# Patient Record
Sex: FEMALE | Race: WHITE | HISPANIC OR LATINO | Employment: PART TIME | URBAN - METROPOLITAN AREA
[De-identification: names, ages, dates, MRNs, and addresses within clinical notes are randomized per-mention and may not be internally consistent; named-entity substitution may affect disease eponyms.]

---

## 2017-11-07 ENCOUNTER — OFFICE VISIT (OUTPATIENT)
Dept: URGENT CARE | Facility: CLINIC | Age: 62
End: 2017-11-07
Payer: COMMERCIAL

## 2017-11-07 VITALS
RESPIRATION RATE: 16 BRPM | HEIGHT: 67 IN | DIASTOLIC BLOOD PRESSURE: 88 MMHG | TEMPERATURE: 98 F | SYSTOLIC BLOOD PRESSURE: 125 MMHG | WEIGHT: 203 LBS | HEART RATE: 87 BPM | OXYGEN SATURATION: 98 % | BODY MASS INDEX: 31.86 KG/M2

## 2017-11-07 DIAGNOSIS — J32.9 SINUSITIS, UNSPECIFIED CHRONICITY, UNSPECIFIED LOCATION: Primary | ICD-10-CM

## 2017-11-07 DIAGNOSIS — J02.9 ACUTE PHARYNGITIS, UNSPECIFIED ETIOLOGY: ICD-10-CM

## 2017-11-07 DIAGNOSIS — H69.93 EUSTACHIAN TUBE DYSFUNCTION, BILATERAL: ICD-10-CM

## 2017-11-07 LAB
CTP QC/QA: YES
S PYO RRNA THROAT QL PROBE: NEGATIVE

## 2017-11-07 PROCEDURE — 87880 STREP A ASSAY W/OPTIC: CPT | Mod: QW,S$GLB,, | Performed by: PHYSICIAN ASSISTANT

## 2017-11-07 PROCEDURE — 96372 THER/PROPH/DIAG INJ SC/IM: CPT | Mod: S$GLB,,, | Performed by: EMERGENCY MEDICINE

## 2017-11-07 PROCEDURE — 99203 OFFICE O/P NEW LOW 30 MIN: CPT | Mod: 25,S$GLB,, | Performed by: PHYSICIAN ASSISTANT

## 2017-11-07 RX ORDER — METFORMIN HYDROCHLORIDE 1000 MG/1
1000 TABLET, FILM COATED, EXTENDED RELEASE ORAL
COMMUNITY
End: 2018-10-30

## 2017-11-07 RX ORDER — CEFDINIR 300 MG/1
300 CAPSULE ORAL 2 TIMES DAILY
Qty: 20 CAPSULE | Refills: 0 | Status: SHIPPED | OUTPATIENT
Start: 2017-11-07 | End: 2017-11-07 | Stop reason: SDUPTHER

## 2017-11-07 RX ORDER — BETAMETHASONE SODIUM PHOSPHATE AND BETAMETHASONE ACETATE 3; 3 MG/ML; MG/ML
6 INJECTION, SUSPENSION INTRA-ARTICULAR; INTRALESIONAL; INTRAMUSCULAR; SOFT TISSUE ONCE
Status: COMPLETED | OUTPATIENT
Start: 2017-11-07 | End: 2017-11-07

## 2017-11-07 RX ORDER — CEFDINIR 300 MG/1
300 CAPSULE ORAL 2 TIMES DAILY
Qty: 20 CAPSULE | Refills: 0 | Status: SHIPPED | OUTPATIENT
Start: 2017-11-07 | End: 2017-11-17

## 2017-11-07 RX ORDER — INSULIN GLARGINE 100 [IU]/ML
INJECTION, SOLUTION SUBCUTANEOUS NIGHTLY
COMMUNITY
End: 2018-10-30 | Stop reason: SDUPTHER

## 2017-11-07 RX ORDER — HYDROCHLOROTHIAZIDE 25 MG/1
25 TABLET ORAL DAILY
COMMUNITY
End: 2018-10-30

## 2017-11-07 RX ADMIN — BETAMETHASONE SODIUM PHOSPHATE AND BETAMETHASONE ACETATE 6 MG: 3; 3 INJECTION, SUSPENSION INTRA-ARTICULAR; INTRALESIONAL; INTRAMUSCULAR; SOFT TISSUE at 01:11

## 2017-11-07 NOTE — PATIENT INSTRUCTIONS
- Please return here or go to the Emergency Department for any concerns or worsening of condition.   - If you were prescribed antibiotics, please take them to completion.  - With steroid injections there is a possible risk of temporary elevation of blood glucose (sugar) and/or exacerbation of diabetic symptoms throughout the effective duration of the medication(s) provided. Please monitor your blood sugar levels and follow up with your PCP should there be significant change in your blood sugar levels for an extended period time, especially if you develop any symptoms of uncontrolled diabetes.    - Please follow up with your primary care provider (PCP) or discussed specialist(s) as needed.         Sinusitis (Antibiotic Treatment)    The sinuses are air-filled spaces within the bones of the face. They connect to the inside of the nose. Sinusitis is an inflammation of the tissue lining the sinus cavity. Sinus inflammation can occur during a cold. It can also be due to allergies to pollens and other particles in the air. Sinusitis can cause symptoms of sinus congestion and fullness. A sinus infection causes fever, headache and facial pain. There is often green or yellow drainage from the nose or into the back of the throat (post-nasal drip). You have been given antibiotics to treat this condition.  Home care:  · Take the full course of antibiotics as instructed. Do not stop taking them, even if you feel better.  · Drink plenty of water, hot tea, and other liquids. This may help thin mucus. It also may promote sinus drainage.  · Heat may help soothe painful areas of the face. Use a towel soaked in hot water. Or,  the shower and direct the hot spray onto your face. Using a vaporizer along with a menthol rub at night may also help.   · An expectorant containing guaifenesin may help thin the mucus and promote drainage from the sinuses.  · Over-the-counter decongestants may be used unless a similar medicine was  prescribed. Nasal sprays work the fastest. Use one that contains phenylephrine or oxymetazoline. First blow the nose gently. Then use the spray. Do not use these medicines more often than directed on the label or symptoms may get worse. You may also use tablets containing pseudoephedrine. Avoid products that combine ingredients, because side effects may be increased. Read labels. You can also ask the pharmacist for help. (NOTE: Persons with high blood pressure should not use decongestants. They can raise blood pressure.)  · Over-the-counter antihistamines may help if allergies contributed to your sinusitis.    · Do not use nasal rinses or irrigation during an acute sinus infection, unless told to by your health care provider. Rinsing may spread the infection to other sinuses.  · Use acetaminophen or ibuprofen to control pain, unless another pain medicine was prescribed. (If you have chronic liver or kidney disease or ever had a stomach ulcer, talk with your doctor before using these medicines. Aspirin should never be used in anyone under 18 years of age who is ill with a fever. It may cause severe liver damage.)  · Don't smoke. This can worsen symptoms.  Follow-up care  Follow up with your healthcare provider or our staff if you are not improving within the next week.  When to seek medical advice  Call your healthcare provider if any of these occur:  · Facial pain or headache becoming more severe  · Stiff neck  · Unusual drowsiness or confusion  · Swelling of the forehead or eyelids  · Vision problems, including blurred or double vision  · Fever of 100.4ºF (38ºC) or higher, or as directed by your healthcare provider  · Seizure  · Breathing problems  · Symptoms not resolving within 10 days  Date Last Reviewed: 4/13/2015 © 2000-2017 [employer]. [address] [place] [address], [place] [address]. All rights reserved. This information is not intended as a substitute for professional medical care. Always follow  your healthcare professional's instructions.      Earache, No Infection (Adult)  Earaches can happen without an infection. This occurs when air and fluid build up behind the eardrum causing a feeling of fullness and discomfort and reduced hearing. This is called otitis media with effusion (OME) or serous otitis media. It means there is fluid in the middle ear. It is not the same as acute otitis media, which is typically from infection.  OME can happen when you have a cold if congestion blocks the passage that drains the middle ear. This passage is called the eustachian tube. OME may also occur with nasal allergies or after a bacterial middle ear infection.    The pain or discomfort may come and go. You may hear clicking or popping sounds when you chew or swallow. You may feel that your balance is off. Or you may hear ringing in the ear.  It often takes from several weeks up to 3 months for the fluid to clear on its own. Oral pain relievers and ear drops help if there is pain. Decongestants and antihistamines sometimes help. Antibiotics don't help since there is no infection. Your doctor may prescribe a nasal spray to help reduce swelling in the nose and eustachian tube. This can allow the ear to drain.  If your OME doesn't improve after 3 months, surgery may be used to drain the fluid and insert a small tube in the eardrum to allow continued drainage.  Because the middle ear fluid can become infected, it is important to watch for signs of an ear infection which may develop later. These signs include increased ear pain, fever, or drainage from the ear.  Home care  The following guidelines will help you care for yourself at home:  · You may use over-the-counter medicine as directed to control pain, unless another medicine was prescribed. If you have chronic liver or kidney disease or ever had a stomach ulcer or GI bleeding, talk with your doctor before using these medicines. Aspirin should never be used in anyone  under 18 years of age who is ill with a fever. It may cause severe liver damage.  · You may use over-the-counter decongestants such as phenylephrine or pseudoephedrine. But they are not always helpful. Don't use nasal spray decongestants more than 3 days. Longer use can make congestion worse. Prescription nasal sprays from your doctor don't typically have those restrictions.  · Antihistamines may help if you are also having allergy symptoms.  · You may use medicines such as guaifenesin to thin mucus and promote drainage.  Follow-up care  Follow up with your healthcare provider or as advised if you are not feeling better after 3 days.  When to seek medical advice  Call your healthcare provider right away if any of the following occur:  · Your ear pain gets worse or does not start to improve   · Fever of 100.4°F (38°C) or higher, or as directed by your healthcare provider  · Fluid or blood draining from the ear  · Headache or sinus pain  · Stiff neck  · Unusual drowsiness or confusion  Date Last Reviewed: 10/1/2016  © 6285-6236 [employer]. [address] [place] [address], [place] [address]. All rights reserved. This information is not intended as a substitute for professional medical care. Always follow your healthcare professional's instructions.

## 2017-11-07 NOTE — PROGRESS NOTES
"Subjective:       Patient ID: Jill Perez is a 62 y.o. female.    Vitals:  height is 5' 7" (1.702 m) and weight is 92.1 kg (203 lb). Her temperature is 98.4 °F (36.9 °C). Her blood pressure is 125/88 and her pulse is 87. Her respiration is 16 and oxygen saturation is 98%.     Chief Complaint: Sore Throat    States that she has been having sinus pressure and pain for over 2-3 weeks and recently flew in a plane and her symptoms became significantly worse (especially the ear pressure)      Sore Throat    This is a new problem. Episode onset: 1 week. The problem has been gradually worsening. Neither side of throat is experiencing more pain than the other. There has been no fever. The pain is at a severity of 8/10. The pain is moderate. Associated symptoms include congestion, ear pain, headaches and a plugged ear sensation. Pertinent negatives include no abdominal pain, coughing, diarrhea, hoarse voice, neck pain, shortness of breath or vomiting. She has tried acetaminophen and gargles for the symptoms. The treatment provided mild relief.   Sinus Problem   Associated symptoms include chills, congestion, ear pain, headaches, sinus pressure and a sore throat. Pertinent negatives include no coughing, hoarse voice, neck pain or shortness of breath.     Review of Systems   Constitution: Positive for chills and malaise/fatigue. Negative for fever.   HENT: Positive for congestion, ear pain, sinus pressure and sore throat. Negative for hoarse voice.    Eyes: Negative for blurred vision, [facility], pain, redness and visual disturbance.   Cardiovascular: Negative for chest pain, dyspnea on exertion, leg swelling, near-syncope and syncope.   Respiratory: Negative for cough, shortness of breath, sputum production and wheezing.    Hematologic/Lymphatic: Negative for adenopathy.   Skin: Negative for itching and rash.   Musculoskeletal: Negative for back pain, neck pain and stiffness.   Gastrointestinal: Negative for abdominal pain, " diarrhea, nausea and vomiting.   Neurological: Positive for headaches. Negative for dizziness, light-headedness and numbness.   Psychiatric/Behavioral: Negative for altered mental status.   Allergic/Immunologic: Negative for hives.   All other systems reviewed and are negative.      Objective:      Physical Exam   Constitutional: She is oriented to person, place, and time. She appears well-developed and well-nourished.  Non-toxic appearance. She has a sickly appearance. She does not appear ill. No distress.   HENT:   Head: Normocephalic and atraumatic.   Right Ear: External ear and ear canal normal. Tympanic membrane is bulging. A middle ear effusion (clear) is present.   Left Ear: External ear and ear canal normal. Tympanic membrane is bulging. A middle ear effusion (clear) is present.   Nose: Mucosal edema present. No epistaxis. Right sinus exhibits maxillary sinus tenderness and frontal sinus tenderness. Left sinus exhibits maxillary sinus tenderness and frontal sinus tenderness.   Mouth/Throat: Uvula is midline and mucous membranes are normal. No uvula swelling. Posterior oropharyngeal erythema present.   Eyes: Pupils are equal, round, and reactive to light.   Neck: Normal range of motion. Neck supple.   Cardiovascular: Normal rate, regular rhythm and normal heart sounds.  Exam reveals no gallop and no friction rub.    No murmur heard.  Pulmonary/Chest: Effort normal and breath sounds normal. No respiratory distress. She has no decreased breath sounds. She has no wheezes. She has no rhonchi. She has no rales.   Musculoskeletal: Normal range of motion.   Lymphadenopathy:        Head (right side): No submental, no submandibular, no tonsillar, no preauricular, no posterior auricular and no occipital adenopathy present.        Head (left side): No submental, no submandibular, no tonsillar, no preauricular, no posterior auricular and no occipital adenopathy present.     She has no cervical adenopathy.        Right  "cervical: No posterior cervical adenopathy present.       Left cervical: No posterior cervical adenopathy present.        Right: No supraclavicular adenopathy present.        Left: No supraclavicular adenopathy present.   Neurological: She is alert and oriented to person, place, and time. She is not disoriented. Coordination and gait normal.   Skin: No abrasion, no ecchymosis, no laceration and no rash noted. No erythema.   Psychiatric: She has a normal mood and affect. Her behavior is normal.   Nursing note and vitals reviewed.      /88 (BP Location: Right arm, Patient Position: Sitting, BP Method: Large (Automatic))   Pulse 87   Temp 98.4 °F (36.9 °C)   Resp 16   Ht 5' 7" (1.702 m)   Wt 92.1 kg (203 lb)   SpO2 98%   BMI 31.79 kg/m²      Assessment:       1. Sinusitis, unspecified chronicity, unspecified location    2. Acute pharyngitis, unspecified etiology    3. Eustachian tube dysfunction, bilateral        Plan:         Sinusitis, unspecified chronicity, unspecified location  -     betamethasone acetate-betamethasone sodium phosphate injection 6 mg; Inject 1 mL (6 mg total) into the muscle once.  -     Discontinue: cefdinir (OMNICEF) 300 MG capsule; Take 1 capsule (300 mg total) by mouth 2 (two) times daily.  Dispense: 20 capsule; Refill: 0  -     cefdinir (OMNICEF) 300 MG capsule; Take 1 capsule (300 mg total) by mouth 2 (two) times daily.  Dispense: 20 capsule; Refill: 0    Acute pharyngitis, unspecified etiology  -     POCT rapid strep A  -     betamethasone acetate-betamethasone sodium phosphate injection 6 mg; Inject 1 mL (6 mg total) into the muscle once.  -     Discontinue: cefdinir (OMNICEF) 300 MG capsule; Take 1 capsule (300 mg total) by mouth 2 (two) times daily.  Dispense: 20 capsule; Refill: 0  -     cefdinir (OMNICEF) 300 MG capsule; Take 1 capsule (300 mg total) by mouth 2 (two) times daily.  Dispense: 20 capsule; Refill: 0    Eustachian tube dysfunction, bilateral  -     " betamethasone acetate-betamethasone sodium phosphate injection 6 mg; Inject 1 mL (6 mg total) into the muscle once.      - Please return here or go to the Emergency Department for any concerns or worsening of condition.   - If you were prescribed antibiotics, please take them to completion.  - With steroid injections there is a possible risk of temporary elevation of blood glucose (sugar) and/or exacerbation of diabetic symptoms throughout the effective duration of the medication(s) provided. Please monitor your blood sugar levels and follow up with your PCP should there be significant change in your blood sugar levels for an extended period time, especially if you develop any symptoms of uncontrolled diabetes.    - Please follow up with your primary care provider (PCP) or discussed specialist(s) as needed.

## 2018-10-23 ENCOUNTER — TELEPHONE (OUTPATIENT)
Dept: FAMILY MEDICINE | Facility: CLINIC | Age: 63
End: 2018-10-23

## 2018-10-23 ENCOUNTER — LAB VISIT (OUTPATIENT)
Dept: LAB | Facility: HOSPITAL | Age: 63
End: 2018-10-23
Attending: FAMILY MEDICINE
Payer: COMMERCIAL

## 2018-10-23 DIAGNOSIS — E78.5 HYPERLIPIDEMIA, UNSPECIFIED HYPERLIPIDEMIA TYPE: ICD-10-CM

## 2018-10-23 DIAGNOSIS — Z79.4 TYPE 2 DIABETES MELLITUS WITH HYPERGLYCEMIA, WITH LONG-TERM CURRENT USE OF INSULIN: ICD-10-CM

## 2018-10-23 DIAGNOSIS — Z01.419 WELL WOMAN EXAM: Primary | ICD-10-CM

## 2018-10-23 DIAGNOSIS — E11.65 TYPE 2 DIABETES MELLITUS WITH HYPERGLYCEMIA, WITH LONG-TERM CURRENT USE OF INSULIN: ICD-10-CM

## 2018-10-23 DIAGNOSIS — E11.65 POORLY CONTROLLED DIABETES MELLITUS: ICD-10-CM

## 2018-10-23 DIAGNOSIS — I10 ESSENTIAL HYPERTENSION: ICD-10-CM

## 2018-10-23 DIAGNOSIS — Z01.419 WELL WOMAN EXAM: ICD-10-CM

## 2018-10-23 LAB
25(OH)D3+25(OH)D2 SERPL-MCNC: 20 NG/ML
ALBUMIN SERPL BCP-MCNC: 3.8 G/DL
ALP SERPL-CCNC: 115 U/L
ALT SERPL W/O P-5'-P-CCNC: 11 U/L
ANION GAP SERPL CALC-SCNC: 11 MMOL/L
AST SERPL-CCNC: 19 U/L
BASOPHILS # BLD AUTO: 0.02 K/UL
BASOPHILS NFR BLD: 0.4 %
BILIRUB SERPL-MCNC: 0.4 MG/DL
BUN SERPL-MCNC: 17 MG/DL
CALCIUM SERPL-MCNC: 10.2 MG/DL
CHLORIDE SERPL-SCNC: 103 MMOL/L
CHOLEST SERPL-MCNC: 244 MG/DL
CHOLEST/HDLC SERPL: 3.7 {RATIO}
CO2 SERPL-SCNC: 26 MMOL/L
CREAT SERPL-MCNC: 0.8 MG/DL
DIFFERENTIAL METHOD: ABNORMAL
EOSINOPHIL # BLD AUTO: 0.1 K/UL
EOSINOPHIL NFR BLD: 1.3 %
ERYTHROCYTE [DISTWIDTH] IN BLOOD BY AUTOMATED COUNT: 16.2 %
EST. GFR  (AFRICAN AMERICAN): >60 ML/MIN/1.73 M^2
EST. GFR  (NON AFRICAN AMERICAN): >60 ML/MIN/1.73 M^2
ESTIMATED AVG GLUCOSE: 166 MG/DL
GLUCOSE SERPL-MCNC: 157 MG/DL
HBA1C MFR BLD HPLC: 7.4 %
HCT VFR BLD AUTO: 40.5 %
HDLC SERPL-MCNC: 66 MG/DL
HDLC SERPL: 27 %
HGB BLD-MCNC: 13 G/DL
IMM GRANULOCYTES # BLD AUTO: 0.02 K/UL
IMM GRANULOCYTES NFR BLD AUTO: 0.4 %
LDLC SERPL CALC-MCNC: 157.8 MG/DL
LYMPHOCYTES # BLD AUTO: 2.4 K/UL
LYMPHOCYTES NFR BLD: 43.3 %
MCH RBC QN AUTO: 27.3 PG
MCHC RBC AUTO-ENTMCNC: 32.1 G/DL
MCV RBC AUTO: 85 FL
MONOCYTES # BLD AUTO: 0.5 K/UL
MONOCYTES NFR BLD: 9.6 %
NEUTROPHILS # BLD AUTO: 2.5 K/UL
NEUTROPHILS NFR BLD: 45 %
NONHDLC SERPL-MCNC: 178 MG/DL
NRBC BLD-RTO: 0 /100 WBC
PLATELET # BLD AUTO: 410 K/UL
PMV BLD AUTO: 10.8 FL
POTASSIUM SERPL-SCNC: 4.1 MMOL/L
PROT SERPL-MCNC: 8.3 G/DL
RBC # BLD AUTO: 4.76 M/UL
SODIUM SERPL-SCNC: 140 MMOL/L
TRIGL SERPL-MCNC: 101 MG/DL
TSH SERPL DL<=0.005 MIU/L-ACNC: 1.67 UIU/ML
WBC # BLD AUTO: 5.43 K/UL

## 2018-10-23 PROCEDURE — 36415 COLL VENOUS BLD VENIPUNCTURE: CPT | Mod: PO

## 2018-10-23 PROCEDURE — 83036 HEMOGLOBIN GLYCOSYLATED A1C: CPT

## 2018-10-23 PROCEDURE — 85025 COMPLETE CBC W/AUTO DIFF WBC: CPT

## 2018-10-23 PROCEDURE — 84443 ASSAY THYROID STIM HORMONE: CPT

## 2018-10-23 PROCEDURE — 80053 COMPREHEN METABOLIC PANEL: CPT

## 2018-10-23 PROCEDURE — 80061 LIPID PANEL: CPT

## 2018-10-23 PROCEDURE — 82306 VITAMIN D 25 HYDROXY: CPT

## 2018-10-30 ENCOUNTER — OFFICE VISIT (OUTPATIENT)
Dept: FAMILY MEDICINE | Facility: CLINIC | Age: 63
End: 2018-10-30
Payer: COMMERCIAL

## 2018-10-30 VITALS
WEIGHT: 203.94 LBS | HEIGHT: 67 IN | HEART RATE: 97 BPM | BODY MASS INDEX: 32.01 KG/M2 | OXYGEN SATURATION: 97 % | DIASTOLIC BLOOD PRESSURE: 88 MMHG | SYSTOLIC BLOOD PRESSURE: 118 MMHG

## 2018-10-30 DIAGNOSIS — R79.89 LOW VITAMIN D LEVEL: ICD-10-CM

## 2018-10-30 DIAGNOSIS — Z12.4 CERVICAL CANCER SCREENING: ICD-10-CM

## 2018-10-30 DIAGNOSIS — E78.49 OTHER HYPERLIPIDEMIA: ICD-10-CM

## 2018-10-30 DIAGNOSIS — Z11.59 NEED FOR HEPATITIS C SCREENING TEST: ICD-10-CM

## 2018-10-30 DIAGNOSIS — Z23 NEED FOR INFLUENZA VACCINATION: ICD-10-CM

## 2018-10-30 DIAGNOSIS — I10 ESSENTIAL HYPERTENSION: ICD-10-CM

## 2018-10-30 DIAGNOSIS — Z23 NEED FOR VACCINATION AGAINST STREPTOCOCCUS PNEUMONIAE: ICD-10-CM

## 2018-10-30 DIAGNOSIS — E11.65 TYPE 2 DIABETES MELLITUS WITH HYPERGLYCEMIA, WITH LONG-TERM CURRENT USE OF INSULIN: ICD-10-CM

## 2018-10-30 DIAGNOSIS — Z79.4 TYPE 2 DIABETES MELLITUS WITH HYPERGLYCEMIA, WITH LONG-TERM CURRENT USE OF INSULIN: ICD-10-CM

## 2018-10-30 DIAGNOSIS — Z23 NEED FOR DIPHTHERIA-TETANUS-PERTUSSIS (TDAP) VACCINE: ICD-10-CM

## 2018-10-30 DIAGNOSIS — Z12.11 COLON CANCER SCREENING: ICD-10-CM

## 2018-10-30 DIAGNOSIS — Z98.84 S/P GASTRIC BYPASS: ICD-10-CM

## 2018-10-30 DIAGNOSIS — F51.01 PRIMARY INSOMNIA: ICD-10-CM

## 2018-10-30 DIAGNOSIS — Z01.419 WELL WOMAN EXAM: Primary | ICD-10-CM

## 2018-10-30 DIAGNOSIS — Z12.39 SCREENING FOR MALIGNANT NEOPLASM OF BREAST: ICD-10-CM

## 2018-10-30 DIAGNOSIS — Z90.81 S/P SPLENECTOMY: ICD-10-CM

## 2018-10-30 PROCEDURE — 99999 PR PBB SHADOW E&M-EST. PATIENT-LVL V: CPT | Mod: PBBFAC,,, | Performed by: FAMILY MEDICINE

## 2018-10-30 PROCEDURE — 99386 PREV VISIT NEW AGE 40-64: CPT | Mod: 25,S$GLB,, | Performed by: FAMILY MEDICINE

## 2018-10-30 PROCEDURE — 90686 IIV4 VACC NO PRSV 0.5 ML IM: CPT | Mod: S$GLB,,, | Performed by: FAMILY MEDICINE

## 2018-10-30 PROCEDURE — 90471 IMMUNIZATION ADMIN: CPT | Mod: S$GLB,,, | Performed by: FAMILY MEDICINE

## 2018-10-30 PROCEDURE — 3045F PR MOST RECENT HEMOGLOBIN A1C LEVEL 7.0-9.0%: CPT | Mod: CPTII,S$GLB,, | Performed by: FAMILY MEDICINE

## 2018-10-30 RX ORDER — LISINOPRIL AND HYDROCHLOROTHIAZIDE 12.5; 2 MG/1; MG/1
TABLET ORAL
Qty: 90 TABLET | Refills: 1 | Status: SHIPPED | OUTPATIENT
Start: 2018-10-30 | End: 2019-01-22 | Stop reason: SDUPTHER

## 2018-10-30 RX ORDER — ERGOCALCIFEROL 1.25 MG/1
50000 CAPSULE ORAL
Qty: 12 CAPSULE | Refills: 0 | Status: SHIPPED | OUTPATIENT
Start: 2018-10-30 | End: 2019-06-05

## 2018-10-30 RX ORDER — ZOLPIDEM TARTRATE 10 MG/1
TABLET ORAL
Refills: 0 | COMMUNITY
Start: 2018-10-15 | End: 2018-10-30 | Stop reason: SDUPTHER

## 2018-10-30 RX ORDER — ATORVASTATIN CALCIUM 40 MG/1
40 TABLET, FILM COATED ORAL DAILY
Qty: 90 TABLET | Refills: 3 | Status: SHIPPED | OUTPATIENT
Start: 2018-10-30 | End: 2019-01-22 | Stop reason: SDUPTHER

## 2018-10-30 RX ORDER — ZOLPIDEM TARTRATE 5 MG/1
5 TABLET ORAL NIGHTLY PRN
Qty: 90 TABLET | Refills: 0 | Status: SHIPPED | OUTPATIENT
Start: 2018-10-30 | End: 2018-12-11

## 2018-10-30 RX ORDER — INSULIN GLARGINE 100 [IU]/ML
20 INJECTION, SOLUTION SUBCUTANEOUS NIGHTLY
Qty: 60 ML | Refills: 0 | Status: SHIPPED | OUTPATIENT
Start: 2018-10-30 | End: 2019-01-22

## 2018-10-30 RX ORDER — LISINOPRIL AND HYDROCHLOROTHIAZIDE 12.5; 2 MG/1; MG/1
TABLET ORAL
Refills: 1 | COMMUNITY
Start: 2018-10-15 | End: 2018-10-30 | Stop reason: SDUPTHER

## 2018-10-30 RX ORDER — METFORMIN HYDROCHLORIDE 1000 MG/1
1000 TABLET ORAL 2 TIMES DAILY WITH MEALS
Qty: 180 TABLET | Refills: 0 | Status: SHIPPED | OUTPATIENT
Start: 2018-10-30 | End: 2019-01-22 | Stop reason: SDUPTHER

## 2018-10-30 NOTE — PATIENT INSTRUCTIONS
Please call your old physician Dr. Church for pneumonia vaccination records    Small sustainable changes  Increase intake of fiber  Decrease carb intake  Decrease drinks with added sugar such as soda or juice  1-2 servings of citrus fruits, berries, pineapple or melon a day (1/2 cup)  Avoid fried foods  No grains, rice, pasta, potatoes, bread, corn, peas, oatmeal, grits, tortillas, crackers, chips  Increase exercise  Handouts given  Consider [facility] alejandro  Follow up in 3 months      Lantus 10 units daily - instructed to titrate up by 2 units every 3-4 days if blood glucose consistently above 130 in the morning after 10 days  Metformin - continue 1000 mg bid  Start once weekly trulicity - monitor for any issues such as swallowing or neck swelling  Reviewed patient's current insulin regimen. Counseled to rotate injection sites.  Advised frequent self blood glucose monitoring.  Patient encouraged to document glucose results and bring them to every clinic visit. Will send me a log on patient portal in 2 weeks.  Hypoglycemia precautions discussed. Instructed on precautions before driving.    Close adherence to lifestyle changes recommended    Diabetes Management Status    Statin: Not taking  ACE/ARB: Taking    Screening or Prevention Patient's value Goal Complete/Controlled?   HgA1C Testing and Control   Lab Results   Component Value Date    HGBA1C 7.4 (H) 10/23/2018      Annually/Less than 8% Yes   Lipid profile : 10/23/2018 Annually Yes   LDL control Lab Results   Component Value Date    LDLCALC 157.8 10/23/2018    Annually/Less than 100 mg/dl  No   Nephropathy screening Lab Results   Component Value Date    LABMICR 18.0 10/23/2018     Lab Results   Component Value Date    PROTEINUA Negative 08/24/2015    Annually Yes   Blood pressure BP Readings from Last 1 Encounters:   10/30/18 118/88    Less than 140/90 Yes   Dilated retinal exam Most Recent Eye Exam Date: Not Found Annually No   Foot exam   : 10/30/2018  Annually No

## 2018-10-30 NOTE — PROGRESS NOTES
Subjective:       Patient ID: Jill Perez is a 63 y.o. female.    Chief Complaint: Establish Care    Jill Perez is a 63 y.o. female who presents today to establish care.     Diet: she doesn't watch what she eats. She drinks soda, every day. She doesn't drink juice. She drinks coffee with sweet and low, about 4 cups/day.   Exercise: she walks, about 3 times a week. Has lost >200 pounds since gastric bypass!    Flu: ordered  Tdap: 3 years ago, she stepped on a nail and got one  Shingles Vaccine: ordered at pharmacy  PNA Vaccine: records pending  Labs: reviewed today  C-scope: last colonoscopy was at age 52  Mammogram: ordered  Pap: never had abnormal pap smear. Desires gyn    PMHx: reviewed in EMR and updated  Meds: reviewed in EMR and updated  Shx: reviewed in EMR and updated  FMHx: reviewed in EMR and updated  Social: moved [place] from [place] Originally lived [place].  is also a patient of mine.     Labs as below reviewed in detail.       Review of Systems   Constitutional: Negative for chills and fever.   Respiratory: Negative for cough and shortness of breath.    Cardiovascular: Negative for chest pain.   Gastrointestinal: Negative for blood in stool, constipation, diarrhea, nausea and vomiting.   Genitourinary: Negative for difficulty urinating.         Health Maintenance Due   Topic Date Due    Hepatitis C Screening  1955    Eye Exam  04/28/1967    Pneumococcal PPSV23 (Medium Risk) (1) 04/28/1975    Pap Smear with HPV Cotest  04/28/1978    Mammogram  04/28/1997    Colonoscopy  04/28/2007    Zoster Vaccine  04/28/2017    Influenza Vaccine  08/01/2018     Immunization History   Administered Date(s) Administered    Influenza - Quadrivalent - PF 12/22/2016, 10/30/2018    Pneumococcal Conjugate - 13 Valent 12/22/2016       Lab Review   Lab Visit on 10/23/2018   Component Date Value    Hemoglobin A1C 10/23/2018 7.4*    Estimated Avg Glucose 10/23/2018 166*    WBC 10/23/2018  5.43     RBC 10/23/2018 4.76     Hemoglobin 10/23/2018 13.0     Hematocrit 10/23/2018 40.5     MCV 10/23/2018 85     MCH 10/23/2018 27.3     MCHC 10/23/2018 32.1     RDW 10/23/2018 16.2*    Platelets 10/23/2018 410*    MPV 10/23/2018 10.8     Sodium 10/23/2018 140     Potassium 10/23/2018 4.1     Chloride 10/23/2018 103     CO2 10/23/2018 26     Glucose 10/23/2018 157*    BUN, Bld 10/23/2018 17     Creatinine 10/23/2018 0.8     Calcium 10/23/2018 10.2     Total Protein 10/23/2018 8.3     Albumin 10/23/2018 3.8     Total Bilirubin 10/23/2018 0.4     Alkaline Phosphatase 10/23/2018 115     AST 10/23/2018 19     ALT 10/23/2018 11     Anion Gap 10/23/2018 11     eGFR if African American 10/23/2018 >60.0     eGFR if non  Amer* 10/23/2018 >60.0     Cholesterol 10/23/2018 244*    Triglycerides 10/23/2018 101     HDL 10/23/2018 66     LDL Cholesterol 10/23/2018 157.8     HDL/Chol Ratio 10/23/2018 27.0     Total Cholesterol/HDL Ra* 10/23/2018 3.7     Non-HDL Cholesterol 10/23/2018 178     TSH 10/23/2018 1.667     Vit D, 25-Hydroxy 10/23/2018 20*   Lab Visit on 10/23/2018   Component Date Value    Microalbum.,U,Random 10/23/2018 18.0     Creatinine, Random Ur 10/23/2018 238.0     Microalb Creat Ratio 10/23/2018 7.6         Objective:     Vitals:    10/30/18 0910   BP: 118/88   Pulse: 97        Physical Exam   Constitutional: She is oriented to person, place, and time. She appears well-developed and well-nourished.   HENT:   Head: Normocephalic and atraumatic.   Right Ear: Tympanic membrane, external ear and ear canal normal.   Left Ear: Tympanic membrane, external ear and ear canal normal.   Nose: Nose normal.   Mouth/Throat: Oropharynx is clear and moist and mucous membranes are normal.   Eyes: Conjunctivae are normal. Pupils are equal, round, and reactive to light.   Neck: Normal range of motion. Neck supple.   Cardiovascular: Normal rate, regular rhythm and normal heart sounds.    Pulmonary/Chest: Effort normal and breath sounds normal. No respiratory distress.   Abdominal: Soft. Bowel sounds are normal. She exhibits no distension.   Genitourinary:   Genitourinary Comments: deferred    Musculoskeletal: She exhibits no edema.   Feet:   Right Foot:   Protective Sensation: 10 sites tested. 10 sites sensed.   Skin Integrity: Negative for ulcer, blister, skin breakdown, erythema, warmth, callus or dry skin.   Left Foot:   Protective Sensation: 10 sites tested. 10 sites sensed.   Skin Integrity: Negative for ulcer, blister, skin breakdown, erythema, warmth, callus or dry skin.   Neurological: She is alert and oriented to person, place, and time. No cranial nerve deficit or sensory deficit. She exhibits normal muscle tone.   Slightly diminished sensation on the left foot compared to the right s/p knee surgery per patient.    Skin: Skin is warm and dry.   Psychiatric: She has a normal mood and affect. Her speech is normal and behavior is normal. Judgment and thought content normal.   Nursing note and vitals reviewed.      Assessment:       1. Well woman exam    2. Colon cancer screening    3. Need for influenza vaccination    4. Need for diphtheria-tetanus-pertussis (Tdap) vaccine    5. Screening for malignant neoplasm of breast    6. Need for hepatitis C screening test    7. Need for vaccination against Streptococcus pneumoniae    8. S/P splenectomy    9. S/P gastric bypass    10. Primary insomnia    11. Cervical cancer screening    12. Other hyperlipidemia    13. Type 2 diabetes mellitus with hyperglycemia, with long-term current use of insulin    14. Essential hypertension    15. Low vitamin D level    16. BMI 31.0-31.9,adult        Plan:         Temporarily decrease to Lantus 10 units daily - instructed to titrate up by 2 units every 3-4 days if blood glucose consistently above 130 in the morning after 10 days ( this is due to Trulicity being started )    Metformin - continue 1000 mg  bid    Start once weekly trulicity - monitor for any issues such as swallowing or neck swelling.No family history of thyroid cancer. No other history of endocrine tumor noted in the family that patient is aware of. The only family history of cancer that patient is aware of is breast cancer in her sister.     PNA records pending, will give vaccinations as indicated given history of splenectomy once I have outside records. TDAP is UTD per patient. Flu shot given today. Order given for shingrix.     Other diagnosis and orders as below. Follow up in 6 weeks.     Well woman exam  ?Avoid tobacco  ?Be physically active  ?Maintain a healthy weight  ?Eat a diet rich in fruits, vegetables, and whole grains, and low in saturated/trans fat  ?Limit alcohol consumption  ?Protect against sexually transmitted infections  ?Avoid excess sun    Colon cancer screening  -     Case request GI: COLONOSCOPY    Need for influenza vaccination  -     Influenza - Quadrivalent (3 years & older) (PF)    Need for diphtheria-tetanus-pertussis (Tdap) vaccine  UTD per patient    Screening for malignant neoplasm of breast  -     Mammo Digital Screening Bilat with Tomosynthesis with CAD; Future; Expected date: 10/30/2018    Need for hepatitis C screening test  -     Hepatitis C antibody; Future; Expected date: 10/30/2018    Need for vaccination against Streptococcus pneumoniaeS/P splenectomy    S/P gastric bypass  -     Vitamin B12; Future; Expected date: 10/30/2018  -     VITAMIN B1; Future; Expected date: 10/30/2018  -     FOLATE; Future; Expected date: 10/30/2018    Primary insomnia  -     zolpidem (AMBIEN) 5 MG Tab; Take 1 tablet (5 mg total) by mouth nightly as needed.  Dispense: 90 tablet; Refill: 0    Cervical cancer screening  -     Ambulatory referral to Obstetrics / Gynecology    Other hyperlipidemia  -     atorvastatin (LIPITOR) 40 MG tablet; Take 1 tablet (40 mg total) by mouth once daily.  Dispense: 90 tablet; Refill: 3    Type 2 diabetes  mellitus with hyperglycemia, with long-term current use of insulin  -     dulaglutide (TRULICITY) 0.75 mg/0.5 mL PnIj; Inject 0.5 mLs (0.75 mg total) into the skin every 7 days.  Dispense: 6 mL; Refill: 0  -     metFORMIN (GLUCOPHAGE) 1000 MG tablet; Take 1 tablet (1,000 mg total) by mouth 2 (two) times daily with meals.  Dispense: 180 tablet; Refill: 0  -     insulin glargine (LANTUS) 100 unit/mL injection; Inject 20 Units into the skin every evening.  Dispense: 60 mL; Refill: 0  -     Ambulatory Referral to Diabetes Education    Essential hypertension  -     lisinopril-hydrochlorothiazide (PRINZIDE,ZESTORETIC) 20-12.5 mg per tablet; Take 1 tablet by mouth once daily.  Dispense: 90 tablet; Refill: 1      Warning signs discussed, patient to call with any further issues or worsening of symptoms.

## 2018-10-31 ENCOUNTER — TELEPHONE (OUTPATIENT)
Dept: GASTROENTEROLOGY | Facility: CLINIC | Age: 63
End: 2018-10-31

## 2018-11-01 ENCOUNTER — TELEPHONE (OUTPATIENT)
Dept: PHARMACY | Facility: CLINIC | Age: 63
End: 2018-11-01

## 2018-11-01 NOTE — TELEPHONE ENCOUNTER
Good Afternoon.    The prior authorization for Ms. Perez's Trulicity prescription has been approved through 11/1/2019.    The patient has been notified and is aware of the medication approval.    If there are any additional questions about the medications approval please contact the [facility] at, [phone].    Thanks.    Barb Olguin CPhT.  Patient Care Advocate  [facility]  [email or web address]

## 2018-11-06 ENCOUNTER — HOSPITAL ENCOUNTER (OUTPATIENT)
Dept: RADIOLOGY | Facility: HOSPITAL | Age: 63
Discharge: HOME | End: 2018-11-06
Attending: FAMILY MEDICINE
Payer: COMMERCIAL

## 2018-11-06 DIAGNOSIS — Z12.39 SCREENING FOR MALIGNANT NEOPLASM OF BREAST: ICD-10-CM

## 2018-11-06 PROCEDURE — 77063 BREAST TOMOSYNTHESIS BI: CPT | Mod: TC

## 2018-11-06 PROCEDURE — 77063 BREAST TOMOSYNTHESIS BI: CPT | Mod: 26,,, | Performed by: RADIOLOGY

## 2018-11-06 PROCEDURE — 77067 SCR MAMMO BI INCL CAD: CPT | Mod: TC

## 2018-11-06 PROCEDURE — 77067 SCR MAMMO BI INCL CAD: CPT | Mod: 26,,, | Performed by: RADIOLOGY

## 2018-11-08 ENCOUNTER — TELEPHONE (OUTPATIENT)
Dept: RADIOLOGY | Facility: HOSPITAL | Age: 63
End: 2018-11-08

## 2018-11-19 ENCOUNTER — TELEPHONE (OUTPATIENT)
Dept: FAMILY MEDICINE | Facility: CLINIC | Age: 63
End: 2018-11-19

## 2018-11-19 DIAGNOSIS — Z12.4 CERVICAL CANCER SCREENING: Primary | ICD-10-CM

## 2018-11-19 NOTE — TELEPHONE ENCOUNTER
Please call the patient regarding her normal result. There is no mammographic evidence of malignancy.    Please ask her to follow up with gyn. She cancelled her appointment. She also needs to set up her colonoscopy.     When you have her on the phone, please ask how her sugars have been controlled.

## 2018-11-20 NOTE — TELEPHONE ENCOUNTER
Left message for patient to call office back if she need more information about Mammogram result.  Left a detail message about her normal mammogram.

## 2018-11-29 ENCOUNTER — TELEPHONE (OUTPATIENT)
Dept: GASTROENTEROLOGY | Facility: CLINIC | Age: 63
End: 2018-11-29

## 2018-12-11 ENCOUNTER — OFFICE VISIT (OUTPATIENT)
Dept: OBSTETRICS AND GYNECOLOGY | Facility: CLINIC | Age: 63
End: 2018-12-11
Payer: COMMERCIAL

## 2018-12-11 ENCOUNTER — OFFICE VISIT (OUTPATIENT)
Dept: FAMILY MEDICINE | Facility: CLINIC | Age: 63
End: 2018-12-11
Payer: COMMERCIAL

## 2018-12-11 VITALS
HEIGHT: 67 IN | DIASTOLIC BLOOD PRESSURE: 80 MMHG | OXYGEN SATURATION: 96 % | HEART RATE: 88 BPM | TEMPERATURE: 99 F | BODY MASS INDEX: 32.35 KG/M2 | SYSTOLIC BLOOD PRESSURE: 118 MMHG | WEIGHT: 206.13 LBS

## 2018-12-11 VITALS
BODY MASS INDEX: 31.97 KG/M2 | SYSTOLIC BLOOD PRESSURE: 114 MMHG | DIASTOLIC BLOOD PRESSURE: 76 MMHG | WEIGHT: 204.13 LBS

## 2018-12-11 DIAGNOSIS — Z01.419 WELL WOMAN EXAM WITH ROUTINE GYNECOLOGICAL EXAM: Primary | ICD-10-CM

## 2018-12-11 DIAGNOSIS — E11.65 TYPE 2 DIABETES MELLITUS WITH HYPERGLYCEMIA, WITH LONG-TERM CURRENT USE OF INSULIN: Primary | ICD-10-CM

## 2018-12-11 DIAGNOSIS — Z79.4 TYPE 2 DIABETES MELLITUS WITH HYPERGLYCEMIA, WITH LONG-TERM CURRENT USE OF INSULIN: Primary | ICD-10-CM

## 2018-12-11 DIAGNOSIS — E78.49 OTHER HYPERLIPIDEMIA: ICD-10-CM

## 2018-12-11 DIAGNOSIS — Z12.11 COLON CANCER SCREENING: ICD-10-CM

## 2018-12-11 DIAGNOSIS — I10 ESSENTIAL HYPERTENSION: ICD-10-CM

## 2018-12-11 DIAGNOSIS — Z12.4 SCREENING FOR CERVICAL CANCER: ICD-10-CM

## 2018-12-11 DIAGNOSIS — F51.01 PRIMARY INSOMNIA: ICD-10-CM

## 2018-12-11 PROCEDURE — 99386 PREV VISIT NEW AGE 40-64: CPT | Mod: S$GLB,,, | Performed by: OBSTETRICS & GYNECOLOGY

## 2018-12-11 PROCEDURE — 99214 OFFICE O/P EST MOD 30 MIN: CPT | Mod: S$GLB,,, | Performed by: FAMILY MEDICINE

## 2018-12-11 PROCEDURE — 3008F BODY MASS INDEX DOCD: CPT | Mod: CPTII,S$GLB,, | Performed by: FAMILY MEDICINE

## 2018-12-11 PROCEDURE — 99999 PR PBB SHADOW E&M-EST. PATIENT-LVL IV: CPT | Mod: PBBFAC,,, | Performed by: FAMILY MEDICINE

## 2018-12-11 PROCEDURE — 3045F PR MOST RECENT HEMOGLOBIN A1C LEVEL 7.0-9.0%: CPT | Mod: CPTII,S$GLB,, | Performed by: FAMILY MEDICINE

## 2018-12-11 PROCEDURE — 88175 CYTOPATH C/V AUTO FLUID REDO: CPT

## 2018-12-11 PROCEDURE — 99999 PR PBB SHADOW E&M-EST. PATIENT-LVL III: CPT | Mod: PBBFAC,,, | Performed by: OBSTETRICS & GYNECOLOGY

## 2018-12-11 PROCEDURE — 87624 HPV HI-RISK TYP POOLED RSLT: CPT

## 2018-12-11 RX ORDER — ZOLPIDEM TARTRATE 10 MG/1
10 TABLET ORAL NIGHTLY PRN
Qty: 30 TABLET | Refills: 1 | Status: SHIPPED | OUTPATIENT
Start: 2018-12-21 | End: 2019-01-22 | Stop reason: SDUPTHER

## 2018-12-11 NOTE — PROGRESS NOTES
GYNECOLOGY OFFICE NOTE    Reason for visit: annual    HPI: Pt is a 63 y.o.  female  who presents for annual. Cycle: menarche- 18. Reports being menopausal at age 37. Reports hot flashes occasionally. Denies issues with bleeding. She is sexually active. She does not desire STI screening. She denies vaginal [facility].  Last pap: unknown,  denies hx of abnormal. Last MMG 2018- negative.     Past Medical History:   Diagnosis Date    Chronic back pain     Diabetes mellitus     Diabetes mellitus, type 2     Hyperlipidemia     Hypertension     Insomnia        Past Surgical History:   Procedure Laterality Date    APPENDECTOMY      BACK SURGERY      laminectomy and fusion    Breast Reduction  2003     SECTION      x2    CHOLECYSTECTOMY      FRACTURE SURGERY      GASTRIC BYPASS  2010    gastric sleeve    SPLENECTOMY, TOTAL      TOTAL KNEE ARTHROPLASTY Left     TOTAL REDUCTION MAMMOPLASTY Bilateral     over 20yrs ago       Family History   Problem Relation Age of Onset    Heart disease Mother     Heart failure Mother     Rheumatologic disease Mother     Heart disease Father     Coronary artery disease Father     Diabetes Father     Hypertension Father     Hyperlipidemia Father     Breast cancer Sister     Cancer Sister     No Known Problems Brother     No Known Problems Brother     Diabetes Sister     No Known Problems Sister     No Known Problems Sister     No Known Problems Sister        Social History     Tobacco Use    Smoking status: Never Smoker    Smokeless tobacco: Never Used   Substance Use Topics    Alcohol use: Yes     Comment: occasional    Drug use: No       OB History    Para Term  AB Living   3 3 3     3   SAB TAB Ectopic Multiple Live Births           3      # Outcome Date GA Lbr Kadeem/2nd Weight Sex Delivery Anes PTL Lv   3 Term         FILOMENA   2 Term      CS-Unspec   FILOMENA   1 Term      CS-Unspec   FILOMENA          Current Outpatient  "Medications   Medication Sig    atorvastatin (LIPITOR) 40 MG tablet Take 1 tablet (40 mg total) by mouth once daily.    dulaglutide (TRULICITY) 0.75 mg/0.5 mL PnIj Inject 0.5 mLs (0.75 mg total) into the skin every 7 days.    ergocalciferol (ERGOCALCIFEROL) 50,000 unit Cap Take 1 capsule (50,000 Units total) by mouth every 7 days. Start OTC supplement once 3 months of this prescription is complete    insulin glargine (LANTUS SOLOSTAR) 100 unit/mL (3 mL) InPn pen Inject 20 Units into the skin every evening.    lisinopril-hydrochlorothiazide (PRINZIDE,ZESTORETIC) 20-12.5 mg per tablet Take 1 tablet by mouth once daily.    metFORMIN (GLUCOPHAGE) 1000 MG tablet Take 1 tablet (1,000 mg total) by mouth 2 (two) times daily with meals.    pen needle, diabetic 32 gauge x 1/4" Ndle USE AS DIRECTED ONCE DAILY WITH INSULIN    zolpidem (AMBIEN) 5 MG Tab Take 1 tablet (5 mg total) by mouth nightly as needed.     No current facility-administered medications for this visit.        Allergies: Aspirin; Codeine; and Pcn [penicillins]     /76   Wt 92.6 kg (204 lb 2.3 oz)   LMP  (LMP Unknown)   Breastfeeding? No   BMI 31.97 kg/m²     ROS:  GENERAL: Denies fever or chills.   SKIN: Denies rash or lesions.   HEAD: Denies head injury or headache.   CHEST: Denies chest pain or shortness of breath.   CARDIOVASCULAR: Denies palpitations or chest pain.   ABDOMEN: No abdominal pain, constipation, diarrhea, nausea, vomiting or rectal bleeding.   URINARY: No dysuria, hematuria, or burning on urination.  REPRODUCTIVE: See HPI.   BREASTS: see HPI  NEUROLOGIC: Denies syncope or weakness.     Physical Exam:  GENERAL: alert, appears stated age and cooperative  NEUROLOGIC: orientated to person, place and time, normal mood and affect   CHEST: Normal respiratory effort  NECK: normal appearance, no thyromegaly masses or tenderness  SKIN: no acne, striae, hirsutism  BREAST EXAM: breasts appear normal, no suspicious masses, no skin or " nipple changes or axillary nodes  ABDOMEN: abdomen is soft without significant tenderness, masses, organomegaly or guarding, no hernias noted  EXTERNAL GENITALIA:  normal general appearance  URETHRA: normal urethra, normal urethral meatus  VAGINA:  atrophic mucosa  CERVIX:  Normal  UTERUS:  mobile, non-tender  ADNEXA:  normal adnexa in size, nontender and no masses    Diagnosis:  1. Well woman exam with routine gynecological exam    2. Screening for cervical cancer        Plan:   1. Annual- up to date with mmg  2. Pap/hpv today    Orders Placed This Encounter    HPV High Risk Genotypes, PCR    Liquid-based pap smear, screening       Patient was counseled today on the new ACS guidelines for cervical cytology screening as well as the current recommendations for breast cancer screening. She was counseled to follow up with her PCP for other routine health maintenance.     Follow-up in about 1 year (around 12/11/2019) for annual.      Frida De Luna MD  OB/GYN  Pager: [phone]

## 2018-12-11 NOTE — PATIENT INSTRUCTIONS
Lantus 12 units daily - instructed to titrate up or down by 2 units every 3-4 days if blood glucose consistently above 130 in the morning.  Metformin - 2000 mg.  Continue once weekly Trulicity, but increase dose. New RX sent to pharmacy.  - monitor for any issues such as swallowing or neck swelling  Reviewed patient's current insulin regimen. Counseled to rotate injection sites.  Advised frequent self blood glucose monitoring.  Patient encouraged to document glucose results and bring them to every clinic visit. Will send me a log on patient portal in 2 weeks.  Hypoglycemia precautions discussed. Instructed on precautions before driving.    Close adherence to lifestyle changes recommended    Diabetes Management Status    Statin: Taking  ACE/ARB: Taking    Screening or Prevention Patient's value Goal Complete/Controlled?   HgA1C Testing and Control   Lab Results   Component Value Date    HGBA1C 7.4 (H) 10/23/2018      Annually/Less than 8% Yes   Lipid profile : 10/23/2018 Annually Yes   LDL control Lab Results   Component Value Date    LDLCALC 157.8 10/23/2018    Annually/Less than 100 mg/dl  No   Nephropathy screening Lab Results   Component Value Date    LABMICR 18.0 10/23/2018     Lab Results   Component Value Date    PROTEINUA Negative 08/24/2015    Annually Yes   Blood pressure BP Readings from Last 1 Encounters:   12/11/18 114/76    Less than 140/90 Yes   Dilated retinal exam Most Recent Eye Exam Date: Not Found Annually No   Foot exam   : 10/30/2018 Annually Yes

## 2018-12-11 NOTE — LETTER
December 11, 2018      Shahab Merlos, DO  [address]  [place] [address]           [place] [facility]  [address], [address]  [address] [facility]  [place] [address]  Phone: [phone]          Patient: Jill Perez   MR Number: [id number]   YOB: 1955   Date of Visit: 12/11/2018       Dear Dr. Shahab Merlos:    Thank you for referring Jill Perez to me for evaluation. Attached you will find relevant portions of my assessment and plan of care.    If you have questions, please do not hesitate to call me. I look forward to following Jill Perez along with you.    Sincerely,    Frida De Luna MD    Enclosure  CC:  No Recipients    If you would like to receive this communication electronically, please contact [email or web address] or [phone] to request more information on [employer] Link access.    For providers and/or their staff who would like to refer a patient to [facility], please contact us through our one-stop-shop provider referral line, [facility], at [phone].    If you feel you have received this communication in error or would no longer like to receive these types of communications, please e-mail [email or web address]

## 2018-12-11 NOTE — PROGRESS NOTES
Subjective:       Patient ID: Jill Perez is a 63 y.o. female.    Chief Complaint: Follow-up; Hypertension; and Hyperlipidemia    Jill is a 63 y.o. female who presents today for follow up on her chronic medical issues.     DM:  At the last visit, I temporarily decreased to Lantus 10 units daily with instruction to titrate up, continued metformin 1000 mg bid and started once weekly trulicity. No issues swallowing, no neck swelling. Her sugars have been around 140's to 160's. She is taking 12 U of lantus currently.   HTN: stable. No issues with medication. No cough.   DLD: she is tolerating this cholesterol medication. No muscle ache changes. She takes it in the morning.     Insomnia: She is feeling tired. Her last sleep study was 5-6 years ago. She doesn't snore that she is aware of. On the 10 mg of Ambien she felt well rested and was able to complete her days work, but now she feels tired. When she takes Ambien 5 mg, she is able to fall asleep but she tosses and turns and wake up tired.         Review of Systems   Constitutional: Negative for chills and fever.   Respiratory: Negative for cough and shortness of breath.    Gastrointestinal: Negative for blood in stool, constipation, diarrhea, nausea and vomiting.   Genitourinary: Negative for difficulty urinating and dysuria.   Musculoskeletal: Negative for neck pain and neck stiffness.   Skin: Negative for rash.   Neurological: Negative for dizziness, light-headedness and numbness.   Psychiatric/Behavioral: Positive for sleep disturbance.         Objective:     Vitals:    12/11/18 1349   BP: 118/80   Pulse: 88   Temp: 98.9 °F (37.2 °C)        Physical Exam   Constitutional: She is oriented to person, place, and time. She appears well-developed and well-nourished. No distress.   HENT:   Head: Normocephalic and atraumatic.   Eyes: Conjunctivae are normal.   Neck: Normal range of motion. Neck supple.   Cardiovascular: Normal rate and regular rhythm.    Pulmonary/Chest: Effort normal and breath sounds normal.   Abdominal: Soft. There is no tenderness.   Musculoskeletal: She exhibits no edema.   Neurological: She is alert and oriented to person, place, and time.   Skin: Skin is warm. She is not diaphoretic.   Psychiatric: She has a normal mood and affect. Her behavior is normal. Judgment and thought content normal.   Nursing note and vitals reviewed.      Assessment:       1. Type 2 diabetes mellitus with hyperglycemia, with long-term current use of insulin    2. Other hyperlipidemia    3. Essential hypertension    4. BMI 31.0-31.9,adult    5. Primary insomnia    6. Colon cancer screening        Plan:         Discussed risks and benefits of 10 mg of Ambien. She was on this from her previous PCP. Discussed that the FDA indication for women is 5 mg. She is unable to function due to lack of sleep and fatigue during the day with the 5 mg dose. Discussed short term use of 10 mg dose for now while she is still trying to function through the holidays, but that we may need a referral to sleep disorders moving forward. She is aware and accepting of the plan    Lantus 12 units daily - instructed to titrate up or down by 2 units every 3-4 days if blood glucose consistently above 130 in the morning.  Metformin - 2000 mg.  Increase trulicity to 1.5 mg New RX sent to pharmacy. Monitor for any issues such as swallowing or neck swelling  Continue ACE/HCTZ for BP  Continue atorvastatin 40 mg    Follow up in 6 weeks with labs prior.     Type 2 diabetes mellitus with hyperglycemia, with long-term current use of insulin  -     dulaglutide (TRULICITY) 1.5 mg/0.5 mL PnIj; Inject 1.5 mg into the skin every 7 days.  Dispense: 6 mL; Refill: 0  -     Ambulatory Referral to Ophthalmology    Other hyperlipidemia    Essential hypertension    BMI 31.0-31.9,adult    Primary insomnia  -     zolpidem (AMBIEN) 10 mg Tab; Take 1 tablet (10 mg total) by mouth nightly as needed.  Dispense: 30 tablet;  Refill: 1    Colon cancer screening  -     Case request GI: COLONOSCOPY        Warning signs discussed, patient to call with any further issues or worsening of symptoms.

## 2018-12-17 LAB
HPV HR 12 DNA CVX QL NAA+PROBE: NEGATIVE
HPV16 AG SPEC QL: NEGATIVE
HPV18 DNA SPEC QL NAA+PROBE: NEGATIVE

## 2018-12-20 ENCOUNTER — TELEPHONE (OUTPATIENT)
Dept: OBSTETRICS AND GYNECOLOGY | Facility: CLINIC | Age: 63
End: 2018-12-20

## 2018-12-20 NOTE — TELEPHONE ENCOUNTER
----- Message from Frida De Luna MD sent at 12/20/2018  2:45 PM CST -----  Please inform pt of negative pap and hPV results.

## 2019-01-10 ENCOUNTER — TELEPHONE (OUTPATIENT)
Dept: GASTROENTEROLOGY | Facility: CLINIC | Age: 64
End: 2019-01-10

## 2019-01-21 ENCOUNTER — LAB VISIT (OUTPATIENT)
Dept: LAB | Facility: HOSPITAL | Age: 64
End: 2019-01-21
Attending: FAMILY MEDICINE
Payer: COMMERCIAL

## 2019-01-21 DIAGNOSIS — Z98.84 S/P GASTRIC BYPASS: ICD-10-CM

## 2019-01-21 DIAGNOSIS — E78.49 OTHER HYPERLIPIDEMIA: ICD-10-CM

## 2019-01-21 DIAGNOSIS — Z79.4 TYPE 2 DIABETES MELLITUS WITH HYPERGLYCEMIA, WITH LONG-TERM CURRENT USE OF INSULIN: ICD-10-CM

## 2019-01-21 DIAGNOSIS — E11.65 TYPE 2 DIABETES MELLITUS WITH HYPERGLYCEMIA, WITH LONG-TERM CURRENT USE OF INSULIN: ICD-10-CM

## 2019-01-21 DIAGNOSIS — Z11.59 NEED FOR HEPATITIS C SCREENING TEST: ICD-10-CM

## 2019-01-21 LAB
CHOLEST SERPL-MCNC: 127 MG/DL
CHOLEST/HDLC SERPL: 2.2 {RATIO}
ESTIMATED AVG GLUCOSE: 131 MG/DL
FOLATE SERPL-MCNC: 11.2 NG/ML
HBA1C MFR BLD HPLC: 6.2 %
HDLC SERPL-MCNC: 57 MG/DL
HDLC SERPL: 44.9 %
LDLC SERPL CALC-MCNC: 59.2 MG/DL
NONHDLC SERPL-MCNC: 70 MG/DL
TRIGL SERPL-MCNC: 54 MG/DL
VIT B12 SERPL-MCNC: 259 PG/ML

## 2019-01-21 PROCEDURE — 86803 HEPATITIS C AB TEST: CPT

## 2019-01-21 PROCEDURE — 82607 VITAMIN B-12: CPT

## 2019-01-21 PROCEDURE — 83036 HEMOGLOBIN GLYCOSYLATED A1C: CPT

## 2019-01-21 PROCEDURE — 36415 COLL VENOUS BLD VENIPUNCTURE: CPT | Mod: PO

## 2019-01-21 PROCEDURE — 84425 ASSAY OF VITAMIN B-1: CPT

## 2019-01-21 PROCEDURE — 80061 LIPID PANEL: CPT

## 2019-01-21 PROCEDURE — 82746 ASSAY OF FOLIC ACID SERUM: CPT

## 2019-01-22 ENCOUNTER — OFFICE VISIT (OUTPATIENT)
Dept: FAMILY MEDICINE | Facility: CLINIC | Age: 64
End: 2019-01-22
Payer: COMMERCIAL

## 2019-01-22 VITALS
DIASTOLIC BLOOD PRESSURE: 74 MMHG | HEIGHT: 67 IN | OXYGEN SATURATION: 97 % | WEIGHT: 197.06 LBS | SYSTOLIC BLOOD PRESSURE: 112 MMHG | TEMPERATURE: 99 F | BODY MASS INDEX: 30.93 KG/M2 | HEART RATE: 100 BPM

## 2019-01-22 DIAGNOSIS — E53.8 LOW SERUM VITAMIN B12: ICD-10-CM

## 2019-01-22 DIAGNOSIS — Z79.4 TYPE 2 DIABETES MELLITUS WITH HYPERGLYCEMIA, WITH LONG-TERM CURRENT USE OF INSULIN: ICD-10-CM

## 2019-01-22 DIAGNOSIS — F51.01 PRIMARY INSOMNIA: ICD-10-CM

## 2019-01-22 DIAGNOSIS — I10 ESSENTIAL HYPERTENSION: Primary | ICD-10-CM

## 2019-01-22 DIAGNOSIS — Z12.11 COLON CANCER SCREENING: ICD-10-CM

## 2019-01-22 DIAGNOSIS — E78.49 OTHER HYPERLIPIDEMIA: ICD-10-CM

## 2019-01-22 DIAGNOSIS — E11.65 TYPE 2 DIABETES MELLITUS WITH HYPERGLYCEMIA, WITH LONG-TERM CURRENT USE OF INSULIN: ICD-10-CM

## 2019-01-22 DIAGNOSIS — I73.00 RAYNAUD'S DISEASE WITHOUT GANGRENE: ICD-10-CM

## 2019-01-22 DIAGNOSIS — R79.89 LOW VITAMIN D LEVEL: ICD-10-CM

## 2019-01-22 LAB — HCV AB SERPL QL IA: NEGATIVE

## 2019-01-22 PROCEDURE — 3074F PR MOST RECENT SYSTOLIC BLOOD PRESSURE < 130 MM HG: ICD-10-PCS | Mod: CPTII,S$GLB,, | Performed by: FAMILY MEDICINE

## 2019-01-22 PROCEDURE — 3074F SYST BP LT 130 MM HG: CPT | Mod: CPTII,S$GLB,, | Performed by: FAMILY MEDICINE

## 2019-01-22 PROCEDURE — 3078F DIAST BP <80 MM HG: CPT | Mod: CPTII,S$GLB,, | Performed by: FAMILY MEDICINE

## 2019-01-22 PROCEDURE — 3044F PR MOST RECENT HEMOGLOBIN A1C LEVEL <7.0%: ICD-10-PCS | Mod: CPTII,S$GLB,, | Performed by: FAMILY MEDICINE

## 2019-01-22 PROCEDURE — 3008F PR BODY MASS INDEX (BMI) DOCUMENTED: ICD-10-PCS | Mod: CPTII,S$GLB,, | Performed by: FAMILY MEDICINE

## 2019-01-22 PROCEDURE — 3008F BODY MASS INDEX DOCD: CPT | Mod: CPTII,S$GLB,, | Performed by: FAMILY MEDICINE

## 2019-01-22 PROCEDURE — 3044F HG A1C LEVEL LT 7.0%: CPT | Mod: CPTII,S$GLB,, | Performed by: FAMILY MEDICINE

## 2019-01-22 PROCEDURE — 99214 PR OFFICE/OUTPT VISIT, EST, LEVL IV, 30-39 MIN: ICD-10-PCS | Mod: S$GLB,,, | Performed by: FAMILY MEDICINE

## 2019-01-22 PROCEDURE — 99214 OFFICE O/P EST MOD 30 MIN: CPT | Mod: S$GLB,,, | Performed by: FAMILY MEDICINE

## 2019-01-22 PROCEDURE — 3078F PR MOST RECENT DIASTOLIC BLOOD PRESSURE < 80 MM HG: ICD-10-PCS | Mod: CPTII,S$GLB,, | Performed by: FAMILY MEDICINE

## 2019-01-22 PROCEDURE — 99999 PR PBB SHADOW E&M-EST. PATIENT-LVL IV: ICD-10-PCS | Mod: PBBFAC,,, | Performed by: FAMILY MEDICINE

## 2019-01-22 PROCEDURE — 99999 PR PBB SHADOW E&M-EST. PATIENT-LVL IV: CPT | Mod: PBBFAC,,, | Performed by: FAMILY MEDICINE

## 2019-01-22 RX ORDER — ATORVASTATIN CALCIUM 40 MG/1
40 TABLET, FILM COATED ORAL DAILY
Qty: 90 TABLET | Refills: 3 | Status: SHIPPED | OUTPATIENT
Start: 2019-01-22 | End: 2019-07-22 | Stop reason: SDUPTHER

## 2019-01-22 RX ORDER — LISINOPRIL AND HYDROCHLOROTHIAZIDE 12.5; 2 MG/1; MG/1
TABLET ORAL
Qty: 90 TABLET | Refills: 1 | Status: SHIPPED | OUTPATIENT
Start: 2019-01-22 | End: 2019-02-21 | Stop reason: ALTCHOICE

## 2019-01-22 RX ORDER — METFORMIN HYDROCHLORIDE 1000 MG/1
1000 TABLET ORAL 2 TIMES DAILY WITH MEALS
Qty: 180 TABLET | Refills: 0 | Status: SHIPPED | OUTPATIENT
Start: 2019-01-22 | End: 2019-04-08

## 2019-01-22 RX ORDER — ZOLPIDEM TARTRATE 10 MG/1
10 TABLET ORAL NIGHTLY PRN
Qty: 30 TABLET | Refills: 1 | Status: SHIPPED | OUTPATIENT
Start: 2019-01-22 | End: 2019-04-08

## 2019-01-22 NOTE — PROGRESS NOTES
"Subjective:       Patient ID: Jill Perez is a 63 y.o. female.    Chief Complaint: Follow-up    Jill is a 63 y.o. female who presents today for follow up on her chronic medical issues.     DM:  She is on trulicity 1.5 mg weekly and metformin. She is taking 4U of glargine. She has been titrating down from 20 to 12 to now 4. Her sugars in the morning have been around 115. In the afternoon they have been around 130. No trouble swallowing. No neck pain.   HTN: stable. No issues with medication. No cough.   DLD: she is tolerating this cholesterol medication. No myalgias. She takes it in the morning.   Weight: initially lost about 200 pounds s/p bypass surgery. She has lost another 10 pounds. She has been walking around the park in [place]. She has also decreased fried foods and portion size. She has also decreased tortilla's and bread.     Hands and Feet: she has occasional "white and blue" feet. This occurs every other day. This has increased as the weather has gotten colder. Her mother had rheumatoid arthritis. This resolves spontaneously. It is not painful. No dry eyes, dry mouth. No other joint pain or join issues that she is aware of.      Insomnia: She is feeling tired. Her last sleep study was 5-6 years ago. She doesn't snore that she is aware of. On the 10 mg of Ambien she felt well rested and was able to complete her days work, but now she feels tired. When she takes Ambien 5 mg, she is able to fall asleep but she tosses and turns and wakes up tired.     Labs as below were reviewed in detail.       Review of Systems   Constitutional: Negative for chills and fever.   Respiratory: Negative for cough and shortness of breath.    Gastrointestinal: Negative for blood in stool, constipation, diarrhea, nausea and vomiting.   Genitourinary: Negative for difficulty urinating and dysuria.   Musculoskeletal: Negative for neck pain and neck stiffness.   Skin: Negative for rash.   Neurological: Negative for dizziness, " light-headedness and numbness.   Psychiatric/Behavioral: Positive for sleep disturbance.       Results for orders placed or performed in visit on 01/21/19   Hepatitis C antibody   Result Value Ref Range    Hepatitis C Ab Negative    Vitamin B12   Result Value Ref Range    Vitamin B-12 259 210 - 950 pg/mL   FOLATE   Result Value Ref Range    Folate 11.2 4.0 - 24.0 ng/mL   Hemoglobin A1c   Result Value Ref Range    Hemoglobin A1C 6.2 (H) 4.0 - 5.6 %    Estimated Avg Glucose 131 68 - 131 mg/dL   Lipid panel   Result Value Ref Range    Cholesterol 127 120 - 199 mg/dL    Triglycerides 54 30 - 150 mg/dL    HDL 57 40 - 75 mg/dL    LDL Cholesterol 59.2 (L) 63.0 - 159.0 mg/dL    HDL/Chol Ratio 44.9 20.0 - 50.0 %    Total Cholesterol/HDL Ratio 2.2 2.0 - 5.0    Non-HDL Cholesterol 70 mg/dL       Objective:     Vitals:    01/22/19 1337   BP: 112/74   Pulse: 100   Temp: 99 °F (37.2 °C)        Physical Exam   Constitutional: She is oriented to person, place, and time. She appears well-developed and well-nourished. No distress.   HENT:   Head: Normocephalic and atraumatic.   Eyes: Conjunctivae are normal.   Neck: Normal range of motion. Neck supple.   Cardiovascular: Normal rate and regular rhythm.   Pulmonary/Chest: Effort normal and breath sounds normal.   Abdominal: Soft. There is no tenderness.   Musculoskeletal: She exhibits no edema.   Pulses appear normal BL LE  BL feet are warm to the touch  No visible deformity noted   Neurological: She is alert and oriented to person, place, and time.   Skin: Skin is warm. She is not diaphoretic.   Psychiatric: She has a normal mood and affect. Her behavior is normal. Judgment and thought content normal.   Nursing note and vitals reviewed.      Assessment:       1. Essential hypertension    2. Type 2 diabetes mellitus with hyperglycemia, with long-term current use of insulin    3. Other hyperlipidemia    4. Low vitamin D level    5. Primary insomnia    6. Raynaud's disease without  gangrene    7. Colon cancer screening    8. Low serum vitamin B12        Plan:       stop insulin glargine  Continue Metformin - 1000 mg twice daily  Continue trulicity to 1.5 mg. Monitor for any issues such as swallowing or neck swelling  Continue ACE/HCTZ for BP  Continue atorvastatin 40 mg  Start daily OTC B12 replacement   a1c in 3 months. Autoimmune workup for Reynaud's at that time. Earlier if other symptoms such as pain develop      Will refill Ambien for now. Will have to reconsider dosing over the long term.     Essential hypertension  -     lisinopril-hydrochlorothiazide (PRINZIDE,ZESTORETIC) 20-12.5 mg per tablet; Take 1 tablet by mouth once daily.  Dispense: 90 tablet; Refill: 1    Type 2 diabetes mellitus with hyperglycemia, with long-term current use of insulin  -     Hemoglobin A1c; Future; Expected date: 01/22/2019  -     metFORMIN (GLUCOPHAGE) 1000 MG tablet; Take 1 tablet (1,000 mg total) by mouth 2 (two) times daily with meals.  Dispense: 180 tablet; Refill: 0  -     dulaglutide (TRULICITY) 1.5 mg/0.5 mL PnIj; Inject 1.5 mg into the skin every 7 days.  Dispense: 6 mL; Refill: 0    Other hyperlipidemia  -     atorvastatin (LIPITOR) 40 MG tablet; Take 1 tablet (40 mg total) by mouth once daily.  Dispense: 90 tablet; Refill: 3    Low vitamin D level    Primary insomnia  -     zolpidem (AMBIEN) 10 mg Tab; Take 1 tablet (10 mg total) by mouth nightly as needed.  Dispense: 30 tablet; Refill: 1  -     Ambulatory consult to Psychiatry    Raynaud's disease without gangrene  -     Comprehensive metabolic panel; Future; Expected date: 01/22/2019  -     Sedimentation rate; Future; Expected date: 01/22/2019  -     C-reactive protein; Future; Expected date: 01/22/2019  -     YOLIS; Future; Expected date: 01/22/2019  -     Rheumatoid factor; Future; Expected date: 01/22/2019  -     Cyclic citrul peptide antibody, IgG; Future; Expected date: 01/22/2019  -     Sjogrens syndrome-A extractable nuclear antibody;  Future; Expected date: 01/22/2019    Colon cancer screening  -     Case request GI: COLONOSCOPY    Low serum vitamin B12      Warning signs discussed, patient to call with any further issues or worsening of symptoms.

## 2019-01-22 NOTE — PATIENT INSTRUCTIONS
stop insulin glargine  Continue Metformin - 1000 mg twice daily  Continue trulicity to 1.5 mg New RX sent to pharmacy. Monitor for any issues such as swallowing or neck swelling  Continue ACE/HCTZ for BP  Continue atorvastatin 40 mg  Start daily OTC B12 replacement   a1c in 3 months. Autoimmune workup for Reynaud's at that time. Earlier if other symptoms such as pain develop    Diabetes Management Status  Statin: Taking  ACE/ARB: Taking  Screening or Prevention Patient's value Goal Complete/Controlled?   HgA1C Testing and Control   Lab Results   Component Value Date    HGBA1C 6.2 (H) 01/21/2019      Annually/Less than 8% Yes   Lipid profile : 01/21/2019 Annually Yes   LDL control Lab Results   Component Value Date    LDLCALC 59.2 (L) 01/21/2019    Annually/Less than 100 mg/dl  Yes   Nephropathy screening Lab Results   Component Value Date    LABMICR 18.0 10/23/2018     Lab Results   Component Value Date    PROTEINUA Negative 08/24/2015    Annually Yes   Blood pressure BP Readings from Last 1 Encounters:   01/22/19 112/74    Less than 140/90 Yes   Dilated retinal exam Most Recent Eye Exam Date: Not Found Annually No   Foot exam   : 10/30/2018 Annually Yes

## 2019-01-23 ENCOUNTER — TELEPHONE (OUTPATIENT)
Dept: GASTROENTEROLOGY | Facility: CLINIC | Age: 64
End: 2019-01-23

## 2019-01-23 NOTE — TELEPHONE ENCOUNTER
Spoke with patient about scheduling a Colonoscopy. Patient stated that she would like to give office a call back when she gets back in town.

## 2019-01-24 LAB — VIT B1 BLD-MCNC: 49 UG/L (ref 38–122)

## 2019-02-15 ENCOUNTER — TELEPHONE (OUTPATIENT)
Dept: GASTROENTEROLOGY | Facility: CLINIC | Age: 64
End: 2019-02-15

## 2019-02-21 ENCOUNTER — LAB VISIT (OUTPATIENT)
Dept: LAB | Facility: HOSPITAL | Age: 64
End: 2019-02-21
Attending: FAMILY MEDICINE
Payer: COMMERCIAL

## 2019-02-21 ENCOUNTER — OFFICE VISIT (OUTPATIENT)
Dept: FAMILY MEDICINE | Facility: CLINIC | Age: 64
End: 2019-02-21
Payer: COMMERCIAL

## 2019-02-21 VITALS
OXYGEN SATURATION: 97 % | SYSTOLIC BLOOD PRESSURE: 136 MMHG | WEIGHT: 202.19 LBS | BODY MASS INDEX: 31.73 KG/M2 | DIASTOLIC BLOOD PRESSURE: 88 MMHG | TEMPERATURE: 98 F | HEART RATE: 90 BPM | HEIGHT: 67 IN

## 2019-02-21 DIAGNOSIS — I10 ESSENTIAL HYPERTENSION: ICD-10-CM

## 2019-02-21 DIAGNOSIS — I73.00 RAYNAUD'S DISEASE WITHOUT GANGRENE: ICD-10-CM

## 2019-02-21 DIAGNOSIS — I73.00 RAYNAUD'S DISEASE WITHOUT GANGRENE: Primary | ICD-10-CM

## 2019-02-21 LAB
ALBUMIN SERPL BCP-MCNC: 3.3 G/DL
ALP SERPL-CCNC: 134 U/L
ALT SERPL W/O P-5'-P-CCNC: 26 U/L
ANION GAP SERPL CALC-SCNC: 7 MMOL/L
AST SERPL-CCNC: 31 U/L
BILIRUB SERPL-MCNC: 0.3 MG/DL
BUN SERPL-MCNC: 12 MG/DL
CALCIUM SERPL-MCNC: 9.9 MG/DL
CCP AB SER IA-ACNC: <0.5 U/ML
CHLORIDE SERPL-SCNC: 106 MMOL/L
CO2 SERPL-SCNC: 28 MMOL/L
CREAT SERPL-MCNC: 0.6 MG/DL
CRP SERPL-MCNC: 0.6 MG/L
ERYTHROCYTE [SEDIMENTATION RATE] IN BLOOD BY WESTERGREN METHOD: 24 MM/HR
EST. GFR  (AFRICAN AMERICAN): >60 ML/MIN/1.73 M^2
EST. GFR  (NON AFRICAN AMERICAN): >60 ML/MIN/1.73 M^2
GLUCOSE SERPL-MCNC: 89 MG/DL
POTASSIUM SERPL-SCNC: 4.3 MMOL/L
PROT SERPL-MCNC: 7.1 G/DL
RHEUMATOID FACT SERPL-ACNC: <10 IU/ML
SODIUM SERPL-SCNC: 141 MMOL/L

## 2019-02-21 PROCEDURE — 3079F DIAST BP 80-89 MM HG: CPT | Mod: CPTII,S$GLB,, | Performed by: FAMILY MEDICINE

## 2019-02-21 PROCEDURE — 99999 PR PBB SHADOW E&M-EST. PATIENT-LVL IV: CPT | Mod: PBBFAC,,, | Performed by: FAMILY MEDICINE

## 2019-02-21 PROCEDURE — 3075F SYST BP GE 130 - 139MM HG: CPT | Mod: CPTII,S$GLB,, | Performed by: FAMILY MEDICINE

## 2019-02-21 PROCEDURE — 99214 OFFICE O/P EST MOD 30 MIN: CPT | Mod: S$GLB,,, | Performed by: FAMILY MEDICINE

## 2019-02-21 PROCEDURE — 86235 NUCLEAR ANTIGEN ANTIBODY: CPT | Mod: 59

## 2019-02-21 PROCEDURE — 3075F PR MOST RECENT SYSTOLIC BLOOD PRESS GE 130-139MM HG: ICD-10-PCS | Mod: CPTII,S$GLB,, | Performed by: FAMILY MEDICINE

## 2019-02-21 PROCEDURE — 3079F PR MOST RECENT DIASTOLIC BLOOD PRESSURE 80-89 MM HG: ICD-10-PCS | Mod: CPTII,S$GLB,, | Performed by: FAMILY MEDICINE

## 2019-02-21 PROCEDURE — 99999 PR PBB SHADOW E&M-EST. PATIENT-LVL IV: ICD-10-PCS | Mod: PBBFAC,,, | Performed by: FAMILY MEDICINE

## 2019-02-21 PROCEDURE — 86140 C-REACTIVE PROTEIN: CPT

## 2019-02-21 PROCEDURE — 86038 ANTINUCLEAR ANTIBODIES: CPT

## 2019-02-21 PROCEDURE — 85652 RBC SED RATE AUTOMATED: CPT

## 2019-02-21 PROCEDURE — 3008F PR BODY MASS INDEX (BMI) DOCUMENTED: ICD-10-PCS | Mod: CPTII,S$GLB,, | Performed by: FAMILY MEDICINE

## 2019-02-21 PROCEDURE — 86431 RHEUMATOID FACTOR QUANT: CPT

## 2019-02-21 PROCEDURE — 80053 COMPREHEN METABOLIC PANEL: CPT

## 2019-02-21 PROCEDURE — 86235 NUCLEAR ANTIGEN ANTIBODY: CPT

## 2019-02-21 PROCEDURE — 36415 COLL VENOUS BLD VENIPUNCTURE: CPT | Mod: PO

## 2019-02-21 PROCEDURE — 86200 CCP ANTIBODY: CPT

## 2019-02-21 PROCEDURE — 99214 PR OFFICE/OUTPT VISIT, EST, LEVL IV, 30-39 MIN: ICD-10-PCS | Mod: S$GLB,,, | Performed by: FAMILY MEDICINE

## 2019-02-21 PROCEDURE — 3008F BODY MASS INDEX DOCD: CPT | Mod: CPTII,S$GLB,, | Performed by: FAMILY MEDICINE

## 2019-02-21 PROCEDURE — 86039 ANTINUCLEAR ANTIBODIES (ANA): CPT

## 2019-02-21 RX ORDER — NIFEDIPINE 30 MG/1
30 TABLET, EXTENDED RELEASE ORAL DAILY
Qty: 90 TABLET | Refills: 0 | Status: SHIPPED | OUTPATIENT
Start: 2019-02-21 | End: 2019-03-13 | Stop reason: SDUPTHER

## 2019-02-21 RX ORDER — LOSARTAN POTASSIUM 50 MG/1
50 TABLET ORAL DAILY
Qty: 90 TABLET | Refills: 3 | Status: SHIPPED | OUTPATIENT
Start: 2019-02-21 | End: 2019-04-22 | Stop reason: SDUPTHER

## 2019-02-21 NOTE — PROGRESS NOTES
"Subjective:       Patient ID: Jill Perez is a 63 y.o. female.    Chief Complaint: Leg Pain    Jill is a 63 y.o. female who presents today with issues regarding possible raynaud's. At the last visit, she was complaining of occasional "white and blue" feet. Her toes also get "cold." the frequency has increased to every day. This has increased as the weather has gotten colder. Her mother had rheumatoid arthritis. This resolves spontaneously. It is painful. No dry eyes, dry mouth. No other joint pain or joint issues that she is aware of.  Cold is a significant factor that worsens her symptom. This also occurs in her hands.     She is able to walk without any claudication symptoms. No cramping in her legs at night.     She does endorse some "shooting pain" down her legs at time.       Review of Systems   Constitutional: Negative for chills and fever.   Respiratory: Negative for cough and shortness of breath.    Cardiovascular: Negative for chest pain, palpitations and leg swelling.   Gastrointestinal: Negative for blood in stool, constipation, diarrhea, nausea and vomiting.   Genitourinary: Negative for difficulty urinating and dysuria.   Musculoskeletal: Positive for gait problem and joint swelling. Negative for neck pain and neck stiffness.   Skin: Negative for rash.   Neurological: Negative for dizziness, light-headedness and numbness.   Psychiatric/Behavioral: Positive for sleep disturbance.           Objective:     Vitals:    02/21/19 0833   BP: 136/88   Pulse: 90   Temp: 98 °F (36.7 °C)        Physical Exam   Constitutional: She is oriented to person, place, and time. She appears well-developed and well-nourished. No distress.   HENT:   Head: Normocephalic and atraumatic.   Eyes: Conjunctivae are normal.   Neck: Normal range of motion. Neck supple.   Cardiovascular: Normal rate and regular rhythm.   Pulmonary/Chest: Effort normal and breath sounds normal.   Musculoskeletal: She exhibits no edema.   Pulses " appear normal BL LE  BL feet are warm to the touch  No visible deformity noted   Neurological: She is alert and oriented to person, place, and time.   Skin: Skin is warm. She is not diaphoretic.   Psychiatric: She has a normal mood and affect. Her behavior is normal. Judgment and thought content normal.   Nursing note and vitals reviewed.      Assessment:       1. Raynaud's disease without gangrene    2. Essential hypertension        Plan:         Possible multifactorial causes to her symptoms including raynaud's vs diabetic neuropathy vs PAD vs other.   Believe this is just primary Raynaud's at this time, autoimmune workup ordered to rule out other issues.   Stop lisinopril - hctz combination  Start losartan 50 mg  Start nifedipine 30 mg, can titrate this up as needed  Rheumatology referral placed    Follow instructions as on AVS  F/u in 2 months, A1c lab prior.     Raynaud's disease without gangrene  -     NIFEdipine (PROCARDIA-XL) 30 MG (OSM) 24 hr tablet; Take 1 tablet (30 mg total) by mouth once daily.  Dispense: 90 tablet; Refill: 0  -     losartan (COZAAR) 50 MG tablet; Take 1 tablet (50 mg total) by mouth once daily.  Dispense: 90 tablet; Refill: 3  -     Ambulatory referral to Rheumatology    Essential hypertension  -     NIFEdipine (PROCARDIA-XL) 30 MG (OSM) 24 hr tablet; Take 1 tablet (30 mg total) by mouth once daily.  Dispense: 90 tablet; Refill: 0  -     losartan (COZAAR) 50 MG tablet; Take 1 tablet (50 mg total) by mouth once daily.  Dispense: 90 tablet; Refill: 3        Warning signs discussed, patient to call with any further issues or worsening of symptoms.

## 2019-02-21 NOTE — PATIENT INSTRUCTIONS
Possible multifactorial causes to her symptoms including raynaud's vs diabetic neuropathy vs PAD vs other.     Stop lisinopril - hctz combination  Start losartan 50 mg  Start nifedipine 30 mg, can titrate this up as needed    Follow instructions as below  F/u in 2 months  A1c lab prior.       Raynaud Disease  Your healthcare provider has told you that you have Raynaud disease. It is also called Raynaud phenomenon or Raynaud syndrome. There is no cure for Raynaud disease, but you can manage it to help prevent attacks.    What are the symptoms of Raynaud disease?  A Raynaud disease attack is often triggered by cold or stress. During an attack, blood vessels suddenly narrow (called vasospasm).  This most often happens in fingers and toes. In rare cases, the nose, ears, or even tongue are affected. Narrowed blood vessels reduce the blood supply to the area. The area then turns white, then blue. The area may feel numb or painful. As the attack passes, the blood vessels open. The affected area may turn bright red as it warms up, then returns to normal color.  What is the cause of Raynaud disease?  With Raynaud disease, it is believed that blood vessels in the affected areas overrespond to certain triggers, such as cold. This makes them narrow (called vasospasm) much more than in people without the disease. What causes the blood vessels to react so strongly to certain triggers is unknown. In between attacks, the blood vessels are normal and healthy. Attacks dont permanently damage the blood vessels, but may thicken the artery walls.   In some cases, Raynaud disease happens along with another disease or condition. This is often a connective tissue disorder, such as lupus, scleroderma, or rheumatoid arthritis. This is called secondary Raynaud disease (as opposed to primary Raynaud disease discussed above) and may be more severe. If this is the case for you, you and your healthcare provider can discuss treatment for the  underlying condition.  What are the risk factors?  Risk factors for Raynaud disease include:  · Women are more likely to get Raynaud disease than men.  · Younger individuals are at higher risk, usually ages 15 to 30.  · Living in colder climates increases risk.  · Having a family member with Raynaud disease increases one's risk.  · Underlying rheumatoid conditions may increase one's risk.   What are possible triggers?  Triggers for Raynaud disease include:   · Cold  · Stress  · Caffeine  · Smoking  · Repetitive movements  · Certain medicines, such as beta-blockers, migraine medicine, birth control pills and others  · Injury  How is Raynaud disease diagnosed?  Your description of your symptoms, a health history, and a physical exam are often enough for a diagnosis. Blood tests and other tests may be done to see if any underlying conditions are present and rule out other problems.  How is Raynaud disease treated?  There is no cure for Raynaud disease. But you can control symptoms and reduce the number and severity of attacks. For most people, avoiding triggers is enough to limit attacks. Your healthcare provider may suggest the following:  · Take precautions to help prevent your hands and feet from losing circulation. This includes:  ¨ Dressing warmly in cold weather.  ¨ Wear gloves or mittens when your hands may become cold, such as when you use the refrigerator or freezer.  ¨ Avoid stress and caffeine.  ¨ Exercise regularly. This may reduce the number and severity of attacks.   ¨ If you smoke, quitting may improve the condition. This is because smoking causes your blood vessels to narrow and reduces blood flow.  · Soak your hands or feet in warm (not hot) water. Do this at the first sign of attack. Keep soaking until your skin color returns to normal.  In some people, symptoms are persistent or troubling. For these cases, other treatments are a choice. Your healthcare provider can tell you more about the  following:  · Prescription medicines that relax and widen blood vessels, such as calcium channel blockers. These may help relieve symptoms.  · Nerve surgery for severe cases that dont respond to other treatments. Surgery removes the nerves that surround the blood vessels in the hands and feet. Without nerve stimulation, the blood vessels stay more relaxed. They are less likely to become very narrow due to stimulus. Nerves may be blocked using injections in some cases.  Most cases of Raynaud disease are not cause for concern. The disease doesnt get worse and isnt likely to cause any permanent damage. If attacks are severe, very prolonged, or very often, skin damage may result. Controlling attacks can help prevent this.  When to seek medical care  The following problems happen rarely, but they can be serious. Call your healthcare provider right away if you notice any of the following:  · Infection or sores on the skin  · A finger or toe turns black  · The skin breaks open on its own  · A rash develops  · A finger or toe joint becomes painful or swollen   Date Last Reviewed: 1/27/2016  © 0835-3728 [employer]. [address] [place] [address], [place] [address]. All rights reserved. This information is not intended as a substitute for professional medical care. Always follow your healthcare professional's instructions.

## 2019-02-22 LAB
ANA SER QL IF: POSITIVE
ANA TITR SER IF: NORMAL {TITER}
ANTI-SSA ANTIBODY: 1.17 EU
ANTI-SSA INTERPRETATION: NEGATIVE

## 2019-02-25 ENCOUNTER — TELEPHONE (OUTPATIENT)
Dept: FAMILY MEDICINE | Facility: CLINIC | Age: 64
End: 2019-02-25

## 2019-02-25 LAB
ANTI SM ANTIBODY: 1.17 EU
ANTI SM/RNP ANTIBODY: 0 EU
ANTI-SM INTERPRETATION: NEGATIVE
ANTI-SM/RNP INTERPRETATION: NEGATIVE
ANTI-SSA ANTIBODY: 1.17 EU
ANTI-SSA INTERPRETATION: NEGATIVE
ANTI-SSB ANTIBODY: 0.18 EU
ANTI-SSB INTERPRETATION: NEGATIVE
DSDNA AB SER-ACNC: NORMAL [IU]/ML

## 2019-02-25 NOTE — TELEPHONE ENCOUNTER
----- Message from Paige Quezada sent at 2/25/2019  4:17 PM CST -----  Contact: Self/ [phone]  Patient called in requesting to speak with you. Patient prefers to speak with a nurse.     Please call and advise.

## 2019-02-25 NOTE — TELEPHONE ENCOUNTER
I spoke to patient and and inform of staci results, she said he sees rheumatology in May wanted [employer] she sees rheumatology to know if you can prescribed something for pain for legs and hands. Please advice.

## 2019-02-25 NOTE — TELEPHONE ENCOUNTER
----- Message from Shahab Merlos DO sent at 2/25/2019 11:44 AM CST -----  Please call the patient regarding her abnormal result. Her YOLIS test is positive. This is a non specific screening for autoimmune diseases. She needs to see rheumatology for this, and she already has an appointment to see them. The other labs I did, did not give me a more specific cause for this elevation.

## 2019-02-26 ENCOUNTER — TELEPHONE (OUTPATIENT)
Dept: FAMILY MEDICINE | Facility: CLINIC | Age: 64
End: 2019-02-26

## 2019-02-26 DIAGNOSIS — I73.00 RAYNAUD'S DISEASE WITHOUT GANGRENE: Primary | ICD-10-CM

## 2019-02-26 RX ORDER — TRAMADOL HYDROCHLORIDE 50 MG/1
50 TABLET ORAL EVERY 8 HOURS PRN
Qty: 21 TABLET | Refills: 0 | Status: SHIPPED | OUTPATIENT
Start: 2019-02-26 | End: 2019-04-22

## 2019-02-26 NOTE — TELEPHONE ENCOUNTER
Patient state that her feet feel like they're on fire and frozen at the same time. Patient state the medication did not help at all. I informed patient that you contact the rheumatologist and waiting on a response. I advised the patient she's on the waiting list for sooner appointment. Patient would like to know if something else can be called in for pain. Patient state she have taken tramadol before and asking for a Rx. I advised patient I would send message to  and give her a return call. Patient voiced understanding.

## 2019-02-26 NOTE — TELEPHONE ENCOUNTER
I am waiting for rheumatology to call back. She can increase this nifedipine to twice daily. I am not sure if tramadol will help this but I have sent her in a short course. She may need topical nitro or other local vasodilatory medications.

## 2019-02-26 NOTE — TELEPHONE ENCOUNTER
I spoke with patient and informed her to increase i nifedipine to twice daily. Tramadol was called into pharmacy. Patient voiced understanding.

## 2019-02-26 NOTE — TELEPHONE ENCOUNTER
----- Message from Julián Bailey sent at 2/26/2019 10:54 AM CST -----  Contact: self/[phone]  Patient called to speak with the doctor about the current pain in her legs.    Please call and advise.

## 2019-03-11 ENCOUNTER — TELEPHONE (OUTPATIENT)
Dept: RHEUMATOLOGY | Facility: CLINIC | Age: 64
End: 2019-03-11

## 2019-03-11 NOTE — TELEPHONE ENCOUNTER
----- Message from Elvira Daniels MD sent at 2/28/2019  4:10 PM CST -----   Hi  It the raynauds is controlled then she should not be having that much pain. I would try switching her to another CCB  if its not controlled.  I will cc our nurse supervisor and see if we can get her in sooner.    Elvira  ----- Message -----  From: Shahab Merlos DO  Sent: 2/26/2019  11:51 AM  To: Elvira Daniels MD    Hi,   I am seeing this patient and she appears to have raynaud's. She is also YOLIS positive. I started her on a CCB for the raynaud's but she is still having severe pain in her extremities. What would you recommend for pain control besides supportive care to keep extremities warm?     She is seeing you, but her appointment is not for a few months    Thanks in advance for your help.     Shahab

## 2019-03-13 ENCOUNTER — OFFICE VISIT (OUTPATIENT)
Dept: RHEUMATOLOGY | Facility: CLINIC | Age: 64
End: 2019-03-13
Payer: COMMERCIAL

## 2019-03-13 VITALS
HEART RATE: 96 BPM | BODY MASS INDEX: 32.73 KG/M2 | HEIGHT: 67 IN | SYSTOLIC BLOOD PRESSURE: 141 MMHG | WEIGHT: 208.56 LBS | DIASTOLIC BLOOD PRESSURE: 90 MMHG

## 2019-03-13 DIAGNOSIS — R05.9 COUGH: ICD-10-CM

## 2019-03-13 DIAGNOSIS — M25.512 LEFT SHOULDER PAIN, UNSPECIFIED CHRONICITY: ICD-10-CM

## 2019-03-13 DIAGNOSIS — M25.50 POLYARTHRALGIA: ICD-10-CM

## 2019-03-13 DIAGNOSIS — I73.00 RAYNAUD'S DISEASE WITHOUT GANGRENE: Primary | ICD-10-CM

## 2019-03-13 DIAGNOSIS — I10 ESSENTIAL HYPERTENSION: ICD-10-CM

## 2019-03-13 PROCEDURE — 99205 PR OFFICE/OUTPT VISIT, NEW, LEVL V, 60-74 MIN: ICD-10-PCS | Mod: S$GLB,,, | Performed by: INTERNAL MEDICINE

## 2019-03-13 PROCEDURE — 99205 OFFICE O/P NEW HI 60 MIN: CPT | Mod: S$GLB,,, | Performed by: INTERNAL MEDICINE

## 2019-03-13 PROCEDURE — 99999 PR PBB SHADOW E&M-EST. PATIENT-LVL III: CPT | Mod: PBBFAC,,, | Performed by: INTERNAL MEDICINE

## 2019-03-13 PROCEDURE — 3077F PR MOST RECENT SYSTOLIC BLOOD PRESSURE >= 140 MM HG: ICD-10-PCS | Mod: CPTII,S$GLB,, | Performed by: INTERNAL MEDICINE

## 2019-03-13 PROCEDURE — 3008F BODY MASS INDEX DOCD: CPT | Mod: CPTII,S$GLB,, | Performed by: INTERNAL MEDICINE

## 2019-03-13 PROCEDURE — 3008F PR BODY MASS INDEX (BMI) DOCUMENTED: ICD-10-PCS | Mod: CPTII,S$GLB,, | Performed by: INTERNAL MEDICINE

## 2019-03-13 PROCEDURE — 3077F SYST BP >= 140 MM HG: CPT | Mod: CPTII,S$GLB,, | Performed by: INTERNAL MEDICINE

## 2019-03-13 PROCEDURE — 99999 PR PBB SHADOW E&M-EST. PATIENT-LVL III: ICD-10-PCS | Mod: PBBFAC,,, | Performed by: INTERNAL MEDICINE

## 2019-03-13 PROCEDURE — 3080F DIAST BP >= 90 MM HG: CPT | Mod: CPTII,S$GLB,, | Performed by: INTERNAL MEDICINE

## 2019-03-13 PROCEDURE — 3080F PR MOST RECENT DIASTOLIC BLOOD PRESSURE >= 90 MM HG: ICD-10-PCS | Mod: CPTII,S$GLB,, | Performed by: INTERNAL MEDICINE

## 2019-03-13 RX ORDER — NIFEDIPINE 90 MG/1
90 TABLET, EXTENDED RELEASE ORAL DAILY
Qty: 30 TABLET | Refills: 4 | Status: SHIPPED | OUTPATIENT
Start: 2019-03-13 | End: 2019-07-22 | Stop reason: SDUPTHER

## 2019-03-13 NOTE — PROGRESS NOTES
Subjective:       Patient ID: Jill Perez is a 63 y.o. female.    Chief Complaint: raynauds disease    HPI     63 year old F with PMH of type II DM, s/p gastric bypass around age 53, HL, HTN, left knee replacement, lumbar surgeries x2, s/p splenectomy   here for evaluation.   Reports pain in left shoulder, lower back, hips. Reports she has been in pain for last year.  Pain level can be as high as 8/10,aching, non-radiating..She takes tylenol up to 6 pills a day due to the pain.   Reports mild swelling in both hands and feet since she was taken off fluid salt about a month ago. She tries to eat low salt.  Reports she has stiffness in morning in legs, arms, and hips. Heat improves the pain.  Denies any rashes, oral ulcers, alopecia, fevers, dysphagia. She has chronic reflux based on what she eats. Denies itchy skin, night sweats.    Denies sob or cough.  Reports that she has raynauds for a year in ands and feet.  She tries to keep her hands warm. She used to smoke in her teens.  She is taking procardia 60mg since February 26.       Review of Systems   Constitutional: Negative for activity change, appetite change, chills, diaphoresis and fatigue.   HENT: Negative for congestion, ear [facility], ear pain, facial swelling, mouth sores, sinus pressure, sneezing, sore throat, tinnitus and trouble swallowing.    Eyes: Negative for photophobia, pain, [facility], redness, itching and visual disturbance.   Respiratory: Negative for apnea, chest tightness, shortness of breath, wheezing and stridor.    Cardiovascular: Negative for leg swelling.   Gastrointestinal: Negative for abdominal distention, abdominal pain, anal bleeding, blood in stool, constipation, diarrhea and nausea.   Endocrine: Negative for cold intolerance and heat intolerance.   Genitourinary: Negative for difficulty urinating and dysuria.   Musculoskeletal: Positive for arthralgias and back pain. Negative for gait problem, joint swelling, myalgias, neck pain  and neck stiffness.   Skin: Positive for color change. Negative for pallor, rash and wound.   Neurological: Negative for dizziness, seizures, light-headedness and numbness.   Hematological: Negative for adenopathy. Does not bruise/bleed easily.   Psychiatric/Behavioral: Negative for sleep disturbance. The patient is not nervous/anxious.            Objective:   LMP  (LMP Unknown)      Physical Exam   Constitutional: She is oriented to person, place, and time.   HENT:   Head: Normocephalic and atraumatic.   Right Ear: External ear normal.   Left Ear: External ear normal.   Nose: Nose normal.   Mouth/Throat: Oropharynx is clear and moist. No oropharyngeal exudate.   Eyes: Conjunctivae and EOM are normal. Pupils are equal, round, and reactive to light. Right eye exhibits no [facility]. Left eye exhibits no [facility]. No scleral icterus.   Neck: Neck supple. No JVD present. No thyromegaly present.   Cardiovascular: Normal rate, regular rhythm, normal heart sounds and intact distal pulses.  Exam reveals no gallop and no friction rub.    No murmur heard.  Pulmonary/Chest: Effort normal and breath sounds normal. No respiratory distress. She has no wheezes. She has no rales. She exhibits no tenderness.   Abdominal: Soft. Bowel sounds are normal. She exhibits no distension and no mass. There is no tenderness. There is no rebound and no guarding.   Lymphadenopathy:     She has no cervical adenopathy.   Neurological: She is alert and oriented to person, place, and time. No cranial nerve deficit. Gait normal. Coordination normal.   Skin: Skin is dry. No rash noted. No erythema. No pallor.     Psychiatric: Affect and judgment normal.   Musculoskeletal: She exhibits no edema, tenderness or deformity.           Labs: reviewed       Assessment:     63 year old F with PMH of type II DM, s/p gastric bypass around age 53, HL, HTN, left knee replacement, lumbar surgeries x2, s/p splenectomy here for evaluation of raynauds.  Will work her  up for systemic connective tissue disease but have low suspicion.  No diagnosis found.        Plan:       Labs  Increase nifedipine from 60mg a day to 90mg a day ( told her she has to monitor her BP).  Patient reports she keeps house frigid. I told her it is very  Important she keeps her extremities warm   rtc in 10- 12 weeks  *

## 2019-03-13 NOTE — LETTER
March 18, 2019      Shahab Merlos, DO  [address]  [place] [address]           [facility]  [address] [place] [address] [address]  [place] [address]  Phone: [phone]          Patient: Jill Perez   MR Number: [id number]   YOB: 1955   Date of Visit: 3/13/2019       Dear Dr. Shahab Merlos:    Thank you for referring Jill Perez to me for evaluation. Attached you will find relevant portions of my assessment and plan of care.    If you have questions, please do not hesitate to call me. I look forward to following Jill Perez along with you.    Sincerely,    Elvira Daniels MD    Enclosure  CC:  No Recipients    If you would like to receive this communication electronically, please contact [email or web address] or [phone] to request more information on [facility] Link access.    For providers and/or their staff who would like to refer a patient to [facility], please contact us through our one-stop-shop provider referral line, [facility] , at [phone].    If you feel you have received this communication in error or would no longer like to receive these types of communications, please e-mail [email or web address]

## 2019-03-18 ENCOUNTER — HOSPITAL ENCOUNTER (OUTPATIENT)
Dept: RADIOLOGY | Facility: HOSPITAL | Age: 64
Discharge: HOME | End: 2019-03-18
Attending: INTERNAL MEDICINE
Payer: COMMERCIAL

## 2019-03-18 DIAGNOSIS — R05.9 COUGH: ICD-10-CM

## 2019-03-18 DIAGNOSIS — M25.512 LEFT SHOULDER PAIN, UNSPECIFIED CHRONICITY: ICD-10-CM

## 2019-03-18 PROCEDURE — 73030 XR SHOULDER COMPLETE 2 OR MORE VIEWS LEFT: ICD-10-PCS | Mod: 26,LT,, | Performed by: RADIOLOGY

## 2019-03-18 PROCEDURE — 71046 X-RAY EXAM CHEST 2 VIEWS: CPT | Mod: TC,FY

## 2019-03-18 PROCEDURE — 73030 X-RAY EXAM OF SHOULDER: CPT | Mod: TC,FY,LT

## 2019-03-18 PROCEDURE — 71046 X-RAY EXAM CHEST 2 VIEWS: CPT | Mod: 26,,, | Performed by: RADIOLOGY

## 2019-03-18 PROCEDURE — 71046 XR CHEST PA AND LATERAL: ICD-10-PCS | Mod: 26,,, | Performed by: RADIOLOGY

## 2019-03-18 PROCEDURE — 73030 X-RAY EXAM OF SHOULDER: CPT | Mod: 26,LT,, | Performed by: RADIOLOGY

## 2019-03-20 ENCOUNTER — TELEPHONE (OUTPATIENT)
Dept: RHEUMATOLOGY | Facility: CLINIC | Age: 64
End: 2019-03-20

## 2019-03-20 NOTE — TELEPHONE ENCOUNTER
----- Message from Rosaile Lee MA sent at 3/20/2019  9:49 AM CDT -----  Contact: Self/ [phone]      ----- Message -----  From: Anushka Cline  Sent: 3/20/2019   9:15 AM  To: Jeremiah Felix Staff    Patient would like a call back to get her lab results.

## 2019-03-21 ENCOUNTER — HOSPITAL ENCOUNTER (OUTPATIENT)
Dept: RADIOLOGY | Facility: HOSPITAL | Age: 64
Discharge: HOME | End: 2019-03-21
Attending: INTERNAL MEDICINE
Payer: COMMERCIAL

## 2019-03-21 DIAGNOSIS — M25.50 POLYARTHRALGIA: ICD-10-CM

## 2019-03-21 PROCEDURE — 77077 XR ARTHRITIS SURVEY: ICD-10-PCS | Mod: 26,,, | Performed by: RADIOLOGY

## 2019-03-21 PROCEDURE — 77077 JOINT SURVEY SINGLE VIEW: CPT | Mod: 26,,, | Performed by: RADIOLOGY

## 2019-03-21 PROCEDURE — 77077 JOINT SURVEY SINGLE VIEW: CPT | Mod: TC

## 2019-03-31 ENCOUNTER — TELEPHONE (OUTPATIENT)
Dept: RHEUMATOLOGY | Facility: CLINIC | Age: 64
End: 2019-03-31

## 2019-03-31 ENCOUNTER — PATIENT MESSAGE (OUTPATIENT)
Dept: RHEUMATOLOGY | Facility: CLINIC | Age: 64
End: 2019-03-31

## 2019-04-08 DIAGNOSIS — F51.01 PRIMARY INSOMNIA: ICD-10-CM

## 2019-04-08 DIAGNOSIS — E11.65 TYPE 2 DIABETES MELLITUS WITH HYPERGLYCEMIA, WITH LONG-TERM CURRENT USE OF INSULIN: ICD-10-CM

## 2019-04-08 DIAGNOSIS — Z79.4 TYPE 2 DIABETES MELLITUS WITH HYPERGLYCEMIA, WITH LONG-TERM CURRENT USE OF INSULIN: ICD-10-CM

## 2019-04-08 RX ORDER — ZOLPIDEM TARTRATE 10 MG/1
10 TABLET ORAL NIGHTLY PRN
Qty: 30 TABLET | Refills: 0 | Status: SHIPPED | OUTPATIENT
Start: 2019-04-08 | End: 2019-04-22 | Stop reason: SDUPTHER

## 2019-04-08 RX ORDER — METFORMIN HYDROCHLORIDE 1000 MG/1
1000 TABLET ORAL 2 TIMES DAILY WITH MEALS
Qty: 180 TABLET | Refills: 0 | Status: SHIPPED | OUTPATIENT
Start: 2019-04-08 | End: 2019-07-29 | Stop reason: SDUPTHER

## 2019-04-10 ENCOUNTER — PATIENT OUTREACH (OUTPATIENT)
Dept: ADMINISTRATIVE | Facility: HOSPITAL | Age: 64
End: 2019-04-10

## 2019-04-20 ENCOUNTER — LAB VISIT (OUTPATIENT)
Dept: LAB | Facility: HOSPITAL | Age: 64
End: 2019-04-20
Attending: FAMILY MEDICINE
Payer: COMMERCIAL

## 2019-04-20 DIAGNOSIS — E11.65 TYPE 2 DIABETES MELLITUS WITH HYPERGLYCEMIA, WITH LONG-TERM CURRENT USE OF INSULIN: ICD-10-CM

## 2019-04-20 DIAGNOSIS — Z79.4 TYPE 2 DIABETES MELLITUS WITH HYPERGLYCEMIA, WITH LONG-TERM CURRENT USE OF INSULIN: ICD-10-CM

## 2019-04-20 LAB
ESTIMATED AVG GLUCOSE: 123 MG/DL (ref 68–131)
HBA1C MFR BLD HPLC: 5.9 % (ref 4–5.6)

## 2019-04-20 PROCEDURE — 36415 COLL VENOUS BLD VENIPUNCTURE: CPT | Mod: PO

## 2019-04-20 PROCEDURE — 83036 HEMOGLOBIN GLYCOSYLATED A1C: CPT

## 2019-04-22 ENCOUNTER — LAB VISIT (OUTPATIENT)
Dept: LAB | Facility: HOSPITAL | Age: 64
End: 2019-04-22
Attending: FAMILY MEDICINE
Payer: COMMERCIAL

## 2019-04-22 ENCOUNTER — OFFICE VISIT (OUTPATIENT)
Dept: FAMILY MEDICINE | Facility: CLINIC | Age: 64
End: 2019-04-22
Payer: COMMERCIAL

## 2019-04-22 ENCOUNTER — TELEPHONE (OUTPATIENT)
Dept: GASTROENTEROLOGY | Facility: CLINIC | Age: 64
End: 2019-04-22

## 2019-04-22 VITALS
DIASTOLIC BLOOD PRESSURE: 72 MMHG | BODY MASS INDEX: 31.04 KG/M2 | HEIGHT: 67 IN | TEMPERATURE: 99 F | HEART RATE: 99 BPM | OXYGEN SATURATION: 98 % | SYSTOLIC BLOOD PRESSURE: 104 MMHG | WEIGHT: 197.75 LBS

## 2019-04-22 DIAGNOSIS — I10 ESSENTIAL HYPERTENSION: ICD-10-CM

## 2019-04-22 DIAGNOSIS — Z12.11 COLON CANCER SCREENING: ICD-10-CM

## 2019-04-22 DIAGNOSIS — Z79.4 TYPE 2 DIABETES MELLITUS WITH HYPERGLYCEMIA, WITH LONG-TERM CURRENT USE OF INSULIN: Primary | ICD-10-CM

## 2019-04-22 DIAGNOSIS — R10.13 EPIGASTRIC PAIN: ICD-10-CM

## 2019-04-22 DIAGNOSIS — R23.3 EASY BRUISING: ICD-10-CM

## 2019-04-22 DIAGNOSIS — E78.49 OTHER HYPERLIPIDEMIA: ICD-10-CM

## 2019-04-22 DIAGNOSIS — F51.01 PRIMARY INSOMNIA: ICD-10-CM

## 2019-04-22 DIAGNOSIS — E11.65 TYPE 2 DIABETES MELLITUS WITH HYPERGLYCEMIA, WITH LONG-TERM CURRENT USE OF INSULIN: Primary | ICD-10-CM

## 2019-04-22 DIAGNOSIS — I73.00 RAYNAUD'S DISEASE WITHOUT GANGRENE: ICD-10-CM

## 2019-04-22 LAB
ALBUMIN SERPL BCP-MCNC: 3.5 G/DL (ref 3.5–5.2)
ALP SERPL-CCNC: 131 U/L (ref 55–135)
ALT SERPL W/O P-5'-P-CCNC: 20 U/L (ref 10–44)
AMYLASE SERPL-CCNC: 81 U/L (ref 20–110)
ANION GAP SERPL CALC-SCNC: 11 MMOL/L (ref 8–16)
AST SERPL-CCNC: 26 U/L (ref 10–40)
BASOPHILS # BLD AUTO: 0.03 K/UL (ref 0–0.2)
BASOPHILS NFR BLD: 0.5 % (ref 0–1.9)
BILIRUB SERPL-MCNC: 0.4 MG/DL (ref 0.1–1)
BUN SERPL-MCNC: 14 MG/DL (ref 8–23)
CALCIUM SERPL-MCNC: 9.6 MG/DL (ref 8.7–10.5)
CHLORIDE SERPL-SCNC: 102 MMOL/L (ref 95–110)
CO2 SERPL-SCNC: 27 MMOL/L (ref 23–29)
CREAT SERPL-MCNC: 0.7 MG/DL (ref 0.5–1.4)
DIFFERENTIAL METHOD: ABNORMAL
EOSINOPHIL # BLD AUTO: 0.1 K/UL (ref 0–0.5)
EOSINOPHIL NFR BLD: 1.9 % (ref 0–8)
ERYTHROCYTE [DISTWIDTH] IN BLOOD BY AUTOMATED COUNT: 14.9 % (ref 11.5–14.5)
EST. GFR  (AFRICAN AMERICAN): >60 ML/MIN/1.73 M^2
EST. GFR  (NON AFRICAN AMERICAN): >60 ML/MIN/1.73 M^2
GLUCOSE SERPL-MCNC: 65 MG/DL (ref 70–110)
HCT VFR BLD AUTO: 38.4 % (ref 37–48.5)
HGB BLD-MCNC: 11.7 G/DL (ref 12–16)
IMM GRANULOCYTES # BLD AUTO: 0.02 K/UL (ref 0–0.04)
IMM GRANULOCYTES NFR BLD AUTO: 0.3 % (ref 0–0.5)
INR PPP: 0.9 (ref 0.8–1.2)
LIPASE SERPL-CCNC: 20 U/L (ref 4–60)
LYMPHOCYTES # BLD AUTO: 2.5 K/UL (ref 1–4.8)
LYMPHOCYTES NFR BLD: 39.6 % (ref 18–48)
MCH RBC QN AUTO: 26.9 PG (ref 27–31)
MCHC RBC AUTO-ENTMCNC: 30.5 G/DL (ref 32–36)
MCV RBC AUTO: 88 FL (ref 82–98)
MONOCYTES # BLD AUTO: 0.6 K/UL (ref 0.3–1)
MONOCYTES NFR BLD: 10.1 % (ref 4–15)
NEUTROPHILS # BLD AUTO: 3 K/UL (ref 1.8–7.7)
NEUTROPHILS NFR BLD: 47.6 % (ref 38–73)
NRBC BLD-RTO: 0 /100 WBC
PLATELET # BLD AUTO: 435 K/UL (ref 150–350)
PMV BLD AUTO: 10.5 FL (ref 9.2–12.9)
POTASSIUM SERPL-SCNC: 4 MMOL/L (ref 3.5–5.1)
PROT SERPL-MCNC: 7.6 G/DL (ref 6–8.4)
PROTHROMBIN TIME: 9.9 SEC (ref 9–12.5)
RBC # BLD AUTO: 4.35 M/UL (ref 4–5.4)
SODIUM SERPL-SCNC: 140 MMOL/L (ref 136–145)
WBC # BLD AUTO: 6.26 K/UL (ref 3.9–12.7)

## 2019-04-22 PROCEDURE — 99214 PR OFFICE/OUTPT VISIT, EST, LEVL IV, 30-39 MIN: ICD-10-PCS | Mod: S$GLB,,, | Performed by: FAMILY MEDICINE

## 2019-04-22 PROCEDURE — 3008F PR BODY MASS INDEX (BMI) DOCUMENTED: ICD-10-PCS | Mod: CPTII,S$GLB,, | Performed by: FAMILY MEDICINE

## 2019-04-22 PROCEDURE — 99214 OFFICE O/P EST MOD 30 MIN: CPT | Mod: S$GLB,,, | Performed by: FAMILY MEDICINE

## 2019-04-22 PROCEDURE — 3078F DIAST BP <80 MM HG: CPT | Mod: CPTII,S$GLB,, | Performed by: FAMILY MEDICINE

## 2019-04-22 PROCEDURE — 3074F SYST BP LT 130 MM HG: CPT | Mod: CPTII,S$GLB,, | Performed by: FAMILY MEDICINE

## 2019-04-22 PROCEDURE — 99999 PR PBB SHADOW E&M-EST. PATIENT-LVL III: ICD-10-PCS | Mod: PBBFAC,,, | Performed by: FAMILY MEDICINE

## 2019-04-22 PROCEDURE — 36415 COLL VENOUS BLD VENIPUNCTURE: CPT | Mod: PO

## 2019-04-22 PROCEDURE — 83690 ASSAY OF LIPASE: CPT

## 2019-04-22 PROCEDURE — 85610 PROTHROMBIN TIME: CPT

## 2019-04-22 PROCEDURE — 80053 COMPREHEN METABOLIC PANEL: CPT

## 2019-04-22 PROCEDURE — 85025 COMPLETE CBC W/AUTO DIFF WBC: CPT

## 2019-04-22 PROCEDURE — 3044F HG A1C LEVEL LT 7.0%: CPT | Mod: CPTII,S$GLB,, | Performed by: FAMILY MEDICINE

## 2019-04-22 PROCEDURE — 3078F PR MOST RECENT DIASTOLIC BLOOD PRESSURE < 80 MM HG: ICD-10-PCS | Mod: CPTII,S$GLB,, | Performed by: FAMILY MEDICINE

## 2019-04-22 PROCEDURE — 3008F BODY MASS INDEX DOCD: CPT | Mod: CPTII,S$GLB,, | Performed by: FAMILY MEDICINE

## 2019-04-22 PROCEDURE — 3074F PR MOST RECENT SYSTOLIC BLOOD PRESSURE < 130 MM HG: ICD-10-PCS | Mod: CPTII,S$GLB,, | Performed by: FAMILY MEDICINE

## 2019-04-22 PROCEDURE — 99999 PR PBB SHADOW E&M-EST. PATIENT-LVL III: CPT | Mod: PBBFAC,,, | Performed by: FAMILY MEDICINE

## 2019-04-22 PROCEDURE — 82150 ASSAY OF AMYLASE: CPT

## 2019-04-22 PROCEDURE — 3044F PR MOST RECENT HEMOGLOBIN A1C LEVEL <7.0%: ICD-10-PCS | Mod: CPTII,S$GLB,, | Performed by: FAMILY MEDICINE

## 2019-04-22 RX ORDER — LOSARTAN POTASSIUM 25 MG/1
25 TABLET ORAL DAILY
Qty: 90 TABLET | Refills: 3 | Status: SHIPPED | OUTPATIENT
Start: 2019-04-22 | End: 2019-07-22 | Stop reason: SDUPTHER

## 2019-04-22 RX ORDER — ZOLPIDEM TARTRATE 10 MG/1
10 TABLET ORAL NIGHTLY PRN
Qty: 30 TABLET | Refills: 0 | Status: SHIPPED | OUTPATIENT
Start: 2019-04-22 | End: 2019-06-05

## 2019-04-22 RX ORDER — PANTOPRAZOLE SODIUM 40 MG/1
40 TABLET, DELAYED RELEASE ORAL DAILY
Qty: 30 TABLET | Refills: 11 | Status: SHIPPED | OUTPATIENT
Start: 2019-04-22 | End: 2019-07-22 | Stop reason: SDUPTHER

## 2019-04-22 RX ORDER — GABAPENTIN 300 MG/1
CAPSULE ORAL
Refills: 0 | COMMUNITY
Start: 2019-03-03 | End: 2019-04-22

## 2019-04-22 NOTE — PROGRESS NOTES
Subjective:       Patient ID: Jill Perez is a 63 y.o. female.    Chief Complaint: Follow-up    Jill is a 63 y.o. female who presents today for follow up on her chronic medical issues.      DM:  No more lantus! continued metformin 1000 mg bid and started once weekly trulicity. No issues swallowing, no neck swelling. No symptoms of low blood sugar. a1c is well controlled at 5.9.   HTN: stable. No issues with medication. No cough.   DLD: she is tolerating this cholesterol medication. No muscle ache changes. She takes it in the morning.   Obesity: she has lost about 5 pounds. She has tried to eat small portions and has been trying to get away from fried food and breads.     Insomnia: She is feeling tired. Her last sleep study was 5-6 years ago. She doesn't snore that she is aware of. On the 10 mg of Ambien she felt well rested and was able to complete her days work, but now she feels tired. When she takes Ambien 5 mg, she is able to fall asleep but she tosses and turns and wake up tired.     She has been having  Raynaud's. Her nifedipine was increased by rheumatology. Her symptoms have been improving. She is having easy bruising. She has pain that starts under her left breast that seems to travel down her left arm. No chest pain. Weakness with activity but no SOB. Unknown triggers. Possibly associated with burping. Resolves on its own.       Review of Systems   Constitutional: Negative for chills and fever.   Respiratory: Negative for cough and shortness of breath.    Gastrointestinal: Negative for blood in stool, constipation, diarrhea, nausea and vomiting.   Genitourinary: Negative for difficulty urinating and dysuria.   Musculoskeletal: Negative for neck pain and neck stiffness.   Skin: Negative for rash.   Neurological: Negative for dizziness, light-headedness and numbness.   Hematological: Negative for adenopathy. Bruises/bleeds easily.   Psychiatric/Behavioral: Positive for sleep disturbance.         Results  for orders placed or performed in visit on 04/20/19   Hemoglobin A1c   Result Value Ref Range    Hemoglobin A1C 5.9 (H) 4.0 - 5.6 %    Estimated Avg Glucose 123 68 - 131 mg/dL       Objective:     Vitals:    04/22/19 0816   BP: 104/72   Pulse: 99   Temp: 98.8 °F (37.1 °C)        Physical Exam   Constitutional: She is oriented to person, place, and time. She appears well-developed and well-nourished. No distress.   HENT:   Head: Normocephalic and atraumatic.   Eyes: Conjunctivae are normal.   Neck: Normal range of motion. Neck supple.   Cardiovascular: Normal rate and regular rhythm.   Pulmonary/Chest: Effort normal and breath sounds normal.   Abdominal: Soft. There is tenderness in the epigastric area. There is no rigidity, no rebound, no guarding and no CVA tenderness.   Musculoskeletal: She exhibits no edema.   Neurological: She is alert and oriented to person, place, and time.   Skin: Skin is warm. Bruising noted. She is not diaphoretic.   Noted on BL LE, but left leg worse then the right.    Psychiatric: She has a normal mood and affect. Her behavior is normal. Judgment and thought content normal.   Nursing note and vitals reviewed.      Assessment:       1. Type 2 diabetes mellitus with hyperglycemia, with long-term current use of insulin    2. Essential hypertension    3. Other hyperlipidemia    4. BMI 30.0-30.9,adult    5. Easy bruising    6. Epigastric pain    7. Primary insomnia    8. Raynaud's disease without gangrene    9. Colon cancer screening        Plan:         Continue Metformin - 1000 mg twice daily  Continue trulicity to 1.5 mg. Monitor for any issues such as swallowing or neck swelling  Decrease losartan to 25 mg  Continue nifedipine and follow up with rheumatology  Continue atorvastatin 40 mg  Start daily OTC B12 replacement   Start acid reflux medication, first thing, every morning    Will refill Ambien for now. Will have to reconsider dosing over the long term.       Follow up in 3 months.  Labs at the time of the next visit.     Type 2 diabetes mellitus with hyperglycemia, with long-term current use of insulin  -     dulaglutide (TRULICITY) 1.5 mg/0.5 mL PnIj; Inject 1.5 mg into the skin every 7 days.  Dispense: 6 mL; Refill: 0    Essential hypertension  -     losartan (COZAAR) 25 MG tablet; Take 1 tablet (25 mg total) by mouth once daily.  Dispense: 90 tablet; Refill: 3    Other hyperlipidemia    BMI 30.0-30.9,adult    Easy bruising  -     CBC auto differential; Future; Expected date: 04/22/2019  -     Comprehensive metabolic panel; Future; Expected date: 04/22/2019  -     Protime-INR; Future; Expected date: 04/22/2019    Epigastric pain  -     Amylase; Future; Expected date: 04/22/2019  -     Lipase; Future; Expected date: 04/22/2019  -     pantoprazole (PROTONIX) 40 MG tablet; Take 1 tablet (40 mg total) by mouth once daily.  Dispense: 30 tablet; Refill: 11    Primary insomnia  -     zolpidem (AMBIEN) 10 mg Tab; Take 1 tablet (10 mg total) by mouth nightly as needed.  Dispense: 30 tablet; Refill: 0    Raynaud's disease without gangrene  -     losartan (COZAAR) 25 MG tablet; Take 1 tablet (25 mg total) by mouth once daily.  Dispense: 90 tablet; Refill: 3    Colon cancer screening  -     Case request GI: COLONOSCOPY        Warning signs discussed, patient to call with any further issues or worsening of symptoms.

## 2019-04-22 NOTE — TELEPHONE ENCOUNTER
SUPREP Instructions    You are scheduled for a colonoscopy with Dr. Armas on 5/17/2019 at [facility]  To ensure that your test is accurate and complete, you MUST follow these instructions listed below.  If you have any questions, please call our office at [phone].  Plan on being at the hospital for your procedure for 3-4 hours.    1.  Follow a CLEAR LIQUID DIET for the entire day before your scheduled colonoscopy.  This means no solid food the entire day starting when you wake.  You may have as much of the clear liquids as you want throughout the day.   CLEAR LIQUID DIET:   - Avoid Red, Orange, Purple, and/or Blue food coloring   - NO DAIRY   - You can have:  Coffee with sugar (no creamer), tea, water, soda, apple or white grape juice, chicken or beef broth/bouillon (no meat, noodles, or veggies), green/yellow popsicles, green/yellow Jell-O, lemonade.    2.  AT 5 pm the evening before your colonoscopy, POUR ONE (1) BOTTLE OF SUPREP INTO THE MIXING CONTAINER, PROVIDED INSIDE THE BOX.  ADD WATER TO THE LINE ON THE CONTAINER AND MIX IT WELL.  DRINK THE ENTIRE CONTAINER AND THEN DRINK TWO (2) MORE CONTAINERS OF WATER OVER THE NEXT 1 HOUR.  This is sometimes easier to drink if this solution is cold, so you can mix the solution a few hours ahead of time and place in the refrigerator prior to drinking.  You have to drink the solution within 24 hours of mixing it.  Do NOT put this solution over ice.  It IS ok to drink with a straw.    3.  The endoscopy department will call you 2 days before your colonoscopy to tell you the exact time to arrive, AND to tell you the exact time to drink the 2nd portion of your prep (which will be FIVE HOURS BEFORE YOUR ARRIVAL TIME).  At this time given to you, POUR ONE (1) BOTTLE OF SUPREP INTO THE MIXING CONTAINER, PROVIDED INSIDE THE BOX.  ADD WATER TO THE LINE ON THE CONTAINER AND MIX IT WELL.  DRINK THE ENTIRE CONTAINER AND THEN DRINK TWO (2) MORE CONTAINERS OF WATER OVER THE  NEXT 1 HOUR.  This is sometimes easier to drink if this solution is cold, so you can mix the solution a few hours ahead of time and place in the refrigerator prior to drinking.  You have to drink the solution within 24 hours of mixing it.  Do NOT put this solution over ice.  It IS ok to drink with a straw. Once this is complete, you may not have ANYTHING else by mouth!    4.  You must have someone with you to DRIVE YOU HOME since you will be receiving IV sedation for the colonoscopy.    5.  It is ok to take your heart, blood pressure, and seizure medications in the morning of your test with a SIP of water.  Hold other medications until after your procedure.  Do NOT have anything else to eat or drink the morning of your colonoscopy.  It is ok to brush your teeth.    6.  If you are on blood thinners THAT YOU HAVE BEEN INSTRUCTED TO HOLD BY YOUR DOCTOR FOR THIS PROCEDURE, then do NOT take this the morning of your colonoscopy.  Do NOT stop these medications on your own, they must be approved to be held by your doctor.  Your colonoscopy can NOT be done if you are on these medications.  Examples of blood thinners include: Coumadin, Aggrenox, Plavix, Pradaxa, Reapro, Pletal, Xarelto, Ticagrelor, Brilinta, Eliquis, and high dose aspirin (325 mg).  You do not have to stop baby aspirin 81 mg.    7.  IF YOU ARE DIABETIC:  NO INSULIN OR ORAL MEDICATIONS THE MORNING OF THE COLONOSCOPY.  TAKE ONLY HALF THE DOSE OF YOUR INSULIN THE DAY BEFORE THE COLONOSCOPY.  DO NOT TAKE ANY ORAL DIABETIC MEDICATIONS THE DAY BEFORE THE COLONOSCOPY.  IF YOU ARE AN INSULIN DEPENDENT DIABETIC WITH UNSTABLE BLOOD SUGARDS, NOTIFY YOUR PRIMARY CARE PHYSICIAN FOR INSTRUCTIONS.

## 2019-04-22 NOTE — TELEPHONE ENCOUNTER
Colonoscopy Referral   Referring Physician: Dr. Merlos  Date: 5/17/2019  Reason for Referral: Colon Screening  Family History of: None  Colon polyp:  None  Relationship/Age of Onset: None  Colon cancer: None  Relationship/Age of Onset: None      Hemoccults Done:NO    Iron deficient: NO  On Blood Thinner: NO  Valvular heart disease/valve replacement: NO   Anemia Present: NO  On NSAID: YES  Lung disease: NO  Kidney disease: NO  Hx of polyps:NO  Hx of colon cancer: NO  Previous colon evalations: YES      When:   Where:   Pertinent symptoms:     Patient was scheduled for colonoscopy on 5/17/2019 with Dr. Armas at [facility]. Suprep instructions were reviewed with patient.

## 2019-04-22 NOTE — PATIENT INSTRUCTIONS
Decrease losartan to 25 mg  Continue nifedipine and follow up with rheumatology  Continue trulicity  Continue atorvastatin  Start acid reflux medication, first thing, every morning    Diabetes Management Status    Statin: Taking  ACE/ARB: Taking    Screening or Prevention Patient's value Goal Complete/Controlled?   HgA1C Testing and Control   Lab Results   Component Value Date    HGBA1C 5.9 (H) 04/20/2019      Annually/Less than 8% Yes   Lipid profile : 01/21/2019 Annually Yes   LDL control Lab Results   Component Value Date    LDLCALC 59.2 (L) 01/21/2019    Annually/Less than 100 mg/dl  Yes   Nephropathy screening Lab Results   Component Value Date    LABMICR 18.0 10/23/2018     Lab Results   Component Value Date    PROTEINUA Negative 03/18/2019    Annually Yes   Blood pressure BP Readings from Last 1 Encounters:   04/22/19 104/72    Less than 140/90 Yes   Dilated retinal exam Most Recent Eye Exam Date: Not Found Annually No   Foot exam   : 10/30/2018 Annually Yes

## 2019-04-23 ENCOUNTER — TELEPHONE (OUTPATIENT)
Dept: FAMILY MEDICINE | Facility: CLINIC | Age: 64
End: 2019-04-23

## 2019-04-23 DIAGNOSIS — Z79.4 TYPE 2 DIABETES MELLITUS WITH HYPERGLYCEMIA, WITH LONG-TERM CURRENT USE OF INSULIN: ICD-10-CM

## 2019-04-23 DIAGNOSIS — R23.3 EASY BRUISING: ICD-10-CM

## 2019-04-23 DIAGNOSIS — I10 ESSENTIAL HYPERTENSION: Primary | ICD-10-CM

## 2019-04-23 DIAGNOSIS — E11.65 TYPE 2 DIABETES MELLITUS WITH HYPERGLYCEMIA, WITH LONG-TERM CURRENT USE OF INSULIN: ICD-10-CM

## 2019-04-24 ENCOUNTER — INITIAL CONSULT (OUTPATIENT)
Dept: OPTOMETRY | Facility: CLINIC | Age: 64
End: 2019-04-24
Payer: COMMERCIAL

## 2019-04-24 DIAGNOSIS — H25.13 NUCLEAR SCLEROSIS, BILATERAL: ICD-10-CM

## 2019-04-24 DIAGNOSIS — E11.9 TYPE 2 DIABETES MELLITUS WITHOUT RETINOPATHY: Primary | ICD-10-CM

## 2019-04-24 DIAGNOSIS — H52.4 PRESBYOPIA: ICD-10-CM

## 2019-04-24 PROCEDURE — 99999 PR PBB SHADOW E&M-EST. PATIENT-LVL II: CPT | Mod: PBBFAC,,, | Performed by: OPTOMETRIST

## 2019-04-24 PROCEDURE — 92004 PR EYE EXAM, NEW PATIENT,COMPREHESV: ICD-10-PCS | Mod: S$GLB,,, | Performed by: OPTOMETRIST

## 2019-04-24 PROCEDURE — 99999 PR PBB SHADOW E&M-EST. PATIENT-LVL II: ICD-10-PCS | Mod: PBBFAC,,, | Performed by: OPTOMETRIST

## 2019-04-24 PROCEDURE — 92015 PR REFRACTION: ICD-10-PCS | Mod: S$GLB,,, | Performed by: OPTOMETRIST

## 2019-04-24 PROCEDURE — 92004 COMPRE OPH EXAM NEW PT 1/>: CPT | Mod: S$GLB,,, | Performed by: OPTOMETRIST

## 2019-04-24 PROCEDURE — 92015 DETERMINE REFRACTIVE STATE: CPT | Mod: S$GLB,,, | Performed by: OPTOMETRIST

## 2019-04-24 NOTE — PROGRESS NOTES
YAYA ENGEL: 5 years ago   Pt states  She likes to read a lot and she has noticed when she puts her   OTC reader +1.25 on she has to have her book far away from her to be able   to read it. Also pt states she has been getting headaches when she has her   readers on.  States sometimes when she is reading she sees a blue   light/spot and when she closes her eyes and rubs them it goes away.   No problems with distance  No floaters  No gtts  LBS: does not have to check   Hemoglobin A1C       Date                     Value               Ref Range             Status                04/20/2019               5.9 (H)             4.0 - 5.6 %           Final              Comment:    ADA Screening Guidelines:  5.7-6.4%  Consistent with   prediabetes  >or=6.5%  Consistent with diabetes  High levels of fetal   hemoglobin interfere with the HbA1C  assay. Heterozygous hemoglobin   variants (HbS, HgC, etc)do  not significantly interfere with this assay.     However, presence of multiple variants may affect accuracy.         01/21/2019               6.2 (H)             4.0 - 5.6 %           Final              Comment:    ADA Screening Guidelines:  5.7-6.4%  Consistent with   prediabetes  >or=6.5%  Consistent with diabetes  High levels of fetal   hemoglobin interfere with the HbA1C  assay. Heterozygous hemoglobin   variants (HbS, HgC, etc)do  not significantly interfere with this assay.     However, presence of multiple variants may affect accuracy.         10/23/2018               7.4 (H)             4.0 - 5.6 %           Final              Comment:    ADA Screening Guidelines:  5.7-6.4%  Consistent with   prediabetes  >or=6.5%  Consistent with diabetes  High levels of fetal   hemoglobin interfere with the HbA1C  assay. Heterozygous hemoglobin   variants (HbS, HgC, etc)do  not significantly interfere with this assay.     However, presence of multiple variants may affect accuracy.    ----------        Last edited by Tonio Hutton, JUANITA  on 4/24/2019 12:54 PM. (History)        ROS     Positive for: Endocrine (DM)    Negative for: Constitutional, Gastrointestinal, Neurological, Skin,   Genitourinary, Musculoskeletal, HENT, Cardiovascular, Eyes, Respiratory,   Psychiatric, Allergic/Imm, Heme/Lymph    Last edited by Tonio Hutton, OD on 4/24/2019 12:57 PM. (History)        Assessment /Plan     For exam results, see Encounter Report.    Type 2 diabetes mellitus without retinopathy    Nuclear sclerosis, bilateral    Presbyopia      1. [employer] OU--pt happy w otc readers, but discussed she need to go stronger than +1.25  2. DM- WITHOUT RETINOPATHY.  Advised yearly DFE.  ALSO discussed visual flux assoc w sugar flux    PLAN:    rtc 1 yr

## 2019-04-24 NOTE — LETTER
April 24, 2019      Shahab Merlos, DO  [address] [facility] [place] [address]           [place] [facility]  [address] [facility]  [place] [address]  Phone: [phone]  Fax: [phone]          Patient: Jill Perez   MR Number: [id number]   YOB: 1955   Date of Visit: 4/24/2019       Dear Dr. Shahab Merlos:    Thank you for referring Jill Perez to me for evaluation. Attached you will find relevant portions of my assessment and plan of care.    If you have questions, please do not hesitate to call me. I look forward to following Jill Perez along with you.    Sincerely,    Tonio [place], OD    Enclosure  CC:  No Recipients    If you would like to receive this communication electronically, please contact [email or web address] or [phone] to request more information on [facility] Link access.    For providers and/or their staff who would like to refer a patient to [facility], please contact us through our one-stop-shop provider referral line, [facility], at [phone].    If you feel you have received this communication in error or would no longer like to receive these types of communications, please e-mail [email or web address]

## 2019-05-08 ENCOUNTER — TELEPHONE (OUTPATIENT)
Dept: FAMILY MEDICINE | Facility: CLINIC | Age: 64
End: 2019-05-08

## 2019-05-08 NOTE — TELEPHONE ENCOUNTER
----- Message from Anand Blevins sent at 5/8/2019 11:07 AM CDT -----  Contact: [phone]/self  Patient requesting to speak with the nurse only about a referral.  Please call back to assist at [phone]

## 2019-05-08 NOTE — TELEPHONE ENCOUNTER
I spoke with patient and offered her a same day appointment with Rheumatology. Patient declined same day appointment. I was unable to book a appointment. I advised patient that a message have been sent to department for a sooner appointment. Patient voiced understanding.

## 2019-05-13 ENCOUNTER — TELEPHONE (OUTPATIENT)
Dept: GASTROENTEROLOGY | Facility: CLINIC | Age: 64
End: 2019-05-13

## 2019-05-13 NOTE — TELEPHONE ENCOUNTER
Attempted to contact patient to inform her that her prep has been called to her pharmacy. Unable to leave patient a message.

## 2019-05-15 ENCOUNTER — TELEPHONE (OUTPATIENT)
Dept: ENDOSCOPY | Facility: HOSPITAL | Age: 64
End: 2019-05-15

## 2019-05-16 ENCOUNTER — TELEPHONE (OUTPATIENT)
Dept: ENDOSCOPY | Facility: HOSPITAL | Age: 64
End: 2019-05-16

## 2019-05-16 NOTE — TELEPHONE ENCOUNTER
Patient cancelled the procedure tomorrow because nobody will give her a ride back home. Advised to call the office to reschedule another appointment date.

## 2019-05-30 ENCOUNTER — TELEPHONE (OUTPATIENT)
Dept: GASTROENTEROLOGY | Facility: CLINIC | Age: 64
End: 2019-05-30

## 2019-05-30 NOTE — TELEPHONE ENCOUNTER
Attempted to contact patient about rescheduling her colonoscopy. Unable to leave patient a message.

## 2019-06-05 ENCOUNTER — OFFICE VISIT (OUTPATIENT)
Dept: FAMILY MEDICINE | Facility: CLINIC | Age: 64
End: 2019-06-05
Payer: COMMERCIAL

## 2019-06-05 ENCOUNTER — TELEPHONE (OUTPATIENT)
Dept: GASTROENTEROLOGY | Facility: CLINIC | Age: 64
End: 2019-06-05

## 2019-06-05 VITALS
OXYGEN SATURATION: 98 % | SYSTOLIC BLOOD PRESSURE: 128 MMHG | BODY MASS INDEX: 32.01 KG/M2 | DIASTOLIC BLOOD PRESSURE: 84 MMHG | HEIGHT: 67 IN | WEIGHT: 203.94 LBS | HEART RATE: 97 BPM

## 2019-06-05 DIAGNOSIS — I73.00 RAYNAUD'S DISEASE WITHOUT GANGRENE: Primary | ICD-10-CM

## 2019-06-05 PROCEDURE — 99999 PR PBB SHADOW E&M-EST. PATIENT-LVL III: ICD-10-PCS | Mod: PBBFAC,,, | Performed by: FAMILY MEDICINE

## 2019-06-05 PROCEDURE — 99215 PR OFFICE/OUTPT VISIT, EST, LEVL V, 40-54 MIN: ICD-10-PCS | Mod: S$GLB,,, | Performed by: FAMILY MEDICINE

## 2019-06-05 PROCEDURE — 3008F PR BODY MASS INDEX (BMI) DOCUMENTED: ICD-10-PCS | Mod: CPTII,S$GLB,, | Performed by: FAMILY MEDICINE

## 2019-06-05 PROCEDURE — 99215 OFFICE O/P EST HI 40 MIN: CPT | Mod: S$GLB,,, | Performed by: FAMILY MEDICINE

## 2019-06-05 PROCEDURE — 99999 PR PBB SHADOW E&M-EST. PATIENT-LVL III: CPT | Mod: PBBFAC,,, | Performed by: FAMILY MEDICINE

## 2019-06-05 PROCEDURE — 3008F BODY MASS INDEX DOCD: CPT | Mod: CPTII,S$GLB,, | Performed by: FAMILY MEDICINE

## 2019-06-05 PROCEDURE — 3074F SYST BP LT 130 MM HG: CPT | Mod: CPTII,S$GLB,, | Performed by: FAMILY MEDICINE

## 2019-06-05 PROCEDURE — 3079F PR MOST RECENT DIASTOLIC BLOOD PRESSURE 80-89 MM HG: ICD-10-PCS | Mod: CPTII,S$GLB,, | Performed by: FAMILY MEDICINE

## 2019-06-05 PROCEDURE — 3074F PR MOST RECENT SYSTOLIC BLOOD PRESSURE < 130 MM HG: ICD-10-PCS | Mod: CPTII,S$GLB,, | Performed by: FAMILY MEDICINE

## 2019-06-05 PROCEDURE — 3079F DIAST BP 80-89 MM HG: CPT | Mod: CPTII,S$GLB,, | Performed by: FAMILY MEDICINE

## 2019-06-05 RX ORDER — TRAMADOL HYDROCHLORIDE 50 MG/1
50 TABLET ORAL EVERY 8 HOURS PRN
Qty: 30 TABLET | Refills: 0 | Status: SHIPPED | OUTPATIENT
Start: 2019-06-05 | End: 2019-08-09

## 2019-06-05 RX ORDER — FLUOXETINE HYDROCHLORIDE 20 MG/1
20 CAPSULE ORAL DAILY
Qty: 90 CAPSULE | Refills: 0 | Status: SHIPPED | OUTPATIENT
Start: 2019-06-05 | End: 2019-07-22

## 2019-06-05 NOTE — PATIENT INSTRUCTIONS
Topical nitrate (eg, 0.5 inches [approximately 1.3 cm] of 2% ointment should be applied to a single more severely affected or ischemic digit for 6 hours. The nitroglycerin 2% ointment can be titrated as needed up to a full 2 inches (approximately 5.1 cm) every 4 to 6 hours with a 12-hour nitrate-free period.  Verbal order for this medication given to pharmacy. Can not find order in Epic.   Tramadol 50 mg Q8H PRN for pain  Start fluoxetine  Message sent to rheumatology.

## 2019-06-05 NOTE — TELEPHONE ENCOUNTER
Returned patient's call. Patient want to reschedule her Colonoscopy. Patient scheduled procedure with Dr. Armas on 6/28/2019. Patient stated she never picked up prep from pharmacy, informed patient another prep will be sent to her pharmacy.

## 2019-06-05 NOTE — Clinical Note
Hi, If you are able, please try to see patient earlier. I have started fluoxetine and nitro paste. She was already on a CCB. Tearful and in severe pain today. Dr. Merlos

## 2019-06-05 NOTE — PROGRESS NOTES
Subjective:       Patient ID: Jill Perez is a 64 y.o. female.    Chief Complaint: Leg Pain and Foot Pain    Jill is a 64 y.o. female who presents today with pain from her raynaud's. She saw Dr. Daniels and her procardia was increased to 90 mg. She is keeping the house warmer. She tried to use a fan once and it made her foot pain much worse. When she lived in [place], she never had this issue. The pain is a 10/10. She feels pain and swelling and needles down her foot. Symptoms have not changed since onset. No other systemic symptoms.     Review of Systems   Constitutional: Negative for chills and fever.   Respiratory: Negative for cough and shortness of breath.    Cardiovascular: Negative for chest pain, palpitations and leg swelling.   Gastrointestinal: Negative for blood in stool, constipation, diarrhea, nausea and vomiting.   Genitourinary: Negative for difficulty urinating and dysuria.   Musculoskeletal: Positive for gait problem and joint swelling. Negative for neck pain and neck stiffness.   Skin: Negative for rash.   Neurological: Negative for dizziness, light-headedness and numbness.   Psychiatric/Behavioral: Negative for sleep disturbance.         Objective:     Vitals:    06/05/19 1552   BP: 128/84   Pulse: 97        Physical Exam   Constitutional: She is oriented to person, place, and time. She appears well-developed and well-nourished. She appears distressed.   Tearful throughout the visit   HENT:   Head: Normocephalic and atraumatic.   Cardiovascular: Normal rate and regular rhythm.   Pulmonary/Chest: Effort normal and breath sounds normal.   Musculoskeletal: She exhibits no edema.   Pulses appear normal BL LE  BL feet are warm to the touch  No visible deformity noted   Neurological: She is alert and oriented to person, place, and time.   Skin: Skin is warm. She is not diaphoretic.   Psychiatric: She has a normal mood and affect. Her behavior is normal. Judgment and thought content normal.  "  Nursing note and vitals reviewed.      Assessment:       1. Raynaud's disease without gangrene        Plan:         Topical nitrate (eg, 0.5 inches [approximately 1.3 cm] of 2% ointment should be applied to a single more severely affected or ischemic digit for 6 hours. The nitroglycerin 2% ointment can be titrated as needed up to a full 2 inches (approximately 5.1 cm) every 4 to 6 hours with a 12-hour nitrate-free period.  Verbal order for this medication given to pharmacy. Can not find order in Epic.   Tramadol 50 mg Q8H PRN for pain  Start fluoxetine  Continue CCB  Reviewed treatment recommendation with the patient.   She is agreeable to trying all of the above interventions. "anything to get rid of the pain"  Message sent to rheumatology. Have asked for her to be seen earlier.   KEEP YOUR HOUSE HOT  Avoid cold/AC    Raynaud's disease without gangrene  -     FLUoxetine 20 MG capsule; Take 1 capsule (20 mg total) by mouth once daily.  Dispense: 90 capsule; Refill: 0  -     traMADol (ULTRAM) 50 mg tablet; Take 1 tablet (50 mg total) by mouth every 8 (eight) hours as needed for Pain.  Dispense: 30 tablet; Refill: 0        Warning signs discussed, patient to call with any further issues or worsening of symptoms.     40 minutes spent with patient, of which >50% was spent on counseling and coordination of care.     "

## 2019-06-06 DIAGNOSIS — F51.01 PRIMARY INSOMNIA: ICD-10-CM

## 2019-06-06 RX ORDER — ZOLPIDEM TARTRATE 10 MG/1
10 TABLET ORAL NIGHTLY PRN
Qty: 30 TABLET | Refills: 0 | Status: SHIPPED | OUTPATIENT
Start: 2019-06-06 | End: 2019-07-03

## 2019-06-11 ENCOUNTER — OFFICE VISIT (OUTPATIENT)
Dept: RHEUMATOLOGY | Facility: CLINIC | Age: 64
End: 2019-06-11
Payer: COMMERCIAL

## 2019-06-11 VITALS
HEART RATE: 92 BPM | WEIGHT: 207.69 LBS | SYSTOLIC BLOOD PRESSURE: 141 MMHG | BODY MASS INDEX: 32.6 KG/M2 | DIASTOLIC BLOOD PRESSURE: 86 MMHG | HEIGHT: 67 IN

## 2019-06-11 DIAGNOSIS — I73.00 RAYNAUD'S DISEASE WITHOUT GANGRENE: Primary | ICD-10-CM

## 2019-06-11 DIAGNOSIS — F32.A DEPRESSION, UNSPECIFIED DEPRESSION TYPE: ICD-10-CM

## 2019-06-11 DIAGNOSIS — M25.50 POLYARTHRALGIA: ICD-10-CM

## 2019-06-11 PROCEDURE — 99999 PR PBB SHADOW E&M-EST. PATIENT-LVL V: ICD-10-PCS | Mod: PBBFAC,,, | Performed by: STUDENT IN AN ORGANIZED HEALTH CARE EDUCATION/TRAINING PROGRAM

## 2019-06-11 PROCEDURE — 99214 OFFICE O/P EST MOD 30 MIN: CPT | Mod: S$GLB,,, | Performed by: STUDENT IN AN ORGANIZED HEALTH CARE EDUCATION/TRAINING PROGRAM

## 2019-06-11 PROCEDURE — 99214 PR OFFICE/OUTPT VISIT, EST, LEVL IV, 30-39 MIN: ICD-10-PCS | Mod: S$GLB,,, | Performed by: STUDENT IN AN ORGANIZED HEALTH CARE EDUCATION/TRAINING PROGRAM

## 2019-06-11 PROCEDURE — 3008F BODY MASS INDEX DOCD: CPT | Mod: CPTII,S$GLB,, | Performed by: STUDENT IN AN ORGANIZED HEALTH CARE EDUCATION/TRAINING PROGRAM

## 2019-06-11 PROCEDURE — 3077F SYST BP >= 140 MM HG: CPT | Mod: CPTII,S$GLB,, | Performed by: STUDENT IN AN ORGANIZED HEALTH CARE EDUCATION/TRAINING PROGRAM

## 2019-06-11 PROCEDURE — 3079F PR MOST RECENT DIASTOLIC BLOOD PRESSURE 80-89 MM HG: ICD-10-PCS | Mod: CPTII,S$GLB,, | Performed by: STUDENT IN AN ORGANIZED HEALTH CARE EDUCATION/TRAINING PROGRAM

## 2019-06-11 PROCEDURE — 3008F PR BODY MASS INDEX (BMI) DOCUMENTED: ICD-10-PCS | Mod: CPTII,S$GLB,, | Performed by: STUDENT IN AN ORGANIZED HEALTH CARE EDUCATION/TRAINING PROGRAM

## 2019-06-11 PROCEDURE — 3077F PR MOST RECENT SYSTOLIC BLOOD PRESSURE >= 140 MM HG: ICD-10-PCS | Mod: CPTII,S$GLB,, | Performed by: STUDENT IN AN ORGANIZED HEALTH CARE EDUCATION/TRAINING PROGRAM

## 2019-06-11 PROCEDURE — 99999 PR PBB SHADOW E&M-EST. PATIENT-LVL V: CPT | Mod: PBBFAC,,, | Performed by: STUDENT IN AN ORGANIZED HEALTH CARE EDUCATION/TRAINING PROGRAM

## 2019-06-11 PROCEDURE — 3079F DIAST BP 80-89 MM HG: CPT | Mod: CPTII,S$GLB,, | Performed by: STUDENT IN AN ORGANIZED HEALTH CARE EDUCATION/TRAINING PROGRAM

## 2019-06-11 ASSESSMENT — ROUTINE ASSESSMENT OF PATIENT INDEX DATA (RAPID3)
PAIN SCORE: 7.5
MDHAQ FUNCTION SCORE: 1.2
FATIGUE SCORE: 8
PSYCHOLOGICAL DISTRESS SCORE: 5.5
TOTAL RAPID3 SCORE: 5.5
AM STIFFNESS SCORE: 1, YES
WHEN YOU AWAKENED IN THE MORNING OVER THE LAST WEEK, PLEASE INDICATE THE AMOUNT OF TIME IT TAKES UNTIL YOU ARE AS LIMBER AS YOU WILL BE FOR THE DAY: 30MIN
PATIENT GLOBAL ASSESSMENT SCORE: 5

## 2019-06-11 NOTE — PROGRESS NOTES
RHEUMATOLOGY CLINIC FOLLOW UP VISIT  Chief complaints:-  To follow up for Raynaud's and neuropathy     HPI:-  Jill Bañuelos a 64 y.o. pleasant female with history of DMII, gastric bypass, HTN, L knee replacement, lumbar surgeries x 2, splenectomy was told by PCP to return back to rheumatology clinic for worsening Raynaud's.     Patient was last seen here in Rheumatology clinic by Dr. Daniels (March 13, 2019).  At that time patient was complaining of L shoulder, lower back and hip pain.  It was associated with AM stiffness.  No alleviation of tylenol (takes up to 6 pills/day).  Heat alleviated the symptoms.  Patient also reports Raynaud's of her hands and feet x 1 year.      Denies any rash, oral ulcers, alopecia, fever/chills, N/V, dysphagia, SOB, or cough.      Autoimmune work up was negative (YOLIS, APL, preDMARDs, RF/CCP, SSA, anti-scleroderma).  Imaging was significant for mild R knee chondrocalcinosis and bilateral 1st carpometocarpal osteophytes formation and DJD of the L shoulder.     FHx: RA in mother    Denies any EtOH, tobacco, or recreational medication/drugs.     Retired .     Interval History:  Patient followed up with PCP with concern about worsening neuropathy pain  (pins and needles) of bilateral LE especially after application of nitro paste.  Patient continues to have Raynaud's (purplish discoloration in the color and then redness after rewarming).  She is complaint with all home medication.  DM is better controlled - HgA1c coming down and one of the DM had been discontinued.  Diagnosed with DM ~2011.  PCP recently started fluoxetine 5 days ago.      Hx of L shoulder injury involving ligament repair (>12 years ago).  Currently L shoulder pain worsen and inability to move raise more than 90 degree.  Denies any recent injury.     + stressors.  +Insomnia and early awakening.  +      Review of Systems   Constitutional: Negative for  chills, diaphoresis, fever, malaise/fatigue and weight loss.   HENT: Negative for congestion, ear [facility], ear pain, hearing loss, nosebleeds, sinus pain and tinnitus.    Eyes: Negative for photophobia, pain, [facility] and redness.   Respiratory: Negative for cough, hemoptysis, sputum production, shortness of breath, wheezing and stridor.    Cardiovascular: Negative for chest pain, palpitations, orthopnea, claudication, leg swelling and PND.   Gastrointestinal: Positive for heartburn. Negative for abdominal pain, constipation, diarrhea, nausea and vomiting.   Genitourinary: Negative for dysuria, frequency, hematuria and urgency.   Musculoskeletal: Positive for joint pain. Negative for back pain, myalgias and neck pain.   Skin: Negative for rash.   Neurological: Positive for tingling. Negative for dizziness, tremors, weakness and headaches.   Endo/Heme/Allergies: Does not bruise/bleed easily.   Psychiatric/Behavioral: Negative for depression, hallucinations and suicidal ideas. The patient has insomnia. The patient is not nervous/anxious.        Past Medical History:   Diagnosis Date    Chronic back pain     Diabetes mellitus     Diabetes mellitus, type 2     Hyperlipidemia     Hypertension     Insomnia        Past Surgical History:   Procedure Laterality Date    APPENDECTOMY      BACK SURGERY      laminectomy and fusion    Breast Reduction  2003     SECTION      x2    CHOLECYSTECTOMY      FRACTURE SURGERY      GASTRIC BYPASS  2010    gastric sleeve    SPLENECTOMY, TOTAL      TOTAL KNEE ARTHROPLASTY Left     TOTAL REDUCTION MAMMOPLASTY Bilateral     over 20yrs ago        Social History     Tobacco Use    Smoking status: Never Smoker    Smokeless tobacco: Never Used   Substance Use Topics    Alcohol use: Yes     Comment: occasional    Drug use: No       Family History   Problem Relation Age of Onset    Heart disease Mother     Heart failure Mother     Rheumatologic disease Mother      "Cataracts Mother     Glaucoma Mother     Heart disease Father     Coronary artery disease Father     Diabetes Father     Hypertension Father     Hyperlipidemia Father     Cataracts Father     Breast cancer Sister     Cancer Sister     No Known Problems Brother     No Known Problems Brother     Diabetes Sister     No Known Problems Sister     No Known Problems Sister     No Known Problems Sister        Review of patient's allergies indicates:   Allergen Reactions    Aspirin Swelling     Tongue swelling    Codeine Itching    Pcn [penicillins] Hives and Itching       Vitals:    06/11/19 0816   BP: (!) 141/86   Pulse: 92   Weight: 94.2 kg (207 lb 10.8 oz)   Height: 5' 7" (1.702 m)   PainSc:   7       Physical Exam   Constitutional: She is oriented to person, place, and time and well-developed, well-nourished, and in no distress.   HENT:   Head: Normocephalic and atraumatic.   No oral ulcers  No signs of alopecia     Eyes: Pupils are equal, round, and reactive to light. EOM are normal.   Neck: Normal range of motion. Neck supple.   Cardiovascular: Normal rate, regular rhythm and normal heart sounds.   Pulmonary/Chest: Effort normal and breath sounds normal.   Abdominal: Soft. Bowel sounds are normal.   Musculoskeletal: Normal range of motion. She exhibits edema.   + R knee crepitus  Healed L knee wound s/p knee replacement   Decreased ROM of L shoulder - inability to raise above 90 degrees   Mild +1 edema bilateral LE    Neurological: She is alert and oriented to person, place, and time. Gait normal. GCS score is 15.   Skin: Skin is warm and dry. No rash noted. No erythema.   Cold extremities with no digital ulcers  No rashes   Psychiatric: Mood, memory, affect and judgment normal.       Medication List with Changes/Refills   Current Medications    ATORVASTATIN (LIPITOR) 40 MG TABLET    Take 1 tablet (40 mg total) by mouth once daily.    DULAGLUTIDE (TRULICITY) 1.5 MG/0.5 ML CHAR    Inject 1.5 mg into " "the skin every 7 days.    FLUOXETINE 20 MG CAPSULE    Take 1 capsule (20 mg total) by mouth once daily.    LOSARTAN (COZAAR) 25 MG TABLET    Take 1 tablet (25 mg total) by mouth once daily.    METFORMIN (GLUCOPHAGE) 1000 MG TABLET    Take 1 tablet (1,000 mg total) by mouth 2 (two) times daily with meals.    NIFEDIPINE (PROCARDIA-XL) 90 MG (OSM) 24 HR TABLET    Take 1 tablet (90 mg total) by mouth once daily.    NITROGLYCERIN 2% TD OINT (NITRO-BID) 2 % OINTMENT    Apply up to 2 inches of ointment to afftected joints every 4 to 6 hours.  Have 12 hour nitro free period daily    PANTOPRAZOLE (PROTONIX) 40 MG TABLET    Take 1 tablet (40 mg total) by mouth once daily.    PEN NEEDLE, DIABETIC 32 GAUGE X 1/4" NDLE    USE AS DIRECTED ONCE DAILY WITH INSULIN    TRAMADOL (ULTRAM) 50 MG TABLET    Take 1 tablet (50 mg total) by mouth every 8 (eight) hours as needed for Pain.    ZOLPIDEM (AMBIEN) 10 MG TAB    Take 1 tablet (10 mg total) by mouth nightly as needed.       Assessment/Plans:-  64 y.o. pleasant female with history of DMII, gastric bypass, HTN, L knee replacement, lumbar surgeries x 2, splenectomy was told by PCP to return back to rheumatology clinic for worsening Raynaud's.     Patient currently on procardia 90mg Qday, fluoxetine 20mg and nitropaste for Raynaud's.     Patient continues to experience neuropathy of bilateral LE - concern for DM neuropathy (DM x 7 years) vs Raynaud's.      Previously labs are remarkable for elevated ESR in setting of negative autoimmune workup.  Concern about MM which can also cause neuropathy     Plan  - Repeat ESR and CRP  - CPK - patient is on statin  - SPEP - evaluation of MM  - referral to psychology - +emotional stress is a trigger for Raynaud's  - Continue procardia and fluoxetine - education provided that fluoxetine was recently started and may take a few weeks to be effective  - discontinuation of nitropaste - worsening of neuropathy  - EMG of RUE and RLE - evaluation of " diabetic neuropathy  - referral to water aerobes     Follow up as previously scheduled with Dr. Daniels (or other provider)    .

## 2019-06-12 NOTE — PROGRESS NOTES
Patient seen and examined with fellow.  All elements of history, physical exam and medical decision making independently confirmed by me.  Patient of Dr. Daniels.  Seen in the emergency visit for worsening Raynaud's per her primary care doctor's concerns.  Patient reports significant stressors.  Patient's history not clear that this is actually Raynaud's related.  She has experienced significant amount of stress.  She is also a diabetic.  We will evaluate with labs.  Stressful situations can worsen Raynaud's as does cold exposure.  We recommend that she sees a psychologist to help deal with the stressors that are impacting her.  She was upset during her visit and noted significant stressors impacting her.  See note for details.

## 2019-06-26 ENCOUNTER — TELEPHONE (OUTPATIENT)
Dept: ENDOSCOPY | Facility: HOSPITAL | Age: 64
End: 2019-06-26

## 2019-06-26 NOTE — TELEPHONE ENCOUNTER
Left message instructing patient to call dept @ [phone] between 8am-4pm.    Arrival time to be given 1200 .  Message sent via [facility] portal.

## 2019-06-26 NOTE — TELEPHONE ENCOUNTER
Spoke with patient about arrival time @1200.     Prep instructions reviewed: the day before the procedure, follow a clear liquid diet all day, then start the first 1/2 of prep at 5pm and take 2nd 1/2 of prep @0600.  Pt must be completely NPO when prep completed @0800.              Medications: Do not take Insulin or oral diabetic medications the day of the procedure.  Take as prescribed: heart, seizure and blood pressure medication in the morning with a sip of water (less than an ounce).  Take any breathing medications and bring inhalers to hospital with you Leave all valuables and jewelry at home.     Wear comfortable clothes to procedure to change into hospital gown You cannot drive for 24 hours after your procedure because you will receive sedation for your procedure to make you comfortable.  A ride must be provided at [facility].

## 2019-06-28 ENCOUNTER — ANESTHESIA EVENT (OUTPATIENT)
Dept: ENDOSCOPY | Facility: HOSPITAL | Age: 64
End: 2019-06-28

## 2019-06-28 ENCOUNTER — ANESTHESIA (OUTPATIENT)
Dept: ENDOSCOPY | Facility: HOSPITAL | Age: 64
End: 2019-06-28

## 2019-06-30 ENCOUNTER — HOSPITAL ENCOUNTER (EMERGENCY)
Facility: HOSPITAL | Age: 64
Discharge: HOME | End: 2019-06-30
Attending: EMERGENCY MEDICINE
Payer: COMMERCIAL

## 2019-06-30 VITALS
WEIGHT: 198 LBS | HEIGHT: 67 IN | BODY MASS INDEX: 31.08 KG/M2 | TEMPERATURE: 98 F | OXYGEN SATURATION: 100 % | DIASTOLIC BLOOD PRESSURE: 60 MMHG | RESPIRATION RATE: 18 BRPM | SYSTOLIC BLOOD PRESSURE: 130 MMHG | HEART RATE: 86 BPM

## 2019-06-30 DIAGNOSIS — R10.9 LEFT FLANK PAIN: ICD-10-CM

## 2019-06-30 DIAGNOSIS — R10.9 LEFT SIDED ABDOMINAL PAIN: Primary | ICD-10-CM

## 2019-06-30 DIAGNOSIS — R10.9 ABDOMINAL PAIN: ICD-10-CM

## 2019-06-30 LAB
ALBUMIN SERPL BCP-MCNC: 3.3 G/DL (ref 3.5–5.2)
ALP SERPL-CCNC: 124 U/L (ref 55–135)
ALT SERPL W/O P-5'-P-CCNC: 18 U/L (ref 10–44)
ANION GAP SERPL CALC-SCNC: 10 MMOL/L (ref 8–16)
AST SERPL-CCNC: 21 U/L (ref 10–40)
BASOPHILS # BLD AUTO: 0.01 K/UL (ref 0–0.2)
BASOPHILS NFR BLD: 0.1 % (ref 0–1.9)
BILIRUB SERPL-MCNC: 0.2 MG/DL (ref 0.1–1)
BILIRUB UR QL STRIP: NEGATIVE
BUN SERPL-MCNC: 13 MG/DL (ref 8–23)
CALCIUM SERPL-MCNC: 10.1 MG/DL (ref 8.7–10.5)
CHLORIDE SERPL-SCNC: 104 MMOL/L (ref 95–110)
CLARITY UR: CLEAR
CO2 SERPL-SCNC: 27 MMOL/L (ref 23–29)
COLOR UR: YELLOW
CREAT SERPL-MCNC: 0.6 MG/DL (ref 0.5–1.4)
DIFFERENTIAL METHOD: ABNORMAL
EOSINOPHIL # BLD AUTO: 0.1 K/UL (ref 0–0.5)
EOSINOPHIL NFR BLD: 1.3 % (ref 0–8)
ERYTHROCYTE [DISTWIDTH] IN BLOOD BY AUTOMATED COUNT: 15.3 % (ref 11.5–14.5)
EST. GFR  (AFRICAN AMERICAN): >60 ML/MIN/1.73 M^2
EST. GFR  (NON AFRICAN AMERICAN): >60 ML/MIN/1.73 M^2
GLUCOSE SERPL-MCNC: 70 MG/DL (ref 70–110)
GLUCOSE UR QL STRIP: NEGATIVE
HCT VFR BLD AUTO: 35.8 % (ref 37–48.5)
HGB BLD-MCNC: 11.4 G/DL (ref 12–16)
HGB UR QL STRIP: NEGATIVE
KETONES UR QL STRIP: NEGATIVE
LEUKOCYTE ESTERASE UR QL STRIP: ABNORMAL
LIPASE SERPL-CCNC: 34 U/L (ref 4–60)
LYMPHOCYTES # BLD AUTO: 2.7 K/UL (ref 1–4.8)
LYMPHOCYTES NFR BLD: 35.6 % (ref 18–48)
MCH RBC QN AUTO: 27.1 PG (ref 27–31)
MCHC RBC AUTO-ENTMCNC: 31.8 G/DL (ref 32–36)
MCV RBC AUTO: 85 FL (ref 82–98)
MICROSCOPIC COMMENT: NORMAL
MONOCYTES # BLD AUTO: 0.9 K/UL (ref 0.3–1)
MONOCYTES NFR BLD: 11.7 % (ref 4–15)
NEUTROPHILS # BLD AUTO: 3.8 K/UL (ref 1.8–7.7)
NEUTROPHILS NFR BLD: 51.3 % (ref 38–73)
NITRITE UR QL STRIP: NEGATIVE
PH UR STRIP: 6 [PH] (ref 5–8)
PLATELET # BLD AUTO: 460 K/UL (ref 150–350)
PMV BLD AUTO: 9.2 FL (ref 9.2–12.9)
POTASSIUM SERPL-SCNC: 4.5 MMOL/L (ref 3.5–5.1)
PROT SERPL-MCNC: 7.4 G/DL (ref 6–8.4)
PROT UR QL STRIP: NEGATIVE
RBC # BLD AUTO: 4.2 M/UL (ref 4–5.4)
SODIUM SERPL-SCNC: 141 MMOL/L (ref 136–145)
SP GR UR STRIP: <=1.005 (ref 1–1.03)
SQUAMOUS #/AREA URNS HPF: 5 /HPF
TROPONIN I SERPL DL<=0.01 NG/ML-MCNC: <0.006 NG/ML (ref 0–0.03)
URN SPEC COLLECT METH UR: ABNORMAL
UROBILINOGEN UR STRIP-ACNC: NEGATIVE EU/DL
WBC # BLD AUTO: 7.5 K/UL (ref 3.9–12.7)
WBC #/AREA URNS HPF: 4 /HPF (ref 0–5)

## 2019-06-30 PROCEDURE — 93005 ELECTROCARDIOGRAM TRACING: CPT

## 2019-06-30 PROCEDURE — 83690 ASSAY OF LIPASE: CPT

## 2019-06-30 PROCEDURE — 85025 COMPLETE CBC W/AUTO DIFF WBC: CPT

## 2019-06-30 PROCEDURE — 96375 TX/PRO/DX INJ NEW DRUG ADDON: CPT

## 2019-06-30 PROCEDURE — 81000 URINALYSIS NONAUTO W/SCOPE: CPT

## 2019-06-30 PROCEDURE — 25000003 PHARM REV CODE 250: Performed by: EMERGENCY MEDICINE

## 2019-06-30 PROCEDURE — 99285 EMERGENCY DEPT VISIT HI MDM: CPT | Mod: 25

## 2019-06-30 PROCEDURE — 25500020 PHARM REV CODE 255: Performed by: EMERGENCY MEDICINE

## 2019-06-30 PROCEDURE — 96361 HYDRATE IV INFUSION ADD-ON: CPT

## 2019-06-30 PROCEDURE — 96374 THER/PROPH/DIAG INJ IV PUSH: CPT

## 2019-06-30 PROCEDURE — 84484 ASSAY OF TROPONIN QUANT: CPT

## 2019-06-30 PROCEDURE — 63600175 PHARM REV CODE 636 W HCPCS: Performed by: EMERGENCY MEDICINE

## 2019-06-30 PROCEDURE — 80053 COMPREHEN METABOLIC PANEL: CPT

## 2019-06-30 PROCEDURE — 82962 GLUCOSE BLOOD TEST: CPT

## 2019-06-30 RX ORDER — ONDANSETRON 2 MG/ML
4 INJECTION INTRAMUSCULAR; INTRAVENOUS
Status: COMPLETED | OUTPATIENT
Start: 2019-06-30 | End: 2019-06-30

## 2019-06-30 RX ORDER — VALACYCLOVIR HYDROCHLORIDE 1 G/1
1000 TABLET, FILM COATED ORAL 3 TIMES DAILY
Qty: 21 TABLET | Refills: 0 | Status: SHIPPED | OUTPATIENT
Start: 2019-06-30 | End: 2019-07-22

## 2019-06-30 RX ORDER — MORPHINE SULFATE 4 MG/ML
4 INJECTION, SOLUTION INTRAMUSCULAR; INTRAVENOUS
Status: COMPLETED | OUTPATIENT
Start: 2019-06-30 | End: 2019-06-30

## 2019-06-30 RX ORDER — HYDROCODONE BITARTRATE AND ACETAMINOPHEN 5; 325 MG/1; MG/1
1 TABLET ORAL EVERY 8 HOURS PRN
Qty: 10 TABLET | Refills: 0 | Status: SHIPPED | OUTPATIENT
Start: 2019-06-30 | End: 2019-07-22

## 2019-06-30 RX ADMIN — ONDANSETRON 4 MG: 2 INJECTION INTRAMUSCULAR; INTRAVENOUS at 07:06

## 2019-06-30 RX ADMIN — MORPHINE SULFATE 4 MG: 4 INJECTION INTRAVENOUS at 07:06

## 2019-06-30 RX ADMIN — SODIUM CHLORIDE 1000 ML: 0.9 INJECTION, SOLUTION INTRAVENOUS at 07:06

## 2019-06-30 RX ADMIN — IOHEXOL 100 ML: 350 INJECTION, SOLUTION INTRAVENOUS at 08:06

## 2019-06-30 NOTE — ED PROVIDER NOTES
"Encounter Date: 2019    SCRIBE #1 NOTE: I, Shilpa Rosas, am scribing for, and in the presence of,  Dr. Hunter. I have scribed the entire note.       History     Chief Complaint   Patient presents with    Abdominal Pain     LUQ pain x 2 days in same area as when she had spleen removed years ago.  Feels "hot" inside.  Decreased appetite.  Denies nausea or dysuria or fever     Jill Perez is a 64 y.o. female who  has a past medical history of Chronic back pain, Diabetes mellitus, Diabetes mellitus, type 2, Hyperlipidemia, Hypertension, and Insomnia.    The patient presents to the ED due to left sided abdominal pain. She reports onset of symptoms was 3 days ago. The patient describes the pain as burning that waxes and wanes. She notes the pain radiates down the abdomen. She denies any associated nausea, vomiting, diarrhea, urinary symptoms, fever or chills. The patient has not tried any medications for pain. There is no change in pain with eating or exertion. Current pain is similar to previous pain experienced when her spleen was removed about 20 years ago. The patient states her spleen was enlarged due use of weight loss drug and needed to be removed due to constant pain.     The history is provided by the patient.     Review of patient's allergies indicates:   Allergen Reactions    Aspirin Swelling     Tongue swelling    Codeine Itching    Pcn [penicillins] Hives and Itching     Past Medical History:   Diagnosis Date    Chronic back pain     Diabetes mellitus     Diabetes mellitus, type 2     Hyperlipidemia     Hypertension     Insomnia      Past Surgical History:   Procedure Laterality Date    APPENDECTOMY      BACK SURGERY      laminectomy and fusion    Breast Reduction  2003     SECTION      x2    CHOLECYSTECTOMY      FRACTURE SURGERY      GASTRIC BYPASS  2010    gastric sleeve    SPLENECTOMY, TOTAL      TOTAL KNEE ARTHROPLASTY Left     TOTAL REDUCTION MAMMOPLASTY Bilateral  "    over 20yrs ago     Family History   Problem Relation Age of Onset    Heart disease Mother     Heart failure Mother     Rheumatologic disease Mother     Cataracts Mother     Glaucoma Mother     Heart disease Father     Coronary artery disease Father     Diabetes Father     Hypertension Father     Hyperlipidemia Father     Cataracts Father     Breast cancer Sister     Cancer Sister     No Known Problems Brother     No Known Problems Brother     Diabetes Sister     No Known Problems Sister     No Known Problems Sister     No Known Problems Sister      Social History     Tobacco Use    Smoking status: Never Smoker    Smokeless tobacco: Never Used   Substance Use Topics    Alcohol use: Yes     Comment: occasional    Drug use: No     Review of Systems   Constitutional: Negative for chills and fever.   HENT: Negative for congestion, rhinorrhea and sore throat.    Eyes: Negative for redness and visual disturbance.   Respiratory: Negative for cough, shortness of breath and wheezing.    Cardiovascular: Negative for chest pain and palpitations.   Gastrointestinal: Positive for abdominal pain. Negative for diarrhea, nausea and vomiting.   Genitourinary: Negative for dysuria and hematuria.   Musculoskeletal: Negative for back pain, myalgias and neck pain.   Skin: Negative for rash.   Neurological: Negative for dizziness, weakness and light-headedness.   Psychiatric/Behavioral: Negative for confusion.   All other systems reviewed and are negative.      Physical Exam     Initial Vitals [06/30/19 1810]   BP Pulse Resp Temp SpO2   (!) 142/96 102 18 98.8 °F (37.1 °C) 99 %      MAP       --         Physical Exam    Nursing note and vitals reviewed.  Constitutional: She appears well-developed and well-nourished. She is not diaphoretic. No distress.   HENT:   Head: Normocephalic and atraumatic.   Mouth/Throat: Oropharynx is clear and moist.   Eyes: Conjunctivae and EOM are normal. Pupils are equal, round, and  reactive to light.   Neck: Normal range of motion. Neck supple.   Cardiovascular: Normal rate, regular rhythm and normal heart sounds. Exam reveals no gallop and no friction rub.    No murmur heard.  Pulmonary/Chest: Breath sounds normal. She has no wheezes. She has no rhonchi. She has no rales.   Abdominal: Soft. Bowel sounds are normal. There is tenderness. There is no rebound and no guarding.   Left sided abdominal tenderness, upper greater than lower. No significant guarding   Musculoskeletal: Normal range of motion. She exhibits no edema or tenderness.   Lymphadenopathy:     She has no cervical adenopathy.   Neurological: She is alert and oriented to person, place, and time. She has normal strength.   Skin: Skin is warm and dry. Capillary refill takes less than 2 seconds. No rash noted.         ED Course   Procedures  Labs Reviewed   CBC W/ AUTO DIFFERENTIAL - Abnormal; Notable for the following components:       Result Value    Hemoglobin 11.4 (*)     Hematocrit 35.8 (*)     Mean Corpuscular Hemoglobin Conc 31.8 (*)     RDW 15.3 (*)     Platelets 460 (*)     All other components within normal limits   COMPREHENSIVE METABOLIC PANEL - Abnormal; Notable for the following components:    Albumin 3.3 (*)     All other components within normal limits   URINALYSIS, REFLEX TO URINE CULTURE - Abnormal; Notable for the following components:    Specific Gravity, UA <=1.005 (*)     Leukocytes, UA Trace (*)     All other components within normal limits    Narrative:     Preferred Collection Type->Urine, Clean Catch   LIPASE   TROPONIN I   URINALYSIS MICROSCOPIC    Narrative:     Preferred Collection Type->Urine, Clean Catch   POCT GLUCOSE     EKG Readings: (Independently Interpreted)   Sinus rhythm with single PAC. Ventricular rate of 88. RBBB. No STEMI     ECG Results          EKG 12-lead (Final result)  Result time 07/01/19 16:39:03    Final result by Interface, Lab In [facility] (07/01/19 16:39:03)                  Narrative:    Test Reason : R10.9,    Vent. Rate : 088 BPM     Atrial Rate : 088 BPM     P-R Int : 136 ms          QRS Dur : 146 ms      QT Int : 410 ms       P-R-T Axes : 056 -40 034 degrees     QTc Int : 496 ms    Sinus rhythm with Premature atrial complexes  Left axis deviation  Right bundle branch block  Abnormal ECG  When compared with ECG of 21-DEC-2016 18:57,  Premature atrial complexes are now Present  Confirmed by Eric Michael MD ([id number]) on 7/1/2019 4:38:52 PM    Referred By: [id number]   SELF           Confirmed By:Eric Michael MD                            Imaging Results          CT Abdomen Pelvis With Contrast (Final result)  Result time 06/30/19 21:14:43    Final result by Tigist Motley MD (06/30/19 21:14:43)                 Impression:      Mild intrahepatic biliary ductal dilatation.  If warranted, consider ERCP or MRCP.    Fatty involutional change of the pancreas.    Postsurgical changes of the bowel in stomach.  Splenectomy.      Electronically signed by: Tigist Motley  Date:    06/30/2019  Time:    21:14             Narrative:    EXAMINATION:  CT OF ABDOMEN PELVIS WITH    CLINICAL HISTORY:  Abd pain, fever, abscess suspected;    TECHNIQUE:  5 mm enhanced axial images were obtained from the lung bases through the greater trochanters.  One hundred mL of Omnipaque 350 was injected.    COMPARISON:  None.    FINDINGS:  Mild intrahepatic biliary ductal dilatation is present.  The gallbladder is surgically absent.  The kidneys, and adrenal glands are unremarkable.  There are severe fatty involutional changes of the pancreas.  Splenectomy.    There is surgical changes involving the stomach, small bowel, and the ileocecal region.    There is no definite evidence for abdominal adenopathy or ascites.    There are no pelvic masses or adenopathy.  The urinary bladder is moderately distended.  There is no urinary bladder wall thickening.  A postmenopausal uterus is noted.    There is no  free fluid in the pelvis.    There is mild bibasilar atelectasis.  Pneumatocele or emphysematous bleb is seen in the right lower lobe.    There is surgical fixation lower lumbosacral spine with laminectomy changes.                                 Medical Decision Making:   Initial Assessment:   The patient presents to the ED due to left sided abdominal pain  Independently Interpreted Test(s):   I have ordered and independently interpreted EKG Reading(s) - see prior notes  Clinical Tests:   Lab Tests: Ordered and Reviewed  Radiological Study: Ordered and Reviewed  Medical Tests: Ordered and Reviewed  ED Management:  Care patient assumed from Dr. Shin as of shift change  CT abdomen pelvis demonstrates mild intrahepatic biliary ductal dilatation; fatty involutional change of the pancreas; postsurgical changes of the bowel in stomach; splenectomy  Pain controlled in the emergency department  Concern for pre herpetic breakthrough; prescribe patient Valtrex for possible zoster dissemination  Patient able to tolerate p.o. prior to [facility]  Vital signs stable on [facility]  Patient follow up with primary care physician next 2-3 days for recheck of days complaints  Patient given strict return precautions for any new or worsening symptoms  Patient verbalized understanding of the aforementioned                      Clinical Impression:     1. Left sided abdominal pain    2. Abdominal pain    3. Left flank pain                         Luan Mathew MD  07/03/19 0037

## 2019-06-30 NOTE — ED NOTES
Patient presents to Ed with c/o Abdominal [ain to LUQ. Patient states she has been having symptoms for two days. Patient states that she had her spleen removed and area feels hot inside. Denies nausea.       APPEARANCE: Alert, oriented and in no acute distress.  CARDIAC: Normal rate and rhythm, no murmur heard.   PERIPHERAL VASCULAR: peripheral pulses present. Normal cap refill. No edema. Warm to touch.    RESPIRATORY:Normal rate and effort, breath sounds clear bilaterally throughout chest. Respirations are equal and unlabored no obvious signs of distress.  GASTRO: soft, bowel sounds normal, tenderness to LUQ, no abdominal distention. LUQ abdominal pain and discomfort.  MUSC: Full ROM. No bony tenderness or soft tissue tenderness. No obvious deformity.  SKIN: Skin is warm and dry, normal skin turgor, mucous membranes moist.  NEURO: 5/5 strength major flexors/extensors bilaterally. Sensory intact to light touch bilaterally. Stuart coma scale: eyes open spontaneously-4, oriented & converses-5, obeys commands-6. No neurological abnormalities.   MENTAL STATUS: awake, alert and aware of environment.  EYE: PERRL, both eyes: pupils brisk and reactive to light. Normal size.  ENT: EARS: no obvious drainage. NOSE: no active bleeding.

## 2019-07-01 LAB — POCT GLUCOSE: 90 MG/DL (ref 70–110)

## 2019-07-03 DIAGNOSIS — F51.01 PRIMARY INSOMNIA: ICD-10-CM

## 2019-07-03 RX ORDER — ZOLPIDEM TARTRATE 10 MG/1
10 TABLET ORAL NIGHTLY PRN
Qty: 30 TABLET | Refills: 0 | Status: SHIPPED | OUTPATIENT
Start: 2019-07-03 | End: 2019-07-22 | Stop reason: SDUPTHER

## 2019-07-19 ENCOUNTER — LAB VISIT (OUTPATIENT)
Dept: LAB | Facility: HOSPITAL | Age: 64
End: 2019-07-19
Attending: FAMILY MEDICINE
Payer: COMMERCIAL

## 2019-07-19 DIAGNOSIS — R23.3 EASY BRUISING: ICD-10-CM

## 2019-07-19 DIAGNOSIS — E11.65 TYPE 2 DIABETES MELLITUS WITH HYPERGLYCEMIA, WITH LONG-TERM CURRENT USE OF INSULIN: ICD-10-CM

## 2019-07-19 DIAGNOSIS — I10 ESSENTIAL HYPERTENSION: ICD-10-CM

## 2019-07-19 DIAGNOSIS — Z79.4 TYPE 2 DIABETES MELLITUS WITH HYPERGLYCEMIA, WITH LONG-TERM CURRENT USE OF INSULIN: ICD-10-CM

## 2019-07-19 LAB
ALBUMIN SERPL BCP-MCNC: 3.3 G/DL (ref 3.5–5.2)
ALP SERPL-CCNC: 127 U/L (ref 55–135)
ALT SERPL W/O P-5'-P-CCNC: 23 U/L (ref 10–44)
ANION GAP SERPL CALC-SCNC: 15 MMOL/L (ref 8–16)
AST SERPL-CCNC: 29 U/L (ref 10–40)
BASOPHILS # BLD AUTO: 0.02 K/UL (ref 0–0.2)
BASOPHILS NFR BLD: 0.3 % (ref 0–1.9)
BILIRUB SERPL-MCNC: 0.3 MG/DL (ref 0.1–1)
BUN SERPL-MCNC: 10 MG/DL (ref 8–23)
CALCIUM SERPL-MCNC: 9.4 MG/DL (ref 8.7–10.5)
CHLORIDE SERPL-SCNC: 106 MMOL/L (ref 95–110)
CO2 SERPL-SCNC: 22 MMOL/L (ref 23–29)
CREAT SERPL-MCNC: 0.7 MG/DL (ref 0.5–1.4)
DIFFERENTIAL METHOD: ABNORMAL
EOSINOPHIL # BLD AUTO: 0.1 K/UL (ref 0–0.5)
EOSINOPHIL NFR BLD: 1.7 % (ref 0–8)
ERYTHROCYTE [DISTWIDTH] IN BLOOD BY AUTOMATED COUNT: 16.3 % (ref 11.5–14.5)
EST. GFR  (AFRICAN AMERICAN): >60 ML/MIN/1.73 M^2
EST. GFR  (NON AFRICAN AMERICAN): >60 ML/MIN/1.73 M^2
ESTIMATED AVG GLUCOSE: 123 MG/DL (ref 68–131)
GLUCOSE SERPL-MCNC: 110 MG/DL (ref 70–110)
HBA1C MFR BLD HPLC: 5.9 % (ref 4–5.6)
HCT VFR BLD AUTO: 36.1 % (ref 37–48.5)
HGB BLD-MCNC: 11.1 G/DL (ref 12–16)
IMM GRANULOCYTES # BLD AUTO: 0.01 K/UL (ref 0–0.04)
IMM GRANULOCYTES NFR BLD AUTO: 0.2 % (ref 0–0.5)
LYMPHOCYTES # BLD AUTO: 2.8 K/UL (ref 1–4.8)
LYMPHOCYTES NFR BLD: 43.7 % (ref 18–48)
MCH RBC QN AUTO: 27.3 PG (ref 27–31)
MCHC RBC AUTO-ENTMCNC: 30.7 G/DL (ref 32–36)
MCV RBC AUTO: 89 FL (ref 82–98)
MONOCYTES # BLD AUTO: 0.8 K/UL (ref 0.3–1)
MONOCYTES NFR BLD: 12.3 % (ref 4–15)
NEUTROPHILS # BLD AUTO: 2.7 K/UL (ref 1.8–7.7)
NEUTROPHILS NFR BLD: 41.8 % (ref 38–73)
NRBC BLD-RTO: 0 /100 WBC
PLATELET # BLD AUTO: 402 K/UL (ref 150–350)
PMV BLD AUTO: 10.8 FL (ref 9.2–12.9)
POTASSIUM SERPL-SCNC: 4.5 MMOL/L (ref 3.5–5.1)
PROT SERPL-MCNC: 7.2 G/DL (ref 6–8.4)
RBC # BLD AUTO: 4.07 M/UL (ref 4–5.4)
SODIUM SERPL-SCNC: 143 MMOL/L (ref 136–145)
WBC # BLD AUTO: 6.48 K/UL (ref 3.9–12.7)

## 2019-07-19 PROCEDURE — 36415 COLL VENOUS BLD VENIPUNCTURE: CPT | Mod: PO

## 2019-07-19 PROCEDURE — 83036 HEMOGLOBIN GLYCOSYLATED A1C: CPT

## 2019-07-19 PROCEDURE — 85025 COMPLETE CBC W/AUTO DIFF WBC: CPT

## 2019-07-19 PROCEDURE — 80053 COMPREHEN METABOLIC PANEL: CPT

## 2019-07-22 ENCOUNTER — OFFICE VISIT (OUTPATIENT)
Dept: FAMILY MEDICINE | Facility: CLINIC | Age: 64
End: 2019-07-22
Payer: COMMERCIAL

## 2019-07-22 ENCOUNTER — TELEPHONE (OUTPATIENT)
Dept: RHEUMATOLOGY | Facility: CLINIC | Age: 64
End: 2019-07-22

## 2019-07-22 ENCOUNTER — LAB VISIT (OUTPATIENT)
Dept: LAB | Facility: HOSPITAL | Age: 64
End: 2019-07-22
Attending: FAMILY MEDICINE
Payer: COMMERCIAL

## 2019-07-22 VITALS
HEART RATE: 99 BPM | HEIGHT: 67 IN | DIASTOLIC BLOOD PRESSURE: 80 MMHG | OXYGEN SATURATION: 98 % | BODY MASS INDEX: 30.93 KG/M2 | SYSTOLIC BLOOD PRESSURE: 118 MMHG | TEMPERATURE: 98 F | WEIGHT: 197.06 LBS

## 2019-07-22 DIAGNOSIS — D64.9 NORMOCYTIC ANEMIA: ICD-10-CM

## 2019-07-22 DIAGNOSIS — F51.01 PRIMARY INSOMNIA: ICD-10-CM

## 2019-07-22 DIAGNOSIS — R10.13 EPIGASTRIC PAIN: ICD-10-CM

## 2019-07-22 DIAGNOSIS — E78.49 OTHER HYPERLIPIDEMIA: ICD-10-CM

## 2019-07-22 DIAGNOSIS — E53.8 LOW SERUM VITAMIN B12: ICD-10-CM

## 2019-07-22 DIAGNOSIS — Z79.4 TYPE 2 DIABETES MELLITUS WITH HYPERGLYCEMIA, WITH LONG-TERM CURRENT USE OF INSULIN: Primary | ICD-10-CM

## 2019-07-22 DIAGNOSIS — E11.65 TYPE 2 DIABETES MELLITUS WITH HYPERGLYCEMIA, WITH LONG-TERM CURRENT USE OF INSULIN: Primary | ICD-10-CM

## 2019-07-22 DIAGNOSIS — R76.8 POSITIVE ANA (ANTINUCLEAR ANTIBODY): ICD-10-CM

## 2019-07-22 DIAGNOSIS — I10 ESSENTIAL HYPERTENSION: ICD-10-CM

## 2019-07-22 DIAGNOSIS — I73.00 RAYNAUD'S DISEASE WITHOUT GANGRENE: ICD-10-CM

## 2019-07-22 DIAGNOSIS — Z01.419 WELL WOMAN EXAM: ICD-10-CM

## 2019-07-22 DIAGNOSIS — Z12.11 COLON CANCER SCREENING: ICD-10-CM

## 2019-07-22 LAB
BASOPHILS # BLD AUTO: 0.02 K/UL (ref 0–0.2)
BASOPHILS NFR BLD: 0.3 % (ref 0–1.9)
DIFFERENTIAL METHOD: ABNORMAL
EOSINOPHIL # BLD AUTO: 0.1 K/UL (ref 0–0.5)
EOSINOPHIL NFR BLD: 1.4 % (ref 0–8)
ERYTHROCYTE [DISTWIDTH] IN BLOOD BY AUTOMATED COUNT: 16.5 % (ref 11.5–14.5)
FERRITIN SERPL-MCNC: 14 NG/ML (ref 20–300)
FOLATE SERPL-MCNC: 12.5 NG/ML (ref 4–24)
HAPTOGLOB SERPL-MCNC: 233 MG/DL (ref 30–250)
HCT VFR BLD AUTO: 37.6 % (ref 37–48.5)
HGB BLD-MCNC: 11.6 G/DL (ref 12–16)
IMM GRANULOCYTES # BLD AUTO: 0.02 K/UL (ref 0–0.04)
IMM GRANULOCYTES NFR BLD AUTO: 0.3 % (ref 0–0.5)
IRON SERPL-MCNC: 37 UG/DL (ref 30–160)
LDH SERPL L TO P-CCNC: 164 U/L (ref 110–260)
LYMPHOCYTES # BLD AUTO: 2.1 K/UL (ref 1–4.8)
LYMPHOCYTES NFR BLD: 35.8 % (ref 18–48)
MCH RBC QN AUTO: 27.4 PG (ref 27–31)
MCHC RBC AUTO-ENTMCNC: 30.9 G/DL (ref 32–36)
MCV RBC AUTO: 89 FL (ref 82–98)
MONOCYTES # BLD AUTO: 0.6 K/UL (ref 0.3–1)
MONOCYTES NFR BLD: 9.6 % (ref 4–15)
NEUTROPHILS # BLD AUTO: 3.1 K/UL (ref 1.8–7.7)
NEUTROPHILS NFR BLD: 52.6 % (ref 38–73)
NRBC BLD-RTO: 0 /100 WBC
PLATELET # BLD AUTO: 440 K/UL (ref 150–350)
PMV BLD AUTO: 10.6 FL (ref 9.2–12.9)
RBC # BLD AUTO: 4.23 M/UL (ref 4–5.4)
RETICS/RBC NFR AUTO: 1.3 % (ref 0.5–2.5)
SATURATED IRON: 9 % (ref 20–50)
TOTAL IRON BINDING CAPACITY: 435 UG/DL (ref 250–450)
TRANSFERRIN SERPL-MCNC: 294 MG/DL (ref 200–375)
VIT B12 SERPL-MCNC: 167 PG/ML (ref 210–950)
WBC # BLD AUTO: 5.81 K/UL (ref 3.9–12.7)

## 2019-07-22 PROCEDURE — 3044F HG A1C LEVEL LT 7.0%: CPT | Mod: CPTII,S$GLB,, | Performed by: FAMILY MEDICINE

## 2019-07-22 PROCEDURE — 99999 PR PBB SHADOW E&M-EST. PATIENT-LVL III: CPT | Mod: PBBFAC,,, | Performed by: FAMILY MEDICINE

## 2019-07-22 PROCEDURE — 82607 VITAMIN B-12: CPT

## 2019-07-22 PROCEDURE — 83615 LACTATE (LD) (LDH) ENZYME: CPT

## 2019-07-22 PROCEDURE — 85045 AUTOMATED RETICULOCYTE COUNT: CPT

## 2019-07-22 PROCEDURE — 36415 COLL VENOUS BLD VENIPUNCTURE: CPT | Mod: PO

## 2019-07-22 PROCEDURE — 3074F PR MOST RECENT SYSTOLIC BLOOD PRESSURE < 130 MM HG: ICD-10-PCS | Mod: CPTII,S$GLB,, | Performed by: FAMILY MEDICINE

## 2019-07-22 PROCEDURE — 3079F DIAST BP 80-89 MM HG: CPT | Mod: CPTII,S$GLB,, | Performed by: FAMILY MEDICINE

## 2019-07-22 PROCEDURE — 99999 PR PBB SHADOW E&M-EST. PATIENT-LVL III: ICD-10-PCS | Mod: PBBFAC,,, | Performed by: FAMILY MEDICINE

## 2019-07-22 PROCEDURE — 82728 ASSAY OF FERRITIN: CPT

## 2019-07-22 PROCEDURE — 85025 COMPLETE CBC W/AUTO DIFF WBC: CPT

## 2019-07-22 PROCEDURE — 83010 ASSAY OF HAPTOGLOBIN QUANT: CPT

## 2019-07-22 PROCEDURE — 82746 ASSAY OF FOLIC ACID SERUM: CPT

## 2019-07-22 PROCEDURE — 3044F PR MOST RECENT HEMOGLOBIN A1C LEVEL <7.0%: ICD-10-PCS | Mod: CPTII,S$GLB,, | Performed by: FAMILY MEDICINE

## 2019-07-22 PROCEDURE — 3079F PR MOST RECENT DIASTOLIC BLOOD PRESSURE 80-89 MM HG: ICD-10-PCS | Mod: CPTII,S$GLB,, | Performed by: FAMILY MEDICINE

## 2019-07-22 PROCEDURE — 99214 PR OFFICE/OUTPT VISIT, EST, LEVL IV, 30-39 MIN: ICD-10-PCS | Mod: S$GLB,,, | Performed by: FAMILY MEDICINE

## 2019-07-22 PROCEDURE — 3008F BODY MASS INDEX DOCD: CPT | Mod: CPTII,S$GLB,, | Performed by: FAMILY MEDICINE

## 2019-07-22 PROCEDURE — 3008F PR BODY MASS INDEX (BMI) DOCUMENTED: ICD-10-PCS | Mod: CPTII,S$GLB,, | Performed by: FAMILY MEDICINE

## 2019-07-22 PROCEDURE — 99214 OFFICE O/P EST MOD 30 MIN: CPT | Mod: S$GLB,,, | Performed by: FAMILY MEDICINE

## 2019-07-22 PROCEDURE — 3074F SYST BP LT 130 MM HG: CPT | Mod: CPTII,S$GLB,, | Performed by: FAMILY MEDICINE

## 2019-07-22 PROCEDURE — 83540 ASSAY OF IRON: CPT

## 2019-07-22 RX ORDER — FLUOXETINE 10 MG/1
10 TABLET ORAL DAILY
Qty: 30 TABLET | Refills: 0 | Status: SHIPPED | OUTPATIENT
Start: 2019-07-22 | End: 2019-08-17 | Stop reason: SDUPTHER

## 2019-07-22 RX ORDER — NIFEDIPINE 90 MG/1
90 TABLET, EXTENDED RELEASE ORAL DAILY
Qty: 30 TABLET | Refills: 4 | Status: SHIPPED | OUTPATIENT
Start: 2019-07-22 | End: 2019-10-22 | Stop reason: SDUPTHER

## 2019-07-22 RX ORDER — ZOLPIDEM TARTRATE 10 MG/1
10 TABLET ORAL NIGHTLY PRN
Qty: 30 TABLET | Refills: 2 | Status: SHIPPED | OUTPATIENT
Start: 2019-08-02 | End: 2019-10-22 | Stop reason: SDUPTHER

## 2019-07-22 RX ORDER — ATORVASTATIN CALCIUM 40 MG/1
40 TABLET, FILM COATED ORAL DAILY
Qty: 90 TABLET | Refills: 3 | Status: SHIPPED | OUTPATIENT
Start: 2019-07-22 | End: 2019-10-22 | Stop reason: SDUPTHER

## 2019-07-22 RX ORDER — LOSARTAN POTASSIUM 25 MG/1
25 TABLET ORAL DAILY
Qty: 90 TABLET | Refills: 3 | Status: SHIPPED | OUTPATIENT
Start: 2019-07-22 | End: 2019-10-22

## 2019-07-22 RX ORDER — PANTOPRAZOLE SODIUM 40 MG/1
40 TABLET, DELAYED RELEASE ORAL DAILY
Qty: 90 TABLET | Refills: 1 | Status: SHIPPED | OUTPATIENT
Start: 2019-07-22 | End: 2020-01-22 | Stop reason: SDUPTHER

## 2019-07-22 NOTE — TELEPHONE ENCOUNTER
Spoke with pt she wants pt, she wants to see Dr Logan, she knows she is out on maternity leave. She will call the doctor who referred her to see if she can wait until Dr Daniels gets back and give me a call back

## 2019-07-22 NOTE — PROGRESS NOTES
"Subjective:       Patient ID: Jill Perez is a 64 y.o. female.    Chief Complaint: Follow-up and Diabetes    Jill is a 64 y.o. female who presents today for follow up on her chronic medical issues.      DM:  No more lantus! continued metformin 1000 mg bid and started once weekly trulicity. No issues swallowing, no neck swelling. No symptoms of low blood sugar. a1c is well controlled at 5.9.   HTN: stable. No issues with medication. No cough.   DLD: she is tolerating this cholesterol medication. No muscle ache changes. She takes it in the morning.   Obesity: she has lost about 12 pounds since she first came to see me. She has tried to eat small portions and has been trying to get away from fried food and breads.   Normocytic anemia: down trending from normal 9 months ago to aprox 11 now. No melena, no hematochezia.     Foot pain: she has seen rheumatology. I doubt this is diabetic neuropathy. Worse when its cold. When she "sits outside in the sun" her feet are better. She is unsure if fluoxetine is helping. She desires to stop it and see.     Insomnia: She is feeling tired. Her last sleep study was 5-6 years ago. She doesn't snore that she is aware of. On the 10 mg of Ambien she felt well rested and was able to complete her days work, but now she feels tired. When she takes Ambien 5 mg, she is able to fall asleep but she tosses and turns and wake up tired.     Labs as below reviewed in detail.     Review of Systems   Constitutional: Negative for chills and fever.   Respiratory: Negative for cough and shortness of breath.    Cardiovascular: Negative for chest pain, palpitations and leg swelling.   Gastrointestinal: Negative for anal bleeding, blood in stool, constipation, diarrhea, nausea and vomiting.   Genitourinary: Negative for difficulty urinating and dysuria.   Musculoskeletal: Positive for gait problem and joint swelling. Negative for neck pain and neck stiffness.   Skin: Negative for rash.   Neurological: " Negative for dizziness, light-headedness and numbness.   Psychiatric/Behavioral: Negative for sleep disturbance.           Results for orders placed or performed in visit on 07/19/19   CBC auto differential   Result Value Ref Range    WBC 6.48 3.90 - 12.70 K/uL    RBC 4.07 4.00 - 5.40 M/uL    Hemoglobin 11.1 (L) 12.0 - 16.0 g/dL    Hematocrit 36.1 (L) 37.0 - 48.5 %    Mean Corpuscular Volume 89 82 - 98 fL    Mean Corpuscular Hemoglobin 27.3 27.0 - 31.0 pg    Mean Corpuscular Hemoglobin Conc 30.7 (L) 32.0 - 36.0 g/dL    RDW 16.3 (H) 11.5 - 14.5 %    Platelets 402 (H) 150 - 350 K/uL    MPV 10.8 9.2 - 12.9 fL    Immature Granulocytes 0.2 0.0 - 0.5 %    Gran # (ANC) 2.7 1.8 - 7.7 K/uL    Immature Grans (Abs) 0.01 0.00 - 0.04 K/uL    Lymph # 2.8 1.0 - 4.8 K/uL    Mono # 0.8 0.3 - 1.0 K/uL    Eos # 0.1 0.0 - 0.5 K/uL    Baso # 0.02 0.00 - 0.20 K/uL    nRBC 0 0 /100 WBC    Gran% 41.8 38.0 - 73.0 %    Lymph% 43.7 18.0 - 48.0 %    Mono% 12.3 4.0 - 15.0 %    Eosinophil% 1.7 0.0 - 8.0 %    Basophil% 0.3 0.0 - 1.9 %    Differential Method Automated    Comprehensive metabolic panel   Result Value Ref Range    Sodium 143 136 - 145 mmol/L    Potassium 4.5 3.5 - 5.1 mmol/L    Chloride 106 95 - 110 mmol/L    CO2 22 (L) 23 - 29 mmol/L    Glucose 110 70 - 110 mg/dL    BUN, Bld 10 8 - 23 mg/dL    Creatinine 0.7 0.5 - 1.4 mg/dL    Calcium 9.4 8.7 - 10.5 mg/dL    Total Protein 7.2 6.0 - 8.4 g/dL    Albumin 3.3 (L) 3.5 - 5.2 g/dL    Total Bilirubin 0.3 0.1 - 1.0 mg/dL    Alkaline Phosphatase 127 55 - 135 U/L    AST 29 10 - 40 U/L    ALT 23 10 - 44 U/L    Anion Gap 15 8 - 16 mmol/L    eGFR if African American >60.0 >60 mL/min/1.73 m^2    eGFR if non African American >60.0 >60 mL/min/1.73 m^2   Hemoglobin A1c   Result Value Ref Range    Hemoglobin A1C 5.9 (H) 4.0 - 5.6 %    Estimated Avg Glucose 123 68 - 131 mg/dL       Objective:     Vitals:    07/22/19 0813   BP: 118/80   Pulse: 99   Temp: 98.4 °F (36.9 °C)        Physical Exam    Constitutional: She is oriented to person, place, and time. She appears well-developed and well-nourished. No distress.   HENT:   Head: Normocephalic and atraumatic.   Eyes: Conjunctivae are normal.   Neck: Normal range of motion. Neck supple.   Cardiovascular: Normal rate and regular rhythm.   Pulmonary/Chest: Effort normal and breath sounds normal.   Musculoskeletal: She exhibits no edema.   BL feet are warm to the touch   Neurological: She is alert and oriented to person, place, and time.   Skin: Skin is warm. She is not diaphoretic.   Psychiatric: She has a normal mood and affect. Her behavior is normal. Judgment and thought content normal.   Nursing note and vitals reviewed.      Assessment:       1. Type 2 diabetes mellitus with hyperglycemia, with long-term current use of insulin    2. Essential hypertension    3. Other hyperlipidemia    4. Raynaud's disease without gangrene    5. BMI 30.0-30.9,adult    6. Positive YOLIS (antinuclear antibody)    7. Normocytic anemia    8. Low serum vitamin B12    9. Colon cancer screening    10. Epigastric pain    11. Primary insomnia    12. Well woman exam        Plan:       Decrease to fluoxetine 10 mg x 1 week  Then take 1/2 tablet (5 mg) x 1 week  Then taken 1/2 tablet every other day   Then stop  If raynaud's worsen, restart 20 mg and call me, I will refill 20 mg Rx.     Continue Metformin - 1000 mg twice daily  Continue trulicity to 1.5 mg. Monitor for any issues such as swallowing or neck swelling  Continue losartan 25 mg  Continue nifedipine and follow up with rheumatology  Continue atorvastatin 40 mg  You have low B12. Goal >400. Start daily OTC B12 supplementation at 500 mcg   Start acid reflux medication, first thing, every morning     Will refill Ambien for now. Will have to reconsider dosing over the long term.     Follow up in 3 months with labs prior for a physical.     Workup normocytic anemia as below.     Type 2 diabetes mellitus with hyperglycemia, with  long-term current use of insulin  -     losartan (COZAAR) 25 MG tablet; Take 1 tablet (25 mg total) by mouth once daily.  Dispense: 90 tablet; Refill: 3  -     dulaglutide (TRULICITY) 1.5 mg/0.5 mL PnIj; Inject 1.5 mg into the skin every 7 days.  Dispense: 6 mL; Refill: 0  -     Hemoglobin A1c; Future; Expected date: 07/22/2019    Essential hypertension  -     losartan (COZAAR) 25 MG tablet; Take 1 tablet (25 mg total) by mouth once daily.  Dispense: 90 tablet; Refill: 3  -     NIFEdipine (PROCARDIA-XL) 90 MG (OSM) 24 hr tablet; Take 1 tablet (90 mg total) by mouth once daily.  Dispense: 30 tablet; Refill: 4    Other hyperlipidemia  -     atorvastatin (LIPITOR) 40 MG tablet; Take 1 tablet (40 mg total) by mouth once daily.  Dispense: 90 tablet; Refill: 3    Raynaud's disease without gangrene  -     Ambulatory referral to Rheumatology  -     FLUoxetine 10 MG Tab; Take 1 tablet (10 mg total) by mouth once daily.  Dispense: 30 tablet; Refill: 0  -     losartan (COZAAR) 25 MG tablet; Take 1 tablet (25 mg total) by mouth once daily.  Dispense: 90 tablet; Refill: 3  -     NIFEdipine (PROCARDIA-XL) 90 MG (OSM) 24 hr tablet; Take 1 tablet (90 mg total) by mouth once daily.  Dispense: 30 tablet; Refill: 4    BMI 30.0-30.9,adult    Positive YOLIS (antinuclear antibody)    Normocytic anemia  -     Iron and TIBC; Future; Expected date: 07/22/2019  -     Ferritin; Future; Expected date: 07/22/2019  -     Haptoglobin; Future; Expected date: 07/22/2019  -     Lactate dehydrogenase; Future; Expected date: 07/22/2019  -     Reticulocytes; Future; Expected date: 07/22/2019  -     CBC auto differential; Future; Expected date: 07/22/2019  -     CBC auto differential; Future; Expected date: 07/22/2019    Low serum vitamin B12  -     Vitamin B12; Future; Expected date: 07/22/2019  -     FOLATE; Future; Expected date: 07/22/2019    Colon cancer screening  -     Case request GI: COLONOSCOPY    Epigastric pain  -     pantoprazole (PROTONIX)  40 MG tablet; Take 1 tablet (40 mg total) by mouth once daily.  Dispense: 90 tablet; Refill: 1    Primary insomnia  -     zolpidem (AMBIEN) 10 mg Tab; Take 1 tablet (10 mg total) by mouth nightly as needed.  Dispense: 30 tablet; Refill: 2    Well woman exam  -     CBC auto differential; Future; Expected date: 07/22/2019  -     Hemoglobin A1c; Future; Expected date: 07/22/2019  -     Comprehensive metabolic panel; Future; Expected date: 07/22/2019  -     Lipid panel; Future; Expected date: 07/22/2019  -     TSH; Future; Expected date: 07/22/2019  -     Vitamin D; Future; Expected date: 07/22/2019  -     MICROALBUMIN / CREATININE RATIO URINE        Warning signs discussed, patient to call with any further issues or worsening of symptoms.

## 2019-07-22 NOTE — TELEPHONE ENCOUNTER
----- Message from Amy Curran sent at 7/22/2019  9:01 AM CDT -----  Good Morning,   The patient has a referral from Dr. Shahab Merlos. The patient states the doctor was going out for maternity leave last time she saw her. Patient would like an appointment with Dr. Daniels, do we know when she will be coming back? Please let me know    Thank you

## 2019-07-22 NOTE — PATIENT INSTRUCTIONS
Decrease to fluoxetine 10 mg x 1 week  Then take 1/2 tablet (5 mg) x 1 week  Then taken 1/2 tablet every other day   Then stop  If raynaud's worsen, restart 20 mg and call me, I will refill 20 mg Rx.     Continue Metformin - 1000 mg twice daily  Continue trulicity to 1.5 mg. Monitor for any issues such as swallowing or neck swelling  Decrease losartan to 25 mg  Continue nifedipine and follow up with rheumatology  Continue atorvastatin 40 mg  You have low B12. Goal >400. Start daily OTC B12 supplementation at 500 mcg   Start acid reflux medication, first thing, every morning     Will refill Ambien for now. Will have to reconsider dosing over the long term.     Follow up in 3 months with labs prior for a physical.      Diabetes Management Status    Statin: Taking  ACE/ARB: Taking    Screening or Prevention Patient's value Goal Complete/Controlled?   HgA1C Testing and Control   Lab Results   Component Value Date    HGBA1C 5.9 (H) 07/19/2019      Annually/Less than 8% Yes   Lipid profile : 01/21/2019 Annually Yes   LDL control Lab Results   Component Value Date    LDLCALC 59.2 (L) 01/21/2019    Annually/Less than 100 mg/dl  Yes   Nephropathy screening Lab Results   Component Value Date    LABMICR 18.0 10/23/2018     Lab Results   Component Value Date    PROTEINUA Negative 06/30/2019    Annually Yes   Blood pressure BP Readings from Last 1 Encounters:   07/22/19 118/80    Less than 140/90 Yes   Dilated retinal exam : 04/24/2019 Annually Yes   Foot exam   : 10/30/2018 Annually Yes

## 2019-07-23 ENCOUNTER — TELEPHONE (OUTPATIENT)
Dept: FAMILY MEDICINE | Facility: CLINIC | Age: 64
End: 2019-07-23

## 2019-07-23 DIAGNOSIS — D64.9 NORMOCYTIC ANEMIA: Primary | ICD-10-CM

## 2019-07-23 NOTE — TELEPHONE ENCOUNTER
----- Message from Shahab Merlos DO sent at 7/23/2019 11:57 AM CDT -----  Please call the patient regarding her abnormal result. Please tell her to check her portal.

## 2019-07-29 DIAGNOSIS — Z79.4 TYPE 2 DIABETES MELLITUS WITH HYPERGLYCEMIA, WITH LONG-TERM CURRENT USE OF INSULIN: ICD-10-CM

## 2019-07-29 DIAGNOSIS — E11.65 TYPE 2 DIABETES MELLITUS WITH HYPERGLYCEMIA, WITH LONG-TERM CURRENT USE OF INSULIN: ICD-10-CM

## 2019-07-29 RX ORDER — METFORMIN HYDROCHLORIDE 1000 MG/1
1000 TABLET ORAL 2 TIMES DAILY WITH MEALS
Qty: 180 TABLET | Refills: 0 | Status: SHIPPED | OUTPATIENT
Start: 2019-07-29 | End: 2019-10-22 | Stop reason: SDUPTHER

## 2019-08-02 ENCOUNTER — TELEPHONE (OUTPATIENT)
Dept: GASTROENTEROLOGY | Facility: CLINIC | Age: 64
End: 2019-08-02

## 2019-08-06 ENCOUNTER — TELEPHONE (OUTPATIENT)
Dept: GASTROENTEROLOGY | Facility: CLINIC | Age: 64
End: 2019-08-06

## 2019-08-06 NOTE — TELEPHONE ENCOUNTER
Spoke with patient about scheduling a Colonoscopy. Informed patient due to her Anemia an office visit is needed first. Patient scheduled an appointment with Dr. Kearney on 8/9/2019.

## 2019-08-07 ENCOUNTER — PATIENT OUTREACH (OUTPATIENT)
Dept: ADMINISTRATIVE | Facility: OTHER | Age: 64
End: 2019-08-07

## 2019-08-09 ENCOUNTER — OFFICE VISIT (OUTPATIENT)
Dept: GASTROENTEROLOGY | Facility: CLINIC | Age: 64
End: 2019-08-09
Payer: COMMERCIAL

## 2019-08-09 VITALS
DIASTOLIC BLOOD PRESSURE: 71 MMHG | WEIGHT: 194.88 LBS | HEIGHT: 67 IN | SYSTOLIC BLOOD PRESSURE: 100 MMHG | BODY MASS INDEX: 30.59 KG/M2

## 2019-08-09 DIAGNOSIS — K21.9 GASTROESOPHAGEAL REFLUX DISEASE, ESOPHAGITIS PRESENCE NOT SPECIFIED: ICD-10-CM

## 2019-08-09 DIAGNOSIS — R13.19 ESOPHAGEAL DYSPHAGIA: ICD-10-CM

## 2019-08-09 DIAGNOSIS — D50.9 IRON DEFICIENCY ANEMIA, UNSPECIFIED IRON DEFICIENCY ANEMIA TYPE: Primary | ICD-10-CM

## 2019-08-09 DIAGNOSIS — Z12.11 SCREEN FOR COLON CANCER: ICD-10-CM

## 2019-08-09 PROCEDURE — 99205 PR OFFICE/OUTPT VISIT, NEW, LEVL V, 60-74 MIN: ICD-10-PCS | Mod: S$GLB,,, | Performed by: INTERNAL MEDICINE

## 2019-08-09 PROCEDURE — 99999 PR PBB SHADOW E&M-EST. PATIENT-LVL IV: ICD-10-PCS | Mod: PBBFAC,,, | Performed by: INTERNAL MEDICINE

## 2019-08-09 PROCEDURE — 99205 OFFICE O/P NEW HI 60 MIN: CPT | Mod: S$GLB,,, | Performed by: INTERNAL MEDICINE

## 2019-08-09 PROCEDURE — 99999 PR PBB SHADOW E&M-EST. PATIENT-LVL IV: CPT | Mod: PBBFAC,,, | Performed by: INTERNAL MEDICINE

## 2019-08-09 RX ORDER — FERROUS SULFATE 325(65) MG
325 TABLET ORAL 2 TIMES DAILY
COMMUNITY
End: 2020-04-22

## 2019-08-09 RX ORDER — LISINOPRIL 2.5 MG/1
2.5 TABLET ORAL DAILY
COMMUNITY
End: 2019-10-22

## 2019-08-09 RX ORDER — ASPIRIN 81 MG/1
81 TABLET ORAL NIGHTLY
Status: ON HOLD | COMMUNITY
End: 2023-10-27

## 2019-08-09 NOTE — PATIENT INSTRUCTIONS
SUPREP Instructions    You are scheduled for a colonoscopy with Dr. Kearney on 8/14/19 at [facility] located at [address] [place] [address].  Check in at the admit desk, first floor of the hospital (which is the building on the left).     You will receive a call 2-3 days before your colonoscopy to tell you the time to arrive.  If you have not received a call by the day before your procedure, call the Endoscopy Lab at [phone].    To ensure that your test is accurate and complete, you MUST follow these instructions listed below.  If you have any questions, please call our office at [phone].  Plan on being at the hospital for your procedure for 3-4 hours.    1.  Follow a CLEAR LIQUID DIET for the entire day before your scheduled colonoscopy.  This means no solid food the entire day starting when you wake.  You may have as much of the clear liquids as you want throughout the day.   CLEAR LIQUID DIET:   - Avoid Red, Orange, Purple, and/or Blue food coloring   - NO DAIRY   - You can have:  Coffee with sugar (no creamer), tea, water, soda, apple or white grape juice, chicken or beef broth/bouillon (no meat, noodles, or veggies), green/yellow popsicles, green/yellow Jell-O, lemonade.    2.  AT 5 pm the evening before your colonoscopy, POUR ONE (1) BOTTLE OF SUPREP INTO THE MIXING CONTAINER, PROVIDED INSIDE THE BOX.  ADD WATER TO THE LINE ON THE CONTAINER AND MIX IT WELL.  DRINK THE ENTIRE CONTAINER AND THEN DRINK TWO (2) MORE CONTAINERS OF WATER OVER THE NEXT 1 HOUR.  This is sometimes easier to drink if this solution is cold, so you can mix the solution a few hours ahead of time and place in the refrigerator prior to drinking.  You have to drink the solution within 24 hours of mixing it.  Do NOT put this solution over ice.  It IS ok to drink with a straw.    3.  The endoscopy department will call you 2 days before your colonoscopy to tell you the exact time to arrive, AND to tell you the exact time to  drink the 2nd portion of your prep (which will be FIVE HOURS BEFORE YOUR ARRIVAL TIME).  At this time given to you, POUR ONE (1) BOTTLE OF SUPREP INTO THE MIXING CONTAINER, PROVIDED INSIDE THE BOX.  ADD WATER TO THE LINE ON THE CONTAINER AND MIX IT WELL.  DRINK THE ENTIRE CONTAINER AND THEN DRINK TWO (2) MORE CONTAINERS OF WATER OVER THE NEXT 1 HOUR.  This is sometimes easier to drink if this solution is cold, so you can mix the solution a few hours ahead of time and place in the refrigerator prior to drinking.  You have to drink the solution within 24 hours of mixing it.  Do NOT put this solution over ice.  It IS ok to drink with a straw. Once this is complete, you may not have ANYTHING else by mouth!    4.  You must have someone with you to DRIVE YOU HOME since you will be receiving IV sedation for the colonoscopy.    5.  It is ok to take your heart, blood pressure, and seizure medications in the morning of your test with a SIP of water.  Hold other medications until after your procedure.  Do NOT have anything else to eat or drink the morning of your colonoscopy.  It is ok to brush your teeth.    6.  If you are on blood thinners THAT YOU HAVE BEEN INSTRUCTED TO HOLD BY YOUR DOCTOR FOR THIS PROCEDURE, then do NOT take this the morning of your colonoscopy.  Do NOT stop these medications on your own, they must be approved to be held by your doctor.  Your colonoscopy can NOT be done if you are on these medications.  Examples of blood thinners include: Coumadin, Aggrenox, Plavix, Pradaxa, Reapro, Pletal, Xarelto, Ticagrelor, Brilinta, Eliquis, and high dose aspirin (325 mg).  You do not have to stop baby aspirin 81 mg.    7.  IF YOU ARE DIABETIC:  NO INSULIN OR ORAL MEDICATIONS THE MORNING OF THE COLONOSCOPY.  TAKE ONLY HALF THE DOSE OF YOUR INSULIN THE DAY BEFORE THE COLONOSCOPY.  DO NOT TAKE ANY ORAL DIABETIC MEDICATIONS THE DAY BEFORE THE COLONOSCOPY.  IF YOU ARE AN INSULIN DEPENDENT DIABETIC WITH UNSTABLE BLOOD  SUGARS, NOTIFY YOUR PRIMARY CARE PHYSICIAN FOR INSTRUCTIONS.

## 2019-08-09 NOTE — PROGRESS NOTES
GASTROENTEROLOGY CLINIC NOTE    Reason for visit: The primary encounter diagnosis was Iron deficiency anemia, unspecified iron deficiency anemia type. Diagnoses of Screen for colon cancer, Esophageal dysphagia, and Gastroesophageal reflux disease, esophagitis presence not specified were also pertinent to this visit.  Referring provider/PCP: Shahab Merlos DO      HPI:  Jill Perez is a 64 y.o. female here today for new patient evaluation. Referred by PCP.  Hx pertinent for gastric sleeve. She has GERD and takes protonix as well.   Patient states over the past 2 weeks he has experienced a sticking sensation at the bottom of her chest.  This is mainly with solids and pills.  She does drink some water afterwards to help food go down.  She has not had a food bolus.  She has never had an EGD.  She has not had this problem before the past 2 weeks.  She has some indigestion and bubbling of her stomach of recent as well. It seems as though the Protonix has somewhat help with this.  The symptoms of dyspepsia are mainly after p.o. Intake. She has occasional reflux type symptoms.    She was also recently seen by her primary care doctor and her labs revealed an iron deficiency anemia.  She is due for screening colonoscopy.    Pt moved here approx 6 years ago. Formerly lived in [facility].   No family hx of CRC.    GI ROS:  Reflux: occasional  Dysphagia: yes   Constipation: No  Diarrhea: No  Rectal bleeding/Melena/hematemesis: No  NSAIDs: No  Anticoagulation: No  Anti-platelet therapy: asa    Prior Endoscopy:  EGD: none  Colon: 12 years ago in [place], was told to come back in 10 years.    (Portions of this note were dictated using voice recognition software and may contain dictation related errors in spelling/grammar/syntax not found on text review)    Review of Systems   Constitutional: Negative for fever and weight loss.   HENT: Negative for hearing loss and nosebleeds.    Eyes: Negative for double  vision and photophobia.   Respiratory: Negative for cough and shortness of breath.    Cardiovascular: Negative for chest pain and leg swelling.   Gastrointestinal: Positive for heartburn and nausea. Negative for blood in stool and melena.   Genitourinary: Negative for dysuria and hematuria.   Musculoskeletal: Negative for back pain and neck pain.   Skin: Negative for itching and rash.   Neurological: Negative for dizziness and headaches.   Psychiatric/Behavioral: Negative for hallucinations. The patient does not have insomnia.        Past Medical History: has a past medical history of Chronic back pain, Diabetes mellitus, Diabetes mellitus, type 2, Hyperlipidemia, Hypertension, and Insomnia.    Past Surgical History: has a past surgical history that includes Back surgery; Appendectomy; Fracture surgery; Splenectomy, total; Gastric bypass (); Total knee arthroplasty (Left); Cholecystectomy; Breast Reduction ();  section; and Total Reduction Mammoplasty (Bilateral).    Family History:family history includes Breast cancer in her sister; Cancer in her sister; Cataracts in her father and mother; Coronary artery disease in her father; Diabetes in her father and sister; Glaucoma in her mother; Heart disease in her father and mother; Heart failure in her mother; Hyperlipidemia in her father; Hypertension in her father; No Known Problems in her brother, brother, sister, sister, and sister; Rheumatologic disease in her mother.    Allergies:   Review of patient's allergies indicates:   Allergen Reactions    Codeine Itching    Pcn [penicillins] Hives and Itching       Social History: reports that she has never smoked. She has never used smokeless tobacco. She reports that she drinks alcohol. She reports that she does not use drugs.    Home medications:   Current Outpatient Medications on File Prior to Visit   Medication Sig Dispense Refill    aspirin (ECOTRIN) 81 MG EC tablet Take 81 mg by mouth once daily.    "   atorvastatin (LIPITOR) 40 MG tablet Take 1 tablet (40 mg total) by mouth once daily. 90 tablet 3    dulaglutide (TRULICITY) 1.5 mg/0.5 mL PnIj Inject 1.5 mg into the skin every 7 days. 6 mL 0    ferrous sulfate (FEOSOL) 325 mg (65 mg iron) Tab tablet Take 325 mg by mouth daily with breakfast.      FLUoxetine 10 MG Tab Take 1 tablet (10 mg total) by mouth once daily. 30 tablet 0    lisinopril (PRINIVIL,ZESTRIL) 2.5 MG tablet Take 2.5 mg by mouth once daily.      losartan (COZAAR) 25 MG tablet Take 1 tablet (25 mg total) by mouth once daily. 90 tablet 3    metFORMIN (GLUCOPHAGE) 1000 MG tablet Take 1 tablet (1,000 mg total) by mouth 2 (two) times daily with meals. 180 tablet 0    NIFEdipine (PROCARDIA-XL) 90 MG (OSM) 24 hr tablet Take 1 tablet (90 mg total) by mouth once daily. 30 tablet 4    nitroGLYCERIN 2% TD oint (NITRO-BID) 2 % ointment Apply up to 2 inches of ointment to afftected joints every 4 to 6 hours.  Have 12 hour nitro free period daily 30 g 0    pantoprazole (PROTONIX) 40 MG tablet Take 1 tablet (40 mg total) by mouth once daily. 90 tablet 1    pen needle, diabetic 32 gauge x 1/4" Ndle USE AS DIRECTED ONCE DAILY WITH INSULIN 100 each 4    sodium,potassium,mag sulfates (SUPREP BOWEL PREP KIT) 17.5-3.13-1.6 gram SolR Use as instructed on prep sheet 354 mL 0    zolpidem (AMBIEN) 10 mg Tab Take 1 tablet (10 mg total) by mouth nightly as needed. 30 tablet 2    [DISCONTINUED] traMADol (ULTRAM) 50 mg tablet Take 1 tablet (50 mg total) by mouth every 8 (eight) hours as needed for Pain. 30 tablet 0     No current facility-administered medications on file prior to visit.        Vital signs:  /71   Ht 5' 7" (1.702 m)   Wt 88.4 kg (194 lb 14.2 oz)   LMP  (LMP Unknown)   BMI 30.52 kg/m²     Physical Exam   Constitutional: She is oriented to person, place, and time. She appears well-developed. No distress.   HENT:   Head: Normocephalic and atraumatic.   Eyes: Conjunctivae are normal. No " scleral icterus.   Neck: Neck supple.   Cardiovascular: Normal rate.   Pulmonary/Chest: Effort normal. No stridor. No respiratory distress.   Abdominal: Soft. She exhibits no distension. There is no tenderness. There is no rebound and no guarding.   Musculoskeletal: She exhibits no tenderness or deformity.   Neurological: She is alert and oriented to person, place, and time.   Skin: Skin is warm and dry. No rash noted.   Psychiatric: She has a normal mood and affect.   Vitals reviewed.      Routine labs:  Lab Results   Component Value Date    WBC 5.81 07/22/2019    HGB 11.6 (L) 07/22/2019    HCT 37.6 07/22/2019    MCV 89 07/22/2019     (H) 07/22/2019     Lab Results   Component Value Date    INR 0.9 04/22/2019     Lab Results   Component Value Date    IRON 37 07/22/2019    FERRITIN 14 (L) 07/22/2019    TIBC 435 07/22/2019    FESATURATED 9 (L) 07/22/2019     Lab Results   Component Value Date     07/19/2019    K 4.5 07/19/2019     07/19/2019    CO2 22 (L) 07/19/2019    BUN 10 07/19/2019    CREATININE 0.7 07/19/2019     Lab Results   Component Value Date    ALBUMIN 3.3 (L) 07/19/2019    ALT 23 07/19/2019    AST 29 07/19/2019    ALKPHOS 127 07/19/2019    BILITOT 0.3 07/19/2019     No results found for: GLUCOSE    Imaging:  Reviewed      Assessment:    1. Iron deficiency anemia, unspecified iron deficiency anemia type    2. Screen for colon cancer    3. Esophageal dysphagia    4. Gastroesophageal reflux disease, esophagitis presence not specified        Plan:    Orders Placed This Encounter    Case request GI: EGD (ESOPHAGOGASTRODUODENOSCOPY), COLONOSCOPY     EGD for dysphagia, dyspepsia, iron def anemia in setting of chronic ASA use  Colon for repeat screening - last was 12 years ago.      RTC based on endoscopy      Carmine Kearney MD  [facility]

## 2019-08-09 NOTE — H&P (VIEW-ONLY)
GASTROENTEROLOGY CLINIC NOTE    Reason for visit: The primary encounter diagnosis was Iron deficiency anemia, unspecified iron deficiency anemia type. Diagnoses of Screen for colon cancer, Esophageal dysphagia, and Gastroesophageal reflux disease, esophagitis presence not specified were also pertinent to this visit.  Referring provider/PCP: Shahab Merlos DO      HPI:  Jill Perez is a 64 y.o. female here today for new patient evaluation. Referred by PCP.  Hx pertinent for gastric sleeve. She has GERD and takes protonix as well.   Patient states over the past 2 weeks he has experienced a sticking sensation at the bottom of her chest.  This is mainly with solids and pills.  She does drink some water afterwards to help food go down.  She has not had a food bolus.  She has never had an EGD.  She has not had this problem before the past 2 weeks.  She has some indigestion and bubbling of her stomach of recent as well. It seems as though the Protonix has somewhat help with this.  The symptoms of dyspepsia are mainly after p.o. Intake. She has occasional reflux type symptoms.    She was also recently seen by her primary care doctor and her labs revealed an iron deficiency anemia.  She is due for screening colonoscopy.    Pt moved here approx 6 years ago. Formerly lived in [facility].   No family hx of CRC.    GI ROS:  Reflux: occasional  Dysphagia: yes   Constipation: No  Diarrhea: No  Rectal bleeding/Melena/hematemesis: No  NSAIDs: No  Anticoagulation: No  Anti-platelet therapy: asa    Prior Endoscopy:  EGD: none  Colon: 12 years ago in [place], was told to come back in 10 years.    (Portions of this note were dictated using voice recognition software and may contain dictation related errors in spelling/grammar/syntax not found on text review)    Review of Systems   Constitutional: Negative for fever and weight loss.   HENT: Negative for hearing loss and nosebleeds.    Eyes: Negative for double  vision and photophobia.   Respiratory: Negative for cough and shortness of breath.    Cardiovascular: Negative for chest pain and leg swelling.   Gastrointestinal: Positive for heartburn and nausea. Negative for blood in stool and melena.   Genitourinary: Negative for dysuria and hematuria.   Musculoskeletal: Negative for back pain and neck pain.   Skin: Negative for itching and rash.   Neurological: Negative for dizziness and headaches.   Psychiatric/Behavioral: Negative for hallucinations. The patient does not have insomnia.        Past Medical History: has a past medical history of Chronic back pain, Diabetes mellitus, Diabetes mellitus, type 2, Hyperlipidemia, Hypertension, and Insomnia.    Past Surgical History: has a past surgical history that includes Back surgery; Appendectomy; Fracture surgery; Splenectomy, total; Gastric bypass (); Total knee arthroplasty (Left); Cholecystectomy; Breast Reduction ();  section; and Total Reduction Mammoplasty (Bilateral).    Family History:family history includes Breast cancer in her sister; Cancer in her sister; Cataracts in her father and mother; Coronary artery disease in her father; Diabetes in her father and sister; Glaucoma in her mother; Heart disease in her father and mother; Heart failure in her mother; Hyperlipidemia in her father; Hypertension in her father; No Known Problems in her brother, brother, sister, sister, and sister; Rheumatologic disease in her mother.    Allergies:   Review of patient's allergies indicates:   Allergen Reactions    Codeine Itching    Pcn [penicillins] Hives and Itching       Social History: reports that she has never smoked. She has never used smokeless tobacco. She reports that she drinks alcohol. She reports that she does not use drugs.    Home medications:   Current Outpatient Medications on File Prior to Visit   Medication Sig Dispense Refill    aspirin (ECOTRIN) 81 MG EC tablet Take 81 mg by mouth once daily.    "   atorvastatin (LIPITOR) 40 MG tablet Take 1 tablet (40 mg total) by mouth once daily. 90 tablet 3    dulaglutide (TRULICITY) 1.5 mg/0.5 mL PnIj Inject 1.5 mg into the skin every 7 days. 6 mL 0    ferrous sulfate (FEOSOL) 325 mg (65 mg iron) Tab tablet Take 325 mg by mouth daily with breakfast.      FLUoxetine 10 MG Tab Take 1 tablet (10 mg total) by mouth once daily. 30 tablet 0    lisinopril (PRINIVIL,ZESTRIL) 2.5 MG tablet Take 2.5 mg by mouth once daily.      losartan (COZAAR) 25 MG tablet Take 1 tablet (25 mg total) by mouth once daily. 90 tablet 3    metFORMIN (GLUCOPHAGE) 1000 MG tablet Take 1 tablet (1,000 mg total) by mouth 2 (two) times daily with meals. 180 tablet 0    NIFEdipine (PROCARDIA-XL) 90 MG (OSM) 24 hr tablet Take 1 tablet (90 mg total) by mouth once daily. 30 tablet 4    nitroGLYCERIN 2% TD oint (NITRO-BID) 2 % ointment Apply up to 2 inches of ointment to afftected joints every 4 to 6 hours.  Have 12 hour nitro free period daily 30 g 0    pantoprazole (PROTONIX) 40 MG tablet Take 1 tablet (40 mg total) by mouth once daily. 90 tablet 1    pen needle, diabetic 32 gauge x 1/4" Ndle USE AS DIRECTED ONCE DAILY WITH INSULIN 100 each 4    sodium,potassium,mag sulfates (SUPREP BOWEL PREP KIT) 17.5-3.13-1.6 gram SolR Use as instructed on prep sheet 354 mL 0    zolpidem (AMBIEN) 10 mg Tab Take 1 tablet (10 mg total) by mouth nightly as needed. 30 tablet 2    [DISCONTINUED] traMADol (ULTRAM) 50 mg tablet Take 1 tablet (50 mg total) by mouth every 8 (eight) hours as needed for Pain. 30 tablet 0     No current facility-administered medications on file prior to visit.        Vital signs:  /71   Ht 5' 7" (1.702 m)   Wt 88.4 kg (194 lb 14.2 oz)   LMP  (LMP Unknown)   BMI 30.52 kg/m²     Physical Exam   Constitutional: She is oriented to person, place, and time. She appears well-developed. No distress.   HENT:   Head: Normocephalic and atraumatic.   Eyes: Conjunctivae are normal. No " scleral icterus.   Neck: Neck supple.   Cardiovascular: Normal rate.   Pulmonary/Chest: Effort normal. No stridor. No respiratory distress.   Abdominal: Soft. She exhibits no distension. There is no tenderness. There is no rebound and no guarding.   Musculoskeletal: She exhibits no tenderness or deformity.   Neurological: She is alert and oriented to person, place, and time.   Skin: Skin is warm and dry. No rash noted.   Psychiatric: She has a normal mood and affect.   Vitals reviewed.      Routine labs:  Lab Results   Component Value Date    WBC 5.81 07/22/2019    HGB 11.6 (L) 07/22/2019    HCT 37.6 07/22/2019    MCV 89 07/22/2019     (H) 07/22/2019     Lab Results   Component Value Date    INR 0.9 04/22/2019     Lab Results   Component Value Date    IRON 37 07/22/2019    FERRITIN 14 (L) 07/22/2019    TIBC 435 07/22/2019    FESATURATED 9 (L) 07/22/2019     Lab Results   Component Value Date     07/19/2019    K 4.5 07/19/2019     07/19/2019    CO2 22 (L) 07/19/2019    BUN 10 07/19/2019    CREATININE 0.7 07/19/2019     Lab Results   Component Value Date    ALBUMIN 3.3 (L) 07/19/2019    ALT 23 07/19/2019    AST 29 07/19/2019    ALKPHOS 127 07/19/2019    BILITOT 0.3 07/19/2019     No results found for: GLUCOSE    Imaging:  Reviewed      Assessment:    1. Iron deficiency anemia, unspecified iron deficiency anemia type    2. Screen for colon cancer    3. Esophageal dysphagia    4. Gastroesophageal reflux disease, esophagitis presence not specified        Plan:    Orders Placed This Encounter    Case request GI: EGD (ESOPHAGOGASTRODUODENOSCOPY), COLONOSCOPY     EGD for dysphagia, dyspepsia, iron def anemia in setting of chronic ASA use  Colon for repeat screening - last was 12 years ago.      RTC based on endoscopy      Carmine Kearney MD  [facility]

## 2019-08-12 ENCOUNTER — TELEPHONE (OUTPATIENT)
Dept: ENDOSCOPY | Facility: HOSPITAL | Age: 64
End: 2019-08-12

## 2019-08-12 NOTE — TELEPHONE ENCOUNTER
Unable to contact patient due to either wrong number or unable to leave messages on her emergency contacts.    Arrival time 1030 and 2nd dose of prep @ 0530....then completely NPO

## 2019-08-13 ENCOUNTER — TELEPHONE (OUTPATIENT)
Dept: ENDOSCOPY | Facility: HOSPITAL | Age: 64
End: 2019-08-13

## 2019-08-13 NOTE — TELEPHONE ENCOUNTER
Spoke with patient about arrival time @ 1030.  EGD/Colon.    Prep instructions reviewed: the day before the procedure, follow a clear liquid diet all day, then start the first 1/2 of prep at 5pm and take 2nd 1/2 of prep @ 0530.  Pt must be completely NPO when prep completed @ 0630.              Medications: Do not take Insulin or oral diabetic medications the day of the procedure.  Take as prescribed: heart, seizure and blood pressure medication in the morning with a sip of water (less than an ounce).  Take any breathing medications and bring inhalers to hospital with you Leave all valuables and jewelry at home.     Wear comfortable clothes to procedure to change into hospital gown You cannot drive for 24 hours after your procedure because you will receive sedation for your procedure to make you comfortable.  A ride must be provided at [facility].

## 2019-08-14 ENCOUNTER — ANESTHESIA (OUTPATIENT)
Dept: ENDOSCOPY | Facility: HOSPITAL | Age: 64
End: 2019-08-14
Payer: COMMERCIAL

## 2019-08-14 ENCOUNTER — ANESTHESIA EVENT (OUTPATIENT)
Dept: ENDOSCOPY | Facility: HOSPITAL | Age: 64
End: 2019-08-14
Payer: COMMERCIAL

## 2019-08-14 ENCOUNTER — HOSPITAL ENCOUNTER (OUTPATIENT)
Facility: HOSPITAL | Age: 64
Discharge: HOME | End: 2019-08-14
Attending: INTERNAL MEDICINE | Admitting: INTERNAL MEDICINE
Payer: COMMERCIAL

## 2019-08-14 DIAGNOSIS — D64.9 NORMOCYTIC ANEMIA: Primary | ICD-10-CM

## 2019-08-14 DIAGNOSIS — Z12.11 COLON CANCER SCREENING: ICD-10-CM

## 2019-08-14 LAB — GLUCOSE SERPL-MCNC: 163 MG/DL (ref 70–110)

## 2019-08-14 PROCEDURE — 88305 TISSUE SPECIMEN TO PATHOLOGY - SURGERY: ICD-10-PCS | Mod: 26,,, | Performed by: PATHOLOGY

## 2019-08-14 PROCEDURE — 43239 EGD BIOPSY SINGLE/MULTIPLE: CPT | Mod: 51,,, | Performed by: INTERNAL MEDICINE

## 2019-08-14 PROCEDURE — G0121 COLON CA SCRN NOT HI RSK IND: HCPCS | Performed by: INTERNAL MEDICINE

## 2019-08-14 PROCEDURE — 88342 IMHCHEM/IMCYTCHM 1ST ANTB: CPT | Mod: 26,,, | Performed by: PATHOLOGY

## 2019-08-14 PROCEDURE — 88305 TISSUE EXAM BY PATHOLOGIST: CPT | Mod: 26,,, | Performed by: PATHOLOGY

## 2019-08-14 PROCEDURE — G0121 COLON CA SCRN NOT HI RSK IND: HCPCS | Mod: ,,, | Performed by: INTERNAL MEDICINE

## 2019-08-14 PROCEDURE — 43239 PR EGD, FLEX, W/BIOPSY, SGL/MULTI: ICD-10-PCS | Mod: 51,,, | Performed by: INTERNAL MEDICINE

## 2019-08-14 PROCEDURE — 88342 TISSUE SPECIMEN TO PATHOLOGY - SURGERY: ICD-10-PCS | Mod: 26,,, | Performed by: PATHOLOGY

## 2019-08-14 PROCEDURE — 43239 EGD BIOPSY SINGLE/MULTIPLE: CPT | Performed by: INTERNAL MEDICINE

## 2019-08-14 PROCEDURE — 37000009 HC ANESTHESIA EA ADD 15 MINS: Performed by: INTERNAL MEDICINE

## 2019-08-14 PROCEDURE — 82962 GLUCOSE BLOOD TEST: CPT | Performed by: INTERNAL MEDICINE

## 2019-08-14 PROCEDURE — 88342 IMHCHEM/IMCYTCHM 1ST ANTB: CPT | Performed by: PATHOLOGY

## 2019-08-14 PROCEDURE — 63600175 PHARM REV CODE 636 W HCPCS: Performed by: NURSE ANESTHETIST, CERTIFIED REGISTERED

## 2019-08-14 PROCEDURE — G0121 COLON CA SCRN NOT HI RSK IND: ICD-10-PCS | Mod: ,,, | Performed by: INTERNAL MEDICINE

## 2019-08-14 PROCEDURE — 63600175 PHARM REV CODE 636 W HCPCS: Performed by: INTERNAL MEDICINE

## 2019-08-14 PROCEDURE — 37000008 HC ANESTHESIA 1ST 15 MINUTES: Performed by: INTERNAL MEDICINE

## 2019-08-14 PROCEDURE — 88305 TISSUE EXAM BY PATHOLOGIST: CPT | Performed by: PATHOLOGY

## 2019-08-14 PROCEDURE — 27201012 HC FORCEPS, HOT/COLD, DISP: Performed by: INTERNAL MEDICINE

## 2019-08-14 RX ORDER — SODIUM CHLORIDE 9 MG/ML
INJECTION, SOLUTION INTRAVENOUS CONTINUOUS
Status: DISCONTINUED | OUTPATIENT
Start: 2019-08-14 | End: 2019-08-14 | Stop reason: HOSPADM

## 2019-08-14 RX ORDER — LIDOCAINE HCL/PF 100 MG/5ML
SYRINGE (ML) INTRAVENOUS
Status: DISCONTINUED | OUTPATIENT
Start: 2019-08-14 | End: 2019-08-14

## 2019-08-14 RX ORDER — SODIUM CHLORIDE 0.9 % (FLUSH) 0.9 %
10 SYRINGE (ML) INJECTION
Status: DISCONTINUED | OUTPATIENT
Start: 2019-08-14 | End: 2019-08-14 | Stop reason: HOSPADM

## 2019-08-14 RX ORDER — PROPOFOL 10 MG/ML
VIAL (ML) INTRAVENOUS
Status: DISCONTINUED | OUTPATIENT
Start: 2019-08-14 | End: 2019-08-14

## 2019-08-14 RX ORDER — PROPOFOL 10 MG/ML
VIAL (ML) INTRAVENOUS CONTINUOUS PRN
Status: DISCONTINUED | OUTPATIENT
Start: 2019-08-14 | End: 2019-08-14

## 2019-08-14 RX ADMIN — SODIUM CHLORIDE: 0.9 INJECTION, SOLUTION INTRAVENOUS at 10:08

## 2019-08-14 RX ADMIN — PROPOFOL 100 MG: 10 INJECTION, EMULSION INTRAVENOUS at 11:08

## 2019-08-14 RX ADMIN — PROPOFOL 30 MG: 10 INJECTION, EMULSION INTRAVENOUS at 11:08

## 2019-08-14 RX ADMIN — LIDOCAINE HYDROCHLORIDE 80 MG: 20 INJECTION, SOLUTION INTRAVENOUS at 11:08

## 2019-08-14 RX ADMIN — PROPOFOL 150 MCG/KG/MIN: 10 INJECTION, EMULSION INTRAVENOUS at 11:08

## 2019-08-14 RX ADMIN — PROPOFOL 40 MG: 10 INJECTION, EMULSION INTRAVENOUS at 11:08

## 2019-08-14 NOTE — TRANSFER OF CARE
"Anesthesia Transfer of Care Note    Patient: Jill Perez    Procedure(s) Performed: Procedure(s) (LRB):  EGD (ESOPHAGOGASTRODUODENOSCOPY) (N/A)  COLONOSCOPYSuprep (N/A)    Patient location: GI    Anesthesia Type: MAC    Transport from OR: Transported from OR on room air with adequate spontaneous ventilation    Post pain: adequate analgesia    Post assessment: no apparent anesthetic complications    Post vital signs: stable    Level of consciousness: awake    Nausea/Vomiting: no nausea/vomiting    Complications: none    Transfer of care protocol was followed      Last vitals:   Visit Vitals  /77 (BP Location: Left arm, Patient Position: Lying)   Pulse 99   Temp 36.8 °C (98.2 °F) (Temporal)   Resp 16   Ht 5' 7" (1.702 m)   Wt 85.3 kg (188 lb)   LMP  (LMP Unknown)   SpO2 99%   Breastfeeding? No   BMI 29.44 kg/m²     "

## 2019-08-14 NOTE — PLAN OF CARE
Pt. Procedure and recovery time completed with no complications.IV discontinued as ordered,Discharge orders given and explained.Pt. Verbalizes understanding.Pt. To be [facility]d to home with .

## 2019-08-14 NOTE — [employer] PATIENT INSTRUCTIONS
Discharge Summary/Instructions after an Endoscopic Procedure  Patient Name: Jill Perez  Patient MRN: [id number]  Patient YOB: 1955 Wednesday, August 14, 2019  Carmine Kearney MD  RESTRICTIONS:  During your procedure today, you received medications for sedation.  These   medications may affect your judgment, balance and coordination.  Therefore,   for 24 hours, you have the following restrictions:   - DO NOT drive a car, operate machinery, make legal/financial decisions,   sign important papers or drink alcohol.    ACTIVITY:  Today: no heavy lifting, straining or running due to procedural   sedation/anesthesia.  The following day: return to full activity including work.  DIET:  Eat and drink normally unless instructed otherwise.     TREATMENT FOR COMMON SIDE EFFECTS:  - Mild abdominal pain, nausea, belching, bloating or excessive gas:  rest,   eat lightly and use a heating pad.  - Sore Throat: treat with throat lozenges and/or gargle with warm salt   water.  - Because air was used during the procedure, expelling large amounts of air   from your rectum or belching is normal.  - If a bowel prep was taken, you may not have a bowel movement for 1-3 days.    This is normal.  SYMPTOMS TO WATCH FOR AND REPORT TO YOUR PHYSICIAN:  1. Abdominal pain or bloating, other than gas cramps.  2. Chest pain.  3. Back pain.  4. Signs of infection such as: chills or fever occurring within 24 hours   after the procedure.  5. Rectal bleeding, which would show as bright red, maroon, or black stools.   (A tablespoon of blood from the rectum is not serious, especially if   hemorrhoids are present.)  6. Vomiting.  7. Weakness or dizziness.  GO DIRECTLY TO THE NEAREST EMERGENCY ROOM IF YOU HAVE ANY OF THE FOLLOWING:      Difficulty breathing              Chills and/or fever over 101 F   Persistent vomiting and/or vomiting blood   Severe abdominal pain   Severe chest pain   Black, tarry stools   Bleeding- more than one  tablespoon   Any other symptom or condition that you feel may need urgent attention  Your doctor recommends these additional instructions:  If any biopsies were taken, your doctors clinic will contact you in 1 to 2   weeks with any results.  - Discharge patient to home.   - Patient has a contact number available for emergencies.  The signs and   symptoms of potential delayed complications were discussed with the   patient.  Return to normal activities tomorrow.  Written [facility]   instructions were provided to the patient.   - Perform a colonoscopy today.   - Continue present medications, including daily protonix.  - If symptoms of dysphagia persist despite daily PPI, might need to consider   manometry.  - Await pathology results.  For questions, problems or results please call your physician - Carmine Kearney MD at Work:  [phone].  EMERGENCY PHONE NUMBER: [phone],  LAB RESULTS: [phone]  IF A COMPLICATION OR EMERGENCY SITUATION ARISES AND YOU ARE UNABLE TO REACH   YOUR PHYSICIAN - GO DIRECTLY TO THE EMERGENCY ROOM.  MD Carmine Bray MD  8/14/2019 12:10:50 PM  This report has been verified and signed electronically.  [employer]

## 2019-08-14 NOTE — ANESTHESIA PREPROCEDURE EVALUATION
2019  Jill Perez is a 64 y.o., female for EGD and c-scope    Past Medical History:   Diagnosis Date    Chronic back pain     Diabetes mellitus     Diabetes mellitus, type 2     Hyperlipidemia     Hypertension     Insomnia      Past Surgical History:   Procedure Laterality Date    APPENDECTOMY      BACK SURGERY      laminectomy and fusion    Breast Reduction  2003     SECTION      x2    CHOLECYSTECTOMY      FRACTURE SURGERY      GASTRIC BYPASS      gastric sleeve    SPLENECTOMY, TOTAL      TOTAL KNEE ARTHROPLASTY Left     TOTAL REDUCTION MAMMOPLASTY Bilateral     over 20yrs ago     Lab Results   Component Value Date    WBC 5.81 2019    HGB 11.6 (L) 2019    HCT 37.6 2019     (H) 2019    CHOL 127 2019    TRIG 54 2019    HDL 57 2019    ALT 23 2019    AST 29 2019     2019    K 4.5 2019     2019    CREATININE 0.7 2019    BUN 10 2019    CO2 22 (L) 2019    TSH 1.667 10/23/2018    INR 0.9 2019    HGBA1C 5.9 (H) 2019         Anesthesia Evaluation    I have reviewed the Patient Summary Reports.    I have reviewed the Nursing Notes.   I have reviewed the Medications.     Review of Systems  Anesthesia Hx:  No problems with previous Anesthesia Denies Hx of Anesthetic complications  Denies Family Hx of Anesthesia complications.   Denies Personal Hx of Anesthesia complications.   Social:  Non-Smoker, No Alcohol Use    Hematology/Oncology:     Oncology Normal     EENT/Dental:EENT/Dental Normal   Cardiovascular:   Exercise tolerance: good Hypertension hyperlipidemia    Pulmonary:   Asthma    Renal/:  Renal/ Normal     Hepatic/GI:  Hepatic/GI Normal    Neurological:  Neurology Normal    Endocrine:   Diabetes    Psych:   depression          Physical  Exam  General:  Well nourished    Airway/Jaw/Neck:  Airway Findings: Mouth Opening: Normal Tongue: Normal  Mallampati: II  TM Distance: Normal, at least 6 cm  Jaw/Neck Findings:  Neck ROM: Normal ROM      Dental:  Dental Findings: In tact   Chest/Lungs:  Chest/Lungs Findings: Clear to auscultation     Heart/Vascular:  Heart Findings: Rate: Normal  Rhythm: Regular Rhythm  Sounds: Normal        Mental Status:  Mental Status Findings:  Alert and Oriented, Cooperative         Anesthesia Plan  Type of Anesthesia, risks & benefits discussed:  Anesthesia Type:  MAC  Patient's Preference:   Intra-op Monitoring Plan: standard ASA monitors  Intra-op Monitoring Plan Comments:   Post Op Pain Control Plan: per primary service following [facility] from PACU  Post Op Pain Control Plan Comments:   Induction:   IV  Beta Blocker:  Patient is not currently on a Beta-Blocker (No further documentation required).       Informed Consent: Patient understands risks and agrees with Anesthesia plan.  Questions answered. Anesthesia consent signed with patient.  ASA Score: 2     Day of Surgery Review of History & Physical:            Ready For Surgery From Anesthesia Perspective.

## 2019-08-14 NOTE — ANESTHESIA POSTPROCEDURE EVALUATION
Anesthesia Post Evaluation    Patient: Jill Perez    Procedure(s) Performed: Procedure(s) (LRB):  EGD (ESOPHAGOGASTRODUODENOSCOPY) (N/A)  COLONOSCOPYSuprep (N/A)    Final Anesthesia Type: MAC  Patient location during evaluation: GI PACU  Patient participation: Yes- Able to Participate  Level of consciousness: awake and alert  Post-procedure vital signs: reviewed and stable  Pain management: adequate  Airway patency: patent  PONV status at [facility]: No PONV  Anesthetic complications: no      Cardiovascular status: blood pressure returned to baseline and hemodynamically stable  Respiratory status: unassisted, spontaneous ventilation and room air  Hydration status: euvolemic  Follow-up not needed.          Vitals Value Taken Time   /77 8/14/2019 10:48 AM   Temp 36.8 °C (98.2 °F) 8/14/2019 10:48 AM   Pulse 99 8/14/2019 10:48 AM   Resp 16 8/14/2019 10:48 AM   SpO2 99 % 8/14/2019 10:48 AM         No case tracking events are documented in the log.      Pain/James Score: James Score: 9 (8/14/2019 12:03 PM)

## 2019-08-14 NOTE — PROVATION PATIENT INSTRUCTIONS
Discharge Summary/Instructions after an Endoscopic Procedure  Patient Name: Jill Perez  Patient MRN: [id number]  Patient YOB: 1955 Wednesday, August 14, 2019  Carmine Kearney MD  RESTRICTIONS:  During your procedure today, you received medications for sedation.  These   medications may affect your judgment, balance and coordination.  Therefore,   for 24 hours, you have the following restrictions:   - DO NOT drive a car, operate machinery, make legal/financial decisions,   sign important papers or drink alcohol.    ACTIVITY:  Today: no heavy lifting, straining or running due to procedural   sedation/anesthesia.  The following day: return to full activity including work.  DIET:  Eat and drink normally unless instructed otherwise.     TREATMENT FOR COMMON SIDE EFFECTS:  - Mild abdominal pain, nausea, belching, bloating or excessive gas:  rest,   eat lightly and use a heating pad.  - Sore Throat: treat with throat lozenges and/or gargle with warm salt   water.  - Because air was used during the procedure, expelling large amounts of air   from your rectum or belching is normal.  - If a bowel prep was taken, you may not have a bowel movement for 1-3 days.    This is normal.  SYMPTOMS TO WATCH FOR AND REPORT TO YOUR PHYSICIAN:  1. Abdominal pain or bloating, other than gas cramps.  2. Chest pain.  3. Back pain.  4. Signs of infection such as: chills or fever occurring within 24 hours   after the procedure.  5. Rectal bleeding, which would show as bright red, maroon, or black stools.   (A tablespoon of blood from the rectum is not serious, especially if   hemorrhoids are present.)  6. Vomiting.  7. Weakness or dizziness.  GO DIRECTLY TO THE NEAREST EMERGENCY ROOM IF YOU HAVE ANY OF THE FOLLOWING:      Difficulty breathing              Chills and/or fever over 101 F   Persistent vomiting and/or vomiting blood   Severe abdominal pain   Severe chest pain   Black, tarry stools   Bleeding- more than one  tablespoon   Any other symptom or condition that you feel may need urgent attention  Your doctor recommends these additional instructions:  If any biopsies were taken, your doctors clinic will contact you in 1 to 2   weeks with any results.  - Discharge patient to home.   - Patient has a contact number available for emergencies.  The signs and   symptoms of potential delayed complications were discussed with the   patient.  Return to normal activities tomorrow.  Written [facility]   instructions were provided to the patient.   - Resume previous diet.   - Continue present medications.   - Repeat colonoscopy in 10 years for screening purposes.  For questions, problems or results please call your physician - Carmine Kearney MD at Work:  [phone].  EMERGENCY PHONE NUMBER: [phone],  LAB RESULTS: [phone]  IF A COMPLICATION OR EMERGENCY SITUATION ARISES AND YOU ARE UNABLE TO REACH   YOUR PHYSICIAN - GO DIRECTLY TO THE EMERGENCY ROOM.  MD Carmine Bray MD  8/14/2019 12:02:07 PM  This report has been verified and signed electronically.  [employer]

## 2019-08-14 NOTE — PLAN OF CARE
Past Medical History:   Diagnosis Date    Chronic back pain     Diabetes mellitus     Diabetes mellitus, type 2     Hyperlipidemia     Hypertension     Insomnia      Admission process complete. Patient ready for procedure. Plan of care reviewed with patient. Preoperative fasting appropriate for procedure and sedation. Call light in reach and bed in lowest position.

## 2019-08-14 NOTE — INTERVAL H&P NOTE
The patient has been examined and the H&P has been reviewed:    I concur with the findings and no changes have occurred since H&P was written.    Anesthesia/Surgery risks, benefits and alternative options discussed and understood by patient/family.          Active Hospital Problems    Diagnosis  POA    Colon cancer screening [Z12.11]  Not Applicable      Resolved Hospital Problems   No resolved problems to display.

## 2019-08-15 VITALS
HEART RATE: 80 BPM | HEIGHT: 67 IN | WEIGHT: 188 LBS | BODY MASS INDEX: 29.51 KG/M2 | RESPIRATION RATE: 18 BRPM | OXYGEN SATURATION: 100 % | DIASTOLIC BLOOD PRESSURE: 62 MMHG | SYSTOLIC BLOOD PRESSURE: 111 MMHG | TEMPERATURE: 99 F

## 2019-08-15 LAB — POCT GLUCOSE: 163 MG/DL (ref 70–110)

## 2019-08-17 DIAGNOSIS — I73.00 RAYNAUD'S DISEASE WITHOUT GANGRENE: ICD-10-CM

## 2019-08-17 RX ORDER — FLUOXETINE 10 MG/1
10 TABLET ORAL DAILY
Qty: 30 TABLET | Refills: 0 | Status: SHIPPED | OUTPATIENT
Start: 2019-08-17 | End: 2019-09-16 | Stop reason: SDUPTHER

## 2019-08-21 ENCOUNTER — TELEPHONE (OUTPATIENT)
Dept: GASTROENTEROLOGY | Facility: CLINIC | Age: 64
End: 2019-08-21

## 2019-08-21 NOTE — TELEPHONE ENCOUNTER
----- Message from Carmine Kearney MD sent at 8/21/2019  3:08 PM CDT -----  Please inform:  Biopsies show no infection, mild inflammation which isnt currently active.    Thanks  ======  1. SMALL BOWEL, BIOPSY:  Benign small bowel mucosa with no significant histopathologic alterations.  No features of gluten sensitive enteropathy or intervillous parasitic microorganisms.  2. STOMACH, BIOPSY:  Oxyntic mucosa with moderate chronic inactive gastritis.  An immunohistochemical stain for Helicobacter pylori is negative, see note.  No intestinal metaplasia or dysplasia.  3. ESOPHAGUS, MID, BIOPSY:  Benign squamous mucosa with no significant histopathologic alterations.  No intraepithelial eosinophils are present.  No dysplasia.

## 2019-09-06 ENCOUNTER — HOSPITAL ENCOUNTER (EMERGENCY)
Facility: HOSPITAL | Age: 64
Discharge: HOME | End: 2019-09-07
Attending: EMERGENCY MEDICINE
Payer: COMMERCIAL

## 2019-09-06 DIAGNOSIS — B02.29 POST HERPETIC NEURALGIA: ICD-10-CM

## 2019-09-06 DIAGNOSIS — R07.9 CHEST PAIN: ICD-10-CM

## 2019-09-06 DIAGNOSIS — R07.89 ATYPICAL CHEST PAIN: Primary | ICD-10-CM

## 2019-09-06 LAB
ALBUMIN SERPL BCP-MCNC: 4.2 G/DL (ref 3.5–5.2)
ALP SERPL-CCNC: 135 U/L (ref 55–135)
ALT SERPL W/O P-5'-P-CCNC: 19 U/L (ref 10–44)
ANION GAP SERPL CALC-SCNC: 17 MMOL/L (ref 8–16)
AST SERPL-CCNC: 24 U/L (ref 10–40)
BASOPHILS # BLD AUTO: 0.01 K/UL (ref 0–0.2)
BASOPHILS NFR BLD: 0.1 % (ref 0–1.9)
BILIRUB SERPL-MCNC: 0.2 MG/DL (ref 0.1–1)
BNP SERPL-MCNC: 23 PG/ML (ref 0–99)
BUN SERPL-MCNC: 16 MG/DL (ref 8–23)
CALCIUM SERPL-MCNC: 10.3 MG/DL (ref 8.7–10.5)
CHLORIDE SERPL-SCNC: 104 MMOL/L (ref 95–110)
CO2 SERPL-SCNC: 20 MMOL/L (ref 23–29)
CREAT SERPL-MCNC: 0.7 MG/DL (ref 0.5–1.4)
D DIMER PPP IA.FEU-MCNC: 0.55 MG/L FEU
DIFFERENTIAL METHOD: ABNORMAL
EOSINOPHIL # BLD AUTO: 0 K/UL (ref 0–0.5)
EOSINOPHIL NFR BLD: 0.4 % (ref 0–8)
ERYTHROCYTE [DISTWIDTH] IN BLOOD BY AUTOMATED COUNT: 16.6 % (ref 11.5–14.5)
EST. GFR  (AFRICAN AMERICAN): >60 ML/MIN/1.73 M^2
EST. GFR  (NON AFRICAN AMERICAN): >60 ML/MIN/1.73 M^2
GLUCOSE SERPL-MCNC: 110 MG/DL (ref 70–110)
HCT VFR BLD AUTO: 40.3 % (ref 37–48.5)
HGB BLD-MCNC: 13.1 G/DL (ref 12–16)
LYMPHOCYTES # BLD AUTO: 3.3 K/UL (ref 1–4.8)
LYMPHOCYTES NFR BLD: 33.4 % (ref 18–48)
MAGNESIUM SERPL-MCNC: 1.4 MG/DL (ref 1.6–2.6)
MCH RBC QN AUTO: 27.9 PG (ref 27–31)
MCHC RBC AUTO-ENTMCNC: 32.5 G/DL (ref 32–36)
MCV RBC AUTO: 86 FL (ref 82–98)
MONOCYTES # BLD AUTO: 0.9 K/UL (ref 0.3–1)
MONOCYTES NFR BLD: 8.8 % (ref 4–15)
NEUTROPHILS # BLD AUTO: 5.6 K/UL (ref 1.8–7.7)
NEUTROPHILS NFR BLD: 57.3 % (ref 38–73)
PLATELET # BLD AUTO: 393 K/UL (ref 150–350)
PMV BLD AUTO: 9.5 FL (ref 9.2–12.9)
POTASSIUM SERPL-SCNC: 4.7 MMOL/L (ref 3.5–5.1)
PROT SERPL-MCNC: 7.9 G/DL (ref 6–8.4)
RBC # BLD AUTO: 4.7 M/UL (ref 4–5.4)
SODIUM SERPL-SCNC: 141 MMOL/L (ref 136–145)
TROPONIN I SERPL DL<=0.01 NG/ML-MCNC: 0.01 NG/ML (ref 0–0.03)
WBC # BLD AUTO: 9.82 K/UL (ref 3.9–12.7)

## 2019-09-06 PROCEDURE — 80053 COMPREHEN METABOLIC PANEL: CPT

## 2019-09-06 PROCEDURE — 85025 COMPLETE CBC W/AUTO DIFF WBC: CPT

## 2019-09-06 PROCEDURE — 85379 FIBRIN DEGRADATION QUANT: CPT

## 2019-09-06 PROCEDURE — 99285 EMERGENCY DEPT VISIT HI MDM: CPT | Mod: 25

## 2019-09-06 PROCEDURE — 63600175 PHARM REV CODE 636 W HCPCS: Performed by: EMERGENCY MEDICINE

## 2019-09-06 PROCEDURE — 96360 HYDRATION IV INFUSION INIT: CPT

## 2019-09-06 PROCEDURE — 25000003 PHARM REV CODE 250: Performed by: EMERGENCY MEDICINE

## 2019-09-06 PROCEDURE — 83880 ASSAY OF NATRIURETIC PEPTIDE: CPT

## 2019-09-06 PROCEDURE — 83735 ASSAY OF MAGNESIUM: CPT

## 2019-09-06 PROCEDURE — 84484 ASSAY OF TROPONIN QUANT: CPT | Mod: 91

## 2019-09-06 PROCEDURE — 93005 ELECTROCARDIOGRAM TRACING: CPT

## 2019-09-06 RX ORDER — NAPROXEN SODIUM 220 MG/1
162 TABLET, FILM COATED ORAL
Status: COMPLETED | OUTPATIENT
Start: 2019-09-06 | End: 2019-09-06

## 2019-09-06 RX ORDER — ASPIRIN 325 MG
325 TABLET ORAL
Status: DISCONTINUED | OUTPATIENT
Start: 2019-09-06 | End: 2019-09-06

## 2019-09-06 RX ORDER — MULTIVIT WITH MINERALS/HERBS
1 TABLET ORAL DAILY
COMMUNITY
End: 2020-04-22

## 2019-09-06 RX ORDER — LIDOCAINE 50 MG/G
1 PATCH TOPICAL
Status: DISCONTINUED | OUTPATIENT
Start: 2019-09-06 | End: 2019-09-07 | Stop reason: HOSPADM

## 2019-09-06 RX ADMIN — LIDOCAINE 1 PATCH: 50 PATCH TOPICAL at 09:09

## 2019-09-06 RX ADMIN — SODIUM CHLORIDE 1000 ML: 0.9 INJECTION, SOLUTION INTRAVENOUS at 09:09

## 2019-09-06 RX ADMIN — ASPIRIN 81 MG 162 MG: 81 TABLET ORAL at 09:09

## 2019-09-07 VITALS
WEIGHT: 198 LBS | SYSTOLIC BLOOD PRESSURE: 129 MMHG | OXYGEN SATURATION: 100 % | TEMPERATURE: 98 F | BODY MASS INDEX: 31.01 KG/M2 | RESPIRATION RATE: 10 BRPM | HEART RATE: 79 BPM | DIASTOLIC BLOOD PRESSURE: 64 MMHG

## 2019-09-07 LAB — TROPONIN I SERPL DL<=0.01 NG/ML-MCNC: <0.006 NG/ML (ref 0–0.03)

## 2019-09-07 PROCEDURE — 25000003 PHARM REV CODE 250: Performed by: EMERGENCY MEDICINE

## 2019-09-07 RX ORDER — GABAPENTIN 300 MG/1
300 CAPSULE ORAL 3 TIMES DAILY
Status: DISCONTINUED | OUTPATIENT
Start: 2019-09-07 | End: 2019-09-07 | Stop reason: HOSPADM

## 2019-09-07 RX ORDER — GABAPENTIN 300 MG/1
300 CAPSULE ORAL 3 TIMES DAILY
Qty: 90 CAPSULE | Refills: 11 | Status: SHIPPED | OUTPATIENT
Start: 2019-09-07 | End: 2020-01-22

## 2019-09-07 RX ORDER — DIPHENHYDRAMINE HCL 25 MG
25 CAPSULE ORAL
Status: COMPLETED | OUTPATIENT
Start: 2019-09-07 | End: 2019-09-07

## 2019-09-07 RX ORDER — HYDROCODONE BITARTRATE AND ACETAMINOPHEN 5; 325 MG/1; MG/1
1 TABLET ORAL
Status: COMPLETED | OUTPATIENT
Start: 2019-09-07 | End: 2019-09-07

## 2019-09-07 RX ORDER — HYDROCODONE BITARTRATE AND ACETAMINOPHEN 5; 325 MG/1; MG/1
1 TABLET ORAL EVERY 4 HOURS PRN
Qty: 8 TABLET | Refills: 0 | Status: SHIPPED | OUTPATIENT
Start: 2019-09-07 | End: 2019-09-10

## 2019-09-07 RX ADMIN — HYDROCODONE BITARTRATE AND ACETAMINOPHEN 1 TABLET: 5; 325 TABLET ORAL at 01:09

## 2019-09-07 RX ADMIN — DIPHENHYDRAMINE HYDROCHLORIDE 25 MG: 25 CAPSULE ORAL at 01:09

## 2019-09-07 NOTE — DISCHARGE INSTRUCTIONS
Please follow up with primary care.  Please take pain medicine as prescribed.  Please have caution with taking Norco, if you experience itching and please take Benadryl with it.  Please come to the ER for any concerning reason.

## 2019-09-07 NOTE — ED PROVIDER NOTES
Encounter Date: 2019    SCRIBE #1 NOTE: I, Gian Cabrera, am scribing for, and in the presence of,  . I have scribed the entire note.       History     Chief Complaint   Patient presents with    Chest Pain     mid sternal chest pain x 2 days with worsening last night patient states vikas Perez is a 64 y.o. female who  has a past medical history of Chronic back pain, Diabetes mellitus, Diabetes mellitus, type 2, Hyperlipidemia, Hypertension, and Insomnia.    The patient presents to the ED due to chest pain with associated nausea and headache for 2 days. The pain is located mid chest and worsened last night. Patient had similar symptoms a few months ago and was told it was related to shingles. Patient denies fever, chills, sore throat, cough, vomiting, dysuria, urinary frequency, urgency, back pain, or any other complaints.        Review of patient's allergies indicates:   Allergen Reactions    Codeine Itching    Pcn [penicillins] Hives and Itching     Past Medical History:   Diagnosis Date    Chronic back pain     Diabetes mellitus     Diabetes mellitus, type 2     Hyperlipidemia     Hypertension     Insomnia      Past Surgical History:   Procedure Laterality Date    ANTERIOR CRUCIATE LIGAMENT REPAIR      left shoulder    APPENDECTOMY      BACK SURGERY      laminectomy and fusion    Breast Reduction  2003     SECTION      x2    CHOLECYSTECTOMY      COLONOSCOPYSuprep N/A 2019    Performed by [facility] at [facility]    EGD (ESOPHAGOGASTRODUODENOSCOPY) N/A 2019    Performed by [facility] at [facility] END[facility]    FRACTURE SURGERY      GASTRIC BYPASS  2010    gastric sleeve    SPLENECTOMY, TOTAL      TOTAL KNEE ARTHROPLASTY Left     TOTAL REDUCTION MAMMOPLASTY Bilateral     over 20yrs ago     Family History   Problem Relation Age of Onset    Heart disease Mother     Heart failure Mother     Rheumatologic disease Mother     Cataracts Mother      Glaucoma Mother     Heart disease Father     Coronary artery disease Father     Diabetes Father     Hypertension Father     Hyperlipidemia Father     Cataracts Father     Breast cancer Sister     Cancer Sister     No Known Problems Brother     No Known Problems Brother     Diabetes Sister     No Known Problems Sister     No Known Problems Sister     No Known Problems Sister      Social History     Tobacco Use    Smoking status: Never Smoker    Smokeless tobacco: Never Used   Substance Use Topics    Alcohol use: Yes     Comment: occasional    Drug use: No     Review of Systems   Constitutional: Negative for chills and fever.   Respiratory: Negative for cough and shortness of breath.    Cardiovascular: Positive for chest pain.   Gastrointestinal: Positive for nausea. Negative for abdominal pain and vomiting.   Musculoskeletal: Negative for back pain.   Neurological: Positive for headaches.   All other systems reviewed and are negative.      Physical Exam     Initial Vitals [09/06/19 1830]   BP Pulse Resp Temp SpO2   121/74 (!) 120 20 98.2 °F (36.8 °C) 96 %      MAP       --         Physical Exam    ED Course   Procedures  Labs Reviewed   CBC W/ AUTO DIFFERENTIAL - Abnormal; Notable for the following components:       Result Value    RDW 16.6 (*)     Platelets 393 (*)     All other components within normal limits   COMPREHENSIVE METABOLIC PANEL - Abnormal; Notable for the following components:    CO2 20 (*)     Anion Gap 17 (*)     All other components within normal limits   MAGNESIUM - Abnormal; Notable for the following components:    Magnesium 1.4 (*)     All other components within normal limits   D DIMER, QUANTITATIVE - Abnormal; Notable for the following components:    D-Dimer 0.55 (*)     All other components within normal limits   TROPONIN I   B-TYPE NATRIURETIC PEPTIDE   MAGNESIUM   D DIMER, QUANTITATIVE   TROPONIN I     EKG Readings: (Independently Interpreted)   Sinus tach 104 beats per  "minute, right bundle branch block, left axis, no significant change from previous EKG       Imaging Results          X-Ray Chest PA And Lateral (Final result)  Result time 09/06/19 21:18:23    Final result by Justen Valero MD (09/06/19 21:18:23)                 Impression:      No acute process.      Electronically signed by: Justen Valero MD  Date:    09/06/2019  Time:    21:18             Narrative:    EXAMINATION:  XR CHEST PA AND LATERAL    CLINICAL HISTORY:  Chest Pain;    TECHNIQUE:  PA and lateral views of the chest were performed.    COMPARISON:  03/18/2019.    FINDINGS:  Monitoring EKG leads are present.  The trachea is unremarkable.  There are calcifications of the aortic knob.  The cardiomediastinal silhouette is within normal limits.  The hemidiaphragms are unremarkable.  There is no evidence of free air beneath the hemidiaphragms.  There are no pleural effusions.  There is no evidence of a pneumothorax.  There is no evidence of pneumomediastinum.  No airspace opacity is present.  There are degenerative changes in the osseous structures.  The subcutaneous tissues are unremarkable.                                 Medical Decision Making:   Initial Assessment:   This is a 64-year-old female who has a history of chronic back pain, diabetes, hypertension, presents the ER for evaluation 2 days of midsternal chest pain.  Reports intermittent, pleuritic, progressively worsening, denies has history like this previously.  She reports she had something similar a couple months ago ago, and was told that it was "shingles".Patient is anxious appearing, but no acute distress, differential includes cardiac, pulmonary, infectious or electrolyte abnormality or post herpetic neuralgia.  Will obtain cardiac workup pain control reassess.              Attending Attestation:           Physician Attestation for Scribe:  Physician Attestation Statement for Scribe #1: Joann WYATT M.D., reviewed documentation, as scribed by " Gian Cabrera in my presence, and it is both accurate and complete.                 ED Course as of Sep 07 0159   Sat Sep 07, 2019   0045 RestingIn bed, patient still complaining of minor chest discomfort.  Burning sensation  Two set troponin negative, EKG no acute changes.  D-dimer slightly elevated but negative for age adjusted D-dimer.  Patient could be possibly suffering from post herpetic neuralgia, she does report that she was diagnosed with shingles in this area few months ago.  No obvious skin changes over the site.  Heart rate has improved.  Will give narcotics and gabapentin will reassess.  Likely plan [facility] soon.    [SE]      ED Course User Index  [SE] Joann Glez MD     Clinical Impression:       ICD-10-CM ICD-9-CM   1. Atypical chest pain R07.89 786.59   2. Chest pain R07.9 786.50   3. Post herpetic neuralgia B02.29 053.19                                  Joann Glez MD  09/07/19 0159

## 2019-09-07 NOTE — ED NOTES
Pt reports pain is unchanged. Pt sitting in bed, reports has a ride home that is currently at facility.

## 2019-09-07 NOTE — ED NOTES
"Pt presents to ED c/o chest pain to left chest, reports has been constant X 2 days. Pt reports had similar episode of pain X3 months ago, was dx with shingles. Pt states  had shingles and the symptoms were not the same, so pt did not take prescribed medication. Pt reports pain has been intermittent X 1 week. Pt does not associate getting worse or better with anything specific. Pt reports taking tylenol for pain with no relief. Pt reports last dose of tylenol at 1800 today. Pt reports pain intermittent is bad enough to cause SOB described as "taking breath away".   "

## 2019-09-16 DIAGNOSIS — I73.00 RAYNAUD'S DISEASE WITHOUT GANGRENE: ICD-10-CM

## 2019-09-16 RX ORDER — FLUOXETINE 10 MG/1
10 TABLET ORAL DAILY
Qty: 30 TABLET | Refills: 0 | Status: SHIPPED | OUTPATIENT
Start: 2019-09-16 | End: 2019-10-16 | Stop reason: SDUPTHER

## 2019-09-27 ENCOUNTER — PATIENT OUTREACH (OUTPATIENT)
Dept: ADMINISTRATIVE | Facility: OTHER | Age: 64
End: 2019-09-27

## 2019-10-08 ENCOUNTER — PATIENT OUTREACH (OUTPATIENT)
Dept: ADMINISTRATIVE | Facility: HOSPITAL | Age: 64
End: 2019-10-08

## 2019-10-10 ENCOUNTER — PROCEDURE VISIT (OUTPATIENT)
Dept: PHYSICAL MEDICINE AND REHAB | Facility: CLINIC | Age: 64
End: 2019-10-10
Payer: COMMERCIAL

## 2019-10-10 ENCOUNTER — PATIENT MESSAGE (OUTPATIENT)
Dept: RHEUMATOLOGY | Facility: CLINIC | Age: 64
End: 2019-10-10

## 2019-10-10 DIAGNOSIS — I73.00 RAYNAUD'S DISEASE WITHOUT GANGRENE: ICD-10-CM

## 2019-10-10 DIAGNOSIS — M25.50 POLYARTHRALGIA: ICD-10-CM

## 2019-10-10 PROCEDURE — 95913 PR NERVE CONDUCTION STUDY; 13 OR MORE STUDIES: ICD-10-PCS | Mod: S$GLB,,, | Performed by: PHYSICAL MEDICINE & REHABILITATION

## 2019-10-10 PROCEDURE — 95886 MUSC TEST DONE W/N TEST COMP: CPT | Mod: S$GLB,,, | Performed by: PHYSICAL MEDICINE & REHABILITATION

## 2019-10-10 PROCEDURE — 95913 NRV CNDJ TEST 13/> STUDIES: CPT | Mod: S$GLB,,, | Performed by: PHYSICAL MEDICINE & REHABILITATION

## 2019-10-10 PROCEDURE — 95886 PR EMG COMPLETE, W/ NERVE CONDUCTION STUDIES, 5+ MUSCLES: ICD-10-PCS | Mod: S$GLB,,, | Performed by: PHYSICAL MEDICINE & REHABILITATION

## 2019-10-10 NOTE — PROCEDURES
HPI: Jill Perez is a 64 y.o.female who presents for NCS/EMG of all 4 limbs for left>right leg pain and left>right hand numbness/tingling.  She relates a history of 2 lumbar decompressions.      NCV & EMG Findings:   Evaluation of the left Fibular motor nerve showed reduced amplitude.   The left tibial motor nerve showed prolonged distal onset latency and reduced amplitude.   The left superficial peroneal nerve showed no response   The left median sensory nerve showed prolonged latency at the wrist    All examined muscles (as indicated in the following table) showed no evidence of electrical instability.    Impression:  1. There is evidence of a mild sensory median mononeuropathy across the left wrist (i.e. carpal tunnel syndrome).  There is no motor axonal loss and no active denervation.  2. 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  However, an extensive needle examination of the left lower extremity was completely normal, without signs of active or chronic neuropathy.  Therefore, I cannot localize this well electrodiagnostically.  The fact that it involves all three of these nerves would lend towards either a sciatic neuropathy, lumbosacral plexopathy (lower lumbosacral plexus), radiculopathy, or asymmetric peripheral polyneuropathy.  Given her history of lumbar decompressions, I think that lumbar radiculopathy is the most likely cause.      ___________________________  Damien Lucio D.O.        Nerve Conduction Studies- 2nd runs  Motor Summary Table     Stim Site NR Onset (ms) Norm Onset (ms) O-P Amp (mV) Norm O-P Amp Site1 Site2 Delta-0 (ms) Dist (cm) Sohan (m/s) Norm Sohan (m/s)   Left Fibular Motor (Ext Dig Brev)   Ankle    3.4 <6.1 *0.5 >2.5 B Fib Ankle 7.5 0.0  >38   B Fib    10.9  0.5          Site 4    13.8  0.0          Left Tibial Motor (Abd  Potter Brev)   Ankle    *11.6 <6.1 *0.3 >3.0           NCS+  Motor Nerve Results      Latency Amplitude F-Lat Segment Distance CV Comment   Site (ms) Norm (mV) Norm (ms)  (cm) (m/s) Norm    Left Fibular (EDB) Motor   Ankle 3.2  < 6.5 *0.55  > 1.10         Bel Fib Head 11.0 - 0.46 -  Bel Fib Head-Ankle 32 41 -    Right Fibular (EDB) Motor   Ankle 3.5  < 6.5 1.62  > 1.10         Bel Fib Head 10.3 - 1.61 -  Bel Fib Head-Ankle 34 50 -    Left Median (APB) Motor   Wrist 3.6  < 4.4 4.8  > 3.8         Elbow 7.5 - 4.2 -  Elbow-Wrist 24 62  > 51    Right Median (APB) Motor   Wrist 3.5  < 4.4 5.8  > 3.8         Elbow 7.5 - 5.6 -  Elbow-Wrist 22 55  > 51    Left Tibial (AHB) Motor   Ankle 5.4  < 6.1 *0.30  > 1.10         Right Tibial (AHB) Motor   Ankle 3.9  < 6.1 1.33  > 1.10         Left Ulnar (ADM) Motor   Wrist 2.8  < 3.7 8.1  > 5.0         Bel Elbow 6.3 - 6.7 -  Bel Elbow-Wrist 22 63  > 52    Right Ulnar (ADM) Motor   Wrist 2.7  < 3.7 5.1  > 5.0         Bel Elbow 6.2 - 5.8 -  Bel Elbow-Wrist 24 69  > 52      Sensory Nerve Results      Latency (Peak) Amplitude (P-P) Segment Distance CV Comment   Site (ms) Norm (µV) Norm  (cm) (m/s) Norm    Left Median Sensory   Wrist-Dig II *4.2  < 4.0 13  > 8 Wrist-Dig II 14 *33  > 39    Right Median Sensory   Wrist-Dig II 4.0  < 4.0 22  > 8 Wrist-Dig II 14 *35  > 39    Left Radial Sensory   Forearm-Wrist 2.0  < 2.8 20  > 11 Forearm-Wrist 10 50 -    Right Radial Sensory   Forearm-Wrist 2.3  < 2.8 22  > 11 Forearm-Wrist 10 43 -    Left Superficial Fibular Sensory   14 cm-Ankle *NR  < 4.2 *NR  > 5 14 cm-Ankle 14 *NR  > 32    Right Superficial Fibular Sensory   14 cm-Ankle 2.1  < 4.2 8  > 5 14 cm-Ankle 14 67  > 32    Left Sural Sensory   Calf-Lat Mall 3.3  < 4.5 9  > 4 Calf-Lat Mall 14 42  > 35    Right Ulnar Sensory   Wrist-Dig V 3.6  < 4.0 16  > 4 Wrist-Dig V 14 39  > 38      EMG+     Side Muscle Nerve Root Ins Act Fibs Psw Amp Dur Poly Recrt Int Pat Comment   Left Vastus Med Femoral L2-L4  Nml Nml Nml Nml Nml 0 Nml Nml    Left Add Longus Obturator L2-L4 Nml Nml Nml Nml Nml 0 Nml Nml    Left Tib Anterior Fibular,  Deep Fibula... L4-L5 Nml Nml Nml Nml Nml 0 Nml Nml    Left Fib Longus Fibular,  Superficial... L5-S1 Nml Nml Nml Nml Nml 0 Nml Nml    Left Gastroc Tibial S1-S2 Nml Nml Nml Nml Nml 0 Nml Nml    Left Gluteus Max Inf Gluteal L5-S2 Nml Nml Nml Nml Nml 0 Nml Nml    Left Gluteus Med Sup Gluteal L5-S1 Nml Nml Nml Nml Nml 0 Nml Nml    Left Lumbo Parasp (Lower) Rami L5-S1 Nml Nml Nml         Left Lumbo Parasp (Mid) Rami L3-L4 Nml Nml Nml         Left Biceps Fem SH Sciatic L5-S1 Nml Nml Nml Nml Nml 0 Nml Nml    Left Biceps Musculocut C5-C6 Nml Nml Nml Nml Nml 0 Nml Nml    Left Triceps Radial C6-C8 Nml Nml Nml Nml Nml 0 Nml Nml    Left Pronator Teres Median C6-C7 Nml Nml Nml Nml Nml 0 Nml Nml    Left Brachiorad Radial C5-C6 Nml Nml Nml Nml Nml 0 Nml Nml    Left FDI Ulnar C8-T1 Nml Nml Nml Nml Nml 0 Nml Nml    Left APB Median C8-T1 Nml Nml Nml Nml Nml 0 Nml Nml        Nerve Conduction Studies  Motor Left/Right Comparison     Stim Site L Lat (ms) R Lat (ms) L-R Lat (ms) L Amp (mV) R Amp (mV) L-R Amp (%) Site1 Site2 L Sohan (m/s) R Sohan (m/s) L-R Sohan (m/s)   Fibular Motor (Ext Dig Brev)   Ankle 3.4   *0.5   B Fib Ankle      B Fib 10.9   0.5          Site 4 13.8   0.0          Tibial Motor (Abd Potter Brev)   Ankle *11.6   *0.3                Waveforms:       Motor                           Sensory

## 2019-10-15 DIAGNOSIS — E11.65 TYPE 2 DIABETES MELLITUS WITH HYPERGLYCEMIA, WITH LONG-TERM CURRENT USE OF INSULIN: ICD-10-CM

## 2019-10-15 DIAGNOSIS — Z79.4 TYPE 2 DIABETES MELLITUS WITH HYPERGLYCEMIA, WITH LONG-TERM CURRENT USE OF INSULIN: ICD-10-CM

## 2019-10-16 ENCOUNTER — IMMUNIZATION (OUTPATIENT)
Dept: PHARMACY | Facility: CLINIC | Age: 64
End: 2019-10-16
Payer: COMMERCIAL

## 2019-10-16 DIAGNOSIS — I73.00 RAYNAUD'S DISEASE WITHOUT GANGRENE: ICD-10-CM

## 2019-10-16 RX ORDER — FLUOXETINE 10 MG/1
10 TABLET ORAL DAILY
Qty: 30 TABLET | Refills: 0 | Status: SHIPPED | OUTPATIENT
Start: 2019-10-16 | End: 2019-10-22

## 2019-10-19 ENCOUNTER — LAB VISIT (OUTPATIENT)
Dept: LAB | Facility: HOSPITAL | Age: 64
End: 2019-10-19
Attending: FAMILY MEDICINE
Payer: COMMERCIAL

## 2019-10-19 DIAGNOSIS — D64.9 NORMOCYTIC ANEMIA: ICD-10-CM

## 2019-10-19 DIAGNOSIS — E11.65 TYPE 2 DIABETES MELLITUS WITH HYPERGLYCEMIA, WITH LONG-TERM CURRENT USE OF INSULIN: ICD-10-CM

## 2019-10-19 DIAGNOSIS — Z79.4 TYPE 2 DIABETES MELLITUS WITH HYPERGLYCEMIA, WITH LONG-TERM CURRENT USE OF INSULIN: ICD-10-CM

## 2019-10-19 DIAGNOSIS — Z01.419 WELL WOMAN EXAM: ICD-10-CM

## 2019-10-19 LAB
ALBUMIN SERPL BCP-MCNC: 3.2 G/DL (ref 3.5–5.2)
ALP SERPL-CCNC: 106 U/L (ref 55–135)
ALT SERPL W/O P-5'-P-CCNC: 13 U/L (ref 10–44)
ANION GAP SERPL CALC-SCNC: 11 MMOL/L (ref 8–16)
AST SERPL-CCNC: 20 U/L (ref 10–40)
BASOPHILS # BLD AUTO: 0.02 K/UL (ref 0–0.2)
BASOPHILS NFR BLD: 0.3 % (ref 0–1.9)
BILIRUB SERPL-MCNC: 0.3 MG/DL (ref 0.1–1)
BUN SERPL-MCNC: 9 MG/DL (ref 8–23)
CALCIUM SERPL-MCNC: 9.2 MG/DL (ref 8.7–10.5)
CHLORIDE SERPL-SCNC: 104 MMOL/L (ref 95–110)
CHOLEST SERPL-MCNC: 133 MG/DL (ref 120–199)
CHOLEST/HDLC SERPL: 2.1 {RATIO} (ref 2–5)
CO2 SERPL-SCNC: 25 MMOL/L (ref 23–29)
CREAT SERPL-MCNC: 0.6 MG/DL (ref 0.5–1.4)
DIFFERENTIAL METHOD: ABNORMAL
EOSINOPHIL # BLD AUTO: 0.1 K/UL (ref 0–0.5)
EOSINOPHIL NFR BLD: 1.2 % (ref 0–8)
ERYTHROCYTE [DISTWIDTH] IN BLOOD BY AUTOMATED COUNT: 17.5 % (ref 11.5–14.5)
EST. GFR  (AFRICAN AMERICAN): >60 ML/MIN/1.73 M^2
EST. GFR  (NON AFRICAN AMERICAN): >60 ML/MIN/1.73 M^2
ESTIMATED AVG GLUCOSE: 140 MG/DL (ref 68–131)
FERRITIN SERPL-MCNC: 19 NG/ML (ref 20–300)
GLUCOSE SERPL-MCNC: 108 MG/DL (ref 70–110)
HBA1C MFR BLD HPLC: 6.5 % (ref 4–5.6)
HCT VFR BLD AUTO: 37.2 % (ref 37–48.5)
HDLC SERPL-MCNC: 62 MG/DL (ref 40–75)
HDLC SERPL: 46.6 % (ref 20–50)
HGB BLD-MCNC: 11.7 G/DL (ref 12–16)
IMM GRANULOCYTES # BLD AUTO: 0.04 K/UL (ref 0–0.04)
IMM GRANULOCYTES NFR BLD AUTO: 0.6 % (ref 0–0.5)
LDLC SERPL CALC-MCNC: 60.2 MG/DL (ref 63–159)
LYMPHOCYTES # BLD AUTO: 3.6 K/UL (ref 1–4.8)
LYMPHOCYTES NFR BLD: 54.9 % (ref 18–48)
MCH RBC QN AUTO: 28.5 PG (ref 27–31)
MCHC RBC AUTO-ENTMCNC: 31.5 G/DL (ref 32–36)
MCV RBC AUTO: 91 FL (ref 82–98)
MONOCYTES # BLD AUTO: 0.5 K/UL (ref 0.3–1)
MONOCYTES NFR BLD: 7.9 % (ref 4–15)
NEUTROPHILS # BLD AUTO: 2.3 K/UL (ref 1.8–7.7)
NEUTROPHILS NFR BLD: 35.1 % (ref 38–73)
NONHDLC SERPL-MCNC: 71 MG/DL
NRBC BLD-RTO: 0 /100 WBC
PLATELET # BLD AUTO: 418 K/UL (ref 150–350)
PMV BLD AUTO: 10.6 FL (ref 9.2–12.9)
POTASSIUM SERPL-SCNC: 3.9 MMOL/L (ref 3.5–5.1)
PROT SERPL-MCNC: 6.9 G/DL (ref 6–8.4)
RBC # BLD AUTO: 4.1 M/UL (ref 4–5.4)
SODIUM SERPL-SCNC: 140 MMOL/L (ref 136–145)
TRIGL SERPL-MCNC: 54 MG/DL (ref 30–150)
TSH SERPL DL<=0.005 MIU/L-ACNC: 2.84 UIU/ML (ref 0.4–4)
VIT B12 SERPL-MCNC: 646 PG/ML (ref 210–950)
WBC # BLD AUTO: 6.59 K/UL (ref 3.9–12.7)

## 2019-10-19 PROCEDURE — 80061 LIPID PANEL: CPT

## 2019-10-19 PROCEDURE — 83036 HEMOGLOBIN GLYCOSYLATED A1C: CPT

## 2019-10-19 PROCEDURE — 36415 COLL VENOUS BLD VENIPUNCTURE: CPT | Mod: PO

## 2019-10-19 PROCEDURE — 82607 VITAMIN B-12: CPT

## 2019-10-19 PROCEDURE — 84443 ASSAY THYROID STIM HORMONE: CPT

## 2019-10-19 PROCEDURE — 85025 COMPLETE CBC W/AUTO DIFF WBC: CPT

## 2019-10-19 PROCEDURE — 82728 ASSAY OF FERRITIN: CPT

## 2019-10-19 PROCEDURE — 80053 COMPREHEN METABOLIC PANEL: CPT

## 2019-10-22 ENCOUNTER — LAB VISIT (OUTPATIENT)
Dept: LAB | Facility: HOSPITAL | Age: 64
End: 2019-10-22
Attending: FAMILY MEDICINE
Payer: COMMERCIAL

## 2019-10-22 ENCOUNTER — OFFICE VISIT (OUTPATIENT)
Dept: FAMILY MEDICINE | Facility: CLINIC | Age: 64
End: 2019-10-22
Payer: COMMERCIAL

## 2019-10-22 ENCOUNTER — INITIAL CONSULT (OUTPATIENT)
Dept: RHEUMATOLOGY | Facility: CLINIC | Age: 64
End: 2019-10-22
Payer: COMMERCIAL

## 2019-10-22 VITALS
HEART RATE: 101 BPM | WEIGHT: 193.13 LBS | BODY MASS INDEX: 30.31 KG/M2 | HEIGHT: 67 IN | TEMPERATURE: 99 F | DIASTOLIC BLOOD PRESSURE: 68 MMHG | SYSTOLIC BLOOD PRESSURE: 94 MMHG | OXYGEN SATURATION: 96 %

## 2019-10-22 VITALS
SYSTOLIC BLOOD PRESSURE: 111 MMHG | DIASTOLIC BLOOD PRESSURE: 78 MMHG | BODY MASS INDEX: 30.42 KG/M2 | HEIGHT: 67 IN | HEART RATE: 88 BPM | WEIGHT: 193.81 LBS

## 2019-10-22 DIAGNOSIS — R79.89 LOW VITAMIN D LEVEL: ICD-10-CM

## 2019-10-22 DIAGNOSIS — Z79.4 TYPE 2 DIABETES MELLITUS WITH HYPERGLYCEMIA, WITH LONG-TERM CURRENT USE OF INSULIN: ICD-10-CM

## 2019-10-22 DIAGNOSIS — Z79.899 HIGH RISK MEDICATION USE: ICD-10-CM

## 2019-10-22 DIAGNOSIS — F51.01 PRIMARY INSOMNIA: ICD-10-CM

## 2019-10-22 DIAGNOSIS — Z01.419 WELL WOMAN EXAM: Primary | ICD-10-CM

## 2019-10-22 DIAGNOSIS — I10 ESSENTIAL HYPERTENSION: ICD-10-CM

## 2019-10-22 DIAGNOSIS — D64.9 NORMOCYTIC ANEMIA: ICD-10-CM

## 2019-10-22 DIAGNOSIS — E53.8 B12 DEFICIENCY: ICD-10-CM

## 2019-10-22 DIAGNOSIS — Z12.31 ENCOUNTER FOR SCREENING MAMMOGRAM FOR MALIGNANT NEOPLASM OF BREAST: ICD-10-CM

## 2019-10-22 DIAGNOSIS — I73.00 RAYNAUD'S DISEASE WITHOUT GANGRENE: ICD-10-CM

## 2019-10-22 DIAGNOSIS — E11.65 TYPE 2 DIABETES MELLITUS WITH HYPERGLYCEMIA, WITH LONG-TERM CURRENT USE OF INSULIN: ICD-10-CM

## 2019-10-22 DIAGNOSIS — M54.50 CHRONIC MIDLINE LOW BACK PAIN, UNSPECIFIED WHETHER SCIATICA PRESENT: ICD-10-CM

## 2019-10-22 DIAGNOSIS — M25.50 POLYARTHRALGIA: ICD-10-CM

## 2019-10-22 DIAGNOSIS — I73.00 RAYNAUD'S DISEASE WITHOUT GANGRENE: Primary | ICD-10-CM

## 2019-10-22 DIAGNOSIS — R76.8 POSITIVE ANA (ANTINUCLEAR ANTIBODY): ICD-10-CM

## 2019-10-22 DIAGNOSIS — E78.49 OTHER HYPERLIPIDEMIA: ICD-10-CM

## 2019-10-22 DIAGNOSIS — R70.0 ELEVATED SED RATE: ICD-10-CM

## 2019-10-22 DIAGNOSIS — G89.29 CHRONIC MIDLINE LOW BACK PAIN, UNSPECIFIED WHETHER SCIATICA PRESENT: ICD-10-CM

## 2019-10-22 LAB
ALBUMIN/CREAT UR: 8.2 UG/MG (ref 0–30)
CREAT UR-MCNC: 134 MG/DL (ref 15–325)
MICROALBUMIN UR DL<=1MG/L-MCNC: 11 UG/ML

## 2019-10-22 PROCEDURE — 99396 PR PREVENTIVE VISIT,EST,40-64: ICD-10-PCS | Mod: S$GLB,,, | Performed by: FAMILY MEDICINE

## 2019-10-22 PROCEDURE — 99999 PR PBB SHADOW E&M-EST. PATIENT-LVL III: ICD-10-PCS | Mod: PBBFAC,,, | Performed by: INTERNAL MEDICINE

## 2019-10-22 PROCEDURE — 3008F PR BODY MASS INDEX (BMI) DOCUMENTED: ICD-10-PCS | Mod: CPTII,S$GLB,, | Performed by: INTERNAL MEDICINE

## 2019-10-22 PROCEDURE — 3078F PR MOST RECENT DIASTOLIC BLOOD PRESSURE < 80 MM HG: ICD-10-PCS | Mod: CPTII,S$GLB,, | Performed by: INTERNAL MEDICINE

## 2019-10-22 PROCEDURE — 3044F PR MOST RECENT HEMOGLOBIN A1C LEVEL <7.0%: ICD-10-PCS | Mod: CPTII,S$GLB,, | Performed by: FAMILY MEDICINE

## 2019-10-22 PROCEDURE — 3078F DIAST BP <80 MM HG: CPT | Mod: CPTII,S$GLB,, | Performed by: FAMILY MEDICINE

## 2019-10-22 PROCEDURE — 82043 UR ALBUMIN QUANTITATIVE: CPT

## 2019-10-22 PROCEDURE — 3078F PR MOST RECENT DIASTOLIC BLOOD PRESSURE < 80 MM HG: ICD-10-PCS | Mod: CPTII,S$GLB,, | Performed by: FAMILY MEDICINE

## 2019-10-22 PROCEDURE — 3008F BODY MASS INDEX DOCD: CPT | Mod: CPTII,S$GLB,, | Performed by: INTERNAL MEDICINE

## 2019-10-22 PROCEDURE — 99999 PR PBB SHADOW E&M-EST. PATIENT-LVL III: CPT | Mod: PBBFAC,,, | Performed by: INTERNAL MEDICINE

## 2019-10-22 PROCEDURE — 99999 PR PBB SHADOW E&M-EST. PATIENT-LVL IV: ICD-10-PCS | Mod: PBBFAC,,, | Performed by: FAMILY MEDICINE

## 2019-10-22 PROCEDURE — 3078F DIAST BP <80 MM HG: CPT | Mod: CPTII,S$GLB,, | Performed by: INTERNAL MEDICINE

## 2019-10-22 PROCEDURE — 3074F SYST BP LT 130 MM HG: CPT | Mod: CPTII,S$GLB,, | Performed by: INTERNAL MEDICINE

## 2019-10-22 PROCEDURE — 3074F SYST BP LT 130 MM HG: CPT | Mod: CPTII,S$GLB,, | Performed by: FAMILY MEDICINE

## 2019-10-22 PROCEDURE — 3044F HG A1C LEVEL LT 7.0%: CPT | Mod: CPTII,S$GLB,, | Performed by: FAMILY MEDICINE

## 2019-10-22 PROCEDURE — 3074F PR MOST RECENT SYSTOLIC BLOOD PRESSURE < 130 MM HG: ICD-10-PCS | Mod: CPTII,S$GLB,, | Performed by: FAMILY MEDICINE

## 2019-10-22 PROCEDURE — 99999 PR PBB SHADOW E&M-EST. PATIENT-LVL IV: CPT | Mod: PBBFAC,,, | Performed by: FAMILY MEDICINE

## 2019-10-22 PROCEDURE — 99215 OFFICE O/P EST HI 40 MIN: CPT | Mod: S$GLB,,, | Performed by: INTERNAL MEDICINE

## 2019-10-22 PROCEDURE — 99215 PR OFFICE/OUTPT VISIT, EST, LEVL V, 40-54 MIN: ICD-10-PCS | Mod: S$GLB,,, | Performed by: INTERNAL MEDICINE

## 2019-10-22 PROCEDURE — 3074F PR MOST RECENT SYSTOLIC BLOOD PRESSURE < 130 MM HG: ICD-10-PCS | Mod: CPTII,S$GLB,, | Performed by: INTERNAL MEDICINE

## 2019-10-22 PROCEDURE — 99396 PREV VISIT EST AGE 40-64: CPT | Mod: S$GLB,,, | Performed by: FAMILY MEDICINE

## 2019-10-22 RX ORDER — NIFEDIPINE 90 MG/1
90 TABLET, EXTENDED RELEASE ORAL DAILY
Qty: 30 TABLET | Refills: 4 | Status: SHIPPED | OUTPATIENT
Start: 2019-10-22 | End: 2019-10-22 | Stop reason: SDUPTHER

## 2019-10-22 RX ORDER — ZOLPIDEM TARTRATE 10 MG/1
10 TABLET ORAL NIGHTLY PRN
Qty: 30 TABLET | Refills: 2 | Status: SHIPPED | OUTPATIENT
Start: 2019-10-22 | End: 2020-01-17

## 2019-10-22 RX ORDER — ATORVASTATIN CALCIUM 40 MG/1
40 TABLET, FILM COATED ORAL DAILY
Qty: 90 TABLET | Refills: 3 | Status: SHIPPED | OUTPATIENT
Start: 2019-10-22 | End: 2020-07-21 | Stop reason: SDUPTHER

## 2019-10-22 RX ORDER — FLUOXETINE 20 MG/1
20 TABLET ORAL DAILY
Qty: 90 TABLET | Refills: 1 | Status: SHIPPED | OUTPATIENT
Start: 2019-10-22 | End: 2020-03-30

## 2019-10-22 RX ORDER — METFORMIN HYDROCHLORIDE 1000 MG/1
1000 TABLET ORAL 2 TIMES DAILY WITH MEALS
Qty: 180 TABLET | Refills: 0 | Status: SHIPPED | OUTPATIENT
Start: 2019-10-22 | End: 2020-01-19 | Stop reason: SDUPTHER

## 2019-10-22 RX ORDER — NIFEDIPINE 90 MG/1
90 TABLET, EXTENDED RELEASE ORAL DAILY
Qty: 30 TABLET | Refills: 4 | Status: SHIPPED | OUTPATIENT
Start: 2019-10-22 | End: 2020-05-01 | Stop reason: SDUPTHER

## 2019-10-22 ASSESSMENT — ROUTINE ASSESSMENT OF PATIENT INDEX DATA (RAPID3)
FATIGUE SCORE: 2.5
PAIN SCORE: 4
PATIENT GLOBAL ASSESSMENT SCORE: 3
WHEN YOU AWAKENED IN THE MORNING OVER THE LAST WEEK, PLEASE INDICATE THE AMOUNT OF TIME IT TAKES UNTIL YOU ARE AS LIMBER AS YOU WILL BE FOR THE DAY: 1 HR
PSYCHOLOGICAL DISTRESS SCORE: 3.3
AM STIFFNESS SCORE: 1, YES
TOTAL RAPID3 SCORE: 3.44
MDHAQ FUNCTION SCORE: 1

## 2019-10-22 NOTE — PROGRESS NOTES
Subjective:       Patient ID: Jill Perez is a 64 y.o. female.    Chief Complaint: Annual Exam    Jill Perez is a 64 y.o. female who presents today for a physical    Diet: a1c has gone up slightly; she has been eating more carbs recently. She hasn't been checking her sugars. She has been eating more bread and rice and pasta.   Exercise: she walks around in the park.     Labs: ordered previously, reviewed    C-scope: UTD    Mammogram: UTD. Desires annually. Repeat ordered.   Pap: UTD    DM:  No more lantus! continued metformin 1000 mg bid and started once weekly trulicity. No issues swallowing, no neck swelling. No symptoms of low blood sugar. a1c is well controlled at 6.5  HTN: BP is a little on the lower end. She has been feeling a little dizzy recently.   DLD: she is tolerating this cholesterol medication. No muscle ache changes. She takes it in the morning.   Obesity: weight is generally trending down   Normocytic anemia: EGD/Colonoscopy negative. Mild low iron.    GERD: no reflux symptoms currently  Mood: fluoxetine given for raynaud's; its helping her mood.     Foot pain: taking fluoxetine. Seeing rheumatology.  she was given nitro cream; that didn't help. Her feet are always discolored and cold.      Insomnia: She is feeling tired. Her last sleep study was 5-6 years ago. She doesn't snore that she is aware of. On the 10 mg of Ambien she felt well rested and was able to complete her days work, but now she feels tired. When she takes Ambien 5 mg, she is able to fall asleep but she tosses and turns and wake up tired.     PMHx: reviewed in EMR and updated  Meds: reviewed in EMR and updated  Shx: reviewed in EMR and updated  FMHx: no family history of colon cancer, breast cancer, ovarian cancer  Social: moved here from [place] permanently about 1 year ago. Originally lived in [place].  is also a patient of mine. No kids at home. No pets at home. No smokers at home.       Review of Systems    Constitutional: Negative for chills and fever.   Respiratory: Negative for cough and shortness of breath.    Cardiovascular: Positive for leg swelling. Negative for chest pain and palpitations.   Gastrointestinal: Negative for anal bleeding, blood in stool, constipation, diarrhea, nausea and vomiting.        No trouble swallowing   Genitourinary: Negative for difficulty urinating and dysuria.   Musculoskeletal: Positive for gait problem and joint swelling. Negative for neck pain and neck stiffness.   Skin: Negative for rash.   Neurological: Positive for dizziness. Negative for light-headedness and numbness.   Psychiatric/Behavioral: Positive for sleep disturbance. Negative for dysphoric mood and suicidal ideas. The patient is not nervous/anxious.          Health Maintenance Due   Topic Date Due    Pneumococcal Vaccine (Medium Risk) (1 of 1 - PPSV23) 04/28/1976     Immunization History   Administered Date(s) Administered    Influenza - Quadrivalent - PF (6 months and older) 12/22/2016, 10/30/2018    Meningococcal Conjugate (MCV4P) 09/14/2017    Pneumococcal Conjugate - 13 Valent 12/22/2016    Zoster Recombinant 08/02/2019, 10/15/2019         Objective:     Vitals:    10/22/19 0918   BP: 94/68   Pulse: 101   Temp: 98.6 °F (37 °C)        Physical Exam   Constitutional: She is oriented to person, place, and time. She appears well-developed and well-nourished. No distress.   HENT:   Head: Normocephalic and atraumatic.   Right Ear: External ear normal.   Left Ear: External ear normal.   Nose: Nose normal.   Mouth/Throat: Oropharynx is clear and moist.   Eyes: Conjunctivae are normal.   Neck: Normal range of motion. Neck supple.   Cardiovascular: Normal rate and regular rhythm.   Pulmonary/Chest: Effort normal and breath sounds normal.   Abdominal: Soft. There is no tenderness.   Musculoskeletal: She exhibits no edema.   BL feet are warm to the touch   Feet:   Right Foot:   Protective Sensation: 10 sites tested. 10  sites sensed.   Skin Integrity: Negative for ulcer, blister, skin breakdown, erythema, warmth, callus or dry skin.   Left Foot:   Protective Sensation: 10 sites tested. 10 sites sensed.   Skin Integrity: Negative for ulcer, blister, skin breakdown, erythema, warmth, callus or dry skin.   Neurological: She is alert and oriented to person, place, and time. No sensory deficit.   Skin: Skin is warm. She is not diaphoretic.   Psychiatric: She has a normal mood and affect. Her behavior is normal. Judgment and thought content normal.   Nursing note and vitals reviewed.      Assessment:       1. Well woman exam    2. Essential hypertension    3. Other hyperlipidemia    4. Normocytic anemia    5. Type 2 diabetes mellitus with hyperglycemia, with long-term current use of insulin    6. B12 deficiency    7. Encounter for screening mammogram for malignant neoplasm of breast    8. Raynaud's disease without gangrene    9. Primary insomnia    10. BMI 30.0-30.9,adult        Plan:       Stop losartan  Check BP at home  Decrease carbs    Continue Metformin - 1000 mg twice daily  Continue trulicity to 1.5 mg. Monitor for any issues such as swallowing or neck swelling  Continue nifedipine and follow up with rheumatology  Continue atorvastatin 40 mg  Start acid reflux medication, first thing, every morning  Continue fluoxetine 20 mg    Normocytic anemia; 2/2 to chronic disease? EGD/Cscope normal. Ferritin low. Start iron BID. Recheck CBC in 3 months.      Will refill Ambien for now. Will have to reconsider dosing over the long term.       Well woman exam  ?Avoid tobacco  ?Be physically active  ?Maintain a healthy weight  ?Eat a diet rich in fruits, vegetables, and whole grains, and low in saturated/trans fat  ?Limit alcohol consumption  ?Protect against sexually transmitted infections  ?Avoid excess sun    Essential hypertension  -     CBC auto differential; Future; Expected date: 10/22/2019  -     Comprehensive metabolic panel; Future;  Expected date: 10/22/2019  -     Discontinue: NIFEdipine (PROCARDIA-XL) 90 MG (OSM) 24 hr tablet; Take 1 tablet (90 mg total) by mouth once daily.  Dispense: 30 tablet; Refill: 4    Other hyperlipidemia  -     atorvastatin (LIPITOR) 40 MG tablet; Take 1 tablet (40 mg total) by mouth once daily.  Dispense: 90 tablet; Refill: 3    Normocytic anemia  -     CBC auto differential; Future; Expected date: 10/22/2019  -     Comprehensive metabolic panel; Future; Expected date: 10/22/2019    Type 2 diabetes mellitus with hyperglycemia, with long-term current use of insulin  -     CBC auto differential; Future; Expected date: 10/22/2019  -     Comprehensive metabolic panel; Future; Expected date: 10/22/2019  -     Hemoglobin A1c; Future; Expected date: 10/22/2019  -     metFORMIN (GLUCOPHAGE) 1000 MG tablet; Take 1 tablet (1,000 mg total) by mouth 2 (two) times daily with meals.  Dispense: 180 tablet; Refill: 0  -     dulaglutide (TRULICITY) 1.5 mg/0.5 mL PnIj; Inject 1.5 mg into the skin every 7 days.  Dispense: 6 mL; Refill: 0    B12 deficiency    Encounter for screening mammogram for malignant neoplasm of breast  -     Mammo Digital Screening Bilat; Future; Expected date: 10/22/2019    Raynaud's disease without gangrene  -     Discontinue: NIFEdipine (PROCARDIA-XL) 90 MG (OSM) 24 hr tablet; Take 1 tablet (90 mg total) by mouth once daily.  Dispense: 30 tablet; Refill: 4  -     FLUoxetine 20 MG tablet; Take 1 tablet (20 mg total) by mouth once daily.  Dispense: 90 tablet; Refill: 1    Primary insomnia  -     zolpidem (AMBIEN) 10 mg Tab; Take 1 tablet (10 mg total) by mouth nightly as needed.  Dispense: 30 tablet; Refill: 2      Warning signs discussed, patient to call with any further issues or worsening of symptoms.     40 minutes spent with patient, of which >50% was spent on counseling and coordination of care.

## 2019-10-22 NOTE — PROGRESS NOTES
Subjective:       Patient ID: Jill Perez is a 64 y.o. female.    Chief Complaint: raynauds  HPI  Pt is a 64 year old female with PMH of HTN, HLD, DM2, dCHF, chronic lower back pain s/p lumbar surgery x 2 (last surgery was in the early 2000s), s/p gastric bypass, left knee replacement who presented to clinic for follow up of raynaud's.    Prior work up has revealed +YOLIS 1;1280, negative profile, elevated sed rate of 46, normal CRP, negative Scl70, normal complement, negative RF/CCP, absent cryoglobulins, normal SPEP, negative hepatitis B and C, urine showed no elevation in protein or hematuria, normal CPK.    Pt has achiness in the morning for about 10-15 minutes. Pt admits to pain in her b/l shoulders and pain at the base of her right thumb.  No swelling of joints. No rashes.  Pt has history of chronic headaches.  Pt has not seen psychiatry from prior recommendations at her June visit with Dr Gonzalez.  Pt stated that she wears two pairs of socks and uses warm water to help keep her hands/feet warm. Pt admits to have some tingling in her left leg, she had an EMG done 10/10/2019.  Pt admits to have a left foot drop after she had her last lower back surgery and had to wear a brace.    Initial HPI from 10/22/2019: Pt initially saw Dr Daniels on 3/13/2019 for complain ot left shoulder, lower back and hip pain with stiffness in her arms/legs and hips in the morning with improvement in pain.  She had been having raynauds in her hands/feet for years but was officially diagnosed when she saw Dr Daniels in March of 2019.   Pt had her nifedipine increased from 60mg to 90mg daily.  Pt then followed up in June 2019 with Dr Gonzalez/Bety.    Former smoker in her 20s, smoked for 5-6 years, smoked a few cigarettes a week.  No drinking, no drug use. 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  Pt has three children, all premature, 1 born at 6.5 months and others at 7 months, with pre-eclampsia.  "No h/o miscarriages.  Mother had rheumatoid.  Son has Crohn's disease.  Patient denied family history of lupus, psoriasis, scleroderma, sjogrens, sarcoidosis, UC.    Review of Systems   Constitutional: Positive for fatigue. Negative for chills, diaphoresis, fever and unexpected weight change.   HENT: Negative for congestion, hearing loss, mouth sores, nosebleeds, sore throat, trouble swallowing and voice change.    Eyes: Negative for photophobia, pain, redness and visual disturbance.   Respiratory: Negative for cough, chest tightness and shortness of breath.    Cardiovascular: Negative for chest pain and palpitations.   Gastrointestinal: Negative for abdominal pain, diarrhea, nausea and vomiting.        Alternating diarrhea and constipation with taking iron and laxatives.   Genitourinary: Negative for difficulty urinating, dysuria, genital sores and hematuria.   Musculoskeletal: Positive for arthralgias and back pain. Negative for joint swelling, myalgias, neck pain and neck stiffness.   Skin: Positive for color change. Negative for rash.   Neurological: Positive for headaches. Negative for seizures, weakness and numbness.   Psychiatric/Behavioral: Positive for agitation. Negative for confusion and sleep disturbance. The patient is nervous/anxious.          Objective:   /78   Pulse 88   Ht 5' 7" (1.702 m)   Wt 87.9 kg (193 lb 12.6 oz)   LMP  (LMP Unknown)   BMI 30.35 kg/m²      Physical Exam   Nursing note and vitals reviewed.  Constitutional: She is oriented to person, place, and time and well-developed, well-nourished, and in no distress. No distress.   HENT:   Head: Normocephalic and atraumatic.   Right Ear: External ear normal.   Left Ear: External ear normal.   Mouth/Throat: Oropharynx is clear and moist. No oropharyngeal exudate.   Poor dentition   Eyes: Conjunctivae and EOM are normal. Right eye exhibits no [facility]. Left eye exhibits no [facility]. No scleral icterus.   Neck: Neck supple. No " tracheal deviation present.   Cardiovascular: Normal rate, regular rhythm, normal heart sounds and intact distal pulses.    No murmur heard.  Pulmonary/Chest: Effort normal and breath sounds normal. No stridor. No respiratory distress. She has no wheezes. She has no rales.   Abdominal: Soft. Bowel sounds are normal. She exhibits no distension. There is no tenderness. There is no rebound.   Neurological: She is alert and oriented to person, place, and time.   Skin: Skin is warm and dry. No rash noted. She is not diaphoretic.     No digital or toe ulcerations noted    Cool toes b/l but good perfusion, no blue discoloration noted.   Psychiatric: Mood and affect normal.   Musculoskeletal:   No active synovitis, enthesitis, dactylitis or effusion noted on exam      squarring of CMC joints b/l hands, OA changes of PIPs/DIPs b/l        Results for JAX TINSLEY (MRN [id number]) as of 10/22/2019 10:54   Ref. Range 3/18/2019 11:48 3/18/2019 11:48 6/11/2019 10:10   YOLIS HEP-2 Titer Unknown Positive 1:1280 Speckled  Atypical     Anti-SSA Antibody Latest Ref Range: 0.00 - 19.99 EU 1.71     Anti-SSA Interpretation Latest Ref Range: Negative  Negative     Anti-SSB Antibody Latest Ref Range: 0.00 - 19.99 EU 0.31     Anti-SSB Interpretation Latest Ref Range: Negative  Negative     ds DNA Ab Latest Ref Range: Negative 1:10  Negative 1:10     Anti Sm Antibody Latest Ref Range: 0.00 - 19.99 EU 1.51     Anti-Sm Interpretation Latest Ref Range: Negative  Negative     Anti Sm/RNP Antibody Latest Ref Range: 0.00 - 19.99 EU 1.08     Anti-Sm/RNP Interpretation Latest Ref Range: Negative  Negative     YOLIS Screen Latest Ref Range: Negative <1:160  Positive (A)     Scleroderma SCL- Latest Ref Range: <20 UNITS 3     Complement (C-3) Latest Ref Range: 50 - 180 mg/dL 167     Complement (C-4) Latest Ref Range: 11 - 44 mg/dL 32     Protein, Serum Latest Ref Range: 6.0 - 8.4 g/dL   7.5   Albumin grams/dl Latest Ref Range: 3.35 - 5.55 g/dL   3.71    Alpha-1 grams/dl Latest Ref Range: 0.17 - 0.41 g/dL   0.33   Alpha-2 grams/dl Latest Ref Range: 0.43 - 0.99 g/dL   0.97   Beta grams/dl Latest Ref Range: 0.50 - 1.10 g/dL   1.19 (H)   Gamma grams/dl Latest Ref Range: 0.67 - 1.58 g/dL   1.30   Pathologist Interpretation SPE Unknown   REVIEWED   Cryoglobulin, Qual Latest Ref Range: Absent  Absent Absent      Arthritis survey done 3/21/2019 independently reviewed showing DJD of cspine and hands b/l, left knee replacement, chondrocalcinosis of right knee, no erosions identified.    EMG done 10/10/2019: 1. There is evidence of a mild sensory median mononeuropathy   across the left wrist (i.e. carpal tunnel syndrome).  There is no   motor axonal loss and no active denervation.  2. 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  However, an extensive needle examination of the   left lower extremity was completely normal, without signs of   active or chronic neuropathy.  Therefore, I cannot localize this   well electrodiagnostically.  The fact that it involves all three   of these nerves would lend towards either a sciatic neuropathy,   lumbosacral plexopathy (lower lumbosacral plexus), radiculopathy,   or asymmetric peripheral polyneuropathy.  Given her history of   lumbar decompressions, I think that lumbar radiculopathy is the   most likely cause.      Assessment:       1. Raynaud's disease without gangrene    2. Polyarthralgia    3. Elevated sed rate    4. Low vitamin D level    5. Chronic midline low back pain, unspecified whether sciatica present    6. Essential hypertension    7. Positive YOLIS (antinuclear antibody)    8. High risk medication use        Pt is a 64 year old female with PMH of HTN, HLD, DM2, dCHF, chronic lower back pain s/p lumbar surgery x 2 (last surgery was in the early 2000s), s/p gastric bypass,  left knee replacement who presented to clinic for follow up of raynaud's.    Prior work up has revealed +YOLIS 1;1280, negative profile, elevated sed rate of 46, normal CRP, negative Scl70, normal complement, negative RF/CCP, absent cryoglobulins, normal SPEP, negative hepatitis B and C, urine showed no elevation in protein or hematuria, normal CPK.    Pt currently does not meet criteria for a CTD, although with positive YOLIS, need to be mindful of potential underlying CTD. Will evaluate further.    Plan:       -Labs and urine as below  -refilled nifedipine 90mg XL for raynauds, BP okay today, pt tolerating medication well  -in setting of positive YOLIS and raynauds, repeat lupus labs  -check vitamin D  -referral to ortho spine clinic as requested by patient to establish care given her h/o spinal surgery and now EMG showing lumbar radiculopathy potentially.  -educated pt on keeping her hands/feet warm in the winter months, keeping house temperature warm and body temperature warm.  -RTC in 3 months  Problem List Items Addressed This Visit        Cardiac/Vascular    Essential hypertension    Relevant Medications    NIFEdipine (PROCARDIA-XL) 90 MG (OSM) 24 hr tablet    Raynaud's disease without gangrene - Primary    Relevant Medications    NIFEdipine (PROCARDIA-XL) 90 MG (OSM) 24 hr tablet    Other Relevant Orders    RNA polymerase III Ab, IgG    CBC auto differential    CK    Comprehensive metabolic panel    C-reactive protein    Sedimentation rate    Urinalysis    Protein / creatinine ratio, urine    C3 complement    C4 complement    Beta-2 glycoprotein Abs (IgA, IgG, IgM)    DRVVT    Direct antiglobulin test    Cardiolipin antibody    Immunofixation electrophoresis    Protein electrophoresis, serum    Anti-DNA antibody, double-stranded       Immunology/Multi System    Positive YOLIS (antinuclear antibody)    Relevant Orders    RNA polymerase III Ab, IgG    CBC auto differential    CK    Comprehensive metabolic panel     C-reactive protein    Sedimentation rate    Urinalysis    Protein / creatinine ratio, urine    C3 complement    C4 complement    Beta-2 glycoprotein Abs (IgA, IgG, IgM)    DRVVT    Direct antiglobulin test    Cardiolipin antibody    Immunofixation electrophoresis    Protein electrophoresis, serum    Anti-DNA antibody, double-stranded       Endocrine    Low vitamin D level    Relevant Orders    Vitamin D       Orthopedic    Polyarthralgia    Relevant Orders    RNA polymerase III Ab, IgG    CBC auto differential    CK    Comprehensive metabolic panel    C-reactive protein    Sedimentation rate    Urinalysis    Protein / creatinine ratio, urine    C3 complement    C4 complement    Beta-2 glycoprotein Abs (IgA, IgG, IgM)    DRVVT    Direct antiglobulin test    Cardiolipin antibody    Immunofixation electrophoresis    Protein electrophoresis, serum    Anti-DNA antibody, double-stranded      Other Visit Diagnoses     Elevated sed rate        Relevant Orders    RNA polymerase III Ab, IgG    CBC auto differential    CK    Comprehensive metabolic panel    C-reactive protein    Sedimentation rate    Urinalysis    Protein / creatinine ratio, urine    C3 complement    C4 complement    Beta-2 glycoprotein Abs (IgA, IgG, IgM)    DRVVT    Direct antiglobulin test    Cardiolipin antibody    Immunofixation electrophoresis    Protein electrophoresis, serum    Anti-DNA antibody, double-stranded    Chronic midline low back pain, unspecified whether sciatica present        Relevant Orders    Ambulatory Referral to Back & Spine Clinic    High risk medication use

## 2019-10-22 NOTE — LETTER
October 22, 2019      Shahab Merlos DO  [address]  [place] [address]           [place] - Rheumatology  [address]  [place] [address]  Phone: [phone]  Fax: [phone]          Patient: Jill Perez   MR Number: [id number]   YOB: 1955   Date of Visit: 10/22/2019       Dear Dr. Shahab Merlos:    Thank you for referring Jill Perez to me for evaluation. Attached you will find relevant portions of my assessment and plan of care.    If you have questions, please do not hesitate to call me. I look forward to following Jill Perez along with you.    Sincerely,    Ignacia Magdaleno,     Enclosure  CC:  No Recipients    If you would like to receive this communication electronically, please contact [email or web address] or [phone] to request more information on [facility] access.    For providers and/or their staff who would like to refer a patient to [facility], please contact us through our one-stop-shop provider referral line, [facility], at [phone].    If you feel you have received this communication in error or would no longer like to receive these types of communications, please e-mail [email or web address]

## 2019-10-22 NOTE — PATIENT INSTRUCTIONS
Stop losartan  Check BP at home  Decrease carbs    Continue Metformin - 1000 mg twice daily  Continue trulicity to 1.5 mg. Monitor for any issues such as swallowing or neck swelling  Continue nifedipine and follow up with rheumatology  Continue atorvastatin 40 mg  Start acid reflux medication, first thing, every morning  Continue fluoxetine 20 mg    Normocytic anemia; 2/2 to chronic disease? EGD/Cscope normal. Ferritin low. Start iron BID. Recheck CBC in 3 months.      Will refill Ambien for now. Will have to reconsider dosing over the long term.     Diabetes Management Status    Statin: Taking  ACE/ARB: Taking    Screening or Prevention Patient's value Goal Complete/Controlled?   HgA1C Testing and Control   Lab Results   Component Value Date    HGBA1C 6.5 (H) 10/19/2019      Annually/Less than 8% Yes   Lipid profile : 10/19/2019 Annually Yes   LDL control Lab Results   Component Value Date    LDLCALC 60.2 (L) 10/19/2019    Annually/Less than 100 mg/dl  Yes   Nephropathy screening Lab Results   Component Value Date    LABMICR 18.0 10/23/2018     Lab Results   Component Value Date    PROTEINUA Negative 06/30/2019    Annually Yes   Blood pressure BP Readings from Last 1 Encounters:   10/22/19 94/68    Less than 140/90 Yes   Dilated retinal exam : 04/24/2019 Annually Yes   Foot exam   : 10/22/2019 Annually Yes

## 2019-10-23 ENCOUNTER — PATIENT OUTREACH (OUTPATIENT)
Dept: ADMINISTRATIVE | Facility: OTHER | Age: 64
End: 2019-10-23

## 2019-10-23 ENCOUNTER — TELEPHONE (OUTPATIENT)
Dept: RHEUMATOLOGY | Facility: CLINIC | Age: 64
End: 2019-10-23

## 2019-10-23 DIAGNOSIS — R79.89 LOW VITAMIN D LEVEL: Primary | ICD-10-CM

## 2019-10-23 RX ORDER — ASPIRIN 325 MG
50000 TABLET, DELAYED RELEASE (ENTERIC COATED) ORAL
Qty: 40 CAPSULE | Refills: 0 | Status: SHIPPED | OUTPATIENT
Start: 2019-10-24 | End: 2020-04-22

## 2019-10-25 ENCOUNTER — OFFICE VISIT (OUTPATIENT)
Dept: SPINE | Facility: CLINIC | Age: 64
End: 2019-10-25
Payer: COMMERCIAL

## 2019-10-25 ENCOUNTER — PATIENT MESSAGE (OUTPATIENT)
Dept: SPINE | Facility: CLINIC | Age: 64
End: 2019-10-25

## 2019-10-25 ENCOUNTER — HOSPITAL ENCOUNTER (OUTPATIENT)
Dept: RADIOLOGY | Facility: OTHER | Age: 64
Discharge: HOME | End: 2019-10-25
Attending: PHYSICAL MEDICINE & REHABILITATION
Payer: COMMERCIAL

## 2019-10-25 VITALS
DIASTOLIC BLOOD PRESSURE: 83 MMHG | BODY MASS INDEX: 30.29 KG/M2 | HEART RATE: 97 BPM | WEIGHT: 193 LBS | HEIGHT: 67 IN | SYSTOLIC BLOOD PRESSURE: 124 MMHG

## 2019-10-25 DIAGNOSIS — G89.29 CHRONIC LEFT-SIDED LOW BACK PAIN WITH LEFT-SIDED SCIATICA: ICD-10-CM

## 2019-10-25 DIAGNOSIS — M54.42 CHRONIC LEFT-SIDED LOW BACK PAIN WITH LEFT-SIDED SCIATICA: ICD-10-CM

## 2019-10-25 DIAGNOSIS — M54.16 CHRONIC LEFT LUMBAR RADICULOPATHY: ICD-10-CM

## 2019-10-25 DIAGNOSIS — R29.898 LEFT LEG WEAKNESS: ICD-10-CM

## 2019-10-25 DIAGNOSIS — M54.16 CHRONIC LEFT LUMBAR RADICULOPATHY: Primary | ICD-10-CM

## 2019-10-25 PROCEDURE — 3008F BODY MASS INDEX DOCD: CPT | Mod: CPTII,S$GLB,, | Performed by: PHYSICAL MEDICINE & REHABILITATION

## 2019-10-25 PROCEDURE — 3074F PR MOST RECENT SYSTOLIC BLOOD PRESSURE < 130 MM HG: ICD-10-PCS | Mod: CPTII,S$GLB,, | Performed by: PHYSICAL MEDICINE & REHABILITATION

## 2019-10-25 PROCEDURE — 3074F SYST BP LT 130 MM HG: CPT | Mod: CPTII,S$GLB,, | Performed by: PHYSICAL MEDICINE & REHABILITATION

## 2019-10-25 PROCEDURE — 3079F DIAST BP 80-89 MM HG: CPT | Mod: CPTII,S$GLB,, | Performed by: PHYSICAL MEDICINE & REHABILITATION

## 2019-10-25 PROCEDURE — 99999 PR PBB SHADOW E&M-EST. PATIENT-LVL III: CPT | Mod: PBBFAC,,, | Performed by: PHYSICAL MEDICINE & REHABILITATION

## 2019-10-25 PROCEDURE — 3079F PR MOST RECENT DIASTOLIC BLOOD PRESSURE 80-89 MM HG: ICD-10-PCS | Mod: CPTII,S$GLB,, | Performed by: PHYSICAL MEDICINE & REHABILITATION

## 2019-10-25 PROCEDURE — 99999 PR PBB SHADOW E&M-EST. PATIENT-LVL III: ICD-10-PCS | Mod: PBBFAC,,, | Performed by: PHYSICAL MEDICINE & REHABILITATION

## 2019-10-25 PROCEDURE — 99214 PR OFFICE/OUTPT VISIT, EST, LEVL IV, 30-39 MIN: ICD-10-PCS | Mod: S$GLB,,, | Performed by: PHYSICAL MEDICINE & REHABILITATION

## 2019-10-25 PROCEDURE — 99214 OFFICE O/P EST MOD 30 MIN: CPT | Mod: S$GLB,,, | Performed by: PHYSICAL MEDICINE & REHABILITATION

## 2019-10-25 PROCEDURE — 72100 X-RAY EXAM L-S SPINE 2/3 VWS: CPT | Mod: 26,,, | Performed by: RADIOLOGY

## 2019-10-25 PROCEDURE — 72100 XR LUMBAR SPINE 2 OR 3 VIEWS: ICD-10-PCS | Mod: 26,,, | Performed by: RADIOLOGY

## 2019-10-25 PROCEDURE — 3008F PR BODY MASS INDEX (BMI) DOCUMENTED: ICD-10-PCS | Mod: CPTII,S$GLB,, | Performed by: PHYSICAL MEDICINE & REHABILITATION

## 2019-10-25 PROCEDURE — 72100 X-RAY EXAM L-S SPINE 2/3 VWS: CPT | Mod: TC,FY

## 2019-10-25 NOTE — PROGRESS NOTES
[facility] Initial Visit Note    Chief Complaint: Low back pain    HPI: Jill Perez is a 64 y.o. female who presents for evaluation of low back pain, referred by Dr. Ignacia Magdaleno.    Pain is located at posterior lower lumbar spine, with radiation down to the posterior thigh and knee.    It has been present for 20+ years, and began without inciting injury.  The pain is described as achey    It is aggravated by forward flexion, sitting, laying supine  It is alleviated by standing up straight, laying in fetal position   Pain 3/10 currently    (+) numbness/tingling left leg anterorlateral to foot, mostly in L5 distruibution  (+) weakness- left leg- has to  her leg to get in car  (--) bowel/bladder incontinence  (--) recent fevers    Therapeutic interventions thus far:  Medications: aspercream (does help), gabapentin (300mg TID- doesn't help much), alleve (helps)  Bracing/Modalities: heat (helps)  Therapy: years ago (after surgeries)  Injections: has had several (ESIs)  Surgery: Laminectomy (20 years ago), Fusion (10-12 years ago)      PMH:   Past Medical History:   Diagnosis Date    B12 deficiency     Chronic back pain     Diabetes mellitus     Diabetes mellitus, type 2     Hyperlipidemia     Hypertension     Insomnia                  Past Surgical History:   Procedure Laterality Date    ANTERIOR CRUCIATE LIGAMENT REPAIR      left shoulder    APPENDECTOMY      BACK SURGERY      laminectomy and fusion    Breast Reduction       SECTION      x2    CHOLECYSTECTOMY      COLONOSCOPY N/A 2019    Procedure: COLONOSCOPYSuprep;  Surgeon: Carmine Kearney MD;  Location: [facility];  Service: General;  Laterality: N/A;    ESOPHAGOGASTRODUODENOSCOPY N/A 2019    Procedure: EGD (ESOPHAGOGASTRODUODENOSCOPY);  Surgeon: Carmine Kearney MD;  Location: [facility];  Service: General;  Laterality: N/A;    FRACTURE SURGERY      GASTRIC BYPASS      gastric sleeve    SPLENECTOMY,  TOTAL      TOTAL KNEE ARTHROPLASTY Left     TOTAL REDUCTION MAMMOPLASTY Bilateral     over 20yrs ago       Family Hx:   Family History   Problem Relation Age of Onset    Heart disease Mother     Heart failure Mother     Rheumatologic disease Mother     Cataracts Mother     Glaucoma Mother     Heart disease Father     Coronary artery disease Father     Diabetes Father     Hypertension Father     Hyperlipidemia Father     Cataracts Father     Breast cancer Sister     Cancer Sister     No Known Problems Brother     No Known Problems Brother     Diabetes Sister     No Known Problems Sister     No Known Problems Sister     No Known Problems Sister         Social History:   Social History     Socioeconomic History    Marital status:      Spouse name: Link    Number of children: 3    Years of education: Not on file    Highest education level: Not on file   Occupational History    Not on file   Social Needs    Financial resource strain: Not on file    Food insecurity:     Worry: Not on file     Inability: Not on file    Transportation needs:     Medical: Not on file     Non-medical: Not on file   Tobacco Use    Smoking status: Current Every Day Smoker     Years: 5.00    Smokeless tobacco: Former User   Substance and Sexual Activity    Alcohol use: Yes     Comment: occasional    Drug use: No    Sexual activity: Yes     Partners: Male     Birth control/protection: Post-menopausal   Lifestyle    Physical activity:     Days per week: Not on file     Minutes per session: Not on file    Stress: Not on file   Relationships    Social connections:     Talks on phone: Not on file     Gets together: Not on file     Attends [employer] service: Not on file     Active member of club or organization: Not on file     Attends meetings of clubs or organizations: Not on file     Relationship status: Not on file   Other Topics Concern    Not on file   Social History Narrative    10/22/19: moved  "here from [place] permanently about 1 year ago. Originally lived in [place].  is also a patient of mine. No kids at home. No pets at home. No smokers at home.        Allergies:   Review of patient's allergies indicates:   Allergen Reactions    Codeine Itching    Pcn [penicillins] Hives and Itching       Current Meds:   Current Outpatient Medications   Medication Sig Dispense Refill    aspirin (ECOTRIN) 81 MG EC tablet Take 81 mg by mouth once daily.      atorvastatin (LIPITOR) 40 MG tablet Take 1 tablet (40 mg total) by mouth once daily. 90 tablet 3    b complex vitamins tablet Take 1 tablet by mouth once daily.      cholecalciferol, vitamin D3, (DECARA) 50,000 unit capsule Take 1 capsule (50,000 Units total) by mouth twice a week. 40 capsule 0    dulaglutide (TRULICITY) 1.5 mg/0.5 mL PnIj Inject 1.5 mg into the skin every 7 days. 6 mL 0    ferrous sulfate (FEOSOL) 325 mg (65 mg iron) Tab tablet Take 325 mg by mouth 2 (two) times daily.      FLUoxetine 20 MG tablet Take 1 tablet (20 mg total) by mouth once daily. 90 tablet 1    gabapentin (NEURONTIN) 300 MG capsule Take 1 capsule (300 mg total) by mouth 3 (three) times daily. 90 capsule 11    metFORMIN (GLUCOPHAGE) 1000 MG tablet Take 1 tablet (1,000 mg total) by mouth 2 (two) times daily with meals. 180 tablet 0    NIFEdipine (PROCARDIA-XL) 90 MG (OSM) 24 hr tablet Take 1 tablet (90 mg total) by mouth once daily. 30 tablet 4    pantoprazole (PROTONIX) 40 MG tablet Take 1 tablet (40 mg total) by mouth once daily. 90 tablet 1    pen needle, diabetic 32 gauge x 1/4" Ndle USE AS DIRECTED ONCE DAILY WITH INSULIN 100 each 4    zolpidem (AMBIEN) 10 mg Tab Take 1 tablet (10 mg total) by mouth nightly as needed. 30 tablet 2     No current facility-administered medications for this visit.        Review of Systems:  11 Point ROS (constitutional,HEENT,Resp,CV,GI,,Skin,Endo/Heme/Allergy,Psych,Neuro,MSK) negative aside from what is mentioned in HPI " "above.      Physical Exam:  /83   Pulse 97   Ht 5' 7" (1.702 m)   Wt 87.5 kg (193 lb)   LMP  (LMP Unknown)   BMI 30.23 kg/m²   General/Constitutional: Awake, in no acute distress  Psych: Normal affect  CV: Warm, well perfused extremities  Resp: Normal rate, unlabored breathing  Skin: No erythema or rash on exposed skin  Lymph: No lympedema  Lumbar Spine Exam:  Inspection: Pelvis is asymmetric,  Lumbar lordotic curvature is decreased.  Palpation: There was tenderness to palpation of the bilateral lower lumbar paraspinals  ROM: ROM was Moderately decreased.  There was pain with ROM (flexion>extension)    Provocative tests:   (+) SLR - in L5 distribution mostly, partially S1  (+) FABERs- left lateral hip pain  (--) FADIR  (--) Piriformis stretch test- causes low back pain of left paraspinals  Unable to keep balance with Trendelenburg   Neurovascular test:  Feet are warm and well perfused.    Reflexes are mildly hypoactive and symmetric in the lower extremities  Sensation to light touch is abnormal to the left anterior leg and dorsal foot, hyperesthetic to the right anterior distal leg as well.  Otherwise, sensation to light touch is normal in all dermatomes and peripheral nerve distributions to the bilateral lower extremities  Motor exam showed weakness of Left ADF and EHL>APF, as well as knee flexion.  Hip flexion limited by low back pain..  Otherwise, strength was full in the bilateral lower extremities.                      Labs:  Sodium   Date Value Ref Range Status   10/22/2019 139 136 - 145 mmol/L Final     Potassium   Date Value Ref Range Status   10/22/2019 3.8 3.5 - 5.1 mmol/L Final     Chloride   Date Value Ref Range Status   10/22/2019 105 95 - 110 mmol/L Final     CO2   Date Value Ref Range Status   10/22/2019 24 23 - 29 mmol/L Final     Glucose   Date Value Ref Range Status   10/22/2019 91 70 - 110 mg/dL Final     BUN, Bld   Date Value Ref Range Status   10/22/2019 11 8 - 23 mg/dL Final "     Creatinine   Date Value Ref Range Status   10/22/2019 0.6 0.5 - 1.4 mg/dL Final     Calcium   Date Value Ref Range Status   10/22/2019 9.4 8.7 - 10.5 mg/dL Final     Total Protein   Date Value Ref Range Status   10/22/2019 7.2 6.0 - 8.4 g/dL Final     Albumin   Date Value Ref Range Status   10/22/2019 3.5 3.5 - 5.2 g/dL Final     Total Bilirubin   Date Value Ref Range Status   10/22/2019 0.3 0.1 - 1.0 mg/dL Final     Comment:     For infants and newborns, interpretation of results should be based  on gestational age, weight and in agreement with clinical  observations.  Premature Infant recommended reference ranges:  Up to 24 hours.............<8.0 mg/dL  Up to 48 hours............<12.0 mg/dL  3-5 days..................<15.0 mg/dL  6-29 days.................<15.0 mg/dL       Alkaline Phosphatase   Date Value Ref Range Status   10/22/2019 102 55 - 135 U/L Final     AST   Date Value Ref Range Status   10/22/2019 17 10 - 40 U/L Final     ALT   Date Value Ref Range Status   10/22/2019 12 10 - 44 U/L Final     Anion Gap   Date Value Ref Range Status   10/22/2019 10 8 - 16 mmol/L Final     eGFR if    Date Value Ref Range Status   10/22/2019 >60.0 >60 mL/min/1.73 m^2 Final     eGFR if non    Date Value Ref Range Status   10/22/2019 >60.0 >60 mL/min/1.73 m^2 Final     Comment:     Calculation used to obtain the estimated glomerular filtration  rate (eGFR) is the CKD-EPI equation.        Lab Results   Component Value Date    WBC 7.71 10/22/2019    HGB 11.5 (L) 10/22/2019    HCT 36.7 (L) 10/22/2019    MCV 90 10/22/2019     10/22/2019     Lab Results   Component Value Date    CRP 0.8 10/22/2019     Lab Results   Component Value Date    SEDRATE 52 (H) 10/22/2019     Lab Results   Component Value Date    HGBA1C 6.5 (H) 10/19/2019         Imaging: None of lumbar spine    EMG/NCS (10/10/19):  1. There is evidence of a mild sensory median mononeuropathy across the left wrist (i.e. carpal  tunnel syndrome).  There is no motor axonal loss and no active denervation.  2. 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  However, an extensive needle examination of the left lower extremity was completely normal, without signs of active or chronic neuropathy.  Therefore, I cannot localize this well electrodiagnostically.  The fact that it involves all three of these nerves would lend towards either a sciatic neuropathy, lumbosacral plexopathy (lower lumbosacral plexus), radiculopathy, or asymmetric peripheral polyneuropathy.  Given her history of lumbar decompressions, I think that lumbar radiculopathy is the most likely cause.        Impression: Jill Perez is a 64 y.o. female who presents with: Chronic low back pain with history of lumbar laminectomy and fusion X2 (20 yrs ago, and 10-12 yrs ago), with chronic radicular symptoms, mostly in the L5 dermatome.  She does have weakness of her left leg clinically as well in L5 innervated muscles.   Her EMG was not entirely conclusive of her lower extremities (showed mild CTS otherwise), but I reviewed the data, and the smaller tibial and peroneal nerve responses could certainly be due to a chronic L5/S1 radiculopathy.  The superficial peroneal nerve was absent, which can also be affected with L5 radiculopathies.  Strangely, the needle examination did not show any signs of active or chronic denervation.  However, putting this all together, I believe that her EMG findings and clinical signs are most consistent with a chronic left L5 radiculopathy.        Plan:   - Physical Therapy to work on core stabilization, leg strengthening, and gait safety  - Since the gabapentin not helpful, discussed that we could 1) continue current dose, 2) titrate up, or 3) wean off.  She prefers to wean off- discussed wean schedule over the  course of 1 week  - Currently taking Alleve OTC which does help- ok to continue, but try to use sparingly given cardiac and GI history  - Ok to continue with heat PRN, aspercreme PRN- also helpful  - Given her clinical weakness, if no significant improvement is noted with this conservative approach, will order MRI to evaluate the left L5 and S1 nerve roots.  Discussed that if she is not making progress in PT, to call/message me and I will place the order.    - Follow up with me in 3 months      Damien Lucio, DO  Physical Medicine and Rehabilitation

## 2019-11-04 ENCOUNTER — TELEPHONE (OUTPATIENT)
Dept: FAMILY MEDICINE | Facility: CLINIC | Age: 64
End: 2019-11-04

## 2019-11-04 DIAGNOSIS — I73.00 RAYNAUD'S DISEASE WITHOUT GANGRENE: Primary | ICD-10-CM

## 2019-11-04 RX ORDER — TRAMADOL HYDROCHLORIDE 50 MG/1
50 TABLET ORAL EVERY 12 HOURS PRN
Qty: 14 TABLET | Refills: 0 | Status: SHIPPED | OUTPATIENT
Start: 2019-11-04 | End: 2020-01-22

## 2019-11-06 ENCOUNTER — TELEPHONE (OUTPATIENT)
Dept: PHARMACY | Facility: CLINIC | Age: 64
End: 2019-11-06

## 2019-11-12 ENCOUNTER — TELEPHONE (OUTPATIENT)
Dept: FAMILY MEDICINE | Facility: CLINIC | Age: 64
End: 2019-11-12

## 2019-11-12 ENCOUNTER — PATIENT MESSAGE (OUTPATIENT)
Dept: FAMILY MEDICINE | Facility: CLINIC | Age: 64
End: 2019-11-12

## 2019-11-12 DIAGNOSIS — T30.0 BURN: Primary | ICD-10-CM

## 2019-11-12 RX ORDER — SILVER SULFADIAZINE 10 G/1000G
CREAM TOPICAL
Qty: 50 G | Refills: 0 | Status: SHIPPED | OUTPATIENT
Start: 2019-11-12 | End: 2020-04-22
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
## 2019-11-12 NOTE — TELEPHONE ENCOUNTER
Wound care referral placed. Silvadene cream sent. Please call patient and let her know about upcoming apts and medication sent to pharmacy.

## 2019-11-13 ENCOUNTER — HOSPITAL ENCOUNTER (OUTPATIENT)
Dept: WOUND CARE | Facility: HOSPITAL | Age: 64
Discharge: HOME | End: 2019-11-13
Attending: SURGERY
Payer: COMMERCIAL

## 2019-11-13 VITALS
TEMPERATURE: 99 F | HEIGHT: 67 IN | DIASTOLIC BLOOD PRESSURE: 68 MMHG | HEART RATE: 88 BPM | BODY MASS INDEX: 30.29 KG/M2 | WEIGHT: 193 LBS | SYSTOLIC BLOOD PRESSURE: 124 MMHG

## 2019-11-13 DIAGNOSIS — Z79.4 TYPE 2 DIABETES MELLITUS WITH HYPERGLYCEMIA, WITH LONG-TERM CURRENT USE OF INSULIN: ICD-10-CM

## 2019-11-13 DIAGNOSIS — E11.65 TYPE 2 DIABETES MELLITUS WITH HYPERGLYCEMIA, WITH LONG-TERM CURRENT USE OF INSULIN: ICD-10-CM

## 2019-11-13 DIAGNOSIS — I10 ESSENTIAL HYPERTENSION: ICD-10-CM

## 2019-11-13 DIAGNOSIS — T30.0 THERMAL BURNS OF MULTIPLE SITES: Primary | ICD-10-CM

## 2019-11-13 PROCEDURE — 99204 OFFICE O/P NEW MOD 45 MIN: CPT | Mod: 25

## 2019-11-13 PROCEDURE — 97597 DBRDMT OPN WND 1ST 20 CM/<: CPT
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
## 2019-11-20 ENCOUNTER — HOSPITAL ENCOUNTER (OUTPATIENT)
Dept: WOUND CARE | Facility: HOSPITAL | Age: 64
Discharge: HOME | End: 2019-11-20
Attending: SURGERY
Payer: COMMERCIAL

## 2019-11-20 VITALS
DIASTOLIC BLOOD PRESSURE: 75 MMHG | HEART RATE: 89 BPM | SYSTOLIC BLOOD PRESSURE: 117 MMHG | TEMPERATURE: 98 F | HEIGHT: 67 IN | BODY MASS INDEX: 30.29 KG/M2 | WEIGHT: 193 LBS

## 2019-11-20 DIAGNOSIS — T30.0 THERMAL BURNS OF MULTIPLE SITES: Primary | ICD-10-CM

## 2019-11-20 DIAGNOSIS — E78.49 OTHER HYPERLIPIDEMIA: ICD-10-CM

## 2019-11-20 DIAGNOSIS — E11.65 TYPE 2 DIABETES MELLITUS WITH HYPERGLYCEMIA, WITH LONG-TERM CURRENT USE OF INSULIN: ICD-10-CM

## 2019-11-20 DIAGNOSIS — Z79.4 TYPE 2 DIABETES MELLITUS WITH HYPERGLYCEMIA, WITH LONG-TERM CURRENT USE OF INSULIN: ICD-10-CM

## 2019-11-20 DIAGNOSIS — F32.A DEPRESSION, UNSPECIFIED DEPRESSION TYPE: ICD-10-CM

## 2019-11-20 PROCEDURE — 99213 OFFICE O/P EST LOW 20 MIN: CPT | Performed by: NURSE PRACTITIONER
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
## 2019-11-25 ENCOUNTER — TELEPHONE (OUTPATIENT)
Dept: ADMINISTRATIVE | Facility: OTHER | Age: 64
End: 2019-11-25

## 2019-12-04 ENCOUNTER — HOSPITAL ENCOUNTER (OUTPATIENT)
Dept: WOUND CARE | Facility: HOSPITAL | Age: 64
Discharge: HOME | End: 2019-12-04
Attending: SURGERY
Payer: COMMERCIAL

## 2019-12-04 VITALS
TEMPERATURE: 98 F | SYSTOLIC BLOOD PRESSURE: 105 MMHG | BODY MASS INDEX: 30.29 KG/M2 | WEIGHT: 193 LBS | HEART RATE: 88 BPM | DIASTOLIC BLOOD PRESSURE: 67 MMHG | HEIGHT: 67 IN

## 2019-12-04 DIAGNOSIS — T30.0 THERMAL BURNS OF MULTIPLE SITES: Primary | ICD-10-CM

## 2019-12-04 PROCEDURE — 99212 OFFICE O/P EST SF 10 MIN: CPT
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
## 2019-12-04 NOTE — PATIENT INSTRUCTIONS
Continue to clean closed wounds daily and redress with silvadene or Maderma creame, cover if needed until sensitivity resolved..

## 2020-01-17 DIAGNOSIS — F51.01 PRIMARY INSOMNIA: ICD-10-CM

## 2020-01-17 RX ORDER — ZOLPIDEM TARTRATE 10 MG/1
10 TABLET ORAL NIGHTLY PRN
Qty: 30 TABLET | Refills: 2 | Status: SHIPPED | OUTPATIENT
Start: 2020-01-17 | End: 2020-04-14

## 2020-01-19 DIAGNOSIS — Z79.4 TYPE 2 DIABETES MELLITUS WITH HYPERGLYCEMIA, WITH LONG-TERM CURRENT USE OF INSULIN: ICD-10-CM

## 2020-01-19 DIAGNOSIS — E11.65 TYPE 2 DIABETES MELLITUS WITH HYPERGLYCEMIA, WITH LONG-TERM CURRENT USE OF INSULIN: ICD-10-CM

## 2020-01-19 RX ORDER — METFORMIN HYDROCHLORIDE 1000 MG/1
1000 TABLET ORAL 2 TIMES DAILY WITH MEALS
Qty: 180 TABLET | Refills: 0 | Status: SHIPPED | OUTPATIENT
Start: 2020-01-19 | End: 2020-04-19 | Stop reason: SDUPTHER

## 2020-01-21 ENCOUNTER — LAB VISIT (OUTPATIENT)
Dept: LAB | Facility: HOSPITAL | Age: 65
End: 2020-01-21
Attending: INTERNAL MEDICINE
Payer: COMMERCIAL

## 2020-01-21 DIAGNOSIS — M25.50 POLYARTHRALGIA: ICD-10-CM

## 2020-01-21 DIAGNOSIS — I73.00 RAYNAUD'S DISEASE WITHOUT GANGRENE: ICD-10-CM

## 2020-01-21 DIAGNOSIS — R76.8 POSITIVE ANA (ANTINUCLEAR ANTIBODY): ICD-10-CM

## 2020-01-21 DIAGNOSIS — R70.0 ELEVATED SED RATE: ICD-10-CM

## 2020-01-21 LAB
ALBUMIN SERPL BCP-MCNC: 3.7 G/DL (ref 3.5–5.2)
ALP SERPL-CCNC: 113 U/L (ref 55–135)
ALT SERPL W/O P-5'-P-CCNC: 12 U/L (ref 10–44)
ANION GAP SERPL CALC-SCNC: 12 MMOL/L (ref 8–16)
AST SERPL-CCNC: 17 U/L (ref 10–40)
BASOPHILS # BLD AUTO: 0.02 K/UL (ref 0–0.2)
BASOPHILS NFR BLD: 0.3 % (ref 0–1.9)
BILIRUB SERPL-MCNC: 0.3 MG/DL (ref 0.1–1)
BUN SERPL-MCNC: 9 MG/DL (ref 8–23)
CALCIUM SERPL-MCNC: 9.6 MG/DL (ref 8.7–10.5)
CHLORIDE SERPL-SCNC: 104 MMOL/L (ref 95–110)
CK SERPL-CCNC: 42 U/L (ref 20–180)
CO2 SERPL-SCNC: 26 MMOL/L (ref 23–29)
CREAT SERPL-MCNC: 0.6 MG/DL (ref 0.5–1.4)
CRP SERPL-MCNC: 1.2 MG/L (ref 0–8.2)
DIFFERENTIAL METHOD: ABNORMAL
EOSINOPHIL # BLD AUTO: 0.1 K/UL (ref 0–0.5)
EOSINOPHIL NFR BLD: 1.2 % (ref 0–8)
ERYTHROCYTE [DISTWIDTH] IN BLOOD BY AUTOMATED COUNT: 15 % (ref 11.5–14.5)
ERYTHROCYTE [SEDIMENTATION RATE] IN BLOOD BY WESTERGREN METHOD: 16 MM/HR (ref 0–36)
EST. GFR  (AFRICAN AMERICAN): >60 ML/MIN/1.73 M^2
EST. GFR  (NON AFRICAN AMERICAN): >60 ML/MIN/1.73 M^2
GLUCOSE SERPL-MCNC: 127 MG/DL (ref 70–110)
HCT VFR BLD AUTO: 41.3 % (ref 37–48.5)
HGB BLD-MCNC: 12.3 G/DL (ref 12–16)
IMM GRANULOCYTES # BLD AUTO: 0.02 K/UL (ref 0–0.04)
IMM GRANULOCYTES NFR BLD AUTO: 0.3 % (ref 0–0.5)
LYMPHOCYTES # BLD AUTO: 3.2 K/UL (ref 1–4.8)
LYMPHOCYTES NFR BLD: 47.1 % (ref 18–48)
MCH RBC QN AUTO: 27.6 PG (ref 27–31)
MCHC RBC AUTO-ENTMCNC: 29.8 G/DL (ref 32–36)
MCV RBC AUTO: 93 FL (ref 82–98)
MONOCYTES # BLD AUTO: 0.6 K/UL (ref 0.3–1)
MONOCYTES NFR BLD: 8.2 % (ref 4–15)
NEUTROPHILS # BLD AUTO: 2.9 K/UL (ref 1.8–7.7)
NEUTROPHILS NFR BLD: 42.9 % (ref 38–73)
NRBC BLD-RTO: 0 /100 WBC
PLATELET # BLD AUTO: 377 K/UL (ref 150–350)
PMV BLD AUTO: 10.9 FL (ref 9.2–12.9)
POTASSIUM SERPL-SCNC: 3.4 MMOL/L (ref 3.5–5.1)
PROT SERPL-MCNC: 7.5 G/DL (ref 6–8.4)
RBC # BLD AUTO: 4.46 M/UL (ref 4–5.4)
SODIUM SERPL-SCNC: 142 MMOL/L (ref 136–145)
WBC # BLD AUTO: 6.71 K/UL (ref 3.9–12.7)

## 2020-01-21 PROCEDURE — 82550 ASSAY OF CK (CPK): CPT

## 2020-01-21 PROCEDURE — 86140 C-REACTIVE PROTEIN: CPT

## 2020-01-21 PROCEDURE — 83036 HEMOGLOBIN GLYCOSYLATED A1C: CPT

## 2020-01-21 PROCEDURE — 85652 RBC SED RATE AUTOMATED: CPT

## 2020-01-21 PROCEDURE — 36415 COLL VENOUS BLD VENIPUNCTURE: CPT | Mod: PO

## 2020-01-21 PROCEDURE — 80053 COMPREHEN METABOLIC PANEL: CPT

## 2020-01-21 PROCEDURE — 85025 COMPLETE CBC W/AUTO DIFF WBC: CPT

## 2020-01-22 ENCOUNTER — OFFICE VISIT (OUTPATIENT)
Dept: FAMILY MEDICINE | Facility: CLINIC | Age: 65
End: 2020-01-22
Payer: COMMERCIAL

## 2020-01-22 ENCOUNTER — LAB VISIT (OUTPATIENT)
Dept: LAB | Facility: HOSPITAL | Age: 65
End: 2020-01-22
Attending: FAMILY MEDICINE
Payer: COMMERCIAL

## 2020-01-22 VITALS
HEIGHT: 67 IN | WEIGHT: 184.5 LBS | DIASTOLIC BLOOD PRESSURE: 70 MMHG | SYSTOLIC BLOOD PRESSURE: 118 MMHG | OXYGEN SATURATION: 97 % | HEART RATE: 95 BPM | BODY MASS INDEX: 28.96 KG/M2 | TEMPERATURE: 98 F

## 2020-01-22 DIAGNOSIS — K83.8 COMMON BILE DUCT DILATION: ICD-10-CM

## 2020-01-22 DIAGNOSIS — R10.13 EPIGASTRIC PAIN: Primary | ICD-10-CM

## 2020-01-22 DIAGNOSIS — Z98.84 S/P GASTRIC BYPASS: ICD-10-CM

## 2020-01-22 DIAGNOSIS — T30.0 THERMAL BURNS OF MULTIPLE SITES: ICD-10-CM

## 2020-01-22 DIAGNOSIS — E78.49 OTHER HYPERLIPIDEMIA: ICD-10-CM

## 2020-01-22 DIAGNOSIS — I73.00 RAYNAUD'S DISEASE WITHOUT GANGRENE: ICD-10-CM

## 2020-01-22 DIAGNOSIS — I10 ESSENTIAL HYPERTENSION: ICD-10-CM

## 2020-01-22 DIAGNOSIS — D64.9 NORMOCYTIC ANEMIA: ICD-10-CM

## 2020-01-22 DIAGNOSIS — E11.65 TYPE 2 DIABETES MELLITUS WITH HYPERGLYCEMIA, WITH LONG-TERM CURRENT USE OF INSULIN: ICD-10-CM

## 2020-01-22 DIAGNOSIS — Z90.81 S/P SPLENECTOMY: ICD-10-CM

## 2020-01-22 DIAGNOSIS — Z23 NEED FOR VACCINATION AGAINST STREPTOCOCCUS PNEUMONIAE: ICD-10-CM

## 2020-01-22 DIAGNOSIS — Z79.4 TYPE 2 DIABETES MELLITUS WITH HYPERGLYCEMIA, WITH LONG-TERM CURRENT USE OF INSULIN: ICD-10-CM

## 2020-01-22 DIAGNOSIS — R10.13 EPIGASTRIC PAIN: ICD-10-CM

## 2020-01-22 PROBLEM — Z12.11 COLON CANCER SCREENING: Status: RESOLVED | Noted: 2019-08-14 | Resolved: 2020-01-22

## 2020-01-22 LAB
AMYLASE SERPL-CCNC: 91 U/L (ref 20–110)
ESTIMATED AVG GLUCOSE: 134 MG/DL (ref 68–131)
HBA1C MFR BLD HPLC: 6.3 % (ref 4–5.6)
LIPASE SERPL-CCNC: 16 U/L (ref 4–60)

## 2020-01-22 PROCEDURE — 99999 PR PBB SHADOW E&M-EST. PATIENT-LVL IV: CPT | Mod: PBBFAC,,, | Performed by: FAMILY MEDICINE

## 2020-01-22 PROCEDURE — 90471 IMMUNIZATION ADMIN: CPT | Mod: S$GLB,,, | Performed by: FAMILY MEDICINE

## 2020-01-22 PROCEDURE — 90732 PNEUMOCOCCAL POLYSACCHARIDE VACCINE 23-VALENT =>2YO SQ IM: ICD-10-PCS | Mod: S$GLB,,, | Performed by: FAMILY MEDICINE

## 2020-01-22 PROCEDURE — 3074F SYST BP LT 130 MM HG: CPT | Mod: CPTII,S$GLB,, | Performed by: FAMILY MEDICINE

## 2020-01-22 PROCEDURE — 99215 OFFICE O/P EST HI 40 MIN: CPT | Mod: 25,S$GLB,, | Performed by: FAMILY MEDICINE

## 2020-01-22 PROCEDURE — 3044F PR MOST RECENT HEMOGLOBIN A1C LEVEL <7.0%: ICD-10-PCS | Mod: CPTII,S$GLB,, | Performed by: FAMILY MEDICINE

## 2020-01-22 PROCEDURE — 36415 COLL VENOUS BLD VENIPUNCTURE: CPT | Mod: PO

## 2020-01-22 PROCEDURE — 82150 ASSAY OF AMYLASE: CPT

## 2020-01-22 PROCEDURE — 3074F PR MOST RECENT SYSTOLIC BLOOD PRESSURE < 130 MM HG: ICD-10-PCS | Mod: CPTII,S$GLB,, | Performed by: FAMILY MEDICINE

## 2020-01-22 PROCEDURE — 86677 HELICOBACTER PYLORI ANTIBODY: CPT

## 2020-01-22 PROCEDURE — 3008F BODY MASS INDEX DOCD: CPT | Mod: CPTII,S$GLB,, | Performed by: FAMILY MEDICINE

## 2020-01-22 PROCEDURE — 3078F DIAST BP <80 MM HG: CPT | Mod: CPTII,S$GLB,, | Performed by: FAMILY MEDICINE

## 2020-01-22 PROCEDURE — 90471 PNEUMOCOCCAL POLYSACCHARIDE VACCINE 23-VALENT =>2YO SQ IM: ICD-10-PCS | Mod: S$GLB,,, | Performed by: FAMILY MEDICINE

## 2020-01-22 PROCEDURE — 3008F PR BODY MASS INDEX (BMI) DOCUMENTED: ICD-10-PCS | Mod: CPTII,S$GLB,, | Performed by: FAMILY MEDICINE

## 2020-01-22 PROCEDURE — 3044F HG A1C LEVEL LT 7.0%: CPT | Mod: CPTII,S$GLB,, | Performed by: FAMILY MEDICINE

## 2020-01-22 PROCEDURE — 99215 PR OFFICE/OUTPT VISIT, EST, LEVL V, 40-54 MIN: ICD-10-PCS | Mod: 25,S$GLB,, | Performed by: FAMILY MEDICINE

## 2020-01-22 PROCEDURE — 83690 ASSAY OF LIPASE: CPT

## 2020-01-22 PROCEDURE — 3078F PR MOST RECENT DIASTOLIC BLOOD PRESSURE < 80 MM HG: ICD-10-PCS | Mod: CPTII,S$GLB,, | Performed by: FAMILY MEDICINE

## 2020-01-22 PROCEDURE — 90732 PPSV23 VACC 2 YRS+ SUBQ/IM: CPT | Mod: S$GLB,,, | Performed by: FAMILY MEDICINE

## 2020-01-22 PROCEDURE — 99999 PR PBB SHADOW E&M-EST. PATIENT-LVL IV: ICD-10-PCS | Mod: PBBFAC,,, | Performed by: FAMILY MEDICINE

## 2020-01-22 RX ORDER — PANTOPRAZOLE SODIUM 40 MG/1
40 TABLET, DELAYED RELEASE ORAL 2 TIMES DAILY
Qty: 180 TABLET | Refills: 0 | Status: SHIPPED | OUTPATIENT
Start: 2020-01-22 | End: 2020-04-24 | Stop reason: SDUPTHER

## 2020-01-22 NOTE — PATIENT INSTRUCTIONS
Continue Metformin - 1000 mg twice daily  Continue trulicity to 1.5 mg. Monitor for any issues such as swallowing or neck swelling  Continue nifedipine and follow up with rheumatology  Continue atorvastatin 40 mg  Start acid reflux medication twice daily  Continue fluoxetine 20 mg    Epigastric pain; refractory to PPI. Multiple possible causes. History of hiatial hernia and gastric bypass. trulicity can increase risk of pancreatitis. Had recent EGD, but symptoms started after that. CT in early 2019 also showed: Mild intrahepatic biliary ductal dilatation. Refer to GI. May need advanced endoscopy. Check amylase, lipase, hpylori today    Regarding fall, likely due to hypoglycemia/decrease oral intake. You need to eat regularly! If you do not, I will have to decrease/stop trulicity. Current a1c is at goal. Consider decreasing to 0.75 in the future. Neuro exam normal. If symptoms happen again, will do MRI and refer to neurology for possible EMG.     Anemia better      Diabetes Management Status    Statin: Taking  ACE/ARB: Not taking    Screening or Prevention Patient's value Goal Complete/Controlled?   HgA1C Testing and Control   Lab Results   Component Value Date    HGBA1C 6.3 (H) 01/21/2020      Annually/Less than 8% Yes   Lipid profile : 10/19/2019 Annually Yes   LDL control Lab Results   Component Value Date    LDLCALC 60.2 (L) 10/19/2019    Annually/Less than 100 mg/dl  Yes   Nephropathy screening Lab Results   Component Value Date    LABMICR 12.0 01/21/2020     Lab Results   Component Value Date    PROTEINUA Negative 01/21/2020    Annually Yes   Blood pressure BP Readings from Last 1 Encounters:   01/22/20 118/70    Less than 140/90 Yes   Dilated retinal exam : 04/24/2019 Annually Yes   Foot exam   : 10/22/2019 Annually Yes
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
## 2020-01-23 LAB — H PYLORI IGG SERPL QL IA: NEGATIVE

## 2020-01-28 ENCOUNTER — HOSPITAL ENCOUNTER (OUTPATIENT)
Dept: RADIOLOGY | Facility: HOSPITAL | Age: 65
Discharge: HOME | End: 2020-01-28
Attending: FAMILY MEDICINE
Payer: COMMERCIAL

## 2020-01-28 DIAGNOSIS — R10.13 EPIGASTRIC PAIN: ICD-10-CM

## 2020-01-28 DIAGNOSIS — K83.8 COMMON BILE DUCT DILATION: ICD-10-CM

## 2020-01-28 PROCEDURE — 76705 ECHO EXAM OF ABDOMEN: CPT | Mod: TC

## 2020-01-28 PROCEDURE — 76705 ECHO EXAM OF ABDOMEN: CPT | Mod: 26,,, | Performed by: RADIOLOGY

## 2020-01-28 PROCEDURE — 76705 US ABDOMEN LIMITED: ICD-10-PCS | Mod: 26,,, | Performed by: RADIOLOGY

## 2020-01-31 ENCOUNTER — OFFICE VISIT (OUTPATIENT)
Dept: RHEUMATOLOGY | Facility: CLINIC | Age: 65
End: 2020-01-31
Payer: COMMERCIAL

## 2020-01-31 ENCOUNTER — LAB VISIT (OUTPATIENT)
Dept: LAB | Facility: HOSPITAL | Age: 65
End: 2020-01-31
Attending: INTERNAL MEDICINE
Payer: COMMERCIAL

## 2020-01-31 VITALS
DIASTOLIC BLOOD PRESSURE: 77 MMHG | SYSTOLIC BLOOD PRESSURE: 125 MMHG | HEIGHT: 67 IN | BODY MASS INDEX: 28.04 KG/M2 | HEART RATE: 112 BPM | OXYGEN SATURATION: 98 % | WEIGHT: 178.69 LBS

## 2020-01-31 DIAGNOSIS — I73.00 RAYNAUD'S DISEASE WITHOUT GANGRENE: ICD-10-CM

## 2020-01-31 DIAGNOSIS — Z79.899 HIGH RISK MEDICATION USE: ICD-10-CM

## 2020-01-31 DIAGNOSIS — E55.9 VITAMIN D INSUFFICIENCY: ICD-10-CM

## 2020-01-31 DIAGNOSIS — R76.8 POSITIVE ANA (ANTINUCLEAR ANTIBODY): Primary | ICD-10-CM

## 2020-01-31 DIAGNOSIS — R76.8 POSITIVE ANA (ANTINUCLEAR ANTIBODY): ICD-10-CM

## 2020-01-31 DIAGNOSIS — L60.1 ONYCHOLYSIS: ICD-10-CM

## 2020-01-31 LAB
C3 SERPL-MCNC: 146 MG/DL (ref 50–180)
C4 SERPL-MCNC: 33 MG/DL (ref 11–44)
CCP AB SER IA-ACNC: <0.5 U/ML
RHEUMATOID FACT SERPL-ACNC: 10 IU/ML (ref 0–15)

## 2020-01-31 PROCEDURE — 86235 NUCLEAR ANTIGEN ANTIBODY: CPT

## 2020-01-31 PROCEDURE — 99999 PR PBB SHADOW E&M-EST. PATIENT-LVL III: ICD-10-PCS | Mod: PBBFAC,,, | Performed by: INTERNAL MEDICINE

## 2020-01-31 PROCEDURE — 86200 CCP ANTIBODY: CPT

## 2020-01-31 PROCEDURE — 99214 OFFICE O/P EST MOD 30 MIN: CPT | Mod: S$GLB,,, | Performed by: INTERNAL MEDICINE

## 2020-01-31 PROCEDURE — 3074F PR MOST RECENT SYSTOLIC BLOOD PRESSURE < 130 MM HG: ICD-10-PCS | Mod: CPTII,S$GLB,, | Performed by: INTERNAL MEDICINE

## 2020-01-31 PROCEDURE — 99999 PR PBB SHADOW E&M-EST. PATIENT-LVL III: CPT | Mod: PBBFAC,,, | Performed by: INTERNAL MEDICINE

## 2020-01-31 PROCEDURE — 86160 COMPLEMENT ANTIGEN: CPT

## 2020-01-31 PROCEDURE — 86235 NUCLEAR ANTIGEN ANTIBODY: CPT | Mod: 59

## 2020-01-31 PROCEDURE — 3078F PR MOST RECENT DIASTOLIC BLOOD PRESSURE < 80 MM HG: ICD-10-PCS | Mod: CPTII,S$GLB,, | Performed by: INTERNAL MEDICINE

## 2020-01-31 PROCEDURE — 3008F BODY MASS INDEX DOCD: CPT | Mod: CPTII,S$GLB,, | Performed by: INTERNAL MEDICINE

## 2020-01-31 PROCEDURE — 3078F DIAST BP <80 MM HG: CPT | Mod: CPTII,S$GLB,, | Performed by: INTERNAL MEDICINE

## 2020-01-31 PROCEDURE — 3074F SYST BP LT 130 MM HG: CPT | Mod: CPTII,S$GLB,, | Performed by: INTERNAL MEDICINE

## 2020-01-31 PROCEDURE — 86038 ANTINUCLEAR ANTIBODIES: CPT

## 2020-01-31 PROCEDURE — 36415 COLL VENOUS BLD VENIPUNCTURE: CPT | Mod: PO

## 2020-01-31 PROCEDURE — 86160 COMPLEMENT ANTIGEN: CPT | Mod: 59

## 2020-01-31 PROCEDURE — 86431 RHEUMATOID FACTOR QUANT: CPT

## 2020-01-31 PROCEDURE — 99214 PR OFFICE/OUTPT VISIT, EST, LEVL IV, 30-39 MIN: ICD-10-PCS | Mod: S$GLB,,, | Performed by: INTERNAL MEDICINE

## 2020-01-31 PROCEDURE — 82306 VITAMIN D 25 HYDROXY: CPT

## 2020-01-31 PROCEDURE — 3008F PR BODY MASS INDEX (BMI) DOCUMENTED: ICD-10-PCS | Mod: CPTII,S$GLB,, | Performed by: INTERNAL MEDICINE

## 2020-01-31 NOTE — PROGRESS NOTES
Subjective:       Patient ID: Jill Perez is a 64 y.o. female.    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    Prior work up has revealed +YOLIS 1;1280, negative profile, elevated sed rate of 46, normal CRP, negative Scl70, normal complement, negative RF/CCP, absent cryoglobulins, normal SPEP, negative hepatitis B and C, urine showed no elevation in protein or hematuria, normal CPK.    Interval history: Pt has achiness in the morning for about 10-15 minutes. Pt admits to pain in her left shoulders and right hand 2nd/3rd MCP pain, no swelling of the joints. No rashes.  Pt has history of chronic headaches.  Pt stated that she wears two pairs of socks and uses warm water to help keep her hands/feet warm.  Pt has not seen psychiatry from prior recommendations at her June visit with Dr Gonzalez. Pt admits to have some tingling in her left leg, she had an EMG done 10/10/2019.  Pt admits to have a left foot drop after she had her last lower back surgery and had to wear a brace.    Pt has lost about 10-12 pounds since mid December unintentionally in setting of dysphagia.    Pt admits that she feels like her pills would get stuck when she is trying to swallow them, pt had EGD with Dr Kearney, no explanation of esophageal dysphagia seen to explain dysphagia. She was recently placed on a PPI BID.    Initial HPI from 10/22/2019: Pt initially saw Dr Daniels on 3/13/2019 for complain ot left shoulder, lower back and hip pain with stiffness in her arms/legs and hips in the morning with improvement in pain.  She had been having raynauds in her hands/feet for years but was officially diagnosed when she saw Dr Daniels in March of 2019.   Pt had her nifedipine increased  "from 60mg to 90mg daily.  Pt then followed up in June 2019 with Dr Gonzalez/Bety.    Former smoker in her 20s, smoked for 5-6 years, smoked a few cigarettes a week.  No drinking, no drug use. 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  Pt has three children, all premature, 1 born at 6.5 months and others at 7 months, with pre-eclampsia. No h/o miscarriages.  Mother had rheumatoid.  Son has Crohn's disease.  Patient denied family history of lupus, psoriasis, scleroderma, sjogrens, sarcoidosis, UC.    Review of Systems   Constitutional: Positive for fatigue. Negative for chills, diaphoresis, fever and unexpected weight change.   HENT: Negative for congestion, hearing loss, mouth sores, nosebleeds, sore throat, trouble swallowing and voice change.    Eyes: Negative for photophobia, pain, redness and visual disturbance.   Respiratory: Negative for cough, chest tightness and shortness of breath.    Cardiovascular: Negative for chest pain and palpitations.   Gastrointestinal: Negative for abdominal pain, diarrhea, nausea and vomiting.        Alternating diarrhea and constipation with taking iron and laxatives.   Genitourinary: Negative for difficulty urinating, dysuria, genital sores and hematuria.   Musculoskeletal: Positive for arthralgias and back pain. Negative for joint swelling, myalgias, neck pain and neck stiffness.   Skin: Positive for color change. Negative for rash.   Neurological: Positive for headaches. Negative for seizures, weakness and numbness.   Psychiatric/Behavioral: Positive for agitation. Negative for confusion and sleep disturbance. The patient is nervous/anxious.          Objective:   /77   Pulse (!) 112   Ht 5' 7" (1.702 m)   Wt 81.1 kg (178 lb 11.4 oz)   LMP  (LMP Unknown)   SpO2 98%   BMI 27.99 kg/m²       Repeat HR of 100.  Physical Exam   Nursing note and vitals reviewed.  Constitutional: She is oriented to person, place, and time and " well-developed, well-nourished, and in no distress. No distress.   HENT:   Head: Normocephalic and atraumatic.   Right Ear: External ear normal.   Left Ear: External ear normal.   Mouth/Throat: Oropharynx is clear and moist. No oropharyngeal exudate.   Poor dentition   Eyes: Conjunctivae and EOM are normal. Right eye exhibits no [facility]. Left eye exhibits no [facility]. No scleral icterus.   Neck: Neck supple. No tracheal deviation present.   Cardiovascular: Normal rate, regular rhythm, normal heart sounds and intact distal pulses.    No murmur heard.  Pulmonary/Chest: Effort normal and breath sounds normal. No stridor. No respiratory distress. She has no wheezes. She has no rales.   Abdominal: Soft. Bowel sounds are normal. She exhibits no distension. There is no tenderness. There is no rebound.   Neurological: She is alert and oriented to person, place, and time.   Skin: Skin is warm and dry. No rash noted. She is not diaphoretic.     No digital or toe ulcerations noted    Cool toes b/l but good perfusion, no blue discoloration noted.   Psychiatric: Mood and affect normal.   Musculoskeletal:   No active synovitis, enthesitis, dactylitis or effusion noted on exam      squarring of CMC joints b/l hands, OA changes of PIPs/DIPs b/l           Ref. Range 3/18/2019 11:48 3/18/2019 11:48 6/11/2019 10:10   YOLIS HEP-2 Titer Unknown Positive 1:1280 Speckled  Atypical     Anti-SSA Antibody Latest Ref Range: 0.00 - 19.99 EU 1.71     Anti-SSA Interpretation Latest Ref Range: Negative  Negative     Anti-SSB Antibody Latest Ref Range: 0.00 - 19.99 EU 0.31     Anti-SSB Interpretation Latest Ref Range: Negative  Negative     ds DNA Ab Latest Ref Range: Negative 1:10  Negative 1:10     Anti Sm Antibody Latest Ref Range: 0.00 - 19.99 EU 1.51     Anti-Sm Interpretation Latest Ref Range: Negative  Negative     Anti Sm/RNP Antibody Latest Ref Range: 0.00 - 19.99 EU 1.08     Anti-Sm/RNP Interpretation Latest Ref Range: Negative   Negative     YOLIS Screen Latest Ref Range: Negative <1:160  Positive (A)     Scleroderma SCL- Latest Ref Range: <20 UNITS 3     Complement (C-3) Latest Ref Range: 50 - 180 mg/dL 167     Complement (C-4) Latest Ref Range: 11 - 44 mg/dL 32     Protein, Serum Latest Ref Range: 6.0 - 8.4 g/dL   7.5   Albumin grams/dl Latest Ref Range: 3.35 - 5.55 g/dL   3.71   Alpha-1 grams/dl Latest Ref Range: 0.17 - 0.41 g/dL   0.33   Alpha-2 grams/dl Latest Ref Range: 0.43 - 0.99 g/dL   0.97   Beta grams/dl Latest Ref Range: 0.50 - 1.10 g/dL   1.19 (H)   Gamma grams/dl Latest Ref Range: 0.67 - 1.58 g/dL   1.30   Pathologist Interpretation SPE Unknown   REVIEWED   Cryoglobulin, Qual Latest Ref Range: Absent  Absent Absent      Arthritis survey done 3/21/2019 independently reviewed showing DJD of cspine and hands b/l, left knee replacement, chondrocalcinosis of right knee, no erosions identified.    EMG done 10/10/2019: 1. There is evidence of a mild sensory median mononeuropathy   across the left wrist (i.e. carpal tunnel syndrome).  There is no   motor axonal loss and no active denervation.  2. 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  However, an extensive needle examination of the   left lower extremity was completely normal, without signs of   active or chronic neuropathy.  Therefore, I cannot localize this   well electrodiagnostically.  The fact that it involves all three   of these nerves would lend towards either a sciatic neuropathy,   lumbosacral plexopathy (lower lumbosacral plexus), radiculopathy,   or asymmetric peripheral polyneuropathy.  Given her history of   lumbar decompressions, I think that lumbar radiculopathy is the   most likely cause.      Assessment:       1. Positive YOLIS (antinuclear antibody)    2. Raynaud's disease without gangrene    3. High risk  medication use    4. Onycholysis    5. Vitamin D insufficiency        Pt is a 64 year old female with PMH of HTN, HLD, DM2, dCHF, chronic lower back pain s/p lumbar surgery x 2 (last surgery was in the early 2000s), s/p gastric sleeve, left knee replacement who presented to clinic for follow up of raynaud's.    Prior work up has revealed +YOLIS 1;1280, negative profile, elevated sed rate of 46, normal CRP, negative Scl70, normal complement, negative RF/CCP, absent cryoglobulins, normal SPEP, negative hepatitis B and C, urine showed no elevation in protein or hematuria, normal CPK.    Pt currently does not meet criteria for a CTD, although with positive YOLIS, need to be mindful of potential underlying CTD. Will continue to monitor    Plan:       -Labs and urine as below  -continue nifedipine 90mg XL for raynauds, BP okay today, pt tolerating medication well  -in setting of positive YOLIS and raynauds, repeat lupus labs  -pt to start vitamin D3 2000 units daily.  -pt to follow up with Dr Lucio, he is considering getting MRI lumbar spine  -pt to follow up with GI regarding dysphagia, could this be related to her prior gastric sleeve?  -educated pt on keeping her hands/feet warm in the winter months, keeping house temperature warm and body temperature warm.  -referred to podiatry for onycholysis per pt request.  -RTC in 3 months  Problem List Items Addressed This Visit        Cardiac/Vascular    Raynaud's disease without gangrene    Relevant Orders    YOLIS Screen w/Reflex    Rheumatoid factor    Cyclic citrul peptide antibody, IgG    Sjogrens syndrome-A extractable nuclear antibody    Anti-scleroderma antibody    C4 complement    C3 complement    Urinalysis    Protein / creatinine ratio, urine    Ambulatory referral to Podiatry       Immunology/Multi System    Positive YOLIS (antinuclear antibody) - Primary    Relevant Orders    YOLIS Screen w/Reflex    Rheumatoid factor    Cyclic citrul peptide antibody, IgG    Sjogrens  syndrome-A extractable nuclear antibody    Anti-scleroderma antibody    C4 complement    C3 complement    Urinalysis    Protein / creatinine ratio, urine    Ambulatory referral to Podiatry      Other Visit Diagnoses     High risk medication use        Relevant Orders    YOLIS Screen w/Reflex    Rheumatoid factor    Cyclic citrul peptide antibody, IgG    Sjogrens syndrome-A extractable nuclear antibody    Anti-scleroderma antibody    C4 complement    C3 complement    Urinalysis    Protein / creatinine ratio, urine    Ambulatory referral to Podiatry    Onycholysis        Relevant Orders    Ambulatory referral to Podiatry    Vitamin D insufficiency        Relevant Orders    Vitamin D

## 2020-02-01 LAB — 25(OH)D3+25(OH)D2 SERPL-MCNC: 122 NG/ML (ref 30–96)

## 2020-02-03 LAB — ENA SCL70 AB SER-ACNC: 4 UNITS

## 2020-02-04 LAB
ANA SER QL IF: NORMAL
ANTI-SSA ANTIBODY: 0.07 RATIO (ref 0–0.99)
ANTI-SSA INTERPRETATION: NEGATIVE

## 2020-02-21 ENCOUNTER — OFFICE VISIT (OUTPATIENT)
Dept: GASTROENTEROLOGY | Facility: CLINIC | Age: 65
End: 2020-02-21
Payer: COMMERCIAL

## 2020-02-21 VITALS
WEIGHT: 178.56 LBS | HEART RATE: 90 BPM | SYSTOLIC BLOOD PRESSURE: 123 MMHG | DIASTOLIC BLOOD PRESSURE: 83 MMHG | BODY MASS INDEX: 27.97 KG/M2

## 2020-02-21 DIAGNOSIS — R10.12 LUQ PAIN: ICD-10-CM

## 2020-02-21 DIAGNOSIS — R11.2 NAUSEA VOMITING AND DIARRHEA: Primary | ICD-10-CM

## 2020-02-21 DIAGNOSIS — R19.7 NAUSEA VOMITING AND DIARRHEA: Primary | ICD-10-CM

## 2020-02-21 PROCEDURE — 99214 PR OFFICE/OUTPT VISIT, EST, LEVL IV, 30-39 MIN: ICD-10-PCS | Mod: S$GLB,,, | Performed by: INTERNAL MEDICINE

## 2020-02-21 PROCEDURE — 99999 PR PBB SHADOW E&M-EST. PATIENT-LVL IV: ICD-10-PCS | Mod: PBBFAC,,, | Performed by: INTERNAL MEDICINE

## 2020-02-21 PROCEDURE — 99999 PR PBB SHADOW E&M-EST. PATIENT-LVL IV: CPT | Mod: PBBFAC,,, | Performed by: INTERNAL MEDICINE

## 2020-02-21 PROCEDURE — 99214 OFFICE O/P EST MOD 30 MIN: CPT | Mod: S$GLB,,, | Performed by: INTERNAL MEDICINE

## 2020-02-21 RX ORDER — ONDANSETRON 4 MG/1
4 TABLET, ORALLY DISINTEGRATING ORAL EVERY 8 HOURS PRN
Qty: 60 TABLET | Refills: 0 | Status: SHIPPED | OUTPATIENT
Start: 2020-02-21 | End: 2020-10-21

## 2020-02-21 RX ORDER — SUCRALFATE 1 G/1
1 TABLET ORAL
Qty: 90 TABLET | Refills: 1 | Status: SHIPPED | OUTPATIENT
Start: 2020-02-21 | End: 2020-04-22 | Stop reason: SDUPTHER

## 2020-02-21 NOTE — PROGRESS NOTES
GASTROENTEROLOGY CLINIC NOTE    Reason for visit: The primary encounter diagnosis was Nausea vomiting and diarrhea. A diagnosis of LUQ pain was also pertinent to this visit.  Referring provider/PCP: Shahab Merlos DO      HPI:  Jill Perez is a 64 y.o. female here today for flollow up.  Hx pertinent for gastric bypass. She has GERD and takes protonix as well.     Interval:  Recently worsening luq pain. She feels a knot at times there and a bulge that goes away, these symptoms are not constant and become intermittent. During this, she can have n/v. She also has constipation and goes 2 -3 days wihtout a BM then will have a couple liquids stools in  A day. No blood in the stool. No new medications tried.  The vomiting can be with liquids or solids, she sometimes feels that things stick at bottom of chest.   She has tried increasing her protonix to BID and that didn't help.  She has had labs including negative HP serology, negative amylase / lipase  She had U/s abdomen with a normal biliary tree.      ========================  Initial HPI:  Patient states over the past 2 weeks he has experienced a sticking sensation at the bottom of her chest.  This is mainly with solids and pills.  She does drink some water afterwards to help food go down.  She has not had a food bolus.  She has never had an EGD.  She has not had this problem before the past 2 weeks.  She has some indigestion and bubbling of her stomach of recent as well. It seems as though the Protonix has somewhat help with this.  The symptoms of dyspepsia are mainly after p.o. Intake. She has occasional reflux type symptoms.    She was also recently seen by her primary care doctor and her labs revealed an iron deficiency anemia.  She is due for screening colonoscopy.    Pt moved here approx 6 years ago. Formerly lived in [facility].   No family hx of CRC.        Prior Endoscopy:  EGD:   2019 with me  Impression:           - No endoscopic  esophageal abnormality to explain                         patient's dysphagia. Biopsied.                        - 2 cm hiatal hernia.                        - Priya-en-Y gastrojejunostomy with gastrojejunal                         anastomosis characterized by healthy appearing                         mucosa. Biopsied.                        - Normal examined jejunum. Biopsied.  PATH normal.    Colon:   2019 with me  Findings:       The perianal and digital rectal examinations were normal.       A few small-mouthed diverticula were found in the sigmoid colon.       The colon (entire examined portion) appeared normal.       The terminal ileum appeared normal.  Repeat 10 years    Around  in [place], was told to come back in 10 years.    (Portions of this note were dictated using voice recognition software and may contain dictation related errors in spelling/grammar/syntax not found on text review)    Review of Systems   Constitutional: Negative for fever and weight loss.   Respiratory: Negative for cough and shortness of breath.    Cardiovascular: Negative for chest pain and leg swelling.   Gastrointestinal: Positive for abdominal pain, constipation, diarrhea, heartburn, nausea and vomiting. Negative for blood in stool and melena.   Genitourinary: Negative for dysuria and hematuria.   Musculoskeletal: Positive for back pain. Negative for neck pain.   Neurological: Negative for dizziness and headaches.   Psychiatric/Behavioral: Negative for hallucinations. The patient does not have insomnia.        Past Medical History: has a past medical history of B12 deficiency, Chronic back pain, Diabetes mellitus, Diabetes mellitus, type 2, Hyperlipidemia, Hypertension, and Insomnia.    Past Surgical History: has a past surgical history that includes Back surgery; Appendectomy; Fracture surgery; Splenectomy, total; Gastric bypass (); Total knee arthroplasty (Left); Cholecystectomy; Breast Reduction ();   section; Total Reduction Mammoplasty (Bilateral); Anterior cruciate ligament repair; Esophagogastroduodenoscopy (N/A, 8/14/2019); and Colonoscopy (N/A, 8/14/2019).    Family History:family history includes Breast cancer in her sister; Cancer in her sister; Cataracts in her father and mother; Coronary artery disease in her father; Diabetes in her father and sister; Glaucoma in her mother; Heart disease in her father and mother; Heart failure in her mother; Hyperlipidemia in her father; Hypertension in her father; No Known Problems in her brother, brother, sister, sister, and sister; Rheumatologic disease in her mother.    Allergies:   Review of patient's allergies indicates:   Allergen Reactions    Pcn [penicillins] Hives and Itching       Social History: reports that she has been smoking. She has smoked for the past 5.00 years. She has quit using smokeless tobacco. She reports that she drinks alcohol. She reports that she does not use drugs.    Home medications:   Current Outpatient Medications on File Prior to Visit   Medication Sig Dispense Refill    aspirin (ECOTRIN) 81 MG EC tablet Take 81 mg by mouth once daily.      atorvastatin (LIPITOR) 40 MG tablet Take 1 tablet (40 mg total) by mouth once daily. 90 tablet 3    dulaglutide (TRULICITY) 1.5 mg/0.5 mL PnIj Inject 1.5 mg into the skin every 7 days. 6 mL 0    ferrous sulfate (FEOSOL) 325 mg (65 mg iron) Tab tablet Take 325 mg by mouth 2 (two) times daily.      FLUoxetine 20 MG tablet Take 1 tablet (20 mg total) by mouth once daily. 90 tablet 1    metFORMIN (GLUCOPHAGE) 1000 MG tablet Take 1 tablet (1,000 mg total) by mouth 2 (two) times daily with meals. 180 tablet 0    NIFEdipine (PROCARDIA-XL) 90 MG (OSM) 24 hr tablet Take 1 tablet (90 mg total) by mouth once daily. 30 tablet 4    pantoprazole (PROTONIX) 40 MG tablet Take 1 tablet (40 mg total) by mouth 2 (two) times daily. 180 tablet 0    zolpidem (AMBIEN) 10 mg Tab Take 1 tablet (10 mg total) by  "mouth nightly as needed. 30 tablet 2    b complex vitamins tablet Take 1 tablet by mouth once daily.      cholecalciferol, vitamin D3, (DECARA) 50,000 unit capsule Take 1 capsule (50,000 Units total) by mouth twice a week. (Patient not taking: Reported on 2/21/2020) 40 capsule 0    pen needle, diabetic 32 gauge x 1/4" Ndle USE AS DIRECTED ONCE DAILY WITH INSULIN (Patient not taking: Reported on 2/21/2020) 100 each 4    silver sulfADIAZINE 1% (SILVADENE) 1 % cream apply  cream in 1/16-inch layer twice daily (Patient not taking: Reported on 2/21/2020) 50 g 0     No current facility-administered medications on file prior to visit.        Vital signs:  /83   Pulse 90   Wt 81 kg (178 lb 9.2 oz)   LMP  (LMP Unknown)   BMI 27.97 kg/m²     Physical Exam   Constitutional: She is oriented to person, place, and time. She appears well-nourished. No distress.   HENT:   Head: Normocephalic and atraumatic.   Eyes: Conjunctivae are normal. No scleral icterus.   Abdominal: Soft. Bowel sounds are normal. She exhibits no distension and no mass.   Over the LUQ, there is prior splenectomy scar; she points exactly the the length of this scar as the issue of concern ; there is TTP over the scar tissue to deeper palpation ; I dont appreciate any hernia in that area on sitting up or supine. No mass or knot in that area appreciated ; no rash in that area   Neurological: She is alert and oriented to person, place, and time.   Vitals reviewed.      Routine labs:  Lab Results   Component Value Date    WBC 6.71 01/21/2020    HGB 12.3 01/21/2020    HCT 41.3 01/21/2020    MCV 93 01/21/2020     (H) 01/21/2020     Lab Results   Component Value Date    INR 0.9 04/22/2019     Lab Results   Component Value Date    IRON 37 07/22/2019    FERRITIN 19 (L) 10/19/2019    TIBC 435 07/22/2019    FESATURATED 9 (L) 07/22/2019     Lab Results   Component Value Date     01/21/2020    K 3.4 (L) 01/21/2020     01/21/2020    CO2 26 " 01/21/2020    BUN 9 01/21/2020    CREATININE 0.6 01/21/2020     Lab Results   Component Value Date    ALBUMIN 3.7 01/21/2020    ALT 12 01/21/2020    AST 17 01/21/2020    ALKPHOS 113 01/21/2020    BILITOT 0.3 01/21/2020     No results found for: GLUCOSE    Imaging:  Reviewed      Assessment:  1. Nausea vomiting and diarrhea    2. LUQ pain      Her site of maximal pain is directly over LUQ prior scar tissue.   I do wonder if she has intermittent internal hernia or if she has incisional hernia, vs. Scar tissue type pain.  Certainly there seems to be a degree of constipation causing symptoms as well.    Plan:  Orders Placed This Encounter    US Abdomen Limited    CT Abdomen Pelvis With Contrast    X-Ray Abdomen AP 1 View    Creatinine, serum    sucralfate (CARAFATE) 1 gram tablet    ondansetron (ZOFRAN-ODT) 4 MG TbDL     Check CT with PO / IV contrast  US abd wall, rule out hernia in that area  Xray rule out constipation    Trial of carafate and zofran for symptomatic relief.    RTC 1 month   - consider surgery referral pending results. Could also consider injection into scar tissue with pain management.   - consider manometry if dysphagia persists.    Carmine Kearney MD  [facility]    A total of 25 minutes were spent face-to-face with the patient during this encounter and over half of that time was spent on counseling and coordination of care.

## 2020-02-21 NOTE — LETTER
February 21, 2020      Shahab Merlos, DO  [address] [facility]  [place] [address]           [facility]  [address], [address]  [place] [address]  Phone: [phone]          Patient: Jill Perez   MR Number: [id number]   YOB: 1955   Date of Visit: 2/21/2020       Dear Dr. Shahab Merlos:    Thank you for referring Jill Perez to me for evaluation. Attached you will find relevant portions of my assessment and plan of care.    If you have questions, please do not hesitate to call me. I look forward to following Jill Perez along with you.    Sincerely,    Carmine Kearney MD    Enclosure  CC:  No Recipients    If you would like to receive this communication electronically, please contact [email or web address] or [phone] to request more information on PowerMessage Link access.    For providers and/or their staff who would like to refer a patient to [facility], please contact us through our one-stop-shop provider referral line, [facility] , at [phone].    If you feel you have received this communication in error or would no longer like to receive these types of communications, please e-mail [email or web address]

## 2020-03-03 ENCOUNTER — TELEPHONE (OUTPATIENT)
Dept: GASTROENTEROLOGY | Facility: CLINIC | Age: 65
End: 2020-03-03

## 2020-03-03 ENCOUNTER — HOSPITAL ENCOUNTER (OUTPATIENT)
Dept: RADIOLOGY | Facility: HOSPITAL | Age: 65
Discharge: HOME | End: 2020-03-03
Attending: INTERNAL MEDICINE
Payer: COMMERCIAL

## 2020-03-03 DIAGNOSIS — R11.2 NAUSEA VOMITING AND DIARRHEA: ICD-10-CM

## 2020-03-03 DIAGNOSIS — R10.12 LUQ PAIN: Primary | ICD-10-CM

## 2020-03-03 DIAGNOSIS — R13.19 ESOPHAGEAL DYSPHAGIA: ICD-10-CM

## 2020-03-03 DIAGNOSIS — R10.12 LUQ PAIN: ICD-10-CM

## 2020-03-03 DIAGNOSIS — R19.7 NAUSEA VOMITING AND DIARRHEA: ICD-10-CM

## 2020-03-03 DIAGNOSIS — L76.82 INCISIONAL PAIN: ICD-10-CM

## 2020-03-03 PROCEDURE — 74177 CT ABDOMEN PELVIS WITH CONTRAST: ICD-10-PCS | Mod: 26,,, | Performed by: RADIOLOGY

## 2020-03-03 PROCEDURE — 76705 ECHO EXAM OF ABDOMEN: CPT | Mod: TC

## 2020-03-03 PROCEDURE — 25500020 PHARM REV CODE 255: Performed by: INTERNAL MEDICINE

## 2020-03-03 PROCEDURE — 76705 US ABDOMEN LIMITED: ICD-10-PCS | Mod: 26,,, | Performed by: RADIOLOGY

## 2020-03-03 PROCEDURE — 76705 ECHO EXAM OF ABDOMEN: CPT | Mod: 26,,, | Performed by: RADIOLOGY

## 2020-03-03 PROCEDURE — 74177 CT ABD & PELVIS W/CONTRAST: CPT | Mod: 26,,, | Performed by: RADIOLOGY

## 2020-03-03 PROCEDURE — 74177 CT ABD & PELVIS W/CONTRAST: CPT | Mod: TC

## 2020-03-03 RX ORDER — DICYCLOMINE HYDROCHLORIDE 10 MG/1
10 CAPSULE ORAL
Qty: 60 CAPSULE | Refills: 1 | Status: SHIPPED | OUTPATIENT
Start: 2020-03-03 | End: 2020-04-22 | Stop reason: ALTCHOICE

## 2020-03-03 RX ADMIN — IOHEXOL 1000 ML: 9 SOLUTION ORAL at 10:03

## 2020-03-03 RX ADMIN — IOHEXOL 75 ML: 350 INJECTION, SOLUTION INTRAVENOUS at 11:03

## 2020-03-03 NOTE — TELEPHONE ENCOUNTER
Spoke with pt and she stated that someone called her to set up appointment with the [facility]. Patient has been scheduled for an Esophagram on 3/6/20. Number to main campus given to patient to schedule Manometry test.

## 2020-03-03 NOTE — TELEPHONE ENCOUNTER
----- Message from Carmine Kearney MD sent at 3/3/2020 12:59 PM CST -----  Please arrange following orders placed.  Esophagram.  Pain referral  Manometry at [facility].

## 2020-03-05 ENCOUNTER — OFFICE VISIT (OUTPATIENT)
Dept: PAIN MEDICINE | Facility: CLINIC | Age: 65
End: 2020-03-05
Payer: COMMERCIAL

## 2020-03-05 VITALS
HEART RATE: 86 BPM | WEIGHT: 178.63 LBS | DIASTOLIC BLOOD PRESSURE: 82 MMHG | BODY MASS INDEX: 27.97 KG/M2 | SYSTOLIC BLOOD PRESSURE: 115 MMHG

## 2020-03-05 DIAGNOSIS — R10.12 LUQ PAIN: Primary | ICD-10-CM

## 2020-03-05 DIAGNOSIS — G58.9 NERVE ENTRAPMENT: ICD-10-CM

## 2020-03-05 DIAGNOSIS — R53.81 PHYSICAL DECONDITIONING: ICD-10-CM

## 2020-03-05 DIAGNOSIS — L76.82 INCISIONAL PAIN: ICD-10-CM

## 2020-03-05 DIAGNOSIS — R19.8 ABDOMINAL WEAKNESS: ICD-10-CM

## 2020-03-05 PROCEDURE — 99999 PR PBB SHADOW E&M-EST. PATIENT-LVL III: CPT | Mod: PBBFAC,,, | Performed by: PHYSICAL MEDICINE & REHABILITATION

## 2020-03-05 PROCEDURE — 99999 PR PBB SHADOW E&M-EST. PATIENT-LVL III: ICD-10-PCS | Mod: PBBFAC,,, | Performed by: PHYSICAL MEDICINE & REHABILITATION

## 2020-03-05 PROCEDURE — 99244 PR OFFICE CONSULTATION,LEVEL IV: ICD-10-PCS | Mod: S$GLB,,, | Performed by: PHYSICAL MEDICINE & REHABILITATION

## 2020-03-05 PROCEDURE — 99244 OFF/OP CNSLTJ NEW/EST MOD 40: CPT | Mod: S$GLB,,, | Performed by: PHYSICAL MEDICINE & REHABILITATION

## 2020-03-05 NOTE — PROGRESS NOTES
[facility]  New Patient H&P    Referring Provider: Carmine Kearney Md  [address] [facility]  [address]  [place] [address]    Chief Complaint:   Chief Complaint   Patient presents with    Abdominal Pain       History of Present Illness: Jill Perez is a 64 y.o. female with history of gastric bypass, splenectomy, dm 2, previous lumbar fusion, depression referred by Dr. Carmine Kearney for left upper quadrant pain, incisional pain.      Onset: 4 months ago, gradually progressed in frequency and severity   Location: LUQ abdomen over prior scar tissue from previous splenectomy about 20 years ago.   Radiation: none  Timing: intermittent, not necessarily associated with any activity, eating, bowel movements  Quality: Aching, Throbbing, Superficial and Sharp  Exacerbating Factors: nothing in particular. However, she states she cannot bend or twist much due to back pain.   Alleviating Factors: nothing, sometimes helps to put pressure over the area  Associated Symptoms: (+) numbness in the area of the previous splenectomy scar and pain. (+) Nausea, vomiting, dysphagia, However, this N/V and dysphagia is not associated with her abdominal pain. Occasional diarrhea and constipation, but again does not seem assoc with abdominal pain. (+) Weakness in the left leg since 2nd back surgery. (+) Bowel incontinence, last was 4-5 months ago. Denies night fever/night sweats, urinary incontinence, bowel incontinence, significant weight loss    Severity: Currently:4 /10   Typical Range: 2-4/10     Exacerbation: 4/10   Pain Disability Index  Family/Home Responsibilities:: 4  Recreation:: 4  Social Activity:: 4  Occupation:: 4  Sexual Behavior:: 0  Self Care:: 4  Life-Support Activities:: 4  Pain Disability Index (PDI): 24    Previous Interventions:  - Possibly had scar tissue injection 6 months after surgery (20 years ago)  - Multiple back injections in [place] prior to back surgery    Previous Therapies:  PT/OT: yes for the  "back and left knee, but not for abdominal wall/core strengthening  Surgery: yes    - Splenectomy 20 years ago   - 2 lumbar surgeries, Laminectomy and fusion at L4-5, more than 20 years ago   - Left knee TKA 10 years ago, sometimes with pain still and has limited ROM  Previous Medications:   - NSAIDS: Tylenol sometimes helps  - Muscle Relaxants: none   - TCAs:   - SNRIs:   - Topicals:   - Anticonvulsants: Gabapentin just made her sick and nauseous  - Opioids:     Current Pain Medications:  1. Bentyl  2. Zofran  3. Carafate    Blood Thinners: ASA 81 mg     Full Medication List:    Current Outpatient Medications:     aspirin (ECOTRIN) 81 MG EC tablet, Take 81 mg by mouth once daily., Disp: , Rfl:     atorvastatin (LIPITOR) 40 MG tablet, Take 1 tablet (40 mg total) by mouth once daily., Disp: 90 tablet, Rfl: 3    dicyclomine (BENTYL) 10 MG capsule, Take 1 capsule (10 mg total) by mouth before meals as needed., Disp: 60 capsule, Rfl: 1    dulaglutide (TRULICITY) 1.5 mg/0.5 mL PnIj, Inject 1.5 mg into the skin every 7 days., Disp: 6 mL, Rfl: 0    ferrous sulfate (FEOSOL) 325 mg (65 mg iron) Tab tablet, Take 325 mg by mouth 2 (two) times daily., Disp: , Rfl:     FLUoxetine 20 MG tablet, Take 1 tablet (20 mg total) by mouth once daily., Disp: 90 tablet, Rfl: 1    metFORMIN (GLUCOPHAGE) 1000 MG tablet, Take 1 tablet (1,000 mg total) by mouth 2 (two) times daily with meals., Disp: 180 tablet, Rfl: 0    NIFEdipine (PROCARDIA-XL) 90 MG (OSM) 24 hr tablet, Take 1 tablet (90 mg total) by mouth once daily., Disp: 30 tablet, Rfl: 4    ondansetron (ZOFRAN-ODT) 4 MG TbDL, Dissolve 1 tablet (4 mg total) by mouth every 8 (eight) hours as needed (nausea)., Disp: 60 tablet, Rfl: 0    pantoprazole (PROTONIX) 40 MG tablet, Take 1 tablet (40 mg total) by mouth 2 (two) times daily., Disp: 180 tablet, Rfl: 0    pen needle, diabetic 32 gauge x 1/4" Ndle, USE AS DIRECTED ONCE DAILY WITH INSULIN, Disp: 100 each, Rfl: 4    sucralfate " (CARAFATE) 1 gram tablet, Take 1 tablet (1 g total) by mouth every meal as needed (upper gi upset)., Disp: 90 tablet, Rfl: 1    zolpidem (AMBIEN) 10 mg Tab, Take 1 tablet (10 mg total) by mouth nightly as needed., Disp: 30 tablet, Rfl: 2    b complex vitamins tablet, Take 1 tablet by mouth once daily., Disp: , Rfl:     cholecalciferol, vitamin D3, (DECARA) 50,000 unit capsule, Take 1 capsule (50,000 Units total) by mouth twice a week. (Patient not taking: Reported on 2/21/2020), Disp: 40 capsule, Rfl: 0    silver sulfADIAZINE 1% (SILVADENE) 1 % cream, apply  cream in 1/16-inch layer twice daily (Patient not taking: Reported on 2/21/2020), Disp: 50 g, Rfl: 0     Review of Systems:  Review of Systems   Constitutional: Negative for fever and weight loss.   HENT: Negative for ear pain and tinnitus.    Eyes: Negative for pain and redness.   Respiratory: Negative for cough and shortness of breath.    Cardiovascular: Negative for chest pain and palpitations.   Gastrointestinal: Positive for abdominal pain, constipation, diarrhea, heartburn, nausea and vomiting.   Genitourinary: Negative.         Denies urinary incontinence. Denies urine retention.    Musculoskeletal: Positive for joint pain (left knee). Negative for back pain and neck pain.   Skin: Negative for itching and rash.   Neurological: Positive for tingling, weakness and headaches. Negative for focal weakness and seizures.   Endo/Heme/Allergies: Negative for environmental allergies. Does not bruise/bleed easily.   Psychiatric/Behavioral: Negative for depression. The patient has insomnia. The patient is not nervous/anxious.        Allergies:  Pcn [penicillins]     Medical History:   has a past medical history of B12 deficiency, Chronic back pain, Diabetes mellitus, Diabetes mellitus, type 2, Hyperlipidemia, Hypertension, and Insomnia.    Surgical History:   has a past surgical history that includes Back surgery; Appendectomy; Fracture surgery; Splenectomy,  total; Gastric bypass (2010); Total knee arthroplasty (Left); Cholecystectomy; Breast Reduction ();  section; Total Reduction Mammoplasty (Bilateral); Anterior cruciate ligament repair; Esophagogastroduodenoscopy (N/A, 2019); and Colonoscopy (N/A, 2019).    Family History:  family history includes Breast cancer in her sister; Cancer in her sister; Cataracts in her father and mother; Coronary artery disease in her father; Diabetes in her father and sister; Glaucoma in her mother; Heart disease in her father and mother; Heart failure in her mother; Hyperlipidemia in her father; Hypertension in her father; No Known Problems in her brother, brother, sister, sister, and sister; Rheumatologic disease in her mother.    Social History:   reports that she has quit smoking. She quit after 5.00 years of use. She has quit using smokeless tobacco. She reports that she drinks alcohol. She reports that she does not use drugs.    Physical Exam:  /82   Pulse 86   Wt 81 kg (178 lb 9.6 oz)   LMP  (LMP Unknown)   BMI 27.97 kg/m²   GEN: No acute distress. Calm, comfortable  HENT: Normocephalic, atraumatic, moist mucous membranes  EYE: Anicteric sclera, non-injected.   CV: Non-diaphoretic. Regular Rate. Radial Pulses 2+.  RESP: Breathing comfortably. Chest expansion symmetric.  EXT: No clubbing, cyanosis.   SKIN: Warm, & dry to palpation. No visible rashes or lesions of exposed skin.   PSYCH: Pleasant mood and appropriate affect. Recent and remote memory intact.   ABD: (+) TTP in LUQ over previous splenectomy scar. Weakly positive Carnett's sign, but patient unable to lift both legs simultaneously due to weak core musculature.   GAIT: Independent, antalgic ambulation  Neurologic Exam:     Alert. Speech is fluent and appropriate.     Cranial Nerves: Extra-ocular movements intact. Pupils equal. No strabismus. Face Symmetric. Jaw opens midline. Uvula midline. Tongue midline. Shoulder shrug symmetric.        Strength: 4/5 in bilateral hip flexion and left knee extension, otherwise 5/5 throughout bilateral lower extremities     Sensation: Abnormal to LT over previous splenectomy scar, otherwise grossly intact to light touch in bilateral lower extremities     Reflexes: 2+ in right patella, absent left patella and b/l achilles     Tone: No abnormality appreciated      No Clonus     Downgoing toes on plantar stimulation     (-) Ibarra bilaterally      Imaging:  - CT Abd/Pel with contrast 03/03/2020:  Cholecystectomy, mild prominence of the biliary ducts can be seen after cholecystectomy, it is unchanged.  Fatty atrophy of the pancreas.  Prior stomach surgery and splenectomy.  No acute intra-abdominal process seen    - Limited ultrasound abdomen 03/03/2020:  1. No evidence for LUQ hernia.    Labs:  BMP  Lab Results   Component Value Date     01/21/2020    K 3.4 (L) 01/21/2020     01/21/2020    CO2 26 01/21/2020    BUN 9 01/21/2020    CREATININE 0.6 01/21/2020    CALCIUM 9.6 01/21/2020    ANIONGAP 12 01/21/2020    ESTGFRAFRICA >60.0 01/21/2020    EGFRNONAA >60.0 01/21/2020     Lab Results   Component Value Date    ALT 12 01/21/2020    AST 17 01/21/2020    ALKPHOS 113 01/21/2020    BILITOT 0.3 01/21/2020     Lab Results   Component Value Date     (H) 01/21/2020       Assessment:  Jill Perez is a 64 y.o. female with the following diagnoses based on history, exam, and imaging:    Problem List Items Addressed This Visit     None      Visit Diagnoses     LUQ pain    -  Primary    Relevant Orders    Ambulatory referral/consult to Physical Therapy    Incisional pain        Physical deconditioning        Relevant Orders    Ambulatory referral/consult to Physical Therapy    Abdominal weakness        Relevant Orders    Ambulatory referral/consult to Physical Therapy    Nerve entrapment        Relevant Orders    Ambulatory referral/consult to Physical Therapy          This is a pleasant 64 y.o. lady with  diabetes, depression, previous splenectomy, gastric sleeve, lumbar fusion, left total knee arthroplasty presenting with left upper quadrant pain.    Left upper quadrant pain does appear neuropathic, with abnormal sensation in the area.  It is peculiar that this developed 20 years after the surgery.  She does have some other GI issues, but this pain does not appear visceral in nature.  It could be a nerve entrapment in that scar tissue from previous splenectomy.    Treatment Plan:   - PT/OT/HEP: Refer to physical therapy for core strengthening  - Procedures:  Plan for ultrasound-guided abdominal wall trigger point injection at next available appointment  - Medications: Prescription for compound topical medication with diclofenac 2.5%, gabapentin 2.5%, lidocaine 2.25%, prilocaine 2.25%.  Apply 3.2 (2 pumps) to painful area up to 5 times daily  - Imaging: Reviewed.   - Labs: Reviewed.  Medications are appropriately dosed for current hepatorenal function.    Follow Up: RTC next available for US guided abdominal wall trigger point injection.     Patricia Barnett M.D.  Interventional Pain Medicine     Disclaimer: This note was partly generated using dictation software which may occasionally result in transcription errors.

## 2020-03-05 NOTE — LETTER
March 5, 2020      Carmine Kearney MD  [address] [place] [address]  [address]  [facility] [address]           [facility]  [address] [facility] [address]  [facility] [address]  Phone: [phone]          Patient: Jill Perez   MR Number: [id number]   YOB: 1955   Date of Visit: 3/5/2020       Dear Dr. Carmine Kearney:    Thank you for referring Jill Perez to me for evaluation. Attached you will find relevant portions of my assessment and plan of care.    If you have questions, please do not hesitate to call me. I look forward to following Jill Perez along with you.    Sincerely,    Patricia Barnett MD    Enclosure  CC:  No Recipients    If you would like to receive this communication electronically, please contact [email or web address] or [phone] to request more information on [employer] Link access.    For providers and/or their staff who would like to refer a patient to [facility], please contact us through our one-stop-shop provider referral line, [facility], at [phone].    If you feel you have received this communication in error or would no longer like to receive these types of communications, please e-mail [email or web address]         
declines

## 2020-03-06 ENCOUNTER — HOSPITAL ENCOUNTER (OUTPATIENT)
Dept: RADIOLOGY | Facility: HOSPITAL | Age: 65
Discharge: HOME | End: 2020-03-06
Attending: INTERNAL MEDICINE
Payer: COMMERCIAL

## 2020-03-06 DIAGNOSIS — R13.19 ESOPHAGEAL DYSPHAGIA: ICD-10-CM

## 2020-03-06 PROCEDURE — 25500020 PHARM REV CODE 255: Performed by: INTERNAL MEDICINE

## 2020-03-06 PROCEDURE — 74220 FL ESOPHAGRAM WITH BARIUM TABLET: ICD-10-PCS | Mod: 26,,, | Performed by: RADIOLOGY

## 2020-03-06 PROCEDURE — 74220 X-RAY XM ESOPHAGUS 1CNTRST: CPT | Mod: 26,,, | Performed by: RADIOLOGY

## 2020-03-06 PROCEDURE — A9698 NON-RAD CONTRAST MATERIALNOC: HCPCS | Performed by: INTERNAL MEDICINE

## 2020-03-06 PROCEDURE — 74220 X-RAY XM ESOPHAGUS 1CNTRST: CPT | Mod: TC

## 2020-03-06 RX ADMIN — BARIUM SULFATE 355 ML: 0.6 SUSPENSION ORAL at 08:03

## 2020-03-06 RX ADMIN — BARIUM SULFATE 340 ML: 980 POWDER, FOR SUSPENSION ORAL at 08:03

## 2020-03-09 ENCOUNTER — PATIENT MESSAGE (OUTPATIENT)
Dept: GASTROENTEROLOGY | Facility: CLINIC | Age: 65
End: 2020-03-09

## 2020-03-17 ENCOUNTER — PROCEDURE VISIT (OUTPATIENT)
Dept: PAIN MEDICINE | Facility: CLINIC | Age: 65
End: 2020-03-17
Payer: COMMERCIAL

## 2020-03-17 VITALS
BODY MASS INDEX: 27.88 KG/M2 | WEIGHT: 178 LBS | HEART RATE: 111 BPM | SYSTOLIC BLOOD PRESSURE: 109 MMHG | DIASTOLIC BLOOD PRESSURE: 76 MMHG

## 2020-03-17 DIAGNOSIS — L76.82 INCISIONAL PAIN: ICD-10-CM

## 2020-03-17 DIAGNOSIS — R19.8 ABDOMINAL WEAKNESS: ICD-10-CM

## 2020-03-17 DIAGNOSIS — R10.12 LUQ PAIN: ICD-10-CM

## 2020-03-17 DIAGNOSIS — M79.18 MYOFASCIAL PAIN: Primary | ICD-10-CM

## 2020-03-17 DIAGNOSIS — R53.81 PHYSICAL DECONDITIONING: ICD-10-CM

## 2020-03-17 PROCEDURE — 20553 PR INJECT TRIGGER POINTS, > 3: ICD-10-PCS | Mod: S$GLB,,, | Performed by: PHYSICAL MEDICINE & REHABILITATION

## 2020-03-17 PROCEDURE — 76942 PR U/S GUIDANCE FOR NEEDLE GUIDANCE: ICD-10-PCS | Mod: S$GLB,,, | Performed by: PHYSICAL MEDICINE & REHABILITATION

## 2020-03-17 PROCEDURE — 76942 ECHO GUIDE FOR BIOPSY: CPT | Mod: S$GLB,,, | Performed by: PHYSICAL MEDICINE & REHABILITATION

## 2020-03-17 PROCEDURE — 20553 NJX 1/MLT TRIGGER POINTS 3/>: CPT | Mod: S$GLB,,, | Performed by: PHYSICAL MEDICINE & REHABILITATION

## 2020-03-17 RX ORDER — BUPIVACAINE HYDROCHLORIDE 2.5 MG/ML
10 INJECTION, SOLUTION EPIDURAL; INFILTRATION; INTRACAUDAL
Status: COMPLETED | OUTPATIENT
Start: 2020-03-17 | End: 2020-03-17

## 2020-03-17 RX ORDER — TRIAMCINOLONE ACETONIDE 40 MG/ML
40 INJECTION, SUSPENSION INTRA-ARTICULAR; INTRAMUSCULAR ONCE
Status: COMPLETED | OUTPATIENT
Start: 2020-03-17 | End: 2020-03-17

## 2020-03-17 RX ADMIN — TRIAMCINOLONE ACETONIDE 40 MG: 40 INJECTION, SUSPENSION INTRA-ARTICULAR; INTRAMUSCULAR at 10:03

## 2020-03-17 RX ADMIN — BUPIVACAINE HYDROCHLORIDE 25 MG: 2.5 INJECTION, SOLUTION EPIDURAL; INFILTRATION; INTRACAUDAL at 10:03

## 2020-03-17 NOTE — PROCEDURES
Procedures     The patient returns today and notes that the LUQ pain is unchanged.  Denies any new pains since previous visit. The pain is unchanged in character or location.  Pain intensity does fluctuate.  Pain currently 5/10. Denies any changes in bowel or bladder function. Denies any new weakness or new numbness since the most recent visit. Denies any fevers or recent infections/antibiotics. Denies any unexplained weight loss.     Trigger Point Injection:   The procedure was discussed with the patient including complications of damage, bleeding, infection, and failure of pain relief.     All medications, allergies, and relevant histories were reviewed. No recent antibiotics or infections.  A time-out was taken to verify the correct patient, procedure, laterality, and appropriate medications/allergies.    Trigger points were identified by palpation and marked.  ChloraPrep utilized to prep  sites. Ultrasound guidance was utilized to ensure safe needle depth. A 25-gauge needle was advanced to the point of maximal tenderness, and 1 mL of a mixture of 9 mL of 0.25% bupivacaine with kenalog 40 mg was injected after negative aspiration in a fanlike distribution. All sites done in the same manner. Patient tolerated the procedure well and without complications. 10 Sites injected included: Abdominal wall musculature.     The patient tolerated the procedure well and was [facility]d in excellent condition.

## 2020-03-20 ENCOUNTER — PATIENT MESSAGE (OUTPATIENT)
Dept: ADMINISTRATIVE | Facility: OTHER | Age: 65
End: 2020-03-20

## 2020-03-23 DIAGNOSIS — E11.9 TYPE 2 DIABETES MELLITUS: ICD-10-CM

## 2020-03-23 RX ORDER — GABAPENTIN 300 MG/1
300 CAPSULE ORAL 3 TIMES DAILY
Qty: 90 CAPSULE | Refills: 11 | Status: CANCELLED | OUTPATIENT
Start: 2020-03-23 | End: 2021-03-23

## 2020-03-26 RX ORDER — GABAPENTIN 300 MG/1
300 CAPSULE ORAL 3 TIMES DAILY
Qty: 90 CAPSULE | Refills: 11 | OUTPATIENT
Start: 2020-03-26 | End: 2021-03-26

## 2020-03-30 DIAGNOSIS — I73.00 RAYNAUD'S DISEASE WITHOUT GANGRENE: ICD-10-CM

## 2020-03-30 RX ORDER — FLUOXETINE 20 MG/1
20 TABLET ORAL DAILY
Qty: 90 TABLET | Refills: 0 | Status: SHIPPED | OUTPATIENT
Start: 2020-03-30 | End: 2020-06-10 | Stop reason: SDUPTHER

## 2020-03-30 RX ORDER — GABAPENTIN 300 MG/1
300 CAPSULE ORAL 3 TIMES DAILY
Qty: 270 CAPSULE | Refills: 0 | Status: SHIPPED | OUTPATIENT
Start: 2020-03-30 | End: 2020-07-21 | Stop reason: SDUPTHER

## 2020-03-30 NOTE — TELEPHONE ENCOUNTER
----- Message from Serena Kam sent at 3/30/2020  9:02 AM CDT -----  Contact: self, [phone]  Patient is requesting a medication refill.        RX Name: Gabapentin  Strength: 300 mg  Quantity:  90  Directions: Take 1 tablet 3x a day  Pharmacy: [facility]

## 2020-04-04 ENCOUNTER — PATIENT MESSAGE (OUTPATIENT)
Dept: ADMINISTRATIVE | Facility: OTHER | Age: 65
End: 2020-04-04

## 2020-04-07 DIAGNOSIS — E11.65 TYPE 2 DIABETES MELLITUS WITH HYPERGLYCEMIA, WITH LONG-TERM CURRENT USE OF INSULIN: ICD-10-CM

## 2020-04-07 DIAGNOSIS — Z79.4 TYPE 2 DIABETES MELLITUS WITH HYPERGLYCEMIA, WITH LONG-TERM CURRENT USE OF INSULIN: ICD-10-CM

## 2020-04-08 ENCOUNTER — PATIENT MESSAGE (OUTPATIENT)
Dept: FAMILY MEDICINE | Facility: CLINIC | Age: 65
End: 2020-04-08

## 2020-04-14 DIAGNOSIS — F51.01 PRIMARY INSOMNIA: ICD-10-CM

## 2020-04-14 RX ORDER — ZOLPIDEM TARTRATE 10 MG/1
10 TABLET ORAL NIGHTLY PRN
Qty: 30 TABLET | Refills: 2 | Status: SHIPPED | OUTPATIENT
Start: 2020-04-14 | End: 2020-07-13 | Stop reason: SDUPTHER

## 2020-04-16 ENCOUNTER — TELEPHONE (OUTPATIENT)
Dept: GASTROENTEROLOGY | Facility: CLINIC | Age: 65
End: 2020-04-16

## 2020-04-18 DIAGNOSIS — Z79.4 TYPE 2 DIABETES MELLITUS WITH HYPERGLYCEMIA, WITH LONG-TERM CURRENT USE OF INSULIN: ICD-10-CM

## 2020-04-18 DIAGNOSIS — E11.65 TYPE 2 DIABETES MELLITUS WITH HYPERGLYCEMIA, WITH LONG-TERM CURRENT USE OF INSULIN: ICD-10-CM

## 2020-04-19 RX ORDER — METFORMIN HYDROCHLORIDE 1000 MG/1
1000 TABLET ORAL 2 TIMES DAILY WITH MEALS
Qty: 180 TABLET | Refills: 0 | Status: SHIPPED | OUTPATIENT
Start: 2020-04-19 | End: 2020-07-10 | Stop reason: SDUPTHER

## 2020-04-20 ENCOUNTER — LAB VISIT (OUTPATIENT)
Dept: LAB | Facility: HOSPITAL | Age: 65
End: 2020-04-20
Attending: FAMILY MEDICINE
Payer: COMMERCIAL

## 2020-04-20 DIAGNOSIS — E11.65 TYPE 2 DIABETES MELLITUS WITH HYPERGLYCEMIA, WITH LONG-TERM CURRENT USE OF INSULIN: ICD-10-CM

## 2020-04-20 DIAGNOSIS — Z79.4 TYPE 2 DIABETES MELLITUS WITH HYPERGLYCEMIA, WITH LONG-TERM CURRENT USE OF INSULIN: ICD-10-CM

## 2020-04-20 LAB
ESTIMATED AVG GLUCOSE: 160 MG/DL (ref 68–131)
HBA1C MFR BLD HPLC: 7.2 % (ref 4–5.6)

## 2020-04-20 PROCEDURE — 83036 HEMOGLOBIN GLYCOSYLATED A1C: CPT

## 2020-04-20 PROCEDURE — 36415 COLL VENOUS BLD VENIPUNCTURE: CPT | Mod: PO

## 2020-04-22 ENCOUNTER — PATIENT MESSAGE (OUTPATIENT)
Dept: FAMILY MEDICINE | Facility: CLINIC | Age: 65
End: 2020-04-22

## 2020-04-22 ENCOUNTER — PATIENT MESSAGE (OUTPATIENT)
Dept: ADMINISTRATIVE | Facility: OTHER | Age: 65
End: 2020-04-22

## 2020-04-22 ENCOUNTER — OFFICE VISIT (OUTPATIENT)
Dept: FAMILY MEDICINE | Facility: CLINIC | Age: 65
End: 2020-04-22
Payer: COMMERCIAL

## 2020-04-22 DIAGNOSIS — E11.65 TYPE 2 DIABETES MELLITUS WITH HYPERGLYCEMIA, WITH LONG-TERM CURRENT USE OF INSULIN: Primary | ICD-10-CM

## 2020-04-22 DIAGNOSIS — Z79.899 ENCOUNTER FOR MONITORING LONG-TERM PROTON PUMP INHIBITOR THERAPY: ICD-10-CM

## 2020-04-22 DIAGNOSIS — E78.49 OTHER HYPERLIPIDEMIA: ICD-10-CM

## 2020-04-22 DIAGNOSIS — Z11.4 ENCOUNTER FOR SCREENING FOR HIV: ICD-10-CM

## 2020-04-22 DIAGNOSIS — K22.4 ESOPHAGEAL DYSMOTILITY: ICD-10-CM

## 2020-04-22 DIAGNOSIS — Z79.4 TYPE 2 DIABETES MELLITUS WITH HYPERGLYCEMIA, WITH LONG-TERM CURRENT USE OF INSULIN: Primary | ICD-10-CM

## 2020-04-22 DIAGNOSIS — Z51.81 ENCOUNTER FOR MONITORING LONG-TERM PROTON PUMP INHIBITOR THERAPY: ICD-10-CM

## 2020-04-22 DIAGNOSIS — I10 ESSENTIAL HYPERTENSION: ICD-10-CM

## 2020-04-22 DIAGNOSIS — K21.9 GASTROESOPHAGEAL REFLUX DISEASE, ESOPHAGITIS PRESENCE NOT SPECIFIED: ICD-10-CM

## 2020-04-22 DIAGNOSIS — Z98.84 S/P GASTRIC BYPASS: ICD-10-CM

## 2020-04-22 DIAGNOSIS — I73.00 RAYNAUD'S DISEASE WITHOUT GANGRENE: ICD-10-CM

## 2020-04-22 DIAGNOSIS — R76.8 POSITIVE ANA (ANTINUCLEAR ANTIBODY): ICD-10-CM

## 2020-04-22 DIAGNOSIS — D64.9 NORMOCYTIC ANEMIA: ICD-10-CM

## 2020-04-22 PROCEDURE — 99214 OFFICE O/P EST MOD 30 MIN: CPT | Mod: 95,,, | Performed by: FAMILY MEDICINE

## 2020-04-22 PROCEDURE — 3051F PR MOST RECENT HEMOGLOBIN A1C LEVEL 7.0 - < 8.0%: ICD-10-PCS | Mod: CPTII,,, | Performed by: FAMILY MEDICINE

## 2020-04-22 PROCEDURE — 3051F HG A1C>EQUAL 7.0%<8.0%: CPT | Mod: CPTII,,, | Performed by: FAMILY MEDICINE

## 2020-04-22 PROCEDURE — 99214 PR OFFICE/OUTPT VISIT, EST, LEVL IV, 30-39 MIN: ICD-10-PCS | Mod: 95,,, | Performed by: FAMILY MEDICINE

## 2020-04-22 RX ORDER — SUCRALFATE 1 G/1
1 TABLET ORAL
Qty: 90 TABLET | Refills: 1 | Status: SHIPPED | OUTPATIENT
Start: 2020-04-22 | End: 2020-10-21

## 2020-04-22 NOTE — Clinical Note
F/u in 3 months with labs and urine prior. Telemedicine visit please. Please call her and schedule this/let her know date/time of apts. Thanks.DIANE

## 2020-04-22 NOTE — PROGRESS NOTES
"Subjective:       Patient ID: Jill Perez is a 64 y.o. female.    The patient location is: her home  The chief complaint leading to consultation is: DM, other issues  Visit type: audiovisual  Total time spent with patient: aprox 30 minutes  Each patient to whom he or she provides medical services by telemedicine is:  (1) informed of the relationship between the physician and patient and the respective role of any other health care provider with respect to management of the patient; and (2) notified that he or she may decline to receive medical services by telemedicine and may withdraw from such care at any time.      Jill is a 64 y.o. female who presents today for f/u on her multiple issues.     DM: metformin 1000 BID, taking trulicity weekly. a1c slightly higher. Goal is 7. No trouble swallowing.     HTN: on nifedipine. She has not been checking her BP. Supposed to be using digital HTN? Ordered again.     DLD: lipitor 40 mg. Tolerating.     Raynauds: nifedipine, wants better control. Seeing rheumatology. Still having pain. Hurts all the time. Wearing multiple socks today (3), still can't "get warm." Only trigger seems to be the cold. Doesn't report signs of PAD such as pain with activity.     Insomnia: ambien 10mg, sleeping well. Her last sleep study was 5-6 years ago. She doesn't snore that she is aware of. On the 10 mg of Ambien she felt well rested and was able to complete her days work, but without it she feels tired. When she takes Ambien 5 mg, she is able to fall asleep but she tosses and turns and wakes up tired.     Anemia: egd/colonoscopy normal. Anemia improved    Mood: fluoxetine is helping. Continuing to take ambien. Tried to trial off or lower dose, unable to sleep without it. Diminished quality of life.      GI/Issues: has seen GI and Pain management. Had scar injection. Didn't help. CT and US done by Gi were normal. Esophageal dysmotility noted on manometry. Also having spontaneous reflux. She " is NOT taking sulfacrate But she is taking bentyl and PPI. Minimally improved. Alternating constipation and loose stool. No black stools. No hematochezia. Loose stools are just water.     She has not passed out since episode in [place].     Obesity: she has lost maybe 5 pounds? She is stressed. covid isolation has been very difficult for her. Off insulin as well. No night sweats. She is cooking more then she used to.     Diabetes   She has type 2 diabetes mellitus. No MedicAlert identification noted. Hypoglycemia symptoms include dizziness, headaches, mood changes, nervousness/anxiousness and pallor. Pertinent negatives for hypoglycemia include no confusion, hunger, seizures, sleepiness, sweats or tremors. Associated symptoms include blurred vision, fatigue, foot paresthesias, polydipsia, polyuria, visual change, weakness and weight loss. Pertinent negatives for diabetes include no chest pain, no foot ulcerations and no polyphagia. Hypoglycemia complications include required assistance. Pertinent negatives for hypoglycemia complications include no blackouts, no hospitalization, no nocturnal hypoglycemia and no required glucagon injection. Symptoms are worsening. Diabetic complications include peripheral neuropathy and PVD. Pertinent negatives for diabetic complications include no CVA, heart disease, impotence, nephropathy or retinopathy. Risk factors for coronary artery disease include dyslipidemia, family history, hypertension and stress. Current diabetic treatment includes insulin injections and oral agent (monotherapy). She is compliant with treatment most of the time. She is currently taking insulin pre-breakfast. Insulin injections are given by patient. Rotation sites for injection include the abdominal wall. Her weight is decreasing steadily. She is following a high fat/cholesterol and high salt diet. When asked about meal planning, she reported none. She has not had a previous visit with a dietitian. She  participates in exercise daily. She monitors blood glucose at home 1-2 x per week. Blood glucose monitoring compliance is inadequate. Her home blood glucose trend is fluctuating minimally. She does not see a podiatrist.Eye exam is not current.     Review of Systems   Constitutional: Positive for fatigue and weight loss.   Eyes: Positive for blurred vision.   Cardiovascular: Negative for chest pain.   Endocrine: Positive for polydipsia and polyuria. Negative for polyphagia.   Genitourinary: Negative for impotence.   Skin: Positive for pallor.   Neurological: Positive for dizziness, weakness and headaches. Negative for tremors and seizures.   Psychiatric/Behavioral: Negative for confusion. The patient is nervous/anxious.          .    Results for orders placed or performed in visit on 04/20/20   Hemoglobin A1c   Result Value Ref Range    Hemoglobin A1C 7.2 (H) 4.0 - 5.6 %    Estimated Avg Glucose 160 (H) 68 - 131 mg/dL       Objective:   There were no vitals filed for this visit.     Physical Exam   Constitutional: She is oriented to person, place, and time. She appears well-developed and well-nourished. No distress.   Exam performed as able, but limited due to the inherent nature of telemedicine visit.    Pulmonary/Chest: No respiratory distress.   No respiratory distress or accessory muscle use noted   Neurological: She is alert and oriented to person, place, and time.   Skin: She is not diaphoretic.   Psychiatric: She has a normal mood and affect. Her behavior is normal. Judgment and thought content normal.       Assessment:       1. Type 2 diabetes mellitus with hyperglycemia, with long-term current use of insulin    2. Normocytic anemia    3. Positive YOLIS (antinuclear antibody)    4. Raynaud's disease without gangrene    5. Other hyperlipidemia    6. Essential hypertension    7. S/P gastric bypass    8. Esophageal dysmotility    9. Gastroesophageal reflux disease, esophagitis presence not specified    10.  Encounter for monitoring long-term proton pump inhibitor therapy    11. Encounter for screening for HIV        Plan:       Trial of gabapentin once in the AM, twice in the PM  Continue fluoxetine BUT AT 40 mg. Let me know how this works/if this works in 3 weeks.   Continue Metformin - 1000 mg twice daily  Continue trulicity to 1.5 mg. Monitor for any issues such as swallowing or neck swelling  Goal a1c around 7. No change in regimen at this time.     Continue nifedipine and follow up with rheumatology  Continue atorvastatin 40 mg  Continue acid reflux medication twice daily  Restart sulfacrate TID WM  Esophagram shows small hiatal hernia and spontaneous reflux. CT and US negative. Pain management injections didn't help.   Eat small frequently meals. Avoid saucy foods, spicy foods. Amylase/lipase/hypylori recently negative.     Will hold off on a diabetic eye exam due to covid.     Treat constipation with directions as per [employer] message    F/u with rheumatology for persistent raynaud's. Consider   US? F/u with GI if pain persists despite addition of carafate and PPI BID.     F/u in 3 months. Labs and urine prior.       Type 2 diabetes mellitus with hyperglycemia, with long-term current use of insulin  -     Diabetes Digital Medicine (DDMP) Enrollment Order  -     Hypertension [place] Medicine (HDMP) Enrollment Order  -     Hypertension Digital Medicine (HDMP): Assign Onboarding Questionnaires  -     Hemoglobin A1c; Future; Expected date: 04/22/2020  -     Microalbumin/creatinine urine ratio; Future; Expected date: 04/22/2020    Normocytic anemia  -     CBC auto differential; Future; Expected date: 04/22/2020  -     Comprehensive metabolic panel; Future; Expected date: 04/22/2020  -     Ferritin; Future; Expected date: 04/22/2020  -     Iron and TIBC; Future; Expected date: 04/22/2020    Positive YOLIS (antinuclear antibody)    Raynaud's disease without gangrene    Other hyperlipidemia  -     Lipid panel; Future;  Expected date: 04/22/2020    Essential hypertension  -     Diabetes Digital Medicine (DDMP) Enrollment Order  -     Hypertension Digital Medicine (HDMP) Enrollment Order  -     Hypertension Digital Medicine (HDMP): Assign Onboarding Questionnaires  -     TSH; Future; Expected date: 04/22/2020    S/P gastric bypass    Esophageal dysmotility  -     sucralfate (CARAFATE) 1 gram tablet; Take 1 tablet (1 g total) by mouth every meal as needed (upper gi upset).  Dispense: 90 tablet; Refill: 1    Gastroesophageal reflux disease, esophagitis presence not specified  -     sucralfate (CARAFATE) 1 gram tablet; Take 1 tablet (1 g total) by mouth every meal as needed (upper gi upset).  Dispense: 90 tablet; Refill: 1    Encounter for monitoring long-term proton pump inhibitor therapy  -     Vitamin B12; Future; Expected date: 04/22/2020  -     Magnesium; Future; Expected date: 04/22/2020  -     Vitamin D; Future; Expected date: 04/22/2020    Encounter for screening for HIV  -     HIV 1/2 Ag/Ab (4th Gen); Future; Expected date: 04/22/2020        Warning signs discussed, patient to call with any further issues or worsening of symptoms.

## 2020-04-23 ENCOUNTER — TELEPHONE (OUTPATIENT)
Dept: GASTROENTEROLOGY | Facility: CLINIC | Age: 65
End: 2020-04-23

## 2020-04-23 NOTE — TELEPHONE ENCOUNTER
----- Message from Lenora Hays sent at 4/23/2020  9:16 AM CDT -----  Contact: [phone]  Type:  Patient Returning Call    Who Called: self  Who Left Message for Patient: Isabel  Does the patient know what this is regarding?:   Would the patient rather a call back or a response via [facility]?  Call  Best Call Back Number: [phone]  Additional Information:

## 2020-04-23 NOTE — TELEPHONE ENCOUNTER
----- Message from Carmine Kearney MD sent at 4/23/2020  8:56 AM CDT -----  Thanks for routing that to me...  I havent figured out the source of her pain yet... I thought perhaps it was incisional given the nature directly over her prior scar.   Everything else including CT and endoscopies are all ok...    Ill get her back in about a month or so and see hows she doing with the med adjustments you advised.  Thanks again.    Isabel, can we schedule her non-urgent routine follow up, perhaps in about 1 months time.    ----- Message -----  From: Shahab Merlos DO  Sent: 4/22/2020   4:26 PM CDT  To: Carmine Kearney MD

## 2020-04-24 DIAGNOSIS — R10.13 EPIGASTRIC PAIN: ICD-10-CM

## 2020-04-24 RX ORDER — PANTOPRAZOLE SODIUM 40 MG/1
40 TABLET, DELAYED RELEASE ORAL 2 TIMES DAILY
Qty: 180 TABLET | Refills: 0 | Status: SHIPPED | OUTPATIENT
Start: 2020-04-24 | End: 2020-07-23 | Stop reason: SDUPTHER

## 2020-04-26 ENCOUNTER — PATIENT MESSAGE (OUTPATIENT)
Dept: ADMINISTRATIVE | Facility: OTHER | Age: 65
End: 2020-04-26

## 2020-04-30 ENCOUNTER — TELEPHONE (OUTPATIENT)
Dept: RHEUMATOLOGY | Facility: CLINIC | Age: 65
End: 2020-04-30

## 2020-04-30 NOTE — TELEPHONE ENCOUNTER
Spoke with patient in regards to transitioning 5/1 appointment with Dr. Magdaleno to a virtual visit. Patient states that she has the [employer] alejandro downloaded and understands how to navigate the alejandro. She states that she has previously done a virtual visit with a different provider.

## 2020-05-01 ENCOUNTER — OFFICE VISIT (OUTPATIENT)
Dept: RHEUMATOLOGY | Facility: CLINIC | Age: 65
End: 2020-05-01
Payer: COMMERCIAL

## 2020-05-01 DIAGNOSIS — M25.50 POLYARTHRALGIA: ICD-10-CM

## 2020-05-01 DIAGNOSIS — I10 ESSENTIAL HYPERTENSION: ICD-10-CM

## 2020-05-01 DIAGNOSIS — I73.00 RAYNAUD'S DISEASE WITHOUT GANGRENE: Primary | ICD-10-CM

## 2020-05-01 DIAGNOSIS — Z79.899 HIGH RISK MEDICATION USE: ICD-10-CM

## 2020-05-01 PROCEDURE — 99214 OFFICE O/P EST MOD 30 MIN: CPT | Mod: 95,,, | Performed by: INTERNAL MEDICINE

## 2020-05-01 PROCEDURE — 99214 PR OFFICE/OUTPT VISIT, EST, LEVL IV, 30-39 MIN: ICD-10-PCS | Mod: 95,,, | Performed by: INTERNAL MEDICINE

## 2020-05-01 RX ORDER — NIFEDIPINE 90 MG/1
90 TABLET, EXTENDED RELEASE ORAL DAILY
Qty: 30 TABLET | Refills: 4 | Status: SHIPPED | OUTPATIENT
Start: 2020-05-01 | End: 2020-06-24 | Stop reason: DRUGHIGH

## 2020-05-01 NOTE — PROGRESS NOTES
Subjective:       Patient ID: Jill Perez is a 65 y.o. female.    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    Prior work up has revealed +YOLIS 1;1280, negative profile, elevated sed rate of 46, normal CRP, negative Scl70, normal complement, negative RF/CCP, absent cryoglobulins, normal SPEP, negative hepatitis B and C, urine showed no elevation in protein or hematuria, normal CPK.  Repeat YOLIS negative, normal complement, negative dsDNA.    Interval history: pt feels she has more fatigued and like she is having more anxiety. Patient recently had prozac dose increased last week.  Pt has been having more discoloration of her feet when they turn white, but not red or purple.  She then will have episodes of her feet being hot.  She uses layers of socks. No ulcerations on her feet. She feels that when her anxiety is worse he raynauds gets worse.    She still has some achiness of her joints in her shoulders and hands but stated she is doing well overall. No swelling of her joints. Pt has history of chronic headaches.    Pt has not seen psychiatry from prior recommendations at her [facility] visit with Dr Gonzalez. Pt admits to have some tingling in her left leg, she had an EMG done 10/10/2019.  Pt admits to have a left foot drop after she had her last lower back surgery and had to wear a brace.    Pt has lost about 10-12 pounds since mid December unintentionally in setting of dysphagia.     Pt admits that she feels like her pills would get stuck when she is trying to swallow them, pt had EGD with Dr Kearney, no explanation of esophageal dysphagia seen to explain dysphagia. She was recently placed on a PPI BID.    Initial HPI from 10/22/2019: Pt initially saw   Jeremiah on 3/13/2019 for complain ot left shoulder, lower back and hip pain with stiffness in her arms/legs and hips in the morning with improvement in pain.  She had been having raynauds in her hands/feet for years but was officially diagnosed when she saw Dr Daniels in March of 2019.   Pt had her nifedipine increased from 60mg to 90mg daily.  Pt then followed up in June 2019 with Dr Gonzalez/Bety.    Former smoker in her 20s, smoked for 5-6 years, smoked a few cigarettes a week.  No drinking, no drug use. 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  Pt has three children, all premature, 1 born at 6.5 months and others at 7 months, with pre-eclampsia. No h/o miscarriages.  Mother had rheumatoid.  Son has Crohn's disease.  Patient denied family history of lupus, psoriasis, scleroderma, sjogrens, sarcoidosis, UC.    Review of Systems   Constitutional: Positive for fatigue. Negative for chills, diaphoresis, fever and unexpected weight change.   HENT: Negative for congestion, hearing loss, mouth sores, nosebleeds, sore throat, trouble swallowing and voice change.    Eyes: Negative for photophobia, pain, redness and visual disturbance.   Respiratory: Negative for cough, chest tightness and shortness of breath.    Cardiovascular: Negative for chest pain and palpitations.   Gastrointestinal: Negative for abdominal pain, diarrhea, nausea and vomiting.        Alternating diarrhea and constipation with taking iron and laxatives.   Genitourinary: Negative for difficulty urinating, dysuria, genital sores and hematuria.   Musculoskeletal: Positive for arthralgias and back pain. Negative for joint swelling, myalgias, neck pain and neck stiffness.   Skin: Positive for color change. Negative for rash.   Neurological: Positive for headaches. Negative for seizures, weakness and numbness.   Psychiatric/Behavioral: Positive for agitation. Negative for confusion and sleep disturbance. The patient  is nervous/anxious.          Objective:   LMP  (LMP Unknown)         Physical Exam   Constitutional: She is oriented to person, place, and time and well-developed, well-nourished, and in no distress. No distress.   HENT:   Head: Normocephalic and atraumatic.   Pulmonary/Chest: Effort normal.   Neurological: She is alert and oriented to person, place, and time.   Skin: She is not diaphoretic.     Feet without ischemic looking changes visually, no palor or purple discoloration, mild erythema of left 4th/5th digit and lateral foot.   Psychiatric: Mood and affect normal.         Ref. Range 3/18/2019 11:48 3/18/2019 11:48 6/11/2019 10:10   YOLIS HEP-2 Titer Unknown Positive 1:1280 Speckled  Atypical     Anti-SSA Antibody Latest Ref Range: 0.00 - 19.99 EU 1.71     Anti-SSA Interpretation Latest Ref Range: Negative  Negative     Anti-SSB Antibody Latest Ref Range: 0.00 - 19.99 EU 0.31     Anti-SSB Interpretation Latest Ref Range: Negative  Negative     ds DNA Ab Latest Ref Range: Negative 1:10  Negative 1:10     Anti Sm Antibody Latest Ref Range: 0.00 - 19.99 EU 1.51     Anti-Sm Interpretation Latest Ref Range: Negative  Negative     Anti Sm/RNP Antibody Latest Ref Range: 0.00 - 19.99 EU 1.08     Anti-Sm/RNP Interpretation Latest Ref Range: Negative  Negative     YOLIS Screen Latest Ref Range: Negative <1:160  Positive (A)     Scleroderma SCL- Latest Ref Range: <20 UNITS 3     Complement (C-3) Latest Ref Range: 50 - 180 mg/dL 167     Complement (C-4) Latest Ref Range: 11 - 44 mg/dL 32     Protein, Serum Latest Ref Range: 6.0 - 8.4 g/dL   7.5   Albumin grams/dl Latest Ref Range: 3.35 - 5.55 g/dL   3.71   Alpha-1 grams/dl Latest Ref Range: 0.17 - 0.41 g/dL   0.33   Alpha-2 grams/dl Latest Ref Range: 0.43 - 0.99 g/dL   0.97   Beta grams/dl Latest Ref Range: 0.50 - 1.10 g/dL   1.19 (H)   Gamma grams/dl Latest Ref Range: 0.67 - 1.58 g/dL   1.30   Pathologist Interpretation SPE Unknown   REVIEWED   Cryoglobulin, Qual Latest Ref  Range: Absent  Absent Absent      Arthritis survey done 3/21/2019 independently reviewed showing DJD of cspine and hands b/l, left knee replacement, chondrocalcinosis of right knee, no erosions identified.    EMG done 10/10/2019: 1. There is evidence of a mild sensory median mononeuropathy   across the left wrist (i.e. carpal tunnel syndrome).  There is no   motor axonal loss and no active denervation.  2. 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  However, an extensive needle examination of the   left lower extremity was completely normal, without signs of   active or chronic neuropathy.  Therefore, I cannot localize this   well electrodiagnostically.  The fact that it involves all three   of these nerves would lend towards either a sciatic neuropathy,   lumbosacral plexopathy (lower lumbosacral plexus), radiculopathy,   or asymmetric peripheral polyneuropathy.  Given her history of   lumbar decompressions, I think that lumbar radiculopathy is the   most likely cause.      Assessment:       1. Raynaud's disease without gangrene    2. Essential hypertension    3. High risk medication use    4. 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    Prior work up has revealed +YOLIS 1;1280, negative profile, elevated sed rate of 46, normal CRP, negative Scl70, normal complement, negative RF/CCP, absent cryoglobulins, normal SPEP, negative hepatitis B and C, urine showed no elevation in protein or hematuria, normal CPK.  Repeat YOLIS negative, normal complement, negative dsDNA.    Pt currently does not meet criteria for  a CTD at this time    Plan:       - no need for labs today  -continue nifedipine 90mg XL for raynauds, pt tolerating medication well, refill given  -pt to follow up with GI regarding dysphagia  -educated pt on keeping her hands/feet warm in the winter months, keeping house temperature warm and body temperature warm.  -repeat vitamin D at next visit  -pt to follow up with her PCP regarding her anxiety, as concerned that patients worsening anxiety is also contributing to worsening of her raynauds.  -RTC in 8 weeks  Problem List Items Addressed This Visit        Cardiac/Vascular    Essential hypertension    Relevant Medications    NIFEdipine (PROCARDIA-XL) 90 MG (OSM) 24 hr tablet    Raynaud's disease without gangrene - Primary    Relevant Medications    NIFEdipine (PROCARDIA-XL) 90 MG (OSM) 24 hr tablet       Orthopedic    Polyarthralgia      Other Visit Diagnoses     High risk medication use            The patient location is: home  The chief complaint leading to consultation is: follow up raynauds  Visit type: audiovisual  Total time spent with patient: 10 minutes  Each patient to whom he or she provides medical services by telemedicine is:  (1) informed of the relationship between the physician and patient and the respective role of any other health care provider with respect to management of the patient; and (2) notified that he or she may decline to receive medical services by telemedicine and may withdraw from such care at any time.

## 2020-05-04 ENCOUNTER — TELEPHONE (OUTPATIENT)
Dept: RHEUMATOLOGY | Facility: CLINIC | Age: 65
End: 2020-05-04

## 2020-05-04 NOTE — TELEPHONE ENCOUNTER
Ignacia Magdaleno, DO [facility] Staff             Please schedule patient for a follow up in person in 8 weeks from now.        Yue: The patient confirmed apt for June 19 at 8:00 am. The pt voices understanding.

## 2020-05-28 ENCOUNTER — PATIENT MESSAGE (OUTPATIENT)
Dept: GASTROENTEROLOGY | Facility: CLINIC | Age: 65
End: 2020-05-28

## 2020-05-28 ENCOUNTER — TELEPHONE (OUTPATIENT)
Dept: GASTROENTEROLOGY | Facility: CLINIC | Age: 65
End: 2020-05-28

## 2020-05-28 NOTE — TELEPHONE ENCOUNTER
Patient stated that she could not get her wifi to work. Patient would like an appointment to come in. OV scheduled on 6/9/20 at 9:30am.

## 2020-05-28 NOTE — TELEPHONE ENCOUNTER
----- Message from Jared Szymanski sent at 5/28/2020  9:49 AM CDT -----  Contact: Pt  Pt called and said that she was supposed to have a virtual visit at 9:00    She is having trouble logging in and would like to have someone call her back    Pt can be reached at [phone]

## 2020-06-05 ENCOUNTER — PATIENT OUTREACH (OUTPATIENT)
Dept: OTHER | Facility: OTHER | Age: 65
End: 2020-06-05

## 2020-06-05 NOTE — LETTER
June 5, 2020     Jill Perez  [address]  [address]  [place] [address]       Dear Jill,    Welcome to [facility]! Our goal is to make care effective, proactive and convenient by using data you send us from home to better treat your chronic conditions.              My name is Yoel Mays, and I am your dedicated Digital Medicine clinician. As an expert in medication management, I will help ensure that the medications you are taking continue to provide the intended benefits and help you reach your goals. You can reach me directly at [phone] or by sending me a message directly through your [facility] account.      I am Eugenia Simms and I will be your health . My job is to help you identify lifestyle changes to improve your disease control. We will talk about nutrition, exercise, and other ways you may be able to adjust your current habits to better your health. Additionally, we will help ensure you are completing the tests and screenings that are necessary to help manage your conditions. You can reach me directly at [phone] or by sending me a message directly through your [facility] account.    Most importantly, YOU are at the center of this team. Together, we will work to improve your overall health and encourage you to meet your goals for a healthier lifestyle.     What we expect from YOU:  · Please take frequent home blood pressure measurements. We ask that you take at least 1 blood pressure reading per week, but more information will better help us get you know you. Be sure you rest for a few minutes before taking the reading in a quiet, comfortable place.    · Please take frequent home blood sugar measurements according to the frequency your physician and Digital Medicine care team specify. It is important that your team see both fasting and after meal readings.      Be available to receive phone calls or [facility] messages, when appropriate, from your care team. Please let  us know if there are any specific days or times that work best for us to reach you via phone.     Complete routine tests and screenings. Dont worry, we will help keep you on track!           What you should expect from your Digital Medicine Care Team:   We will work with you to create a personalized plan of care and provide you with encouragement and education, including regarding lifestyle changes, that could help you manage your disease states.     We will adjust your current medications, if needed, and continue to monitor your long-term progress.     We will provide you and your physician with monthly progress reports after you have been in the program for more than 30 days.     We will send you reminders through [employer] and text messages to help ensure you do not miss any testing deadlines to help manage your disease states.    You will be able to reach us by phone or through your [facility] account by clicking our names under Care Team on the right side of the home screen.    I look forward to working with you to achieve your blood pressure goals!    We look forward to working with you to help manage your health,    Sincerely,    Your Digital Medicine Team    Please visit our websites to learn more:   · Hypertension: [email or web address]  · Diabetes: [email or web address]      Remember, we are not available for emergencies. If you have an emergency, please contact your doctors office directly or call [facility] on-call ([phone] or [phone]) or [phone].    Diabetes: We want help you get important tests and screenings done regularly to assure that your health needs are met. We have put a new system in place, called CareTouch that will help us improve how we monitor and reach out to you about the following lab tests that you will need to help manage your diabetes.  · Hemoglobin A1c testing (Frequency: Every 3 to 6 months, dependent on A1c goal)  · Nephropathy  Assessment, generally urine micro albumin testing (Frequency: Yearly)  · Eye exam through a quick 30-minute Eye Photo Exam (Frequency: 1-2 Years, depending on result)    When necessary you can come in to one of the labs on the attached page any weekday between 10:30 am and 4:00 pm to have your tests done prior to their due date. Tell the  you received a CareTouch letter, or just look for the CareTouch sign.

## 2020-06-05 NOTE — PROGRESS NOTES
Digital Medicine: Health  Introduction    Introduced Jill Perez to Digital Medicine. Discussed health  role and recommended lifestyle modifications.    The history is provided by the patient. No  was used.     HYPERTENSION  Our goal is to get BP to consistently below 130/80mmHg and make the process convenient so patient can avoid extra trips to the office. Getting your blood pressure below 130/80mmHg (definition of control) will reduce your risk for heart attack, kidney failure, stroke and death (as well as kidney failure, eye disease, & dementia)      Reviewed that the Digital Medicine care team - consisting of a clinician and a health  - will follow the most current evidence-based national guidelines for treating your condition.  The health  will focus on lifestyle modifications and motivation while the clinician will focus on medication therapy.  The care team will review all data on a regular basis and reach out as needed.      Explained that one of the key parts of the program is communication with the care team.  Asked patient to respond to outreach attempts and complete questionnaires.  Stressed importance of medication adherence.    Reviewed non-pharmacologic therapies and impact on BP.      Explained that we expect patient to obtain several blood pressures per week at random times of day.  Instructed patient not to allow anyone else to use phone and monitoring device.  Confirmed appropriate BP monitoring technique.      Explained to patient that the digital medicine team is not available for emergencies.  Patient will call [facility] on-call ([phone] or [phone]) or [phone] if needed.      Patient's BP goal is 130/80.Patient's BP average is 119/80 mmHg, which is above goal, per 2017 ACC/AHA Hypertension Guidelines.        DIABETES    Our goal is to decrease A1c within patient-specific target levels and make the process convenient so patient can avoid extra trips  to the office. Reducing A1C by merely 1% results in a decreased risk of complications of at least 10%. For example, an A1C reduced from 8.5% to 7.5% results in almost 40% lower risk of kidney, eye, and nerve disease      Reviewed that the Digital Medicine care team - consisting of a clinician and a health  - will follow the most current evidence-based national guidelines for treating your condition.  The health  will focus on lifestyle modifications and motivation while the clinician will focus on medication therapy.  The care team will review all data on a regular basis and reach out as needed.      Explained that one of the key parts of the program is communication with the care team.  Asked patient to respond to outreach attempts and complete questionnaires.  Stressed importance of medication adherence.      Instructed patient not to allow anyone else to use phone and monitoring device.  Explained that we expect patient to test their blood sugar as prescribed by their physician or Digital Medicine Clinician.      Reviewed general Self-Monitoring of Blood Glucose (SMBG) goals:  · FPG: <  mg/dL  · 1h PPG: < 180 mg/dL  · 2h PPG: < 160 mg/dL  · Bedtime: < 150 mg/dL    Explained to patient that the digital medicine team is not available for emergencies.  Patient will call [facility] on-call ([phone] or [phone]) or [phone] if needed.      Expected SMBG schedule: Daily  Reviewed signs and symptoms of hypoglycemia (weakness, dizziness, hunger, shakiness, nausea, headache, heart palpitations, sweating, fatigue, anxiety, etc.).  Reviewed treatment of hypoglycemia (15/15 rule).          Last 5 Patient Entered Readings                                      Current 30 Day Average: 119/80     Recent Readings 6/5/2020 6/4/2020 6/3/2020 5/12/2020    SBP (mmHg) 115 125 115 120    DBP (mmHg) 85 78 78 80    Pulse 73 72 99 80        Last 6 Patient Entered Readings                                          Most  Recent A1c:      Recent Readings 6/5/2020 6/4/2020 6/3/2020    Blood Glucose (mg/dL) 136 116 299          INTERVENTION(S)  reviewed appropriate dose schedule, recommended diet modifications, recommend physical activity, reviewed monitoring technique and encouragement/support    PLAN  patient verbalizes understanding and continue monitoring    Will f/u in 3 weeks to discuss screenings in detail.       There are no preventive care reminders to display for this patient.    Reviewed the importance of self-monitoring, medication adherence, and that the health  can be used as a resource for lifestyle modifications to help reduce or maintain a healthy lifestyle.    Sent link to [facility] webpages and my contact information via [employer] for future questions. Follow up scheduled.             Diet Screening       Encouraged to decrease sodium intake to <2,000 mg per day and decrease carb intake to <200g per day.    Physical Activity Screening       Encouraged to increase exercise to at least 150 minutes per week.        SDOH

## 2020-06-09 ENCOUNTER — OFFICE VISIT (OUTPATIENT)
Dept: GASTROENTEROLOGY | Facility: CLINIC | Age: 65
End: 2020-06-09
Payer: COMMERCIAL

## 2020-06-09 ENCOUNTER — HOSPITAL ENCOUNTER (OUTPATIENT)
Dept: RADIOLOGY | Facility: HOSPITAL | Age: 65
Discharge: HOME | End: 2020-06-09
Attending: INTERNAL MEDICINE
Payer: COMMERCIAL

## 2020-06-09 VITALS
HEIGHT: 67 IN | WEIGHT: 180.75 LBS | SYSTOLIC BLOOD PRESSURE: 122 MMHG | DIASTOLIC BLOOD PRESSURE: 81 MMHG | BODY MASS INDEX: 28.37 KG/M2 | HEART RATE: 94 BPM

## 2020-06-09 DIAGNOSIS — R19.4 CHANGE IN BOWEL HABITS: ICD-10-CM

## 2020-06-09 DIAGNOSIS — R13.19 ESOPHAGEAL DYSPHAGIA: Primary | ICD-10-CM

## 2020-06-09 PROCEDURE — 99999 PR PBB SHADOW E&M-EST. PATIENT-LVL IV: CPT | Mod: PBBFAC,,, | Performed by: INTERNAL MEDICINE

## 2020-06-09 PROCEDURE — 74018 RADEX ABDOMEN 1 VIEW: CPT | Mod: 26,,, | Performed by: RADIOLOGY

## 2020-06-09 PROCEDURE — 99214 OFFICE O/P EST MOD 30 MIN: CPT | Mod: S$GLB,,, | Performed by: INTERNAL MEDICINE

## 2020-06-09 PROCEDURE — 99214 PR OFFICE/OUTPT VISIT, EST, LEVL IV, 30-39 MIN: ICD-10-PCS | Mod: S$GLB,,, | Performed by: INTERNAL MEDICINE

## 2020-06-09 PROCEDURE — 74018 XR ABDOMEN AP 1 VIEW: ICD-10-PCS | Mod: 26,,, | Performed by: RADIOLOGY

## 2020-06-09 PROCEDURE — 99999 PR PBB SHADOW E&M-EST. PATIENT-LVL IV: ICD-10-PCS | Mod: PBBFAC,,, | Performed by: INTERNAL MEDICINE

## 2020-06-09 PROCEDURE — 74018 RADEX ABDOMEN 1 VIEW: CPT | Mod: TC,FY

## 2020-06-09 NOTE — PATIENT INSTRUCTIONS
EGD Prep Instructions    [facility] [address] [place] [address]    You are scheduled for an EGD with Dr. Kearney on 6/17/20 at [facility] located at [address] [place] [address].  Check in at the admit desk, first floor of the hospital (which is the building on the left).   You will receive a call 2-3 days before your EGD to tell you the time to arrive.  If you have not received a call by the day before your procedure, call the Endoscopy Lab at [phone].    Nothing to eat or drink after midnight before the procedure.  You MAY brush your teeth.    You MAY take your blood pressure, heart, and seizure medication on the morning of the procedure, with a SIP of water.  Hold ALL other medications until after the procedure.    If you are on blood thinners THAT YOU HAVE BEEN INSTRUCTED TO HOLD BY YOUR DOCTOR FOR THIS PROCEDURE, then do NOT take this the morning of your EGD.  Do NOT stop these medications on your own, they must be approved to be held by your doctor.  Your EGD can NOT be done if you are on these medications.  Examples of blood thinners include: Coumadin, Aggrenox, Plavix, Pradaxa, Reapro, Pletal, Xarelto, Ticagrelor, Brilinta, Eliquis, and high dose aspirin (325 mg).  You do not have to stop baby aspirin 81 mg.    You will receive a call 2-3 days before your EGD to tell you the time to arrive.  If you have not received a call by the day before your procedure, call the Endoscopy department at [phone].

## 2020-06-09 NOTE — PROGRESS NOTES
GASTROENTEROLOGY CLINIC NOTE    Reason for visit: The primary encounter diagnosis was Esophageal dysphagia. A diagnosis of Change in bowel habits was also pertinent to this visit.  Referring provider/PCP: Shahab Merlos DO      HPI:  Jill Perez is a 65 y.o. female here today for flollow up.  Hx pertinent for gastric bypass. She has GERD and takes protonix as well.       Interval:   Has had visits with rheumatology and pcp in interval. Also had pain mngmt visit and trigger injection didn't help the pain.  Ct without evidence of etiology of pain.  Esophagram showing dysmotility and barium tablet momentarily sticking at GEJ then spontaneously passing.    She has 3 issues today:  Dysphagia - still persistent to solids, not liquids tho. She has to crush her pills.   Taking protonix BID which helps, taking carafate and doesn't help at all.     Diarrhea / constipation: she has had 1 week of loose stools , no blood, but doesn't feel like completely evacuating. Some small squirts and at times doesn't feel like she will make it to restroom.   Taking:   Metamucil, prune juice, apple sauce cocktail.  Dulcolax  Scared of exlax. Gets bad cramps.    LUQ pain:  Intermittent pain, feels like gas trapping and gurgling and she has to massage it out. Not constant and only very intermittent and most of the time she massages it down and it goes away. No vomiting.        ========================  Initial HPI:  Patient states over the past 2 weeks he has experienced a sticking sensation at the bottom of her chest.  This is mainly with solids and pills.  She does drink some water afterwards to help food go down.  She has not had a food bolus.  She has never had an EGD.  She has not had this problem before the past 2 weeks.  She has some indigestion and bubbling of her stomach of recent as well. It seems as though the Protonix has somewhat help with this.  The symptoms of dyspepsia are mainly after p.o. Intake. She has  occasional reflux type symptoms.    She was also recently seen by her primary care doctor and her labs revealed an iron deficiency anemia.  She is due for screening colonoscopy.    Pt moved here approx 6 years ago. Formerly lived in [facility].   No family hx of CRC.        Prior Endoscopy:  EGD:   2019 with me  Impression:           - No endoscopic esophageal abnormality to explain                         patient's dysphagia. Biopsied.                        - 2 cm hiatal hernia.                        - Priya-en-Y gastrojejunostomy with gastrojejunal                         anastomosis characterized by healthy appearing                         mucosa. Biopsied.                        - Normal examined jejunum. Biopsied.  PATH normal.    Colon:   2019 with me  Findings:       The perianal and digital rectal examinations were normal.       A few small-mouthed diverticula were found in the sigmoid colon.       The colon (entire examined portion) appeared normal.       The terminal ileum appeared normal.  Repeat 10 years    Around 2008 in [place], was told to come back in 10 years.    (Portions of this note were dictated using voice recognition software and may contain dictation related errors in spelling/grammar/syntax not found on text review)    Review of Systems   Constitutional: Negative for chills and fever.   Respiratory: Negative for cough and shortness of breath.    Cardiovascular: Negative for chest pain and leg swelling.   Gastrointestinal: Positive for abdominal pain, constipation, diarrhea, heartburn and nausea. Negative for blood in stool, melena and vomiting.   Genitourinary: Negative for dysuria and hematuria.   Musculoskeletal: Positive for back pain. Negative for neck pain.   Neurological: Negative for dizziness and headaches.   Psychiatric/Behavioral: Negative for hallucinations. The patient does not have insomnia.        Past Medical History: has a past medical history of B12 deficiency,  Chronic back pain, Diabetes mellitus, Diabetes mellitus, type 2, Hyperlipidemia, Hypertension, and Insomnia.    Past Surgical History: has a past surgical history that includes Back surgery; Appendectomy; Fracture surgery; Splenectomy, total; Gastric bypass (2010); Total knee arthroplasty (Left); Cholecystectomy; Breast Reduction ();  section; Total Reduction Mammoplasty (Bilateral); Anterior cruciate ligament repair; Esophagogastroduodenoscopy (N/A, 2019); and Colonoscopy (N/A, 2019).    Family History:family history includes Breast cancer in her sister; Cancer in her sister; Cataracts in her father and mother; Coronary artery disease in her father; Diabetes in her father and sister; Glaucoma in her mother; Heart disease in her father and mother; Heart failure in her mother; Hyperlipidemia in her father; Hypertension in her father; No Known Problems in her brother, brother, sister, sister, and sister; Rheumatologic disease in her mother.    Allergies:   Review of patient's allergies indicates:   Allergen Reactions    Pcn [penicillins] Hives and Itching       Social History: reports that she has quit smoking. She quit after 5.00 years of use. She has quit using smokeless tobacco. She reports that she drinks alcohol. She reports that she does not use drugs.    Home medications:   Current Outpatient Medications on File Prior to Visit   Medication Sig Dispense Refill    aspirin (ECOTRIN) 81 MG EC tablet Take 81 mg by mouth once daily.      atorvastatin (LIPITOR) 40 MG tablet Take 1 tablet (40 mg total) by mouth once daily. 90 tablet 3    dulaglutide (TRULICITY) 1.5 mg/0.5 mL PnIj Inject 1.5 mg into the skin every 7 days. 6 mL 0    FLUoxetine 20 MG tablet Take 1 tablet (20 mg total) by mouth once daily. 90 tablet 0    gabapentin (NEURONTIN) 300 MG capsule Take 1 capsule (300 mg total) by mouth 3 (three) times daily. 270 capsule 0    metFORMIN (GLUCOPHAGE) 1000 MG tablet Take 1 tablet (1,000  "mg total) by mouth 2 (two) times daily with meals. 180 tablet 0    NIFEdipine (PROCARDIA-XL) 90 MG (OSM) 24 hr tablet Take 1 tablet (90 mg total) by mouth once daily. 30 tablet 4    ondansetron (ZOFRAN-ODT) 4 MG TbDL Dissolve 1 tablet (4 mg total) by mouth every 8 (eight) hours as needed (nausea). 60 tablet 0    pantoprazole (PROTONIX) 40 MG tablet Take 1 tablet (40 mg total) by mouth 2 (two) times daily. 180 tablet 0    sucralfate (CARAFATE) 1 gram tablet Take 1 tablet (1 g total) by mouth every meal as needed (upper gi upset). 90 tablet 1    zolpidem (AMBIEN) 10 mg Tab Take 1 tablet (10 mg total) by mouth nightly as needed. 30 tablet 2     No current facility-administered medications on file prior to visit.        Vital signs:  /81   Pulse 94   Ht 5' 7" (1.702 m)   Wt 82 kg (180 lb 12.4 oz)   LMP  (LMP Unknown)   BMI 28.31 kg/m²     Physical Exam   Constitutional: She is oriented to person, place, and time. She appears well-nourished. No distress.   HENT:   Head: Normocephalic and atraumatic.   Eyes: Conjunctivae are normal. No scleral icterus.   Abdominal: Soft. Bowel sounds are normal. She exhibits no distension and no mass.   Again pain over LUQ without rigidity ; no palpable mass or hernia; pain only with deep palpation    Neurological: She is alert and oriented to person, place, and time.   Vitals reviewed.      Routine labs:  Lab Results   Component Value Date    WBC 6.71 01/21/2020    HGB 12.3 01/21/2020    HCT 41.3 01/21/2020    MCV 93 01/21/2020     (H) 01/21/2020     Lab Results   Component Value Date    INR 0.9 04/22/2019     Lab Results   Component Value Date    IRON 37 07/22/2019    FERRITIN 19 (L) 10/19/2019    TIBC 435 07/22/2019    FESATURATED 9 (L) 07/22/2019     Lab Results   Component Value Date     01/21/2020    K 3.4 (L) 01/21/2020     01/21/2020    CO2 26 01/21/2020    BUN 9 01/21/2020    CREATININE 0.6 01/21/2020     Lab Results   Component Value Date    " ALBUMIN 3.7 01/21/2020    ALT 12 01/21/2020    AST 17 01/21/2020    ALKPHOS 113 01/21/2020    BILITOT 0.3 01/21/2020     No results found for: GLUCOSE    Imaging:  Reviewed    CT 3/2020  Cholecystectomy, mild prominence of the biliary ducts can be seen after cholecystectomy, it is unchanged.  Fatty atrophy of the pancreas.  Prior stomach surgery and splenectomy.  No acute intra-abdominal process seen.    esoophagram 3/20  Esophageal dysmotility.  Prior gastric bypass with small hiatal hernia and spontaneous reflux.  Barium tablet passed readily to the gastroesophageal junction with eventual passage after approximately 2 minutes, underlying intermittent spasm not excluded.    Assessment:  1. Esophageal dysphagia    2. Change in bowel habits        Dysphagia: suspect related to underlying dysmotility vs. Mild stricture at GEJ. Her HH and gastric sleeve may be contributing as well.  LUQ pain: I believe this is scar tissue and intermittent air trapping related to scar tissue in that area, as it resloves intermittently with pressure and massage to that area. No benefit from trigger injection to that site.  Alternating bowel habits/ fecal urgency: unsure if predominant constipation or diarrhea.      Plan:  Orders Placed This Encounter    X-Ray Abdomen AP 1 View    COVID-19 Routine Screening    Case request GI: EGD (ESOPHAGOGASTRODUODENOSCOPY)     egd with empiric dilation  Continue protonix    We discussed scar tissue as suspected etiology of luq intermittent pains and offered referral to surgery but she declines.    Will start with KUB to see if overflow constipation, if not, can consider questran vs. zenpep vs. Elavil (prolonged QTc 519 in 2019 )    rtc after above complete.    Carmine Kearney MD  [facility]    A total of 32 minutes were spent face-to-face with the patient during this encounter and over half of that time was spent on counseling and coordination of care.

## 2020-06-09 NOTE — H&P (VIEW-ONLY)
GASTROENTEROLOGY CLINIC NOTE    Reason for visit: The primary encounter diagnosis was Esophageal dysphagia. A diagnosis of Change in bowel habits was also pertinent to this visit.  Referring provider/PCP: hSahab Merlos DO      HPI:  Jill Perez is a 65 y.o. female here today for flollow up.  Hx pertinent for gastric bypass. She has GERD and takes protonix as well.       Interval:   Has had visits with rheumatology and pcp in interval. Also had pain mngmt visit and trigger injection didn't help the pain.  Ct without evidence of etiology of pain.  Esophagram showing dysmotility and barium tablet momentarily sticking at GEJ then spontaneously passing.    She has 3 issues today:  Dysphagia - still persistent to solids, not liquids tho. She has to crush her pills.   Taking protonix BID which helps, taking carafate and doesn't help at all.     Diarrhea / constipation: she has had 1 week of loose stools , no blood, but doesn't feel like completely evacuating. Some small squirts and at times doesn't feel like she will make it to restroom.   Taking:   Metamucil, prune juice, apple sauce cocktail.  Dulcolax  Scared of exlax. Gets bad cramps.    LUQ pain:  Intermittent pain, feels like gas trapping and gurgling and she has to massage it out. Not constant and only very intermittent and most of the time she massages it down and it goes away. No vomiting.        ========================  Initial HPI:  Patient states over the past 2 weeks he has experienced a sticking sensation at the bottom of her chest.  This is mainly with solids and pills.  She does drink some water afterwards to help food go down.  She has not had a food bolus.  She has never had an EGD.  She has not had this problem before the past 2 weeks.  She has some indigestion and bubbling of her stomach of recent as well. It seems as though the Protonix has somewhat help with this.  The symptoms of dyspepsia are mainly after p.o. Intake. She has  occasional reflux type symptoms.    She was also recently seen by her primary care doctor and her labs revealed an iron deficiency anemia.  She is due for screening colonoscopy.    Pt moved here approx 6 years ago. Formerly lived in [facility].   No family hx of CRC.        Prior Endoscopy:  EGD:   2019 with me  Impression:           - No endoscopic esophageal abnormality to explain                         patient's dysphagia. Biopsied.                        - 2 cm hiatal hernia.                        - Priya-en-Y gastrojejunostomy with gastrojejunal                         anastomosis characterized by healthy appearing                         mucosa. Biopsied.                        - Normal examined jejunum. Biopsied.  PATH normal.    Colon:   2019 with me  Findings:       The perianal and digital rectal examinations were normal.       A few small-mouthed diverticula were found in the sigmoid colon.       The colon (entire examined portion) appeared normal.       The terminal ileum appeared normal.  Repeat 10 years    Around 2008 in [place], was told to come back in 10 years.    (Portions of this note were dictated using voice recognition software and may contain dictation related errors in spelling/grammar/syntax not found on text review)    Review of Systems   Constitutional: Negative for chills and fever.   Respiratory: Negative for cough and shortness of breath.    Cardiovascular: Negative for chest pain and leg swelling.   Gastrointestinal: Positive for abdominal pain, constipation, diarrhea, heartburn and nausea. Negative for blood in stool, melena and vomiting.   Genitourinary: Negative for dysuria and hematuria.   Musculoskeletal: Positive for back pain. Negative for neck pain.   Neurological: Negative for dizziness and headaches.   Psychiatric/Behavioral: Negative for hallucinations. The patient does not have insomnia.        Past Medical History: has a past medical history of B12 deficiency,  Chronic back pain, Diabetes mellitus, Diabetes mellitus, type 2, Hyperlipidemia, Hypertension, and Insomnia.    Past Surgical History: has a past surgical history that includes Back surgery; Appendectomy; Fracture surgery; Splenectomy, total; Gastric bypass (2010); Total knee arthroplasty (Left); Cholecystectomy; Breast Reduction ();  section; Total Reduction Mammoplasty (Bilateral); Anterior cruciate ligament repair; Esophagogastroduodenoscopy (N/A, 2019); and Colonoscopy (N/A, 2019).    Family History:family history includes Breast cancer in her sister; Cancer in her sister; Cataracts in her father and mother; Coronary artery disease in her father; Diabetes in her father and sister; Glaucoma in her mother; Heart disease in her father and mother; Heart failure in her mother; Hyperlipidemia in her father; Hypertension in her father; No Known Problems in her brother, brother, sister, sister, and sister; Rheumatologic disease in her mother.    Allergies:   Review of patient's allergies indicates:   Allergen Reactions    Pcn [penicillins] Hives and Itching       Social History: reports that she has quit smoking. She quit after 5.00 years of use. She has quit using smokeless tobacco. She reports that she drinks alcohol. She reports that she does not use drugs.    Home medications:   Current Outpatient Medications on File Prior to Visit   Medication Sig Dispense Refill    aspirin (ECOTRIN) 81 MG EC tablet Take 81 mg by mouth once daily.      atorvastatin (LIPITOR) 40 MG tablet Take 1 tablet (40 mg total) by mouth once daily. 90 tablet 3    dulaglutide (TRULICITY) 1.5 mg/0.5 mL PnIj Inject 1.5 mg into the skin every 7 days. 6 mL 0    FLUoxetine 20 MG tablet Take 1 tablet (20 mg total) by mouth once daily. 90 tablet 0    gabapentin (NEURONTIN) 300 MG capsule Take 1 capsule (300 mg total) by mouth 3 (three) times daily. 270 capsule 0    metFORMIN (GLUCOPHAGE) 1000 MG tablet Take 1 tablet (1,000  "mg total) by mouth 2 (two) times daily with meals. 180 tablet 0    NIFEdipine (PROCARDIA-XL) 90 MG (OSM) 24 hr tablet Take 1 tablet (90 mg total) by mouth once daily. 30 tablet 4    ondansetron (ZOFRAN-ODT) 4 MG TbDL Dissolve 1 tablet (4 mg total) by mouth every 8 (eight) hours as needed (nausea). 60 tablet 0    pantoprazole (PROTONIX) 40 MG tablet Take 1 tablet (40 mg total) by mouth 2 (two) times daily. 180 tablet 0    sucralfate (CARAFATE) 1 gram tablet Take 1 tablet (1 g total) by mouth every meal as needed (upper gi upset). 90 tablet 1    zolpidem (AMBIEN) 10 mg Tab Take 1 tablet (10 mg total) by mouth nightly as needed. 30 tablet 2     No current facility-administered medications on file prior to visit.        Vital signs:  /81   Pulse 94   Ht 5' 7" (1.702 m)   Wt 82 kg (180 lb 12.4 oz)   LMP  (LMP Unknown)   BMI 28.31 kg/m²     Physical Exam   Constitutional: She is oriented to person, place, and time. She appears well-nourished. No distress.   HENT:   Head: Normocephalic and atraumatic.   Eyes: Conjunctivae are normal. No scleral icterus.   Abdominal: Soft. Bowel sounds are normal. She exhibits no distension and no mass.   Again pain over LUQ without rigidity ; no palpable mass or hernia; pain only with deep palpation    Neurological: She is alert and oriented to person, place, and time.   Vitals reviewed.      Routine labs:  Lab Results   Component Value Date    WBC 6.71 01/21/2020    HGB 12.3 01/21/2020    HCT 41.3 01/21/2020    MCV 93 01/21/2020     (H) 01/21/2020     Lab Results   Component Value Date    INR 0.9 04/22/2019     Lab Results   Component Value Date    IRON 37 07/22/2019    FERRITIN 19 (L) 10/19/2019    TIBC 435 07/22/2019    FESATURATED 9 (L) 07/22/2019     Lab Results   Component Value Date     01/21/2020    K 3.4 (L) 01/21/2020     01/21/2020    CO2 26 01/21/2020    BUN 9 01/21/2020    CREATININE 0.6 01/21/2020     Lab Results   Component Value Date    " ALBUMIN 3.7 01/21/2020    ALT 12 01/21/2020    AST 17 01/21/2020    ALKPHOS 113 01/21/2020    BILITOT 0.3 01/21/2020     No results found for: GLUCOSE    Imaging:  Reviewed    CT 3/2020  Cholecystectomy, mild prominence of the biliary ducts can be seen after cholecystectomy, it is unchanged.  Fatty atrophy of the pancreas.  Prior stomach surgery and splenectomy.  No acute intra-abdominal process seen.    esoophagram 3/20  Esophageal dysmotility.  Prior gastric bypass with small hiatal hernia and spontaneous reflux.  Barium tablet passed readily to the gastroesophageal junction with eventual passage after approximately 2 minutes, underlying intermittent spasm not excluded.    Assessment:  1. Esophageal dysphagia    2. Change in bowel habits        Dysphagia: suspect related to underlying dysmotility vs. Mild stricture at GEJ. Her HH and gastric sleeve may be contributing as well.  LUQ pain: I believe this is scar tissue and intermittent air trapping related to scar tissue in that area, as it resloves intermittently with pressure and massage to that area. No benefit from trigger injection to that site.  Alternating bowel habits/ fecal urgency: unsure if predominant constipation or diarrhea.      Plan:  Orders Placed This Encounter    X-Ray Abdomen AP 1 View    COVID-19 Routine Screening    Case request GI: EGD (ESOPHAGOGASTRODUODENOSCOPY)     egd with empiric dilation  Continue protonix    We discussed scar tissue as suspected etiology of luq intermittent pains and offered referral to surgery but she declines.    Will start with KUB to see if overflow constipation, if not, can consider questran vs. zenpep vs. Elavil (prolonged QTc 519 in 2019 )    rtc after above complete.    Carmine Kearney MD  [facility]    A total of 32 minutes were spent face-to-face with the patient during this encounter and over half of that time was spent on counseling and coordination of care.

## 2020-06-10 ENCOUNTER — TELEPHONE (OUTPATIENT)
Dept: BARIATRICS | Facility: CLINIC | Age: 65
End: 2020-06-10

## 2020-06-10 ENCOUNTER — TELEPHONE (OUTPATIENT)
Dept: SURGERY | Facility: CLINIC | Age: 65
End: 2020-06-10

## 2020-06-10 DIAGNOSIS — I73.00 RAYNAUD'S DISEASE WITHOUT GANGRENE: ICD-10-CM

## 2020-06-10 RX ORDER — FLUOXETINE 20 MG/1
20 TABLET ORAL DAILY
Qty: 90 TABLET | Refills: 0 | Status: SHIPPED | OUTPATIENT
Start: 2020-06-10 | End: 2020-07-21 | Stop reason: SDUPTHER

## 2020-06-10 NOTE — TELEPHONE ENCOUNTER
----- Message from Cesar Bryan sent at 6/10/2020 10:33 AM CDT -----  Patient does not have bariatric coverage.       ----- Message -----  From: Nallely Dobson RN  Sent: 6/10/2020  10:15 AM CDT  To: Cesar Bryan    Good morning. Will you please verify her insurance to see if she has bariatric coverage? Thank you!

## 2020-06-11 ENCOUNTER — TELEPHONE (OUTPATIENT)
Dept: SURGERY | Facility: CLINIC | Age: 65
End: 2020-06-11

## 2020-06-11 NOTE — TELEPHONE ENCOUNTER
Spoke with pt and scheduled appt for June 16 at 1430. Pt confirmed location, date and time.      ----- Message from Shabana Laureano LPN sent at 6/11/2020  8:43 AM CDT -----  Contact: Pt      ----- Message -----  From: Radha El  Sent: 6/11/2020   8:36 AM CDT  To: Jo[facility] Sch[facility] Staff    Reason: Pt returning Shabana call regarding to scheduling appt . Please call back    Communication: [phone]

## 2020-06-15 ENCOUNTER — LAB VISIT (OUTPATIENT)
Dept: FAMILY MEDICINE | Facility: CLINIC | Age: 65
End: 2020-06-15
Payer: COMMERCIAL

## 2020-06-15 ENCOUNTER — TELEPHONE (OUTPATIENT)
Dept: ENDOSCOPY | Facility: HOSPITAL | Age: 65
End: 2020-06-15

## 2020-06-15 DIAGNOSIS — R13.19 ESOPHAGEAL DYSPHAGIA: ICD-10-CM

## 2020-06-15 PROCEDURE — U0003 INFECTIOUS AGENT DETECTION BY NUCLEIC ACID (DNA OR RNA); SEVERE ACUTE RESPIRATORY SYNDROME CORONAVIRUS 2 (SARS-COV-2) (CORONAVIRUS DISEASE [COVID-19]), AMPLIFIED PROBE TECHNIQUE, MAKING USE OF HIGH THROUGHPUT TECHNOLOGIES AS DESCRIBED BY CMS-2020-01-R: HCPCS

## 2020-06-15 NOTE — TELEPHONE ENCOUNTER
Spoke with patient about arrival time @ 1000.     NPO status reviewed: Patient must have nothing to eat after midnight.  Medications: Do not take Insulin or oral diabetic medications the day of the procedure.  Take as prescribed: heart, seizure and blood pressure medication in the morning with a sip of water (less than an ounce).  Take any breathing medications and bring inhalers to hospital with you Leave all valuables and jewelry at home.     Wear comfortable clothes to procedure to change into hospital gown You cannot drive for 24 hours after your procedure because you will receive sedation for your procedure to make you comfortable.  A ride must be provided at [facility].

## 2020-06-16 ENCOUNTER — PATIENT OUTREACH (OUTPATIENT)
Dept: ADMINISTRATIVE | Facility: OTHER | Age: 65
End: 2020-06-16

## 2020-06-16 ENCOUNTER — OFFICE VISIT (OUTPATIENT)
Dept: SURGERY | Facility: CLINIC | Age: 65
End: 2020-06-16
Payer: COMMERCIAL

## 2020-06-16 VITALS
HEART RATE: 81 BPM | TEMPERATURE: 98 F | DIASTOLIC BLOOD PRESSURE: 65 MMHG | BODY MASS INDEX: 29.07 KG/M2 | WEIGHT: 180.88 LBS | SYSTOLIC BLOOD PRESSURE: 111 MMHG | HEIGHT: 66 IN

## 2020-06-16 DIAGNOSIS — K45.8 INTERNAL HERNIA: Primary | ICD-10-CM

## 2020-06-16 DIAGNOSIS — R13.19 ESOPHAGEAL DYSPHAGIA: ICD-10-CM

## 2020-06-16 DIAGNOSIS — K44.9 HIATAL HERNIA: ICD-10-CM

## 2020-06-16 DIAGNOSIS — Z01.818 PRE-OP TESTING: ICD-10-CM

## 2020-06-16 PROCEDURE — 3008F BODY MASS INDEX DOCD: CPT | Mod: CPTII,S$GLB,, | Performed by: SURGERY

## 2020-06-16 PROCEDURE — 99205 OFFICE O/P NEW HI 60 MIN: CPT | Mod: S$GLB,,, | Performed by: SURGERY

## 2020-06-16 PROCEDURE — 99999 PR PBB SHADOW E&M-EST. PATIENT-LVL V: ICD-10-PCS | Mod: PBBFAC,,, | Performed by: SURGERY

## 2020-06-16 PROCEDURE — 3074F PR MOST RECENT SYSTOLIC BLOOD PRESSURE < 130 MM HG: ICD-10-PCS | Mod: CPTII,S$GLB,, | Performed by: SURGERY

## 2020-06-16 PROCEDURE — 99999 PR PBB SHADOW E&M-EST. PATIENT-LVL V: CPT | Mod: PBBFAC,,, | Performed by: SURGERY

## 2020-06-16 PROCEDURE — 99205 PR OFFICE/OUTPT VISIT, NEW, LEVL V, 60-74 MIN: ICD-10-PCS | Mod: S$GLB,,, | Performed by: SURGERY

## 2020-06-16 PROCEDURE — 3078F PR MOST RECENT DIASTOLIC BLOOD PRESSURE < 80 MM HG: ICD-10-PCS | Mod: CPTII,S$GLB,, | Performed by: SURGERY

## 2020-06-16 PROCEDURE — 3078F DIAST BP <80 MM HG: CPT | Mod: CPTII,S$GLB,, | Performed by: SURGERY

## 2020-06-16 PROCEDURE — 3074F SYST BP LT 130 MM HG: CPT | Mod: CPTII,S$GLB,, | Performed by: SURGERY

## 2020-06-16 PROCEDURE — 3008F PR BODY MASS INDEX (BMI) DOCUMENTED: ICD-10-PCS | Mod: CPTII,S$GLB,, | Performed by: SURGERY

## 2020-06-16 NOTE — LETTER
June 18, 2020      Carmine Kearney MD  [address]  [address]  [facility] [address]           [facility]  [address]  [place] [address]  Phone: [phone]          Patient: Jill Perez   MR Number: [id number]   YOB: 1955   Date of Visit: 6/16/2020       Dear Dr. Carmine Kearney:    Thank you for referring Jill Perez to me for evaluation. Attached you will find relevant portions of my assessment and plan of care.    If you have questions, please do not hesitate to call me. I look forward to following Jill Perez along with you.    Sincerely,    Solange Kent MD    Enclosure  CC:  DO Ignacia Bess, DO Sirena Reynolds, PREETI Barnett MD    If you would like to receive this communication electronically, please contact [email or web address] or [phone] to request more information on [employer] Link access.    For providers and/or their staff who would like to refer a patient to [facility], please contact us through our one-stop-shop provider referral line, [facility], at [phone].    If you feel you have received this communication in error or would no longer like to receive these types of communications, please e-mail [email or web address]

## 2020-06-17 ENCOUNTER — TELEPHONE (OUTPATIENT)
Dept: SURGERY | Facility: CLINIC | Age: 65
End: 2020-06-17

## 2020-06-17 ENCOUNTER — HOSPITAL ENCOUNTER (OUTPATIENT)
Facility: HOSPITAL | Age: 65
Discharge: HOME | End: 2020-06-17
Attending: INTERNAL MEDICINE | Admitting: INTERNAL MEDICINE
Payer: COMMERCIAL

## 2020-06-17 ENCOUNTER — ANESTHESIA (OUTPATIENT)
Dept: ENDOSCOPY | Facility: HOSPITAL | Age: 65
End: 2020-06-17
Payer: COMMERCIAL

## 2020-06-17 ENCOUNTER — ANESTHESIA EVENT (OUTPATIENT)
Dept: ENDOSCOPY | Facility: HOSPITAL | Age: 65
End: 2020-06-17
Payer: COMMERCIAL

## 2020-06-17 VITALS
WEIGHT: 176 LBS | DIASTOLIC BLOOD PRESSURE: 68 MMHG | HEART RATE: 79 BPM | HEIGHT: 66 IN | BODY MASS INDEX: 28.28 KG/M2 | SYSTOLIC BLOOD PRESSURE: 104 MMHG | TEMPERATURE: 98 F | OXYGEN SATURATION: 100 % | RESPIRATION RATE: 20 BRPM

## 2020-06-17 DIAGNOSIS — R13.19 ESOPHAGEAL DYSPHAGIA: ICD-10-CM

## 2020-06-17 DIAGNOSIS — K22.4 ESOPHAGEAL DYSMOTILITY: Primary | ICD-10-CM

## 2020-06-17 LAB
POCT GLUCOSE: 116 MG/DL (ref 70–110)
SARS-COV-2 RNA RESP QL NAA+PROBE: NOT DETECTED

## 2020-06-17 PROCEDURE — 43248 PR EGD, FLEX, W/DILATION OVER GUIDEWIRE: ICD-10-PCS | Mod: ,,, | Performed by: INTERNAL MEDICINE

## 2020-06-17 PROCEDURE — 37000009 HC ANESTHESIA EA ADD 15 MINS: Performed by: INTERNAL MEDICINE

## 2020-06-17 PROCEDURE — 63600175 PHARM REV CODE 636 W HCPCS: Performed by: NURSE ANESTHETIST, CERTIFIED REGISTERED

## 2020-06-17 PROCEDURE — 25000003 PHARM REV CODE 250: Performed by: INTERNAL MEDICINE

## 2020-06-17 PROCEDURE — 43248 EGD GUIDE WIRE INSERTION: CPT | Mod: ,,, | Performed by: INTERNAL MEDICINE

## 2020-06-17 PROCEDURE — 43248 EGD GUIDE WIRE INSERTION: CPT | Performed by: INTERNAL MEDICINE

## 2020-06-17 PROCEDURE — 25000003 PHARM REV CODE 250: Performed by: NURSE ANESTHETIST, CERTIFIED REGISTERED

## 2020-06-17 PROCEDURE — C1769 GUIDE WIRE: HCPCS | Performed by: INTERNAL MEDICINE

## 2020-06-17 PROCEDURE — 37000008 HC ANESTHESIA 1ST 15 MINUTES: Performed by: INTERNAL MEDICINE

## 2020-06-17 PROCEDURE — 82962 GLUCOSE BLOOD TEST: CPT | Performed by: INTERNAL MEDICINE

## 2020-06-17 RX ORDER — SODIUM CHLORIDE 9 MG/ML
INJECTION, SOLUTION INTRAVENOUS CONTINUOUS
Status: DISCONTINUED | OUTPATIENT
Start: 2020-06-17 | End: 2020-06-17 | Stop reason: HOSPADM

## 2020-06-17 RX ORDER — PROPOFOL 10 MG/ML
VIAL (ML) INTRAVENOUS CONTINUOUS PRN
Status: DISCONTINUED | OUTPATIENT
Start: 2020-06-17 | End: 2020-06-17

## 2020-06-17 RX ORDER — SODIUM CHLORIDE 0.9 % (FLUSH) 0.9 %
10 SYRINGE (ML) INJECTION
Status: DISCONTINUED | OUTPATIENT
Start: 2020-06-17 | End: 2020-06-17 | Stop reason: HOSPADM

## 2020-06-17 RX ORDER — PROPOFOL 10 MG/ML
VIAL (ML) INTRAVENOUS
Status: DISCONTINUED | OUTPATIENT
Start: 2020-06-17 | End: 2020-06-17

## 2020-06-17 RX ORDER — LIDOCAINE HCL/PF 100 MG/5ML
SYRINGE (ML) INTRAVENOUS
Status: DISCONTINUED | OUTPATIENT
Start: 2020-06-17 | End: 2020-06-17

## 2020-06-17 RX ADMIN — PROPOFOL 150 MCG/KG/MIN: 10 INJECTION, EMULSION INTRAVENOUS at 11:06

## 2020-06-17 RX ADMIN — LIDOCAINE HYDROCHLORIDE 75 MG: 20 INJECTION, SOLUTION INTRAVENOUS at 11:06

## 2020-06-17 RX ADMIN — SODIUM CHLORIDE: 0.9 INJECTION, SOLUTION INTRAVENOUS at 10:06

## 2020-06-17 RX ADMIN — PROPOFOL 150 MG: 10 INJECTION, EMULSION INTRAVENOUS at 11:06

## 2020-06-17 NOTE — [employer] PATIENT INSTRUCTIONS
Discharge Summary/Instructions after an Endoscopic Procedure  Patient Name: Jill Perez  Patient MRN: [id number]  Patient YOB: 1955 Wednesday, June 17, 2020  Carmine Kearney MD  Your health is very important to us during the Covid Crisis. Following your   procedure today, you will receive a daily text for 2 weeks asking about   signs or symptoms of Covid 19.  Please respond to this text when you   receive it so we can follow up and keep you as safe as possible.   RESTRICTIONS:  During your procedure today, you received medications for sedation.  These   medications may affect your judgment, balance and coordination.  Therefore,   for 24 hours, you have the following restrictions:   - DO NOT drive a car, operate machinery, make legal/financial decisions,   sign important papers or drink alcohol.    ACTIVITY:  Today: no heavy lifting, straining or running due to procedural   sedation/anesthesia.  The following day: return to full activity including work.  DIET:  Eat and drink normally unless instructed otherwise.     TREATMENT FOR COMMON SIDE EFFECTS:  - Mild abdominal pain, nausea, belching, bloating or excessive gas:  rest,   eat lightly and use a heating pad.  - Sore Throat: treat with throat lozenges and/or gargle with warm salt   water.  - Because air was used during the procedure, expelling large amounts of air   from your rectum or belching is normal.  - If a bowel prep was taken, you may not have a bowel movement for 1-3 days.    This is normal.  SYMPTOMS TO WATCH FOR AND REPORT TO YOUR PHYSICIAN:  1. Abdominal pain or bloating, other than gas cramps.  2. Chest pain.  3. Back pain.  4. Signs of infection such as: chills or fever occurring within 24 hours   after the procedure.  5. Rectal bleeding, which would show as bright red, maroon, or black stools.   (A tablespoon of blood from the rectum is not serious, especially if   hemorrhoids are present.)  6. Vomiting.  7. Weakness or dizziness.  GO  DIRECTLY TO THE NEAREST EMERGENCY ROOM IF YOU HAVE ANY OF THE FOLLOWING:      Difficulty breathing              Chills and/or fever over 101 F   Persistent vomiting and/or vomiting blood   Severe abdominal pain   Severe chest pain   Black, tarry stools   Bleeding- more than one tablespoon   Any other symptom or condition that you feel may need urgent attention  Your doctor recommends these additional instructions:  If any biopsies were taken, your doctors clinic will contact you in 1 to 2   weeks with any results.  - Discharge patient to home.   - Patient has a contact number available for emergencies.  The signs and   symptoms of potential delayed complications were discussed with the   patient.  Return to normal activities tomorrow.  Written [facility]   instructions were provided to the patient.   - Resume previous diet.   - Continue present medications.   - Will inform Dr. Kent of my findings today and discuss, given that the   small diverticular-type opening created from prior gastric surgery that may   preferentially collect pills / foods etc and create the sensation of   dysphagia. Empirically dilated today with max 51Fr Savary but the esophagus   is wide open and I dont suspect there is actual esophageal stricturing.   Other possibility is that the small hiatal hernia is creating the sensation   of dysphagia.  For questions, problems or results please call your physician - Carmine Kearney MD at Work:  [phone].  EMERGENCY PHONE NUMBER: [phone],  LAB RESULTS: [phone]  IF A COMPLICATION OR EMERGENCY SITUATION ARISES AND YOU ARE UNABLE TO REACH   YOUR PHYSICIAN - GO DIRECTLY TO THE EMERGENCY ROOM.  Carmine Kearney MD  6/17/2020 11:26:11 AM  This report has been verified and signed electronically.  [employer]

## 2020-06-17 NOTE — ANESTHESIA PREPROCEDURE EVALUATION
2020  Jill Perez is a 65 y.o., female for EGD under MAC    Past Medical History:   Diagnosis Date    B12 deficiency     Chronic back pain     Diabetes mellitus     Diabetes mellitus, type 2     Hyperlipidemia     Hypertension     Insomnia      Past Surgical History:   Procedure Laterality Date    ANTERIOR CRUCIATE LIGAMENT REPAIR      left shoulder    APPENDECTOMY      BACK SURGERY      laminectomy and fusion    Breast Reduction  2003     SECTION      x2    CHOLECYSTECTOMY      COLONOSCOPY N/A 2019    Procedure: COLONOSCOPYSuprep;  Surgeon: Carmine Kearney MD;  Location: [facility];  Service: General;  Laterality: N/A;    ESOPHAGOGASTRODUODENOSCOPY N/A 2019    Procedure: EGD (ESOPHAGOGASTRODUODENOSCOPY);  Surgeon: Carmine Kearney MD;  Location: [facility];  Service: General;  Laterality: N/A;    FRACTURE SURGERY      GASTRIC BYPASS  2010    gastric sleeve    SPLENECTOMY, TOTAL      TOTAL KNEE ARTHROPLASTY Left     TOTAL REDUCTION MAMMOPLASTY Bilateral     over 20yrs ago           Pre-op Assessment    I have reviewed the Patient Summary Reports.     I have reviewed the Nursing Notes. I have reviewed the NPO Status.   I have reviewed the Medications.     Review of Systems  Anesthesia Hx:  No problems with previous Anesthesia Denies Hx of Anesthetic complications   Denies Personal Hx of Anesthesia complications.   Social:  No Alcohol Use, Former Smoker    Cardiovascular:   Exercise tolerance: good Hypertension hyperlipidemia    Pulmonary:   Asthma    Renal/:  Renal/ Normal     Hepatic/GI:   GERD    Neurological:  Neurology Normal    Endocrine:   Diabetes    Psych:   depression          Physical Exam  General:  Well nourished    Airway/Jaw/Neck:  Airway Findings: Mouth Opening: Normal Tongue: Normal  Mallampati: II  TM Distance: Normal, at least 6 cm  Jaw/Neck  Findings:  Neck ROM: Normal ROM       Chest/Lungs:  Chest/Lungs Findings: Clear to auscultation, Normal Respiratory Rate     Heart/Vascular:  Heart Findings: Rate: Normal  Rhythm: Regular Rhythm  Sounds: Normal        Mental Status:  Mental Status Findings:  Alert and Oriented, Cooperative         Anesthesia Plan  Type of Anesthesia, risks & benefits discussed:  Anesthesia Type:  MAC  Patient's Preference:   Intra-op Monitoring Plan: standard ASA monitors  Intra-op Monitoring Plan Comments:   Post Op Pain Control Plan: per primary service following [facility] from PACU  Post Op Pain Control Plan Comments:   Induction:   IV  Beta Blocker:  Patient is not currently on a Beta-Blocker (No further documentation required).       Informed Consent: Patient understands risks and agrees with Anesthesia plan.  Questions answered. Anesthesia consent signed with patient.  ASA Score: 2     Day of Surgery Review of History & Physical:            Ready For Surgery From Anesthesia Perspective.

## 2020-06-17 NOTE — PLAN OF CARE
Admission process complete. Patient ready for procedure. Plan of care reviewed with patient. Preoperative fasting appropriate for procedure and sedation. Call light in reach and bed in lowest position. tion.

## 2020-06-17 NOTE — TRANSFER OF CARE
"Anesthesia Transfer of Care Note    Patient: Jill Perez    Procedure(s) Performed: Procedure(s) (LRB):  EGD (ESOPHAGOGASTRODUODENOSCOPY) (N/A)    Patient location: GI    Anesthesia Type: MAC    Transport from OR: Transported from OR on room air with adequate spontaneous ventilation    Post pain: adequate analgesia    Post assessment: no apparent anesthetic complications    Post vital signs: stable    Level of consciousness: awake    Nausea/Vomiting: no nausea/vomiting    Complications: none    Transfer of care protocol was followed      Last vitals:   Visit Vitals  /73 (Patient Position: Lying)   Pulse 88   Temp 36.3 °C (97.3 °F) (Temporal)   Resp 17   Ht 5' 6" (1.676 m)   Wt 79.8 kg (176 lb)   LMP  (LMP Unknown)   SpO2 99%   Breastfeeding No   BMI 28.41 kg/m²     "

## 2020-06-17 NOTE — ANESTHESIA POSTPROCEDURE EVALUATION
Anesthesia Post Evaluation    Patient: Jill Perez    Procedure(s) Performed: Procedure(s) (LRB):  EGD (ESOPHAGOGASTRODUODENOSCOPY) (N/A)    Final Anesthesia Type: general    Patient location during evaluation: PACU  Patient participation: Yes- Able to Participate  Level of consciousness: awake and alert, oriented and awake  Post-procedure vital signs: reviewed and stable  Pain management: adequate  Airway patency: patent    PONV status at [facility]: No PONV  Anesthetic complications: no      Cardiovascular status: blood pressure returned to baseline  Respiratory status: unassisted and room air  Hydration status: euvolemic  Follow-up not needed.          Vitals Value Taken Time   /71 06/17/20 1122   Temp 36.4 °C (97.5 °F) 06/17/20 1122   Pulse 89 06/17/20 1122   Resp 18 06/17/20 1122   SpO2 98 % 06/17/20 1122         No case tracking events are documented in the log.      Pain/James Score: James Score: 10 (6/17/2020 11:22 AM)

## 2020-06-17 NOTE — TELEPHONE ENCOUNTER
Spoke with pt and informed her that her sx will be changed from 6/25 to 7/6/20. New post-op appt made and mailed out. Pt verbalized understanding.

## 2020-06-17 NOTE — ANESTHESIA POSTPROCEDURE EVALUATION
Anesthesia Post Evaluation    Patient: Jill Perez    Procedure(s) Performed: Procedure(s) (LRB):  EGD (ESOPHAGOGASTRODUODENOSCOPY) (N/A)    [facility] Anesthesia Post Op Evaluation      Vitals Value Taken Time   /63 06/17/20 1125   Temp 36.8 06/17/20 1125   Pulse 71 06/17/20 1125   Resp 14 06/17/20 1125   SpO2 99 06/17/20 1125         No case tracking events are documented in the log.      Pain/James Score: No data recorded

## 2020-06-18 ENCOUNTER — TELEPHONE (OUTPATIENT)
Dept: FAMILY MEDICINE | Facility: CLINIC | Age: 65
End: 2020-06-18

## 2020-06-18 PROBLEM — K44.9 HIATAL HERNIA: Status: ACTIVE | Noted: 2020-04-22

## 2020-06-18 PROBLEM — D64.9 NORMOCYTIC ANEMIA: Status: RESOLVED | Noted: 2019-07-22 | Resolved: 2020-06-18

## 2020-06-18 PROBLEM — Z98.84 S/P GASTRIC BYPASS: Status: RESOLVED | Noted: 2018-10-30 | Resolved: 2020-06-18

## 2020-06-18 PROBLEM — E78.00 PURE HYPERCHOLESTEROLEMIA: Status: ACTIVE | Noted: 2018-10-23

## 2020-06-18 PROBLEM — R79.89 LOW VITAMIN D LEVEL: Status: RESOLVED | Noted: 2018-10-30 | Resolved: 2020-06-18

## 2020-06-18 PROBLEM — E53.8 B12 DEFICIENCY: Status: RESOLVED | Noted: 2019-10-22 | Resolved: 2020-06-18

## 2020-06-18 RX ORDER — SODIUM CHLORIDE 9 MG/ML
INJECTION, SOLUTION INTRAVENOUS CONTINUOUS
Status: CANCELLED | OUTPATIENT
Start: 2020-06-18
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
## 2020-06-18 NOTE — TELEPHONE ENCOUNTER
Spoke with patient and informed returning call. Patient reports she will call office back in the morning.
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
## 2020-06-18 NOTE — TELEPHONE ENCOUNTER
----- Message from María Perez sent at 6/18/2020  4:58 PM CDT -----  Regarding: Call back  Contact: [phone]  Patient Returning Your Phone Call

## 2020-06-19 ENCOUNTER — LAB VISIT (OUTPATIENT)
Dept: LAB | Facility: HOSPITAL | Age: 65
End: 2020-06-19
Attending: INTERNAL MEDICINE
Payer: COMMERCIAL

## 2020-06-19 ENCOUNTER — TELEPHONE (OUTPATIENT)
Dept: RHEUMATOLOGY | Facility: CLINIC | Age: 65
End: 2020-06-19

## 2020-06-19 ENCOUNTER — OFFICE VISIT (OUTPATIENT)
Dept: RHEUMATOLOGY | Facility: CLINIC | Age: 65
End: 2020-06-19
Payer: COMMERCIAL

## 2020-06-19 VITALS
TEMPERATURE: 98 F | SYSTOLIC BLOOD PRESSURE: 128 MMHG | HEIGHT: 66 IN | HEART RATE: 95 BPM | WEIGHT: 180.5 LBS | BODY MASS INDEX: 29.01 KG/M2 | DIASTOLIC BLOOD PRESSURE: 80 MMHG

## 2020-06-19 DIAGNOSIS — R76.8 POSITIVE ANA (ANTINUCLEAR ANTIBODY): ICD-10-CM

## 2020-06-19 DIAGNOSIS — M79.606 PAIN AND SWELLING OF LOWER EXTREMITY, UNSPECIFIED LATERALITY: ICD-10-CM

## 2020-06-19 DIAGNOSIS — Z79.899 HIGH RISK MEDICATION USE: ICD-10-CM

## 2020-06-19 DIAGNOSIS — M79.89 PAIN AND SWELLING OF LOWER EXTREMITY, UNSPECIFIED LATERALITY: Primary | ICD-10-CM

## 2020-06-19 DIAGNOSIS — I73.00 RAYNAUD'S DISEASE WITHOUT GANGRENE: ICD-10-CM

## 2020-06-19 DIAGNOSIS — M79.606 PAIN AND SWELLING OF LOWER EXTREMITY, UNSPECIFIED LATERALITY: Primary | ICD-10-CM

## 2020-06-19 DIAGNOSIS — M79.89 PAIN AND SWELLING OF LOWER EXTREMITY, UNSPECIFIED LATERALITY: ICD-10-CM

## 2020-06-19 LAB
C3 SERPL-MCNC: 120 MG/DL (ref 50–180)
C4 SERPL-MCNC: 29 MG/DL (ref 11–44)
CRP SERPL-MCNC: 55.3 MG/L (ref 0–8.2)
ERYTHROCYTE [SEDIMENTATION RATE] IN BLOOD BY WESTERGREN METHOD: 39 MM/HR (ref 0–36)

## 2020-06-19 PROCEDURE — 99214 PR OFFICE/OUTPT VISIT, EST, LEVL IV, 30-39 MIN: ICD-10-PCS | Mod: S$GLB,,, | Performed by: INTERNAL MEDICINE

## 2020-06-19 PROCEDURE — 3008F BODY MASS INDEX DOCD: CPT | Mod: CPTII,S$GLB,, | Performed by: INTERNAL MEDICINE

## 2020-06-19 PROCEDURE — 86225 DNA ANTIBODY NATIVE: CPT

## 2020-06-19 PROCEDURE — 36415 COLL VENOUS BLD VENIPUNCTURE: CPT | Mod: PO

## 2020-06-19 PROCEDURE — 99999 PR PBB SHADOW E&M-EST. PATIENT-LVL IV: ICD-10-PCS | Mod: PBBFAC,,, | Performed by: INTERNAL MEDICINE

## 2020-06-19 PROCEDURE — 3008F PR BODY MASS INDEX (BMI) DOCUMENTED: ICD-10-PCS | Mod: CPTII,S$GLB,, | Performed by: INTERNAL MEDICINE

## 2020-06-19 PROCEDURE — 3074F PR MOST RECENT SYSTOLIC BLOOD PRESSURE < 130 MM HG: ICD-10-PCS | Mod: CPTII,S$GLB,, | Performed by: INTERNAL MEDICINE

## 2020-06-19 PROCEDURE — 85652 RBC SED RATE AUTOMATED: CPT

## 2020-06-19 PROCEDURE — 99999 PR PBB SHADOW E&M-EST. PATIENT-LVL IV: CPT | Mod: PBBFAC,,, | Performed by: INTERNAL MEDICINE

## 2020-06-19 PROCEDURE — 99214 OFFICE O/P EST MOD 30 MIN: CPT | Mod: S$GLB,,, | Performed by: INTERNAL MEDICINE

## 2020-06-19 PROCEDURE — 3074F SYST BP LT 130 MM HG: CPT | Mod: CPTII,S$GLB,, | Performed by: INTERNAL MEDICINE

## 2020-06-19 PROCEDURE — 3079F PR MOST RECENT DIASTOLIC BLOOD PRESSURE 80-89 MM HG: ICD-10-PCS | Mod: CPTII,S$GLB,, | Performed by: INTERNAL MEDICINE

## 2020-06-19 PROCEDURE — 86140 C-REACTIVE PROTEIN: CPT

## 2020-06-19 PROCEDURE — 86162 COMPLEMENT TOTAL (CH50): CPT

## 2020-06-19 PROCEDURE — 86160 COMPLEMENT ANTIGEN: CPT | Mod: 59

## 2020-06-19 PROCEDURE — 3079F DIAST BP 80-89 MM HG: CPT | Mod: CPTII,S$GLB,, | Performed by: INTERNAL MEDICINE

## 2020-06-19 PROCEDURE — 86160 COMPLEMENT ANTIGEN: CPT

## 2020-06-19 NOTE — TELEPHONE ENCOUNTER
----- Message from Jared Szymanski sent at 6/19/2020  8:26 AM CDT -----  Contact: Patient  Pt called and would like to have someone call her back    She thought that he appt was for 8:30 this morning. She would like to know is you received some pictures that she sent.    Pt can be reached at [phone]

## 2020-06-19 NOTE — TELEPHONE ENCOUNTER
Spoke with patient, she missed appointment this morning but have cancellation and will reschedule pt for 2:30pm to discuss foot pain and swelling.   Color consistent with ethnicity/race, warm, dry intact, resilient.

## 2020-06-19 NOTE — PROGRESS NOTES
Subjective:       Patient ID: Jill Perez is a 65 y.o. female.    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    Prior work up has revealed +YOLIS 1;1280, negative profile, elevated sed rate of 46, normal CRP, negative Scl70, normal complement, negative RF/CCP, absent cryoglobulins, normal SPEP, negative hepatitis B and C, urine showed no elevation in protein or hematuria, normal CPK.  Repeat YOLIS negative, normal complement, negative dsDNA.    Interval history:  Today patient admitting to a burning sensation on the bottom of her feet associated with foot pain and lower extremity swelling as well.  She rates the pain a 6/10.  At times she will have shooting pains down her legs from her back but denied back pain today and stated her foot pain is the worst currently and they feel tight with fluid.    On 6/17 pt had EGD with Dr Kearney for the dysphagia and had balloon of her esophagus per patient and found to have hiatal hernia, planning to have surgery July 6th for this.    Pt still having discoloration of her feet when they turn white, but not red or purple.  She then will have episodes of her feet being hot.  She uses layers of socks. No ulcerations on her feet. She feels that when her anxiety is worse he raynauds gets worse.    She still has some achiness of her joints in her shoulders and hands but stated she is doing well overall. No swelling of her joints. Pt has history of chronic headaches.     Pt has not seen psychiatry from prior recommendations at her June visit with Dr Gonzalez. Pt admits to have some tingling in her left leg, she had an EMG done 10/10/2019.  Pt admits to have a left foot drop after she had her last lower back surgery and had  "to wear a brace.    Initial HPI from 10/22/2019: Pt initially saw Dr Daniels on 3/13/2019 for complain ot left shoulder, lower back and hip pain with stiffness in her arms/legs and hips in the morning with improvement in pain.  She had been having raynauds in her hands/feet for years but was officially diagnosed when she saw Dr Daniels in March of 2019.   Pt had her nifedipine increased from 60mg to 90mg daily.  Pt then followed up in June 2019 with Dr Gonzalez/Bety.    Former smoker in her 20s, smoked for 5-6 years, smoked a few cigarettes a week.  No drinking, no drug use. 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  Pt has three children, all premature, 1 born at 6.5 months and others at 7 months, with pre-eclampsia. No h/o miscarriages.  Mother had rheumatoid.  Son has Crohn's disease.  Patient denied family history of lupus, psoriasis, scleroderma, sjogrens, sarcoidosis, UC.    Review of Systems   Constitutional: Negative for unexpected weight change.   HENT: Negative for hearing loss, mouth sores, nosebleeds, trouble swallowing and voice change.    Eyes: Negative for photophobia, pain, redness and visual disturbance.   Respiratory: Negative for chest tightness and shortness of breath.    Cardiovascular: Negative for palpitations.   Gastrointestinal: Negative for diarrhea.        Alternating diarrhea and constipation with taking iron and laxatives.   Genitourinary: Negative for difficulty urinating, dysuria, genital sores and hematuria.   Musculoskeletal: Positive for back pain. Negative for neck stiffness.   Skin: Positive for color change.   Neurological: Negative for seizures.   Psychiatric/Behavioral: Positive for agitation. Negative for confusion and sleep disturbance. The patient is nervous/anxious.          Objective:   /80   Pulse 95   Temp 97.8 °F (36.6 °C)   Ht 5' 6" (1.676 m)   Wt 81.9 kg (180 lb 8 oz)   LMP  (LMP Unknown)   BMI 29.13 kg/m²     "     Physical Exam   Nursing note and vitals reviewed.  Constitutional: She is oriented to person, place, and time and well-developed, well-nourished, and in no distress. No distress.   HENT:   Head: Normocephalic and atraumatic.   Right Ear: External ear normal.   Left Ear: External ear normal.   Mouth/Throat: Oropharynx is clear and moist. No oropharyngeal exudate.   Eyes: Conjunctivae and EOM are normal. Right eye exhibits no [facility]. Left eye exhibits no [facility]. No scleral icterus.   Neck: Neck supple. No tracheal deviation present.   Cardiovascular: Normal rate, regular rhythm and normal heart sounds.    No murmur heard.  Pulmonary/Chest: Effort normal and breath sounds normal. No stridor. No respiratory distress. She has no wheezes. She has no rales.   Abdominal: Soft. Bowel sounds are normal. She exhibits no distension. There is no abdominal tenderness. There is no rebound.   Neurological: She is alert and oriented to person, place, and time.   Skin: Skin is warm and dry. No rash noted. She is not diaphoretic.     Feet without ischemic looking changes visually, no palor or purple discoloration, mild erythema of left 4th/5th digit and lateral foot.   Psychiatric: Mood and affect normal.   Musculoskeletal: Tenderness, deformity and edema present.      Comments: No active synovitis, enthesitis, dactylitis or effusion noted on exam    ayo enlargement PIPs b/l with squarring of CMC b/l    right knee crepitus    Pitting edema b/l lower extremities with tenderness on plantar surface of feet and distal lower extremities b/l, non specific to joints. Tenderness along anterior and posterior distal lower extremity b/l.    Good capillary refill b/l toes and fingers with intact distal lower extremities pulses b/l    No signs of ischemia or raynauds on exam, no digital or toe ulcerations noted.    Varicose veins noted b/l           Ref. Range 3/18/2019 11:48 3/18/2019 11:48 6/11/2019 10:10   YOLIS HEP-2 Titer Unknown  Positive 1:1280 Speckled  Atypical     Anti-SSA Antibody Latest Ref Range: 0.00 - 19.99 EU 1.71     Anti-SSA Interpretation Latest Ref Range: Negative  Negative     Anti-SSB Antibody Latest Ref Range: 0.00 - 19.99 EU 0.31     Anti-SSB Interpretation Latest Ref Range: Negative  Negative     ds DNA Ab Latest Ref Range: Negative 1:10  Negative 1:10     Anti Sm Antibody Latest Ref Range: 0.00 - 19.99 EU 1.51     Anti-Sm Interpretation Latest Ref Range: Negative  Negative     Anti Sm/RNP Antibody Latest Ref Range: 0.00 - 19.99 EU 1.08     Anti-Sm/RNP Interpretation Latest Ref Range: Negative  Negative     YOLIS Screen Latest Ref Range: Negative <1:160  Positive (A)     Scleroderma SCL- Latest Ref Range: <20 UNITS 3     Complement (C-3) Latest Ref Range: 50 - 180 mg/dL 167     Complement (C-4) Latest Ref Range: 11 - 44 mg/dL 32     Protein, Serum Latest Ref Range: 6.0 - 8.4 g/dL   7.5   Albumin grams/dl Latest Ref Range: 3.35 - 5.55 g/dL   3.71   Alpha-1 grams/dl Latest Ref Range: 0.17 - 0.41 g/dL   0.33   Alpha-2 grams/dl Latest Ref Range: 0.43 - 0.99 g/dL   0.97   Beta grams/dl Latest Ref Range: 0.50 - 1.10 g/dL   1.19 (H)   Gamma grams/dl Latest Ref Range: 0.67 - 1.58 g/dL   1.30   Pathologist Interpretation SPE Unknown   REVIEWED   Cryoglobulin, Qual Latest Ref Range: Absent  Absent Absent      Arthritis survey done 3/21/2019 independently reviewed showing DJD of cspine and hands b/l, left knee replacement, chondrocalcinosis of right knee, no erosions identified.    EMG done 10/10/2019: 1. There is evidence of a mild sensory median mononeuropathy   across the left wrist (i.e. carpal tunnel syndrome).  There is no   motor axonal loss and no active denervation.  2. 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  However, an extensive needle examination of the   left lower  extremity was completely normal, without signs of   active or chronic neuropathy.  Therefore, I cannot localize this   well electrodiagnostically.  The fact that it involves all three   of these nerves would lend towards either a sciatic neuropathy,   lumbosacral plexopathy (lower lumbosacral plexus), radiculopathy,   or asymmetric peripheral polyneuropathy.  Given her history of   lumbar decompressions, I think that lumbar radiculopathy is the   most likely cause.      Assessment:       1. Pain and swelling of lower extremity, unspecified laterality    2. Positive YOLIS (antinuclear antibody)    3. Raynaud's disease without gangrene    4. High risk medication use        Pt is a 65 year old female with PMH of HTN, HLD, DM2, dCHF, chronic lower back pain s/p lumbar surgery x 2 (last surgery was in the early 2000s), s/p gastric sleeve (2008), s/p splenectomy (due to phin phin), left knee replacement who presented to clinic for follow up of raynaud's.    Prior work up has revealed +YOLIS 1;1280, negative profile, elevated sed rate of 46, normal CRP, negative Scl70, normal complement, negative RF/CCP, absent cryoglobulins, normal SPEP, negative hepatitis B and C, urine showed no elevation in protein or hematuria, normal CPK.  Repeat YOLIS negative, normal complement, negative dsDNA.    Pt currently does not meet criteria for a CTD at this time    Plan:       -obtain labs as below  -continue nifedipine 90mg XL for raynauds, pt tolerating medication well  -pt to follow up with GI  -educated pt on keeping her hands/feet warm, keeping house temperature warm and body temperature warm.  -referral to vascular for lower extremity swelling and varicose veins  -obtain US lower extremities b/l  -RTC in 3 months or sooner if needed.  Problem List Items Addressed This Visit        Cardiac/Vascular    Raynaud's disease without gangrene    Relevant Orders    C-Reactive Protein    Sedimentation rate    Urinalysis    Protein / creatinine  ratio, urine    C4 complement    C3 complement    Complement, total    Anti-DNA antibody, double-stranded       Immunology/Multi System    Positive YOLIS (antinuclear antibody)    Relevant Orders    C-Reactive Protein    Sedimentation rate    Urinalysis    Protein / creatinine ratio, urine    US Lower Extremity Veins Bilateral    C4 complement    C3 complement    Complement, total    Anti-DNA antibody, double-stranded      Other Visit Diagnoses     Pain and swelling of lower extremity, unspecified laterality    -  Primary    Relevant Orders    C-Reactive Protein    Sedimentation rate    Urinalysis    Protein / creatinine ratio, urine    US Lower Extremity Veins Bilateral    C4 complement    C3 complement    Complement, total    Anti-DNA antibody, double-stranded    Ambulatory referral/consult to Vascular Medicine    High risk medication use        Relevant Orders    C-Reactive Protein    Sedimentation rate    Urinalysis    Protein / creatinine ratio, urine    C4 complement    C3 complement    Complement, total    Anti-DNA antibody, double-stranded

## 2020-06-19 NOTE — TELEPHONE ENCOUNTER
Jared Beth Staff   Caller: Patient (Today,  8:26 AM)             Pt called and would like to have someone call her back     She thought that he appt was for 8:30 this morning. She would like to know is you received some pictures that she sent.     Pt can be reached at [phone]      I called the patient around 8:03 am this morning and informed her that her apt was indeed for 8:00am. I also called the patient on 05/04/2020 and confirmed the apt for today at 8:00am.     I informed the patient that we have not received the pictures via my chart. I gave the patient the technical support number for my chart [phone]. The patient voices understanding.

## 2020-06-22 ENCOUNTER — TELEPHONE (OUTPATIENT)
Dept: BARIATRICS | Facility: CLINIC | Age: 65
End: 2020-06-22

## 2020-06-22 ENCOUNTER — PATIENT OUTREACH (OUTPATIENT)
Dept: OTHER | Facility: OTHER | Age: 65
End: 2020-06-22

## 2020-06-22 ENCOUNTER — TELEPHONE (OUTPATIENT)
Dept: CARDIOLOGY | Facility: CLINIC | Age: 65
End: 2020-06-22

## 2020-06-22 ENCOUNTER — HOSPITAL ENCOUNTER (OUTPATIENT)
Dept: RADIOLOGY | Facility: HOSPITAL | Age: 65
Discharge: HOME | End: 2020-06-22
Attending: INTERNAL MEDICINE
Payer: COMMERCIAL

## 2020-06-22 DIAGNOSIS — E11.65 TYPE 2 DIABETES MELLITUS WITH HYPERGLYCEMIA, WITH LONG-TERM CURRENT USE OF INSULIN: ICD-10-CM

## 2020-06-22 DIAGNOSIS — M79.606 PAIN AND SWELLING OF LOWER EXTREMITY, UNSPECIFIED LATERALITY: ICD-10-CM

## 2020-06-22 DIAGNOSIS — I10 ESSENTIAL HYPERTENSION: Primary | ICD-10-CM

## 2020-06-22 DIAGNOSIS — M79.89 PAIN AND SWELLING OF LOWER EXTREMITY, UNSPECIFIED LATERALITY: ICD-10-CM

## 2020-06-22 DIAGNOSIS — Z79.4 TYPE 2 DIABETES MELLITUS WITH HYPERGLYCEMIA, WITH LONG-TERM CURRENT USE OF INSULIN: ICD-10-CM

## 2020-06-22 DIAGNOSIS — R76.8 POSITIVE ANA (ANTINUCLEAR ANTIBODY): ICD-10-CM

## 2020-06-22 LAB — DSDNA AB SER-ACNC: NORMAL [IU]/ML

## 2020-06-22 PROCEDURE — 93970 EXTREMITY STUDY: CPT | Mod: TC

## 2020-06-22 PROCEDURE — 93970 EXTREMITY STUDY: CPT | Mod: 26,,, | Performed by: RADIOLOGY

## 2020-06-22 PROCEDURE — 93970 US LOWER EXTREMITY VEINS BILATERAL: ICD-10-PCS | Mod: 26,,, | Performed by: RADIOLOGY

## 2020-06-22 NOTE — TELEPHONE ENCOUNTER
Schedule pt on matias's schedule for inpatient MSD- discussed copay of 55.  Pt v/u  ---- Message from Sirena Reynolds RD sent at 6/17/2020  7:55 AM CDT -----  Regarding: Call to schedule MSD

## 2020-06-22 NOTE — TELEPHONE ENCOUNTER
Tried reaching out to pt in regards to message     Unable to leave message, phone kept ringing     Will add pt alejandro to the waitlist and will work on getting the pt scheduled sooner for an alejandro

## 2020-06-22 NOTE — TELEPHONE ENCOUNTER
----- Message from Amy Curran sent at 6/22/2020 11:20 AM CDT -----  Good Morning,  The patient has a referral from Dr. Ignacia Magdaleno, the diagnosis is Pain and swelling of lower extremity, unspecified laterality. I scheduled her for 07/28/20 but patient would like to know if she can get in sooner.     Thank you

## 2020-06-24 RX ORDER — NIFEDIPINE 60 MG/1
60 TABLET, EXTENDED RELEASE ORAL DAILY
Qty: 30 TABLET | Refills: 0 | Status: SHIPPED | OUTPATIENT
Start: 2020-06-24 | End: 2020-07-15 | Stop reason: SDUPTHER

## 2020-06-24 NOTE — PROGRESS NOTES
Digital Medicine: Clinician Introduction    Jill Perez is a 65 y.o. female who is newly enrolled in the Digital Medicine Clinic.    The following information was reviewed and updated:  Preferred pharmacy   [facility]  [address] [place] [address]  Phone: [phone] Fax: [phone]      Patient prefers a 90 days supply.     Review of patient's allergies indicates:   Allergen Reactions    Pcn [penicillins] Hives and Itching       Patient doing okay but endorses recent low blood pressure readings that are associated with dizziness and fatigue.  Notes upcoming surgery on July 6. Also, does not know why her blood sugar readings are so high.    Diabetes  Hyper-/hypoglycemic symptoms: confirms    Medication issues/non-adherence: denies    Glucometer issues: believes readings are higher than they actually are    2. HTN  Hyper-/hypotensive symptoms: confirms, as above    Medication issues/non-adherence: denies    Blood pressure cuff issues: denies      Diet: Not following any particular diet, mindful of sugar intake, no red meat    Physical activity[place] but limited due to leg pain            The history is provided by the patient. No  was used.     DIABETES  Instructed patient not to allow anyone else to use phone and monitoring device.  Explained that we expect patient to test their blood sugar as prescribed by their physician or Digital Medicine Clinician.      Reviewed general Self-Monitoring of Blood Glucose (SMBG) goals:  · FPG: <  mg/dL  · 1h PPG: < 180 mg/dL  · 2h PPG: < 160 mg/dL  · Bedtime: < 150 mg/dL    Explained to patient that the digital medicine team is not available for emergencies.  Patient will call [facility] on-call ([phone] or [phone]) or [phone] if needed.      Expected SMBG schedule: Weekly  Patient does not have history of hypoglycemia.    Reviewed signs and symptoms of hypoglycemia (weakness,  dizziness, hunger, shakiness, nausea, headache, heart palpitations, sweating, fatigue, anxiety, etc.).  Reviewed treatment of hypoglycemia (15/15 rule).      Reviewed signs or symptoms of hyperglycemia (headache, increased thirst, increased urination, fatigue, blurred vision, etc.).      Patient is not on ace inhibitor or angiotensin II receptor blocker because Using nifedipine for Reynaud's and blood pressure low.    Patient is on statin.     Patient's A1C goals is less than or equal to 7. Patient's most recent A1C result is above goal. Lab Results     Component                Value               Date                     HGBA1C                   7.2 (H)             04/20/2020             Clinician received low BP alert.  Patient is experiencing symptoms     Medication Change: dose decrease    Med Review complete.    Allergies reviewed.      HYPERTENSION  Our goal is to get BP to consistently below 130/80mmHg and make the process convenient so patient can avoid extra trips to the office. Getting your blood pressure below 130/80mmHg (definition of control) will reduce your risk for heart attack, kidney failure, stroke and death (as well as kidney failure, eye disease, & dementia)      Reviewed non-pharmacologic therapies and impact on BP      Explained that we expect patient to obtain several blood pressures per week at random times of day.  Instructed patient not to allow anyone else to use phone and monitoring device.  Confirmed appropriate BP monitoring technique.      Explained to patient that the digital medicine team is not available for emergencies.  Patient will call [facility] on-call ([phone] or [phone]) or [phone] if needed.    Patient's BP goal is 130/80. Patients BP average is 109/72 mmHg, which is at or below goal, per 2017 ACC/AHA Hypertension Guidelines.    Clinician received low BP alert.    Patient is experiencing symptoms of hypotension.      Medication Change: dose decrease    Med Review  complete.    Allergies reviewed.      Last 5 Patient Entered Readings                                      Current 30 Day Average: 109/72     Recent Readings 6/24/2020 6/24/2020 6/23/2020 6/20/2020 6/19/2020    SBP (mmHg) 89 88 110 120 104    DBP (mmHg) 59 61 75 80 67    Pulse 103 103 92 81 99        Last 6 Patient Entered Readings                                          Most Recent A1c: 7.2% on 4/20/2020  (Goal: 7%)     Recent Readings 6/24/2020 6/23/2020 6/20/2020 6/19/2020 6/19/2020    Blood Glucose (mg/dL) 261 286 211 265 257          INTERVENTION(S)  reviewed appropriate dose schedule, reviewed monitoring technique, recommended med change, encouragement/support and denied questions    PLAN  patient verbalizes understanding, demonstrates understanding via teach back, patient amenable to changes, additional monitoring needed and continue monitoring    Diabetes  -Glycemia is Uncontrolled per recent A1c but home readings incongruent.  -Hypoglycemia absent  -Likely factors contributing to lack of control include: unclear - may be issues with meter itself  -Current regimen is appropriate and may be adequate but need to investigate meter first.  Will send info about proper technique and using control solution    -Continue current medications as prescribed for now  -Use control solution to see if test strips are working properly    HTN  -Blood pressure is Controlled per recent readings but pt having symptomatic lows  -Upcoming surgery so additional reason for correctly lows promptly  -Current regimen is appropriate due to concomitant Reynaud's but dose is too high.  Will decrease to 60 mg    -Medication changes as follows: decrease nifedipine to 60 mg once daily        There are no preventive care reminders to display for this patient.    Current Medication Regimen:  Hypertension Medications             NIFEdipine (PROCARDIA-XL) 90 MG (OSM) 24 hr tablet Take 1 tablet (90 mg total) by mouth once daily.        Diabetes  Medications             dulaglutide (TRULICITY) 1.5 mg/0.5 mL PnIj Inject 1.5 mg into the skin every 7 days.    metFORMIN (GLUCOPHAGE) 1000 MG tablet Take 1 tablet (1,000 mg total) by mouth 2 (two) times daily with meals.          Reviewed the importance of self-monitoring, medication adherence, and that the health  can be used as a resource for lifestyle modifications to help reduce or maintain a healthy lifestyle.    Sent link to [facility] webpages and my contact information via [employer] for future questions. Follow up scheduled.             Sleep Apnea Screening  Patient not previously diagnosed with IRAM and         Medication Adherence Screening   She missed a dose once this week.  Patient knows purpose of medications.      Patient identified the following reasons for non-compliance: None    Adherence tools used: None    Intervention(s): Provided patient education

## 2020-06-25 ENCOUNTER — CLINICAL SUPPORT (OUTPATIENT)
Dept: BARIATRICS | Facility: CLINIC | Age: 65
End: 2020-06-25
Payer: COMMERCIAL

## 2020-06-25 VITALS — BODY MASS INDEX: 28.57 KG/M2 | WEIGHT: 177 LBS

## 2020-06-25 DIAGNOSIS — Z98.0 S/P BYPASS GASTROJEJUNOSTOMY: Primary | ICD-10-CM

## 2020-06-25 PROCEDURE — 99999 PR PBB SHADOW E&M-EST. PATIENT-LVL I: ICD-10-PCS | Mod: PBBFAC,,, | Performed by: DIETITIAN, REGISTERED

## 2020-06-25 PROCEDURE — 99499 UNLISTED E&M SERVICE: CPT | Mod: S$GLB,,, | Performed by: DIETITIAN, REGISTERED

## 2020-06-25 PROCEDURE — 99999 PR PBB SHADOW E&M-EST. PATIENT-LVL I: CPT | Mod: PBBFAC,,, | Performed by: DIETITIAN, REGISTERED

## 2020-06-25 PROCEDURE — 99499 NO LOS: ICD-10-PCS | Mod: S$GLB,,, | Performed by: DIETITIAN, REGISTERED

## 2020-06-25 NOTE — PROGRESS NOTES
Pt will use Premier protein shakes.  If using protein powder, reminded pt of mixing with water for days 1 and 2, by day 3 may try using skim, 1% milk or unsweetened almond/soy milk.  By Day 4, may use RTD protein shakes as tolerated  Pt has the vitamins and minerals to start taking once [facility]d from hospital  Reviewed dosage and timing of vitamin/mineral guidelines.  Reviewed nutritional guidelines for protein and fluid requirements for week 1 and week 2 post-surgery.  Handout provided to log protein and fluid daily.  1 ounce medicine cups provided for patient to measure fluid intake after surgery.  Reviewed common nutritional concerns and prevention tips after bariatric surgery  Pt verbalized understanding of information provided with appropriate questions and comments.

## 2020-06-26 ENCOUNTER — PATIENT MESSAGE (OUTPATIENT)
Dept: FAMILY MEDICINE | Facility: CLINIC | Age: 65
End: 2020-06-26

## 2020-06-26 ENCOUNTER — PATIENT OUTREACH (OUTPATIENT)
Dept: OTHER | Facility: OTHER | Age: 65
End: 2020-06-26

## 2020-06-26 LAB — CH50 SERPL-ACNC: 81 U/ML (ref 42–95)

## 2020-06-26 NOTE — TELEPHONE ENCOUNTER
Grzegorz Brown, it looks like Dr. Kent has ordered CBC, BMP, a1c, folate, liver function, cholesterol, iron, thyroid, b12, b1, vitamin D, electrolytes      Can you add my ferritin and HIV to her upcoming labs for Dr. Kent.

## 2020-06-26 NOTE — PROGRESS NOTES
Digital Medicine: Health  Follow-Up    Average blood pressure today is 112/73. Average blood glucose today is 212. She knows that her readings have been really high lately. She has been having a lot of bread but is planning on cutting it out now. Her reading this morning was 134 which is a huge improvement now.     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 She states that she had gastric bypass surgery about 15 years ago.     The history is provided by the patient. No  was used.   Follow Up  Follow-up reason(s): routine education      Routine Education Topics: eating patterns and physical activity        INTERVENTION(S)  recommended diet modifications, recommend physical activity and encouragement/support    PLAN  patient verbalizes understanding and continue monitoring    Will f/u in 5 weeks, sooner if concerns.       There are no preventive care reminders to display for this patient.    Last 5 Patient Entered Readings                                      Current 30 Day Average: 112/73     Recent Readings 6/26/2020 6/25/2020 6/24/2020 6/24/2020 6/23/2020    SBP (mmHg) 139 127 89 88 110    DBP (mmHg) 83 83 59 61 75    Pulse 77 79 103 103 92        Last 6 Patient Entered Readings                                          Most Recent A1c: 7.2% on 4/20/2020  (Goal: 7%)     Recent Readings 6/26/2020 6/25/2020 6/24/2020 6/23/2020 6/20/2020    Blood Glucose (mg/dL) 134 183 261 286 211                    Diet Screening   She has the following dietary restrictions: low sodium diet and low carb    Patient reports that she has been doing a lot of bread and hasn't been doing well with her diet lately. She knows that she needs to improve it and has started to cut out bread again. She will start to monitor her carbs better now.     Intervention(s): carb reduction    Assigning the following patient goals: reduce carbs    Physical  Activity Screening   When asked if exercising, patient responded: no    She identified the following barriers to physical activity: pain/injury/recent surgery and motivation    She reports that she has varicose veins and they hurt really bad in the morning. She hasn't done any exercise for 2 weeks. She used to walk for a mile every day. She is planning on starting this back up. She does have some stockings she can wear when walking.     Medication Adherence Screening   She did not miss a dose this month.  Patient knows purpose of medications.      Patient identified the following reasons for non-compliance: None    Tobacco and Alcohol Screening       No tobacco use     Patient is not eligible for referral to smoking cessation.      She has a drink maybe 1-2 times a year.      SDOH

## 2020-06-29 ENCOUNTER — PATIENT MESSAGE (OUTPATIENT)
Dept: FAMILY MEDICINE | Facility: CLINIC | Age: 65
End: 2020-06-29

## 2020-06-29 ENCOUNTER — LAB VISIT (OUTPATIENT)
Dept: LAB | Facility: HOSPITAL | Age: 65
End: 2020-06-29
Attending: FAMILY MEDICINE
Payer: COMMERCIAL

## 2020-06-29 ENCOUNTER — TELEPHONE (OUTPATIENT)
Dept: BARIATRICS | Facility: CLINIC | Age: 65
End: 2020-06-29

## 2020-06-29 ENCOUNTER — PATIENT MESSAGE (OUTPATIENT)
Dept: RHEUMATOLOGY | Facility: CLINIC | Age: 65
End: 2020-06-29

## 2020-06-29 DIAGNOSIS — Z11.4 ENCOUNTER FOR SCREENING FOR HIV: ICD-10-CM

## 2020-06-29 DIAGNOSIS — Z79.4 TYPE 2 DIABETES MELLITUS WITH HYPERGLYCEMIA, WITH LONG-TERM CURRENT USE OF INSULIN: ICD-10-CM

## 2020-06-29 DIAGNOSIS — E78.49 OTHER HYPERLIPIDEMIA: ICD-10-CM

## 2020-06-29 DIAGNOSIS — E11.65 TYPE 2 DIABETES MELLITUS WITH HYPERGLYCEMIA, WITH LONG-TERM CURRENT USE OF INSULIN: ICD-10-CM

## 2020-06-29 DIAGNOSIS — Z79.899 ENCOUNTER FOR MONITORING LONG-TERM PROTON PUMP INHIBITOR THERAPY: ICD-10-CM

## 2020-06-29 DIAGNOSIS — I10 ESSENTIAL HYPERTENSION: ICD-10-CM

## 2020-06-29 DIAGNOSIS — D64.9 NORMOCYTIC ANEMIA: ICD-10-CM

## 2020-06-29 DIAGNOSIS — Z51.81 ENCOUNTER FOR MONITORING LONG-TERM PROTON PUMP INHIBITOR THERAPY: ICD-10-CM

## 2020-06-29 LAB
25(OH)D3+25(OH)D2 SERPL-MCNC: 39 NG/ML (ref 30–96)
ALBUMIN SERPL BCP-MCNC: 3.3 G/DL (ref 3.5–5.2)
ALP SERPL-CCNC: 73 U/L (ref 55–135)
ALT SERPL W/O P-5'-P-CCNC: 18 U/L (ref 10–44)
ANION GAP SERPL CALC-SCNC: 9 MMOL/L (ref 8–16)
AST SERPL-CCNC: 27 U/L (ref 10–40)
BASOPHILS # BLD AUTO: 0.02 K/UL (ref 0–0.2)
BASOPHILS NFR BLD: 0.3 % (ref 0–1.9)
BILIRUB SERPL-MCNC: 0.3 MG/DL (ref 0.1–1)
BUN SERPL-MCNC: 14 MG/DL (ref 8–23)
CALCIUM SERPL-MCNC: 9.5 MG/DL (ref 8.7–10.5)
CHLORIDE SERPL-SCNC: 108 MMOL/L (ref 95–110)
CHOLEST SERPL-MCNC: 147 MG/DL (ref 120–199)
CHOLEST/HDLC SERPL: 2.4 {RATIO} (ref 2–5)
CO2 SERPL-SCNC: 26 MMOL/L (ref 23–29)
CREAT SERPL-MCNC: 0.6 MG/DL (ref 0.5–1.4)
DIFFERENTIAL METHOD: ABNORMAL
EOSINOPHIL # BLD AUTO: 0.1 K/UL (ref 0–0.5)
EOSINOPHIL NFR BLD: 1.1 % (ref 0–8)
ERYTHROCYTE [DISTWIDTH] IN BLOOD BY AUTOMATED COUNT: 15.5 % (ref 11.5–14.5)
EST. GFR  (AFRICAN AMERICAN): >60 ML/MIN/1.73 M^2
EST. GFR  (NON AFRICAN AMERICAN): >60 ML/MIN/1.73 M^2
ESTIMATED AVG GLUCOSE: 137 MG/DL (ref 68–131)
FERRITIN SERPL-MCNC: 22 NG/ML (ref 20–300)
GLUCOSE SERPL-MCNC: 120 MG/DL (ref 70–110)
HBA1C MFR BLD HPLC: 6.4 % (ref 4–5.6)
HCT VFR BLD AUTO: 34.4 % (ref 37–48.5)
HDLC SERPL-MCNC: 61 MG/DL (ref 40–75)
HDLC SERPL: 41.5 % (ref 20–50)
HGB BLD-MCNC: 11 G/DL (ref 12–16)
HIV 1+2 AB+HIV1 P24 AG SERPL QL IA: NEGATIVE
IMM GRANULOCYTES # BLD AUTO: 0.01 K/UL (ref 0–0.04)
IMM GRANULOCYTES NFR BLD AUTO: 0.2 % (ref 0–0.5)
IRON SERPL-MCNC: 71 UG/DL (ref 30–160)
LDLC SERPL CALC-MCNC: 70.8 MG/DL (ref 63–159)
LYMPHOCYTES # BLD AUTO: 2.4 K/UL (ref 1–4.8)
LYMPHOCYTES NFR BLD: 36.1 % (ref 18–48)
MAGNESIUM SERPL-MCNC: 1.6 MG/DL (ref 1.6–2.6)
MCH RBC QN AUTO: 28.5 PG (ref 27–31)
MCHC RBC AUTO-ENTMCNC: 32 G/DL (ref 32–36)
MCV RBC AUTO: 89 FL (ref 82–98)
MONOCYTES # BLD AUTO: 0.6 K/UL (ref 0.3–1)
MONOCYTES NFR BLD: 8.6 % (ref 4–15)
NEUTROPHILS # BLD AUTO: 3.6 K/UL (ref 1.8–7.7)
NEUTROPHILS NFR BLD: 53.7 % (ref 38–73)
NONHDLC SERPL-MCNC: 86 MG/DL
NRBC BLD-RTO: 0 /100 WBC
PLATELET # BLD AUTO: 397 K/UL (ref 150–350)
PMV BLD AUTO: 10.8 FL (ref 9.2–12.9)
POTASSIUM SERPL-SCNC: 4 MMOL/L (ref 3.5–5.1)
PROT SERPL-MCNC: 6.8 G/DL (ref 6–8.4)
RBC # BLD AUTO: 3.86 M/UL (ref 4–5.4)
SATURATED IRON: 19 % (ref 20–50)
SODIUM SERPL-SCNC: 143 MMOL/L (ref 136–145)
TOTAL IRON BINDING CAPACITY: 369 UG/DL (ref 250–450)
TRANSFERRIN SERPL-MCNC: 249 MG/DL (ref 200–375)
TRIGL SERPL-MCNC: 76 MG/DL (ref 30–150)
TSH SERPL DL<=0.005 MIU/L-ACNC: 0.97 UIU/ML (ref 0.4–4)
VIT B12 SERPL-MCNC: 354 PG/ML (ref 210–950)
WBC # BLD AUTO: 6.62 K/UL (ref 3.9–12.7)

## 2020-06-29 PROCEDURE — 80053 COMPREHEN METABOLIC PANEL: CPT

## 2020-06-29 PROCEDURE — 83036 HEMOGLOBIN GLYCOSYLATED A1C: CPT

## 2020-06-29 PROCEDURE — 82728 ASSAY OF FERRITIN: CPT

## 2020-06-29 PROCEDURE — 82306 VITAMIN D 25 HYDROXY: CPT

## 2020-06-29 PROCEDURE — 86703 HIV-1/HIV-2 1 RESULT ANTBDY: CPT

## 2020-06-29 PROCEDURE — 36415 COLL VENOUS BLD VENIPUNCTURE: CPT | Mod: PO

## 2020-06-29 PROCEDURE — 83735 ASSAY OF MAGNESIUM: CPT

## 2020-06-29 PROCEDURE — 83540 ASSAY OF IRON: CPT

## 2020-06-29 PROCEDURE — 85025 COMPLETE CBC W/AUTO DIFF WBC: CPT

## 2020-06-29 PROCEDURE — 80061 LIPID PANEL: CPT

## 2020-06-29 PROCEDURE — 82607 VITAMIN B-12: CPT

## 2020-06-29 PROCEDURE — 84443 ASSAY THYROID STIM HORMONE: CPT

## 2020-06-29 NOTE — TELEPHONE ENCOUNTER
Called patient to discuss liquid diet and vitamins.    Pt using [employer] protein and protein 2.0    Discussion:  -  gms of protein per day  - 600-800 calories per day   - Less than 4gm sugar per shake  - SF, Decaf, non-carbonated Fluids  - No Fruits, juices, yogurt or pudding on liquid diet  - No vitamins, fish oils or herbal supplements for 1 week prior to surgery    Remind pt per nursing and medical team to inform our department if taking antibiotics within the 30 days post bariatric surgery or it any other surgeries/procedures are scheduled within 30 days after bariatric surgery.    Reminded pt of pre-op and surgery dates.    Pt to call back with any questions.  ----- Message from Sirena Reynolds RD sent at 2020  3:03 PM CDT -----  Regardin week liquid diet  Call pt to discuss liquid diet.  [facility] surgery pt  Sx date: 2020

## 2020-06-29 NOTE — TELEPHONE ENCOUNTER
I spoke to the patient and explained to her that Dr. Merlos is out of office and to reach out to her rheumatologist.

## 2020-06-30 ENCOUNTER — PATIENT OUTREACH (OUTPATIENT)
Dept: OTHER | Facility: OTHER | Age: 65
End: 2020-06-30

## 2020-06-30 NOTE — PROGRESS NOTES
Digital Medicine: Clinician Follow-Up    Patient reports doing well without major concern.  Surgery on Monday.  Has been feeling better since nifedipine dose decrease    Diabetes  Hyper-/hypoglycemic symptoms: confirms, feeling sluggish and thirsty with high readings    Medication issues/non-adherence: denies    Glucometer issues: did control solution check, within expected range    2. HTN  Hyper-/hypotensive symptoms: denies    Medication issues/non-adherence: denies    Blood pressure cuff issues: denies      Diet: On liquid diet up until surgery    Physical activity: No changes          The history is provided by the patient. No  was used.   Follow Up  Follow-up reason(s): reading review and medication change follow-up      Patient started new medication.    Is patient tolerating med change?:  Yes  Routine Education Topics: eating patterns and macronutrient distribution        INTERVENTION(S)  reviewed appropriate dose schedule, reviewed monitoring technique, encouragement/support and denied questions    PLAN  patient verbalizes understanding, patient amenable to changes, additional monitoring needed and continue monitoring    Diabetes  -Glycemia is Controlled per recent A1c but home reading avg 205  -Will have pt switch up how she is checking after recovering from surgery.  Switch to pre/2hrs post 1 meal per day  -Hypoglycemia absent  -Likely factors contributing to lack of control include: Eating pattern and Physical activity  -Current regimen is appropriate.  Defer any changes until after surgery    -Continue current medications as prescribed    HTN  -Blood pressure is Controlled per recent readings  -Current regimen is appropriate and adequate for control.  Dose decrease resolved symptoms.    -Continue current medications as prescribed        There are no preventive care reminders to display for this patient.    Last 5 Patient Entered Readings                                      Current 30  Day Average: 113/73     Recent Readings 6/30/2020 6/29/2020 6/28/2020 6/27/2020 6/26/2020    SBP (mmHg) 126 114 123 112 139    DBP (mmHg) 74 69 79 67 83    Pulse 86 74 75 68 77        Last 6 Patient Entered Readings                                          Most Recent A1c: 6.4% on 6/29/2020  (Goal: 7%)     Recent Readings 6/30/2020 6/29/2020 6/28/2020 6/27/2020 6/26/2020    Blood Glucose (mg/dL) 162 156 172 159 134           Hypertension Medications             NIFEdipine (PROCARDIA-XL) 60 MG (OSM) 24 hr tablet Take 1 tablet (60 mg total) by mouth once daily.        Diabetes Medications             dulaglutide (TRULICITY) 1.5 mg/0.5 mL PnIj Inject 1.5 mg into the skin every 7 days.    metFORMIN (GLUCOPHAGE) 1000 MG tablet Take 1 tablet (1,000 mg total) by mouth 2 (two) times daily with meals.               Screenings

## 2020-07-02 ENCOUNTER — TELEPHONE (OUTPATIENT)
Dept: SURGERY | Facility: CLINIC | Age: 65
End: 2020-07-02

## 2020-07-02 ENCOUNTER — PATIENT MESSAGE (OUTPATIENT)
Dept: RHEUMATOLOGY | Facility: CLINIC | Age: 65
End: 2020-07-02
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
## 2020-07-02 NOTE — PRE-PROCEDURE INSTRUCTIONS
PREOP INSTRUCTIONS:    No solid food ,milk or milk products for 8 hours prior to procedure.Clear liquids are allowed up to 2 hours before procedure.Clear liquids are:water,apple juice,gatorade & powerade.Shower instructions as well as directions to the Day of  Surgery Center were given.Patient encouraged to wear loose fitting,comfortable clothing.Medication instructions for pm prior to and am of procedure reviewed.Instructed patient to avoid taking vitamins,supplements,aspirin and ibuprofen the morning of surgery. Patient stated an understanding.Patient instructed to take temperature the night before surgery as well as the morning of surgery and to notify [facility] at [phone] if it is 100.4 or above.Patient also informed of the current visitor policy and advised patient that one visitor may accompany them into the hospital and wait (socially distanced) .When they enter the hospital both patient and visitor will have their temperature checked,provided a mask and provided assistance to their destination.     Inpatients are allowed 1 visitor/day from 8 am until 6 pm.     Covid screening needs to be completed   Patient denies any side effects or issues with anesthesia or sedation.     - [facility] WILL BE PROVIDING TRANSPORTATION HOME UPON DISCHARGE.

## 2020-07-02 NOTE — TELEPHONE ENCOUNTER
Call patient discuss her concerns.  Her voicemail not set up.  Will [facility] a message through [facility].  Review of records shows the patient has an EMG that was concerning of lumbar radiculopathy.  Will discuss the patient's symptoms with her when she returns the message.  She is seen by pain management.  Will recommend that she discuss with them her concerns.

## 2020-07-05 ENCOUNTER — ANESTHESIA EVENT (OUTPATIENT)
Dept: SURGERY | Facility: HOSPITAL | Age: 65
End: 2020-07-05
Payer: COMMERCIAL

## 2020-07-05 NOTE — ANESTHESIA PREPROCEDURE EVALUATION
[facility]  Anesthesia Pre-Operative Evaluation         Patient Name: Jill Perez  YOB: 1955  MRN: [id number]    SUBJECTIVE:     Pre-operative evaluation for Procedure(s) (LRB):  LAPAROSCOPY, DIAGNOSTIC, LYSIS OF ADHESIONS, POSSIBLE REPAIR OF INTERNAL HERNIA, (N/A)  REPAIR, HERNIA, HIATAL, LAPAROSCOPIC WITHOUT FUNDO (N/A)     07/05/2020    Jill Perez is a 65 y.o. female w/ a significant PMHx of HTN, HLD, NIDDM2, GERD, Asthma, Raynaud's disease +YOLIS, depression, insomnia, and h/o prior splenectomy, cholecystectomy, and maribel-en-y gastric bypass (12 years ago) who presents for evaluation of LUQ abdominal pain and dysphagia.    Patient now presents for the above procedure(s).      LDA: None documented.     Prev airway: None documented.    Drips: None documented.      Patient Active Problem List   Diagnosis    Type 2 diabetes mellitus with hyperglycemia, with long-term current use of insulin    Essential hypertension    Pure hypercholesterolemia    Primary insomnia    S/P splenectomy    Polyarthralgia    Raynaud's disease without gangrene    Depression    Positive YOLIS (antinuclear antibody)    BMI 28.0-28.9,adult    Hiatal hernia    Esophageal dysphagia       Review of patient's allergies indicates:   Allergen Reactions    Pcn [penicillins] Hives and Itching       Current Inpatient Medications:      No current facility-administered medications on file prior to encounter.      Current Outpatient Medications on File Prior to Encounter   Medication Sig Dispense Refill    aspirin (ECOTRIN) 81 MG EC tablet Take 81 mg by mouth once daily.      atorvastatin (LIPITOR) 40 MG tablet Take 1 tablet (40 mg total) by mouth once daily. 90 tablet 3    FLUoxetine 20 MG tablet Take 1 tablet (20 mg total) by mouth once daily. 90 tablet 0    gabapentin (NEURONTIN) 300 MG capsule Take 1 capsule (300 mg total) by mouth 3 (three) times daily. 270 capsule 0    metFORMIN (GLUCOPHAGE) 1000  MG tablet Take 1 tablet (1,000 mg total) by mouth 2 (two) times daily with meals. 180 tablet 0    pantoprazole (PROTONIX) 40 MG tablet Take 1 tablet (40 mg total) by mouth 2 (two) times daily. 180 tablet 0    sucralfate (CARAFATE) 1 gram tablet Take 1 tablet (1 g total) by mouth every meal as needed (upper gi upset). 90 tablet 1    zolpidem (AMBIEN) 10 mg Tab Take 1 tablet (10 mg total) by mouth nightly as needed. 30 tablet 2    dulaglutide (TRULICITY) 1.5 mg/0.5 mL PnIj Inject 1.5 mg into the skin every 7 days. 6 mL 0    ondansetron (ZOFRAN-ODT) 4 MG TbDL Dissolve 1 tablet (4 mg total) by mouth every 8 (eight) hours as needed (nausea). 60 tablet 0       Past Surgical History:   Procedure Laterality Date    APPENDECTOMY      BACK SURGERY      laminectomy and fusion     SECTION      x2    CHOLECYSTECTOMY      COLONOSCOPY N/A 2019    Procedure: COLONOSCOPYSuprep;  Surgeon: Carmine Kearney MD;  Location: [facility];  Service: General;  Laterality: N/A;    ESOPHAGOGASTRODUODENOSCOPY N/A 2019    Procedure: EGD (ESOPHAGOGASTRODUODENOSCOPY);  Surgeon: Carmine Kearney MD;  Location: [facility];  Service: General;  Laterality: N/A;    ESOPHAGOGASTRODUODENOSCOPY N/A 2020    Procedure: EGD (ESOPHAGOGASTRODUODENOSCOPY);  Surgeon: Carmine Kearney MD;  Location: [facility];  Service: Endoscopy;  Laterality: N/A;    GASTRIC BYPASS      SHOULDER SURGERY Left     SPLENECTOMY, TOTAL      TOTAL KNEE ARTHROPLASTY Left     TOTAL REDUCTION MAMMOPLASTY Bilateral        Social History     Socioeconomic History    Marital status:      Spouse name: Link    Number of children: 3    Years of education: Not on file    Highest education level: Not on file   Occupational History    Not on file   Social Needs    Financial resource strain: Not hard at all    Food insecurity     Worry: Never true     Inability: Never true    Transportation needs     Medical: No     Non-medical: No    Tobacco Use    Smoking status: Former Smoker     Years: 5.00    Smokeless tobacco: Former User   Substance and Sexual Activity    Alcohol use: Yes     Frequency: Never     Drinks per session: Patient refused     Binge frequency: Never     Comment: occasional    Drug use: No    Sexual activity: Yes     Partners: Male     Birth control/protection: Post-menopausal   Lifestyle    Physical activity     Days per week: 1 day     Minutes per session: Not on file    Stress: To some extent   Relationships    Social connections     Talks on phone: More than three times a week     Gets together: Twice a week     Attends Mu-ism service: More than 4 times per year     Active member of club or organization: Yes     Attends meetings of clubs or organizations: Patient refused     Relationship status:    Other Topics Concern    Not on file   Social History Narrative    10/22/19: moved here from [place] permanently about 1 year ago. Originally lived in [place].  is also a patient of mine. No kids at home. No pets at home. No smokers at home.        OBJECTIVE:     Vital Signs Range (Last 24H):         Significant Labs:  Lab Results   Component Value Date    WBC 6.62 06/29/2020    HGB 11.0 (L) 06/29/2020    HCT 34.4 (L) 06/29/2020     (H) 06/29/2020    CHOL 147 06/29/2020    TRIG 76 06/29/2020    HDL 61 06/29/2020    ALT 18 06/29/2020    AST 27 06/29/2020     06/29/2020    K 4.0 06/29/2020     06/29/2020    CREATININE 0.6 06/29/2020    BUN 14 06/29/2020    CO2 26 06/29/2020    TSH 0.969 06/29/2020    INR 0.9 04/22/2019    HGBA1C 6.4 (H) 06/29/2020       Diagnostic Studies: No relevant studies.    EKG:   Results for orders placed or performed during the hospital encounter of 09/06/19   EKG 12-lead    Collection Time: 09/06/19  6:26 PM    Narrative    Test Reason : R07.9,    Vent. Rate : 104 BPM     Atrial Rate : 104 BPM     P-R Int : 148 ms          QRS Dur : 140 ms      QT Int : 394  ms       P-R-T Axes : 058 -49 036 degrees     QTc Int : 518 ms    Sinus tachycardia  Possible Left atrial enlargement  Right bundle branch block  Left anterior fascicular block  -- Bifascicular block --  Abnormal ECG  When compared with ECG of 30-JUN-2019 19:55,  Premature atrial complexes are no longer Present  Confirmed by Eric Michael MD ([id number]) on 9/10/2019 9:21:05 AM    Referred By: [email or web address]   SELF           Confirmed By:Eric Michael MD       2D ECHO:  TTE:  No results found for this or any previous visit.    LANA:  No results found for this or any previous visit.    ASSESSMENT/PLAN:         Anesthesia Evaluation    I have reviewed the Patient Summary Reports.    I have reviewed the Nursing Notes. I have reviewed the NPO Status.   I have reviewed the Medications.     Review of Systems  Anesthesia Hx:  No problems with previous Anesthesia Denies Hx of Anesthetic complications  History of prior surgery of interest to airway management or planning: Previous anesthesia: MAC Denies Family Hx of Anesthesia complications.   Denies Personal Hx of Anesthesia complications.   Social:  No Alcohol Use, Former Smoker    Hematology/Oncology:         -- Anemia: Hematology Comments: Normocytic   Cardiovascular:   Exercise tolerance: good Hypertension, well controlled hyperlipidemia    Pulmonary:   Asthma Denies Shortness of breath.  Denies Recent URI.    Renal/:  Renal/ Normal     Hepatic/GI:   GERD    Musculoskeletal:   Arthritis     Neurological:  Neurology Normal  Denies CVA.    Endocrine:   Diabetes, well controlled    Psych:   Psychiatric History depression          Physical Exam  General:  Well nourished    Airway/Jaw/Neck:  Airway Findings: Mouth Opening: Normal Tongue: Normal  Mallampati: II  TM Distance: Normal, at least 6 cm  Jaw/Neck Findings:  Neck ROM: Normal ROM     Eyes/Ears/Nose:  EYES/EARS/NOSE FINDINGS: Normal   Dental:  Dental Findings:   Chest/Lungs:  Chest/Lungs Findings: Clear to  auscultation, Normal Respiratory Rate     Heart/Vascular:  Heart Findings: Rate: Normal  Rhythm: Regular Rhythm  Sounds: Normal     Abdomen:  Abdomen Findings:     Musculoskeletal:  Musculoskeletal Findings: Normal   Skin:  Skin Findings: Normal    Mental Status:  Mental Status Findings:  Alert and Oriented, Cooperative         Anesthesia Plan  Type of Anesthesia, risks & benefits discussed:  Anesthesia Type:  general  Patient's Preference:   Intra-op Monitoring Plan: standard ASA monitors  Intra-op Monitoring Plan Comments:   Post Op Pain Control Plan: multimodal analgesia and per primary service following [facility] from PACU  Post Op Pain Control Plan Comments:   Induction:   IV  Beta Blocker:  Patient is not currently on a Beta-Blocker (No further documentation required).       Informed Consent:    ASA Score: 2     Day of Surgery Review of History & Physical:    H&P update referred to the surgeon.         Ready For Surgery From Anesthesia Perspective.

## 2020-07-06 ENCOUNTER — ANESTHESIA (OUTPATIENT)
Dept: SURGERY | Facility: HOSPITAL | Age: 65
End: 2020-07-06
Payer: COMMERCIAL

## 2020-07-06 ENCOUNTER — HOSPITAL ENCOUNTER (OUTPATIENT)
Facility: HOSPITAL | Age: 65
Discharge: HOME | End: 2020-07-07
Attending: SURGERY | Admitting: SURGERY
Payer: COMMERCIAL

## 2020-07-06 DIAGNOSIS — Z90.49 S/P SMALL BOWEL RESECTION: ICD-10-CM

## 2020-07-06 DIAGNOSIS — K45.8 INTERNAL HERNIA: ICD-10-CM

## 2020-07-06 DIAGNOSIS — E11.65 TYPE 2 DIABETES MELLITUS WITH HYPERGLYCEMIA, WITH LONG-TERM CURRENT USE OF INSULIN: ICD-10-CM

## 2020-07-06 DIAGNOSIS — K44.9 HIATAL HERNIA: Primary | ICD-10-CM

## 2020-07-06 DIAGNOSIS — Z79.4 TYPE 2 DIABETES MELLITUS WITH HYPERGLYCEMIA, WITH LONG-TERM CURRENT USE OF INSULIN: ICD-10-CM

## 2020-07-06 LAB
POCT GLUCOSE: 118 MG/DL (ref 70–110)
POCT GLUCOSE: 150 MG/DL (ref 70–110)
SARS-COV-2 RDRP RESP QL NAA+PROBE: NEGATIVE

## 2020-07-06 PROCEDURE — 37000008 HC ANESTHESIA 1ST 15 MINUTES: Performed by: SURGERY

## 2020-07-06 PROCEDURE — 25000003 PHARM REV CODE 250: Performed by: STUDENT IN AN ORGANIZED HEALTH CARE EDUCATION/TRAINING PROGRAM

## 2020-07-06 PROCEDURE — 43280 PR LAP,ESOPHAGOGAST FUNDOPLASTY: ICD-10-PCS | Mod: 51,,, | Performed by: SURGERY

## 2020-07-06 PROCEDURE — 88307 TISSUE EXAM BY PATHOLOGIST: CPT | Mod: 26,,, | Performed by: PATHOLOGY

## 2020-07-06 PROCEDURE — 63600175 PHARM REV CODE 636 W HCPCS: Performed by: STUDENT IN AN ORGANIZED HEALTH CARE EDUCATION/TRAINING PROGRAM

## 2020-07-06 PROCEDURE — 43280 LAPAROSCOPY FUNDOPLASTY: CPT | Mod: 51,,, | Performed by: SURGERY

## 2020-07-06 PROCEDURE — 36000711: Performed by: SURGERY

## 2020-07-06 PROCEDURE — 82962 GLUCOSE BLOOD TEST: CPT | Performed by: SURGERY

## 2020-07-06 PROCEDURE — D9220A PRA ANESTHESIA: Mod: ANES,,, | Performed by: ANESTHESIOLOGY

## 2020-07-06 PROCEDURE — 94761 N-INVAS EAR/PLS OXIMETRY MLT: CPT

## 2020-07-06 PROCEDURE — D9220A PRA ANESTHESIA: Mod: CRNA,,, | Performed by: NURSE ANESTHETIST, CERTIFIED REGISTERED

## 2020-07-06 PROCEDURE — U0002 COVID-19 LAB TEST NON-CDC: HCPCS

## 2020-07-06 PROCEDURE — 63600175 PHARM REV CODE 636 W HCPCS: Performed by: NURSE ANESTHETIST, CERTIFIED REGISTERED

## 2020-07-06 PROCEDURE — 63600175 PHARM REV CODE 636 W HCPCS: Performed by: SURGERY

## 2020-07-06 PROCEDURE — 25000003 PHARM REV CODE 250: Performed by: SURGERY

## 2020-07-06 PROCEDURE — 71000015 HC POSTOP RECOV 1ST HR: Performed by: SURGERY

## 2020-07-06 PROCEDURE — 37000009 HC ANESTHESIA EA ADD 15 MINS: Performed by: SURGERY

## 2020-07-06 PROCEDURE — 25000003 PHARM REV CODE 250: Performed by: NURSE ANESTHETIST, CERTIFIED REGISTERED

## 2020-07-06 PROCEDURE — D9220A PRA ANESTHESIA: ICD-10-PCS | Mod: ANES,,, | Performed by: ANESTHESIOLOGY

## 2020-07-06 PROCEDURE — 71000016 HC POSTOP RECOV ADDL HR: Performed by: SURGERY

## 2020-07-06 PROCEDURE — 36000710: Performed by: SURGERY

## 2020-07-06 PROCEDURE — 27201423 OPTIME MED/SURG SUP & DEVICES STERILE SUPPLY: Performed by: SURGERY

## 2020-07-06 PROCEDURE — 44202 LAP ENTERECTOMY: CPT | Mod: ,,, | Performed by: SURGERY

## 2020-07-06 PROCEDURE — 88307 PR  SURG PATH,LEVEL V: ICD-10-PCS | Mod: 26,,, | Performed by: PATHOLOGY

## 2020-07-06 PROCEDURE — 27000221 HC OXYGEN, UP TO 24 HOURS

## 2020-07-06 PROCEDURE — C1768 GRAFT, VASCULAR: HCPCS | Performed by: SURGERY

## 2020-07-06 PROCEDURE — 44202 PR LAP,SURG,ENTERECTOMY,RESECT & ANAST: ICD-10-PCS | Mod: ,,, | Performed by: SURGERY

## 2020-07-06 PROCEDURE — D9220A PRA ANESTHESIA: ICD-10-PCS | Mod: CRNA,,, | Performed by: NURSE ANESTHETIST, CERTIFIED REGISTERED

## 2020-07-06 PROCEDURE — 71000033 HC RECOVERY, INTIAL HOUR: Performed by: SURGERY

## 2020-07-06 PROCEDURE — 88307 TISSUE EXAM BY PATHOLOGIST: CPT | Performed by: PATHOLOGY

## 2020-07-06 DEVICE — FELT THIN 1INX1IN: Type: IMPLANTABLE DEVICE | Site: ABDOMEN | Status: FUNCTIONAL

## 2020-07-06 RX ORDER — HYDROMORPHONE HYDROCHLORIDE 1 MG/ML
0.2 INJECTION, SOLUTION INTRAMUSCULAR; INTRAVENOUS; SUBCUTANEOUS EVERY 5 MIN PRN
Status: DISCONTINUED | OUTPATIENT
Start: 2020-07-06 | End: 2020-07-06 | Stop reason: HOSPADM

## 2020-07-06 RX ORDER — MEPERIDINE HYDROCHLORIDE 50 MG/ML
12.5 INJECTION INTRAMUSCULAR; INTRAVENOUS; SUBCUTANEOUS ONCE AS NEEDED
Status: DISCONTINUED | OUTPATIENT
Start: 2020-07-06 | End: 2020-07-06 | Stop reason: HOSPADM

## 2020-07-06 RX ORDER — FENTANYL CITRATE 50 UG/ML
INJECTION, SOLUTION INTRAMUSCULAR; INTRAVENOUS
Status: DISCONTINUED | OUTPATIENT
Start: 2020-07-06 | End: 2020-07-06

## 2020-07-06 RX ORDER — SODIUM CHLORIDE 9 MG/ML
INJECTION, SOLUTION INTRAVENOUS CONTINUOUS
Status: DISCONTINUED | OUTPATIENT
Start: 2020-07-06 | End: 2020-07-06

## 2020-07-06 RX ORDER — PANTOPRAZOLE SODIUM 40 MG/10ML
40 INJECTION, POWDER, LYOPHILIZED, FOR SOLUTION INTRAVENOUS DAILY
Status: DISCONTINUED | OUTPATIENT
Start: 2020-07-07 | End: 2020-07-07 | Stop reason: HOSPADM

## 2020-07-06 RX ORDER — INSULIN ASPART 100 [IU]/ML
0-5 INJECTION, SOLUTION INTRAVENOUS; SUBCUTANEOUS EVERY 6 HOURS PRN
Status: DISCONTINUED | OUTPATIENT
Start: 2020-07-06 | End: 2020-07-07 | Stop reason: HOSPADM

## 2020-07-06 RX ORDER — ROCURONIUM BROMIDE 10 MG/ML
INJECTION, SOLUTION INTRAVENOUS
Status: DISCONTINUED | OUTPATIENT
Start: 2020-07-06 | End: 2020-07-06

## 2020-07-06 RX ORDER — SODIUM CHLORIDE 9 MG/ML
INJECTION, SOLUTION INTRAVENOUS CONTINUOUS
Status: DISCONTINUED | OUTPATIENT
Start: 2020-07-06 | End: 2020-07-07

## 2020-07-06 RX ORDER — GLUCAGON 1 MG
1 KIT INJECTION
Status: DISCONTINUED | OUTPATIENT
Start: 2020-07-06 | End: 2020-07-07 | Stop reason: HOSPADM

## 2020-07-06 RX ORDER — NALOXONE HCL 0.4 MG/ML
0.02 VIAL (ML) INJECTION
Status: DISCONTINUED | OUTPATIENT
Start: 2020-07-06 | End: 2020-07-07 | Stop reason: HOSPADM

## 2020-07-06 RX ORDER — GLYCOPYRROLATE 0.2 MG/ML
INJECTION INTRAMUSCULAR; INTRAVENOUS
Status: DISCONTINUED | OUTPATIENT
Start: 2020-07-06 | End: 2020-07-06

## 2020-07-06 RX ORDER — HYDROMORPHONE HCL IN 0.9% NACL 6 MG/30 ML
PATIENT CONTROLLED ANALGESIA SYRINGE INTRAVENOUS CONTINUOUS
Status: DISCONTINUED | OUTPATIENT
Start: 2020-07-06 | End: 2020-07-07

## 2020-07-06 RX ORDER — KETAMINE HCL IN 0.9 % NACL 50 MG/5 ML
SYRINGE (ML) INTRAVENOUS
Status: DISCONTINUED | OUTPATIENT
Start: 2020-07-06 | End: 2020-07-06

## 2020-07-06 RX ORDER — HYDROCODONE BITARTRATE AND ACETAMINOPHEN 7.5; 325 MG/15ML; MG/15ML
15 SOLUTION ORAL EVERY 4 HOURS PRN
Status: DISCONTINUED | OUTPATIENT
Start: 2020-07-07 | End: 2020-07-07

## 2020-07-06 RX ORDER — ONDANSETRON 2 MG/ML
INJECTION INTRAMUSCULAR; INTRAVENOUS
Status: DISCONTINUED | OUTPATIENT
Start: 2020-07-06 | End: 2020-07-06

## 2020-07-06 RX ORDER — LIDOCAINE HYDROCHLORIDE 20 MG/ML
INJECTION INTRAVENOUS
Status: DISCONTINUED | OUTPATIENT
Start: 2020-07-06 | End: 2020-07-06

## 2020-07-06 RX ORDER — ACETAMINOPHEN 500 MG
1000 TABLET ORAL
Status: COMPLETED | OUTPATIENT
Start: 2020-07-06 | End: 2020-07-06

## 2020-07-06 RX ORDER — LORAZEPAM 2 MG/ML
0.25 INJECTION INTRAMUSCULAR ONCE AS NEEDED
Status: DISCONTINUED | OUTPATIENT
Start: 2020-07-06 | End: 2020-07-06 | Stop reason: HOSPADM

## 2020-07-06 RX ORDER — DEXAMETHASONE SODIUM PHOSPHATE 4 MG/ML
INJECTION, SOLUTION INTRA-ARTICULAR; INTRALESIONAL; INTRAMUSCULAR; INTRAVENOUS; SOFT TISSUE
Status: DISCONTINUED | OUTPATIENT
Start: 2020-07-06 | End: 2020-07-06

## 2020-07-06 RX ORDER — NEOSTIGMINE METHYLSULFATE 0.5 MG/ML
INJECTION, SOLUTION INTRAVENOUS
Status: DISCONTINUED | OUTPATIENT
Start: 2020-07-06 | End: 2020-07-06

## 2020-07-06 RX ORDER — SUCCINYLCHOLINE CHLORIDE 20 MG/ML
INJECTION INTRAMUSCULAR; INTRAVENOUS
Status: DISCONTINUED | OUTPATIENT
Start: 2020-07-06 | End: 2020-07-06

## 2020-07-06 RX ORDER — ACETAMINOPHEN 10 MG/ML
1000 INJECTION, SOLUTION INTRAVENOUS ONCE
Status: COMPLETED | OUTPATIENT
Start: 2020-07-06 | End: 2020-07-06

## 2020-07-06 RX ORDER — CEFAZOLIN SODIUM 1 G/3ML
2 INJECTION, POWDER, FOR SOLUTION INTRAMUSCULAR; INTRAVENOUS
Status: COMPLETED | OUTPATIENT
Start: 2020-07-06 | End: 2020-07-06

## 2020-07-06 RX ORDER — MIDAZOLAM HYDROCHLORIDE 1 MG/ML
INJECTION, SOLUTION INTRAMUSCULAR; INTRAVENOUS
Status: DISCONTINUED | OUTPATIENT
Start: 2020-07-06 | End: 2020-07-06

## 2020-07-06 RX ORDER — ONDANSETRON 2 MG/ML
4 INJECTION INTRAMUSCULAR; INTRAVENOUS EVERY 6 HOURS
Status: DISCONTINUED | OUTPATIENT
Start: 2020-07-06 | End: 2020-07-07

## 2020-07-06 RX ORDER — BUPIVACAINE HYDROCHLORIDE 2.5 MG/ML
INJECTION, SOLUTION EPIDURAL; INFILTRATION; INTRACAUDAL
Status: DISCONTINUED | OUTPATIENT
Start: 2020-07-06 | End: 2020-07-06

## 2020-07-06 RX ORDER — PROPOFOL 10 MG/ML
VIAL (ML) INTRAVENOUS
Status: DISCONTINUED | OUTPATIENT
Start: 2020-07-06 | End: 2020-07-06

## 2020-07-06 RX ADMIN — PROPOFOL 150 MG: 10 INJECTION, EMULSION INTRAVENOUS at 01:07

## 2020-07-06 RX ADMIN — SODIUM CHLORIDE: 0.9 INJECTION, SOLUTION INTRAVENOUS at 09:07

## 2020-07-06 RX ADMIN — SUCCINYLCHOLINE CHLORIDE 140 MG: 20 INJECTION, SOLUTION INTRAMUSCULAR; INTRAVENOUS at 01:07

## 2020-07-06 RX ADMIN — ACETAMINOPHEN 1000 MG: 500 TABLET ORAL at 09:07

## 2020-07-06 RX ADMIN — LIDOCAINE HYDROCHLORIDE 100 MG: 20 INJECTION, SOLUTION INTRAVENOUS at 01:07

## 2020-07-06 RX ADMIN — CEFAZOLIN 2 G: 330 INJECTION, POWDER, FOR SOLUTION INTRAMUSCULAR; INTRAVENOUS at 01:07

## 2020-07-06 RX ADMIN — SODIUM CHLORIDE: 0.9 INJECTION, SOLUTION INTRAVENOUS at 05:07

## 2020-07-06 RX ADMIN — Medication: at 04:07

## 2020-07-06 RX ADMIN — NEOSTIGMINE METHYLSULFATE 3 MG: 0.5 INJECTION INTRAVENOUS at 04:07

## 2020-07-06 RX ADMIN — MIDAZOLAM HYDROCHLORIDE 1 MG: 1 INJECTION, SOLUTION INTRAMUSCULAR; INTRAVENOUS at 01:07

## 2020-07-06 RX ADMIN — PROPOFOL 20 MG: 10 INJECTION, EMULSION INTRAVENOUS at 03:07

## 2020-07-06 RX ADMIN — GLYCOPYRROLATE 0.4 MG: 0.2 INJECTION, SOLUTION INTRAMUSCULAR; INTRAVENOUS at 04:07

## 2020-07-06 RX ADMIN — ONDANSETRON 4 MG: 2 INJECTION, SOLUTION INTRAMUSCULAR; INTRAVENOUS at 01:07

## 2020-07-06 RX ADMIN — FENTANYL CITRATE 50 MCG: 50 INJECTION, SOLUTION INTRAMUSCULAR; INTRAVENOUS at 04:07

## 2020-07-06 RX ADMIN — ROCURONIUM BROMIDE 20 MG: 10 INJECTION, SOLUTION INTRAVENOUS at 02:07

## 2020-07-06 RX ADMIN — ACETAMINOPHEN 1000 MG: 10 INJECTION, SOLUTION INTRAVENOUS at 06:07

## 2020-07-06 RX ADMIN — ROCURONIUM BROMIDE 30 MG: 10 INJECTION, SOLUTION INTRAVENOUS at 01:07

## 2020-07-06 RX ADMIN — FENTANYL CITRATE 100 MCG: 50 INJECTION, SOLUTION INTRAMUSCULAR; INTRAVENOUS at 01:07

## 2020-07-06 RX ADMIN — SODIUM CHLORIDE, SODIUM GLUCONATE, SODIUM ACETATE, POTASSIUM CHLORIDE, MAGNESIUM CHLORIDE, SODIUM PHOSPHATE, DIBASIC, AND POTASSIUM PHOSPHATE: .53; .5; .37; .037; .03; .012; .00082 INJECTION, SOLUTION INTRAVENOUS at 01:07

## 2020-07-06 RX ADMIN — Medication 30 MG: at 01:07

## 2020-07-06 RX ADMIN — SODIUM CHLORIDE: 0.9 INJECTION, SOLUTION INTRAVENOUS at 12:07

## 2020-07-06 RX ADMIN — DEXAMETHASONE SODIUM PHOSPHATE 4 MG: 4 INJECTION, SOLUTION INTRAMUSCULAR; INTRAVENOUS at 01:07

## 2020-07-06 RX ADMIN — ROCURONIUM BROMIDE 20 MG: 10 INJECTION, SOLUTION INTRAVENOUS at 03:07

## 2020-07-06 RX ADMIN — ONDANSETRON 4 MG: 2 INJECTION INTRAMUSCULAR; INTRAVENOUS at 06:07

## 2020-07-06 NOTE — TRANSFER OF CARE
"Anesthesia Transfer of Care Note    Patient: Jill Perez    Procedure(s) Performed: Procedure(s) (LRB):  LAPAROSCOPY, DIAGNOSTIC (N/A)  EXCISION, SMALL INTESTINE, LAPAROSCOPIC (N/A)  LYSIS, ADHESIONS, LAPAROSCOPIC (N/A)  REPAIR, HERNIA, HIATAL, LAPAROSCOPIC (N/A)  EGD (ESOPHAGOGASTRODUODENOSCOPY) (N/A)    Patient location: PACU    Anesthesia Type: general    Transport from OR: Transported from OR on room air with adequate spontaneous ventilation    Post pain: adequate analgesia    Post assessment: no apparent anesthetic complications    Post vital signs: stable    Level of consciousness: awake, alert and oriented    Nausea/Vomiting: no nausea/vomiting    Complications: none    Transfer of care protocol was followed      Last vitals:   Visit Vitals  /68 (BP Location: Right arm, Patient Position: Lying)   Pulse 70   Temp 36.7 °C (98.1 °F) (Temporal)   Resp 14   Ht 5' 6" (1.676 m)   Wt 78.5 kg (173 lb)   LMP  (LMP Unknown)   SpO2 98%   Breastfeeding No   BMI 27.92 kg/m²     "

## 2020-07-06 NOTE — PROGRESS NOTES
Rounding on pt completed, pt denies any needs at this time. Pt resting quietly with her  present at the bedside. Will continue to monitor.

## 2020-07-06 NOTE — BRIEF OP NOTE
[facility]  Surgery Department  Brief Op Note    SUMMARY     Date of Procedure: 7/6/2020     Procedure: Procedure(s) (LRB):  LAPAROSCOPY, DIAGNOSTIC (N/A)  EXCISION, SMALL INTESTINE, LAPAROSCOPIC (N/A)  LYSIS, ADHESIONS, LAPAROSCOPIC (N/A)  REPAIR, HERNIA, HIATAL, LAPAROSCOPIC (N/A)  EGD (ESOPHAGOGASTRODUODENOSCOPY) (N/A)     Surgeon(s) and Role:     * Solange Kent MD - Primary     * Ramana Temple MD - Resident - Assisting        Pre-Operative Diagnosis: Esophageal dysphagia [R13.10]    Post-Operative Diagnosis: Post-Op Diagnosis Codes:     * Esophageal dysphagia [R13.10]    Anesthesia: General    Technical Procedures Used: Small bowel resection. HH repair.  EGD    Description of the Findings of the Procedure: Very long candy cane coming off of G-J, 27cm resected.  No internal hernia.  Hiatal hernia closed primarily      Significant Surgical Tasks Conducted by the Assistant(s), if Applicable: N/a    Complications: No    Estimated Blood Loss (EBL): * No values recorded between 7/6/2020  1:39 PM and 7/6/2020  4:30 PM *           Implants:   Implant Name Type Inv. Item Serial No.  Lot No. LRB No. Used Action   [employer] [id number] - [id number]  [employer] [id number]  [employer] [place] [id number]  1 Implanted       Specimens:   Specimen (12h ago, onward)    None                  Condition: Good    Disposition: PACU - hemodynamically stable.    Attestation: I was present and scrubbed for the entire procedure.

## 2020-07-06 NOTE — PROGRESS NOTES
Attempted to call report,awaiting return call from Tonio RN, pt resting quietly,VSS,resp unlabored, abd soft,laps dry and intact, made ready fro transport to [facility] via [facility] bed per transporters,  updated,stable at present.

## 2020-07-06 NOTE — ANESTHESIA PROCEDURE NOTES
Intubation  Performed by: Elton Duong MD  Authorized by: Sarthak Randle Jr., MD     Intubation:     Induction:  Intravenous    Intubated:  Postinduction    Mask Ventilation:  Easy mask    Attempts:  1    Attempted By:  Resident anesthesiologist    Blade:  George 3    Laryngeal View Grade: Grade I - full view of chords      Difficult Airway Encountered?: No      Complications:  None    Airway Device:  Oral endotracheal tube    Airway Device Size:  7.5    Style/Cuff Inflation:  Cuffed (inflated to minimal occlusive pressure)    Inflation Amount (mL):  6    Tube secured:  23    Secured at:  The lips    Placement Verified By:  Capnometry    Complicating Factors:  None    Findings Post-Intubation:  BS equal bilateral

## 2020-07-06 NOTE — OP NOTE
DATE OF PROCEDURE: 7/6/2020    PRE OP DIAGNOSIS: Dysphagia  LUQ abdominal pain  Partial small bowel obstruction  Status-post gastric bypass  Hiatal hernia  Status-post splenectomy    POST OP DIAGNOSIS: Dysphagia  Candy-cane syndrome  Status-post gastric bypass  Hiatal hernia  Status-post splenectomy  Status-post ventral hernia repair with mesh    PROCEDURE: Procedure(s) (LRB):  LAPAROSCOPY, DIAGNOSTIC (N/A)  EXCISION, SMALL INTESTINE, LAPAROSCOPIC (N/A)  LYSIS, ADHESIONS, LAPAROSCOPIC (N/A)  REPAIR, HERNIA, HIATAL, LAPAROSCOPIC (N/A)  EGD (ESOPHAGOGASTRODUODENOSCOPY) (N/A)    Surgeon(s) and Role:     * Solange Kent MD - Primary     * Ramana Temple MD - Resident - Assisting    ANESTHESIA: General    INDICATION: Patient is a 65 year-old woman with dysphagia s/p open splenectomy and laparoscopic maribel-en-y gastric bypass who presented with colicky LUQ abdominal pain and bloating. Work-up revealed a hiatal hernia and small bowel dilation in the left upper quadrant of her abdomen. After discussing the risks, benefits and alternatives to diagnostic laparoscopic with adhesiolysis, possible internal hernia repair, hiatal hernia repair, and other indicated procedures, she elected to proceed. Informed consent was obtained.     FINDINGS:  1. Extensive omental and transverse colon adhesions to abdominal wall and intraperitoneal mesh, taken down.  2. Antecolic-antegastric maribel-en-y configuration with extremely long blind-appendage of maribel limb (resected).  3. No evidence of internal hernia at either Abraham's or the jejunojejunostomy mesenteric sites.  4. Moderately-sized hiatal hernia, reduced and closed primarily.  5. 4 cm of intra-abdominal esophagus without tension following reduction.  6. Identification and preservation of both the anterior and posterior vagal nerves during hiatal hernia repair.     PROCEDURE IN DETAIL:The patient was met in the pre-op area and her identity and consent confirmed. She was  then brought to the Operating Room and placed in the supine position. General anesthesia was induced and she was intubated without incident. SCDs and a warming blanket were placed and pre-op antibiotic prophylaxis was infused within 30 minutes prior to the incision. The abdomen was prepped and draped in sterile fashion and a pre-procedural pause performed by all members of the surgical and anesthesia teams. Access to peritoneum was gained in the left upper quadrant with a 5-mm Optiview trocar under direct vision. Pneumoperitoneum to 15 mmHg with CO2 gas was obtained. A significant amount of omental adhesions were immediately encountered and blunt dissection with the laparoscope used to make a window to visualize the right-upper lateral abdomen. A 5-mm trocar was then placed under vision and omental adhesions continued to be carefully taken down using a combination of blunt and sharp dissection. Once cleared, a second left upper quadrant 5-mm port was placed under vision and adhesiolysis continued. Centrally an intraperitoneal mesh was encountered and dense adhesions from both omentum and transverse colon were carefully taken down using a combination of blunt, sharp, and Harmonic dissection. Once complete, an 11-mm port 15-cm from the xiphoid just to the left of midline, and a 5-mm RUQ port and 12-mm port to the right of midline were placed. Any remaining adhesions to the abdominal wall were taken down. The terminal ileum was identified and the small bowel run from [id number] to the jejunojejunostomy which was normal and healthy in appearance. The maribel limb was followed proximally and the biliopancreatic limb followed to the ligament of Treitz. There were no twists or kinks throughout. Both the jejunojejunostomy and Abraham's potential mesenteric defects were closely inspected and noted to be well-adherent and closed without evidence of internal hernia. A liver retractor was placed through the right lateral  trocar and adhesions from the maribel limb and gastrojejunostomy were carefully and sharply taken down until the entire pouch and hiatus were exposed. The blind end of the maribel limb was noted to be excessively long creating an exceptional candy-cane, likely the cause of her colicky LUQ symptoms. Intraoperative endoscopy was performed confirming the location of the GEJ and gastrojejunostomy which appeared healthy and patent. Just distal to this, a mesenteric window was made on the blind-limb side and the jejunum divided with a white staple load. The mesentery was divided along the length of this blind afferent limb close to the bowel until it was fully resected. The segment of jejunum was placed in an EndoCatch bag and removed via the 12-mm incision site. This measured 27-cm in length in its devascularized ex-vivo state. At this point we turned our attention to the hiatal hernia. The pars flaccida was divided. Using judicial use of the Harmonic scalpel and blunt dissection the hernia sac was divided from the crura and brought into the abdomen. The EGJ was freed circumferentially. The mediastinal attachments to the esophagus were bluntly divided. The anterior and posterior vagus nerves were identified and preserved with the esophagus throughout. After dissection about 4 cm of esophagus laid within the abdominal cavity without tension. The crural opening was moderately-large in size and was reapproximated with three 0-Ticron pledgeted sutures posteriorly. A grasper could just be placed between the fundoplication and esophagus. The abdomen was inspected and hemostasis ensured. The liver retractor was removed. The 12-mm and 11-mm port sites were closed with 0-vicryl suture using the Arnaud-Anny device. The remaining ports were removed under direct visualization, allowing the abdomen to desufflate prior to removal of the last one. The skin incisions were closed with 4-0 Monocryl and reinforced with Dermabond.The patient  was awoken from anesthesia and extubated without incident. She was brought to the PACU in good condition having tolerated the operation well. Sponge and needle counts were correct at the end of the case.      EBL: 10 mL  Specimens: Jejunum  Drains: None  Complications: None apparent  Dispo: Surgical floor     I was present and scrubbed for the entirety of this operation.     Solange Kent  7/6/2020

## 2020-07-07 VITALS
HEIGHT: 66 IN | SYSTOLIC BLOOD PRESSURE: 134 MMHG | WEIGHT: 173 LBS | DIASTOLIC BLOOD PRESSURE: 67 MMHG | BODY MASS INDEX: 27.8 KG/M2 | OXYGEN SATURATION: 96 % | TEMPERATURE: 99 F | HEART RATE: 74 BPM | RESPIRATION RATE: 18 BRPM

## 2020-07-07 LAB
ACANTHOCYTES BLD QL SMEAR: ABNORMAL
ANION GAP SERPL CALC-SCNC: 7 MMOL/L (ref 8–16)
ANISOCYTOSIS BLD QL SMEAR: ABNORMAL
AUER BODIES BLD QL SMEAR: ABNORMAL
BASO STIPL BLD QL SMEAR: ABNORMAL
BASOPHILS # BLD AUTO: 0.03 K/UL (ref 0–0.2)
BASOPHILS # BLD AUTO: ABNORMAL K/UL (ref 0–0.2)
BASOPHILS NFR BLD: 0.3 % (ref 0–1.9)
BASOPHILS NFR BLD: ABNORMAL % (ref 0–1.9)
BLASTS NFR BLD MANUAL: ABNORMAL %
BUN SERPL-MCNC: 6 MG/DL (ref 8–23)
BURR CELLS BLD QL SMEAR: ABNORMAL
CABOT RINGS BLD QL SMEAR: ABNORMAL
CALCIUM SERPL-MCNC: 8.1 MG/DL (ref 8.7–10.5)
CHLORIDE SERPL-SCNC: 109 MMOL/L (ref 95–110)
CO2 SERPL-SCNC: 25 MMOL/L (ref 23–29)
CREAT SERPL-MCNC: 0.6 MG/DL (ref 0.5–1.4)
DACRYOCYTES BLD QL SMEAR: ABNORMAL
DIFFERENTIAL METHOD: ABNORMAL
DIFFERENTIAL METHOD: ABNORMAL
DOHLE BOD BLD QL SMEAR: ABNORMAL
EOSINOPHIL # BLD AUTO: 0 K/UL (ref 0–0.5)
EOSINOPHIL # BLD AUTO: ABNORMAL K/UL (ref 0–0.5)
EOSINOPHIL NFR BLD: 0.3 % (ref 0–8)
EOSINOPHIL NFR BLD: ABNORMAL % (ref 0–8)
ERYTHROCYTE [DISTWIDTH] IN BLOOD BY AUTOMATED COUNT: 15.4 % (ref 11.5–14.5)
ERYTHROCYTE [DISTWIDTH] IN BLOOD BY AUTOMATED COUNT: ABNORMAL % (ref 11.5–14.5)
EST. GFR  (AFRICAN AMERICAN): >60 ML/MIN/1.73 M^2
EST. GFR  (NON AFRICAN AMERICAN): >60 ML/MIN/1.73 M^2
GIANT PLATELETS BLD QL SMEAR: ABNORMAL
GLUCOSE SERPL-MCNC: 99 MG/DL (ref 70–110)
HCT VFR BLD AUTO: 32.2 % (ref 37–48.5)
HCT VFR BLD AUTO: ABNORMAL % (ref 37–48.5)
HEINZ BOD BLD QL SMEAR: ABNORMAL
HGB BLD-MCNC: 10.3 G/DL (ref 12–16)
HGB BLD-MCNC: ABNORMAL G/DL (ref 12–16)
HGB C CRY RBC QL MICRO: ABNORMAL
HOWELL-JOLLY BOD BLD QL SMEAR: ABNORMAL
HYPOCHROMIA BLD QL SMEAR: ABNORMAL
IMM GRANULOCYTES # BLD AUTO: 0.02 K/UL (ref 0–0.04)
IMM GRANULOCYTES # BLD AUTO: ABNORMAL K/UL (ref 0–0.04)
IMM GRANULOCYTES NFR BLD AUTO: 0.2 % (ref 0–0.5)
IMM GRANULOCYTES NFR BLD AUTO: ABNORMAL % (ref 0–0.5)
LYMPHOCYTES # BLD AUTO: 2.8 K/UL (ref 1–4.8)
LYMPHOCYTES # BLD AUTO: ABNORMAL K/UL (ref 1–4.8)
LYMPHOCYTES NFR BLD: 32.4 % (ref 18–48)
LYMPHOCYTES NFR BLD: ABNORMAL % (ref 18–48)
MAGNESIUM SERPL-MCNC: 1.5 MG/DL (ref 1.6–2.6)
MCH RBC QN AUTO: 28.7 PG (ref 27–31)
MCH RBC QN AUTO: ABNORMAL PG (ref 27–31)
MCHC RBC AUTO-ENTMCNC: 32 G/DL (ref 32–36)
MCHC RBC AUTO-ENTMCNC: ABNORMAL G/DL (ref 32–36)
MCV RBC AUTO: 90 FL (ref 82–98)
MCV RBC AUTO: ABNORMAL FL (ref 82–98)
MEGAKARYOCYTIC FRAGMENTS: ABNORMAL
METAMYELOCYTES NFR BLD MANUAL: ABNORMAL %
MONOCYTES # BLD AUTO: 1 K/UL (ref 0.3–1)
MONOCYTES # BLD AUTO: ABNORMAL K/UL (ref 0.3–1)
MONOCYTES NFR BLD: 11.1 % (ref 4–15)
MONOCYTES NFR BLD: ABNORMAL % (ref 4–15)
MYELOCYTES NFR BLD MANUAL: ABNORMAL %
NEUTROPHILS # BLD AUTO: 4.8 K/UL (ref 1.8–7.7)
NEUTROPHILS # BLD AUTO: ABNORMAL K/UL (ref 1.8–7.7)
NEUTROPHILS NFR BLD: 55.7 % (ref 38–73)
NEUTROPHILS NFR BLD: ABNORMAL % (ref 38–73)
NEUTS BAND NFR BLD MANUAL: ABNORMAL %
NRBC BLD-RTO: 0 /100 WBC
NRBC BLD-RTO: ABNORMAL /100 WBC
OVALOCYTES BLD QL SMEAR: ABNORMAL
PAPPENHEIMER BOD BLD QL SMEAR: ABNORMAL
PHOSPHATE SERPL-MCNC: 2.3 MG/DL (ref 2.7–4.5)
PLASMODIUM BLD QL SMEAR: ABNORMAL
PLATELET # BLD AUTO: 359 K/UL (ref 150–350)
PLATELET # BLD AUTO: ABNORMAL K/UL (ref 150–350)
PLATELET BLD QL SMEAR: ABNORMAL
PMV BLD AUTO: 10.4 FL (ref 9.2–12.9)
PMV BLD AUTO: ABNORMAL FL (ref 9.2–12.9)
POCT GLUCOSE: 112 MG/DL (ref 70–110)
POCT GLUCOSE: 130 MG/DL (ref 70–110)
POCT GLUCOSE: 98 MG/DL (ref 70–110)
POIKILOCYTOSIS BLD QL SMEAR: ABNORMAL
POLYCHROMASIA BLD QL SMEAR: ABNORMAL
POTASSIUM SERPL-SCNC: 3.9 MMOL/L (ref 3.5–5.1)
PROMYELOCYTES NFR BLD MANUAL: ABNORMAL %
RBC # BLD AUTO: 3.59 M/UL (ref 4–5.4)
RBC # BLD AUTO: ABNORMAL M/UL (ref 4–5.4)
RBC AGGLUT BLD QL: ABNORMAL
ROULEAUX BLD QL SMEAR: ABNORMAL
SCHISTOCYTES BLD QL SMEAR: ABNORMAL
SCHISTOCYTES BLD QL SMEAR: ABNORMAL
SICKLE CELLS BLD QL SMEAR: ABNORMAL
SMUDGE CELLS BLD QL SMEAR: ABNORMAL
SODIUM SERPL-SCNC: 141 MMOL/L (ref 136–145)
SPHEROCYTES BLD QL SMEAR: ABNORMAL
STOMATOCYTES BLD QL SMEAR: ABNORMAL
TARGETS BLD QL SMEAR: ABNORMAL
TOXIC GRANULES BLD QL SMEAR: ABNORMAL
WBC # BLD AUTO: 8.7 K/UL (ref 3.9–12.7)
WBC # BLD AUTO: ABNORMAL K/UL (ref 3.9–12.7)
WBC NRBC COR # BLD: ABNORMAL K/UL (ref 3.9–12.7)
WBC OTHER NFR BLD MANUAL: ABNORMAL %
WBC TOXIC VACUOLES BLD QL SMEAR: ABNORMAL

## 2020-07-07 PROCEDURE — 63600175 PHARM REV CODE 636 W HCPCS: Performed by: STUDENT IN AN ORGANIZED HEALTH CARE EDUCATION/TRAINING PROGRAM

## 2020-07-07 PROCEDURE — 25000003 PHARM REV CODE 250: Performed by: STUDENT IN AN ORGANIZED HEALTH CARE EDUCATION/TRAINING PROGRAM

## 2020-07-07 PROCEDURE — 25000003 PHARM REV CODE 250: Performed by: SURGERY

## 2020-07-07 PROCEDURE — 85025 COMPLETE CBC W/AUTO DIFF WBC: CPT

## 2020-07-07 PROCEDURE — C9113 INJ PANTOPRAZOLE SODIUM, VIA: HCPCS | Performed by: STUDENT IN AN ORGANIZED HEALTH CARE EDUCATION/TRAINING PROGRAM

## 2020-07-07 PROCEDURE — 83735 ASSAY OF MAGNESIUM: CPT

## 2020-07-07 PROCEDURE — 84100 ASSAY OF PHOSPHORUS: CPT

## 2020-07-07 PROCEDURE — 80048 BASIC METABOLIC PNL TOTAL CA: CPT

## 2020-07-07 PROCEDURE — 36415 COLL VENOUS BLD VENIPUNCTURE: CPT

## 2020-07-07 RX ORDER — SODIUM,POTASSIUM PHOSPHATES 280-250MG
2 POWDER IN PACKET (EA) ORAL ONCE
Status: COMPLETED | OUTPATIENT
Start: 2020-07-07 | End: 2020-07-07

## 2020-07-07 RX ORDER — SUCRALFATE 1 G/1
1 TABLET ORAL
Status: DISCONTINUED | OUTPATIENT
Start: 2020-07-07 | End: 2020-07-07 | Stop reason: HOSPADM

## 2020-07-07 RX ORDER — GABAPENTIN 300 MG/1
300 CAPSULE ORAL 3 TIMES DAILY
Status: DISCONTINUED | OUTPATIENT
Start: 2020-07-07 | End: 2020-07-07 | Stop reason: HOSPADM

## 2020-07-07 RX ORDER — FLUOXETINE 20 MG/5ML
20 SOLUTION ORAL DAILY
Status: DISCONTINUED | OUTPATIENT
Start: 2020-07-07 | End: 2020-07-07

## 2020-07-07 RX ORDER — NIFEDIPINE 30 MG/1
60 TABLET, EXTENDED RELEASE ORAL DAILY
Status: DISCONTINUED | OUTPATIENT
Start: 2020-07-07 | End: 2020-07-07 | Stop reason: HOSPADM

## 2020-07-07 RX ORDER — HYDROCODONE BITARTRATE AND ACETAMINOPHEN 7.5; 325 MG/15ML; MG/15ML
10 SOLUTION ORAL EVERY 6 HOURS PRN
Qty: 160 ML | Refills: 0 | Status: SHIPPED | OUTPATIENT
Start: 2020-07-07 | End: 2020-07-21

## 2020-07-07 RX ORDER — ATORVASTATIN CALCIUM 10 MG/1
40 TABLET, FILM COATED ORAL NIGHTLY
Status: DISCONTINUED | OUTPATIENT
Start: 2020-07-07 | End: 2020-07-07 | Stop reason: HOSPADM

## 2020-07-07 RX ORDER — HYDROCODONE BITARTRATE AND ACETAMINOPHEN 7.5; 325 MG/15ML; MG/15ML
15 SOLUTION ORAL EVERY 4 HOURS PRN
Status: DISCONTINUED | OUTPATIENT
Start: 2020-07-07 | End: 2020-07-07 | Stop reason: HOSPADM

## 2020-07-07 RX ORDER — ZOLPIDEM TARTRATE 10 MG/1
10 TABLET ORAL NIGHTLY
Status: DISCONTINUED | OUTPATIENT
Start: 2020-07-07 | End: 2020-07-07 | Stop reason: HOSPADM

## 2020-07-07 RX ORDER — LANOLIN ALCOHOL/MO/W.PET/CERES
800 CREAM (GRAM) TOPICAL ONCE
Status: COMPLETED | OUTPATIENT
Start: 2020-07-07 | End: 2020-07-07

## 2020-07-07 RX ORDER — PROMETHAZINE HYDROCHLORIDE 12.5 MG/1
12.5 TABLET ORAL EVERY 6 HOURS PRN
Status: DISCONTINUED | OUTPATIENT
Start: 2020-07-07 | End: 2020-07-07 | Stop reason: HOSPADM

## 2020-07-07 RX ORDER — ONDANSETRON 2 MG/ML
4 INJECTION INTRAMUSCULAR; INTRAVENOUS EVERY 6 HOURS PRN
Status: DISCONTINUED | OUTPATIENT
Start: 2020-07-07 | End: 2020-07-07

## 2020-07-07 RX ORDER — HYDROCODONE BITARTRATE AND ACETAMINOPHEN 7.5; 325 MG/15ML; MG/15ML
10 SOLUTION ORAL EVERY 6 HOURS PRN
Qty: 160 ML | Refills: 0 | Status: SHIPPED | OUTPATIENT
Start: 2020-07-07 | End: 2020-07-07 | Stop reason: SDUPTHER

## 2020-07-07 RX ORDER — HYDROCODONE BITARTRATE AND ACETAMINOPHEN 7.5; 325 MG/15ML; MG/15ML
10 SOLUTION ORAL EVERY 4 HOURS PRN
Status: DISCONTINUED | OUTPATIENT
Start: 2020-07-07 | End: 2020-07-07 | Stop reason: HOSPADM

## 2020-07-07 RX ORDER — ATORVASTATIN CALCIUM 10 MG/1
40 TABLET, FILM COATED ORAL DAILY
Status: DISCONTINUED | OUTPATIENT
Start: 2020-07-07 | End: 2020-07-07

## 2020-07-07 RX ORDER — FLUOXETINE 20 MG/5ML
20 SOLUTION ORAL DAILY
Status: DISCONTINUED | OUTPATIENT
Start: 2020-07-07 | End: 2020-07-07 | Stop reason: HOSPADM

## 2020-07-07 RX ORDER — NIFEDIPINE 30 MG/1
60 TABLET, EXTENDED RELEASE ORAL DAILY
Status: DISCONTINUED | OUTPATIENT
Start: 2020-07-07 | End: 2020-07-07

## 2020-07-07 RX ORDER — ONDANSETRON 4 MG/1
4 TABLET, FILM COATED ORAL EVERY 6 HOURS PRN
Status: DISCONTINUED | OUTPATIENT
Start: 2020-07-07 | End: 2020-07-07 | Stop reason: HOSPADM

## 2020-07-07 RX ADMIN — HYDROCODONE BITARTRATE AND ACETAMINOPHEN 15 ML: 7.5; 325 SOLUTION ORAL at 09:07

## 2020-07-07 RX ADMIN — Medication 800 MG: at 01:07

## 2020-07-07 RX ADMIN — HYDROCODONE BITARTRATE AND ACETAMINOPHEN 10 ML: 7.5; 325 SOLUTION ORAL at 01:07

## 2020-07-07 RX ADMIN — PANTOPRAZOLE SODIUM 40 MG: 40 INJECTION, POWDER, FOR SOLUTION INTRAVENOUS at 09:07

## 2020-07-07 RX ADMIN — ONDANSETRON 4 MG: 2 INJECTION INTRAMUSCULAR; INTRAVENOUS at 05:07

## 2020-07-07 RX ADMIN — NIFEDIPINE 60 MG: 30 TABLET, FILM COATED, EXTENDED RELEASE ORAL at 02:07

## 2020-07-07 RX ADMIN — ONDANSETRON 4 MG: 2 INJECTION INTRAMUSCULAR; INTRAVENOUS at 12:07

## 2020-07-07 RX ADMIN — POTASSIUM & SODIUM PHOSPHATES POWDER PACK 280-160-250 MG 2 PACKET: 280-160-250 PACK at 01:07

## 2020-07-07 NOTE — SUBJECTIVE & OBJECTIVE
Interval History: NAEON. Pt resting comfortably in bed. Pain under control w/ PCA. No BM or gas. No complaints.     Medications:  Continuous Infusions:   sodium chloride 0.9%      sodium chloride 0.9% 125 mL/hr at 07/06/20 1704    hydromorphone in 0.9 % NaCl 6 mg/30 ml       Scheduled Meds:   ondansetron  4 mg Intravenous Q6H    pantoprazole  40 mg Intravenous Daily     PRN Meds:dextrose 50%, glucagon (human recombinant), hydrocodone-apap 7.5-325 MG/15 ML, insulin aspart U-100, naloxone, promethazine (PHENERGAN) IVPB     Review of patient's allergies indicates:   Allergen Reactions    Pcn [penicillins] Hives and Itching     Objective:     Vital Signs (Most Recent):  Temp: 98.7 °F (37.1 °C) (07/07/20 0559)  Pulse: 75 (07/07/20 0559)  Resp: 16 (07/07/20 0559)  BP: 121/68 (07/07/20 0559)  SpO2: 95 % (07/07/20 0559) Vital Signs (24h Range):  Temp:  [96.7 °F (35.9 °C)-98.7 °F (37.1 °C)] 98.7 °F (37.1 °C)  Pulse:  [] 75  Resp:  [10-20] 16  SpO2:  [94 %-98 %] 95 %  BP: (104-141)/(51-70) 121/68     Weight: 78.5 kg (173 lb)  Body mass index is 27.92 kg/m².    Intake/Output - Last 3 Shifts       07/05 0700 - 07/06 0659 07/06 0700 - 07/07 0659    I.V. (mL/kg)  1400 (17.8)    Total Intake(mL/kg)  1400 (17.8)    Net  +1400          Urine Occurrence  2 x    Stool Occurrence  0 x          Physical Exam  Constitutional:       General: She is not in acute distress.     Appearance: Normal appearance.   Eyes:      Extraocular Movements: Extraocular movements intact.   Cardiovascular:      Rate and Rhythm: Normal rate and regular rhythm.   Abdominal:      General: There is no distension.      Palpations: Abdomen is soft.   Skin:     General: Skin is warm and dry.   Neurological:      General: No focal deficit present.      Mental Status: She is alert and oriented to person, place, and time.   Psychiatric:         Mood and Affect: Mood normal.         Behavior: Behavior normal.         Significant Labs:  CBC: No results for  input(s): WBC, RBC, HGB, HCT, PLT, MCV, MCH, MCHC in the last 168 hours.  CMP: No results for input(s): GLU, CALCIUM, ALBUMIN, PROT, NA, K, CO2, CL, BUN, CREATININE, ALKPHOS, ALT, AST, BILITOT in the last 168 hours.    Significant Diagnostics:  I have reviewed all pertinent imaging results/findings within the past 24 hours.

## 2020-07-07 NOTE — HPI
65 y F with BMI 28.41 kg/m2, HTN, DLD, NIDDM2, Raynaud's disease +YOLIS, depression, insomnia, and h/o prior splenectomy, cholecystectomy, and maribel-en-y gastric bypass who presents for evaluation of LUQ abdominal pain and dysphagia. She underwent laparoscopic RYGB about 12 years ago at [facility] in [place]. POD 1 s/p lap hiatal hernia repair and resection of maribel limb candy cane.

## 2020-07-07 NOTE — PROGRESS NOTES
[facility]  General Surgery  Progress Note    Subjective:     History of Present Illness:  65 y F with BMI 28.41 kg/m2, HTN, DLD, NIDDM2, Raynaud's disease +YOLIS, depression, insomnia, and h/o prior splenectomy, cholecystectomy, and maribel-en-y gastric bypass who presents for evaluation of LUQ abdominal pain and dysphagia. She underwent laparoscopic RYGB about 12 years ago at [facility] in [place]. POD 1 s/p lap hiatal hernia repair and resection of maribel limb candy cane.     Post-Op Info:  Procedure(s) (LRB):  LAPAROSCOPY, DIAGNOSTIC (N/A)  EXCISION, SMALL INTESTINE, LAPAROSCOPIC (N/A)  LYSIS, ADHESIONS, LAPAROSCOPIC (N/A)  REPAIR, HERNIA, HIATAL, LAPAROSCOPIC (N/A)  EGD (ESOPHAGOGASTRODUODENOSCOPY) (N/A)   1 Day Post-Op     Interval History: NAEON. Pt resting comfortably in bed. Pain under control w/ PCA. No BM or gas. No complaints.     Medications:   Continuous Infusions:     sodium chloride 0.9%     sodium chloride 0.9% 125 mL/hr at 07/06/20 1704    hydromorphone in 0.9 % NaCl 6 mg/30 ml      Scheduled Meds:      ondansetron 4 mg Intravenous Q6H     pantoprazole 40 mg Intravenous Daily      PRN Meds:dextrose 50%, glucagon (human recombinant), hydrocodone-apap 7.5-325 MG/15 ML, insulin aspart U-100, naloxone, promethazine (PHENERGAN) IVPB           Review of patient's allergies indicates:   Allergen Reactions    Pcn [penicillins] Hives and Itching     Objective:     Vital Signs (Most Recent):   Temp: 98.7 °F (37.1 °C) (07/07/20 0559)   Pulse: 75 (07/07/20 0559)   Resp: 16 (07/07/20 0559)   BP: 121/68 (07/07/20 0559)   SpO2: 95 % (07/07/20 0559) Vital Signs (24h Range):   Temp: [96.7 °F (35.9 °C)-98.7 °F (37.1 °C)] 98.7 °F (37.1 °C)   Pulse: [] 75   Resp: [10-20] 16   SpO2: [94 %-98 %] 95 %   BP: (104-141)/(51-70) 121/68   Weight: 78.5 kg (173 lb)   Body mass index is 27.92 kg/m².          Intake/Output - Last 3 Shifts        07/05 0700 - 07/06 0659 07/06 0700 - 07/07 0659    I.V. (mL/kg)   1400 (17.8)    Total Intake(mL/kg)  1400 (17.8)    Net  +1400          Urine Occurrence  2 x    Stool Occurrence  0 x          Physical Exam   Constitutional:   General: She is not in acute distress.  Appearance: Normal appearance.   Eyes:   Extraocular Movements: Extraocular movements intact.   Cardiovascular:   Rate and Rhythm: Normal rate and regular rhythm.   Abdominal:   General: There is no distension.   Palpations: Abdomen is soft.   Skin:   General: Skin is warm and dry.   Neurological:   General: No focal deficit present.   Mental Status: She is alert and oriented to person, place, and time.   Psychiatric:   Mood and Affect: Mood normal.   Behavior: Behavior normal.     Significant Labs:   CBC: No results for input(s): WBC, RBC, HGB, HCT, PLT, MCV, MCH, MCHC in the last 168 hours.   CMP: No results for input(s): GLU, CALCIUM, ALBUMIN, PROT, NA, K, CO2, CL, BUN, CREATININE, ALKPHOS, ALT, AST, BILITOT in the last 168 hours.   Significant Diagnostics:   I have reviewed all pertinent imaging results/findings within the past 24 hours.    Assessment/Plan:     Hiatal hernia  65 y F s/p laparoscopic hiatal hernia repair and resection of maribel limb candy cane on 7/7/20 POD 1.     - bariatric clears diet  - If tolerates clears, can start home meds liquid or crushed  - will d/c PCA once tolerates clears, begin PO pain control   - OOB to chair and ambulate in halls  - DVT ppx  - encourage IS       Amanda Maguire MD  General Surgery  [facility]

## 2020-07-07 NOTE — PROGRESS NOTES
Report called to Camila BARBOSA, pt made ready for transport to room via bed with transporters,stable at present.

## 2020-07-07 NOTE — ASSESSMENT & PLAN NOTE
65 y F s/p laparoscopic hiatal hernia repair and resection of maribel limb candy cane on 7/7/20 POD 1.     - bariatric clears diet  - If tolerates clears, can start home meds liquid or crushed  - will d/c PCA once tolerates clears, begin PO pain control   - OOB to chair and ambulate in halls  - DVT ppx  - encourage IS

## 2020-07-07 NOTE — DISCHARGE SUMMARY
"[facility]  General Surgery  Discharge Summary      Patient Name: Jill Perez  MRN: [id number]  Admission Date: 7/6/2020  Hospital Length of Stay: 0 days  Discharge Date and Time:  07/07/2020   Attending Physician: Solange Kent*   Discharging Provider: Ezio Duffy MD  Primary Care Provider: Shahab Merlos DO     HPI:     Mrs. Perez is a 65 year-old woman with BMI 28.41 kg/m2, HTN, DLD, NIDDM2, Raynaud's disease +YOLIS, depression, insomnia, and h/o prior splenectomy, cholecystectomy, and maribel-en-y gastric bypass who presents for evaluation of LUQ abdominal pain and dysphagia. She underwent laparoscopic RYGB about 12 years ago at [facility] in [place] since which time she has experienced the sensation of solids and pills getting "stuck" at the bottom of her chest and regurgitation about twice a week. She typically drinks water to help these go down with mixed success. Bread, pork and salads tend to be the most common offenders. More recently, she has also developed intermittent left upper quadrant abdominal pain, that is completely unrelated to the dysphagia, and has progressed to occurring 1-2 times daily. She feels a "burning" or "hot" sensation in this area with a feeling of pressure that lasts up to 15-20 minutes with what she describes a as a palpable fullness in her LUQ. She was evaluated by a Pain specialist who attempted trigger point injections and topical cream to her open splenectomy scar without relief. She has also been evaluated by Dr. Kearney in GI with the following work-up which has been personally reviewed:     CT AP 6/30/19: Mild intrahepatic bile duct dilation  EGD 8/14/19: 2-cm hiatal hernia. 6-cm gastric pouch. No esophagitis. Biopsies: Normal esophagus and duodenum, stomach with mild chronic inactive gastritis, HP negative.  Abd US 1/28/20: Normal  Abd US 3/3/20: Normal, specifically no incisional hernia  CT AP 3/3/20: No intra-abdominal process or incisional " hernia  UGI 3/6/20: Small HH, esophageal dysmotility, spontaneous reflux, 13-mm tablet hung up at EGD for 2 minutes prior to passing  AXR 6/9/20: Several mildly dilated lops of air-filled small bowel within left upper abdomen, no free air    She was admitted for diagnostic laparoscopy and underwent the procedures below.    Procedure(s) (LRB):  LAPAROSCOPY, DIAGNOSTIC (N/A)  EXCISION, SMALL INTESTINE, LAPAROSCOPIC (N/A)  LYSIS, ADHESIONS, LAPAROSCOPIC (N/A)  REPAIR, HERNIA, HIATAL, LAPAROSCOPIC (N/A)  EGD (ESOPHAGOGASTRODUODENOSCOPY) (N/A)     Hospital Course: She was admitted post op. No issues overnight. The next morning she tolerated a clear liquid diet and was progressed to full liquids. After tolerating full liquids and meeting all other milestones she was [facility]d for follow up in 2 weeks.    Consults:   Consults (From admission, onward)        Status Ordering Provider     Inpatient consult to Registered Dietitian/Nutritionist  Once     Provider:  (Not yet assigned)    Ordered TE WATTERS          Significant Diagnostic Studies: none    Pending Diagnostic Studies:     Procedure Component Value Units Date/Time    Specimen to Pathology, Surgery General Surgery [[id number]] Collected: 07/06/20 1628    Order Status: Sent Lab Status: In process Updated: 07/06/20 1629        Final Active Diagnoses:    Diagnosis Date Noted POA    PRINCIPAL PROBLEM:  Internal hernia [K45.8] 07/06/2020 Yes    Hiatal hernia [K44.9] 04/22/2020 Yes      Problems Resolved During this Admission:      Discharged Condition: good    Disposition: Home/Self care    Follow Up:  Follow-up Information     Te Kent MD. Schedule an appointment as soon as possible for a visit in 2 weeks.    Specialties: General Surgery, Bariatrics  Why: post op  Contact information:  [address]  [facility] [id number]  [phone]                 Patient Instructions:     Crush all your home medications. If taking in capsule  form, open the capsule and take with water.    OK to take your procardia without crushing it.       Diet full liquid   Order Comments: Keep a full liquid diet for the first week after [facility].  The second week after [facility] it is OK to progress to a soft diet (no foods that require chewing).     Lifting restrictions   Order Comments: May not lift more than 10 lbs for 3 weeks from day of surgery.  No pushing/pulling such as vacuuming or raking.  No straining, avoid constipation, and take stool softeners as described and laxatives as needed.  No driving while on narcotics and until you can react quickly without pain.     Notify your health care provider if you experience any of the following:  temperature >100.4     Notify your health care provider if you experience any of the following:  persistent nausea and vomiting or diarrhea     Notify your health care provider if you experience any of the following:  severe uncontrolled pain     Notify your health care provider if you experience any of the following:  redness, tenderness, or signs of infection (pain, swelling, redness, odor or green/yellow [facility] around incision site)     Notify your health care provider if you experience any of the following:  difficulty breathing or increased cough     Notify your health care provider if you experience any of the following:  severe persistent headache     Notify your health care provider if you experience any of the following:  worsening rash     Notify your health care provider if you experience any of the following:  persistent dizziness, light-headedness, or visual disturbances     Notify your health care provider if you experience any of the following:  increased confusion or weakness     No dressing needed   Order Comments: Skin glue will come off on its own over 2 weeks.     Activity as tolerated   Order Comments: Do not submerge your incisions for 2 weeks.  OK to shower starting 48 hours after surgery.  You may  have skin glue or steri strips on your incisions. These will come off in 2 weeks.     Medications:  Reconciled Home Medications:      Medication List      START taking these medications    hydrocodone-apap 7.5-325 MG/15 ML oral solution  Commonly known as: HYCET  Take 10 mLs by mouth every 6 (six) hours as needed for Pain (Do not drive while taking this medication.).        CONTINUE taking these medications    aspirin 81 MG EC tablet  Commonly known as: ECOTRIN  Take 81 mg by mouth once daily.     atorvastatin 40 MG tablet  Commonly known as: LIPITOR  Take 1 tablet (40 mg total) by mouth once daily.     FLUoxetine 20 MG tablet  Take 1 tablet (20 mg total) by mouth once daily.     gabapentin 300 MG capsule  Commonly known as: NEURONTIN  Take 1 capsule (300 mg total) by mouth 3 (three) times daily.     metFORMIN 1000 MG tablet  Commonly known as: GLUCOPHAGE  Take 1 tablet (1,000 mg total) by mouth 2 (two) times daily with meals.     NIFEdipine 60 MG (OSM) 24 hr tablet  Commonly known as: PROCARDIA-XL  Take 1 tablet (60 mg total) by mouth once daily.     ondansetron 4 MG Tbdl  Commonly known as: ZOFRAN-ODT  Dissolve 1 tablet (4 mg total) by mouth every 8 (eight) hours as needed (nausea).     pantoprazole 40 MG tablet  Commonly known as: PROTONIX  Take 1 tablet (40 mg total) by mouth 2 (two) times daily.     sucralfate 1 gram tablet  Commonly known as: CARAFATE  Take 1 tablet (1 g total) by mouth every meal as needed (upper gi upset).     TRULICITY 1.5 mg/0.5 mL pen injector  Generic drug: dulaglutide  Inject 1.5 mg into the skin every 7 days.     zolpidem 10 mg Tab  Commonly known as: AMBIEN  Take 1 tablet (10 mg total) by mouth nightly as needed.            Ezio Duffy MD  General Surgery  [facility]

## 2020-07-07 NOTE — CONSULTS
Food & Nutrition  Education    Diet Education: Nissen   Learners: Pt and spouse       Nutrition Education provided with handouts: Diet After Gastric Surgery      Comments: Pt resting in bed with spouse at bedside. Educated pt on full liquid diet x 1 week and soft diet x 1 week. Discussed preventing stomach stretching and excess gas. Pt verbalized understanding.    All questions and concerns answered.      Pt reports decreased appetite PTA 2/2 abdominal pain. No significant wt loss per chart review. Pt does not meet malnutrition criteria at this time, but is at risk.       Follow-Up: Yes     Please re-consult as needed.

## 2020-07-07 NOTE — NURSING
Nurse instructed patient on D/c orders and received copy of [facility]d papers instructions. Pt denies pain at this time. Pt educated on signs and symptoms of when to call the doctor. All belongings with the patient . Pt verbalized understanding. Patient waiting for medication from bedside pharmacy and transport

## 2020-07-08 NOTE — PLAN OF CARE
07/07/20 1616   Final Note   Assessment Type Final Discharge Note   Anticipated Discharge Disposition Home   What phone number can be called within the next 1-3 days to see how you are doing after [facility]? [phone]   Hospital Follow Up  Appt(s) scheduled? Yes   Discharge plans and expectations educations in teach back method with documentation complete? Yes     Future Appointments   Date Time Provider Department Center   7/21/2020 10:00 AM [facility]   7/21/2020  1:15 PM Solange Kent MD [facility]   7/28/2020  4:00 PM Damien Armendariz MD [facility]   9/21/2020 11:30 AM [facility]

## 2020-07-08 NOTE — ANESTHESIA POSTPROCEDURE EVALUATION
Anesthesia Post Evaluation    Patient: Jill C Perez    Procedure(s) Performed: Procedure(s) (LRB):  LAPAROSCOPY, DIAGNOSTIC (N/A)  EXCISION, SMALL INTESTINE, LAPAROSCOPIC (N/A)  LYSIS, ADHESIONS, LAPAROSCOPIC (N/A)  REPAIR, HERNIA, HIATAL, LAPAROSCOPIC (N/A)  EGD (ESOPHAGOGASTRODUODENOSCOPY) (N/A)    Final Anesthesia Type: general    Patient location during evaluation: PACU  Patient participation: Yes- Able to Participate  Level of consciousness: awake and alert  Post-procedure vital signs: reviewed and stable  Pain management: adequate  Airway patency: patent    PONV status at [facility]: No PONV  Anesthetic complications: no      Cardiovascular status: blood pressure returned to baseline  Respiratory status: unassisted, spontaneous ventilation and room air  Hydration status: euvolemic  Follow-up not needed.          Vitals Value Taken Time   /67 07/07/20 0800   Temp 37 °C (98.6 °F) 07/07/20 0800   Pulse 74 07/07/20 0800   Resp 18 07/07/20 1340   SpO2 96 % 07/07/20 0800         Event Time   Out of Recovery 17:15:00         Pain/James Score: Pain Rating Prior to Med Admin: 4 (7/7/2020  1:40 PM)

## 2020-07-08 NOTE — NURSING
Pt [facility]d to home via wheelchair.Pt denies pain at this time. Pt educated on signs and symptoms of when to call the doctor. All belongings with the patient . Pt verbalized understanding.

## 2020-07-10 ENCOUNTER — PATIENT MESSAGE (OUTPATIENT)
Dept: FAMILY MEDICINE | Facility: CLINIC | Age: 65
End: 2020-07-10

## 2020-07-10 DIAGNOSIS — E11.65 TYPE 2 DIABETES MELLITUS WITH HYPERGLYCEMIA, WITH LONG-TERM CURRENT USE OF INSULIN: ICD-10-CM

## 2020-07-10 DIAGNOSIS — Z79.4 TYPE 2 DIABETES MELLITUS WITH HYPERGLYCEMIA, WITH LONG-TERM CURRENT USE OF INSULIN: ICD-10-CM

## 2020-07-10 LAB
FINAL PATHOLOGIC DIAGNOSIS: NORMAL
GROSS: NORMAL

## 2020-07-10 RX ORDER — METFORMIN HYDROCHLORIDE 1000 MG/1
1000 TABLET ORAL 2 TIMES DAILY WITH MEALS
Qty: 180 TABLET | Refills: 0 | Status: SHIPPED | OUTPATIENT
Start: 2020-07-10 | End: 2020-10-15 | Stop reason: SDUPTHER

## 2020-07-10 RX ORDER — METFORMIN HYDROCHLORIDE 500 MG/5ML
1000 SOLUTION ORAL 2 TIMES DAILY
Qty: 300 ML | Refills: 0 | Status: SHIPPED | OUTPATIENT
Start: 2020-07-10 | End: 2020-07-21

## 2020-07-10 RX ORDER — DULAGLUTIDE 1.5 MG/.5ML
1.5 INJECTION, SOLUTION SUBCUTANEOUS
Qty: 6 ML | Refills: 0 | Status: SHIPPED | OUTPATIENT
Start: 2020-07-10 | End: 2020-09-08 | Stop reason: SDUPTHER

## 2020-07-13 ENCOUNTER — TELEPHONE (OUTPATIENT)
Dept: BARIATRICS | Facility: CLINIC | Age: 65
End: 2020-07-13

## 2020-07-13 DIAGNOSIS — F51.01 PRIMARY INSOMNIA: ICD-10-CM

## 2020-07-13 RX ORDER — ZOLPIDEM TARTRATE 10 MG/1
10 TABLET ORAL NIGHTLY PRN
Qty: 90 TABLET | Refills: 0 | Status: SHIPPED | OUTPATIENT
Start: 2020-07-13 | End: 2020-10-13 | Stop reason: SDUPTHER

## 2020-07-13 NOTE — TELEPHONE ENCOUNTER
Called to check in 1 week post op from bariatric surgery.    Water protocol began at 7 am and completed while in hospital/ medicine cups given to you by nursing to take home (y/n):  y    Dehydration assessment:  Urine output/color: clear  Chest pain: n   Persistent increased heart rate: n   Fatigue: n  N/V: n   Dizzy/weak: n  BM: y  Protein and fluid intake assessment: (food diary)  Fluid intake: ~64 oz  Protein supplements:  Premier ( 2) protein 2.0 ( 2)  Protein intake yesterday: 100  Vitamins  -What vitamins are you taking? all  -Are you tolerating well? yes  Medications  Omeprazole: y  Hycet: n  How are you tolerating pain at this time? 3 (rate on a scale from 1 to 10; >7 notify PA/MD)  Are you having any problems? n (f/u with PCP, cardiologist, endocrinologist)  How is your support system at home?  good  Exercise reminder (light exercise at this time, no lifting above 10 lbs)      Questions for nurse/MA/PA:  N     Assessment:  Doing well.     Discussion:  Continue to work on fluid and protein intake.     Confirmed date and time for 2 week po labs and clinic visit    ----- Message from Sirena Reynolds RD sent at 2020  3:05 PM CDT -----  Regardin week post op phone call  [facility] surgery pt

## 2020-07-14 ENCOUNTER — OFFICE VISIT (OUTPATIENT)
Dept: CARDIOLOGY | Facility: CLINIC | Age: 65
End: 2020-07-14
Payer: COMMERCIAL

## 2020-07-14 VITALS
SYSTOLIC BLOOD PRESSURE: 97 MMHG | HEIGHT: 66 IN | WEIGHT: 174.63 LBS | DIASTOLIC BLOOD PRESSURE: 67 MMHG | HEART RATE: 107 BPM | OXYGEN SATURATION: 96 % | BODY MASS INDEX: 28.06 KG/M2

## 2020-07-14 DIAGNOSIS — I10 ESSENTIAL HYPERTENSION: ICD-10-CM

## 2020-07-14 DIAGNOSIS — R07.9 CHEST PAIN: ICD-10-CM

## 2020-07-14 DIAGNOSIS — M79.89 PAIN AND SWELLING OF LOWER EXTREMITY, UNSPECIFIED LATERALITY: ICD-10-CM

## 2020-07-14 DIAGNOSIS — M25.50 POLYARTHRALGIA: ICD-10-CM

## 2020-07-14 DIAGNOSIS — I73.00 RAYNAUD'S DISEASE WITHOUT GANGRENE: ICD-10-CM

## 2020-07-14 DIAGNOSIS — Z79.4 TYPE 2 DIABETES MELLITUS WITH HYPERGLYCEMIA, WITH LONG-TERM CURRENT USE OF INSULIN: ICD-10-CM

## 2020-07-14 DIAGNOSIS — I87.2 VENOUS INSUFFICIENCY OF BOTH LOWER EXTREMITIES: ICD-10-CM

## 2020-07-14 DIAGNOSIS — R76.8 POSITIVE ANA (ANTINUCLEAR ANTIBODY): ICD-10-CM

## 2020-07-14 DIAGNOSIS — R07.9 CHEST PAIN, UNSPECIFIED TYPE: Primary | ICD-10-CM

## 2020-07-14 DIAGNOSIS — Z90.81 S/P SPLENECTOMY: ICD-10-CM

## 2020-07-14 DIAGNOSIS — M79.606 PAIN AND SWELLING OF LOWER EXTREMITY, UNSPECIFIED LATERALITY: ICD-10-CM

## 2020-07-14 DIAGNOSIS — E78.00 PURE HYPERCHOLESTEROLEMIA: ICD-10-CM

## 2020-07-14 DIAGNOSIS — E11.65 TYPE 2 DIABETES MELLITUS WITH HYPERGLYCEMIA, WITH LONG-TERM CURRENT USE OF INSULIN: ICD-10-CM

## 2020-07-14 PROCEDURE — 3074F PR MOST RECENT SYSTOLIC BLOOD PRESSURE < 130 MM HG: ICD-10-PCS | Mod: CPTII,S$GLB,, | Performed by: INTERNAL MEDICINE

## 2020-07-14 PROCEDURE — 3008F BODY MASS INDEX DOCD: CPT | Mod: CPTII,S$GLB,, | Performed by: INTERNAL MEDICINE

## 2020-07-14 PROCEDURE — 3074F SYST BP LT 130 MM HG: CPT | Mod: CPTII,S$GLB,, | Performed by: INTERNAL MEDICINE

## 2020-07-14 PROCEDURE — 3008F PR BODY MASS INDEX (BMI) DOCUMENTED: ICD-10-PCS | Mod: CPTII,S$GLB,, | Performed by: INTERNAL MEDICINE

## 2020-07-14 PROCEDURE — 93010 ELECTROCARDIOGRAM REPORT: CPT | Mod: S$GLB,,, | Performed by: INTERNAL MEDICINE

## 2020-07-14 PROCEDURE — 99205 PR OFFICE/OUTPT VISIT, NEW, LEVL V, 60-74 MIN: ICD-10-PCS | Mod: S$GLB,,, | Performed by: INTERNAL MEDICINE

## 2020-07-14 PROCEDURE — 99205 OFFICE O/P NEW HI 60 MIN: CPT | Mod: S$GLB,,, | Performed by: INTERNAL MEDICINE

## 2020-07-14 PROCEDURE — 3078F DIAST BP <80 MM HG: CPT | Mod: CPTII,S$GLB,, | Performed by: INTERNAL MEDICINE

## 2020-07-14 PROCEDURE — 99999 PR PBB SHADOW E&M-EST. PATIENT-LVL V: ICD-10-PCS | Mod: PBBFAC,,, | Performed by: INTERNAL MEDICINE

## 2020-07-14 PROCEDURE — 93010 EKG 12-LEAD: ICD-10-PCS | Mod: S$GLB,,, | Performed by: INTERNAL MEDICINE

## 2020-07-14 PROCEDURE — 93005 EKG 12-LEAD: ICD-10-PCS | Mod: S$GLB,,, | Performed by: INTERNAL MEDICINE

## 2020-07-14 PROCEDURE — 3078F PR MOST RECENT DIASTOLIC BLOOD PRESSURE < 80 MM HG: ICD-10-PCS | Mod: CPTII,S$GLB,, | Performed by: INTERNAL MEDICINE

## 2020-07-14 PROCEDURE — 3044F HG A1C LEVEL LT 7.0%: CPT | Mod: CPTII,S$GLB,, | Performed by: INTERNAL MEDICINE

## 2020-07-14 PROCEDURE — 99999 PR PBB SHADOW E&M-EST. PATIENT-LVL V: CPT | Mod: PBBFAC,,, | Performed by: INTERNAL MEDICINE

## 2020-07-14 PROCEDURE — 3044F PR MOST RECENT HEMOGLOBIN A1C LEVEL <7.0%: ICD-10-PCS | Mod: CPTII,S$GLB,, | Performed by: INTERNAL MEDICINE

## 2020-07-14 PROCEDURE — 93005 ELECTROCARDIOGRAM TRACING: CPT | Mod: S$GLB,,, | Performed by: INTERNAL MEDICINE

## 2020-07-14 NOTE — LETTER
July 27, 2020      Ignacia Magdaleno, DO  [address]  [place] [address]           [place] [facility]  [address]  [place] [address]  Phone: [phone]          Patient: Jill Perez   MR Number: [id number]   YOB: 1955   Date of Visit: 7/14/2020       Dear Dr. Ignacia Magdaleno:    Thank you for referring Jill Perez to me for evaluation. Attached you will find relevant portions of my assessment and plan of care.    If you have questions, please do not hesitate to call me. I look forward to following Jill Perez along with you.    Sincerely,    Damien Armendariz MD    Enclosure  CC:  No Recipients    If you would like to receive this communication electronically, please contact [email or web address] or [phone] to request more information on [employer] Link access.    For providers and/or their staff who would like to refer a patient to [facility], please contact us through our one-stop-shop provider referral line, Peña Shane, at [phone].    If you feel you have received this communication in error or would no longer like to receive these types of communications, please e-mail [email or web address]

## 2020-07-14 NOTE — PROGRESS NOTES
Subjective:    Patient ID:  Jill Perez is a 65 y.o. female who presents for evaluation of Leg Swelling and Chest Pain      HPI    64 y/o female with hx of HTN, HLD, Raynaud's, DM, polyarthralgia, YOLIS + who presents for evaluation of bilateral LE edema. Has superficial and deep varicose veins that are painful. Edema improves while lying down and in the AM. No recent trauma or prolonged travel. Has also been experiencing substernal, non radiating, non exertional CP. Has some mild TELLES. Denies orthopnea, PND, syncope, palps. Former smoker.     Review of Systems   Constitution: Negative for malaise/fatigue.   HENT: Negative for congestion.    Eyes: Negative for blurred vision.   Cardiovascular: Positive for chest pain, dyspnea on exertion and leg swelling. Negative for claudication, cyanosis, irregular heartbeat, near-syncope, orthopnea, palpitations, paroxysmal nocturnal dyspnea and syncope.   Respiratory: Negative for shortness of breath.    Endocrine: Negative for polyuria.   Hematologic/Lymphatic: Negative for bleeding problem.   Skin: Negative for itching and rash.   Musculoskeletal: Positive for joint pain, joint swelling and muscle weakness. Negative for muscle cramps.   Gastrointestinal: Positive for abdominal pain. Negative for hematemesis, hematochezia, melena, nausea and vomiting.   Genitourinary: Negative for dysuria and hematuria.   Neurological: Negative for dizziness, focal weakness, headaches, light-headedness, loss of balance and weakness.   Psychiatric/Behavioral: Negative for depression. The patient is not nervous/anxious.         Objective:    Physical Exam   Constitutional: She is oriented to person, place, and time. She appears well-developed and well-nourished.   HENT:   Head: Normocephalic and atraumatic.   Neck: Neck supple. No JVD present.   Cardiovascular: Normal rate, regular rhythm and normal heart sounds.   Pulses:       Carotid pulses are 2+ on the right side and 2+ on the left side.        Radial pulses are 2+ on the right side and 2+ on the left side.        Femoral pulses are 2+ on the right side and 2+ on the left side.       Dorsalis pedis pulses are 2+ on the right side and 2+ on the left side.        Posterior tibial pulses are 2+ on the right side and 2+ on the left side.   Pulmonary/Chest: Effort normal and breath sounds normal.   Abdominal: Soft. Bowel sounds are normal.   Musculoskeletal:         General: Edema present.   Neurological: She is alert and oriented to person, place, and time.   Skin: Skin is warm.   Psychiatric: She has a normal mood and affect. Her behavior is normal. Thought content normal.         Assessment:       1. Chest pain, unspecified type    2. Pain and swelling of lower extremity, unspecified laterality    3. Chest pain    4. Essential hypertension    5. Pure hypercholesterolemia    6. Raynaud's disease without gangrene    7. Positive YOLIS (antinuclear antibody)    8. Type 2 diabetes mellitus with hyperglycemia, with long-term current use of insulin    9. S/P splenectomy    10. Polyarthralgia      65 year old patient with hx and presentation as above. Has risk factors for having CAD and will evaluate symptoms with noninvasive cardiac stress imaging and 2DE. Regarding LE edema, appears to have venous insuff and will recommend compression stockings. Venous insuff doppler. Discussed the etiology, evaluation, and management of Chest pain, CAD, venous insuff, leg edema, HTN, HLD, DM. Discussed the importance of med compliance, heart healthy diet, and regular exercise.      Plan:       -Nuclear SPECT  -2DE   -Venous insufficiency doppler   -Compression stockings 20-30 mmHg  -f/u in 1 month

## 2020-07-15 DIAGNOSIS — I10 ESSENTIAL HYPERTENSION: ICD-10-CM

## 2020-07-15 RX ORDER — NIFEDIPINE 60 MG/1
60 TABLET, EXTENDED RELEASE ORAL DAILY
Qty: 90 TABLET | Refills: 0 | Status: SHIPPED | OUTPATIENT
Start: 2020-07-15 | End: 2020-07-21 | Stop reason: SDUPTHER

## 2020-07-21 ENCOUNTER — OFFICE VISIT (OUTPATIENT)
Dept: SURGERY | Facility: CLINIC | Age: 65
End: 2020-07-21
Payer: COMMERCIAL

## 2020-07-21 ENCOUNTER — PATIENT MESSAGE (OUTPATIENT)
Dept: GASTROENTEROLOGY | Facility: CLINIC | Age: 65
End: 2020-07-21

## 2020-07-21 ENCOUNTER — OFFICE VISIT (OUTPATIENT)
Dept: FAMILY MEDICINE | Facility: CLINIC | Age: 65
End: 2020-07-21
Payer: COMMERCIAL

## 2020-07-21 VITALS
DIASTOLIC BLOOD PRESSURE: 69 MMHG | HEART RATE: 97 BPM | BODY MASS INDEX: 28.28 KG/M2 | WEIGHT: 176 LBS | SYSTOLIC BLOOD PRESSURE: 112 MMHG | HEIGHT: 66 IN

## 2020-07-21 DIAGNOSIS — E11.65 TYPE 2 DIABETES MELLITUS WITH HYPERGLYCEMIA, WITH LONG-TERM CURRENT USE OF INSULIN: ICD-10-CM

## 2020-07-21 DIAGNOSIS — E78.00 PURE HYPERCHOLESTEROLEMIA: ICD-10-CM

## 2020-07-21 DIAGNOSIS — Z79.4 TYPE 2 DIABETES MELLITUS WITH HYPERGLYCEMIA, WITH LONG-TERM CURRENT USE OF INSULIN: ICD-10-CM

## 2020-07-21 DIAGNOSIS — E78.49 OTHER HYPERLIPIDEMIA: ICD-10-CM

## 2020-07-21 DIAGNOSIS — I10 ESSENTIAL HYPERTENSION: ICD-10-CM

## 2020-07-21 DIAGNOSIS — Z90.81 S/P SPLENECTOMY: ICD-10-CM

## 2020-07-21 DIAGNOSIS — Z90.49 S/P SMALL BOWEL RESECTION: Primary | ICD-10-CM

## 2020-07-21 DIAGNOSIS — E53.8 B12 DEFICIENCY: Primary | ICD-10-CM

## 2020-07-21 DIAGNOSIS — I73.00 RAYNAUD'S DISEASE WITHOUT GANGRENE: ICD-10-CM

## 2020-07-21 DIAGNOSIS — K44.9 HIATAL HERNIA: ICD-10-CM

## 2020-07-21 DIAGNOSIS — Z01.419 WELL WOMAN EXAM: ICD-10-CM

## 2020-07-21 DIAGNOSIS — Z12.31 ENCOUNTER FOR SCREENING MAMMOGRAM FOR MALIGNANT NEOPLASM OF BREAST: ICD-10-CM

## 2020-07-21 PROCEDURE — 99999 PR PBB SHADOW E&M-EST. PATIENT-LVL III: CPT | Mod: PBBFAC,,, | Performed by: SURGERY

## 2020-07-21 PROCEDURE — 3044F HG A1C LEVEL LT 7.0%: CPT | Mod: CPTII,,, | Performed by: FAMILY MEDICINE

## 2020-07-21 PROCEDURE — 99999 PR PBB SHADOW E&M-EST. PATIENT-LVL III: ICD-10-PCS | Mod: PBBFAC,,, | Performed by: SURGERY

## 2020-07-21 PROCEDURE — 99024 PR POST-OP FOLLOW-UP VISIT: ICD-10-PCS | Mod: S$GLB,,, | Performed by: SURGERY

## 2020-07-21 PROCEDURE — 99024 POSTOP FOLLOW-UP VISIT: CPT | Mod: S$GLB,,, | Performed by: SURGERY

## 2020-07-21 PROCEDURE — 99213 PR OFFICE/OUTPT VISIT, EST, LEVL III, 20-29 MIN: ICD-10-PCS | Mod: 95,,, | Performed by: FAMILY MEDICINE

## 2020-07-21 PROCEDURE — 3044F PR MOST RECENT HEMOGLOBIN A1C LEVEL <7.0%: ICD-10-PCS | Mod: CPTII,,, | Performed by: FAMILY MEDICINE

## 2020-07-21 PROCEDURE — 99213 OFFICE O/P EST LOW 20 MIN: CPT | Mod: 95,,, | Performed by: FAMILY MEDICINE

## 2020-07-21 RX ORDER — FLUOXETINE 20 MG/1
40 TABLET ORAL DAILY
Qty: 180 TABLET | Refills: 0 | Status: SHIPPED | OUTPATIENT
Start: 2020-07-21 | End: 2020-10-21 | Stop reason: SDUPTHER

## 2020-07-21 RX ORDER — ATORVASTATIN CALCIUM 40 MG/1
40 TABLET, FILM COATED ORAL DAILY
Qty: 90 TABLET | Refills: 3 | Status: SHIPPED | OUTPATIENT
Start: 2020-07-21 | End: 2020-10-21 | Stop reason: SDUPTHER

## 2020-07-21 RX ORDER — NIFEDIPINE 30 MG/1
30 TABLET, EXTENDED RELEASE ORAL DAILY
Qty: 45 TABLET | Refills: 0 | Status: SHIPPED | OUTPATIENT
Start: 2020-07-21 | End: 2020-08-13 | Stop reason: SDUPTHER

## 2020-07-21 RX ORDER — GABAPENTIN 300 MG/1
300 CAPSULE ORAL 3 TIMES DAILY
Qty: 270 CAPSULE | Refills: 0 | Status: SHIPPED | OUTPATIENT
Start: 2020-07-21 | End: 2020-10-21

## 2020-07-21 NOTE — PROGRESS NOTES
"Subjective:       Patient ID: Jill Perez is a 65 y.o. female.    Audio Only Telehealth Visit     The patient location is: [place]  The chief complaint leading to consultation is: DM  Visit type: Virtual visit with audio only (telephone)  Total time spent with patient: 11 minutes     The reason for the audio only service rather than synchronous audio and video virtual visit was related to technical difficulties or patient preference/necessity.     Each patient to whom I provide medical services by telemedicine is:  (1) informed of the relationship between the physician and patient and the respective role of any other health care provider with respect to management of the patient; and (2) notified that they may decline to receive medical services by telemedicine and may withdraw from such care at any time. Patient verbally consented to receive this service via voice-only telephone call.    Jill is a 64 y.o. female who presents today for f/u on her multiple issues.      DM: metformin 1000 BID, taking trulicity weekly. Crushing metformin.      HTN: on nifedipine 60 mg. Still gets light headed at time. She has lost some weight since surgery. Was on 90 mg in the past. Decrease to 30 mg now.      DLD: lipitor 40 mg. Tolerating.      Raynauds: nifedipine, wants better control. Seeing rheumatology. Still having pain. Hurts all the time. Wearing multiple socks today (3), still can't "get warm." Only trigger seems to be the cold.     Insomnia: ambien 10mg, sleeping well. Her last sleep study was 5-6 years ago. She doesn't snore that she is aware of. On the 10 mg of Ambien she felt well rested and was able to complete her days work, but without it she feels tired. When she takes Ambien 5 mg, she is able to fall asleep but she tosses and turns and wakes up tired.      Anemia: egd/colonoscopy normal.      Mood: fluoxetine is helping. Continuing to take ambien. Tried to trial off or lower dose, unable to sleep without it. " Diminished quality of life.      GI/Issues: had surgery, symptoms as resolved!        This service was not originating from a related E/M service provided within the previous 7 days nor will  to an E/M service or procedure within the next 24 hours or my soonest available appointment.  Prevailing standard of care was able to be met in this audio-only visit.        Review of Systems   Constitutional: Negative for chills and fever.   Gastrointestinal: Positive for abdominal pain (soreness after surgery). Negative for diarrhea, nausea and vomiting.   Skin: Negative for rash.   Neurological: Positive for light-headedness.             Objective:   There were no vitals filed for this visit.     Physical Exam      Unable to perform due to inherent nature of audio only visit  Audio only due to technical issues  Assessment:       1. B12 deficiency    2. Essential hypertension    3. Other hyperlipidemia    4. Raynaud's disease without gangrene    5. Type 2 diabetes mellitus with hyperglycemia, with long-term current use of insulin    6. Encounter for screening mammogram for malignant neoplasm of breast    7. Well woman exam        Plan:       Trial of once daily metformin  Trial of nifedipine 30 mg XR  Continue fluoxetine 40 mg  Continue atorvastatin 40 mg  Continue gabapentin TID  Continue trulicity  Continue ambien    You have low B12. Goal >400. Start daily OTC B12 supplementation at 1000 mcg. Can consider beverages with B12 rather then crushing pills.     Mammogram and eye doctor ordered    Schedule labs in 3 months with f/u after    B12 deficiency  -     Vitamin B12; Future; Expected date: 07/21/2020    Essential hypertension  -     NIFEdipine (PROCARDIA-XL) 30 MG (OSM) 24 hr tablet; Take 1 tablet (30 mg total) by mouth once daily.  Dispense: 45 tablet; Refill: 0  -     CBC auto differential; Future; Expected date: 07/21/2020  -     Comprehensive metabolic panel; Future; Expected date: 07/21/2020    Other  hyperlipidemia  -     atorvastatin (LIPITOR) 40 MG tablet; Take 1 tablet (40 mg total) by mouth once daily.  Dispense: 90 tablet; Refill: 3  -     CBC auto differential; Future; Expected date: 07/21/2020  -     Comprehensive metabolic panel; Future; Expected date: 07/21/2020    Raynaud's disease without gangrene  -     FLUoxetine 20 MG tablet; Take 2 tablets (40 mg total) by mouth once daily.  Dispense: 180 tablet; Refill: 0    Type 2 diabetes mellitus with hyperglycemia, with long-term current use of insulin  -     Ambulatory referral/consult to Optometry; Future; Expected date: 07/28/2020  -     Hemoglobin A1C; Future; Expected date: 07/21/2020    Encounter for screening mammogram for malignant neoplasm of breast  -     Mammo Digital Screening Bilat; Future; Expected date: 07/21/2020    Well woman exam  -     CBC auto differential; Future; Expected date: 07/21/2020  -     Comprehensive metabolic panel; Future; Expected date: 07/21/2020  -     Hemoglobin A1C; Future; Expected date: 07/21/2020  -     Vitamin B12; Future; Expected date: 07/21/2020    Other orders  -     gabapentin (NEURONTIN) 300 MG capsule; Take 1 capsule (300 mg total) by mouth 3 (three) times daily.  Dispense: 270 capsule; Refill: 0          Warning signs discussed, patient to call with any further issues or worsening of symptoms.

## 2020-07-21 NOTE — Clinical Note
[place], please schedule f/u in 3 months. Labs prior. Schedule mammogram and optometry please.     Visit will be a physical.
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
## 2020-07-22 ENCOUNTER — TELEPHONE (OUTPATIENT)
Dept: FAMILY MEDICINE | Facility: CLINIC | Age: 65
End: 2020-07-22

## 2020-07-22 NOTE — TELEPHONE ENCOUNTER
----- Message from Shahab Merlos DO sent at 7/21/2020 10:30 AM CDT -----  [place], please schedule f/u in 3 months. Labs prior. Schedule mammogram and optometry please. Visit will be a physical.

## 2020-07-23 ENCOUNTER — HOSPITAL ENCOUNTER (OUTPATIENT)
Dept: CARDIOLOGY | Facility: HOSPITAL | Age: 65
Discharge: HOME | End: 2020-07-23
Attending: INTERNAL MEDICINE
Payer: COMMERCIAL

## 2020-07-23 ENCOUNTER — HOSPITAL ENCOUNTER (OUTPATIENT)
Dept: RADIOLOGY | Facility: HOSPITAL | Age: 65
Discharge: HOME | End: 2020-07-23
Attending: INTERNAL MEDICINE
Payer: COMMERCIAL

## 2020-07-23 VITALS — WEIGHT: 176 LBS | BODY MASS INDEX: 28.28 KG/M2 | HEIGHT: 66 IN

## 2020-07-23 VITALS — WEIGHT: 176 LBS | HEIGHT: 66 IN | BODY MASS INDEX: 28.28 KG/M2

## 2020-07-23 DIAGNOSIS — I87.2 VENOUS INSUFFICIENCY OF BOTH LOWER EXTREMITIES: ICD-10-CM

## 2020-07-23 DIAGNOSIS — R07.9 CHEST PAIN, UNSPECIFIED TYPE: ICD-10-CM

## 2020-07-23 DIAGNOSIS — R10.13 EPIGASTRIC PAIN: ICD-10-CM

## 2020-07-23 LAB
AORTIC ROOT ANNULUS: 3.61 CM
AV INDEX (PROSTH): 0.59
AV MEAN GRADIENT: 5 MMHG
AV PEAK GRADIENT: 9 MMHG
AV VALVE AREA: 2.46 CM2
AV VELOCITY RATIO: 0.59
BSA FOR ECHO PROCEDURE: 1.93 M2
CV ECHO LV RWT: 0.37 CM
CV STRESS BASE HR: 58 BPM
DIASTOLIC BLOOD PRESSURE: 74 MMHG
DOP CALC AO PEAK VEL: 1.53 M/S
DOP CALC AO VTI: 37.31 CM
DOP CALC LVOT AREA: 4.2 CM2
DOP CALC LVOT DIAMETER: 2.31 CM
DOP CALC LVOT PEAK VEL: 0.91 M/S
DOP CALC LVOT STROKE VOLUME: 91.65 CM3
DOP CALCLVOT PEAK VEL VTI: 21.88 CM
E WAVE DECELERATION TIME: 186.72 MSEC
E/A RATIO: 1.08
E/E' RATIO: 9.56 M/S
ECHO LV POSTERIOR WALL: 0.93 CM (ref 0.6–1.1)
FRACTIONAL SHORTENING: 33 % (ref 28–44)
INTERVENTRICULAR SEPTUM: 1.13 CM (ref 0.6–1.1)
LA MAJOR: 5.25 CM
LA MINOR: 5.42 CM
LA WIDTH: 4.11 CM
LEFT ATRIUM SIZE: 3.81 CM
LEFT ATRIUM VOLUME INDEX: 37.5 ML/M2
LEFT ATRIUM VOLUME: 70.99 CM3
LEFT INTERNAL DIMENSION IN SYSTOLE: 3.39 CM (ref 2.1–4)
LEFT VENTRICLE DIASTOLIC VOLUME INDEX: 63.54 ML/M2
LEFT VENTRICLE DIASTOLIC VOLUME: 120.36 ML
LEFT VENTRICLE MASS INDEX: 101 G/M2
LEFT VENTRICLE SYSTOLIC VOLUME INDEX: 24.9 ML/M2
LEFT VENTRICLE SYSTOLIC VOLUME: 47.11 ML
LEFT VENTRICULAR INTERNAL DIMENSION IN DIASTOLE: 5.04 CM (ref 3.5–6)
LEFT VENTRICULAR MASS: 191.87 G
LV LATERAL E/E' RATIO: 7.17 M/S
LV SEPTAL E/E' RATIO: 14.33 M/S
MV PEAK A VEL: 0.8 M/S
MV PEAK E VEL: 0.86 M/S
MV STENOSIS PRESSURE HALF TIME: 54.15 MS
MV VALVE AREA P 1/2 METHOD: 4.06 CM2
OHS CV CPX 1 MINUTE RECOVERY HEART RATE: 71 BPM
OHS CV CPX 85 PERCENT MAX PREDICTED HEART RATE MALE: 128
OHS CV CPX MAX PREDICTED HEART RATE: 151
OHS CV CPX PATIENT IS FEMALE: 1
OHS CV CPX PATIENT IS MALE: 0
OHS CV CPX PEAK DIASTOLIC BLOOD PRESSURE: 74 MMHG
OHS CV CPX PEAK HEAR RATE: 58 BPM
OHS CV CPX PEAK RATE PRESSURE PRODUCT: 8178
OHS CV CPX PEAK SYSTOLIC BLOOD PRESSURE: 141 MMHG
OHS CV CPX PERCENT MAX PREDICTED HEART RATE ACHIEVED: 39
OHS CV CPX RATE PRESSURE PRODUCT PRESENTING: 8178
PISA TR MAX VEL: 2.14 M/S
PULM VEIN S/D RATIO: 1
PV PEAK D VEL: 0.46 M/S
PV PEAK S VEL: 0.46 M/S
PV PEAK VELOCITY: 1.02 CM/S
RA MAJOR: 4.2 CM
RA PRESSURE: 3 MMHG
RA WIDTH: 3.6 CM
SYSTOLIC BLOOD PRESSURE: 141 MMHG
TDI LATERAL: 0.12 M/S
TDI SEPTAL: 0.06 M/S
TDI: 0.09 M/S
TR MAX PG: 18 MMHG
TRICUSPID ANNULAR PLANE SYSTOLIC EXCURSION: 1.96 CM
TV REST PULMONARY ARTERY PRESSURE: 21 MMHG

## 2020-07-23 PROCEDURE — 93306 TTE W/DOPPLER COMPLETE: CPT

## 2020-07-23 PROCEDURE — 93016 CV STRESS TEST SUPVJ ONLY: CPT | Mod: ,,, | Performed by: INTERNAL MEDICINE

## 2020-07-23 PROCEDURE — 63600175 PHARM REV CODE 636 W HCPCS: Performed by: INTERNAL MEDICINE

## 2020-07-23 PROCEDURE — 93970 EXTREMITY STUDY: CPT | Mod: TC

## 2020-07-23 PROCEDURE — 78452 HT MUSCLE IMAGE SPECT MULT: CPT | Mod: 26,,, | Performed by: RADIOLOGY

## 2020-07-23 PROCEDURE — 93306 ECHO (CUPID ONLY): ICD-10-PCS | Mod: 26,,, | Performed by: INTERNAL MEDICINE

## 2020-07-23 PROCEDURE — 93306 TTE W/DOPPLER COMPLETE: CPT | Mod: 26,,, | Performed by: INTERNAL MEDICINE

## 2020-07-23 PROCEDURE — A9502 TC99M TETROFOSMIN: HCPCS

## 2020-07-23 PROCEDURE — 93018 NUCLEAR STRESS TEST (CUPID ONLY): ICD-10-PCS | Mod: ,,, | Performed by: INTERNAL MEDICINE

## 2020-07-23 PROCEDURE — 93016 NUCLEAR STRESS TEST (CUPID ONLY): ICD-10-PCS | Mod: ,,, | Performed by: INTERNAL MEDICINE

## 2020-07-23 PROCEDURE — 93970 EXTREMITY STUDY: CPT | Mod: 26,,, | Performed by: RADIOLOGY

## 2020-07-23 PROCEDURE — 93017 CV STRESS TEST TRACING ONLY: CPT

## 2020-07-23 PROCEDURE — 93018 CV STRESS TEST I&R ONLY: CPT | Mod: ,,, | Performed by: INTERNAL MEDICINE

## 2020-07-23 PROCEDURE — 78452 NM MYOCARDIAL PERFUSION SPECT MULTI PHARM: ICD-10-PCS | Mod: 26,,, | Performed by: RADIOLOGY

## 2020-07-23 PROCEDURE — 93970 US LOWER EXTREMITY VEINS BILATERAL INSUFFICIENCY: ICD-10-PCS | Mod: 26,,, | Performed by: RADIOLOGY

## 2020-07-23 RX ORDER — PANTOPRAZOLE SODIUM 40 MG/1
40 TABLET, DELAYED RELEASE ORAL 2 TIMES DAILY
Qty: 180 TABLET | Refills: 0 | Status: SHIPPED | OUTPATIENT
Start: 2020-07-23 | End: 2020-10-21 | Stop reason: SDUPTHER

## 2020-07-23 RX ORDER — REGADENOSON 0.08 MG/ML
0.4 INJECTION, SOLUTION INTRAVENOUS ONCE
Status: COMPLETED | OUTPATIENT
Start: 2020-07-23 | End: 2020-07-23

## 2020-07-23 RX ADMIN — REGADENOSON 0.4 MG: 0.08 INJECTION, SOLUTION INTRAVENOUS at 01:07

## 2020-07-24 ENCOUNTER — TELEPHONE (OUTPATIENT)
Dept: CARDIOLOGY | Facility: CLINIC | Age: 65
End: 2020-07-24

## 2020-07-24 NOTE — TELEPHONE ENCOUNTER
----- Message from Damien Armendariz MD sent at 7/24/2020 11:25 AM CDT -----  Your echocardiogram shows normal heart and valve function.  Your stress test was normal  Your leg ultrasound was abnormal and shows significant congestion in your circulation. I would like to schedule you a clinic visit with my partner to discuss treatment and a procedure that can potentially alleviate the congestion and help with your symptoms.

## 2020-07-24 NOTE — TELEPHONE ENCOUNTER
----- Message from Damien Armendariz MD sent at 7/24/2020 11:26 AM CDT -----  Please schedule for a visit with Swapnil for venous insuff

## 2020-07-24 NOTE — TELEPHONE ENCOUNTER
Reached out to pt in regards to test results     Advised pt about scheduling clinica visit with Dr. Lopez to further discuss US results     Informed pt about scheduled clinical date and time with Dr. Lopez. Tima slip mailed out as well

## 2020-07-29 ENCOUNTER — PATIENT OUTREACH (OUTPATIENT)
Dept: OTHER | Facility: OTHER | Age: 65
End: 2020-07-29

## 2020-07-29 NOTE — PROGRESS NOTES
Digital Medicine: Clinician Follow-Up    Pt reports doing well without concern.  Recovering well from surgery.    The history is provided by the patient.   Follow-up reason(s): medication change follow-up.     Patient readings are stable     Patient did make medication change.    Is patient tolerating med change?: yes      Additional Follow-up details: PCP decreased nifedipine from 60 mg to 30 mg daily and metformin from 2000 mg/day to 1000 mg/day.      Last 5 Patient Entered Readings                                      Current 30 Day Average: 116/77     Recent Readings 7/29/2020 7/28/2020 7/27/2020 7/25/2020 7/24/2020    SBP (mmHg) 115 128 117 130 115    DBP (mmHg) 77 76 78 79 74    Pulse 68 65 76 65 73        Last 6 Patient Entered Readings                                          Most Recent A1c: 6.4% on 6/29/2020  (Goal: 7%)     Recent Readings 7/29/2020 7/27/2020 7/25/2020 7/24/2020 7/22/2020    Blood Glucose (mg/dL) 169 240 165 167 139           Screenings    ASSESSMENT(S)  Patients BP average is 116/77 mmHg, which is at goal. Patient's BP goal is less than or equal to 130/80 per 2017 ACC/AHA Hypertension Guidelines.  Patient's most recent A1C result is Lab Results    Component                Value               Date                     HGBA1C                   6.4 (H)             06/29/2020           and is at goal. Patient's A1C goal is less than or equal to 7.        PLAN  Continue current therapy: Readings stable after medication changes.   Continue current diet/physical activity routine:    Patient verbalizes understanding. Patient did not express questions or concerns and patient has contact information if needed.          There are no preventive care reminders to display for this patient.      Hypertension Medications             NIFEdipine (PROCARDIA-XL) 30 MG (OSM) 24 hr tablet Take 1 tablet (30 mg total) by mouth once daily.        Diabetes Medications             dulaglutide (TRULICITY) 1.5 mg/0.5  mL pen injector Inject 1.5 mg into the skin every 7 days.    metFORMIN (GLUCOPHAGE) 1000 MG tablet Take 1 tablet (1,000 mg total) by mouth 2 (two) times daily with meals.

## 2020-07-31 ENCOUNTER — PATIENT OUTREACH (OUTPATIENT)
Dept: OTHER | Facility: OTHER | Age: 65
End: 2020-07-31

## 2020-08-13 DIAGNOSIS — I10 ESSENTIAL HYPERTENSION: ICD-10-CM

## 2020-08-13 RX ORDER — NIFEDIPINE 30 MG/1
30 TABLET, EXTENDED RELEASE ORAL DAILY
Qty: 90 TABLET | Refills: 0 | Status: SHIPPED | OUTPATIENT
Start: 2020-08-13 | End: 2020-10-21 | Stop reason: SDUPTHER

## 2020-08-28 ENCOUNTER — TELEPHONE (OUTPATIENT)
Dept: FAMILY MEDICINE | Facility: CLINIC | Age: 65
End: 2020-08-28

## 2020-09-01 ENCOUNTER — PATIENT MESSAGE (OUTPATIENT)
Dept: OTHER | Facility: OTHER | Age: 65
End: 2020-09-01

## 2020-09-01 ENCOUNTER — TELEPHONE (OUTPATIENT)
Dept: OPTOMETRY | Facility: CLINIC | Age: 65
End: 2020-09-01

## 2020-09-01 ENCOUNTER — TELEPHONE (OUTPATIENT)
Dept: CARDIOLOGY | Facility: CLINIC | Age: 65
End: 2020-09-01

## 2020-09-01 NOTE — TELEPHONE ENCOUNTER
Returned patient call.    She No Showed for . 8-.      Had referred her.    Patient wanted  to rescheduled but she stated she is fixing to go out of town again.    And will call a later time to reschedule.          NW                                            ----- Message from Rafi Alarcon sent at 9/1/2020  9:52 AM CDT -----  Regarding: reschedule appt  Pt calling in regards to reschedule  her appt she canceled on August 17th, pt was out of town         Please advise pt can be contact at [phone]

## 2020-09-01 NOTE — TELEPHONE ENCOUNTER
Spoke with patient regarding appointment, patient is scheduled to see Dr Hutton Thursday 10/01/2020 at 9.00am.----- Message from Rafi Alarcon sent at 9/1/2020  9:54 AM CDT -----  Regarding: reschedule  Pt calling in regards to reschedule  her appt she canceled on August 17th, pt was out of town         Please advise pt can be contact at [phone]

## 2020-09-08 DIAGNOSIS — Z79.4 TYPE 2 DIABETES MELLITUS WITH HYPERGLYCEMIA, WITH LONG-TERM CURRENT USE OF INSULIN: ICD-10-CM

## 2020-09-08 DIAGNOSIS — E11.65 TYPE 2 DIABETES MELLITUS WITH HYPERGLYCEMIA, WITH LONG-TERM CURRENT USE OF INSULIN: ICD-10-CM

## 2020-09-08 RX ORDER — DULAGLUTIDE 1.5 MG/.5ML
1.5 INJECTION, SOLUTION SUBCUTANEOUS
Qty: 6 ML | Refills: 0 | Status: CANCELLED | OUTPATIENT
Start: 2020-09-08

## 2020-09-08 RX ORDER — DULAGLUTIDE 1.5 MG/.5ML
1.5 INJECTION, SOLUTION SUBCUTANEOUS
Qty: 6 ML | Refills: 0 | Status: SHIPPED | OUTPATIENT
Start: 2020-09-08 | End: 2020-09-09 | Stop reason: SDUPTHER

## 2020-09-09 DIAGNOSIS — Z79.4 TYPE 2 DIABETES MELLITUS WITH HYPERGLYCEMIA, WITH LONG-TERM CURRENT USE OF INSULIN: ICD-10-CM

## 2020-09-09 DIAGNOSIS — E11.65 TYPE 2 DIABETES MELLITUS WITH HYPERGLYCEMIA, WITH LONG-TERM CURRENT USE OF INSULIN: ICD-10-CM

## 2020-09-09 RX ORDER — DULAGLUTIDE 1.5 MG/.5ML
1.5 INJECTION, SOLUTION SUBCUTANEOUS
Qty: 6 ML | Refills: 0 | Status: SHIPPED | OUTPATIENT
Start: 2020-09-09 | End: 2020-10-21 | Stop reason: SDUPTHER

## 2020-09-30 ENCOUNTER — PATIENT OUTREACH (OUTPATIENT)
Dept: ADMINISTRATIVE | Facility: OTHER | Age: 65
End: 2020-09-30

## 2020-09-30 NOTE — PROGRESS NOTES
Care Everywhere:   Immunization: updated    Health Maintenance: updated  Media Review: review for outside mammogram report   Legacy Review:   Order placed:   Upcoming appts:  Mammogram scheduling ticket sent to patient's portal

## 2020-10-08 ENCOUNTER — PATIENT OUTREACH (OUTPATIENT)
Dept: OTHER | Facility: OTHER | Age: 65
End: 2020-10-08

## 2020-10-13 DIAGNOSIS — M79.605 PAIN IN POSTERIOR LEFT LOWER EXTREMITY: Primary | ICD-10-CM

## 2020-10-13 DIAGNOSIS — F51.01 PRIMARY INSOMNIA: ICD-10-CM

## 2020-10-13 DIAGNOSIS — M47.26 OTHER SPONDYLOSIS WITH RADICULOPATHY, LUMBAR REGION: ICD-10-CM

## 2020-10-13 RX ORDER — ZOLPIDEM TARTRATE 10 MG/1
10 TABLET ORAL NIGHTLY PRN
Qty: 90 TABLET | Refills: 0 | Status: SHIPPED | OUTPATIENT
Start: 2020-10-13 | End: 2021-01-06 | Stop reason: SDUPTHER

## 2020-10-14 ENCOUNTER — HOSPITAL ENCOUNTER (OUTPATIENT)
Dept: RADIOLOGY | Facility: HOSPITAL | Age: 65
Discharge: HOME | End: 2020-10-14
Attending: ANESTHESIOLOGY
Payer: COMMERCIAL

## 2020-10-14 DIAGNOSIS — M79.605 PAIN IN POSTERIOR LEFT LOWER EXTREMITY: ICD-10-CM

## 2020-10-14 DIAGNOSIS — M47.26 OTHER SPONDYLOSIS WITH RADICULOPATHY, LUMBAR REGION: ICD-10-CM

## 2020-10-14 PROCEDURE — 72148 MRI LUMBAR SPINE WITHOUT CONTRAST: ICD-10-PCS | Mod: 26,,, | Performed by: RADIOLOGY

## 2020-10-14 PROCEDURE — 72148 MRI LUMBAR SPINE W/O DYE: CPT | Mod: 26,,, | Performed by: RADIOLOGY

## 2020-10-14 PROCEDURE — 72148 MRI LUMBAR SPINE W/O DYE: CPT | Mod: TC

## 2020-10-15 DIAGNOSIS — E11.65 TYPE 2 DIABETES MELLITUS WITH HYPERGLYCEMIA, WITH LONG-TERM CURRENT USE OF INSULIN: ICD-10-CM

## 2020-10-15 DIAGNOSIS — Z79.4 TYPE 2 DIABETES MELLITUS WITH HYPERGLYCEMIA, WITH LONG-TERM CURRENT USE OF INSULIN: ICD-10-CM

## 2020-10-15 RX ORDER — METFORMIN HYDROCHLORIDE 1000 MG/1
1000 TABLET ORAL 2 TIMES DAILY WITH MEALS
Qty: 180 TABLET | Refills: 0 | Status: SHIPPED | OUTPATIENT
Start: 2020-10-15 | End: 2020-10-21 | Stop reason: SDUPTHER

## 2020-10-19 ENCOUNTER — LAB VISIT (OUTPATIENT)
Dept: LAB | Facility: HOSPITAL | Age: 65
End: 2020-10-19
Attending: FAMILY MEDICINE
Payer: COMMERCIAL

## 2020-10-19 DIAGNOSIS — Z79.4 TYPE 2 DIABETES MELLITUS WITH HYPERGLYCEMIA, WITH LONG-TERM CURRENT USE OF INSULIN: ICD-10-CM

## 2020-10-19 DIAGNOSIS — E11.65 TYPE 2 DIABETES MELLITUS WITH HYPERGLYCEMIA, WITH LONG-TERM CURRENT USE OF INSULIN: ICD-10-CM

## 2020-10-19 DIAGNOSIS — E53.8 B12 DEFICIENCY: ICD-10-CM

## 2020-10-19 DIAGNOSIS — Z01.419 WELL WOMAN EXAM: ICD-10-CM

## 2020-10-19 DIAGNOSIS — I10 ESSENTIAL HYPERTENSION: ICD-10-CM

## 2020-10-19 DIAGNOSIS — E78.49 OTHER HYPERLIPIDEMIA: ICD-10-CM

## 2020-10-19 LAB
ALBUMIN SERPL BCP-MCNC: 3.6 G/DL (ref 3.5–5.2)
ALP SERPL-CCNC: 117 U/L (ref 55–135)
ALT SERPL W/O P-5'-P-CCNC: 24 U/L (ref 10–44)
ANION GAP SERPL CALC-SCNC: 7 MMOL/L (ref 8–16)
AST SERPL-CCNC: 24 U/L (ref 10–40)
BASOPHILS # BLD AUTO: 0.03 K/UL (ref 0–0.2)
BASOPHILS NFR BLD: 0.5 % (ref 0–1.9)
BILIRUB SERPL-MCNC: 0.4 MG/DL (ref 0.1–1)
BUN SERPL-MCNC: 15 MG/DL (ref 8–23)
CALCIUM SERPL-MCNC: 9.5 MG/DL (ref 8.7–10.5)
CHLORIDE SERPL-SCNC: 106 MMOL/L (ref 95–110)
CO2 SERPL-SCNC: 28 MMOL/L (ref 23–29)
CREAT SERPL-MCNC: 0.6 MG/DL (ref 0.5–1.4)
DIFFERENTIAL METHOD: ABNORMAL
EOSINOPHIL # BLD AUTO: 0.1 K/UL (ref 0–0.5)
EOSINOPHIL NFR BLD: 1.8 % (ref 0–8)
ERYTHROCYTE [DISTWIDTH] IN BLOOD BY AUTOMATED COUNT: 15.4 % (ref 11.5–14.5)
EST. GFR  (AFRICAN AMERICAN): >60 ML/MIN/1.73 M^2
EST. GFR  (NON AFRICAN AMERICAN): >60 ML/MIN/1.73 M^2
ESTIMATED AVG GLUCOSE: 146 MG/DL (ref 68–131)
GLUCOSE SERPL-MCNC: 114 MG/DL (ref 70–110)
HBA1C MFR BLD HPLC: 6.7 % (ref 4–5.6)
HCT VFR BLD AUTO: 35.1 % (ref 37–48.5)
HGB BLD-MCNC: 11 G/DL (ref 12–16)
IMM GRANULOCYTES # BLD AUTO: 0.01 K/UL (ref 0–0.04)
IMM GRANULOCYTES NFR BLD AUTO: 0.2 % (ref 0–0.5)
LYMPHOCYTES # BLD AUTO: 2.6 K/UL (ref 1–4.8)
LYMPHOCYTES NFR BLD: 39.8 % (ref 18–48)
MCH RBC QN AUTO: 27.7 PG (ref 27–31)
MCHC RBC AUTO-ENTMCNC: 31.3 G/DL (ref 32–36)
MCV RBC AUTO: 88 FL (ref 82–98)
MONOCYTES # BLD AUTO: 0.7 K/UL (ref 0.3–1)
MONOCYTES NFR BLD: 10.9 % (ref 4–15)
NEUTROPHILS # BLD AUTO: 3.1 K/UL (ref 1.8–7.7)
NEUTROPHILS NFR BLD: 46.8 % (ref 38–73)
NRBC BLD-RTO: 0 /100 WBC
PLATELET # BLD AUTO: 335 K/UL (ref 150–350)
PMV BLD AUTO: 11.4 FL (ref 9.2–12.9)
POTASSIUM SERPL-SCNC: 4.9 MMOL/L (ref 3.5–5.1)
PROT SERPL-MCNC: 7.1 G/DL (ref 6–8.4)
RBC # BLD AUTO: 3.97 M/UL (ref 4–5.4)
SODIUM SERPL-SCNC: 141 MMOL/L (ref 136–145)
VIT B12 SERPL-MCNC: >2000 PG/ML (ref 210–950)
WBC # BLD AUTO: 6.59 K/UL (ref 3.9–12.7)

## 2020-10-19 PROCEDURE — 83036 HEMOGLOBIN GLYCOSYLATED A1C: CPT

## 2020-10-19 PROCEDURE — 80053 COMPREHEN METABOLIC PANEL: CPT

## 2020-10-19 PROCEDURE — 36415 COLL VENOUS BLD VENIPUNCTURE: CPT | Mod: PO

## 2020-10-19 PROCEDURE — 85025 COMPLETE CBC W/AUTO DIFF WBC: CPT

## 2020-10-19 PROCEDURE — 82607 VITAMIN B-12: CPT

## 2020-10-21 ENCOUNTER — HOSPITAL ENCOUNTER (OUTPATIENT)
Dept: RADIOLOGY | Facility: HOSPITAL | Age: 65
Discharge: HOME | End: 2020-10-21
Attending: FAMILY MEDICINE
Payer: COMMERCIAL

## 2020-10-21 ENCOUNTER — OFFICE VISIT (OUTPATIENT)
Dept: FAMILY MEDICINE | Facility: CLINIC | Age: 65
End: 2020-10-21
Payer: COMMERCIAL

## 2020-10-21 ENCOUNTER — PATIENT OUTREACH (OUTPATIENT)
Dept: OTHER | Facility: OTHER | Age: 65
End: 2020-10-21

## 2020-10-21 VITALS
DIASTOLIC BLOOD PRESSURE: 72 MMHG | TEMPERATURE: 97 F | HEART RATE: 79 BPM | OXYGEN SATURATION: 97 % | BODY MASS INDEX: 27.65 KG/M2 | WEIGHT: 171.31 LBS | SYSTOLIC BLOOD PRESSURE: 128 MMHG

## 2020-10-21 DIAGNOSIS — I83.893 VARICOSE VEINS OF BOTH LEGS WITH EDEMA: ICD-10-CM

## 2020-10-21 DIAGNOSIS — Z01.419 WELL WOMAN EXAM: ICD-10-CM

## 2020-10-21 DIAGNOSIS — F51.01 PRIMARY INSOMNIA: ICD-10-CM

## 2020-10-21 DIAGNOSIS — E78.00 PURE HYPERCHOLESTEROLEMIA: ICD-10-CM

## 2020-10-21 DIAGNOSIS — R10.13 EPIGASTRIC PAIN: ICD-10-CM

## 2020-10-21 DIAGNOSIS — E78.49 OTHER HYPERLIPIDEMIA: ICD-10-CM

## 2020-10-21 DIAGNOSIS — R63.4 WEIGHT LOSS: ICD-10-CM

## 2020-10-21 DIAGNOSIS — Z90.81 S/P SPLENECTOMY: ICD-10-CM

## 2020-10-21 DIAGNOSIS — E11.65 TYPE 2 DIABETES MELLITUS WITH HYPERGLYCEMIA, WITH LONG-TERM CURRENT USE OF INSULIN: ICD-10-CM

## 2020-10-21 DIAGNOSIS — Z23 NEED FOR INFLUENZA VACCINATION: ICD-10-CM

## 2020-10-21 DIAGNOSIS — R76.8 POSITIVE ANA (ANTINUCLEAR ANTIBODY): ICD-10-CM

## 2020-10-21 DIAGNOSIS — E53.8 B12 DEFICIENCY: ICD-10-CM

## 2020-10-21 DIAGNOSIS — Z01.419 WELL WOMAN EXAM: Primary | ICD-10-CM

## 2020-10-21 DIAGNOSIS — F32.A DEPRESSION, UNSPECIFIED DEPRESSION TYPE: ICD-10-CM

## 2020-10-21 DIAGNOSIS — Z79.4 TYPE 2 DIABETES MELLITUS WITH HYPERGLYCEMIA, WITH LONG-TERM CURRENT USE OF INSULIN: ICD-10-CM

## 2020-10-21 DIAGNOSIS — I73.00 RAYNAUD'S DISEASE WITHOUT GANGRENE: ICD-10-CM

## 2020-10-21 DIAGNOSIS — I10 ESSENTIAL HYPERTENSION: ICD-10-CM

## 2020-10-21 PROCEDURE — 99396 PREV VISIT EST AGE 40-64: CPT | Mod: 25,S$GLB,, | Performed by: FAMILY MEDICINE

## 2020-10-21 PROCEDURE — 3008F PR BODY MASS INDEX (BMI) DOCUMENTED: ICD-10-PCS | Mod: CPTII,S$GLB,, | Performed by: FAMILY MEDICINE

## 2020-10-21 PROCEDURE — 3044F PR MOST RECENT HEMOGLOBIN A1C LEVEL <7.0%: ICD-10-PCS | Mod: CPTII,S$GLB,, | Performed by: FAMILY MEDICINE

## 2020-10-21 PROCEDURE — 90471 IMMUNIZATION ADMIN: CPT | Mod: S$GLB,,, | Performed by: FAMILY MEDICINE

## 2020-10-21 PROCEDURE — 3078F PR MOST RECENT DIASTOLIC BLOOD PRESSURE < 80 MM HG: ICD-10-PCS | Mod: CPTII,S$GLB,, | Performed by: FAMILY MEDICINE

## 2020-10-21 PROCEDURE — 90471 FLU VACCINE (QUAD) GREATER THAN OR EQUAL TO 3YO PRESERVATIVE FREE IM: ICD-10-PCS | Mod: S$GLB,,, | Performed by: FAMILY MEDICINE

## 2020-10-21 PROCEDURE — 71046 XR CHEST PA AND LATERAL: ICD-10-PCS | Mod: 26,,, | Performed by: INTERNAL MEDICINE

## 2020-10-21 PROCEDURE — 3074F PR MOST RECENT SYSTOLIC BLOOD PRESSURE < 130 MM HG: ICD-10-PCS | Mod: CPTII,S$GLB,, | Performed by: FAMILY MEDICINE

## 2020-10-21 PROCEDURE — 99999 PR PBB SHADOW E&M-EST. PATIENT-LVL IV: ICD-10-PCS | Mod: PBBFAC,,, | Performed by: FAMILY MEDICINE

## 2020-10-21 PROCEDURE — 3078F DIAST BP <80 MM HG: CPT | Mod: CPTII,S$GLB,, | Performed by: FAMILY MEDICINE

## 2020-10-21 PROCEDURE — 99999 PR PBB SHADOW E&M-EST. PATIENT-LVL IV: CPT | Mod: PBBFAC,,, | Performed by: FAMILY MEDICINE

## 2020-10-21 PROCEDURE — 90686 FLU VACCINE (QUAD) GREATER THAN OR EQUAL TO 3YO PRESERVATIVE FREE IM: ICD-10-PCS | Mod: S$GLB,,, | Performed by: FAMILY MEDICINE

## 2020-10-21 PROCEDURE — 3008F BODY MASS INDEX DOCD: CPT | Mod: CPTII,S$GLB,, | Performed by: FAMILY MEDICINE

## 2020-10-21 PROCEDURE — 71046 X-RAY EXAM CHEST 2 VIEWS: CPT | Mod: TC,FY,PO

## 2020-10-21 PROCEDURE — 3044F HG A1C LEVEL LT 7.0%: CPT | Mod: CPTII,S$GLB,, | Performed by: FAMILY MEDICINE

## 2020-10-21 PROCEDURE — 99396 PR PREVENTIVE VISIT,EST,40-64: ICD-10-PCS | Mod: 25,S$GLB,, | Performed by: FAMILY MEDICINE

## 2020-10-21 PROCEDURE — 90686 IIV4 VACC NO PRSV 0.5 ML IM: CPT | Mod: S$GLB,,, | Performed by: FAMILY MEDICINE

## 2020-10-21 PROCEDURE — 71046 X-RAY EXAM CHEST 2 VIEWS: CPT | Mod: 26,,, | Performed by: INTERNAL MEDICINE

## 2020-10-21 PROCEDURE — 3074F SYST BP LT 130 MM HG: CPT | Mod: CPTII,S$GLB,, | Performed by: FAMILY MEDICINE

## 2020-10-21 RX ORDER — NIFEDIPINE 30 MG/1
30 TABLET, EXTENDED RELEASE ORAL DAILY
Qty: 90 TABLET | Refills: 0 | Status: SHIPPED | OUTPATIENT
Start: 2020-10-21 | End: 2021-02-09 | Stop reason: SDUPTHER

## 2020-10-21 RX ORDER — METFORMIN HYDROCHLORIDE 1000 MG/1
1000 TABLET ORAL 2 TIMES DAILY WITH MEALS
Qty: 180 TABLET | Refills: 0 | Status: SHIPPED | OUTPATIENT
Start: 2020-10-21 | End: 2021-01-13 | Stop reason: SDUPTHER

## 2020-10-21 RX ORDER — FLUOXETINE 20 MG/1
40 TABLET ORAL DAILY
Qty: 180 TABLET | Refills: 0 | Status: SHIPPED | OUTPATIENT
Start: 2020-10-21 | End: 2021-01-20 | Stop reason: SDUPTHER

## 2020-10-21 RX ORDER — ATORVASTATIN CALCIUM 40 MG/1
40 TABLET, FILM COATED ORAL DAILY
Qty: 90 TABLET | Refills: 3 | Status: SHIPPED | OUTPATIENT
Start: 2020-10-21 | End: 2021-10-21

## 2020-10-21 RX ORDER — PANTOPRAZOLE SODIUM 40 MG/1
40 TABLET, DELAYED RELEASE ORAL DAILY
Qty: 90 TABLET | Refills: 0 | Status: SHIPPED | OUTPATIENT
Start: 2020-10-21 | End: 2022-05-13 | Stop reason: SDUPTHER

## 2020-10-21 RX ORDER — DULAGLUTIDE 1.5 MG/.5ML
1.5 INJECTION, SOLUTION SUBCUTANEOUS
Qty: 6 ML | Refills: 0 | Status: SHIPPED | OUTPATIENT
Start: 2020-10-21 | End: 2022-05-13 | Stop reason: SDUPTHER

## 2020-10-21 RX ORDER — GABAPENTIN 300 MG/1
600 CAPSULE ORAL NIGHTLY
Qty: 270 CAPSULE | Refills: 0
Start: 2020-10-21 | End: 2021-10-21

## 2020-10-21 NOTE — PROGRESS NOTES
THIS NOTE IS FOR PRE-CHARTING PURPOSES ONLY  Current meds for diseases state:   HTN:  -Nifedipine (Procardia XL) 30 mg daily  DM:  -Trulicity 1.5 mg weekly  -Metformin 1000 mg twice daily    Past med(s) & discontinuation reason(s) for disease state:  HTN:  -HCTZ 25 mg daily  -Losartan  -Lisinopril-HCTZ  DM  -Lantus   -Aspart  -Glimepiride (2016, historical med)    Pertinent allergies: n/a    Pertinent problems/diagnoses:  -HLD, HTN, B12 deficiency, T2DM, Raynaud's disease    Current BP Average or A1C (and goal):   -BP: 115/78 avg (goal less than 130/80)  -A1C: 6.7% goal less than 7%    BP/BG trending up/down:   -BP: trending up (still at goal)  -BG: trending down    Past s/s related to disease state    Pertinent labs - values, trends, up to date  -CMP (10/20): wnl, K 4.9, Scr: 0.6, eGFR>60, AST, ALT: wnl, B12: > 2000 (210-950) overly elevated    Technique or device issues to address: n/a    Clinician outreach: Yoel    [employer]  outreach: Eugenia     Other pertinent visits since last outreach    Upcoming appointments:  -Rheumatology 10/29  -Cardiology 11/04  -[facility] 01/2021    Plan  -HTN:     -Assess technique, charge device,      -At goal no changes  -DM:     -Some high readings after breakfast-confirm what she got for breakfast     -Readings trending down-defer changes  Follow up  -F/u: 4 weeks    I have discussed the plan above with Yoel Mays.

## 2020-10-21 NOTE — PROGRESS NOTES
Subjective:       Patient ID: Jill Perez is a 65 y.o. female.    Chief Complaint: Follow-up (3 months) and Leg Pain (both legs)      Jill Perez is a 66 y.o. female who presents today for a physical     Labs: ordered previously, reviewed     C-scope: UTD     Mammogram: ordered, scheduled.   Pap: UTD     DM:  No more lantus! continued metformin 1000 mg bid and started once weekly trulicity. No issues swallowing, no neck swelling. No symptoms of low blood sugar. a1c is well controlled.   HTN: stable   DLD: she is tolerating this cholesterol medication. No muscle ache changes. She takes it in the morning.   Obesity: weight is generally trending down. She has cut back on red meat. Has had bariatric surgery. Had recent surgery.   Normocytic anemia: EGD/Colonoscopy negative. Has seen GI, had recent surgery  GERD: no reflux symptoms currently  Mood: fluoxetine given for raynaud's; its helping her mood.     h/o prior splenectomy, cholecystectomy, and maribel-en-y gastric bypass who presents for 2 week f/u s/p lysis of adhesions and hiatal hernia repair     Foot pain: taking fluoxetine. Seeing rheumatology.  she was given nitro cream; that didn't help. Her feet are always discolored and cold.      Insomnia: She is feeling tired. Her last sleep study was 5-6 years ago. She doesn't snore that she is aware of. On the 10 mg of Ambien she felt well rested and was able to complete her days work, but now she feels tired. When she takes Ambien 5 mg, she is able to fall asleep but she tosses and turns and wake up tired.      She is having worsening varicose veins. She is having pain. She is trying compression, they help a little. Leg swell     PMHx: reviewed in EMR and updated  Meds: reviewed in EMR and updated  Shx: reviewed in EMR and updated  FMHx: no family history of colon cancer, breast cancer, ovarian cancer  Social: moved here from [place] permanently a few years ago. Originally lived in [place].  is also a  patient of mine. No kids at home. No pets at home. No smokers at home.     Review of Systems   Constitutional: Positive for appetite change. Negative for chills and fever.   Respiratory: Negative for shortness of breath.    Cardiovascular: Positive for leg swelling. Negative for chest pain and palpitations.   Gastrointestinal: Negative for abdominal pain, anal bleeding, blood in stool, constipation, diarrhea, nausea and vomiting.   Musculoskeletal: Positive for gait problem and joint swelling. Negative for neck pain and neck stiffness.        Leg pain   Skin: Negative for rash.   Neurological: Negative for dizziness, light-headedness and numbness.   Psychiatric/Behavioral: Negative for sleep disturbance.         Health Maintenance Due   Topic Date Due    Sign Pain Contract  04/28/1975    Complete Opioid Risk Tool  04/28/1975    Eye Exam  04/24/2020    Mammogram  11/14/2020     Immunization History   Administered Date(s) Administered    Influenza 10/22/2019    Influenza - Quadrivalent - PF *Preferred* (6 months and older) 12/22/2016, 10/30/2018, 10/21/2020    Meningococcal Conjugate (MCV4P) 09/14/2017    Pneumococcal Conjugate - 13 Valent 12/22/2016    Pneumococcal Polysaccharide - 23 Valent 01/22/2020    Zoster Recombinant 08/02/2019, 10/15/2019           Objective:     Vitals:    10/21/20 0930   BP: 128/72   Pulse: 79   Temp: 97.2 °F (36.2 °C)        Physical Exam  Vitals signs and nursing note reviewed.   Constitutional:       General: She is not in acute distress.     Appearance: She is well-developed. She is not diaphoretic.   HENT:      Head: Normocephalic and atraumatic.   Neck:      Musculoskeletal: Normal range of motion and neck supple.   Cardiovascular:      Rate and Rhythm: Normal rate and regular rhythm.   Pulmonary:      Effort: Pulmonary effort is normal.      Breath sounds: Normal breath sounds.   Abdominal:      Palpations: Abdomen is soft.      Tenderness: There is no abdominal tenderness.    Musculoskeletal:      Right foot: Normal range of motion. No deformity.      Left foot: Normal range of motion.      Comments: BL feet are warm to the touch    Varicose veins noted   Feet:      Right foot:      Protective Sensation: 10 sites tested. 5 sites sensed.      Skin integrity: No ulcer, blister, skin breakdown, erythema, warmth, callus, dry skin or fissure.      Left foot:      Protective Sensation: 10 sites tested. 5 sites sensed.      Skin integrity: No ulcer, blister, skin breakdown, erythema, warmth, callus, dry skin or fissure.   Lymphadenopathy:      Cervical: No cervical adenopathy.      Right cervical: No superficial cervical adenopathy.     Left cervical: No superficial cervical adenopathy.      Upper Body:      Right upper body: No supraclavicular or axillary adenopathy.      Left upper body: No supraclavicular or axillary adenopathy.   Neurological:      Mental Status: She is alert and oriented to person, place, and time.      Sensory: No sensory deficit.   Psychiatric:         Behavior: Behavior normal.         Thought Content: Thought content normal.         Judgment: Judgment normal.         Assessment:       1. Well woman exam    2. Type 2 diabetes mellitus with hyperglycemia, with long-term current use of insulin    3. B12 deficiency    4. BMI 27.0-27.9,adult    5. Essential hypertension    6. Pure hypercholesterolemia    7. Raynaud's disease without gangrene    8. Positive YOLIS (antinuclear antibody)    9. Depression, unspecified depression type    10. S/P splenectomy    11. Primary insomnia    12. Varicose veins of both legs with edema    13. Need for influenza vaccination    14. Other hyperlipidemia    15. Epigastric pain    16. Weight loss        Plan:       Continue atorvastatin 40 mg  Continue protonix but only once daily  Continue nifedipine 30 mg for BP and for Raynaud's  Continue metformin 1000 mg BID for DM  Continue trulicity  Continue gabapentin but at 600 mg at night time, stop  TID    You have low B12. Goal >400. Start daily OTC B12 supplementation at 1000 mcg. Can consider beverages with B12 rather then crushing pills.     Continue monitoring weight. Will order labs and CXR for next visit. If still losing weight, consider further workup. More likely due to trulicity, diet changes. Consider holding trulicity?        Well woman exam  -     Sedimentation rate; Future; Expected date: 10/21/2020  -     C-Reactive Protein; Future; Expected date: 10/21/2020  -     HIV 1/2 Ag/Ab (4th Gen); Future; Expected date: 10/21/2020  -     CBC auto differential; Future; Expected date: 10/21/2020  -     Comprehensive Metabolic Panel; Future; Expected date: 10/21/2020  -     Hemoglobin A1C; Future; Expected date: 10/21/2020  -     Lipid Panel; Future; Expected date: 10/21/2020  -     TSH; Future; Expected date: 10/21/2020  -     Vitamin D; Future; Expected date: 10/21/2020    Type 2 diabetes mellitus with hyperglycemia, with long-term current use of insulin  -     dulaglutide (TRULICITY) 1.5 mg/0.5 mL pen injector; Inject 1.5 mg into the skin every 7 days.  Dispense: 6 mL; Refill: 0  -     metFORMIN (GLUCOPHAGE) 1000 MG tablet; Take 1 tablet (1,000 mg total) by mouth 2 (two) times daily with meals.  Dispense: 180 tablet; Refill: 0    B12 deficiency    BMI 27.0-27.9,adult    Essential hypertension  -     NIFEdipine (PROCARDIA-XL) 30 MG (OSM) 24 hr tablet; Take 1 tablet (30 mg total) by mouth once daily.  Dispense: 90 tablet; Refill: 0    Pure hypercholesterolemia    Raynaud's disease without gangrene  -     FLUoxetine 20 MG tablet; Take 2 tablets (40 mg total) by mouth once daily.  Dispense: 180 tablet; Refill: 0    Positive YOLIS (antinuclear antibody)    Depression, unspecified depression type    S/P splenectomy    Primary insomnia    Varicose veins of both legs with edema  -     COMPRESSION STOCKINGS  -     Ambulatory referral/consult to Cardiology; Future; Expected date: 10/28/2020    Need for influenza  vaccination  -     Influenza - Quadrivalent (PF)    Other hyperlipidemia  -     atorvastatin (LIPITOR) 40 MG tablet; Take 1 tablet (40 mg total) by mouth once daily.  Dispense: 90 tablet; Refill: 3    Epigastric pain  -     pantoprazole (PROTONIX) 40 MG tablet; Take 1 tablet (40 mg total) by mouth once daily.  Dispense: 90 tablet; Refill: 0    Weight loss  -     Sedimentation rate; Future; Expected date: 10/21/2020  -     C-Reactive Protein; Future; Expected date: 10/21/2020  -     HIV 1/2 Ag/Ab (4th Gen); Future; Expected date: 10/21/2020  -     X-Ray Chest PA And Lateral; Future; Expected date: 10/21/2020    Other orders  -     gabapentin (NEURONTIN) 300 MG capsule; Take 2 capsules (600 mg total) by mouth every evening.  Dispense: 270 capsule; Refill: 0

## 2020-10-21 NOTE — PATIENT INSTRUCTIONS
Continue atorvastatin 40 mg  Continue protonix but only once daily  Continue nifedipine 30 mg for BP and for Raynaud's  Continue metformin 1000 mg BID for DM  Continue trulicity  Continue gabapentin but at 600 mg at night time, stop TID    You have low B12. Goal >400. Start daily OTC B12 supplementation at 1000 mcg. Can consider beverages with B12 rather then crushing pills.     Continue monitoring weight. Will order labs and CXR for next visit. If still losing weight, consider further workup. More likely due to trulicity, diet changes. Consider holding trulicity?    Diabetes Management Status    Statin: Taking  ACE/ARB: Not taking    Screening or Prevention Patient's value Goal Complete/Controlled?   HgA1C Testing and Control   Lab Results   Component Value Date    HGBA1C 6.7 (H) 10/19/2020      Annually/Less than 8% Yes   Lipid profile : 06/29/2020 Annually Yes   LDL control Lab Results   Component Value Date    LDLCALC 70.8 06/29/2020    Annually/Less than 100 mg/dl  Yes   Nephropathy screening Lab Results   Component Value Date    LABMICR <2.5 06/29/2020     Lab Results   Component Value Date    PROTEINUA Negative 06/19/2020    Annually Yes   Blood pressure BP Readings from Last 1 Encounters:   10/21/20 128/72    Less than 140/90 Yes   Dilated retinal exam : 04/24/2019 Annually Yes   Foot exam   : 10/22/2019 Annually Yes

## 2020-10-22 ENCOUNTER — HOSPITAL ENCOUNTER (OUTPATIENT)
Dept: RADIOLOGY | Facility: HOSPITAL | Age: 65
Discharge: HOME | End: 2020-10-22
Attending: FAMILY MEDICINE
Payer: COMMERCIAL

## 2020-10-22 DIAGNOSIS — Z12.31 ENCOUNTER FOR SCREENING MAMMOGRAM FOR MALIGNANT NEOPLASM OF BREAST: ICD-10-CM

## 2020-10-22 PROCEDURE — 77067 SCR MAMMO BI INCL CAD: CPT | Mod: 26,,, | Performed by: RADIOLOGY

## 2020-10-22 PROCEDURE — 77063 BREAST TOMOSYNTHESIS BI: CPT | Mod: 26,,, | Performed by: RADIOLOGY

## 2020-10-22 PROCEDURE — 77067 MAMMO DIGITAL SCREENING BILAT WITH TOMOSYNTHESIS_CAD: ICD-10-PCS | Mod: 26,,, | Performed by: RADIOLOGY

## 2020-10-22 PROCEDURE — 77067 SCR MAMMO BI INCL CAD: CPT | Mod: TC

## 2020-10-22 PROCEDURE — 77063 MAMMO DIGITAL SCREENING BILAT WITH TOMOSYNTHESIS_CAD: ICD-10-PCS | Mod: 26,,, | Performed by: RADIOLOGY

## 2020-10-26 ENCOUNTER — PATIENT MESSAGE (OUTPATIENT)
Dept: FAMILY MEDICINE | Facility: CLINIC | Age: 65
End: 2020-10-26

## 2020-10-26 ENCOUNTER — LAB VISIT (OUTPATIENT)
Dept: LAB | Facility: HOSPITAL | Age: 65
End: 2020-10-26
Attending: FAMILY MEDICINE
Payer: COMMERCIAL

## 2020-10-26 DIAGNOSIS — R39.9 UTI SYMPTOMS: Primary | ICD-10-CM

## 2020-10-26 DIAGNOSIS — R39.9 UTI SYMPTOMS: ICD-10-CM

## 2020-10-26 LAB
BACTERIA #/AREA URNS AUTO: ABNORMAL /HPF
BILIRUB UR QL STRIP: NEGATIVE
CLARITY UR REFRACT.AUTO: ABNORMAL
COLOR UR AUTO: YELLOW
GLUCOSE UR QL STRIP: NEGATIVE
HGB UR QL STRIP: NEGATIVE
HYALINE CASTS UR QL AUTO: 4 /LPF
KETONES UR QL STRIP: NEGATIVE
LEUKOCYTE ESTERASE UR QL STRIP: ABNORMAL
MICROSCOPIC COMMENT: ABNORMAL
NITRITE UR QL STRIP: NEGATIVE
PH UR STRIP: 5 [PH] (ref 5–8)
PROT UR QL STRIP: NEGATIVE
RBC #/AREA URNS AUTO: 1 /HPF (ref 0–4)
SP GR UR STRIP: 1.02 (ref 1–1.03)
SQUAMOUS #/AREA URNS AUTO: 0 /HPF
URN SPEC COLLECT METH UR: ABNORMAL
WBC #/AREA URNS AUTO: 76 /HPF (ref 0–5)

## 2020-10-26 PROCEDURE — 87086 URINE CULTURE/COLONY COUNT: CPT

## 2020-10-26 PROCEDURE — 87088 URINE BACTERIA CULTURE: CPT

## 2020-10-26 PROCEDURE — 87186 SC STD MICRODIL/AGAR DIL: CPT

## 2020-10-26 PROCEDURE — 81001 URINALYSIS AUTO W/SCOPE: CPT

## 2020-10-26 PROCEDURE — 87077 CULTURE AEROBIC IDENTIFY: CPT

## 2020-10-27 RX ORDER — CIPROFLOXACIN 500 MG/1
500 TABLET ORAL 2 TIMES DAILY
Qty: 14 TABLET | Refills: 0 | Status: SHIPPED | OUTPATIENT
Start: 2020-10-27 | End: 2020-11-03

## 2020-10-29 LAB — BACTERIA UR CULT: ABNORMAL

## 2020-10-29 NOTE — PROGRESS NOTES
Unable to complete outreach while on rotation.    Next outreach will be completed by Yoel Mays as scheduled

## 2020-11-02 ENCOUNTER — PATIENT OUTREACH (OUTPATIENT)
Dept: ADMINISTRATIVE | Facility: OTHER | Age: 65
End: 2020-11-02

## 2020-11-02 ENCOUNTER — TELEPHONE (OUTPATIENT)
Dept: RHEUMATOLOGY | Facility: CLINIC | Age: 65
End: 2020-11-02
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
## 2020-11-03 ENCOUNTER — PATIENT MESSAGE (OUTPATIENT)
Dept: OTHER | Facility: OTHER | Age: 65
End: 2020-11-03

## 2020-11-04 ENCOUNTER — OFFICE VISIT (OUTPATIENT)
Dept: CARDIOLOGY | Facility: CLINIC | Age: 65
End: 2020-11-04
Payer: COMMERCIAL

## 2020-11-04 VITALS
HEART RATE: 86 BPM | SYSTOLIC BLOOD PRESSURE: 110 MMHG | DIASTOLIC BLOOD PRESSURE: 70 MMHG | BODY MASS INDEX: 28.03 KG/M2 | HEIGHT: 66 IN | WEIGHT: 174.38 LBS

## 2020-11-04 DIAGNOSIS — I10 ESSENTIAL HYPERTENSION: ICD-10-CM

## 2020-11-04 DIAGNOSIS — I83.893 VARICOSE VEINS OF BOTH LEGS WITH EDEMA: ICD-10-CM

## 2020-11-04 DIAGNOSIS — I83.811 VARICOSE VEINS OF RIGHT LOWER EXTREMITY WITH PAIN: ICD-10-CM

## 2020-11-04 DIAGNOSIS — I87.2 VENOUS INSUFFICIENCY OF BOTH LOWER EXTREMITIES: Primary | ICD-10-CM

## 2020-11-04 DIAGNOSIS — E78.00 PURE HYPERCHOLESTEROLEMIA: ICD-10-CM

## 2020-11-04 DIAGNOSIS — I73.00 RAYNAUD'S DISEASE WITHOUT GANGRENE: ICD-10-CM

## 2020-11-04 DIAGNOSIS — Z79.4 TYPE 2 DIABETES MELLITUS WITH HYPERGLYCEMIA, WITH LONG-TERM CURRENT USE OF INSULIN: ICD-10-CM

## 2020-11-04 DIAGNOSIS — E11.65 TYPE 2 DIABETES MELLITUS WITH HYPERGLYCEMIA, WITH LONG-TERM CURRENT USE OF INSULIN: ICD-10-CM

## 2020-11-04 PROCEDURE — 99999 PR PBB SHADOW E&M-EST. PATIENT-LVL IV: ICD-10-PCS | Mod: PBBFAC,,, | Performed by: INTERNAL MEDICINE

## 2020-11-04 PROCEDURE — 99999 PR PBB SHADOW E&M-EST. PATIENT-LVL IV: CPT | Mod: PBBFAC,,, | Performed by: INTERNAL MEDICINE

## 2020-11-04 PROCEDURE — 99214 OFFICE O/P EST MOD 30 MIN: CPT | Mod: S$GLB,,, | Performed by: INTERNAL MEDICINE

## 2020-11-04 PROCEDURE — 3074F SYST BP LT 130 MM HG: CPT | Mod: CPTII,S$GLB,, | Performed by: INTERNAL MEDICINE

## 2020-11-04 PROCEDURE — 3044F PR MOST RECENT HEMOGLOBIN A1C LEVEL <7.0%: ICD-10-PCS | Mod: CPTII,S$GLB,, | Performed by: INTERNAL MEDICINE

## 2020-11-04 PROCEDURE — 3008F PR BODY MASS INDEX (BMI) DOCUMENTED: ICD-10-PCS | Mod: CPTII,S$GLB,, | Performed by: INTERNAL MEDICINE

## 2020-11-04 PROCEDURE — 3078F DIAST BP <80 MM HG: CPT | Mod: CPTII,S$GLB,, | Performed by: INTERNAL MEDICINE

## 2020-11-04 PROCEDURE — 99214 PR OFFICE/OUTPT VISIT, EST, LEVL IV, 30-39 MIN: ICD-10-PCS | Mod: S$GLB,,, | Performed by: INTERNAL MEDICINE

## 2020-11-04 PROCEDURE — 3078F PR MOST RECENT DIASTOLIC BLOOD PRESSURE < 80 MM HG: ICD-10-PCS | Mod: CPTII,S$GLB,, | Performed by: INTERNAL MEDICINE

## 2020-11-04 PROCEDURE — 3044F HG A1C LEVEL LT 7.0%: CPT | Mod: CPTII,S$GLB,, | Performed by: INTERNAL MEDICINE

## 2020-11-04 PROCEDURE — 3008F BODY MASS INDEX DOCD: CPT | Mod: CPTII,S$GLB,, | Performed by: INTERNAL MEDICINE

## 2020-11-04 PROCEDURE — 3074F PR MOST RECENT SYSTOLIC BLOOD PRESSURE < 130 MM HG: ICD-10-PCS | Mod: CPTII,S$GLB,, | Performed by: INTERNAL MEDICINE

## 2020-11-04 NOTE — PATIENT INSTRUCTIONS
Understanding Chronic Venous Insufficiency  Problems with the veins in the legs may lead to chronic venous insufficiency (CVI). CVI means that there is a long-term problem with the veins not being able to pump blood back to your heart. When this happens, blood stays in the legs and causes swelling and aching.   Two problems that may lead to chronic venous insufficiency are:  · Damaged valves. Valves keep blood flowing from the legs through the blood vessels and back to the heart. When the valves are damaged, blood does not flow as well.   · Deep vein thrombosis (DVT). Blood clots may form in the deep veins of the legs. This may cause pain, redness, and swelling in the legs. It may also block the flow of blood back to the heart. Seek immediate medical care if you have these symptoms.  · A blood clot in the leg can also break off and travel to the lungs. This is called pulmonary embolism (PE). In the lungs, the clot can cut off the flow of blood. This may cause chest pain, trouble breathing, sweating, a fast heartbeat, coughing (may cough up blood), and fainting. It is a medical emergency and may cause death. Call 911 if you have these symptoms.  · Healthcare providers call the two conditions, DVT and PE, venous thromboembolism (VTE).  CVI cant be cured, but you can control leg swelling to reduce the likelihood of ulcers (sores).  Recognizing the symptoms  Be aware of the following:  · If you stand or sit with your feet down for long periods, your legs may ache or feel heavy.  · Swollen ankles are possibly the most common symptom of CVI.  · As swelling increases, the skin over your ankles may show red spots or a brownish tinge. The skin may feel leathery or scaly, and may start to itch.  · If swelling is not controlled, an ulcer (open wound) may form.  What you can do  Reduce your risk of developing ulcers by doing the following:  · Increase blood flow back to your heart by elevating your legs, exercising daily,  and wearing elastic stockings.  · Boost blood flow in your legs by losing excess weight.  · If you must stand or sit in one place for a period of time, keep your blood moving by wiggling your toes, shifting your body position, and rising up on the balls of your feet.    Date Last Reviewed: 5/1/2016  © 6395-5824 [employer]. [address] [place] [address], [place] [address]. All rights reserved. This information is not intended as a substitute for professional medical care. Always follow your healthcare professional's instructions.

## 2020-11-04 NOTE — LETTER
November 4, 2020      Shahab Merlos, DO  [address]  [place] [address]           [place] [facility]  [address]  [place] [address]  Phone: [phone]          Patient: Jill Perez   MR Number: [id number]   YOB: 1955   Date of Visit: 11/4/2020       Dear Dr. Shahab Merlos:    Thank you for referring Jill Perez to me for evaluation. Attached you will find relevant portions of my assessment and plan of care.    If you have questions, please do not hesitate to call me. I look forward to following Jill Perez along with you.    Sincerely,    Swapnil Lopez MD    Enclosure  CC:  No Recipients    If you would like to receive this communication electronically, please contact [email or web address] or [phone] to request more information on [employer] access.    For providers and/or their staff who would like to refer a patient to [facility], please contact us through our one-stop-shop provider referral line, [facility], at [phone].    If you feel you have received this communication in error or would no longer like to receive these types of communications, please e-mail [email or web address]

## 2020-11-04 NOTE — PROGRESS NOTES
Subjective:   Patient ID:  Jill Perez is a 65 y.o. female who presents for evaluation  of Chronic Venous Insufficiency, Varicose Veins, Edema, and venous claudication      HPI:     She is here by recommendation of the care team for management of venous insufficiency complicated by pain, edema, venous claudication, irritation, and engorged varicose veins despite wearing compression stockings 20-30 mmHg. No ulcerations.  No history of DVT. Mother had a history venous insufficiency status post vein stripping.     Patient has a past medical history hypertension, hyperlipidemia, diabetes, obesity status post gastric bypass surgery with over 250 lb weight loss, Raynaud's without gangrene, esophageal dysphagia, and polyarthralgia.      Venous US 2020      R GSV 3.9 mm + reflux 1684 msec   R LSV 3.2 mm reflux     L GSV 4.9 mm - reflux    L LSV 3.5 mm - reflux      No DVT           Patient Active Problem List    Diagnosis Date Noted    Venous insufficiency of both lower extremities 2020    Varicose veins of right lower extremity with pain 2020    Internal hernia 2020    Esophageal dysphagia 2020    Hiatal hernia 2020    BMI 27.0-27.9,adult 2020    B12 deficiency 10/22/2019    Positive YOLIS (antinuclear antibody) 2019    Polyarthralgia 2019    Raynaud's disease without gangrene 2019    Depression 2019    Primary insomnia 10/30/2018    S/P splenectomy 10/30/2018    Type 2 diabetes mellitus with hyperglycemia, with long-term current use of insulin 10/23/2018    Essential hypertension 10/23/2018    Pure hypercholesterolemia 10/23/2018           Right Arm BP - Sittin/70  Left Arm BP - Sittin/70        LABS      LAST HbA1c  Lab Results   Component Value Date    HGBA1C 6.7 (H) 10/19/2020       Lipid panel  Lab Results   Component Value Date    CHOL 147 2020    CHOL 133 10/19/2019    CHOL 127 2019     Lab Results   Component  Value Date    HDL 61 06/29/2020    HDL 62 10/19/2019    HDL 57 01/21/2019     Lab Results   Component Value Date    LDLCALC 70.8 06/29/2020    LDLCALC 60.2 (L) 10/19/2019    LDLCALC 59.2 (L) 01/21/2019     Lab Results   Component Value Date    TRIG 76 06/29/2020    TRIG 54 10/19/2019    TRIG 54 01/21/2019     Lab Results   Component Value Date    CHOLHDL 41.5 06/29/2020    CHOLHDL 46.6 10/19/2019    CHOLHDL 44.9 01/21/2019            Review of Systems   Constitution: Negative for diaphoresis, night sweats, weight gain and weight loss.   HENT: Negative for congestion.    Eyes: Negative for blurred vision, [facility] and double vision.   Cardiovascular: Positive for leg swelling. Negative for chest pain, claudication, cyanosis, dyspnea on exertion, irregular heartbeat, near-syncope, orthopnea, palpitations, paroxysmal nocturnal dyspnea and syncope.   Respiratory: Negative for cough, shortness of breath and wheezing.    Endocrine: Negative for cold intolerance, heat intolerance and polyphagia.   Hematologic/Lymphatic: Negative for adenopathy and bleeding problem. Does not bruise/bleed easily.   Skin: Negative for dry skin and nail changes.   Musculoskeletal: Negative for arthritis, back pain, falls, joint pain, myalgias and neck pain.   Gastrointestinal: Negative for bloating, abdominal pain, change in bowel habit and constipation.   Genitourinary: Negative for bladder incontinence, dysuria, flank pain, genital sores and missed menses.   Neurological: Negative for aphonia, brief paralysis, difficulty with concentration, dizziness and weakness.   Psychiatric/Behavioral: Negative for altered mental status and memory loss. The patient does not have insomnia.    Allergic/Immunologic: Negative for environmental allergies.       Objective:   Physical Exam   Constitutional: She is oriented to person, place, and time. She appears well-developed and well-nourished. She is not intubated.   HENT:   Head: Normocephalic and  atraumatic.   Right Ear: External ear normal.   Left Ear: External ear normal.   Mouth/Throat: Oropharynx is clear and moist.   Eyes: Pupils are equal, round, and reactive to light. Conjunctivae and EOM are normal. Right eye exhibits no [facility]. Left eye exhibits no [facility]. No scleral icterus.   Neck: Normal range of motion. Neck supple. Normal carotid pulses, no hepatojugular reflux and no JVD present. Carotid bruit is not present. No tracheal deviation present. No thyromegaly present.   Cardiovascular: Normal rate, regular rhythm, S1 normal and S2 normal.  No extrasystoles are present. PMI is not displaced. Exam reveals no gallop, no S3, no distant heart sounds, no friction rub and no midsystolic click.   No murmur heard.  Pulses:       Carotid pulses are 2+ on the right side and 2+ on the left side.       Radial pulses are 2+ on the right side and 2+ on the left side.        Femoral pulses are 2+ on the right side and 2+ on the left side.       Popliteal pulses are 2+ on the right side and 2+ on the left side.        Dorsalis pedis pulses are 2+ on the right side and 2+ on the left side.        Posterior tibial pulses are 2+ on the right side and 2+ on the left side.   Pulmonary/Chest: Effort normal and breath sounds normal. No accessory muscle usage or stridor. No apnea, no tachypnea and no bradypnea. She is not intubated. No respiratory distress. She has no decreased breath sounds. She has no wheezes. She has no rales. She exhibits no tenderness and no bony tenderness.   Abdominal: She exhibits no distension, no pulsatile liver, no abdominal bruit, no ascites, no pulsatile midline mass and no mass. There is no abdominal tenderness. There is no rebound and no guarding.   Musculoskeletal: Normal range of motion.         General: No tenderness or edema.   Lymphadenopathy:     She has no cervical adenopathy.   Neurological: She is alert and oriented to person, place, and time. She has normal reflexes. No  cranial nerve deficit. Coordination normal.   Skin: Skin is warm. No rash noted. No erythema. No pallor.       Engorged varicose veins in R medial calf        Psychiatric: She has a normal mood and affect. Her behavior is normal. Judgment and thought content normal.       Assessment:     1. Venous insufficiency of both lower extremities    2. Varicose veins of both legs with edema    3. Essential hypertension    4. Pure hypercholesterolemia    5. Raynaud's disease without gangrene    6. Type 2 diabetes mellitus with hyperglycemia, with long-term current use of insulin    7. Varicose veins of right lower extremity with pain        Plan:       Patient has venous insufficiency complicated with edema, pain, venous claudication, irritation, and engorged varicose veins despite wearing compression stockings 20-30 mmHg for more than 3 months.  She would proceed with chemical ablation right greater saphenous vein.        Continue exercise.  Continue weight loss.    Continue wearing compression stockings 20-30 mmHg.       Continue with current medical plan and lifestyle changes.  Return sooner for concerns or questions. If symptoms persist go to the ED  I have reviewed all pertinent data on this patient       I have reviewed the patient's medical history in detail and updated the computerized patient record.    Orders Placed This Encounter   Procedures    Case Request-Cath Lab: Ablation     Standing Status:   Standing     Number of Occurrences:   1     Order Specific Question:   CPT Code:     Answer:   [place] CHEMICAL VEIN ABLATION [[id number]]     Order Specific Question:   Medical Necessity:     Answer:   Medically Urgent [101]     Order Specific Question:   Is an on-site pathologist required for this procedure?     Answer:   N/A       Follow up as scheduled. Return sooner for concerns or questions            She expressed verbal understanding and agreed with the plan        Patient's Medications   New Prescriptions    No  medications on file   Previous Medications    ASPIRIN (ECOTRIN) 81 MG EC TABLET    Take 81 mg by mouth once daily.    ATORVASTATIN (LIPITOR) 40 MG TABLET    Take 1 tablet (40 mg total) by mouth once daily.    DULAGLUTIDE (TRULICITY) 1.5 MG/0.5 ML PEN INJECTOR    Inject 1.5 mg into the skin every 7 days.    FLUOXETINE 20 MG TABLET    Take 2 tablets (40 mg total) by mouth once daily.    GABAPENTIN (NEURONTIN) 300 MG CAPSULE    Take 2 capsules (600 mg total) by mouth every evening.    METFORMIN (GLUCOPHAGE) 1000 MG TABLET    Take 1 tablet (1,000 mg total) by mouth 2 (two) times daily with meals.    NIFEDIPINE (PROCARDIA-XL) 30 MG (OSM) 24 HR TABLET    Take 1 tablet (30 mg total) by mouth once daily.    PANTOPRAZOLE (PROTONIX) 40 MG TABLET    Take 1 tablet (40 mg total) by mouth once daily.    ZOLPIDEM (AMBIEN) 10 MG TAB    Take 1 tablet (10 mg total) by mouth nightly as needed.   Modified Medications    No medications on file   Discontinued Medications    No medications on file

## 2020-11-05 ENCOUNTER — OFFICE VISIT (OUTPATIENT)
Dept: OPTOMETRY | Facility: CLINIC | Age: 65
End: 2020-11-05
Payer: COMMERCIAL

## 2020-11-05 ENCOUNTER — PATIENT OUTREACH (OUTPATIENT)
Dept: OTHER | Facility: OTHER | Age: 65
End: 2020-11-05

## 2020-11-05 DIAGNOSIS — H52.4 PRESBYOPIA: ICD-10-CM

## 2020-11-05 DIAGNOSIS — E11.9 TYPE 2 DIABETES MELLITUS WITHOUT RETINOPATHY: Primary | ICD-10-CM

## 2020-11-05 DIAGNOSIS — H25.13 NUCLEAR SCLEROSIS, BILATERAL: ICD-10-CM

## 2020-11-05 PROCEDURE — 92014 COMPRE OPH EXAM EST PT 1/>: CPT | Mod: S$GLB,,, | Performed by: OPTOMETRIST

## 2020-11-05 PROCEDURE — 92015 PR REFRACTION: ICD-10-PCS | Mod: S$GLB,,, | Performed by: OPTOMETRIST

## 2020-11-05 PROCEDURE — 99999 PR PBB SHADOW E&M-EST. PATIENT-LVL III: ICD-10-PCS | Mod: PBBFAC,,, | Performed by: OPTOMETRIST

## 2020-11-05 PROCEDURE — 92015 DETERMINE REFRACTIVE STATE: CPT | Mod: S$GLB,,, | Performed by: OPTOMETRIST

## 2020-11-05 PROCEDURE — 99999 PR PBB SHADOW E&M-EST. PATIENT-LVL III: CPT | Mod: PBBFAC,,, | Performed by: OPTOMETRIST

## 2020-11-05 PROCEDURE — 92014 PR EYE EXAM, EST PATIENT,COMPREHESV: ICD-10-PCS | Mod: S$GLB,,, | Performed by: OPTOMETRIST

## 2020-11-05 NOTE — PROGRESS NOTES
Digital Medicine: Clinician Follow-Up    Patient reports doing well.  Dealing with bad varicose veins still and will get a procedure next month.      Notes fluctuating blood pressures and wonders if that is something we need to address.  Endorses feeling a little lightheaded and sluggish the past few days but feels better today.  She wonders if it is related.    High blood sugar last week.  She believes due to pecan pie the night prior.    The history is provided by the patient.      Review of patient's allergies indicates:   -- Pcn (penicillins) -- Hives and Itching  Follow-up reason(s): routine follow up.     Hypertension    Readings are trending up         Last 5 Patient Entered Readings                                      Current 30 Day Average: 122/82     Recent Readings 11/4/2020 11/3/2020 11/2/2020 10/31/2020 10/30/2020    SBP (mmHg) 136 123 120 130 125    DBP (mmHg) 89 88 74 81 82    Pulse 71 67 69 81 63        Last 6 Patient Entered Readings                                          Most Recent A1c: 6.7% on 10/19/2020  (Goal: 7%)     Recent Readings 11/4/2020 11/3/2020 11/2/2020 10/31/2020 10/30/2020    Blood Glucose (mg/dL) 151 142 151 265 119               Depression Screening  Did not address depression screening.    Sleep Apnea Screening    Did not address sleep apnea screening.     Medication Affordability Screening  Did not address medication affordability screening.     Medication Adherence-Medication adherence was assessed.          ASSESSMENT(S)  Patients BP average is 122/82 mmHg, which is at goal. Patient's BP goal is less than or equal to 130/80.  Patient's A1C goal is less than or equal to 7. Patient's most recent A1C result is at goal. Lab Results    Component                Value               Date                     HGBA1C                   6.7 (H)             10/19/2020          .       Hypertension Plan  Additional monitoring needed. She will check a reading next time she feels the  reported symptoms.  Continue current therapy. Unclear if reported symptoms are related to hypotension or hypoglycemia or something totally different.  Has not checked either while feeling this way.  Documented readings are unlikey to cause these symtpoms.  Defer changes for now.    Diabetes Plan  Additional monitoring needed. She will check a reading next time she feels the reported symptoms  Continue current therapy.  Provided patient education. Portion control with desserts        Addressed patient questions and patient has my contact information if needed prior to next outreach. Patient verbalizes understanding.             There are no preventive care reminders to display for this patient.  There are no preventive care reminders to display for this patient.      Hypertension Medications             NIFEdipine (PROCARDIA-XL) 30 MG (OSM) 24 hr tablet Take 1 tablet (30 mg total) by mouth once daily.        Diabetes Medications             dulaglutide (TRULICITY) 1.5 mg/0.5 mL pen injector Inject 1.5 mg into the skin every 7 days.    metFORMIN (GLUCOPHAGE) 1000 MG tablet Take 1 tablet (1,000 mg total) by mouth 2 (two) times daily with meals.

## 2020-11-05 NOTE — PROGRESS NOTES
HPI     DLS 4/24/219  Pt states she's notice her [place] has changed. Pt states she's using OTC   readers +1.75. she would like to get an updates rx for readers. No   problems with distance.  occ floaters  No gtts  BLS 11/4/2020 - 157  Hemoglobin A1C       Date                     Value               Ref Range             Status                10/19/2020               6.7 (H)             4.0 - 5.6 %           Final                       06/29/2020               6.4 (H)             4.0 - 5.6 %           Final                       Last edited by Mell Suazo MA on 11/5/2020  9:13 AM. (History)        ROS     Positive for: Endocrine (DM)    Negative for: Constitutional, Gastrointestinal, Neurological, Skin,   Genitourinary, Musculoskeletal, HENT, Cardiovascular, Eyes, Respiratory,   Psychiatric, Allergic/Imm, Heme/Lymph    Last edited by Tonio Hutton, JUANITA on 11/5/2020  9:14 AM. (History)        Assessment /Plan     For exam results, see Encounter Report.    Type 2 diabetes mellitus without retinopathy    Nuclear sclerosis, bilateral    Presbyopia        1. [employer] OU--pt happy w otc readers, but discussed she need to go stronger than +1.75.  Wrote Rx if wishes to fill that  2. DM- WITHOUT RETINOPATHY.  Advised yearly DFE.  ALSO discussed visual flux assoc w sugar flux    PLAN:    rtc 1 yr

## 2020-11-19 ENCOUNTER — PATIENT OUTREACH (OUTPATIENT)
Dept: OTHER | Facility: OTHER | Age: 65
End: 2020-11-19

## 2020-11-19 NOTE — PROGRESS NOTES
Digital Medicine: Health  Follow-Up    The history is provided by the patient.             Reason for review: Blood glucose not at goal and Blood pressure not at goal        Topics Covered on Call: physical activity and Diet    Additional Follow-up details: Average blood pressure today is 123/83. Blood pressure is improving again.     Blood glucose was elevated this morning. She reports that she had some candy last night but plans to not have it again.             Diet-no change to diet    No change to diet.  Patient reports eating or drinking the following: She hasn't made any changes to her diet. She is trying to limit the candy but has a hard time with it.       Physical Activity-no change to routine  No change to exercise routine.       Additional physical activity details: She still hasn't been exercising. She plans to go on walks with her grand children. She doesn't want to live in [place] and will eventually move. She is in [place] right now visiting her kids.       Medication Adherence-Medication adherence was assessed.      Substance, Sleep, Stress-Not assessed      Provided patient education.       Addressed patient questions and patient has my contact information if needed prior to next outreach. Patient verbalizes understanding.             There are no preventive care reminders to display for this patient.      Last 5 Patient Entered Readings                                      Current 30 Day Average: 123/83     Recent Readings 11/19/2020 11/16/2020 11/15/2020 11/14/2020 11/13/2020    SBP (mmHg) 113 133 136 138 127    DBP (mmHg) 78 87 89 90 88    Pulse 84 71 69 70 71        Last 6 Patient Entered Readings                                          Most Recent A1c: 6.7% on 10/19/2020  (Goal: 7%)     Recent Readings 11/19/2020 11/16/2020 11/15/2020 11/14/2020 11/13/2020    Blood Glucose (mg/dL) 277 169 147 136 167

## 2020-12-03 ENCOUNTER — TELEPHONE (OUTPATIENT)
Dept: RHEUMATOLOGY | Facility: CLINIC | Age: 65
End: 2020-12-03

## 2020-12-03 NOTE — TELEPHONE ENCOUNTER
[facility] Staff   Caller: Unspecified (Today,  1:03 PM)             Pt calling to request a call back from the nurse for an Monday appt for follow up in the afternoons.     Pt can be reached at [phone]     Thank you!      I was unable to reach the patient via phone. I was unable to leave a voicemail.

## 2020-12-03 NOTE — TELEPHONE ENCOUNTER
Pt refused appointment soonest apt which is tomorrow. The patient stated that she would speak to Dr Luciano because she is thinking about going a different route. The patient stated that she would give the office a call back.

## 2020-12-03 NOTE — TELEPHONE ENCOUNTER
----- Message from [facility] sent at 12/3/2020  1:03 PM CST -----  Regarding: Requesting a call back  Pt calling to request a call back from the nurse for an Monday appt for follow up in the afternoons.    Pt can be reached at [phone]    Thank you!

## 2020-12-14 ENCOUNTER — TELEPHONE (OUTPATIENT)
Dept: CARDIOLOGY | Facility: CLINIC | Age: 65
End: 2020-12-14

## 2020-12-14 NOTE — TELEPHONE ENCOUNTER
At the time we are unable to initiate a Prior Authorization or peer to peer for procedure scheduled for 1/21/2021     Pt will obtain new insurance the beginning of January, she is currently uninsured. As soon as the pt receives her new insurance card she will come to the office so we can upload a copy and start working on the authorization process

## 2020-12-14 NOTE — TELEPHONE ENCOUNTER
----- Message from Radha Moran MA sent at 12/8/2020  4:58 PM CST -----  Please send me more information,    Original  denial, so I can call to do Peer to Peer.    NW  ----- Message -----  From: Zuleyma Ridley RN  Sent: 12/7/2020  12:39 PM CST  To: Radha Moran MA    Hello-    Please set up a peer to peer review.    Thank you,    Zuleyma

## 2020-12-30 ENCOUNTER — PATIENT OUTREACH (OUTPATIENT)
Dept: OTHER | Facility: OTHER | Age: 65
End: 2020-12-30

## 2021-01-06 DIAGNOSIS — I83.893 VARICOSE VEINS OF BOTH LEGS WITH EDEMA: Primary | ICD-10-CM

## 2021-01-06 DIAGNOSIS — F51.01 PRIMARY INSOMNIA: ICD-10-CM

## 2021-01-06 DIAGNOSIS — I87.2 VENOUS INSUFFICIENCY OF BOTH LOWER EXTREMITIES: ICD-10-CM

## 2021-01-06 DIAGNOSIS — I83.811 VARICOSE VEINS OF RIGHT LOWER EXTREMITY WITH PAIN: ICD-10-CM

## 2021-01-06 RX ORDER — ZOLPIDEM TARTRATE 10 MG/1
10 TABLET ORAL NIGHTLY PRN
Qty: 90 TABLET | Refills: 0 | Status: SHIPPED | OUTPATIENT
Start: 2021-01-06 | End: 2021-02-05

## 2021-01-06 RX ORDER — ZOLPIDEM TARTRATE 10 MG/1
10 TABLET ORAL NIGHTLY PRN
Qty: 90 TABLET | Refills: 0 | Status: SHIPPED | OUTPATIENT
Start: 2021-01-06 | End: 2021-01-06 | Stop reason: SDUPTHER

## 2021-01-13 DIAGNOSIS — Z79.4 TYPE 2 DIABETES MELLITUS WITH HYPERGLYCEMIA, WITH LONG-TERM CURRENT USE OF INSULIN: ICD-10-CM

## 2021-01-13 DIAGNOSIS — E11.65 TYPE 2 DIABETES MELLITUS WITH HYPERGLYCEMIA, WITH LONG-TERM CURRENT USE OF INSULIN: ICD-10-CM

## 2021-01-13 RX ORDER — METFORMIN HYDROCHLORIDE 1000 MG/1
1000 TABLET ORAL 2 TIMES DAILY WITH MEALS
Qty: 180 TABLET | Refills: 0 | Status: SHIPPED | OUTPATIENT
Start: 2021-01-13 | End: 2022-05-13 | Stop reason: SDUPTHER

## 2021-01-20 DIAGNOSIS — I73.00 RAYNAUD'S DISEASE WITHOUT GANGRENE: ICD-10-CM

## 2021-01-20 RX ORDER — FLUOXETINE 20 MG/1
40 TABLET ORAL DAILY
Qty: 180 TABLET | Refills: 0 | Status: SHIPPED | OUTPATIENT
Start: 2021-01-20 | End: 2022-05-13

## 2021-02-09 ENCOUNTER — PATIENT MESSAGE (OUTPATIENT)
Dept: FAMILY MEDICINE | Facility: CLINIC | Age: 66
End: 2021-02-09

## 2021-02-09 ENCOUNTER — TELEPHONE (OUTPATIENT)
Dept: FAMILY MEDICINE | Facility: CLINIC | Age: 66
End: 2021-02-09

## 2021-02-09 DIAGNOSIS — I10 ESSENTIAL HYPERTENSION: ICD-10-CM

## 2021-02-09 RX ORDER — NIFEDIPINE 30 MG/1
30 TABLET, EXTENDED RELEASE ORAL DAILY
Qty: 90 TABLET | Refills: 0 | Status: SHIPPED | OUTPATIENT
Start: 2021-02-09 | End: 2022-05-13 | Stop reason: SDUPTHER

## 2021-04-15 ENCOUNTER — PATIENT MESSAGE (OUTPATIENT)
Dept: RESEARCH | Facility: HOSPITAL | Age: 66
End: 2021-04-15

## 2021-07-06 ENCOUNTER — PATIENT MESSAGE (OUTPATIENT)
Dept: ADMINISTRATIVE | Facility: HOSPITAL | Age: 66
End: 2021-07-06

## 2021-08-04 ENCOUNTER — PATIENT MESSAGE (OUTPATIENT)
Dept: ADMINISTRATIVE | Facility: HOSPITAL | Age: 66
End: 2021-08-04

## 2021-10-04 ENCOUNTER — PATIENT MESSAGE (OUTPATIENT)
Dept: ADMINISTRATIVE | Facility: HOSPITAL | Age: 66
End: 2021-10-04

## 2021-10-18 ENCOUNTER — PATIENT MESSAGE (OUTPATIENT)
Dept: ADMINISTRATIVE | Facility: HOSPITAL | Age: 66
End: 2021-10-18
Payer: COMMERCIAL

## 2021-10-19 ENCOUNTER — PATIENT MESSAGE (OUTPATIENT)
Dept: ADMINISTRATIVE | Facility: HOSPITAL | Age: 66
End: 2021-10-19
Payer: COMMERCIAL

## 2021-10-19 ENCOUNTER — PATIENT OUTREACH (OUTPATIENT)
Dept: ADMINISTRATIVE | Facility: HOSPITAL | Age: 66
End: 2021-10-19

## 2022-05-13 ENCOUNTER — OFFICE VISIT (OUTPATIENT)
Dept: URGENT CARE | Facility: CLINIC | Age: 67
End: 2022-05-13
Payer: MEDICARE

## 2022-05-13 VITALS
HEART RATE: 87 BPM | DIASTOLIC BLOOD PRESSURE: 87 MMHG | BODY MASS INDEX: 27.94 KG/M2 | SYSTOLIC BLOOD PRESSURE: 139 MMHG | RESPIRATION RATE: 16 BRPM | TEMPERATURE: 98 F | OXYGEN SATURATION: 98 % | WEIGHT: 178 LBS | HEIGHT: 67 IN

## 2022-05-13 DIAGNOSIS — U07.1 COVID-19: Primary | ICD-10-CM

## 2022-05-13 DIAGNOSIS — J02.9 SORE THROAT: ICD-10-CM

## 2022-05-13 DIAGNOSIS — Z76.0 MEDICATION REFILL: ICD-10-CM

## 2022-05-13 DIAGNOSIS — R05.9 COUGH: ICD-10-CM

## 2022-05-13 LAB
CTP QC/QA: YES
SARS-COV-2 RDRP RESP QL NAA+PROBE: POSITIVE

## 2022-05-13 PROCEDURE — 1159F MED LIST DOCD IN RCRD: CPT | Mod: CPTII,S$GLB,,

## 2022-05-13 PROCEDURE — 4010F ACE/ARB THERAPY RXD/TAKEN: CPT | Mod: CPTII,S$GLB,,

## 2022-05-13 PROCEDURE — 3079F PR MOST RECENT DIASTOLIC BLOOD PRESSURE 80-89 MM HG: ICD-10-PCS | Mod: CPTII,S$GLB,,

## 2022-05-13 PROCEDURE — 3075F PR MOST RECENT SYSTOLIC BLOOD PRESS GE 130-139MM HG: ICD-10-PCS | Mod: CPTII,S$GLB,,

## 2022-05-13 PROCEDURE — 3079F DIAST BP 80-89 MM HG: CPT | Mod: CPTII,S$GLB,,

## 2022-05-13 PROCEDURE — 99204 OFFICE O/P NEW MOD 45 MIN: CPT | Mod: S$GLB,,,

## 2022-05-13 PROCEDURE — U0002: ICD-10-PCS | Mod: QW,CR,S$GLB,

## 2022-05-13 PROCEDURE — 3008F BODY MASS INDEX DOCD: CPT | Mod: CPTII,S$GLB,,

## 2022-05-13 PROCEDURE — 3075F SYST BP GE 130 - 139MM HG: CPT | Mod: CPTII,S$GLB,,

## 2022-05-13 PROCEDURE — U0002 COVID-19 LAB TEST NON-CDC: HCPCS | Mod: QW,CR,S$GLB,

## 2022-05-13 PROCEDURE — 99204 PR OFFICE/OUTPT VISIT, NEW, LEVL IV, 45-59 MIN: ICD-10-PCS | Mod: S$GLB,,,

## 2022-05-13 PROCEDURE — 3008F PR BODY MASS INDEX (BMI) DOCUMENTED: ICD-10-PCS | Mod: CPTII,S$GLB,,

## 2022-05-13 PROCEDURE — 1160F PR REVIEW ALL MEDS BY PRESCRIBER/CLIN PHARMACIST DOCUMENTED: ICD-10-PCS | Mod: CPTII,S$GLB,,

## 2022-05-13 PROCEDURE — 1160F RVW MEDS BY RX/DR IN RCRD: CPT | Mod: CPTII,S$GLB,,

## 2022-05-13 PROCEDURE — 1159F PR MEDICATION LIST DOCUMENTED IN MEDICAL RECORD: ICD-10-PCS | Mod: CPTII,S$GLB,,

## 2022-05-13 PROCEDURE — 4010F PR ACE/ARB THEARPY RXD/TAKEN: ICD-10-PCS | Mod: CPTII,S$GLB,,

## 2022-05-13 RX ORDER — DULOXETIN HYDROCHLORIDE 60 MG/1
60 CAPSULE, DELAYED RELEASE ORAL
COMMUNITY
Start: 2021-09-03 | End: 2022-05-13 | Stop reason: SDUPTHER

## 2022-05-13 RX ORDER — METHOCARBAMOL 500 MG/1
500 TABLET, FILM COATED ORAL 2 TIMES DAILY PRN
Qty: 10 TABLET | Refills: 0 | Status: SHIPPED | OUTPATIENT
Start: 2022-05-13 | End: 2022-08-30 | Stop reason: SDUPTHER

## 2022-05-13 RX ORDER — NIFEDIPINE 30 MG/1
30 TABLET, EXTENDED RELEASE ORAL DAILY
Qty: 14 TABLET | Refills: 0 | Status: SHIPPED | OUTPATIENT
Start: 2022-05-13 | End: 2022-05-27

## 2022-05-13 RX ORDER — METFORMIN HYDROCHLORIDE 1000 MG/1
1000 TABLET ORAL 2 TIMES DAILY WITH MEALS
Qty: 28 TABLET | Refills: 0 | Status: SHIPPED | OUTPATIENT
Start: 2022-05-13 | End: 2022-05-24 | Stop reason: SDUPTHER

## 2022-05-13 RX ORDER — PANTOPRAZOLE SODIUM 40 MG/1
40 TABLET, DELAYED RELEASE ORAL DAILY
Qty: 14 TABLET | Refills: 0 | Status: SHIPPED | OUTPATIENT
Start: 2022-05-13 | End: 2022-05-24 | Stop reason: SDUPTHER

## 2022-05-13 RX ORDER — METHOCARBAMOL 500 MG/1
TABLET, FILM COATED ORAL
COMMUNITY
Start: 2022-01-31 | End: 2022-05-13 | Stop reason: SDUPTHER

## 2022-05-13 RX ORDER — DULAGLUTIDE 1.5 MG/.5ML
1.5 INJECTION, SOLUTION SUBCUTANEOUS
Qty: 4 PEN | Refills: 2 | Status: SHIPPED | OUTPATIENT
Start: 2022-05-13 | End: 2022-08-30 | Stop reason: SDUPTHER

## 2022-05-13 RX ORDER — OLMESARTAN MEDOXOMIL 20 MG/1
1 TABLET ORAL DAILY
COMMUNITY
Start: 2022-01-05 | End: 2022-05-13 | Stop reason: SDUPTHER

## 2022-05-13 RX ORDER — DULOXETIN HYDROCHLORIDE 60 MG/1
60 CAPSULE, DELAYED RELEASE ORAL DAILY
Qty: 14 CAPSULE | Refills: 0 | Status: SHIPPED | OUTPATIENT
Start: 2022-05-13 | End: 2022-05-24 | Stop reason: SDUPTHER

## 2022-05-13 RX ORDER — OLMESARTAN MEDOXOMIL 20 MG/1
20 TABLET ORAL DAILY
Qty: 14 TABLET | Refills: 0 | Status: SHIPPED | OUTPATIENT
Start: 2022-05-13 | End: 2022-05-24 | Stop reason: SDUPTHER

## 2022-05-13 RX ORDER — LANOLIN ALCOHOL/MO/W.PET/CERES
1000 CREAM (GRAM) TOPICAL
COMMUNITY
End: 2023-02-17

## 2022-05-13 RX ORDER — BENZONATATE 200 MG/1
200 CAPSULE ORAL 3 TIMES DAILY PRN
Qty: 30 CAPSULE | Refills: 0 | Status: SHIPPED | OUTPATIENT
Start: 2022-05-13 | End: 2022-05-23

## 2022-05-13 NOTE — PATIENT INSTRUCTIONS
PLEASE READ ALL DISCHARGE INSTRUCTIONS    Please follow up with your PCP for further management and medication refill    You have tested positive for COVID-19 today.      ISOLATION  If you tested positive and do not have symptoms, you must isolate for 5 days starting on the day of the positive test. I    If you tested positive and have symptoms, you must isolate for 5 days starting on the day of the first symptoms,  not the day of the positive test.     This is the most important part, both the CDC and the [facility] emphasize that you do not test out of isolation.     After 5 days, if your symptoms have improved and you have not had fever on day 5, you can return to the community on day 6- NO TESTING REQUIRED!      In fact, we do not retest if you were positive in the last 90 days.    After your 5 days of isolation are completed, the CDC recommends strict mask use for the first 5 days that you come out of isolation.    You are being referred for [facility] Infusion and Covid Monitoring--You will be contacted.    - Rest.    - Drink plenty of fluids.  - Viral upper respiratory infections typically run their course in 10-14 days.   -Take tessalon perles as prescribed for cough. If this is not covered by your insurance, you can log onto [employer], search the drug name, and use free coupons to discount this medication.        - You can take over-the-counter claritin, zyrtec, allegra, or xyzal as directed. These are antihistamines that can help with runny nose, nasal congestion, sneezing, and helps to dry up post-nasal drip, which usually causes sore throat and cough.               - If you do NOT have high blood pressure, you may use a decongestant form (D)  of this medication (ie. Claritin- D, zyrtec-D, allegra-D) or if you do not take the D form, you can take sudafed (pseudoephedrine) over the counter, which is a decongestant. Do NOT take two decongestant (D) medications at the same time (such as mucinex-D and  claritin-D or plain sudafed and claritin D). Dextromethorphan (DM) is a cough suppressant over the counter (ie. mucinex DM, robitussin, delsym; dayquil/nyquil has DM as well.)    -If you DO have high blood pressure, you may take Coricidin HBP for sinus congestion and Delsym for cough.      - You can use Flonase (fluticasone) nasal spray as directed for sinus congestion and postnasal drip. This is a steroid nasal spray that works locally over time to decrease the inflammation in your nose/sinuses and help with allergic symptoms. This is not an quick- relief spray like afrin, but it works well if used daily.  Discontinue if you develop nose bleed  - Use nasal saline prior to Flonase.  - Use Ocean Spray Nasal Saline 1-3 puffs each nostril every 2-3 hours then blow out onto tissue. This is to irrigate the nasal passage way to clear the sinus openings. Use until sinus problem resolved.     - You can take plain Mucinex (guaifenesin) 1200 mg twice a day to help loosen mucous.      - Warm salt water gargles can help with sore throat     - Warm tea with honey can help with cough. Honey is a natural cough suppressant.    - Acetaminophen (tylenol) or Ibuprofen (advil,motrin) as directed as needed for fever/pain. Avoid tylenol if you have a history of liver disease. Do not take ibuprofen if you have a history of GI bleeding, kidney disease, or if you take blood thinners.     - You must understand that you have received an Urgent Care treatment only and that you may be released before all of your medical problems are known or treated.   - You, the patient, will arrange for follow up care as instructed.   - If your condition worsens or fails to improve we recommend that you receive another evaluation at the ER immediately or contact your PCP to discuss your concerns or return here.   - Follow up with your PCP or specialty clinic as directed in the next 1-2 weeks if not improved or as needed.  You can call [phone] to  schedule an appointment with the appropriate provider.

## 2022-05-13 NOTE — PROGRESS NOTES
"Subjective:       Patient ID: Jill Perez is a 67 y.o. female.    Vitals:  height is 5' 7" (1.702 m) and weight is 80.7 kg (178 lb). Her oral temperature is 98.4 °F (36.9 °C). Her blood pressure is 139/87 and her pulse is 87. Her respiration is 16 and oxygen saturation is 98%.     Chief Complaint: Generalized Body Aches    Patient states that she has body aches. Her throat and ear have been bothering her. She reports having a sore throat, sinus pressure, sneezing, headaches, a cough and congestion. She is covid vaccinated.     Provider note starts below:  Patient presents with chief complaint of body aches, throat pain, ear pain, sinus pressure, sneezing, headache, cough and congestion since Sunday.  Patient states she is fully vaccinated for COVID-19 is due for her 2nd booster.  She denies any wheezing, chest pain.  She states she felt short of breath walking to the trash can the other day but today denies any shortness of breath. She is taking NyQuil and Tylenol for symptoms.    Sinus Problem  This is a new problem. The current episode started in the past 7 days. The problem has been gradually worsening (5 days ago) since onset. There has been no fever. The fever has been present for less than 1 day. Her pain is at a severity of 7/10. The pain is moderate. Associated symptoms include congestion, coughing, diaphoresis, ear pain, headaches, shortness of breath (1x- resolved), sinus pressure, sneezing and a sore throat. Pertinent negatives include no chills. Past treatments include acetaminophen (nyquil. tylenol). The treatment provided mild relief.       Constitution: Positive for sweating. Negative for chills.   HENT: Positive for ear pain, congestion, sinus pressure and sore throat.    Cardiovascular: Negative for chest pain.   Respiratory: Positive for cough and shortness of breath (1x- resolved). Negative for wheezing.    Gastrointestinal: Negative for nausea and diarrhea.   Musculoskeletal: Positive for " muscle ache.   Allergic/Immunologic: Positive for sneezing.   Neurological: Positive for headaches.       Objective:      Physical Exam   Constitutional: She is oriented to person, place, and time. She appears well-developed. She is cooperative.  Non-toxic appearance. She does not appear ill. No distress.   HENT:   Head: Normocephalic and atraumatic.   Ears:   Right Ear: Hearing, tympanic membrane, external ear and ear canal normal.   Left Ear: Hearing, tympanic membrane, external ear and ear canal normal.   Nose: Nose normal. No mucosal edema, rhinorrhea or nasal deformity. No epistaxis. Right sinus exhibits no maxillary sinus tenderness and no frontal sinus tenderness. Left sinus exhibits no maxillary sinus tenderness and no frontal sinus tenderness.   Mouth/Throat: Uvula is midline, oropharynx is clear and moist and mucous membranes are normal. Mucous membranes are moist. No trismus in the jaw. Normal dentition. No uvula swelling. No oropharyngeal exudate, posterior oropharyngeal edema or posterior oropharyngeal erythema. Oropharynx is clear.   Eyes: Conjunctivae and lids are normal. No scleral icterus.   Neck: Trachea normal and phonation normal. Neck supple. No edema present. No erythema present. No neck rigidity present.   Cardiovascular: Normal rate, regular rhythm, normal heart sounds and normal pulses.   Pulmonary/Chest: Effort normal and breath sounds normal. No respiratory distress. She has no decreased breath sounds. She has no wheezes. She has no rhonchi.   Abdominal: Normal appearance.   Musculoskeletal: Normal range of motion.         General: No deformity. Normal range of motion.   Neurological: She is alert and oriented to person, place, and time. She exhibits normal muscle tone. Coordination normal.   Skin: Skin is warm, dry, intact, not diaphoretic and not pale.   Psychiatric: Her speech is normal and behavior is normal. Judgment and thought content normal.   Nursing note and vitals  reviewed.    Results for orders placed or performed in visit on 05/13/22   POCT COVID-19 Rapid Screening   Result Value Ref Range    POC Rapid COVID Positive (A) Negative     Acceptable Yes            Assessment:       1. COVID-19    2. Sore throat    3. Cough    4. Medication refill          Plan:     patient requesting refill of her medications today because she cannot get in with her PCP for a few more weeks and she is been out of her medications for 1 week.  Labs reviewed.  Discussed side effects and administration of over-the-counter treatment for symptom control as discussed below. Discharge instructions reviewed and ED precautions discussed in length. All questions answered. Patient agreed to this treatment plan.  All questions answered. Patient [facility]d in stable condition.    COVID-19  -     COVID-19 Home Symptom Monitoring  - Duration (days): 14  -     Ambulatory referral/consult to [facility]    Sore throat  -     POCT COVID-19 Rapid Screening  -     magic mouthwash diphen/antac/lidoc; Swish and spit 5 mLs every 4 (four) hours as needed (gargle and spit for sore throat).  Dispense: 360 mL; Refill: 0    Cough  -     benzonatate (TESSALON) 200 MG capsule; Take 1 capsule (200 mg total) by mouth 3 (three) times daily as needed for Cough.  Dispense: 30 capsule; Refill: 0    Medication refill  -     dulaglutide (TRULICITY) 1.5 mg/0.5 mL pen injector; Inject 1.5 mg into the skin every 7 days.  Dispense: 1 pen; Refill: 2  -     DULoxetine (CYMBALTA) 60 MG capsule; Take 1 capsule (60 mg total) by mouth once daily. for 14 days  Dispense: 14 capsule; Refill: 0  -     pantoprazole (PROTONIX) 40 MG tablet; Take 1 tablet (40 mg total) by mouth once daily. for 14 days  Dispense: 14 tablet; Refill: 0  -     olmesartan (BENICAR) 20 MG tablet; Take 1 tablet (20 mg total) by mouth once daily. for 14 days  Dispense: 14 tablet; Refill: 0  -     NIFEdipine (PROCARDIA-XL) 30 MG (OSM) 24 hr tablet; Take 1  tablet (30 mg total) by mouth once daily. for 14 days  Dispense: 14 tablet; Refill: 0  -     methocarbamoL (ROBAXIN) 500 MG Tab; Take 1 tablet (500 mg total) by mouth 2 (two) times daily as needed (muscle cramp).  Dispense: 10 tablet; Refill: 0  -     metFORMIN (GLUCOPHAGE) 1000 MG tablet; Take 1 tablet (1,000 mg total) by mouth 2 (two) times daily with meals. for 14 days  Dispense: 28 tablet; Refill: 0

## 2022-05-14 ENCOUNTER — NURSE TRIAGE (OUTPATIENT)
Dept: ADMINISTRATIVE | Facility: CLINIC | Age: 67
End: 2022-05-14
Payer: MEDICARE

## 2022-05-14 ENCOUNTER — HOSPITAL ENCOUNTER (EMERGENCY)
Facility: HOSPITAL | Age: 67
Discharge: HOME | End: 2022-05-14
Attending: EMERGENCY MEDICINE
Payer: COMMERCIAL

## 2022-05-14 VITALS
DIASTOLIC BLOOD PRESSURE: 89 MMHG | SYSTOLIC BLOOD PRESSURE: 168 MMHG | HEIGHT: 67 IN | BODY MASS INDEX: 27.94 KG/M2 | RESPIRATION RATE: 17 BRPM | HEART RATE: 83 BPM | TEMPERATURE: 99 F | OXYGEN SATURATION: 97 % | WEIGHT: 178 LBS

## 2022-05-14 DIAGNOSIS — U07.1 COVID-19: ICD-10-CM

## 2022-05-14 PROCEDURE — 99284 EMERGENCY DEPT VISIT MOD MDM: CPT | Mod: 25

## 2022-05-14 RX ORDER — CETIRIZINE HYDROCHLORIDE 10 MG/1
10 TABLET ORAL DAILY
Qty: 30 TABLET | Refills: 0 | Status: SHIPPED | OUTPATIENT
Start: 2022-05-14 | End: 2022-08-30 | Stop reason: SDUPTHER

## 2022-05-14 RX ORDER — ALBUTEROL SULFATE 90 UG/1
1-2 AEROSOL, METERED RESPIRATORY (INHALATION) EVERY 6 HOURS PRN
Qty: 8 G | Refills: 0 | Status: SHIPPED | OUTPATIENT
Start: 2022-05-14 | End: 2022-11-30

## 2022-05-14 NOTE — ED NOTES
APPEARANCE: Awake, alert, & oriented. No acute distress.  CARDIAC: Normal rate and rhythm. Reports rib pain with deep breathing.     RESPIRATORY: Respirations are even and unlabored no obvious signs of distress. No accessory muscle use. Breath sounds clear bilaterally throughout chest.  PERIPHERAL VASCULAR: peripheral pulses present. Normal cap refill. No edema.   GASTRO: soft, no tenderness, no abdominal distention.  MUSC: Full ROM. No bony tenderness or soft tissue tenderness. No obvious deformity.  SKIN: Skin is warm, dry, and intact. Normal skin turgor and color.  NEURO: Equal strength bilaterally. Vanderbilt coma scale: Eye Response-4, Motor Response-6, Verbal Response-5. Total=15. Clear speech. No neurological abnormalities.   EENT: No c/o vision or hearing difficulties. Oropharynx clear.

## 2022-05-14 NOTE — DISCHARGE INSTRUCTIONS
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
## 2022-05-14 NOTE — TELEPHONE ENCOUNTER
Jill calling on call nurse currently c/o chest pressure, states it is constant, headache, ears, throat and body aches. Diagnosed with Covid by SYLVIA DE LOS SANTOS yesterday. Advised pt per triage protocol to go to nearest ED now for physician taqueria. V/u     Reason for Disposition   SEVERE or constant chest pain or pressure (Exception: mild central chest pain, present only when coughing)    Additional Information   Negative: SEVERE difficulty breathing (e.g., struggling for each breath, speaks in single words)   Negative: Difficult to awaken or acting confused (e.g., disoriented, slurred speech)   Negative: Bluish (or gray) lips or face now   Negative: Shock suspected (e.g., cold/pale/clammy skin, too weak to stand, low BP, rapid pulse)   Negative: Sounds like a life-threatening emergency to the triager    Protocols used: CORONAVIRUS (COVID-19) DIAGNOSED OR [id number]

## 2022-05-15 ENCOUNTER — PATIENT MESSAGE (OUTPATIENT)
Dept: ADMINISTRATIVE | Facility: OTHER | Age: 67
End: 2022-05-15
Payer: MEDICARE

## 2022-05-15 ENCOUNTER — NURSE TRIAGE (OUTPATIENT)
Dept: ADMINISTRATIVE | Facility: CLINIC | Age: 67
End: 2022-05-15
Payer: MEDICARE

## 2022-05-15 ENCOUNTER — PATIENT MESSAGE (OUTPATIENT)
Dept: ADMINISTRATIVE | Facility: CLINIC | Age: 67
End: 2022-05-15
Payer: MEDICARE

## 2022-05-15 NOTE — TELEPHONE ENCOUNTER
Pt called for no response escalation - pt does not have pulse ox device. States not feeling any worse than when she spoke with triage nurse this morning. Pain in chest only with coughing. States has dry cough and sore throat. Mild TELLES- unchanged since when she was last evaluated. Advised to monitor symptoms and to call back with concerns or worsening condition. Verbalized understanding.     Reason for Disposition   [1] COVID-19 diagnosed by positive lab test (e.g., PCR, rapid self-test kit) AND [2] NO symptoms (e.g., cough, fever, others)    Additional Information   Negative: SEVERE difficulty breathing (e.g., struggling for each breath, speaks in single words)   Negative: Difficult to awaken or acting confused (e.g., disoriented, slurred speech)   Negative: Bluish (or gray) lips or face now   Negative: Shock suspected (e.g., cold/pale/clammy skin, too weak to stand, low BP, rapid pulse)   Negative: Sounds like a life-threatening emergency to the triager   Negative: SEVERE or constant chest pain or pressure (Exception: mild central chest pain, present only when coughing)   Negative: MODERATE difficulty breathing (e.g., speaks in phrases, SOB even at rest, pulse 100-120)   Negative: [1] Headache AND [2] stiff neck (can't touch chin to chest)   Negative: Chest pain or pressure   Negative: Patient sounds very sick or weak to the triager   Negative: MILD difficulty breathing (e.g., minimal/no SOB at rest, SOB with walking, pulse <100)     NOT worse than when she was last seen.   Negative: Fever > 103 F (39.4 C)   Negative: [1] Fever > 101 F (38.3 C) AND [2] age > 60 years   Negative: [1] Fever > 100.0 F (37.8 C) AND [2] bedridden (e.g., nursing home patient, CVA, chronic illness, recovering from surgery)   Negative: HIGH RISK for severe COVID complications (e.g., age > 64 years, obesity with BMI > 25, pregnant, chronic lung disease or other chronic medical condition)  (Exception: Already seen by PCP and  no new or worsening symptoms.)   Negative: [1] HIGH RISK patient AND [2] influenza is widespread in the community AND [3] ONE OR MORE respiratory symptoms: cough, sore throat, runny or stuffy nose   Negative: [1] HIGH RISK patient AND [2] influenza exposure within the last 7 days AND [3] ONE OR MORE respiratory symptoms: cough, sore throat, runny or stuffy nose   Negative: Fever present > 3 days (72 hours)   Negative: [1] Fever returns after gone for over 24 hours AND [2] symptoms worse or not improved   Negative: [1] Continuous (nonstop) coughing interferes with work or school AND [2] no improvement using cough treatment per Care Advice    Protocols used: CORONAVIRUS (COVID-19) DIAGNOSED OR [id number]

## 2022-05-15 NOTE — TELEPHONE ENCOUNTER
Pt called for enrollment #1. Contact made Pt currently enrolled. Pt reports that she has HA, sore throat, anf ear pain. Pt also reports that she feels chest pressure with activity. But it is not there when she is resting. Pt advised to be seen in ED. Pt states that she was seen there yesterday, but will go if CP worsens.    Called patient to review COVID-19 Surveillance Program enrollment process.    Smartphone:yes    [facility]:yes    Program consent:    Pulse oximeter status:no    Verified emergency contacts: yes    Program Overview: Reviewed , no response process, and importance of correct emergency contacts in event that well-being check is warranted. Patient will call [phone] 24/7 to speak with an OnCall nurse, if needed. Pt aware that if Sp02 less than or equal to 93% or if they have any worsening symptoms, they need to go to the emergency department. If they are having a medical emergency, they will call 911. Otherwise, patient will continue to submit data as scheduled. Reviewed importance of wearing mask if self or family members leave the house.     Patient had no further questions.    Sp02:1    Pulse:1    Temperature:1    SOB at rest (0-5):1    SOB with activity (0-5):2    Worsening symptoms:no    Fever symptoms:no    Increased home oxygen:    Reason for Disposition   Chest pain or pressure    Additional Information   Negative: SEVERE difficulty breathing (e.g., struggling for each breath, speaks in single words)   Negative: Difficult to awaken or acting confused (e.g., disoriented, slurred speech)   Negative: Bluish (or gray) lips or face now   Negative: Shock suspected (e.g., cold/pale/clammy skin, too weak to stand, low BP, rapid pulse)   Negative: Sounds like a life-threatening emergency to the triager   Negative: SEVERE or constant chest pain or pressure (Exception: mild central chest pain, present only when coughing)   Negative: MODERATE difficulty breathing (e.g., speaks  in phrases, SOB even at rest, pulse 100-120)   Negative: [1] Headache AND [2] stiff neck (can't touch chin to chest)    Protocols used: CORONAVIRUS (COVID-19) DIAGNOSED OR [id number]

## 2022-05-16 ENCOUNTER — NURSE TRIAGE (OUTPATIENT)
Dept: ADMINISTRATIVE | Facility: CLINIC | Age: 67
End: 2022-05-16
Payer: MEDICARE

## 2022-05-16 ENCOUNTER — PATIENT MESSAGE (OUTPATIENT)
Dept: ADMINISTRATIVE | Facility: CLINIC | Age: 67
End: 2022-05-16
Payer: MEDICARE

## 2022-05-16 ENCOUNTER — TELEPHONE (OUTPATIENT)
Dept: PHARMACY | Facility: CLINIC | Age: 67
End: 2022-05-16
Payer: MEDICARE

## 2022-05-16 ENCOUNTER — INFUSION (OUTPATIENT)
Dept: INFECTIOUS DISEASES | Facility: HOSPITAL | Age: 67
End: 2022-05-16
Attending: INTERNAL MEDICINE
Payer: MEDICARE

## 2022-05-16 VITALS
RESPIRATION RATE: 17 BRPM | HEART RATE: 75 BPM | OXYGEN SATURATION: 98 % | BODY MASS INDEX: 27.94 KG/M2 | TEMPERATURE: 99 F | DIASTOLIC BLOOD PRESSURE: 72 MMHG | WEIGHT: 178 LBS | SYSTOLIC BLOOD PRESSURE: 122 MMHG | HEIGHT: 67 IN

## 2022-05-16 DIAGNOSIS — U07.1 COVID-19: Primary | ICD-10-CM

## 2022-05-16 PROCEDURE — 63600175 PHARM REV CODE 636 W HCPCS: Performed by: INTERNAL MEDICINE

## 2022-05-16 PROCEDURE — M0222 HC IV INJECTION, BEBTELOVIMAB, INCL POST ADMIN MONIT: HCPCS | Performed by: INTERNAL MEDICINE

## 2022-05-16 RX ORDER — ALBUTEROL SULFATE 90 UG/1
2 AEROSOL, METERED RESPIRATORY (INHALATION)
Status: ACTIVE | OUTPATIENT
Start: 2022-05-16 | End: 2022-05-19

## 2022-05-16 RX ORDER — ACETAMINOPHEN 325 MG/1
650 TABLET ORAL
Status: ACTIVE | OUTPATIENT
Start: 2022-05-16 | End: 2022-05-17

## 2022-05-16 RX ORDER — BEBTELOVIMAB 87.5 MG/ML
175 INJECTION, SOLUTION INTRAVENOUS
Status: COMPLETED | OUTPATIENT
Start: 2022-05-16 | End: 2022-05-16

## 2022-05-16 RX ORDER — DIPHENHYDRAMINE HYDROCHLORIDE 50 MG/ML
25 INJECTION INTRAMUSCULAR; INTRAVENOUS
Status: ACTIVE | OUTPATIENT
Start: 2022-05-16 | End: 2022-05-17

## 2022-05-16 RX ORDER — ONDANSETRON 4 MG/1
4 TABLET, ORALLY DISINTEGRATING ORAL
Status: ACTIVE | OUTPATIENT
Start: 2022-05-16 | End: 2022-05-17

## 2022-05-16 RX ORDER — EPINEPHRINE 0.3 MG/.3ML
0.3 INJECTION SUBCUTANEOUS
Status: ACTIVE | OUTPATIENT
Start: 2022-05-16 | End: 2022-05-19

## 2022-05-16 RX ADMIN — BEBTELOVIMAB 175 MG: 87.5 INJECTION, SOLUTION INTRAVENOUS at 11:05

## 2022-05-16 NOTE — PROGRESS NOTES
Bebtelovimab IV Injection administered. Pt tolerated injection well. No S/S of injection reaction noted at present time. One hour observation started.

## 2022-05-16 NOTE — TELEPHONE ENCOUNTER
Surveillance pt escalated for no response. Unable to contact pt or ECs listed. Left VM message on Jr's phone including OOC number.  My/Chart message sent.    Reason for Disposition   Message left on unidentified voice mail. Phone number verified.    Additional Information   Negative: Caller has already spoken with the PCP (or office), and has no further questions   Negative: Caller has already spoken with another triager and has no further questions   Negative: Caller has already spoken with another triager or PCP (or office), and has further questions and triager able to answer questions.   Negative: Busy signal.  First attempt to contact caller.  Follow-up call scheduled within 15 minutes.   Negative: No answer.  First attempt to contact caller.  Follow-up call scheduled within 15 minutes.   Negative: Message left on identified voicemail    Protocols used: NO CONTACT OR DUPLICATE CONTACT CALL-A-OH

## 2022-05-16 NOTE — TELEPHONE ENCOUNTER
Pt contacted for no response after multiple failed calls due to connection NT was able to speak with pt who reports she is currently receiving EUA infusion and will  pulse ox today. Denies any worsening of symptoms and will call OOC for any health concerns.     Reason for Disposition   Information only question and nurse able to answer    Protocols used: INFORMATION ONLY CALL - NO TRIAGE-A-OH

## 2022-05-16 NOTE — PROGRESS NOTES
Patient arrives for Bebtelovimab IV Injection. Ambulatory. Pt AAox3. No distress noted. RR even and unlabored.     Symptoms and onset date:  Body aches, ear pain, throat pain     Tested COVID + on 05/13/22

## 2022-05-16 NOTE — PROGRESS NOTES
Patient remains with no signs of complications noted. Patient received Bebtelovimab IV Injection according to FDA recommendations and [facility] SOP without complications noted and left with mask in place. Drug fact sheet provided. Pt [facility]d home. Ambulatory. Remains AAox3. No distress noted. RR even and unlabored.

## 2022-05-17 ENCOUNTER — NURSE TRIAGE (OUTPATIENT)
Dept: ADMINISTRATIVE | Facility: CLINIC | Age: 67
End: 2022-05-17
Payer: MEDICARE

## 2022-05-17 ENCOUNTER — TELEPHONE (OUTPATIENT)
Dept: ADMINISTRATIVE | Facility: CLINIC | Age: 67
End: 2022-05-17
Payer: MEDICARE

## 2022-05-17 ENCOUNTER — PATIENT MESSAGE (OUTPATIENT)
Dept: ADMINISTRATIVE | Facility: CLINIC | Age: 67
End: 2022-05-17
Payer: MEDICARE

## 2022-05-17 DIAGNOSIS — Z76.0 MEDICATION REFILL: ICD-10-CM

## 2022-05-17 RX ORDER — METFORMIN HYDROCHLORIDE 1000 MG/1
1000 TABLET ORAL 2 TIMES DAILY WITH MEALS
Qty: 28 TABLET | Refills: 0 | OUTPATIENT
Start: 2022-05-17 | End: 2022-05-31

## 2022-05-17 RX ORDER — NIFEDIPINE 30 MG/1
30 TABLET, EXTENDED RELEASE ORAL DAILY
Qty: 14 TABLET | Refills: 0 | OUTPATIENT
Start: 2022-05-17 | End: 2022-05-31

## 2022-05-17 RX ORDER — PANTOPRAZOLE SODIUM 40 MG/1
40 TABLET, DELAYED RELEASE ORAL DAILY
Qty: 14 TABLET | Refills: 0 | OUTPATIENT
Start: 2022-05-17 | End: 2022-05-31

## 2022-05-17 NOTE — TELEPHONE ENCOUNTER
"Pt called for no response to cc push.    [employer] message texted:  This inbox is not monitored 24/7, so please do not reply to this message unless you want to opt out of the surveillance program.    We are contacting you from [facility]'s Covid-19 Surveillance Program, as we did not receive your symptoms and vital signs earlier today. We are calling to make sure that you are okay. If you need anything, please call [facility] ([phone]) to speak with one of our nurses. If you are having a medical emergency, call 911 or go to your nearest emergency room. Thank you.    Participation in this program is voluntary. If you no longer want to be monitored by the Surveillance Program, there are two possible options for opting out:  1. If you are able to reply to this message, you can opt out of the program by replying "Opt out, or  2. Call [phone] from 8:00 am to 8:00 pm and ask to speak with a surveillance nurse.    This inbox is not monitored 24/7, so please do not reply to this message unless you want to opt out of the surveillance program. If you have questions or concerns, please call [phone]. If you are having a medical emergency, please proceed to your nearest emergency department or dial 911.    Pt said she is having chest pressure. No pulse ox available at this time. Alert and oriented, Pt refuses to go to the ER per covid protocol. Pt referred to MILY Brea Valentine.    Reason for Disposition   Chest pain or pressure    Additional Information   Negative: SEVERE difficulty breathing (e.g., struggling for each breath, speaks in single words)   Negative: Difficult to awaken or acting confused (e.g., disoriented, slurred speech)   Negative: Bluish (or gray) lips or face now   Negative: Shock suspected (e.g., cold/pale/clammy skin, too weak to stand, low BP, rapid pulse)   Negative: Sounds like a life-threatening emergency to the triager   Negative: SEVERE or constant chest pain or pressure " (Exception: mild central chest pain, present only when coughing)   Negative: MODERATE difficulty breathing (e.g., speaks in phrases, SOB even at rest, pulse 100-120)   Negative: Headache and stiff neck (can't touch chin to chest)    Protocols used: CORONAVIRUS (COVID-19) DIAGNOSED OR [id number]

## 2022-05-17 NOTE — TELEPHONE ENCOUNTER
Called patient due to RN escalation in COVID Surveillance program. Pt escalated due to chest pressure.    Patient location: [facility]    Vitals: Sp02: NA. P: NA. Temp: NA  Ambulatory Sp02 on the phone (if applicable): NA    67 y.o. female with pertinent PMHx of HTN, Raynaud's, DM type II, B12 def on day 9 of Covid symptoms. Positive Covid screen 5/13/22. CXR on 5/14. Home oxygen: no. COVID-19 Hospitalization History: none. Received antibody infusion: yes. Fully vaccinated: no (needs booster). Remdesivir treatment day: NA. SpO2 goal on hospital [facility]: NA.    HPI: Pt escalated for chest pressure reported to triage RN. Pt states she still feels tired, also reports sore throat, headache, dry intermittent cough, bilateral ear fullness. She complains of a burning sensation in her chest with walking and activity for which she was seen in the ED on 5/14. The burning goes away with rest and inhaler use. This started last Friday. States she feels better overall compared to initial symptom onset. Had antibody infusion yesterday. She is using an inhaler, zyrtec, tylenol and tessalon perles. Does not have a PCP currently, just moved back from [place]. Has appt to establish care next week. Does not have pulse ox or thermometer but will  today.    ROS: As above. Denies worsening cough, light headedness, fever, chills, diaphoresis, abdominal pain, emesis, diarrhea or further symptoms.     Assessment: Unable to assess vitals. During phone call, patient appears alert and oriented. Able to speak in full sentences without difficulty. No audible wheezing or cough heard during call.    Plan:    -Cont supportive care and inhaler use  - pulse ox and thermometer today  -ED precautions discussed for any worsening symptoms    Reviewed with patient the reasons for seeking emergency care. Pt aware that if Sp02 <94% or if they have any worsening symptoms, they need to go to the emergency department. If they are having a  medical emergency, they will call 911. Otherwise, patient will continue to submit data as scheduled. Reviewed importance of wearing mask if self or family members leave the house.     Advised next steps: Continue care at home

## 2022-05-17 NOTE — TELEPHONE ENCOUNTER
Patient has picked up pulse ox but due to her arthritis she is unable to open it to put in the batteries. Once her  is home she will put them in. No further questions or concerns.

## 2022-05-18 ENCOUNTER — PATIENT MESSAGE (OUTPATIENT)
Dept: ADMINISTRATIVE | Facility: CLINIC | Age: 67
End: 2022-05-18
Payer: MEDICARE

## 2022-05-18 ENCOUNTER — NURSE TRIAGE (OUTPATIENT)
Dept: ADMINISTRATIVE | Facility: CLINIC | Age: 67
End: 2022-05-18
Payer: MEDICARE

## 2022-05-18 NOTE — TELEPHONE ENCOUNTER
"Pt called for no response to cc push.     Unable to leave VM on Pts phone. Call cannot be completed message received.    Unable to leave VM on NATALIA Maza's phone. Call cannot be completed message received.    Left message explaining to NATALIA Maza Jr that the Pt has not put in her vs yet and to call her to remind her to do so or if having a medical emergency, to call 911 or go to the nearest ER.    BRAND-YOURSELF message sent by text:    This inbox is not monitored 24/7, so please do not reply to this message unless you want to opt out of the surveillance program.    We are contacting you from [facility]'s Covid-19 Surveillance Program, as we did not receive your symptoms and vital signs earlier today. We are calling to make sure that you are okay. If you need anything, please call [facility] ([phone]) to speak with one of our nurses. If you are having a medical emergency, call 911 or go to your nearest emergency room. Thank you.    Participation in this program is voluntary. If you no longer want to be monitored by the Surveillance Program, there are two possible options for opting out:  1. If you are able to reply to this message, you can opt out of the program by replying "Opt out, or  2. Call [phone] from 8:00 am to 8:00 pm and ask to speak with a surveillance nurse.    This inbox is not monitored 24/7, so please do not reply to this message unless you want to opt out of the surveillance program. If you have questions or concerns, please call [phone]. If you are having a medical emergency, please proceed to your nearest emergency department or dial 911.      Reason for Disposition   Caller has cancelled the call before the first contact    Protocols used: NO CONTACT OR DUPLICATE CONTACT CALL-A-[place]      "

## 2022-05-18 NOTE — TELEPHONE ENCOUNTER
Pt. Escalated due to no response. Pt called with no contact made. Unable to leave  VM, and mychart message sent. EC's called with no contact made    Reason for Disposition   Caller has cancelled the call before the first contact    Protocols used: NO CONTACT OR DUPLICATE CONTACT CALL-A-[place]

## 2022-05-19 ENCOUNTER — NURSE TRIAGE (OUTPATIENT)
Dept: ADMINISTRATIVE | Facility: CLINIC | Age: 67
End: 2022-05-19
Payer: MEDICARE

## 2022-05-19 ENCOUNTER — PATIENT MESSAGE (OUTPATIENT)
Dept: ADMINISTRATIVE | Facility: CLINIC | Age: 67
End: 2022-05-19
Payer: MEDICARE

## 2022-05-19 NOTE — TELEPHONE ENCOUNTER
Pt called and she said that she is unable to log in vitals and moved to [facility] and wrote order and will remove task. PT to called [facility] if needs anything she said that she is feeling better and will reach out if needed  Reason for Disposition   Information only question and nurse able to answer    Protocols used: INFORMATION ONLY CALL - NO TRIAGE-A-OH

## 2022-06-10 ENCOUNTER — LAB VISIT (OUTPATIENT)
Dept: LAB | Facility: HOSPITAL | Age: 67
End: 2022-06-10
Attending: FAMILY MEDICINE
Payer: MEDICARE

## 2022-06-10 DIAGNOSIS — E78.5 HYPERLIPEMIA: ICD-10-CM

## 2022-06-10 DIAGNOSIS — Z98.84 HISTORY OF BARIATRIC SURGERY: Primary | ICD-10-CM

## 2022-06-10 DIAGNOSIS — I10 ESSENTIAL HYPERTENSION, MALIGNANT: ICD-10-CM

## 2022-06-10 DIAGNOSIS — E11.9 DIABETES MELLITUS WITHOUT COMPLICATION: ICD-10-CM

## 2022-06-10 LAB
ALBUMIN SERPL BCP-MCNC: 3.5 G/DL (ref 3.5–5.2)
ALP SERPL-CCNC: 135 U/L (ref 55–135)
ALT SERPL W/O P-5'-P-CCNC: 22 U/L (ref 10–44)
ANION GAP SERPL CALC-SCNC: 10 MMOL/L (ref 8–16)
AST SERPL-CCNC: 20 U/L (ref 10–40)
BASOPHILS # BLD AUTO: 0.02 K/UL (ref 0–0.2)
BASOPHILS NFR BLD: 0.2 % (ref 0–1.9)
BILIRUB SERPL-MCNC: 0.5 MG/DL (ref 0.1–1)
BUN SERPL-MCNC: 11 MG/DL (ref 8–23)
CALCIUM SERPL-MCNC: 9.3 MG/DL (ref 8.7–10.5)
CHLORIDE SERPL-SCNC: 104 MMOL/L (ref 95–110)
CHOLEST SERPL-MCNC: 157 MG/DL (ref 120–199)
CHOLEST/HDLC SERPL: 2.5 {RATIO} (ref 2–5)
CO2 SERPL-SCNC: 26 MMOL/L (ref 23–29)
CREAT SERPL-MCNC: 0.7 MG/DL (ref 0.5–1.4)
DIFFERENTIAL METHOD: ABNORMAL
EOSINOPHIL # BLD AUTO: 0.1 K/UL (ref 0–0.5)
EOSINOPHIL NFR BLD: 0.6 % (ref 0–8)
ERYTHROCYTE [DISTWIDTH] IN BLOOD BY AUTOMATED COUNT: 17.6 % (ref 11.5–14.5)
EST. GFR  (AFRICAN AMERICAN): >60 ML/MIN/1.73 M^2
EST. GFR  (NON AFRICAN AMERICAN): >60 ML/MIN/1.73 M^2
ESTIMATED AVG GLUCOSE: 200 MG/DL (ref 68–131)
FERRITIN SERPL-MCNC: 8 NG/ML (ref 20–300)
GLUCOSE SERPL-MCNC: 158 MG/DL (ref 70–110)
HBA1C MFR BLD: 8.6 % (ref 4–5.6)
HCT VFR BLD AUTO: 33 % (ref 37–48.5)
HDLC SERPL-MCNC: 63 MG/DL (ref 40–75)
HDLC SERPL: 40.1 % (ref 20–50)
HGB BLD-MCNC: 10.8 G/DL (ref 12–16)
IMM GRANULOCYTES # BLD AUTO: 0.03 K/UL (ref 0–0.04)
IMM GRANULOCYTES NFR BLD AUTO: 0.4 % (ref 0–0.5)
IRON SERPL-MCNC: 40 UG/DL (ref 30–160)
IRON SERPL-MCNC: 40 UG/DL (ref 30–160)
LDLC SERPL CALC-MCNC: 80.2 MG/DL (ref 63–159)
LYMPHOCYTES # BLD AUTO: 1.3 K/UL (ref 1–4.8)
LYMPHOCYTES NFR BLD: 15.5 % (ref 18–48)
MCH RBC QN AUTO: 26.5 PG (ref 27–31)
MCHC RBC AUTO-ENTMCNC: 32.7 G/DL (ref 32–36)
MCV RBC AUTO: 81 FL (ref 82–98)
MONOCYTES # BLD AUTO: 0.7 K/UL (ref 0.3–1)
MONOCYTES NFR BLD: 7.6 % (ref 4–15)
NEUTROPHILS # BLD AUTO: 6.5 K/UL (ref 1.8–7.7)
NEUTROPHILS NFR BLD: 75.7 % (ref 38–73)
NONHDLC SERPL-MCNC: 94 MG/DL
NRBC BLD-RTO: 0 /100 WBC
PLATELET # BLD AUTO: 449 K/UL (ref 150–450)
PMV BLD AUTO: 10.2 FL (ref 9.2–12.9)
POTASSIUM SERPL-SCNC: 4.1 MMOL/L (ref 3.5–5.1)
PROT SERPL-MCNC: 7.5 G/DL (ref 6–8.4)
RBC # BLD AUTO: 4.08 M/UL (ref 4–5.4)
SATURATED IRON: 8 % (ref 20–50)
SODIUM SERPL-SCNC: 140 MMOL/L (ref 136–145)
TOTAL IRON BINDING CAPACITY: 478 UG/DL (ref 250–450)
TRANSFERRIN SERPL-MCNC: 323 MG/DL (ref 200–375)
TRIGL SERPL-MCNC: 69 MG/DL (ref 30–150)
VIT B12 SERPL-MCNC: 1233 PG/ML (ref 210–950)
WBC # BLD AUTO: 8.57 K/UL (ref 3.9–12.7)

## 2022-06-10 PROCEDURE — 82728 ASSAY OF FERRITIN: CPT | Performed by: FAMILY MEDICINE

## 2022-06-10 PROCEDURE — 82607 VITAMIN B-12: CPT | Performed by: FAMILY MEDICINE

## 2022-06-10 PROCEDURE — 84466 ASSAY OF TRANSFERRIN: CPT | Performed by: FAMILY MEDICINE

## 2022-06-10 PROCEDURE — 85025 COMPLETE CBC W/AUTO DIFF WBC: CPT | Performed by: FAMILY MEDICINE

## 2022-06-10 PROCEDURE — 36415 COLL VENOUS BLD VENIPUNCTURE: CPT | Performed by: FAMILY MEDICINE

## 2022-06-10 PROCEDURE — 80061 LIPID PANEL: CPT | Performed by: FAMILY MEDICINE

## 2022-06-10 PROCEDURE — 83036 HEMOGLOBIN GLYCOSYLATED A1C: CPT | Performed by: FAMILY MEDICINE

## 2022-06-10 PROCEDURE — 80053 COMPREHEN METABOLIC PANEL: CPT | Performed by: FAMILY MEDICINE

## 2022-07-27 NOTE — PROGRESS NOTES
Digital Medicine: Health  Follow-Up    The history is provided by the patient.             Reason for review: Blood glucose not at goal and Blood pressure at goal        Topics Covered on Call: physical activity and Diet    Additional Follow-up details: Average blood pressure today is 116/78. Blood pressure is stable and controlled    Patient's blood sugar readings have been elevated. She has had some readings in the 200's and 300's. She feels that her glucometer is inaccurate. She has been doing well with diet. Recommend checking with the control solution, the obar, and compare with her provider when she goes in for an appt. She gets back on Saturday and will plan to test it. Will let us know if it continues to spike out of control.             Diet-Change    Patient reports eating or drinking the following: Patient reports that she has been doing a lot of protein. She has really been monitoring her carbohydrates. She will have occasional grits or pizza but not every night. She had pizza last night and her blood sugars were at 179. Recommend reducing carbohydrates and continue to monitor readings.       Physical Activity-no change to routine  No change to exercise routine.       Additional physical activity details: She still hasn't been exercising. Reviewed need to add in exercise and start going for walks. She will try to add this in.       Medication Adherence-Medication adherence was assessed.      Substance, Sleep, Stress-Not assessed      Provided patient education.  Reviewed Device Techniques.     Addressed patient questions and patient has my contact information if needed prior to next outreach. Patient verbalizes understanding.                 Topic    Eye Exam          Last 5 Patient Entered Readings                                      Current 30 Day Average: 116/78     Recent Readings 10/8/2020 10/7/2020 10/6/2020 10/5/2020 10/4/2020    SBP (mmHg) 108 126 110 114 112    DBP (mmHg) 74 77 75 77 77  Medication:   Requested Prescriptions     Pending Prescriptions Disp Refills    levothyroxine (SYNTHROID) 50 MCG tablet 90 tablet 3     Sig: Take one daily     Last Filled:  4.25.22 #90 refills 3    Last appt: 7/25/2022   Next appt: Visit date not found    Last Thyroid:   Lab Results   Component Value Date/Time    TSH 1.95 07/22/2022 08:28 AM    T4FREE 1.4 03/30/2017 11:21 AM    K6AEFKA 7.5 02/21/2014 09:49 AM    Pulse 74 70 70 71 74        Last 6 Patient Entered Readings                                          Most Recent A1c: 6.4% on 6/29/2020  (Goal: 7%)     Recent Readings 10/8/2020 10/7/2020 10/6/2020 10/5/2020 10/4/2020    Blood Glucose (mg/dL) 179 164 172 179 164

## 2022-08-30 ENCOUNTER — OFFICE VISIT (OUTPATIENT)
Dept: FAMILY MEDICINE | Facility: CLINIC | Age: 67
End: 2022-08-30
Payer: MEDICARE

## 2022-08-30 VITALS
OXYGEN SATURATION: 98 % | HEART RATE: 83 BPM | DIASTOLIC BLOOD PRESSURE: 80 MMHG | HEIGHT: 67 IN | TEMPERATURE: 99 F | BODY MASS INDEX: 28.89 KG/M2 | SYSTOLIC BLOOD PRESSURE: 138 MMHG | WEIGHT: 184.06 LBS

## 2022-08-30 DIAGNOSIS — M47.896 OTHER OSTEOARTHRITIS OF SPINE, LUMBAR REGION: Primary | ICD-10-CM

## 2022-08-30 DIAGNOSIS — Z76.89 ENCOUNTER TO ESTABLISH CARE WITH NEW DOCTOR: Primary | ICD-10-CM

## 2022-08-30 DIAGNOSIS — M47.892 OTHER SPONDYLOSIS, CERVICAL REGION: ICD-10-CM

## 2022-08-30 DIAGNOSIS — Z12.31 SCREENING MAMMOGRAM FOR BREAST CANCER: ICD-10-CM

## 2022-08-30 DIAGNOSIS — I87.2 VENOUS INSUFFICIENCY OF BOTH LOWER EXTREMITIES: ICD-10-CM

## 2022-08-30 DIAGNOSIS — I73.00 RAYNAUD'S DISEASE WITHOUT GANGRENE: ICD-10-CM

## 2022-08-30 DIAGNOSIS — E11.65 TYPE 2 DIABETES MELLITUS WITH HYPERGLYCEMIA, WITHOUT LONG-TERM CURRENT USE OF INSULIN: ICD-10-CM

## 2022-08-30 DIAGNOSIS — Z78.0 ASYMPTOMATIC POSTMENOPAUSAL STATE: ICD-10-CM

## 2022-08-30 DIAGNOSIS — I10 ESSENTIAL HYPERTENSION: ICD-10-CM

## 2022-08-30 PROCEDURE — 99999 PR PBB SHADOW E&M-EST. PATIENT-LVL V: CPT | Mod: PBBFAC,,, | Performed by: FAMILY MEDICINE

## 2022-08-30 PROCEDURE — 99214 PR OFFICE/OUTPT VISIT, EST, LEVL IV, 30-39 MIN: ICD-10-PCS | Mod: S$PBB,,, | Performed by: FAMILY MEDICINE

## 2022-08-30 PROCEDURE — 99215 OFFICE O/P EST HI 40 MIN: CPT | Mod: PBBFAC,PO | Performed by: FAMILY MEDICINE

## 2022-08-30 PROCEDURE — 99214 OFFICE O/P EST MOD 30 MIN: CPT | Mod: S$PBB,,, | Performed by: FAMILY MEDICINE

## 2022-08-30 PROCEDURE — 99999 PR PBB SHADOW E&M-EST. PATIENT-LVL V: ICD-10-PCS | Mod: PBBFAC,,, | Performed by: FAMILY MEDICINE

## 2022-08-30 RX ORDER — NIFEDIPINE 60 MG/1
TABLET, EXTENDED RELEASE ORAL
COMMUNITY
Start: 2021-12-22 | End: 2022-08-30
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
## 2022-08-31 RX ORDER — GABAPENTIN 100 MG/1
100 CAPSULE ORAL 2 TIMES DAILY
Qty: 60 CAPSULE | Refills: 0 | Status: CANCELLED | OUTPATIENT
Start: 2022-08-24

## 2022-09-12 ENCOUNTER — PATIENT MESSAGE (OUTPATIENT)
Dept: FAMILY MEDICINE | Facility: CLINIC | Age: 67
End: 2022-09-12
Payer: MEDICARE

## 2022-09-12 ENCOUNTER — TELEPHONE (OUTPATIENT)
Dept: FAMILY MEDICINE | Facility: CLINIC | Age: 67
End: 2022-09-12
Payer: MEDICARE

## 2022-09-12 ENCOUNTER — HOSPITAL ENCOUNTER (OUTPATIENT)
Dept: RADIOLOGY | Facility: HOSPITAL | Age: 67
Discharge: HOME | End: 2022-09-12
Attending: ANESTHESIOLOGY
Payer: MEDICARE

## 2022-09-12 DIAGNOSIS — M47.892 OTHER SPONDYLOSIS, CERVICAL REGION: ICD-10-CM

## 2022-09-12 DIAGNOSIS — M47.896 OTHER OSTEOARTHRITIS OF SPINE, LUMBAR REGION: ICD-10-CM

## 2022-09-12 DIAGNOSIS — G89.29 CHRONIC BACK PAIN, UNSPECIFIED BACK LOCATION, UNSPECIFIED BACK PAIN LATERALITY: Primary | ICD-10-CM

## 2022-09-12 DIAGNOSIS — M54.9 CHRONIC BACK PAIN, UNSPECIFIED BACK LOCATION, UNSPECIFIED BACK PAIN LATERALITY: Primary | ICD-10-CM

## 2022-09-12 PROCEDURE — 72141 MRI CERVICAL SPINE WITHOUT CONTRAST: ICD-10-PCS | Mod: 26,,, | Performed by: RADIOLOGY

## 2022-09-12 PROCEDURE — 72141 MRI NECK SPINE W/O DYE: CPT | Mod: 26,,, | Performed by: RADIOLOGY

## 2022-09-12 PROCEDURE — 72141 MRI NECK SPINE W/O DYE: CPT | Mod: TC

## 2022-09-12 NOTE — TELEPHONE ENCOUNTER
Patient states she received a call from the lab stating that she needed labs before she could get the MRI w/ w/o contrast.  Asked patient if she was told what labs were needed and she could not tell me.  Please advise.

## 2022-09-13 ENCOUNTER — PATIENT MESSAGE (OUTPATIENT)
Dept: FAMILY MEDICINE | Facility: CLINIC | Age: 67
End: 2022-09-13
Payer: MEDICARE

## 2022-09-14 ENCOUNTER — TELEPHONE (OUTPATIENT)
Dept: FAMILY MEDICINE | Facility: CLINIC | Age: 67
End: 2022-09-14
Payer: MEDICARE

## 2022-09-14 DIAGNOSIS — E11.65 TYPE 2 DIABETES MELLITUS WITH HYPERGLYCEMIA, WITHOUT LONG-TERM CURRENT USE OF INSULIN: Primary | ICD-10-CM

## 2022-09-14 RX ORDER — DULAGLUTIDE 4.5 MG/.5ML
4.5 INJECTION, SOLUTION SUBCUTANEOUS
Qty: 4 PEN | Refills: 11 | Status: SHIPPED | OUTPATIENT
Start: 2022-09-14 | End: 2023-05-11 | Stop reason: SDUPTHER

## 2022-09-15 ENCOUNTER — LAB VISIT (OUTPATIENT)
Dept: LAB | Facility: HOSPITAL | Age: 67
End: 2022-09-15
Attending: FAMILY MEDICINE
Payer: COMMERCIAL

## 2022-09-15 DIAGNOSIS — M54.9 CHRONIC BACK PAIN, UNSPECIFIED BACK LOCATION, UNSPECIFIED BACK PAIN LATERALITY: ICD-10-CM

## 2022-09-15 DIAGNOSIS — G89.29 CHRONIC BACK PAIN, UNSPECIFIED BACK LOCATION, UNSPECIFIED BACK PAIN LATERALITY: ICD-10-CM

## 2022-09-15 LAB
ANION GAP SERPL CALC-SCNC: 11 MMOL/L (ref 8–16)
BUN SERPL-MCNC: 15 MG/DL (ref 8–23)
CALCIUM SERPL-MCNC: 9.6 MG/DL (ref 8.7–10.5)
CHLORIDE SERPL-SCNC: 108 MMOL/L (ref 95–110)
CO2 SERPL-SCNC: 24 MMOL/L (ref 23–29)
CREAT SERPL-MCNC: 0.7 MG/DL (ref 0.5–1.4)
EST. GFR  (NO RACE VARIABLE): >60 ML/MIN/1.73 M^2
GLUCOSE SERPL-MCNC: 154 MG/DL (ref 70–110)
POTASSIUM SERPL-SCNC: 4.3 MMOL/L (ref 3.5–5.1)
SODIUM SERPL-SCNC: 143 MMOL/L (ref 136–145)

## 2022-09-15 PROCEDURE — 80048 BASIC METABOLIC PNL TOTAL CA: CPT | Performed by: FAMILY MEDICINE

## 2022-09-15 PROCEDURE — 36415 COLL VENOUS BLD VENIPUNCTURE: CPT | Performed by: FAMILY MEDICINE

## 2022-09-27 ENCOUNTER — PATIENT MESSAGE (OUTPATIENT)
Dept: VASCULAR SURGERY | Facility: CLINIC | Age: 67
End: 2022-09-27
Payer: MEDICARE

## 2022-09-29 ENCOUNTER — HOSPITAL ENCOUNTER (OUTPATIENT)
Dept: RADIOLOGY | Facility: HOSPITAL | Age: 67
Discharge: HOME | End: 2022-09-29
Attending: ANESTHESIOLOGY
Payer: MEDICARE

## 2022-09-29 PROCEDURE — 72158 MRI LUMBAR SPINE W WO CONTRAST: ICD-10-PCS | Mod: 26,,, | Performed by: RADIOLOGY

## 2022-09-29 PROCEDURE — 25500020 PHARM REV CODE 255: Performed by: ANESTHESIOLOGY

## 2022-09-29 PROCEDURE — 72158 MRI LUMBAR SPINE W/O & W/DYE: CPT | Mod: TC

## 2022-09-29 PROCEDURE — 72158 MRI LUMBAR SPINE W/O & W/DYE: CPT | Mod: 26,,, | Performed by: RADIOLOGY

## 2022-09-29 PROCEDURE — A9585 GADOBUTROL INJECTION: HCPCS | Performed by: ANESTHESIOLOGY

## 2022-09-29 RX ORDER — GADOBUTROL 604.72 MG/ML
8 INJECTION INTRAVENOUS
Status: COMPLETED | OUTPATIENT
Start: 2022-09-29 | End: 2022-09-29

## 2022-09-29 RX ADMIN — GADOBUTROL 8 ML: 604.72 INJECTION INTRAVENOUS at 06:09

## 2022-10-06 ENCOUNTER — PATIENT MESSAGE (OUTPATIENT)
Dept: FAMILY MEDICINE | Facility: CLINIC | Age: 67
End: 2022-10-06
Payer: MEDICARE

## 2022-10-06 DIAGNOSIS — E11.9 TYPE 2 DIABETES MELLITUS WITHOUT COMPLICATIONS: ICD-10-CM

## 2022-10-06 RX ORDER — METFORMIN HYDROCHLORIDE 1000 MG/1
1000 TABLET ORAL 2 TIMES DAILY WITH MEALS
Qty: 180 TABLET | Refills: 3 | Status: SHIPPED | OUTPATIENT
Start: 2022-10-06 | End: 2023-05-11 | Stop reason: SDUPTHER

## 2022-10-06 NOTE — TELEPHONE ENCOUNTER
Please see the attached refill request.      Do you want to cancel the other one out and replace with this one?

## 2022-10-10 ENCOUNTER — PATIENT MESSAGE (OUTPATIENT)
Dept: ADMINISTRATIVE | Facility: HOSPITAL | Age: 67
End: 2022-10-10
Payer: MEDICARE

## 2022-10-10 DIAGNOSIS — I83.813 VARICOSE VEINS OF LEG WITH PAIN, BILATERAL: ICD-10-CM

## 2022-10-10 DIAGNOSIS — I83.893 VARICOSE VEINS OF LEG WITH EDEMA, BILATERAL: Primary | ICD-10-CM

## 2022-10-10 DIAGNOSIS — I83.893 VARICOSE VEINS OF LEG WITH SWELLING, BILATERAL: ICD-10-CM

## 2022-10-26 ENCOUNTER — OFFICE VISIT (OUTPATIENT)
Dept: RHEUMATOLOGY | Facility: CLINIC | Age: 67
End: 2022-10-26
Payer: MEDICARE

## 2022-10-26 ENCOUNTER — HOSPITAL ENCOUNTER (OUTPATIENT)
Dept: RADIOLOGY | Facility: HOSPITAL | Age: 67
Discharge: HOME | End: 2022-10-26
Attending: FAMILY MEDICINE
Payer: MEDICARE

## 2022-10-26 ENCOUNTER — HOSPITAL ENCOUNTER (OUTPATIENT)
Dept: RADIOLOGY | Facility: HOSPITAL | Age: 67
Discharge: HOME | End: 2022-10-26
Attending: SURGERY
Payer: MEDICARE

## 2022-10-26 VITALS
DIASTOLIC BLOOD PRESSURE: 83 MMHG | WEIGHT: 188.5 LBS | OXYGEN SATURATION: 96 % | HEART RATE: 101 BPM | SYSTOLIC BLOOD PRESSURE: 127 MMHG | RESPIRATION RATE: 20 BRPM | HEIGHT: 67 IN | BODY MASS INDEX: 29.58 KG/M2

## 2022-10-26 DIAGNOSIS — Z78.0 ASYMPTOMATIC POSTMENOPAUSAL STATE: ICD-10-CM

## 2022-10-26 DIAGNOSIS — I83.893 VARICOSE VEINS OF LEG WITH EDEMA, BILATERAL: ICD-10-CM

## 2022-10-26 DIAGNOSIS — Z12.31 SCREENING MAMMOGRAM FOR BREAST CANCER: ICD-10-CM

## 2022-10-26 DIAGNOSIS — I73.00 RAYNAUD'S DISEASE WITHOUT GANGRENE: Primary | ICD-10-CM

## 2022-10-26 DIAGNOSIS — M15.9 PRIMARY OSTEOARTHRITIS INVOLVING MULTIPLE JOINTS: ICD-10-CM

## 2022-10-26 DIAGNOSIS — Z71.89 COUNSELING AND COORDINATION OF CARE: ICD-10-CM

## 2022-10-26 DIAGNOSIS — I83.893 VARICOSE VEINS OF LEG WITH SWELLING, BILATERAL: ICD-10-CM

## 2022-10-26 DIAGNOSIS — I83.813 VARICOSE VEINS OF LEG WITH PAIN, BILATERAL: ICD-10-CM

## 2022-10-26 PROCEDURE — 77080 DXA BONE DENSITY AXIAL: CPT | Mod: TC

## 2022-10-26 PROCEDURE — 93970 US LOWER EXTREMITY VEINS BILATERAL INSUFFICIENCY: ICD-10-PCS | Mod: 26,,, | Performed by: RADIOLOGY

## 2022-10-26 PROCEDURE — 99999 PR PBB SHADOW E&M-EST. PATIENT-LVL V: ICD-10-PCS | Mod: PBBFAC,,, | Performed by: INTERNAL MEDICINE

## 2022-10-26 PROCEDURE — 93970 EXTREMITY STUDY: CPT | Mod: 26,,, | Performed by: RADIOLOGY

## 2022-10-26 PROCEDURE — 99215 PR OFFICE/OUTPT VISIT, EST, LEVL V, 40-54 MIN: ICD-10-PCS | Mod: S$PBB,,, | Performed by: INTERNAL MEDICINE

## 2022-10-26 PROCEDURE — 99999 PR PBB SHADOW E&M-EST. PATIENT-LVL V: CPT | Mod: PBBFAC,,, | Performed by: INTERNAL MEDICINE

## 2022-10-26 PROCEDURE — 93970 EXTREMITY STUDY: CPT | Mod: TC

## 2022-10-26 PROCEDURE — 99215 OFFICE O/P EST HI 40 MIN: CPT | Mod: S$PBB,,, | Performed by: INTERNAL MEDICINE

## 2022-10-26 PROCEDURE — 99215 OFFICE O/P EST HI 40 MIN: CPT | Mod: PBBFAC,25,PO | Performed by: INTERNAL MEDICINE

## 2022-10-26 PROCEDURE — 77080 DXA BONE DENSITY AXIAL: CPT | Mod: 26,,, | Performed by: STUDENT IN AN ORGANIZED HEALTH CARE EDUCATION/TRAINING PROGRAM

## 2022-10-26 PROCEDURE — 77080 DEXA BONE DENSITY SPINE HIP: ICD-10-PCS | Mod: 26,,, | Performed by: STUDENT IN AN ORGANIZED HEALTH CARE EDUCATION/TRAINING PROGRAM

## 2022-10-26 RX ORDER — AMLODIPINE BESYLATE 5 MG/1
5 TABLET ORAL DAILY
Qty: 30 TABLET | Refills: 11 | Status: SHIPPED | OUTPATIENT
Start: 2022-10-26 | End: 2023-05-03 | Stop reason: SDUPTHER
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
## 2022-10-26 NOTE — TELEPHONE ENCOUNTER
Patient states this was prescribed by her PCP in [place] prior to switching care locally.  Please advise.

## 2022-10-27 ENCOUNTER — PATIENT MESSAGE (OUTPATIENT)
Dept: FAMILY MEDICINE | Facility: CLINIC | Age: 67
End: 2022-10-27
Payer: MEDICARE

## 2022-10-27 RX ORDER — GABAPENTIN 300 MG/1
CAPSULE ORAL
Qty: 90 CAPSULE | Refills: 0 | OUTPATIENT
Start: 2022-10-27

## 2022-10-28 ENCOUNTER — PATIENT MESSAGE (OUTPATIENT)
Dept: FAMILY MEDICINE | Facility: CLINIC | Age: 67
End: 2022-10-28
Payer: MEDICARE

## 2022-10-28 DIAGNOSIS — G62.9 NEUROPATHY: Primary | ICD-10-CM

## 2022-10-28 RX ORDER — GABAPENTIN 300 MG/1
CAPSULE ORAL
Qty: 90 CAPSULE | Refills: 0 | Status: SHIPPED | OUTPATIENT
Start: 2022-10-28 | End: 2023-05-03

## 2022-10-31 ENCOUNTER — PATIENT MESSAGE (OUTPATIENT)
Dept: INTERNAL MEDICINE | Facility: CLINIC | Age: 67
End: 2022-10-31
Payer: MEDICARE

## 2022-11-01 ENCOUNTER — HOSPITAL ENCOUNTER (OUTPATIENT)
Dept: RADIOLOGY | Facility: HOSPITAL | Age: 67
Discharge: HOME | End: 2022-11-01
Attending: FAMILY MEDICINE
Payer: MEDICARE

## 2022-11-01 DIAGNOSIS — N64.4 BREAST PAIN: ICD-10-CM

## 2022-11-01 DIAGNOSIS — N64.9 BREAST DISORDER: ICD-10-CM

## 2022-11-01 PROCEDURE — 77066 DX MAMMO INCL CAD BI: CPT | Mod: 26,,, | Performed by: RADIOLOGY

## 2022-11-01 PROCEDURE — 77062 BREAST TOMOSYNTHESIS BI: CPT | Mod: 26,,, | Performed by: RADIOLOGY

## 2022-11-01 PROCEDURE — 77062 MAMMO DIGITAL DIAGNOSTIC BILAT WITH TOMO: ICD-10-PCS | Mod: 26,,, | Performed by: RADIOLOGY

## 2022-11-01 PROCEDURE — 77062 BREAST TOMOSYNTHESIS BI: CPT | Mod: TC

## 2022-11-01 PROCEDURE — 77066 MAMMO DIGITAL DIAGNOSTIC BILAT WITH TOMO: ICD-10-PCS | Mod: 26,,, | Performed by: RADIOLOGY

## 2022-11-08 ENCOUNTER — TELEPHONE (OUTPATIENT)
Dept: FAMILY MEDICINE | Facility: CLINIC | Age: 67
End: 2022-11-08
Payer: MEDICARE

## 2022-11-08 NOTE — TELEPHONE ENCOUNTER
----- Message from Palak Cabrera sent at 11/8/2022  4:02 PM CST -----  Type:  Sooner Apoointment Request    Caller is requesting a sooner appointment.  Caller declined first available appointment listed below.  Caller will not accept being placed on the waitlist and is requesting a message be sent to doctor.  Name of Caller:pt  When is the first available appointment?11/30/22  Symptoms:stomach issues  Would the patient rather a call back or a response via [facility]? call  Best Call Back Number:[phone]  Additional Information:

## 2022-11-08 NOTE — TELEPHONE ENCOUNTER
Spoke with patient. Notified her that is providers first available. Notified her if she cant wait she can go to ER/UC. Pt agreed and understood.

## 2022-11-16 ENCOUNTER — PATIENT OUTREACH (OUTPATIENT)
Dept: ADMINISTRATIVE | Facility: HOSPITAL | Age: 67
End: 2022-11-16
Payer: MEDICARE

## 2022-11-16 ENCOUNTER — PATIENT MESSAGE (OUTPATIENT)
Dept: ADMINISTRATIVE | Facility: HOSPITAL | Age: 67
End: 2022-11-16
Payer: MEDICARE

## 2022-11-16 NOTE — PROGRESS NOTES
Care Everywhere updates requested and reviewed.  Immunizations reconciled. Media reports reviewed.  Duplicate HM overrides and  orders removed.  Overdue HM topic chart audit and/or requested.  Overdue lab testing linked to upcoming lab appointments if applies.        Health Maintenance Due   Topic Date Due    Sign Pain Contract  Never done    Complete Opioid Risk Tool  Never done    COVID-19 Vaccine (4 - Booster for Moderna series) 10/12/2021    Foot Exam  10/21/2021    Eye Exam  2021    Influenza Vaccine (1) 2022

## 2022-11-25 ENCOUNTER — HOSPITAL ENCOUNTER (EMERGENCY)
Facility: HOSPITAL | Age: 67
Discharge: HOME | End: 2022-11-25
Attending: EMERGENCY MEDICINE
Payer: MEDICARE

## 2022-11-25 VITALS
HEIGHT: 67 IN | WEIGHT: 188 LBS | BODY MASS INDEX: 29.51 KG/M2 | OXYGEN SATURATION: 98 % | TEMPERATURE: 99 F | DIASTOLIC BLOOD PRESSURE: 67 MMHG | SYSTOLIC BLOOD PRESSURE: 101 MMHG | RESPIRATION RATE: 20 BRPM | HEART RATE: 94 BPM

## 2022-11-25 DIAGNOSIS — T30.4 CHEMICAL BURN: Primary | ICD-10-CM

## 2022-11-25 LAB — POCT GLUCOSE: 150 MG/DL (ref 70–110)

## 2022-11-25 PROCEDURE — 63600175 PHARM REV CODE 636 W HCPCS: Performed by: NURSE PRACTITIONER

## 2022-11-25 PROCEDURE — 90471 IMMUNIZATION ADMIN: CPT | Performed by: NURSE PRACTITIONER

## 2022-11-25 PROCEDURE — 82962 GLUCOSE BLOOD TEST: CPT

## 2022-11-25 PROCEDURE — 90715 TDAP VACCINE 7 YRS/> IM: CPT | Performed by: NURSE PRACTITIONER

## 2022-11-25 PROCEDURE — 99284 EMERGENCY DEPT VISIT MOD MDM: CPT | Mod: 25

## 2022-11-25 RX ORDER — BACITRACIN ZINC 500 UNIT/G
OINTMENT (GRAM) TOPICAL 3 TIMES DAILY
Qty: 30 G | Refills: 0 | Status: SHIPPED | OUTPATIENT
Start: 2022-11-25 | End: 2022-12-02

## 2022-11-25 RX ADMIN — TETANUS TOXOID, REDUCED DIPHTHERIA TOXOID AND ACELLULAR PERTUSSIS VACCINE, ADSORBED 0.5 ML: 5; 2.5; 8; 8; 2.5 SUSPENSION INTRAMUSCULAR at 12:11
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
## 2022-11-25 NOTE — DISCHARGE INSTRUCTIONS

## 2022-11-30 ENCOUNTER — OFFICE VISIT (OUTPATIENT)
Dept: FAMILY MEDICINE | Facility: CLINIC | Age: 67
End: 2022-11-30
Payer: MEDICARE

## 2022-11-30 VITALS
BODY MASS INDEX: 28.86 KG/M2 | SYSTOLIC BLOOD PRESSURE: 128 MMHG | OXYGEN SATURATION: 93 % | DIASTOLIC BLOOD PRESSURE: 78 MMHG | HEIGHT: 67 IN | WEIGHT: 183.88 LBS | HEART RATE: 86 BPM

## 2022-11-30 DIAGNOSIS — I10 ESSENTIAL HYPERTENSION: ICD-10-CM

## 2022-11-30 DIAGNOSIS — Z09 FOLLOW-UP EXAM: Primary | ICD-10-CM

## 2022-11-30 DIAGNOSIS — T30.4 CHEMICAL BURN: ICD-10-CM

## 2022-11-30 DIAGNOSIS — E11.65 TYPE 2 DIABETES MELLITUS WITH HYPERGLYCEMIA, WITHOUT LONG-TERM CURRENT USE OF INSULIN: ICD-10-CM

## 2022-11-30 DIAGNOSIS — R32 URINARY INCONTINENCE, UNSPECIFIED TYPE: ICD-10-CM

## 2022-11-30 DIAGNOSIS — F41.1 GAD (GENERALIZED ANXIETY DISORDER): ICD-10-CM

## 2022-11-30 PROBLEM — U07.1 COVID: Status: RESOLVED | Noted: 2022-05-13 | Resolved: 2022-11-30

## 2022-11-30 PROCEDURE — 99214 PR OFFICE/OUTPT VISIT, EST, LEVL IV, 30-39 MIN: ICD-10-PCS | Mod: S$PBB,,, | Performed by: FAMILY MEDICINE

## 2022-11-30 PROCEDURE — 99999 PR PBB SHADOW E&M-EST. PATIENT-LVL V: ICD-10-PCS | Mod: PBBFAC,,, | Performed by: FAMILY MEDICINE

## 2022-11-30 PROCEDURE — 99999 PR PBB SHADOW E&M-EST. PATIENT-LVL V: CPT | Mod: PBBFAC,,, | Performed by: FAMILY MEDICINE

## 2022-11-30 PROCEDURE — 99215 OFFICE O/P EST HI 40 MIN: CPT | Mod: PBBFAC,PO | Performed by: FAMILY MEDICINE

## 2022-11-30 PROCEDURE — 99214 OFFICE O/P EST MOD 30 MIN: CPT | Mod: S$PBB,,, | Performed by: FAMILY MEDICINE

## 2022-11-30 RX ORDER — SULFAMETHOXAZOLE AND TRIMETHOPRIM 400; 80 MG/1; MG/1
1 TABLET ORAL 2 TIMES DAILY
Qty: 6 TABLET | Refills: 0 | Status: SHIPPED | OUTPATIENT
Start: 2022-11-30 | End: 2022-12-05

## 2022-11-30 RX ORDER — DULOXETIN HYDROCHLORIDE 60 MG/1
CAPSULE, DELAYED RELEASE ORAL
Qty: 90 CAPSULE | Refills: 1 | Status: SHIPPED | OUTPATIENT
Start: 2022-11-30 | End: 2023-01-10
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
## 2022-12-07 ENCOUNTER — TELEPHONE (OUTPATIENT)
Dept: FAMILY MEDICINE | Facility: CLINIC | Age: 67
End: 2022-12-07
Payer: MEDICARE

## 2022-12-07 DIAGNOSIS — T75.3XXA MOTION SICKNESS, INITIAL ENCOUNTER: Primary | ICD-10-CM

## 2022-12-07 RX ORDER — SCOLOPAMINE TRANSDERMAL SYSTEM 1 MG/1
1 PATCH, EXTENDED RELEASE TRANSDERMAL
Qty: 4 PATCH | Refills: 0 | Status: SHIPPED | OUTPATIENT
Start: 2022-12-07 | End: 2023-02-17

## 2022-12-07 NOTE — TELEPHONE ENCOUNTER
Pt called to request motion sickness patch for upcoming cruise, scopolamine patches sent.       Estefania Hutson

## 2022-12-14 ENCOUNTER — HOSPITAL ENCOUNTER (EMERGENCY)
Facility: HOSPITAL | Age: 67
Discharge: HOME | End: 2022-12-14
Attending: EMERGENCY MEDICINE
Payer: COMMERCIAL

## 2022-12-14 VITALS
SYSTOLIC BLOOD PRESSURE: 138 MMHG | RESPIRATION RATE: 20 BRPM | TEMPERATURE: 99 F | HEART RATE: 84 BPM | OXYGEN SATURATION: 97 % | WEIGHT: 193 LBS | BODY MASS INDEX: 30.23 KG/M2 | DIASTOLIC BLOOD PRESSURE: 24 MMHG

## 2022-12-14 DIAGNOSIS — J10.1 INFLUENZA A: Primary | ICD-10-CM

## 2022-12-14 DIAGNOSIS — R05.9 COUGH: ICD-10-CM

## 2022-12-14 LAB
INFLUENZA A, MOLECULAR: POSITIVE
INFLUENZA B, MOLECULAR: NEGATIVE
SARS-COV-2 RDRP RESP QL NAA+PROBE: NEGATIVE
SPECIMEN SOURCE: ABNORMAL

## 2022-12-14 PROCEDURE — 87502 INFLUENZA DNA AMP PROBE: CPT | Performed by: NURSE PRACTITIONER

## 2022-12-14 PROCEDURE — 93010 EKG 12-LEAD: ICD-10-PCS | Mod: ,,, | Performed by: INTERNAL MEDICINE

## 2022-12-14 PROCEDURE — 25000003 PHARM REV CODE 250: Performed by: EMERGENCY MEDICINE

## 2022-12-14 PROCEDURE — 93010 ELECTROCARDIOGRAM REPORT: CPT | Mod: ,,, | Performed by: INTERNAL MEDICINE

## 2022-12-14 PROCEDURE — U0002 COVID-19 LAB TEST NON-CDC: HCPCS | Performed by: NURSE PRACTITIONER

## 2022-12-14 PROCEDURE — 99284 EMERGENCY DEPT VISIT MOD MDM: CPT | Mod: 25

## 2022-12-14 PROCEDURE — 93005 ELECTROCARDIOGRAM TRACING: CPT

## 2022-12-14 RX ORDER — ONDANSETRON 4 MG/1
4 TABLET, ORALLY DISINTEGRATING ORAL EVERY 8 HOURS PRN
Qty: 9 TABLET | Refills: 0 | Status: SHIPPED | OUTPATIENT
Start: 2022-12-14 | End: 2022-12-18

## 2022-12-14 RX ORDER — OSELTAMIVIR PHOSPHATE 75 MG/1
75 CAPSULE ORAL 2 TIMES DAILY
Qty: 10 CAPSULE | Refills: 0 | Status: SHIPPED | OUTPATIENT
Start: 2022-12-14 | End: 2022-12-20

## 2022-12-14 RX ORDER — ACETAMINOPHEN 500 MG
1000 TABLET ORAL
Status: COMPLETED | OUTPATIENT
Start: 2022-12-14 | End: 2022-12-14

## 2022-12-14 RX ORDER — PROMETHAZINE HYDROCHLORIDE AND DEXTROMETHORPHAN HYDROBROMIDE 6.25; 15 MG/5ML; MG/5ML
5 SYRUP ORAL EVERY 6 HOURS PRN
Qty: 118 ML | Refills: 0 | Status: SHIPPED | OUTPATIENT
Start: 2022-12-14 | End: 2023-02-17

## 2022-12-14 RX ADMIN — ACETAMINOPHEN 1000 MG: 500 TABLET ORAL at 07:12

## 2022-12-14 NOTE — Clinical Note
"Jlil"Wilder Perez was seen and treated in our emergency department on 12/14/2022.  She may return to work on 12/19/2022.       If you have any questions or concerns, please don't hesitate to call.      Luan Mathew MD"
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
## 2022-12-15 NOTE — ED NOTES
Patient has verified the spelling of their name and  on armband.     APPEARANCE: Alert, oriented and in no acute distress. Pt calm, cooperative, resting comfortably at this time. Pt in no acute distress.  NEURO: awake, alert and aware of environment. Dodie coma scale: eyes open spontaneously-4, oriented & converses-5, obeys commands-6. No neurological abnormalities. Pt denies any headache or dizziness at this time   CARDIAC: Normal rate and rhythm, no murmur heard. Denies any chest pain at this time   RESPIRATORY:Normal rate and effort, with equal and unlabored respirations. No obvious signs of distress. Nailbeds have no discoloration. Complains of cough and congestion x3 days.  GI: pt denies any difficulty or burning with urination at this time. Urine appears clear, yellow with no odor.  GASTRO: soft, bowel sounds normal, no tenderness, no abdominal distention. Denies any nausea or vomiting at this time. Stool pattern normal.   MUSC: Full ROM. No bony tenderness or soft tissue tenderness. No obvious deformity. Pt ambulates with steady gait  SKIN: Skin is warm and dry, normal skin turgor, mucous membranes moist. Skin is intact

## 2022-12-20 RX ORDER — ALBUTEROL SULFATE 0.83 MG/ML
2.5 SOLUTION RESPIRATORY (INHALATION) EVERY 6 HOURS PRN
COMMUNITY
End: 2022-12-20 | Stop reason: SDUPTHER

## 2022-12-20 RX ORDER — ALBUTEROL SULFATE 0.83 MG/ML
2.5 SOLUTION RESPIRATORY (INHALATION) EVERY 6 HOURS PRN
Qty: 90 ML | Refills: 1 | Status: SHIPPED | OUTPATIENT
Start: 2022-12-20 | End: 2023-02-17

## 2022-12-20 NOTE — TELEPHONE ENCOUNTER
----- Message from Sowmya Roman sent at 12/20/2022  8:32 AM CST -----  Type:  RX Refill Request    Who Called: pt  Refill or New Rx:refill  RX Name and Strength:albuterol (PROVENTIL) 2.5 mg /3 mL (0.083 %) nebulizer solution   How is the patient currently taking it? (ex. 1XDay):Route: inhale contents of vial 3 ml as needed every 6 hrs   Is this a 30 day or 90 day RX:  Preferred Pharmacy with phone number:[facility]   Phone: [phone]  Fax: [phone]  Local or Mail Order:local  Ordering Provider:[facility] Marisol MATHEW  Would the patient rather a call back or a response via [facility]? call  Best Call Back Number:[phone]  Additional Information:

## 2022-12-20 NOTE — TELEPHONE ENCOUNTER
----- Message from Palak Cabrera sent at 12/20/2022  9:30 AM CST -----  Type:  Needs Medical Advice    Who Called: pt  Symptoms (please be specific): pt needs to have a script for albuterol sent to the pharmacy this medication is not listed on the pt chart   How long has patient had these symptoms:    Pharmacy name and phone #: [facility]  [address] [address]  Phone: [phone] Fax: [phone]       Would the patient rather a call back or a response via [facility]? call  Best Call Back Number:[phone]

## 2022-12-22 ENCOUNTER — TELEPHONE (OUTPATIENT)
Dept: FAMILY MEDICINE | Facility: CLINIC | Age: 67
End: 2022-12-22
Payer: MEDICARE

## 2022-12-22 NOTE — TELEPHONE ENCOUNTER
----- Message from Palak Cabrera sent at 12/22/2022  9:03 AM CST -----  Type:  Needs Medical Advice    Who Called: pt  Symptoms (please be specific): pt is requesting to get a order for a  treatment  machine that she uses for her albuterol       Pharmacy name and phone #:  [facility]  [address] [place] [address]  Phone: [phone] Fax: [phone]      Would the patient rather a call back or a response via [facility]? call  Best Call Back Number: [phone]  Additional Information:

## 2022-12-23 ENCOUNTER — TELEPHONE (OUTPATIENT)
Dept: FAMILY MEDICINE | Facility: CLINIC | Age: 67
End: 2022-12-23
Payer: MEDICARE

## 2022-12-23 DIAGNOSIS — J44.9 CHRONIC OBSTRUCTIVE PULMONARY DISEASE, UNSPECIFIED COPD TYPE: Primary | ICD-10-CM

## 2022-12-23 NOTE — TELEPHONE ENCOUNTER
Called pt to let her know I have a hard copy of her rx in hand. Did not answer. Could not leave message

## 2022-12-24 DIAGNOSIS — R53.83 FATIGUE, UNSPECIFIED TYPE: Primary | ICD-10-CM

## 2023-01-05 ENCOUNTER — LAB VISIT (OUTPATIENT)
Dept: LAB | Facility: HOSPITAL | Age: 68
End: 2023-01-05
Attending: FAMILY MEDICINE
Payer: MEDICARE

## 2023-01-05 DIAGNOSIS — E11.65 TYPE 2 DIABETES MELLITUS WITH HYPERGLYCEMIA, WITHOUT LONG-TERM CURRENT USE OF INSULIN: ICD-10-CM

## 2023-01-05 LAB
ESTIMATED AVG GLUCOSE: 160 MG/DL (ref 68–131)
HBA1C MFR BLD: 7.2 % (ref 4–5.6)

## 2023-01-05 PROCEDURE — 83036 HEMOGLOBIN GLYCOSYLATED A1C: CPT | Performed by: FAMILY MEDICINE

## 2023-01-05 PROCEDURE — 36415 COLL VENOUS BLD VENIPUNCTURE: CPT | Performed by: FAMILY MEDICINE

## 2023-01-06 RX ORDER — ZOLPIDEM TARTRATE 10 MG/1
10 TABLET ORAL NIGHTLY
Qty: 30 TABLET | Refills: 5 | Status: SHIPPED | OUTPATIENT
Start: 2023-01-06 | End: 2023-06-02 | Stop reason: SDUPTHER

## 2023-01-06 NOTE — TELEPHONE ENCOUNTER
Pt came into clinic today stating that she came to the pharmacy for her rx and it was denied. Pt stated she requested her sleeping medication 2xs with us. Pt is requesting zolpidem (ambien) 10 mg tab to be filled at [facility]

## 2023-01-09 ENCOUNTER — PATIENT MESSAGE (OUTPATIENT)
Dept: FAMILY MEDICINE | Facility: CLINIC | Age: 68
End: 2023-01-09
Payer: MEDICARE

## 2023-01-10 ENCOUNTER — OFFICE VISIT (OUTPATIENT)
Dept: UROLOGY | Facility: CLINIC | Age: 68
End: 2023-01-10
Payer: MEDICARE

## 2023-01-10 VITALS
SYSTOLIC BLOOD PRESSURE: 133 MMHG | WEIGHT: 185.19 LBS | HEART RATE: 84 BPM | BODY MASS INDEX: 29.07 KG/M2 | HEIGHT: 67 IN | DIASTOLIC BLOOD PRESSURE: 92 MMHG

## 2023-01-10 DIAGNOSIS — R39.15 URINARY URGENCY: ICD-10-CM

## 2023-01-10 DIAGNOSIS — N20.0 NEPHROLITHIASIS: ICD-10-CM

## 2023-01-10 DIAGNOSIS — R32 URINARY INCONTINENCE, UNSPECIFIED TYPE: Primary | ICD-10-CM

## 2023-01-10 DIAGNOSIS — N39.43 DRIBBLING URINE: ICD-10-CM

## 2023-01-10 DIAGNOSIS — R39.89 BLADDER PAIN: Primary | ICD-10-CM

## 2023-01-10 PROCEDURE — 99999 PR PBB SHADOW E&M-EST. PATIENT-LVL III: CPT | Mod: PBBFAC,,, | Performed by: STUDENT IN AN ORGANIZED HEALTH CARE EDUCATION/TRAINING PROGRAM

## 2023-01-10 PROCEDURE — 99213 OFFICE O/P EST LOW 20 MIN: CPT | Mod: PBBFAC,PO | Performed by: STUDENT IN AN ORGANIZED HEALTH CARE EDUCATION/TRAINING PROGRAM

## 2023-01-10 PROCEDURE — 99204 PR OFFICE/OUTPT VISIT, NEW, LEVL IV, 45-59 MIN: ICD-10-PCS | Mod: S$PBB,,, | Performed by: STUDENT IN AN ORGANIZED HEALTH CARE EDUCATION/TRAINING PROGRAM

## 2023-01-10 PROCEDURE — 99204 OFFICE O/P NEW MOD 45 MIN: CPT | Mod: S$PBB,,, | Performed by: STUDENT IN AN ORGANIZED HEALTH CARE EDUCATION/TRAINING PROGRAM

## 2023-01-10 PROCEDURE — 99999 PR PBB SHADOW E&M-EST. PATIENT-LVL III: ICD-10-PCS | Mod: PBBFAC,,, | Performed by: STUDENT IN AN ORGANIZED HEALTH CARE EDUCATION/TRAINING PROGRAM
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
## 2023-01-11 ENCOUNTER — LAB VISIT (OUTPATIENT)
Dept: LAB | Facility: HOSPITAL | Age: 68
End: 2023-01-11
Attending: STUDENT IN AN ORGANIZED HEALTH CARE EDUCATION/TRAINING PROGRAM
Payer: MEDICARE

## 2023-01-11 DIAGNOSIS — R39.89 BLADDER PAIN: ICD-10-CM

## 2023-01-11 LAB
BILIRUB UR QL STRIP: NEGATIVE
CLARITY UR: CLEAR
COLOR UR: COLORLESS
GLUCOSE UR QL STRIP: NEGATIVE
HGB UR QL STRIP: NEGATIVE
KETONES UR QL STRIP: NEGATIVE
LEUKOCYTE ESTERASE UR QL STRIP: NEGATIVE
MICROSCOPIC COMMENT: NORMAL
NITRITE UR QL STRIP: NEGATIVE
PH UR STRIP: 6 [PH] (ref 5–8)
PROT UR QL STRIP: NEGATIVE
SP GR UR STRIP: 1.01 (ref 1–1.03)
URN SPEC COLLECT METH UR: ABNORMAL
UROBILINOGEN UR STRIP-ACNC: NEGATIVE EU/DL

## 2023-01-11 PROCEDURE — 87086 URINE CULTURE/COLONY COUNT: CPT | Performed by: STUDENT IN AN ORGANIZED HEALTH CARE EDUCATION/TRAINING PROGRAM

## 2023-01-11 PROCEDURE — 81000 URINALYSIS NONAUTO W/SCOPE: CPT | Performed by: STUDENT IN AN ORGANIZED HEALTH CARE EDUCATION/TRAINING PROGRAM

## 2023-01-12 LAB — BACTERIA UR CULT: NO GROWTH

## 2023-01-13 DIAGNOSIS — N32.89 BLADDER SPASM: Primary | ICD-10-CM

## 2023-01-13 RX ORDER — OXYBUTYNIN CHLORIDE 5 MG/1
5 TABLET, EXTENDED RELEASE ORAL DAILY
Qty: 30 TABLET | Refills: 11 | Status: SHIPPED | OUTPATIENT
Start: 2023-01-13 | End: 2023-05-18

## 2023-01-25 ENCOUNTER — HOSPITAL ENCOUNTER (OUTPATIENT)
Dept: RADIOLOGY | Facility: HOSPITAL | Age: 68
Discharge: HOME | End: 2023-01-25
Attending: STUDENT IN AN ORGANIZED HEALTH CARE EDUCATION/TRAINING PROGRAM
Payer: MEDICARE

## 2023-01-25 DIAGNOSIS — N20.0 NEPHROLITHIASIS: ICD-10-CM

## 2023-01-25 PROCEDURE — 76770 US RETROPERITONEAL COMPLETE: ICD-10-PCS | Mod: 26,,, | Performed by: RADIOLOGY

## 2023-01-25 PROCEDURE — 76770 US EXAM ABDO BACK WALL COMP: CPT | Mod: TC

## 2023-01-25 PROCEDURE — 76770 US EXAM ABDO BACK WALL COMP: CPT | Mod: 26,,, | Performed by: RADIOLOGY

## 2023-01-27 ENCOUNTER — PATIENT MESSAGE (OUTPATIENT)
Dept: VASCULAR SURGERY | Facility: CLINIC | Age: 68
End: 2023-01-27
Payer: MEDICARE

## 2023-02-02 ENCOUNTER — TELEPHONE (OUTPATIENT)
Dept: FAMILY MEDICINE | Facility: CLINIC | Age: 68
End: 2023-02-02
Payer: MEDICARE

## 2023-02-02 NOTE — TELEPHONE ENCOUNTER
----- Message from Rolan Lilly sent at 2/2/2023  8:48 AM CST -----  Type:  Sooner Apoointment Request    Caller is requesting a sooner appointment.  Caller declined first available appointment listed below.  Caller will not accept being placed on the waitlist and is requesting a message be sent to doctor.  Name of Caller:self  When is the first available appointment?3/9  Symptoms:f/u  Best Call Back Numer:[phone]

## 2023-02-15 ENCOUNTER — OFFICE VISIT (OUTPATIENT)
Dept: VASCULAR SURGERY | Facility: CLINIC | Age: 68
End: 2023-02-15
Payer: MEDICARE

## 2023-02-15 VITALS
WEIGHT: 185.19 LBS | SYSTOLIC BLOOD PRESSURE: 136 MMHG | HEIGHT: 67 IN | BODY MASS INDEX: 29.07 KG/M2 | DIASTOLIC BLOOD PRESSURE: 72 MMHG

## 2023-02-15 DIAGNOSIS — I87.2 VENOUS INSUFFICIENCY OF RIGHT LEG: Primary | ICD-10-CM

## 2023-02-15 DIAGNOSIS — I83.891 SYMPTOMATIC VARICOSE VEINS OF RIGHT LOWER EXTREMITY: ICD-10-CM

## 2023-02-15 PROCEDURE — 3075F SYST BP GE 130 - 139MM HG: CPT | Mod: CPTII,S$GLB,, | Performed by: SURGERY

## 2023-02-15 PROCEDURE — 3008F PR BODY MASS INDEX (BMI) DOCUMENTED: ICD-10-PCS | Mod: CPTII,S$GLB,, | Performed by: SURGERY

## 2023-02-15 PROCEDURE — 3078F DIAST BP <80 MM HG: CPT | Mod: CPTII,S$GLB,, | Performed by: SURGERY

## 2023-02-15 PROCEDURE — 3008F BODY MASS INDEX DOCD: CPT | Mod: CPTII,S$GLB,, | Performed by: SURGERY

## 2023-02-15 PROCEDURE — 3075F PR MOST RECENT SYSTOLIC BLOOD PRESS GE 130-139MM HG: ICD-10-PCS | Mod: CPTII,S$GLB,, | Performed by: SURGERY

## 2023-02-15 PROCEDURE — 1125F AMNT PAIN NOTED PAIN PRSNT: CPT | Mod: CPTII,S$GLB,, | Performed by: SURGERY

## 2023-02-15 PROCEDURE — 1159F MED LIST DOCD IN RCRD: CPT | Mod: CPTII,S$GLB,, | Performed by: SURGERY

## 2023-02-15 PROCEDURE — 3051F HG A1C>EQUAL 7.0%<8.0%: CPT | Mod: CPTII,S$GLB,, | Performed by: SURGERY

## 2023-02-15 PROCEDURE — 99203 PR OFFICE/OUTPT VISIT, NEW, LEVL III, 30-44 MIN: ICD-10-PCS | Mod: S$GLB,,, | Performed by: SURGERY

## 2023-02-15 PROCEDURE — 3051F PR MOST RECENT HEMOGLOBIN A1C LEVEL 7.0 - < 8.0%: ICD-10-PCS | Mod: CPTII,S$GLB,, | Performed by: SURGERY

## 2023-02-15 PROCEDURE — 99203 OFFICE O/P NEW LOW 30 MIN: CPT | Mod: S$GLB,,, | Performed by: SURGERY

## 2023-02-15 PROCEDURE — 1159F PR MEDICATION LIST DOCUMENTED IN MEDICAL RECORD: ICD-10-PCS | Mod: CPTII,S$GLB,, | Performed by: SURGERY

## 2023-02-15 PROCEDURE — 3078F PR MOST RECENT DIASTOLIC BLOOD PRESSURE < 80 MM HG: ICD-10-PCS | Mod: CPTII,S$GLB,, | Performed by: SURGERY

## 2023-02-15 PROCEDURE — 1125F PR PAIN SEVERITY QUANTIFIED, PAIN PRESENT: ICD-10-PCS | Mod: CPTII,S$GLB,, | Performed by: SURGERY
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
## 2023-02-17 ENCOUNTER — OFFICE VISIT (OUTPATIENT)
Dept: INTERNAL MEDICINE | Facility: CLINIC | Age: 68
End: 2023-02-17
Payer: MEDICARE

## 2023-02-17 ENCOUNTER — HOSPITAL ENCOUNTER (INPATIENT)
Facility: HOSPITAL | Age: 68
LOS: 2 days | Discharge: HOME | DRG: 871 | End: 2023-02-19
Attending: EMERGENCY MEDICINE | Admitting: HOSPITALIST
Payer: MEDICARE

## 2023-02-17 VITALS
DIASTOLIC BLOOD PRESSURE: 58 MMHG | HEART RATE: 129 BPM | SYSTOLIC BLOOD PRESSURE: 86 MMHG | BODY MASS INDEX: 31.53 KG/M2 | WEIGHT: 196.19 LBS | HEIGHT: 66 IN | OXYGEN SATURATION: 93 %

## 2023-02-17 DIAGNOSIS — R00.2 PALPITATION: Primary | ICD-10-CM

## 2023-02-17 DIAGNOSIS — R09.02 HYPOXEMIA: ICD-10-CM

## 2023-02-17 DIAGNOSIS — R06.02 SOB (SHORTNESS OF BREATH): ICD-10-CM

## 2023-02-17 DIAGNOSIS — R07.9 CHEST PAIN: ICD-10-CM

## 2023-02-17 DIAGNOSIS — I50.9 CHF (CONGESTIVE HEART FAILURE): ICD-10-CM

## 2023-02-17 DIAGNOSIS — I95.9 HYPOTENSION, UNSPECIFIED HYPOTENSION TYPE: ICD-10-CM

## 2023-02-17 DIAGNOSIS — R09.02 HYPOXIA: ICD-10-CM

## 2023-02-17 DIAGNOSIS — I87.2 VENOUS INSUFFICIENCY: Primary | ICD-10-CM

## 2023-02-17 DIAGNOSIS — R50.9 FEVER, UNSPECIFIED FEVER CAUSE: ICD-10-CM

## 2023-02-17 DIAGNOSIS — J18.9 PNEUMONIA OF RIGHT LUNG DUE TO INFECTIOUS ORGANISM, UNSPECIFIED PART OF LUNG: Primary | ICD-10-CM

## 2023-02-17 PROBLEM — M79.89 LEG SWELLING: Status: ACTIVE | Noted: 2023-02-17

## 2023-02-17 PROBLEM — G89.29 CHRONIC HEADACHE: Status: ACTIVE | Noted: 2023-02-17

## 2023-02-17 PROBLEM — R51.9 CHRONIC HEADACHE: Status: ACTIVE | Noted: 2023-02-17

## 2023-02-17 PROBLEM — A41.9 SEPSIS DUE TO PNEUMONIA: Status: ACTIVE | Noted: 2023-02-17

## 2023-02-17 PROBLEM — D63.8 ANEMIA OF CHRONIC DISEASE: Status: ACTIVE | Noted: 2023-02-17

## 2023-02-17 LAB
ALBUMIN SERPL BCP-MCNC: 3 G/DL (ref 3.5–5.2)
ALP SERPL-CCNC: 91 U/L (ref 55–135)
ALT SERPL W/O P-5'-P-CCNC: 32 U/L (ref 10–44)
ANION GAP SERPL CALC-SCNC: 9 MMOL/L (ref 8–16)
AST SERPL-CCNC: 68 U/L (ref 10–40)
AV INDEX (PROSTH): 0.61
AV MEAN GRADIENT: 7 MMHG
AV PEAK GRADIENT: 15 MMHG
AV VALVE AREA: 2.33 CM2
AV VELOCITY RATIO: 0.57
BASOPHILS # BLD AUTO: 0.04 K/UL (ref 0–0.2)
BASOPHILS NFR BLD: 0.2 % (ref 0–1.9)
BILIRUB SERPL-MCNC: 0.3 MG/DL (ref 0.1–1)
BNP SERPL-MCNC: 121 PG/ML (ref 0–99)
BSA FOR ECHO PROCEDURE: 1.96 M2
BUN SERPL-MCNC: 20 MG/DL (ref 8–23)
CALCIUM SERPL-MCNC: 9.2 MG/DL (ref 8.7–10.5)
CHLORIDE SERPL-SCNC: 108 MMOL/L (ref 95–110)
CO2 SERPL-SCNC: 21 MMOL/L (ref 23–29)
CREAT SERPL-MCNC: 0.8 MG/DL (ref 0.5–1.4)
CV ECHO LV RWT: 0.36 CM
DIFFERENTIAL METHOD: ABNORMAL
DOP CALC AO PEAK VEL: 1.91 M/S
DOP CALC AO VTI: 37.9 CM
DOP CALC LVOT AREA: 3.8 CM2
DOP CALC LVOT DIAMETER: 2.21 CM
DOP CALC LVOT PEAK VEL: 1.09 M/S
DOP CALC LVOT STROKE VOLUME: 88.18 CM3
DOP CALCLVOT PEAK VEL VTI: 23 CM
E WAVE DECELERATION TIME: 188.21 MSEC
E/A RATIO: 0.93
E/E' RATIO: 9.78 M/S
ECHO LV POSTERIOR WALL: 0.89 CM (ref 0.6–1.1)
EJECTION FRACTION: 65 %
EOSINOPHIL # BLD AUTO: 0 K/UL (ref 0–0.5)
EOSINOPHIL NFR BLD: 0 % (ref 0–8)
ERYTHROCYTE [DISTWIDTH] IN BLOOD BY AUTOMATED COUNT: 18 % (ref 11.5–14.5)
EST. GFR  (NO RACE VARIABLE): >60 ML/MIN/1.73 M^2
ESTIMATED AVG GLUCOSE: 151 MG/DL (ref 68–131)
FRACTIONAL SHORTENING: 33 % (ref 28–44)
GLUCOSE SERPL-MCNC: 215 MG/DL (ref 70–110)
HBA1C MFR BLD: 6.9 % (ref 4–5.6)
HCT VFR BLD AUTO: 26.8 % (ref 37–48.5)
HGB BLD-MCNC: 8.6 G/DL (ref 12–16)
IMM GRANULOCYTES # BLD AUTO: 0.17 K/UL (ref 0–0.04)
IMM GRANULOCYTES NFR BLD AUTO: 0.9 % (ref 0–0.5)
INTERVENTRICULAR SEPTUM: 0.77 CM (ref 0.6–1.1)
IVRT: 88.49 MSEC
LA MAJOR: 5.37 CM
LA MINOR: 5.39 CM
LA WIDTH: 4.3 CM
LEFT ATRIUM SIZE: 3.62 CM
LEFT ATRIUM VOLUME INDEX MOD: 32.5 ML/M2
LEFT ATRIUM VOLUME INDEX: 37.1 ML/M2
LEFT ATRIUM VOLUME MOD: 62.36 CM3
LEFT ATRIUM VOLUME: 71.18 CM3
LEFT INTERNAL DIMENSION IN SYSTOLE: 3.29 CM (ref 2.1–4)
LEFT VENTRICLE DIASTOLIC VOLUME INDEX: 59.94 ML/M2
LEFT VENTRICLE DIASTOLIC VOLUME: 115.08 ML
LEFT VENTRICLE MASS INDEX: 73 G/M2
LEFT VENTRICLE SYSTOLIC VOLUME INDEX: 22.9 ML/M2
LEFT VENTRICLE SYSTOLIC VOLUME: 43.88 ML
LEFT VENTRICULAR INTERNAL DIMENSION IN DIASTOLE: 4.94 CM (ref 3.5–6)
LEFT VENTRICULAR MASS: 139.5 G
LV LATERAL E/E' RATIO: 7.33 M/S
LV SEPTAL E/E' RATIO: 14.67 M/S
LVOT MG: 2.88 MMHG
LVOT MV: 0.81 CM/S
LYMPHOCYTES # BLD AUTO: 1.1 K/UL (ref 1–4.8)
LYMPHOCYTES NFR BLD: 5.5 % (ref 18–48)
MCH RBC QN AUTO: 25.4 PG (ref 27–31)
MCHC RBC AUTO-ENTMCNC: 32.1 G/DL (ref 32–36)
MCV RBC AUTO: 79 FL (ref 82–98)
MONOCYTES # BLD AUTO: 1.6 K/UL (ref 0.3–1)
MONOCYTES NFR BLD: 8.2 % (ref 4–15)
MV PEAK A VEL: 0.95 M/S
MV PEAK E VEL: 0.88 M/S
MV STENOSIS PRESSURE HALF TIME: 54.58 MS
MV VALVE AREA P 1/2 METHOD: 4.03 CM2
NEUTROPHILS # BLD AUTO: 16.9 K/UL (ref 1.8–7.7)
NEUTROPHILS NFR BLD: 85.2 % (ref 38–73)
NRBC BLD-RTO: 0 /100 WBC
PISA TR MAX VEL: 1.9 M/S
PLATELET # BLD AUTO: 301 K/UL (ref 150–450)
PMV BLD AUTO: 9.8 FL (ref 9.2–12.9)
POCT GLUCOSE: 143 MG/DL (ref 70–110)
POTASSIUM SERPL-SCNC: 3.8 MMOL/L (ref 3.5–5.1)
PROT SERPL-MCNC: 6.4 G/DL (ref 6–8.4)
PULM VEIN S/D RATIO: 1.34
PV PEAK D VEL: 0.29 M/S
PV PEAK S VEL: 0.39 M/S
RA MAJOR: 5.47 CM
RA PRESSURE: 3 MMHG
RA WIDTH: 4.2 CM
RBC # BLD AUTO: 3.38 M/UL (ref 4–5.4)
RIGHT VENTRICULAR END-DIASTOLIC DIMENSION: 3.48 CM
SARS-COV-2 RDRP RESP QL NAA+PROBE: NEGATIVE
SODIUM SERPL-SCNC: 138 MMOL/L (ref 136–145)
STJ: 3.55 CM
TDI LATERAL: 0.12 M/S
TDI SEPTAL: 0.06 M/S
TDI: 0.09 M/S
TR MAX PG: 14 MMHG
TROPONIN I SERPL DL<=0.01 NG/ML-MCNC: 0.01 NG/ML (ref 0–0.03)
TROPONIN I SERPL DL<=0.01 NG/ML-MCNC: 0.01 NG/ML (ref 0–0.03)
TROPONIN I SERPL DL<=0.01 NG/ML-MCNC: <0.006 NG/ML (ref 0–0.03)
TROPONIN I SERPL DL<=0.01 NG/ML-MCNC: <0.006 NG/ML (ref 0–0.03)
TV REST PULMONARY ARTERY PRESSURE: 17 MMHG
WBC # BLD AUTO: 19.85 K/UL (ref 3.9–12.7)

## 2023-02-17 PROCEDURE — 99999 PR PBB SHADOW E&M-EST. PATIENT-LVL III: ICD-10-PCS | Mod: PBBFAC,,, | Performed by: INTERNAL MEDICINE

## 2023-02-17 PROCEDURE — 80053 COMPREHEN METABOLIC PANEL: CPT | Performed by: EMERGENCY MEDICINE

## 2023-02-17 PROCEDURE — 99215 OFFICE O/P EST HI 40 MIN: CPT | Mod: S$PBB,,, | Performed by: INTERNAL MEDICINE

## 2023-02-17 PROCEDURE — 11000001 HC ACUTE MED/SURG PRIVATE ROOM

## 2023-02-17 PROCEDURE — 96374 THER/PROPH/DIAG INJ IV PUSH: CPT

## 2023-02-17 PROCEDURE — 25000003 PHARM REV CODE 250: Performed by: HOSPITALIST

## 2023-02-17 PROCEDURE — 63600175 PHARM REV CODE 636 W HCPCS: Performed by: HOSPITALIST

## 2023-02-17 PROCEDURE — 99213 OFFICE O/P EST LOW 20 MIN: CPT | Mod: PBBFAC,25,27,PO | Performed by: INTERNAL MEDICINE

## 2023-02-17 PROCEDURE — 87040 BLOOD CULTURE FOR BACTERIA: CPT | Performed by: HOSPITALIST

## 2023-02-17 PROCEDURE — 84484 ASSAY OF TROPONIN QUANT: CPT | Mod: 91 | Performed by: EMERGENCY MEDICINE

## 2023-02-17 PROCEDURE — 84484 ASSAY OF TROPONIN QUANT: CPT | Mod: 91 | Performed by: HOSPITALIST

## 2023-02-17 PROCEDURE — A4216 STERILE WATER/SALINE, 10 ML: HCPCS | Performed by: HOSPITALIST

## 2023-02-17 PROCEDURE — 83036 HEMOGLOBIN GLYCOSYLATED A1C: CPT | Performed by: EMERGENCY MEDICINE

## 2023-02-17 PROCEDURE — 83880 ASSAY OF NATRIURETIC PEPTIDE: CPT | Performed by: EMERGENCY MEDICINE

## 2023-02-17 PROCEDURE — 93010 EKG 12-LEAD: ICD-10-PCS | Mod: ,,, | Performed by: INTERNAL MEDICINE

## 2023-02-17 PROCEDURE — U0002 COVID-19 LAB TEST NON-CDC: HCPCS | Performed by: EMERGENCY MEDICINE

## 2023-02-17 PROCEDURE — 25000003 PHARM REV CODE 250: Performed by: EMERGENCY MEDICINE

## 2023-02-17 PROCEDURE — 87449 NOS EACH ORGANISM AG IA: CPT | Performed by: HOSPITALIST

## 2023-02-17 PROCEDURE — 25000003 PHARM REV CODE 250: Performed by: NURSE PRACTITIONER

## 2023-02-17 PROCEDURE — 85025 COMPLETE CBC W/AUTO DIFF WBC: CPT | Performed by: EMERGENCY MEDICINE

## 2023-02-17 PROCEDURE — 87899 AGENT NOS ASSAY W/OPTIC: CPT | Performed by: HOSPITALIST

## 2023-02-17 PROCEDURE — 63600175 PHARM REV CODE 636 W HCPCS: Performed by: EMERGENCY MEDICINE

## 2023-02-17 PROCEDURE — 99285 EMERGENCY DEPT VISIT HI MDM: CPT | Mod: 25

## 2023-02-17 PROCEDURE — 93005 ELECTROCARDIOGRAM TRACING: CPT

## 2023-02-17 PROCEDURE — 93010 ELECTROCARDIOGRAM REPORT: CPT | Mod: ,,, | Performed by: INTERNAL MEDICINE

## 2023-02-17 PROCEDURE — 99215 PR OFFICE/OUTPT VISIT, EST, LEVL V, 40-54 MIN: ICD-10-PCS | Mod: S$PBB,,, | Performed by: INTERNAL MEDICINE

## 2023-02-17 PROCEDURE — 99999 PR PBB SHADOW E&M-EST. PATIENT-LVL III: CPT | Mod: PBBFAC,,, | Performed by: INTERNAL MEDICINE

## 2023-02-17 RX ORDER — NALOXONE HCL 0.4 MG/ML
0.02 VIAL (ML) INJECTION
Status: DISCONTINUED | OUTPATIENT
Start: 2023-02-17 | End: 2023-02-19 | Stop reason: HOSPADM

## 2023-02-17 RX ORDER — ONDANSETRON 2 MG/ML
4 INJECTION INTRAMUSCULAR; INTRAVENOUS EVERY 8 HOURS PRN
Status: DISCONTINUED | OUTPATIENT
Start: 2023-02-17 | End: 2023-02-19 | Stop reason: HOSPADM

## 2023-02-17 RX ORDER — DEXTROSE 40 %
15 GEL (GRAM) ORAL
Status: DISCONTINUED | OUTPATIENT
Start: 2023-02-17 | End: 2023-02-19 | Stop reason: HOSPADM

## 2023-02-17 RX ORDER — POLYETHYLENE GLYCOL 3350 17 G/17G
17 POWDER, FOR SOLUTION ORAL DAILY
Status: DISCONTINUED | OUTPATIENT
Start: 2023-02-17 | End: 2023-02-19 | Stop reason: HOSPADM

## 2023-02-17 RX ORDER — TALC
6 POWDER (GRAM) TOPICAL NIGHTLY PRN
Status: DISCONTINUED | OUTPATIENT
Start: 2023-02-17 | End: 2023-02-19 | Stop reason: HOSPADM

## 2023-02-17 RX ORDER — ASPIRIN 325 MG
325 TABLET ORAL
Status: COMPLETED | OUTPATIENT
Start: 2023-02-17 | End: 2023-02-17

## 2023-02-17 RX ORDER — GLUCAGON 1 MG
1 KIT INJECTION
Status: DISCONTINUED | OUTPATIENT
Start: 2023-02-17 | End: 2023-02-19 | Stop reason: HOSPADM

## 2023-02-17 RX ORDER — ASPIRIN 81 MG/1
81 TABLET ORAL DAILY
Status: DISCONTINUED | OUTPATIENT
Start: 2023-02-18 | End: 2023-02-19 | Stop reason: HOSPADM

## 2023-02-17 RX ORDER — DULOXETIN HYDROCHLORIDE 30 MG/1
60 CAPSULE, DELAYED RELEASE ORAL DAILY
Status: DISCONTINUED | OUTPATIENT
Start: 2023-02-18 | End: 2023-02-19 | Stop reason: HOSPADM

## 2023-02-17 RX ORDER — SODIUM CHLORIDE 0.9 % (FLUSH) 0.9 %
10 SYRINGE (ML) INJECTION EVERY 8 HOURS
Status: DISCONTINUED | OUTPATIENT
Start: 2023-02-17 | End: 2023-02-19 | Stop reason: HOSPADM

## 2023-02-17 RX ORDER — CHOLECALCIFEROL (VITAMIN D3) 25 MCG
1000 TABLET ORAL DAILY
Status: ON HOLD | COMMUNITY
End: 2023-10-27

## 2023-02-17 RX ORDER — DEXTROSE 40 %
30 GEL (GRAM) ORAL
Status: DISCONTINUED | OUTPATIENT
Start: 2023-02-17 | End: 2023-02-19 | Stop reason: HOSPADM

## 2023-02-17 RX ORDER — DOXYCYCLINE HYCLATE 100 MG
100 TABLET ORAL EVERY 12 HOURS
Status: DISCONTINUED | OUTPATIENT
Start: 2023-02-17 | End: 2023-02-19 | Stop reason: HOSPADM

## 2023-02-17 RX ORDER — ACETAMINOPHEN 500 MG
1000 TABLET ORAL EVERY 8 HOURS PRN
Status: DISCONTINUED | OUTPATIENT
Start: 2023-02-17 | End: 2023-02-19 | Stop reason: HOSPADM

## 2023-02-17 RX ORDER — GABAPENTIN 300 MG/1
300 CAPSULE ORAL 2 TIMES DAILY
Status: DISCONTINUED | OUTPATIENT
Start: 2023-02-17 | End: 2023-02-19 | Stop reason: HOSPADM

## 2023-02-17 RX ORDER — ASCORBIC ACID 500 MG
500 TABLET ORAL DAILY
Status: ON HOLD | COMMUNITY
End: 2023-10-27

## 2023-02-17 RX ORDER — ONDANSETRON 4 MG/1
8 TABLET, ORALLY DISINTEGRATING ORAL EVERY 8 HOURS PRN
Status: DISCONTINUED | OUTPATIENT
Start: 2023-02-17 | End: 2023-02-19 | Stop reason: HOSPADM

## 2023-02-17 RX ORDER — IBUPROFEN 200 MG
200 TABLET ORAL EVERY 6 HOURS PRN
COMMUNITY
End: 2023-05-03

## 2023-02-17 RX ORDER — ATORVASTATIN CALCIUM 40 MG/1
40 TABLET, FILM COATED ORAL DAILY
Status: DISCONTINUED | OUTPATIENT
Start: 2023-02-18 | End: 2023-02-19 | Stop reason: HOSPADM

## 2023-02-17 RX ORDER — DEXTROSE 40 %
15 GEL (GRAM) ORAL
Status: DISCONTINUED | OUTPATIENT
Start: 2023-02-17 | End: 2023-02-17 | Stop reason: SDUPTHER

## 2023-02-17 RX ORDER — ACETAMINOPHEN 325 MG/1
650 TABLET ORAL EVERY 4 HOURS PRN
Status: DISCONTINUED | OUTPATIENT
Start: 2023-02-17 | End: 2023-02-19 | Stop reason: HOSPADM

## 2023-02-17 RX ORDER — LEVOFLOXACIN 5 MG/ML
750 INJECTION, SOLUTION INTRAVENOUS
Status: DISCONTINUED | OUTPATIENT
Start: 2023-02-17 | End: 2023-02-17

## 2023-02-17 RX ORDER — DEXTROSE 40 %
30 GEL (GRAM) ORAL
Status: DISCONTINUED | OUTPATIENT
Start: 2023-02-17 | End: 2023-02-17 | Stop reason: SDUPTHER

## 2023-02-17 RX ORDER — ACETAMINOPHEN 500 MG
500 TABLET ORAL EVERY 6 HOURS PRN
COMMUNITY
End: 2023-05-03

## 2023-02-17 RX ORDER — INSULIN ASPART 100 [IU]/ML
0-5 INJECTION, SOLUTION INTRAVENOUS; SUBCUTANEOUS
Status: DISCONTINUED | OUTPATIENT
Start: 2023-02-17 | End: 2023-02-19 | Stop reason: HOSPADM

## 2023-02-17 RX ORDER — HYDROCODONE BITARTRATE AND ACETAMINOPHEN 5; 325 MG/1; MG/1
1 TABLET ORAL EVERY 12 HOURS PRN
Status: DISCONTINUED | OUTPATIENT
Start: 2023-02-17 | End: 2023-02-19 | Stop reason: HOSPADM

## 2023-02-17 RX ORDER — PANTOPRAZOLE SODIUM 40 MG/1
40 TABLET, DELAYED RELEASE ORAL DAILY
Status: DISCONTINUED | OUTPATIENT
Start: 2023-02-18 | End: 2023-02-19 | Stop reason: HOSPADM

## 2023-02-17 RX ORDER — OXYBUTYNIN CHLORIDE 5 MG/1
5 TABLET, EXTENDED RELEASE ORAL DAILY
Status: DISCONTINUED | OUTPATIENT
Start: 2023-02-17 | End: 2023-02-19 | Stop reason: HOSPADM

## 2023-02-17 RX ADMIN — Medication 6 MG: at 08:02

## 2023-02-17 RX ADMIN — SODIUM CHLORIDE, SODIUM LACTATE, POTASSIUM CHLORIDE, AND CALCIUM CHLORIDE 1000 ML: .6; .31; .03; .02 INJECTION, SOLUTION INTRAVENOUS at 04:02

## 2023-02-17 RX ADMIN — POLYETHYLENE GLYCOL 3350 17 G: 17 POWDER, FOR SOLUTION ORAL at 03:02

## 2023-02-17 RX ADMIN — DOXYCYCLINE HYCLATE 100 MG: 100 TABLET, COATED ORAL at 08:02

## 2023-02-17 RX ADMIN — HYDROCODONE BITARTRATE AND ACETAMINOPHEN 1 TABLET: 5; 325 TABLET ORAL at 08:02

## 2023-02-17 RX ADMIN — Medication 10 ML: at 10:02

## 2023-02-17 RX ADMIN — VANCOMYCIN HYDROCHLORIDE 2000 MG: 1 INJECTION, POWDER, LYOPHILIZED, FOR SOLUTION INTRAVENOUS at 07:02

## 2023-02-17 RX ADMIN — Medication 10 ML: at 02:02

## 2023-02-17 RX ADMIN — ASPIRIN 325 MG ORAL TABLET 325 MG: 325 PILL ORAL at 11:02

## 2023-02-17 RX ADMIN — GABAPENTIN 300 MG: 300 CAPSULE ORAL at 08:02

## 2023-02-17 RX ADMIN — OXYBUTYNIN CHLORIDE 5 MG: 5 TABLET, EXTENDED RELEASE ORAL at 05:02

## 2023-02-17 RX ADMIN — INSULIN DETEMIR 5 UNITS: 100 INJECTION, SOLUTION SUBCUTANEOUS at 08:02

## 2023-02-17 RX ADMIN — LEVOFLOXACIN 750 MG: 750 INJECTION, SOLUTION INTRAVENOUS at 02:02

## 2023-02-17 RX ADMIN — CEFTRIAXONE 1 G: 1 INJECTION, POWDER, FOR SOLUTION INTRAMUSCULAR; INTRAVENOUS at 05:02

## 2023-02-17 NOTE — ASSESSMENT & PLAN NOTE
-puts patient at increased risk of infection with encapsulated organisms   -changing antibiotics to include IV ceftriaxone
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
## 2023-02-17 NOTE — ASSESSMENT & PLAN NOTE
-concern for multifocal pneumonia on admission chest x-ray in the setting of sepsis   -will change antibiotics to ceftriaxone and doxycycline (give long QT) to cover for encapsulated organisms  -give vancomycin for now given her recent influenza infection   -blood cultures, respiratory cultures, strep pneumo, and Legionella antigens pending  -can hopefully expeditiously deescalate antibiotics pending patient progression overnight

## 2023-02-17 NOTE — ASSESSMENT & PLAN NOTE
Patient's FSGs are controlled on current medication regimen.  Last A1c reviewed-   Lab Results   Component Value Date    HGBA1C 7.2 (H) 01/05/2023     Most recent fingerstick glucose reviewed- No results for input(s): POCTGLUCOSE in the last 24 hours.  Current correctional scale  Low  Maintain anti-hyperglycemic dose as follows-   Antihyperglycemics (From admission, onward)    Start     Stop Route Frequency Ordered    02/17/23 2100  insulin detemir U-100 pen 5 Units         -- SubQ Nightly 02/17/23 1437    02/17/23 1538  insulin aspart U-100 pen 0-5 Units         -- SubQ Before meals & nightly PRN 02/17/23 1438        Hold Oral hypoglycemics while patient is in the hospital.  -continue home statin   -continue home gabapentin and duloxetine for neuropathy

## 2023-02-17 NOTE — ASSESSMENT & PLAN NOTE
-hemoglobin 8.6 on arrival, little lower than her previous baseline but in the setting of severe disease   -continue to monitor

## 2023-02-17 NOTE — HPI
Ms. Perez is a 66yo woman with DM2, HTN, hx of gastric sleeve surgery who presents from [facility] with hypoxia and hypertension.  She states that on the day prior to admission, she had a sore throat and a mild headache.  She went to bed at around 2:00 p.m. and then woke with shaking chills.  She states that she needed to wear to quilts to feel warm again and that in the morning she noticed that she was having difficulty breathing felt short of breath.  She denies any cough during this time.  She attempted to go to work today but then had the chills again.  She does note that she has a headache right now; she states that she has had chronic left-sided headaches since a fall back in August.  This is maybe a little worse than normal but is otherwise consistent with her prior presentations.  She does state that she had an episode of chest discomfort last night, but is poorly characterized and she states that she thinks it may have been gastrointestinal.  She is also noted bilateral lower extremity swelling over the 2 days and feels that her legs are tight. 

## 2023-02-17 NOTE — ED NOTES
States she was at her PCP office with a HA-- frontal 9/10.      Yesterday she says she just wasn't feeling very well and it has gotten progressively worse.  Shaking/body aches and bilateral LE swelling and warmth up to knees.

## 2023-02-17 NOTE — PROGRESS NOTES
[facility]                   Pharmacy  Pharmacy Vancomycin/AG Sign-off    Therapy with vancomycin completed and/or consult discontinued by provider.  Pharmacy will sign off, please re-consult as needed. Thank you for allowing us to participate in this patient's care.     Flori Cash, PharmD    [phone]

## 2023-02-17 NOTE — ASSESSMENT & PLAN NOTE
-following fall in August   -currently likely worse in the setting of fever and sepsis, but will keep a close eye on this   -low clinical concern for meningitis at this time  -may benefit from further outpatient workup with Neurology

## 2023-02-17 NOTE — PROGRESS NOTES
Pharmacokinetic Initial Assessment: IV Vancomycin    Assessment/Plan:    Initiate intravenous vancomycin with loading dose of 2000 mg once followed by a maintenance dose of vancomycin [id number] IV every 12 hours  Desired empiric serum trough concentration is 15 to 20 mcg/mL  Draw vancomycin trough level 60 min prior to fourth dose on 2/19 at approximately 0200  Pharmacy will continue to follow and monitor vancomycin.      Please contact pharmacy at extension [phone] with any questions regarding this assessment.     Thank you for the consult,   Randi Miller       Patient brief summary:  Jill Perez is a 67 y.o. female initiated on antimicrobial therapy with IV Vancomycin for treatment of suspected lower respiratory infection    Drug Allergies:   Review of patient's allergies indicates:   Allergen Reactions    Pcn [penicillins] Hives and Itching       Actual Body Weight:   82.6 kg    Renal Function:   Estimated Creatinine Clearance: 75.9 mL/min (based on SCr of 0.8 mg/dL).,     Dialysis Method (if applicable):  N/A    CBC (last 72 hours):  Recent Labs   Lab Result Units 02/17/23  1104   WBC K/uL 19.85*   Hemoglobin g/dL 8.6*   Hematocrit % 26.8*   Platelets K/uL 301   Gran % % 85.2*   Lymph % % 5.5*   Mono % % 8.2   Eosinophil % % 0.0   Basophil % % 0.2   Differential Method  Automated       Metabolic Panel (last 72 hours):  Recent Labs   Lab Result Units 02/17/23  1104   Sodium mmol/L 138   Potassium mmol/L 3.8   Chloride mmol/L 108   CO2 mmol/L 21*   Glucose mg/dL 215*   BUN mg/dL 20   Creatinine mg/dL 0.8   Albumin g/dL 3.0*   Total Bilirubin mg/dL 0.3   Alkaline Phosphatase U/L 91   AST U/L 68*   ALT U/L 32       Drug levels (last 3 results):  No results for input(s): VANCOMYCINRA, VANCORANDOM, VANCOMYCINPE, VANCOPEAK, VANCOMYCINTR, VANCOTROUGH in the last 72 hours.    Microbiologic Results:  Microbiology Results (last 7 days)       ** No results found for the last 168 hours. **
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
## 2023-02-17 NOTE — ASSESSMENT & PLAN NOTE
This patient does have evidence of infective focus  My overall impression is sepsis.  Source: Respiratory  Antibiotics given-   Antibiotics (72h ago, onward)    Start     Stop Route Frequency Ordered    02/17/23 1545  cefTRIAXone (ROCEPHIN) 1 g in dextrose 5 % in water (D5W) 5 % 50 mL IVPB (MB+)         -- IV Every 24 hours (non-standard times) 02/17/23 1435    02/17/23 1445  azithromycin (ZITHROMAX) 500 mg in dextrose 5 % (D5W) 250 mL IVPB (Vial-Mate)         -- IV Once 02/17/23 1435        Latest lactate reviewed-  No results for input(s): LACTATE in the last 72 hours.  Organ dysfunction indicated by Acute respiratory failure    Fluid challenge Other- Patient to receive 1 L LR volume other than 30cc/kg due to Volume overload due to- Bilateral lower extremity swelling and increased BNP     Post- resuscitation assessment No - Post resuscitation assessment not needed       Will Not start Pressors- Levophed for MAP of 65  Source control achieved by:  IV antibiotics

## 2023-02-17 NOTE — PROGRESS NOTES
"  Assessment:       1. Palpitation    2. SOB (shortness of breath)    3. Fever, unspecified fever cause    4. Hypotension, unspecified hypotension type    5. Hypoxemia        Plan:         Jill was seen today for fever, generalized body aches, chills and nasal congestion.    Diagnoses and all orders for this visit:    Palpitation    SOB (shortness of breath)    Fever, unspecified fever cause    Hypotension, unspecified hypotension type    Hypoxemia      Acute  New Problem : high risk   Unclear Etiology  Unclear prognosis  Ddx would include ADHF, ACS, VTE, PNA  I explained to the patient the Potential injury or illness that could pose a threat to life or bodily function  EMS called   Patient is agreeable to plan       Subjective:       Patient ID: Jill Perez is a 67 y.o. female.    Chief Complaint: Fever, Generalized Body Aches, Chills (SYDNIE), and Nasal Congestion      Patient reports onset yesterday of fever, chills, body aches, lightheadedness, palpitation and shortness of breath. She reports she though about going to the ER last night. She presents today with lightheadedness, palpitation, fevers.     Fever       Review of Systems   Constitutional:  Positive for fever.           Health Maintenance Due   Topic Date Due    COVID-19 Vaccine (4 - Booster for Moderna series) 10/12/2021    Foot Exam  10/21/2021    Eye Exam  11/05/2021    Influenza Vaccine (1) 09/01/2022         Objective:     BP (!) 86/58 (BP Location: Left arm, Patient Position: Sitting, BP Method: Large (Manual))   Pulse (!) 129   Ht 5' 6" (1.676 m)   Wt 89 kg (196 lb 3.4 oz)   LMP  (LMP Unknown)   SpO2 (!) 93%   BMI 31.67 kg/m²     Vitals 2/17/2023 2/15/2023 1/10/2023 12/14/2022 11/30/2022   Height 66 67 67 - 67   Weight (lbs) 196.21 185.19 185.19 193 183.86   BMI (kg/m2) 31.7 29 29 - 28.8                Physical Exam  Constitutional:       Appearance: She is ill-appearing. She is not diaphoretic.   HENT:      Head: Normocephalic. "   Cardiovascular:      Rate and Rhythm: Tachycardia present.      Heart sounds: No murmur heard.    No friction rub. No gallop.   Pulmonary:      Comments: Tachypnea  Right diminshed breath sounds   Neurological:      Mental Status: She is alert.           Future Appointments   Date Time Provider Department Center   3/3/2023 11:00 AM Connie Butler MD [facility]   3/23/2023  8:30 AM UMANG Lara II, MD [facility]   4/6/2023  8:00 AM Lis Rubin, OD [facility]         Medication List with Changes/Refills   Current Medications    ALBUTEROL (PROVENTIL) 2.5 MG /3 ML (0.083 %) NEBULIZER SOLUTION    Take 3 mLs (2.5 mg total) by nebulization every 6 (six) hours as needed for Wheezing. Rescue    AMLODIPINE (NORVASC) 5 MG TABLET    Take 1 tablet (5 mg total) by mouth once daily.    ASPIRIN (ECOTRIN) 81 MG EC TABLET    Take 81 mg by mouth once daily.    ATORVASTATIN (LIPITOR) 40 MG TABLET    Take 1 tablet Orally Once a day 90 day(s)    BLOOD SUGAR DIAGNOSTIC (TRUE METRIX GLUCOSE TEST STRIP) STRP    USE 3 TIMES A DAY    BLOOD-GLUCOSE METER (TRUE METRIX GLUCOSE METER) MISC    USE 3 TIMES A DA    CETIRIZINE (ZYRTEC) 10 MG TABLET    Take 1 tablet Orally Once a day 30 day(s)    CYANOCOBALAMIN (VITAMIN B-12) 1000 MCG TABLET    Take 1,000 mcg by mouth.    CYANOCOBALAMIN (VITAMIN B-12) 1000 MCG TABLET    1 tablet Orally Once a day 90 day(s)    DULAGLUTIDE (TRULICITY) 4.5 MG/0.5 ML PEN INJECTOR    Inject 4.5 mg into the skin every 7 days.    DULOXETINE (CYMBALTA) 60 MG CAPSULE    Take one capsule by mouth daily    GABAPENTIN (NEURONTIN) 300 MG CAPSULE    Take 1 capsule by mouth 3 times a day as needed for 30 days    GABAPENTIN (NEURONTIN) 600 MG TABLET    take 1 tablet by mouth three times daily as needed    GABAPENTIN (NEURONTIN) 600 MG TABLET    take 1 tablet by mouth three times daily as needed    GABAPENTIN (NEURONTIN) 600 MG TABLET    Take 1 tablet by mouth three times a day as  needed    HYDROCODONE-ACETAMINOPHEN (NORCO)  MG PER TABLET    Take 1 tablet by mouth 3 (three) times daily.    HYDROCODONE-ACETAMINOPHEN (NORCO)  MG PER TABLET    Take 1 tablet by mouth three times a day as needed for pain    LANCETS (TRUEPLUS LANCETS) 30 GAUGE MISC    USE 3 TIMES A DAY    MAGIC MOUTHWASH DIPHEN/ANTAC/LIDOC    Swish and spit 5 mLs every 4 (four) hours as needed (gargle and spit for sore throat).    METFORMIN (GLUCOPHAGE) 1000 MG TABLET    Take 1 tablet (1,000 mg total) by mouth 2 (two) times daily with meals.    METHOCARBAMOL (ROBAXIN) 500 MG TAB    Take 1 tablet (500 mg total) by mouth daily as needed.    METHOCARBAMOL (ROBAXIN) 500 MG TAB    take 1 tablet by mouth daily as needed for muscle spasms    METHOCARBAMOL (ROBAXIN) 500 MG TAB    Take 1 tablet by mouth once a day as needed for muscle spasms    NEBULIZER AND COMPRESSOR (HOME NEBULIZER PLUS SIDESTREAM) ALINE    Use as directed with nebulizer solution    OLMESARTAN (BENICAR) 20 MG TABLET    Take1 tablet Orally Once a day 90 day(s)    OXYBUTYNIN (DITROPAN-XL) 5 MG TR24    Take 1 tablet (5 mg total) by mouth once daily.    PANTOPRAZOLE (PROTONIX) 40 MG TABLET    Take 1 tablet by mouth daily    PROMETHAZINE-DEXTROMETHORPHAN (PROMETHAZINE-DM) 6.25-15 MG/5 ML SYRP    Take 5 mLs by mouth every 6 (six) hours as needed (cough).    PULSE OXIMETER (PULSE OXIMETER) DEVICE    by Apply Externally route 2 (two) times a day. Use twice daily at 8 AM and 3 PM and record the value [facility] as directed.    SCOPOLAMINE (TRANSDERM-SCOP) 1.3-1.5 MG (1 MG OVER 3 DAYS)    Place 1 patch onto the skin every 72 hours.    ZOLPIDEM (AMBIEN) 10 MG TAB    Take 1 tablet (10 mg total) by mouth nightly at bedtime as needed         Disclaimer:  This note has been generated using voice-recognition software. There may be grammatical or spelling errors that have been missed during proof-reading

## 2023-02-17 NOTE — PHARMACY MED REC
"    Admission Medication History     The home medication history was taken by Renetta Lee CPhT.    Medication history obtained from, Patient Verified    You may go to "Admission" then "Reconcile Home Medications" tabs to review and/or act upon these items.     The home medication list has been updated by the Pharmacy department.   Please read ALL comments highlighted in yellow.   Please address this information as you see fit.    Feel free to contact us if you have any questions or require assistance.      The medications listed below were removed from the home medication list.  Please reorder if appropriate:  Patient reports no longer taking the following medication(s):  Albuterol 2.5 mg/3ml  Cetirizine 10 mg  Vitamin B-12 1000 mcg  Magic Mouthwash   Promethazine DM 6.25-15 mg/5 ml syrup  Transderm Scop 1.3-1.5 mg          Renetta Lee CPhT.  Ext [phone]           .        "

## 2023-02-17 NOTE — ASSESSMENT & PLAN NOTE
-with increased BNP   -no prior cardiac history   -EKG nonischemic   -initial troponin negative   -will trend troponins and obtain echocardiogram  -caution with fluid resuscitation
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
## 2023-02-17 NOTE — PROGRESS NOTES
Pharmacokinetic Initial Assessment: Tobramycin    Assessment:  Weight utilized for dose calculation: Adjusted Body Weight  Dosing method utilized: extended interval dosing    Plan: Extended interval dosing regimen: Tobramycin 480 mg IV once, followed by a random level to be drawn on 2/18 at 0100, 8-12 hours after the first dose.    Pharmacy will continue to monitor.    Please contact pharmacy at extension [phone] with any questions regarding this assessment.    Thank you for the consult,    Randi Miller       Patient brief summary:  Jill Perez is a 67 y.o. female initiated on aminoglycoside therapy for treatment of suspected lower respiratory infection    Drug Allergies:   Review of patient's allergies indicates:   Allergen Reactions    Pcn [penicillins] Hives and Itching       Actual Body Weight:   82.6 kg    Adjust Body Weight:   68.6 kg    Ideal Body Weight:  59.3 kg    Renal Function:   Estimated Creatinine Clearance: 75.9 mL/min (based on SCr of 0.8 mg/dL).,     Dialysis Method (if applicable):  N/A    CBC (last 72 hours):  Recent Labs   Lab Result Units 02/17/23  1104   WBC K/uL 19.85*   Hemoglobin g/dL 8.6*   Hematocrit % 26.8*   Platelets K/uL 301   Gran % % 85.2*   Lymph % % 5.5*   Mono % % 8.2   Eosinophil % % 0.0   Basophil % % 0.2   Differential Method  Automated       Metabolic Panel (last 72 hours):  Recent Labs   Lab Result Units 02/17/23  1104   Sodium mmol/L 138   Potassium mmol/L 3.8   Chloride mmol/L 108   CO2 mmol/L 21*   Glucose mg/dL 215*   BUN mg/dL 20   Creatinine mg/dL 0.8   Albumin g/dL 3.0*   Total Bilirubin mg/dL 0.3   Alkaline Phosphatase U/L 91   AST U/L 68*   ALT U/L 32       Microbiologic Results:  Microbiology Results (last 7 days)       ** No results found for the last 168 hours. **

## 2023-02-18 LAB
ANION GAP SERPL CALC-SCNC: 9 MMOL/L (ref 8–16)
BASOPHILS # BLD AUTO: 0.03 K/UL (ref 0–0.2)
BASOPHILS NFR BLD: 0.2 % (ref 0–1.9)
BUN SERPL-MCNC: 11 MG/DL (ref 8–23)
CALCIUM SERPL-MCNC: 9.1 MG/DL (ref 8.7–10.5)
CHLORIDE SERPL-SCNC: 109 MMOL/L (ref 95–110)
CHOLEST SERPL-MCNC: 175 MG/DL (ref 120–199)
CHOLEST/HDLC SERPL: 2.8 {RATIO} (ref 2–5)
CO2 SERPL-SCNC: 21 MMOL/L (ref 23–29)
CREAT SERPL-MCNC: 0.8 MG/DL (ref 0.5–1.4)
DIFFERENTIAL METHOD: ABNORMAL
EOSINOPHIL # BLD AUTO: 0.1 K/UL (ref 0–0.5)
EOSINOPHIL NFR BLD: 0.5 % (ref 0–8)
ERYTHROCYTE [DISTWIDTH] IN BLOOD BY AUTOMATED COUNT: 18.2 % (ref 11.5–14.5)
EST. GFR  (NO RACE VARIABLE): >60 ML/MIN/1.73 M^2
GLUCOSE SERPL-MCNC: 295 MG/DL (ref 70–110)
HCT VFR BLD AUTO: 28.2 % (ref 37–48.5)
HDLC SERPL-MCNC: 62 MG/DL (ref 40–75)
HDLC SERPL: 35.4 % (ref 20–50)
HGB BLD-MCNC: 9 G/DL (ref 12–16)
IMM GRANULOCYTES # BLD AUTO: 0.1 K/UL (ref 0–0.04)
IMM GRANULOCYTES NFR BLD AUTO: 0.6 % (ref 0–0.5)
LDLC SERPL CALC-MCNC: 98.8 MG/DL (ref 63–159)
LYMPHOCYTES # BLD AUTO: 1.5 K/UL (ref 1–4.8)
LYMPHOCYTES NFR BLD: 9 % (ref 18–48)
MCH RBC QN AUTO: 25.4 PG (ref 27–31)
MCHC RBC AUTO-ENTMCNC: 31.9 G/DL (ref 32–36)
MCV RBC AUTO: 79 FL (ref 82–98)
MONOCYTES # BLD AUTO: 0.8 K/UL (ref 0.3–1)
MONOCYTES NFR BLD: 4.7 % (ref 4–15)
NEUTROPHILS # BLD AUTO: 14.1 K/UL (ref 1.8–7.7)
NEUTROPHILS NFR BLD: 85 % (ref 38–73)
NONHDLC SERPL-MCNC: 113 MG/DL
NRBC BLD-RTO: 0 /100 WBC
PLATELET # BLD AUTO: 327 K/UL (ref 150–450)
PMV BLD AUTO: 10.2 FL (ref 9.2–12.9)
POCT GLUCOSE: 114 MG/DL (ref 70–110)
POCT GLUCOSE: 138 MG/DL (ref 70–110)
POCT GLUCOSE: 159 MG/DL (ref 70–110)
POCT GLUCOSE: 198 MG/DL (ref 70–110)
POTASSIUM SERPL-SCNC: 4 MMOL/L (ref 3.5–5.1)
RBC # BLD AUTO: 3.55 M/UL (ref 4–5.4)
SODIUM SERPL-SCNC: 139 MMOL/L (ref 136–145)
TRIGL SERPL-MCNC: 71 MG/DL (ref 30–150)
WBC # BLD AUTO: 16.6 K/UL (ref 3.9–12.7)

## 2023-02-18 PROCEDURE — 11000001 HC ACUTE MED/SURG PRIVATE ROOM

## 2023-02-18 PROCEDURE — 25000003 PHARM REV CODE 250: Performed by: INTERNAL MEDICINE

## 2023-02-18 PROCEDURE — 25000003 PHARM REV CODE 250: Performed by: HOSPITALIST

## 2023-02-18 PROCEDURE — A4216 STERILE WATER/SALINE, 10 ML: HCPCS | Performed by: HOSPITALIST

## 2023-02-18 PROCEDURE — 36415 COLL VENOUS BLD VENIPUNCTURE: CPT | Performed by: HOSPITALIST

## 2023-02-18 PROCEDURE — 85025 COMPLETE CBC W/AUTO DIFF WBC: CPT | Performed by: HOSPITALIST

## 2023-02-18 PROCEDURE — 63600175 PHARM REV CODE 636 W HCPCS: Performed by: HOSPITALIST

## 2023-02-18 PROCEDURE — 80048 BASIC METABOLIC PNL TOTAL CA: CPT | Performed by: HOSPITALIST

## 2023-02-18 PROCEDURE — 80061 LIPID PANEL: CPT | Performed by: HOSPITALIST

## 2023-02-18 PROCEDURE — 94761 N-INVAS EAR/PLS OXIMETRY MLT: CPT

## 2023-02-18 RX ORDER — ZOLPIDEM TARTRATE 5 MG/1
10 TABLET ORAL NIGHTLY PRN
Status: DISCONTINUED | OUTPATIENT
Start: 2023-02-18 | End: 2023-02-19 | Stop reason: HOSPADM

## 2023-02-18 RX ADMIN — ATORVASTATIN CALCIUM 40 MG: 40 TABLET, FILM COATED ORAL at 09:02

## 2023-02-18 RX ADMIN — OXYBUTYNIN CHLORIDE 5 MG: 5 TABLET, EXTENDED RELEASE ORAL at 09:02

## 2023-02-18 RX ADMIN — GABAPENTIN 300 MG: 300 CAPSULE ORAL at 08:02

## 2023-02-18 RX ADMIN — CEFTRIAXONE 1 G: 1 INJECTION, POWDER, FOR SOLUTION INTRAMUSCULAR; INTRAVENOUS at 02:02

## 2023-02-18 RX ADMIN — ASPIRIN 81 MG: 81 TABLET, COATED ORAL at 09:02

## 2023-02-18 RX ADMIN — DULOXETINE 60 MG: 30 CAPSULE, DELAYED RELEASE ORAL at 09:02

## 2023-02-18 RX ADMIN — Medication 10 ML: at 02:02

## 2023-02-18 RX ADMIN — PANTOPRAZOLE SODIUM 40 MG: 40 TABLET, DELAYED RELEASE ORAL at 09:02

## 2023-02-18 RX ADMIN — POLYETHYLENE GLYCOL 3350 17 G: 17 POWDER, FOR SOLUTION ORAL at 09:02

## 2023-02-18 RX ADMIN — VANCOMYCIN HYDROCHLORIDE 1000 MG: 1 INJECTION, POWDER, LYOPHILIZED, FOR SOLUTION INTRAVENOUS at 07:02

## 2023-02-18 RX ADMIN — Medication 10 ML: at 06:02

## 2023-02-18 RX ADMIN — ZOLPIDEM TARTRATE 10 MG: 5 TABLET ORAL at 08:02

## 2023-02-18 RX ADMIN — DOXYCYCLINE HYCLATE 100 MG: 100 TABLET, COATED ORAL at 09:02

## 2023-02-18 RX ADMIN — Medication 10 ML: at 09:02

## 2023-02-18 RX ADMIN — DOXYCYCLINE HYCLATE 100 MG: 100 TABLET, COATED ORAL at 08:02

## 2023-02-18 RX ADMIN — ZOLPIDEM TARTRATE 10 MG: 5 TABLET ORAL at 12:02

## 2023-02-18 RX ADMIN — INSULIN DETEMIR 5 UNITS: 100 INJECTION, SOLUTION SUBCUTANEOUS at 08:02

## 2023-02-18 RX ADMIN — GABAPENTIN 300 MG: 300 CAPSULE ORAL at 09:02

## 2023-02-18 RX ADMIN — VANCOMYCIN HYDROCHLORIDE 1000 MG: 1 INJECTION, POWDER, LYOPHILIZED, FOR SOLUTION INTRAVENOUS at 08:02

## 2023-02-18 NOTE — ASSESSMENT & PLAN NOTE
-puts patient at increased risk of infection with encapsulated organisms   -changed antibiotics to include IV ceftriaxone

## 2023-02-18 NOTE — PROGRESS NOTES
[facility] Medicine  Progress Note    Patient Name: Jill Perez  MRN: [id number]  Patient Class: IP- Inpatient   Admission Date: 2/17/2023  Length of Stay: 1 days  Attending Physician: Raymond Henning, *  Primary Care Provider: Estefania Hutson MD        Subjective:     Principal Problem:Sepsis due to pneumonia        HPI:  Ms. Perez is a 66yo woman with DM2, HTN, hx of gastric sleeve surgery who presents from [facility] with hypoxia and hypertension.  She states that on the day prior to admission, she had a sore throat and a mild headache.  She went to bed at around 2:00 p.m. and then woke with shaking chills.  She states that she needed to wear to quilts to feel warm again and that in the morning she noticed that she was having difficulty breathing felt short of breath.  She denies any cough during this time.  She attempted to go to work today but then had the chills again.  She does note that she has a headache right now; she states that she has had chronic left-sided headaches since a fall back in August.  This is maybe a little worse than normal but is otherwise consistent with her prior presentations.  She does state that she had an episode of chest discomfort last night, but is poorly characterized and she states that she thinks it may have been gastrointestinal.  She is also noted bilateral lower extremity swelling over the 2 days and feels that her legs are tight.       Overview/Hospital Course:  No notes on file    Interval History:  Reports burning pain on the left side of her chest extending down her left arm.  States has been constant over past several days without relieving or exacerbating factors; 6/10 pain.  Shortness of breath improving.  Still with headache. States that she feels that her feet and left hand are swollen    Review of Systems   Constitutional:  Negative for fever.   Respiratory:  Positive for chest tightness.    Neurological:  Positive for headaches.    Objective:     Vital Signs (Most Recent):  Temp: 98.9 °F (37.2 °C) (02/18/23 1157)  Pulse: 78 (02/18/23 1550)  Resp: 18 (02/18/23 1157)  BP: 122/61 (02/18/23 1157)  SpO2: 97 % (02/18/23 1157) Vital Signs (24h Range):  Temp:  [98.2 °F (36.8 °C)-99.9 °F (37.7 °C)] 98.9 °F (37.2 °C)  Pulse:  [] 78  Resp:  [17-24] 18  SpO2:  [92 %-98 %] 97 %  BP: ()/(54-79) 122/61     Weight: 87.5 kg (192 lb 14.4 oz)  Body mass index is 31.14 kg/m².    Intake/Output Summary (Last 24 hours) at 2/18/2023 1551  Last data filed at 2/18/2023 0822  Gross per 24 hour   Intake 836.12 ml   Output 0 ml   Net 836.12 ml      Physical Exam  Vitals reviewed.   Constitutional:       General: She is not in acute distress.     Appearance: She is well-developed. She is not diaphoretic.   HENT:      Head: Normocephalic and atraumatic.      Nose: Nose normal.   Eyes:      General: No scleral icterus.     Pupils: Pupils are equal, round, and reactive to light.   Neck:      Vascular: No JVD.      Trachea: No tracheal deviation.   Cardiovascular:      Rate and Rhythm: Normal rate and regular rhythm.      Heart sounds: Normal heart sounds.   Pulmonary:      Effort: Pulmonary effort is normal. No respiratory distress.      Breath sounds: Rales present.   Abdominal:      General: There is no distension.      Palpations: Abdomen is soft.      Tenderness: There is no abdominal tenderness.   Musculoskeletal:         General: No deformity.      Cervical back: Normal range of motion.      Comments: Trace bilateral lower extremity edema   Skin:     General: Skin is warm and dry.      Findings: No rash.   Neurological:      Mental Status: She is alert and oriented to person, place, and time.   Psychiatric:         Behavior: Behavior normal.       Significant Labs: All pertinent labs within the past 24 hours have been reviewed.    Significant Imaging: I have reviewed all pertinent imaging results/findings within the past 24 hours.      Assessment/Plan:       * Sepsis due to pneumonia  This patient does have evidence of infective focus  My overall impression is sepsis.  Source: Respiratory  Antibiotics given-   Antibiotics (72h ago, onward)    Start     Stop Route Frequency Ordered    02/18/23 0730  vancomycin (VANCOCIN) 1,000 mg in dextrose 5 % (D5W) 250 mL IVPB (Vial-Mate)         -- IV Every 12 hours (non-standard times) 02/17/23 1448    02/17/23 2100  doxycycline tablet 100 mg         -- Oral Every 12 hours 02/17/23 1444    02/17/23 1545  cefTRIAXone (ROCEPHIN) 1 g in dextrose 5 % in water (D5W) 5 % 50 mL IVPB (MB+)         -- IV Every 24 hours (non-standard times) 02/17/23 1435    02/17/23 1543  vancomycin - pharmacy to dose  (vancomycin IVPB)        See [facility] for full Linked Orders Report.    -- IV pharmacy to manage frequency 02/17/23 1443        Latest lactate reviewed-  No results for input(s): LACTATE in the last 72 hours.  Organ dysfunction indicated by Acute respiratory failure    Fluid challenge Other- Patient to receive 1 L LR volume other than 30cc/kg due to Volume overload due to- Bilateral lower extremity swelling and increased BNP     Post- resuscitation assessment No - Post resuscitation assessment not needed       Will Not start Pressors- Levophed for MAP of 65  Source control achieved by:  IV antibiotics    Multifocal pneumonia  -concern for multifocal pneumonia on admission chest x-ray in the setting of sepsis   -will change antibiotics to ceftriaxone and doxycycline (give long QT) to cover for encapsulated organisms  -give vancomycin for now given her recent influenza infection   -blood cultures, respiratory cultures, strep pneumo, and Legionella antigens pending  -can hopefully expeditiously deescalate antibiotics pending patient progression      Leg swelling  -with increased BNP   -no prior cardiac history   -EKG nonischemic   -initial troponin negative   -will trend troponins and obtain echocardiogram  -caution with fluid  resuscitation      Chronic headache  -following fall in August   -currently likely worse in the setting of fever and sepsis, but will obtain head CT since never performed  -low clinical concern for meningitis at this time  -may benefit from further outpatient workup with Neurology      Anemia of chronic disease  -hemoglobin 8.6 on arrival, little lower than her previous baseline but in the setting of severe disease   -continue to monitor      S/P splenectomy  -puts patient at increased risk of infection with encapsulated organisms   -changed antibiotics to include IV ceftriaxone      Pure hypercholesterolemia  -continue home statin      Essential hypertension  -currently hypotensive; hold home blood pressure medications      Type 2 diabetes mellitus with hyperglycemia, without long-term current use of insulin  Patient's FSGs are controlled on current medication regimen.  Last A1c reviewed-   Lab Results   Component Value Date    HGBA1C 6.9 (H) 02/17/2023     Most recent fingerstick glucose reviewed-   Recent Labs   Lab 02/17/23  1629 02/18/23  0414 02/18/23  1156   POCTGLUCOSE 143* 114* 159*     Current correctional scale  Low  Maintain anti-hyperglycemic dose as follows-   Antihyperglycemics (From admission, onward)    Start     Stop Route Frequency Ordered    02/17/23 2100  insulin detemir U-100 pen 5 Units         -- SubQ Nightly 02/17/23 1437    02/17/23 1538  insulin aspart U-100 pen 0-5 Units         -- SubQ Before meals & nightly PRN 02/17/23 1438        Hold Oral hypoglycemics while patient is in the hospital.  -continue home statin   -continue home gabapentin and duloxetine for neuropathy      VTE Risk Mitigation (From admission, onward)         Ordered     IP VTE LOW RISK PATIENT  Once         02/17/23 1420     Place sequential compression device  Until discontinued         02/17/23 1420                Discharge Planning   JAMES:      Code Status: Full Code   Is the patient medically ready for [facility]?:      Reason for patient still in hospital (select all that apply): Patient trending condition and Treatment                     Raymond Henning MD  Department of Hospital Medicine   [facility]

## 2023-02-18 NOTE — SUBJECTIVE & OBJECTIVE
Interval History:  Reports burning pain on the left side of her chest extending down her left arm.  States has been constant over past several days without relieving or exacerbating factors; 6/10 pain.  Shortness of breath improving.  Still with headache. States that she feels that her feet and left hand are swollen    Review of Systems   Constitutional:  Negative for fever.   Respiratory:  Positive for chest tightness.    Neurological:  Positive for headaches.   Objective:     Vital Signs (Most Recent):  Temp: 98.9 °F (37.2 °C) (02/18/23 1157)  Pulse: 78 (02/18/23 1550)  Resp: 18 (02/18/23 1157)  BP: 122/61 (02/18/23 1157)  SpO2: 97 % (02/18/23 1157) Vital Signs (24h Range):  Temp:  [98.2 °F (36.8 °C)-99.9 °F (37.7 °C)] 98.9 °F (37.2 °C)  Pulse:  [] 78  Resp:  [17-24] 18  SpO2:  [92 %-98 %] 97 %  BP: ()/(54-79) 122/61     Weight: 87.5 kg (192 lb 14.4 oz)  Body mass index is 31.14 kg/m².    Intake/Output Summary (Last 24 hours) at 2/18/2023 1551  Last data filed at 2/18/2023 0822  Gross per 24 hour   Intake 836.12 ml   Output 0 ml   Net 836.12 ml      Physical Exam  Vitals reviewed.   Constitutional:       General: She is not in acute distress.     Appearance: She is well-developed. She is not diaphoretic.   HENT:      Head: Normocephalic and atraumatic.      Nose: Nose normal.   Eyes:      General: No scleral icterus.     Pupils: Pupils are equal, round, and reactive to light.   Neck:      Vascular: No JVD.      Trachea: No tracheal deviation.   Cardiovascular:      Rate and Rhythm: Normal rate and regular rhythm.      Heart sounds: Normal heart sounds.   Pulmonary:      Effort: Pulmonary effort is normal. No respiratory distress.      Breath sounds: Rales present.   Abdominal:      General: There is no distension.      Palpations: Abdomen is soft.      Tenderness: There is no abdominal tenderness.   Musculoskeletal:         General: No deformity.      Cervical back: Normal range of motion.       Comments: Trace bilateral lower extremity edema   Skin:     General: Skin is warm and dry.      Findings: No rash.   Neurological:      Mental Status: She is alert and oriented to person, place, and time.   Psychiatric:         Behavior: Behavior normal.       Significant Labs: All pertinent labs within the past 24 hours have been reviewed.    Significant Imaging: I have reviewed all pertinent imaging results/findings within the past 24 hours.

## 2023-02-18 NOTE — PLAN OF CARE
AAO X4. Diabetic diet continued. Pt denies N/V, SOB, distress, and pain. Medications given per MAR. Vital signs stable throughout the night, on room air. Cardiac monitoring continued. Ambulating in room independently up to toilet. Safety precautions maintained. Call bell within reach. Patient encouraged to call for assistance.

## 2023-02-18 NOTE — ASSESSMENT & PLAN NOTE
This patient does have evidence of infective focus  My overall impression is sepsis.  Source: Respiratory  Antibiotics given-   Antibiotics (72h ago, onward)    Start     Stop Route Frequency Ordered    02/18/23 0730  vancomycin (VANCOCIN) 1,000 mg in dextrose 5 % (D5W) 250 mL IVPB (Vial-Mate)         -- IV Every 12 hours (non-standard times) 02/17/23 1448    02/17/23 2100  doxycycline tablet 100 mg         -- Oral Every 12 hours 02/17/23 1444    02/17/23 1545  cefTRIAXone (ROCEPHIN) 1 g in dextrose 5 % in water (D5W) 5 % 50 mL IVPB (MB+)         -- IV Every 24 hours (non-standard times) 02/17/23 1435    02/17/23 1543  vancomycin - pharmacy to dose  (vancomycin IVPB)        See [facility] for full Linked Orders Report.    -- IV pharmacy to manage frequency 02/17/23 1443        Latest lactate reviewed-  No results for input(s): LACTATE in the last 72 hours.  Organ dysfunction indicated by Acute respiratory failure    Fluid challenge Other- Patient to receive 1 L LR volume other than 30cc/kg due to Volume overload due to- Bilateral lower extremity swelling and increased BNP     Post- resuscitation assessment No - Post resuscitation assessment not needed       Will Not start Pressors- Levophed for MAP of 65  Source control achieved by:  IV antibiotics

## 2023-02-18 NOTE — ASSESSMENT & PLAN NOTE
-hemoglobin 8.6 on arrival, little lower than her previous baseline but in the setting of severe disease   -continue to monitor     10/3/19

## 2023-02-18 NOTE — ASSESSMENT & PLAN NOTE
-following fall in August   -currently likely worse in the setting of fever and sepsis, but will obtain head CT since never performed  -low clinical concern for meningitis at this time  -may benefit from further outpatient workup with Neurology

## 2023-02-18 NOTE — PLAN OF CARE
02/17/23 2102   Admission   Initial VN Admission Questions Complete   Communication Issues? None   Shift   Virtual Nurse - Rounding Complete   Pain Management Interventions pain management plan reviewed with patient/caregiver   Virtual Nurse - Patient Verbalized Approval Of Camera Use;VN Rounding   Type of Frequent Check   Type Patient Rounds   Safety/Activity   Patient Rounds bed in low position;call light in patient/parent reach;placement of personal items at bedside;visualized patient;clutter free environment maintained   Safety Promotion/Fall Prevention side rails raised x 2;instructed to call staff for mobility;high risk medications identified;medications reviewed;Fall Risk reviewed with patient/family   Positioning   Body Position sitting up in bed     Pt arrived to unit. Introduced self as VN for this shift. Admission questions completed by VN. Educated pt on VTE risk, safety precautions, and VN's role in pt care. Opportunity given for pt's questions. All questions answered.

## 2023-02-18 NOTE — ASSESSMENT & PLAN NOTE
Patient's FSGs are controlled on current medication regimen.  Last A1c reviewed-   Lab Results   Component Value Date    HGBA1C 6.9 (H) 02/17/2023     Most recent fingerstick glucose reviewed-   Recent Labs   Lab 02/17/23  1629 02/18/23  0414 02/18/23  1156   POCTGLUCOSE 143* 114* 159*     Current correctional scale  Low  Maintain anti-hyperglycemic dose as follows-   Antihyperglycemics (From admission, onward)    Start     Stop Route Frequency Ordered    02/17/23 2100  insulin detemir U-100 pen 5 Units         -- SubQ Nightly 02/17/23 1437    02/17/23 1538  insulin aspart U-100 pen 0-5 Units         -- SubQ Before meals & nightly PRN 02/17/23 1438        Hold Oral hypoglycemics while patient is in the hospital.  -continue home statin   -continue home gabapentin and duloxetine for neuropathy

## 2023-02-19 VITALS
DIASTOLIC BLOOD PRESSURE: 69 MMHG | WEIGHT: 187.38 LBS | SYSTOLIC BLOOD PRESSURE: 138 MMHG | RESPIRATION RATE: 18 BRPM | TEMPERATURE: 99 F | HEIGHT: 66 IN | HEART RATE: 64 BPM | OXYGEN SATURATION: 96 % | BODY MASS INDEX: 30.11 KG/M2

## 2023-02-19 LAB
ANION GAP SERPL CALC-SCNC: 6 MMOL/L (ref 8–16)
BACTERIA #/AREA URNS HPF: ABNORMAL /HPF
BASOPHILS # BLD AUTO: 0.03 K/UL (ref 0–0.2)
BASOPHILS NFR BLD: 0.3 % (ref 0–1.9)
BILIRUB UR QL STRIP: NEGATIVE
BUN SERPL-MCNC: 7 MG/DL (ref 8–23)
CALCIUM SERPL-MCNC: 9 MG/DL (ref 8.7–10.5)
CHLORIDE SERPL-SCNC: 111 MMOL/L (ref 95–110)
CLARITY UR: CLEAR
CO2 SERPL-SCNC: 23 MMOL/L (ref 23–29)
COLOR UR: YELLOW
CREAT SERPL-MCNC: 0.6 MG/DL (ref 0.5–1.4)
DIFFERENTIAL METHOD: ABNORMAL
EOSINOPHIL # BLD AUTO: 0.2 K/UL (ref 0–0.5)
EOSINOPHIL NFR BLD: 1.5 % (ref 0–8)
ERYTHROCYTE [DISTWIDTH] IN BLOOD BY AUTOMATED COUNT: 17.9 % (ref 11.5–14.5)
EST. GFR  (NO RACE VARIABLE): >60 ML/MIN/1.73 M^2
GLUCOSE SERPL-MCNC: 112 MG/DL (ref 70–110)
GLUCOSE UR QL STRIP: NEGATIVE
HCT VFR BLD AUTO: 25 % (ref 37–48.5)
HGB BLD-MCNC: 8 G/DL (ref 12–16)
HGB UR QL STRIP: NEGATIVE
IMM GRANULOCYTES # BLD AUTO: 0.04 K/UL (ref 0–0.04)
IMM GRANULOCYTES NFR BLD AUTO: 0.3 % (ref 0–0.5)
KETONES UR QL STRIP: NEGATIVE
LEUKOCYTE ESTERASE UR QL STRIP: ABNORMAL
LYMPHOCYTES # BLD AUTO: 2.3 K/UL (ref 1–4.8)
LYMPHOCYTES NFR BLD: 20.2 % (ref 18–48)
MCH RBC QN AUTO: 25.1 PG (ref 27–31)
MCHC RBC AUTO-ENTMCNC: 32 G/DL (ref 32–36)
MCV RBC AUTO: 78 FL (ref 82–98)
MICROSCOPIC COMMENT: ABNORMAL
MONOCYTES # BLD AUTO: 1.1 K/UL (ref 0.3–1)
MONOCYTES NFR BLD: 9.7 % (ref 4–15)
NEUTROPHILS # BLD AUTO: 7.8 K/UL (ref 1.8–7.7)
NEUTROPHILS NFR BLD: 68 % (ref 38–73)
NITRITE UR QL STRIP: NEGATIVE
NRBC BLD-RTO: 0 /100 WBC
PH UR STRIP: 7 [PH] (ref 5–8)
PLATELET # BLD AUTO: 320 K/UL (ref 150–450)
PMV BLD AUTO: 10.8 FL (ref 9.2–12.9)
POCT GLUCOSE: 112 MG/DL (ref 70–110)
POCT GLUCOSE: 130 MG/DL (ref 70–110)
POTASSIUM SERPL-SCNC: 3.6 MMOL/L (ref 3.5–5.1)
PROT UR QL STRIP: NEGATIVE
RBC # BLD AUTO: 3.19 M/UL (ref 4–5.4)
RBC #/AREA URNS HPF: 1 /HPF (ref 0–4)
SODIUM SERPL-SCNC: 140 MMOL/L (ref 136–145)
SP GR UR STRIP: 1.02 (ref 1–1.03)
SQUAMOUS #/AREA URNS HPF: 0 /HPF
URN SPEC COLLECT METH UR: ABNORMAL
UROBILINOGEN UR STRIP-ACNC: NEGATIVE EU/DL
VANCOMYCIN TROUGH SERPL-MCNC: 10.6 UG/ML (ref 10–22)
WBC # BLD AUTO: 11.53 K/UL (ref 3.9–12.7)
WBC #/AREA URNS HPF: 11 /HPF (ref 0–5)

## 2023-02-19 PROCEDURE — A4216 STERILE WATER/SALINE, 10 ML: HCPCS | Performed by: HOSPITALIST

## 2023-02-19 PROCEDURE — 80048 BASIC METABOLIC PNL TOTAL CA: CPT | Performed by: HOSPITALIST

## 2023-02-19 PROCEDURE — 87086 URINE CULTURE/COLONY COUNT: CPT | Performed by: HOSPITALIST

## 2023-02-19 PROCEDURE — 36415 COLL VENOUS BLD VENIPUNCTURE: CPT | Performed by: HOSPITALIST

## 2023-02-19 PROCEDURE — 25000003 PHARM REV CODE 250: Performed by: HOSPITALIST

## 2023-02-19 PROCEDURE — 85025 COMPLETE CBC W/AUTO DIFF WBC: CPT | Performed by: HOSPITALIST

## 2023-02-19 PROCEDURE — 80202 ASSAY OF VANCOMYCIN: CPT | Performed by: HOSPITALIST

## 2023-02-19 PROCEDURE — 81000 URINALYSIS NONAUTO W/SCOPE: CPT | Performed by: HOSPITALIST

## 2023-02-19 RX ORDER — ATORVASTATIN CALCIUM 40 MG/1
40 TABLET, FILM COATED ORAL DAILY
Start: 2023-02-19 | End: 2023-05-11 | Stop reason: SDUPTHER

## 2023-02-19 RX ORDER — POTASSIUM CHLORIDE 20 MEQ/1
40 TABLET, EXTENDED RELEASE ORAL ONCE
Status: COMPLETED | OUTPATIENT
Start: 2023-02-19 | End: 2023-02-19

## 2023-02-19 RX ORDER — DOXYCYCLINE HYCLATE 100 MG
100 TABLET ORAL EVERY 12 HOURS
Qty: 6 TABLET | Refills: 0 | Status: SHIPPED | OUTPATIENT
Start: 2023-02-19 | End: 2023-02-22

## 2023-02-19 RX ORDER — CEFPODOXIME PROXETIL 100 MG/1
200 TABLET, FILM COATED ORAL 2 TIMES DAILY
Qty: 12 TABLET | Refills: 0 | Status: SHIPPED | OUTPATIENT
Start: 2023-02-19 | End: 2023-02-22

## 2023-02-19 RX ORDER — PHENAZOPYRIDINE HYDROCHLORIDE 100 MG/1
100 TABLET, FILM COATED ORAL 3 TIMES DAILY PRN
Status: DISCONTINUED | OUTPATIENT
Start: 2023-02-19 | End: 2023-02-19 | Stop reason: HOSPADM

## 2023-02-19 RX ADMIN — PANTOPRAZOLE SODIUM 40 MG: 40 TABLET, DELAYED RELEASE ORAL at 08:02

## 2023-02-19 RX ADMIN — GABAPENTIN 300 MG: 300 CAPSULE ORAL at 08:02

## 2023-02-19 RX ADMIN — POTASSIUM CHLORIDE 40 MEQ: 1500 TABLET, EXTENDED RELEASE ORAL at 08:02

## 2023-02-19 RX ADMIN — Medication 10 ML: at 06:02

## 2023-02-19 RX ADMIN — OXYBUTYNIN CHLORIDE 5 MG: 5 TABLET, EXTENDED RELEASE ORAL at 08:02

## 2023-02-19 RX ADMIN — DOXYCYCLINE HYCLATE 100 MG: 100 TABLET, COATED ORAL at 08:02

## 2023-02-19 RX ADMIN — ASPIRIN 81 MG: 81 TABLET, COATED ORAL at 08:02

## 2023-02-19 RX ADMIN — PHENAZOPYRIDINE HYDROCHLORIDE 100 MG: 100 TABLET ORAL at 11:02

## 2023-02-19 RX ADMIN — ATORVASTATIN CALCIUM 40 MG: 40 TABLET, FILM COATED ORAL at 08:02

## 2023-02-19 RX ADMIN — DULOXETINE 60 MG: 30 CAPSULE, DELAYED RELEASE ORAL at 08:02

## 2023-02-19 NOTE — PLAN OF CARE
Discharge orders noted. Additional clinical references attached. Patient's [facility] instructions reviewed by this VN with patient.  at bedside. Education provided on home care instructions, medications, diagnosis, when to seek medical attention, and follow-up appointments. New medications sent to patient's pharmacy. Patient verbalized understanding. Patient's  to provide ride/transportation home. Allowed time for questions. Transport to [facility] requested. Floor nurse notified.

## 2023-02-19 NOTE — DISCHARGE SUMMARY
[facility]  Discharge Summary      Patient Name: Jill Perez  MRN: [id number]  SINDI: [id number]  Patient Class: IP- Inpatient  Admission Date: 2/17/2023  Hospital Length of Stay: 2 days  Discharge Date and Time:  02/19/2023 11:09 AM  Attending Physician: Raymond Henning, *   Discharging Provider: Raymond Henning MD  Primary Care Provider: Estefania Hutson MD    Primary Care Team: Networked reference to record PCT     HPI:   Ms. Perez is a 66yo woman with DM2, HTN, hx of gastric sleeve surgery who presents from [facility] with hypoxia and hypertension.  She states that on the day prior to admission, she had a sore throat and a mild headache.  She went to bed at around 2:00 p.m. and then woke with shaking chills.  She states that she needed to wear to quilts to feel warm again and that in the morning she noticed that she was having difficulty breathing felt short of breath.  She denies any cough during this time.  She attempted to go to work today but then had the chills again.  She does note that she has a headache right now; she states that she has had chronic left-sided headaches since a fall back in August.  This is maybe a little worse than normal but is otherwise consistent with her prior presentations.  She does state that she had an episode of chest discomfort last night, but is poorly characterized and she states that she thinks it may have been gastrointestinal.  She is also noted bilateral lower extremity swelling over the 2 days and feels that her legs are tight.       * No surgery found *      Hospital Course:   Ms. Perez presented with sepsis due to multifocal pneumonia.  Also with worsening of her chronic headache that has been present following a fall in August.  She was initiated on broad-spectrum IV antibiotics with coverage for encapsulated organisms given her history of prior splenectomy.  Blood cultures obtained and have remained negative throughout her  "stay.  Her breathing has improved and is essentially back to baseline.  CT of the head obtained given history of traumatic fall in August without prior imaging followed by onset of chronic headache; it is unremarkable.  Did have mildly elevated BNP; echocardiogram performed while in house with preserved EF.  She is stable for [facility] today and will complete short course p.o. antibiotics as outpatient.  She will follow-up with her PCP; could consider referral to neurology if headaches continue.    /73 (Patient Position: Lying)   Pulse 73   Temp 98 °F (36.7 °C) (Oral)   Resp 18   Ht 5' 6" (1.676 m)   Wt 85 kg (187 lb 6.3 oz)   LMP  (LMP Unknown)   SpO2 99%   BMI 30.25 kg/m²   Physical Exam  Vitals reviewed.   Constitutional:       General: She is not in acute distress.     Appearance: She is well-developed. She is not diaphoretic.   HENT:      Head: Normocephalic and atraumatic.      Nose: Nose normal.   Eyes:      General: No scleral icterus.     Pupils: Pupils are equal, round, and reactive to light.   Neck:      Vascular: No JVD.      Trachea: No tracheal deviation.   Cardiovascular:      Rate and Rhythm: Normal rate and regular rhythm.      Heart sounds: Normal heart sounds.   Pulmonary:      Effort: Pulmonary effort is normal. No respiratory distress.      Breath sounds: ctab  Abdominal:      General: There is no distension.      Palpations: Abdomen is soft.      Tenderness: There is no abdominal tenderness.   Musculoskeletal:         General: No deformity.      Cervical back: Normal range of motion.      Comments: Trace bilateral lower extremity edema   Skin:     General: Skin is warm and dry.      Findings: No rash.   Neurological:      Mental Status: She is alert and oriented to person, place, and time.   Psychiatric:         Behavior: Behavior normal.        Goals of Care Treatment Preferences:  Code Status: Full Code      Consults:     Neuro  Chronic headache  -following fall in August "   -currently likely worse in the setting of fever and sepsis, but will obtain head CT since never performed  -low clinical concern for meningitis at this time  -may benefit from further outpatient workup with Neurology      Pulmonary  Multifocal pneumonia  -concern for multifocal pneumonia on admission chest x-ray in the setting of sepsis   -will change antibiotics to ceftriaxone and doxycycline (give long QT) to cover for encapsulated organisms  -give vancomycin for now given her recent influenza infection   -blood cultures, respiratory cultures, strep pneumo, and Legionella antigens pending  -can hopefully expeditiously deescalate antibiotics pending patient progression      Cardiac/Vascular  Pure hypercholesterolemia  -continue home statin      Essential hypertension  -currently hypotensive; hold home blood pressure medications      ID  * Sepsis due to pneumonia  This patient does have evidence of infective focus  My overall impression is sepsis.  Source: Respiratory  Antibiotics given-   Antibiotics (72h ago, onward)    Start     Stop Route Frequency Ordered    02/18/23 0730  vancomycin (VANCOCIN) 1,000 mg in dextrose 5 % (D5W) 250 mL IVPB (Vial-Mate)         -- IV Every 12 hours (non-standard times) 02/17/23 1448    02/17/23 2100  doxycycline tablet 100 mg         -- Oral Every 12 hours 02/17/23 1444    02/17/23 1545  cefTRIAXone (ROCEPHIN) 1 g in dextrose 5 % in water (D5W) 5 % 50 mL IVPB (MB+)         -- IV Every 24 hours (non-standard times) 02/17/23 1435    02/17/23 1543  vancomycin - pharmacy to dose  (vancomycin IVPB)        See Hyperspace for full Linked Orders Report.    -- IV pharmacy to manage frequency 02/17/23 1443        Latest lactate reviewed-  No results for input(s): LACTATE in the last 72 hours.  Organ dysfunction indicated by Acute respiratory failure    Fluid challenge Other- Patient to receive 1 L LR volume other than 30cc/kg due to Volume overload due to- Bilateral lower extremity swelling  and increased BNP     Post- resuscitation assessment No - Post resuscitation assessment not needed       Will Not start Pressors- Levophed for MAP of 65  Source control achieved by:  IV antibiotics    Oncology  Anemia of chronic disease  -hemoglobin 8.6 on arrival, little lower than her previous baseline but in the setting of severe disease   -continue to monitor      Endocrine  Type 2 diabetes mellitus with hyperglycemia, without long-term current use of insulin  Patient's FSGs are controlled on current medication regimen.  Last A1c reviewed-   Lab Results   Component Value Date    HGBA1C 6.9 (H) 02/17/2023     Most recent fingerstick glucose reviewed-   Recent Labs   Lab 02/17/23  1629 02/18/23  0414 02/18/23  1156   POCTGLUCOSE 143* 114* 159*     Current correctional scale  Low  Maintain anti-hyperglycemic dose as follows-   Antihyperglycemics (From admission, onward)    Start     Stop Route Frequency Ordered    02/17/23 2100  insulin detemir U-100 pen 5 Units         -- SubQ Nightly 02/17/23 1437    02/17/23 1538  insulin aspart U-100 pen 0-5 Units         -- SubQ Before meals & nightly PRN 02/17/23 1438        Hold Oral hypoglycemics while patient is in the hospital.  -continue home statin   -continue home gabapentin and duloxetine for neuropathy    GI  S/P splenectomy  -puts patient at increased risk of infection with encapsulated organisms   -changed antibiotics to include IV ceftriaxone      Other  Leg swelling  -with increased BNP   -no prior cardiac history   -EKG nonischemic   -initial troponin negative   -will trend troponins and obtain echocardiogram  -caution with fluid resuscitation        Final Active Diagnoses:    Diagnosis Date Noted POA    PRINCIPAL PROBLEM:  Sepsis due to pneumonia [J18.9, A41.9] 02/17/2023 Yes    Anemia of chronic disease [D63.8] 02/17/2023 Yes    Chronic headache [R51.9, G89.29] 02/17/2023 Yes    Leg swelling [M79.89] 02/17/2023 Yes    Multifocal pneumonia [J18.9]  02/17/2023 Yes    S/P splenectomy [Z90.81] 10/30/2018 Not Applicable    Type 2 diabetes mellitus with hyperglycemia, without long-term current use of insulin [E11.65] 10/23/2018 Yes    Pure hypercholesterolemia [E78.00] 10/23/2018 Yes    Essential hypertension [I10] 10/23/2018 Yes      Problems Resolved During this Admission:       Discharged Condition: fair    Disposition: Home or Self Care    Follow Up:   Follow-up Information     Estefania Hutson MD Follow up.    Specialty: Family Medicine  Why: OUT PATIENT  SERVICES/PCP- you will be contacted with your followup appointment for your PCP,however you will also be notified through the [facility] portal.  Contact information:  [address]  [address]  [place] [address] [phone]                       Patient Instructions:      Diet Adult Regular     Notify your health care provider if you experience any of the following:  temperature >100.4     Notify your health care provider if you experience any of the following:  persistent nausea and vomiting or diarrhea     Notify your health care provider if you experience any of the following:  severe uncontrolled pain     Notify your health care provider if you experience any of the following:  difficulty breathing or increased cough     Notify your health care provider if you experience any of the following:  severe persistent headache     Notify your health care provider if you experience any of the following:  persistent dizziness, light-headedness, or visual disturbances     Notify your health care provider if you experience any of the following:  increased confusion or weakness     Activity as tolerated       Significant Diagnostic Studies:  See above    Pending Diagnostic Studies:     Procedure Component Value Units Date/Time    Legionella antigen, urine [[id number]] Collected: 02/17/23 1723    Order Status: Sent Lab Status: In process Updated: 02/18/23 0128    Specimen: Urine, Clean Catch     Strep Pneumo AG  Urine [[id number]] Collected: 02/17/23 1723    Order Status: Sent Lab Status: In process Updated: 02/18/23 0128    Specimen: Urine, Clean Catch          Medications:  Reconciled Home Medications:      Medication List      START taking these medications    cefpodoxime 100 MG tablet  Commonly known as: VANTIN  Take 2 tablets (200 mg total) by mouth 2 (two) times daily. for 3 days     doxycycline 100 MG tablet  Commonly known as: VIBRA-TABS  Take 1 tablet (100 mg total) by mouth every 12 (twelve) hours. for 3 days        CHANGE how you take these medications    TRULICITY 4.5 mg/0.5 mL pen injector  Generic drug: dulaglutide  Inject 4.5 mg into the skin every 7 days.  What changed: when to take this        CONTINUE taking these medications    acetaminophen 500 MG tablet  Commonly known as: TYLENOL  Take 500 mg by mouth every 6 (six) hours as needed for Pain.     amLODIPine 5 MG tablet  Commonly known as: NORVASC  Take 1 tablet (5 mg total) by mouth once daily.     aspirin 81 MG EC tablet  Commonly known as: ECOTRIN  Take 81 mg by mouth once daily.     atorvastatin 40 MG tablet  Commonly known as: LIPITOR  Take 1 tablet (40 mg total) by mouth once daily.     DULoxetine 60 MG capsule  Commonly known as: CYMBALTA  Take one capsule by mouth daily     gabapentin 300 MG capsule  Commonly known as: NEURONTIN  Take 1 capsule by mouth 3 times a day as needed for 30 days     HOME NEBULIZER PLUS SIDESTREAM Elisabeth  Generic drug: nebulizer and compressor  Use as directed with nebulizer solution     HYDROcodone-acetaminophen  mg per tablet  Commonly known as: NORCO  Take 1 tablet by mouth three times a day as needed for pain     ibuprofen 200 MG tablet  Commonly known as: ADVIL,MOTRIN  Take 200 mg by mouth every 6 (six) hours as needed for Pain.     metFORMIN 1000 MG tablet  Commonly known as: GLUCOPHAGE  Take 1 tablet (1,000 mg total) by mouth 2 (two) times daily with meals.     methocarbamoL 500 MG Tab  Commonly known as:  ROBAXIN  Take 1 tablet by mouth once a day as needed for muscle spasms     olmesartan 20 MG tablet  Commonly known as: BENICAR  Take1 tablet Orally Once a day 90 day(s)     oxybutynin 5 MG Tr24  Commonly known as: DITROPAN-XL  Take 1 tablet (5 mg total) by mouth once daily.     pantoprazole 40 MG tablet  Commonly known as: PROTONIX  Take 1 tablet by mouth daily     pulse oximeter device  Commonly known as: pulse oximeter  by Apply Externally route 2 (two) times a day. Use twice daily at 8 AM and 3 PM and record the value in MyChart as directed.     TRUE METRIX GLUCOSE METER Misc  Generic drug: blood-glucose meter  USE 3 TIMES A DA     TRUE METRIX GLUCOSE TEST STRIP Strp  Generic drug: blood sugar diagnostic  USE 3 TIMES A DAY     TRUEPLUS LANCETS 30 gauge Misc  Generic drug: lancets  USE 3 TIMES A DAY     VITAMIN C 500 MG tablet  Generic drug: ascorbic acid (vitamin C)  Take 500 mg by mouth once daily.     vitamin D 1000 units Tab  Commonly known as: VITAMIN D3  Take 1,000 Units by mouth once daily.     zolpidem 10 mg Tab  Commonly known as: AMBIEN  Take 1 tablet (10 mg total) by mouth nightly at bedtime as needed            Indwelling Lines/Drains at time of [facility]:   Lines/Drains/Airways     None                 Time spent on the [facility] of patient: 35 minutes         Raymond Henning MD  Department of Hospital Medicine  [facility]

## 2023-02-19 NOTE — PLAN OF CARE
02/19/23 1003   Final Note   Assessment Type Final Discharge Note   Anticipated Discharge Disposition Home   Hospital Resources/Appts/Education Provided Appointments scheduled and added to AVS   Post-Acute Status   Discharge Delays None known at this time     Patient states that her  will provide [facility] transport home

## 2023-02-19 NOTE — PLAN OF CARE
Pt AAOx4. No complaints of pain, N/V. Medications administered per MAR. On RA. Cardiac monitoring maintained. Blood glucose monitored. Bed alarm set, side rails raised, and call light in reach.

## 2023-02-19 NOTE — PLAN OF CARE
Pt. AAOx4.  at bedside. Head CT complete due to Pt. C/o continuous headaches but Pt. Declined tylenol. BS monitored. Tele monitored. Pt. Instructed to call for assistance.   Problem: Adult Inpatient Plan of Care  Goal: Plan of Care Review  Outcome: Ongoing, Progressing     Problem: Adult Inpatient Plan of Care  Goal: Patient-Specific Goal (Individualized)  Outcome: Ongoing, Progressing     Problem: Adult Inpatient Plan of Care  Goal: Absence of Hospital-Acquired Illness or Injury  Outcome: Ongoing, Progressing     Problem: Adult Inpatient Plan of Care  Goal: Optimal Comfort and Wellbeing  Outcome: Ongoing, Progressing

## 2023-02-19 NOTE — PLAN OF CARE
[facility]  Initial Discharge Assessment       Primary Care Provider: Estefania Hutson MD    Admission Diagnosis: CHF (congestive heart failure) [I50.9]  Hypoxia [R09.02]  Chest pain [R07.9]  Pneumonia of right lung due to infectious organism, unspecified part of lung [J18.9]    Admission Date: 2/17/2023  Expected Discharge Date: 2/19/2023         Payor: BLUE CROSS BLUE SHIELD / Plan[place] / Product Type: HMO /     Extended Emergency Contact Information  Primary Emergency Contact: Link Perez  [address]           [place] [address] [place] [place]  Home Phone: [phone]  Mobile Phone: [phone]  Relation: Spouse  Secondary Emergency Contact: Link Perez Jr   [place]  Home Phone: [phone]  Mobile Phone: [phone]  Relation: Son              [facility]  [address]  [place] [address]  Phone: [phone] Fax: [phone]    [id number] - [place] - [address] AT [facility]  [address]  [place] [address]  Phone: [phone] Fax: [phone]    [id number] - [place] - [address] AT [facility] [address]  [address]  [place] [address]  Phone: [phone] Fax: [phone]

## 2023-02-19 NOTE — HOSPITAL COURSE
"Ms. Perez presented with sepsis due to multifocal pneumonia.  Also with worsening of her chronic headache that has been present following a fall in August.  She was initiated on broad-spectrum IV antibiotics with coverage for encapsulated organisms given her history of prior splenectomy.  Blood cultures obtained and have remained negative throughout her stay.  Her breathing has improved and is essentially back to baseline.  CT of the head obtained given history of traumatic fall in August without prior imaging followed by onset of chronic headache; it is unremarkable.  Did have mildly elevated BNP; echocardiogram performed while in house with preserved EF.  She is stable for [facility] today and will complete short course p.o. antibiotics as outpatient.  She will follow-up with her PCP; could consider referral to neurology if headaches continue.    /73 (Patient Position: Lying)   Pulse 73   Temp 98 °F (36.7 °C) (Oral)   Resp 18   Ht 5' 6" (1.676 m)   Wt 85 kg (187 lb 6.3 oz)   LMP  (LMP Unknown)   SpO2 99%   BMI 30.25 kg/m²   Physical Exam  Vitals reviewed.   Constitutional:       General: She is not in acute distress.     Appearance: She is well-developed. She is not diaphoretic.   HENT:      Head: Normocephalic and atraumatic.      Nose: Nose normal.   Eyes:      General: No scleral icterus.     Pupils: Pupils are equal, round, and reactive to light.   Neck:      Vascular: No JVD.      Trachea: No tracheal deviation.   Cardiovascular:      Rate and Rhythm: Normal rate and regular rhythm.      Heart sounds: Normal heart sounds.   Pulmonary:      Effort: Pulmonary effort is normal. No respiratory distress.      Breath sounds: ctab  Abdominal:      General: There is no distension.      Palpations: Abdomen is soft.      Tenderness: There is no abdominal tenderness.   Musculoskeletal:         General: No deformity.      Cervical back: Normal range of motion.      Comments: Trace bilateral lower extremity " edema   Skin:     General: Skin is warm and dry.      Findings: No rash.   Neurological:      Mental Status: She is alert and oriented to person, place, and time.   Psychiatric:         Behavior: Behavior normal.

## 2023-02-20 ENCOUNTER — PATIENT OUTREACH (OUTPATIENT)
Dept: ADMINISTRATIVE | Facility: CLINIC | Age: 68
End: 2023-02-20
Payer: MEDICARE

## 2023-02-20 LAB
BACTERIA UR CULT: NO GROWTH
L PNEUMO AG UR QL IA: NEGATIVE

## 2023-02-20 NOTE — PROGRESS NOTES
C3 nurse spoke with Jill Perez for a TCC post hospital [facility] follow up call. The patient has a scheduled HOS appointment with Estefania Hutson MD on 2/28 @ 1pm.

## 2023-02-22 DIAGNOSIS — E11.9 TYPE 2 DIABETES MELLITUS WITHOUT COMPLICATION, UNSPECIFIED WHETHER LONG TERM INSULIN USE: ICD-10-CM

## 2023-02-22 LAB
BACTERIA BLD CULT: NORMAL
BACTERIA BLD CULT: NORMAL
S PNEUM AG UR QL: NOT DETECTED

## 2023-02-27 ENCOUNTER — PATIENT MESSAGE (OUTPATIENT)
Dept: ADMINISTRATIVE | Facility: HOSPITAL | Age: 68
End: 2023-02-27
Payer: MEDICARE

## 2023-02-28 ENCOUNTER — OFFICE VISIT (OUTPATIENT)
Dept: FAMILY MEDICINE | Facility: CLINIC | Age: 68
End: 2023-02-28
Payer: MEDICARE

## 2023-02-28 VITALS
HEIGHT: 66 IN | SYSTOLIC BLOOD PRESSURE: 138 MMHG | HEART RATE: 81 BPM | DIASTOLIC BLOOD PRESSURE: 74 MMHG | BODY MASS INDEX: 30.36 KG/M2 | OXYGEN SATURATION: 99 % | WEIGHT: 188.94 LBS | TEMPERATURE: 98 F

## 2023-02-28 DIAGNOSIS — Z23 ENCOUNTER FOR IMMUNIZATION: ICD-10-CM

## 2023-02-28 DIAGNOSIS — M15.9 PRIMARY OSTEOARTHRITIS INVOLVING MULTIPLE JOINTS: ICD-10-CM

## 2023-02-28 DIAGNOSIS — I10 HYPERTENSION, UNSPECIFIED TYPE: ICD-10-CM

## 2023-02-28 DIAGNOSIS — E11.65 TYPE 2 DIABETES MELLITUS WITH HYPERGLYCEMIA, WITHOUT LONG-TERM CURRENT USE OF INSULIN: ICD-10-CM

## 2023-02-28 DIAGNOSIS — Z82.49 FAMILY HISTORY OF CARDIOVASCULAR DISEASE: ICD-10-CM

## 2023-02-28 DIAGNOSIS — Z09 HOSPITAL DISCHARGE FOLLOW-UP: Primary | ICD-10-CM

## 2023-02-28 PROCEDURE — 99215 OFFICE O/P EST HI 40 MIN: CPT | Mod: PBBFAC,PO,25 | Performed by: FAMILY MEDICINE

## 2023-02-28 PROCEDURE — 99214 OFFICE O/P EST MOD 30 MIN: CPT | Mod: S$PBB,,, | Performed by: FAMILY MEDICINE

## 2023-02-28 PROCEDURE — 99214 PR OFFICE/OUTPT VISIT, EST, LEVL IV, 30-39 MIN: ICD-10-PCS | Mod: S$PBB,,, | Performed by: FAMILY MEDICINE

## 2023-02-28 PROCEDURE — 99999 PR PBB SHADOW E&M-EST. PATIENT-LVL V: ICD-10-PCS | Mod: PBBFAC,,, | Performed by: FAMILY MEDICINE

## 2023-02-28 PROCEDURE — G0008 ADMIN INFLUENZA VIRUS VAC: HCPCS | Mod: PBBFAC,PO

## 2023-02-28 PROCEDURE — 99999 PR PBB SHADOW E&M-EST. PATIENT-LVL V: CPT | Mod: PBBFAC,,, | Performed by: FAMILY MEDICINE

## 2023-02-28 RX ORDER — BLOOD-GLUCOSE CONTROL, NORMAL
EACH MISCELLANEOUS
Qty: 100 EACH | Refills: 0 | Status: SHIPPED | OUTPATIENT
Start: 2023-02-28 | End: 2023-09-12 | Stop reason: SDUPTHER

## 2023-02-28 RX ORDER — DEXTROSE 4 G
TABLET,CHEWABLE ORAL
Qty: 1 EACH | Refills: 0 | Status: SHIPPED | OUTPATIENT
Start: 2023-02-28 | End: 2023-05-03
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
## 2023-03-07 ENCOUNTER — PATIENT MESSAGE (OUTPATIENT)
Dept: VASCULAR SURGERY | Facility: CLINIC | Age: 68
End: 2023-03-07
Payer: MEDICARE

## 2023-03-14 ENCOUNTER — TELEPHONE (OUTPATIENT)
Dept: ADMINISTRATIVE | Facility: OTHER | Age: 68
End: 2023-03-14
Payer: MEDICARE

## 2023-03-20 DIAGNOSIS — F41.9 ANXIETY DUE TO INVASIVE PROCEDURE: Primary | ICD-10-CM

## 2023-03-20 DIAGNOSIS — R60.9 EDEMA, UNSPECIFIED: ICD-10-CM

## 2023-03-20 DIAGNOSIS — Z98.890 STATUS POST LASER ABLATION OF INCOMPETENT VEIN: Primary | ICD-10-CM

## 2023-03-20 DIAGNOSIS — Z91.89 AT HIGH RISK FOR PAIN FROM PROCEDURE: ICD-10-CM

## 2023-03-20 RX ORDER — MELOXICAM 7.5 MG/1
7.5 TABLET ORAL 2 TIMES DAILY
Qty: 14 TABLET | Refills: 0 | Status: CANCELLED | OUTPATIENT
Start: 2023-03-20 | End: 2023-03-27

## 2023-03-20 RX ORDER — ALPRAZOLAM 0.5 MG/1
TABLET ORAL
Qty: 2 TABLET | Refills: 0 | Status: CANCELLED | OUTPATIENT
Start: 2023-03-20

## 2023-03-21 ENCOUNTER — TELEPHONE (OUTPATIENT)
Dept: VASCULAR SURGERY | Facility: CLINIC | Age: 68
End: 2023-03-21
Payer: MEDICARE

## 2023-03-21 RX ORDER — MELOXICAM 7.5 MG/1
7.5 TABLET ORAL 2 TIMES DAILY
Qty: 14 TABLET | Refills: 0 | Status: SHIPPED | OUTPATIENT
Start: 2023-03-21 | End: 2023-03-30

## 2023-03-21 RX ORDER — ALPRAZOLAM 0.5 MG/1
TABLET ORAL
Qty: 2 TABLET | Refills: 0 | Status: SHIPPED | OUTPATIENT
Start: 2023-03-21 | End: 2023-04-24 | Stop reason: SDUPTHER

## 2023-03-21 NOTE — TELEPHONE ENCOUNTER
Informed insurance still pending for procedure and new apt date. Pt will  meds and hold until procedure date.

## 2023-03-21 NOTE — TELEPHONE ENCOUNTER
Spoke with pt and informed her that insurance has not approved her procedure. We rescheduled her procedure for late April and we will be in touch with her when we hear back from insurance.

## 2023-03-26 DIAGNOSIS — E11.65 TYPE 2 DIABETES MELLITUS WITH HYPERGLYCEMIA, WITHOUT LONG-TERM CURRENT USE OF INSULIN: ICD-10-CM

## 2023-03-27 DIAGNOSIS — I83.811 VARICOSE VEINS OF LOWER EXTREMITY WITH PAIN, RIGHT: ICD-10-CM

## 2023-03-27 DIAGNOSIS — I83.891 VARICOSE VEINS OF LEG WITH SWELLING, RIGHT: Primary | ICD-10-CM

## 2023-03-27 RX ORDER — DEXTROSE 4 G
TABLET,CHEWABLE ORAL
Qty: 1 EACH | Refills: 0 | OUTPATIENT
Start: 2023-03-27 | End: 2024-03-25

## 2023-03-28 ENCOUNTER — PATIENT MESSAGE (OUTPATIENT)
Dept: INTERNAL MEDICINE | Facility: CLINIC | Age: 68
End: 2023-03-28
Payer: MEDICARE

## 2023-04-13 ENCOUNTER — TELEPHONE (OUTPATIENT)
Dept: VASCULAR SURGERY | Facility: CLINIC | Age: 68
End: 2023-04-13
Payer: MEDICARE

## 2023-04-13 NOTE — TELEPHONE ENCOUNTER
Received message that pt had questions about procedure. Attempted to reach pt- unable to leave voicemail because voicemail is not set up.

## 2023-04-14 RX ORDER — GABAPENTIN 800 MG/1
TABLET ORAL
Qty: 90 TABLET | Refills: 0 | Status: CANCELLED | OUTPATIENT
Start: 2023-04-02

## 2023-04-16 ENCOUNTER — PATIENT MESSAGE (OUTPATIENT)
Dept: INTERNAL MEDICINE | Facility: CLINIC | Age: 68
End: 2023-04-16
Payer: MEDICARE

## 2023-04-17 RX ORDER — GABAPENTIN 800 MG/1
TABLET ORAL
Qty: 90 TABLET | Refills: 0 | Status: CANCELLED | OUTPATIENT
Start: 2023-04-02

## 2023-04-21 ENCOUNTER — PATIENT MESSAGE (OUTPATIENT)
Dept: VASCULAR SURGERY | Facility: CLINIC | Age: 68
End: 2023-04-21
Payer: MEDICARE

## 2023-04-24 DIAGNOSIS — Z91.89 AT HIGH RISK FOR PAIN FROM PROCEDURE: ICD-10-CM

## 2023-04-24 DIAGNOSIS — F41.9 ANXIETY DUE TO INVASIVE PROCEDURE: Primary | ICD-10-CM

## 2023-04-24 RX ORDER — ALPRAZOLAM 0.5 MG/1
TABLET ORAL
Qty: 2 TABLET | Refills: 0 | Status: SHIPPED | OUTPATIENT
Start: 2023-04-24 | End: 2023-05-03

## 2023-04-24 RX ORDER — MELOXICAM 7.5 MG/1
7.5 TABLET ORAL 2 TIMES DAILY
Qty: 14 TABLET | Refills: 0 | Status: SHIPPED | OUTPATIENT
Start: 2023-04-24 | End: 2023-05-03

## 2023-04-25 NOTE — ASSESSMENT & PLAN NOTE
-concern for multifocal pneumonia on admission chest x-ray in the setting of sepsis   -will change antibiotics to ceftriaxone and doxycycline (give long QT) to cover for encapsulated organisms  -give vancomycin for now given her recent influenza infection   -blood cultures, respiratory cultures, strep pneumo, and Legionella antigens pending  -can hopefully expeditiously deescalate antibiotics pending patient progression     Alert-The patient is alert, awake and responds to voice. The patient is oriented to time, place, and person. The triage nurse is able to obtain subjective information.

## 2023-04-27 ENCOUNTER — PROCEDURE VISIT (OUTPATIENT)
Dept: VASCULAR SURGERY | Facility: CLINIC | Age: 68
End: 2023-04-27
Payer: MEDICARE

## 2023-04-27 VITALS
SYSTOLIC BLOOD PRESSURE: 119 MMHG | DIASTOLIC BLOOD PRESSURE: 79 MMHG | BODY MASS INDEX: 30.36 KG/M2 | WEIGHT: 188.94 LBS | HEART RATE: 72 BPM | HEIGHT: 66 IN

## 2023-04-27 DIAGNOSIS — G89.18 PAIN ASSOCIATED WITH SURGICAL PROCEDURE: Primary | ICD-10-CM

## 2023-04-27 DIAGNOSIS — I83.891 VARICOSE VEINS OF LEG WITH SWELLING, RIGHT: ICD-10-CM

## 2023-04-27 DIAGNOSIS — I83.811 VARICOSE VEINS OF LOWER EXTREMITY WITH PAIN, RIGHT: ICD-10-CM

## 2023-04-27 PROCEDURE — 37765 PR PHLEB VEINS - EXTREM - TO 20: ICD-10-PCS | Mod: RT,S$GLB,, | Performed by: SURGERY

## 2023-04-27 PROCEDURE — 37765 STAB PHLEB VEINS XTR 10-20: CPT | Mod: RT,S$GLB,, | Performed by: SURGERY

## 2023-04-27 RX ORDER — LIDOCAINE HYDROCHLORIDE 10 MG/ML
1 INJECTION INFILTRATION; PERINEURAL
Status: DISCONTINUED | OUTPATIENT
Start: 2023-04-27 | End: 2023-08-24

## 2023-04-27 NOTE — PROCEDURES
Procedures    Vascular Surgery Op Note    Date of Operation/Procedure:  04/27/2023    Pre-operative Diagnosis:  Symptomatic right lower extremity varicose veins    Post-operative Diagnosis:  Same    Anesthesia:  Local    Operation/Procedure Performed:   Right lower extremity microphlebectomy (10 incisions)    Attending Surgeon: UMANG Lara II, MD    Procedure in Detail:   Prior to entering the procedure room the patient's varicose veins on the right leg were marked in standing position using a skin marker. The patient was placed in the procedure room in supine position. Right leg was prepped and draped circumferentially from the foot to the high-thigh in normal sterile fashion using ChloraPrep.  Each of the areas marked for phlebectomy was anesthetized with 1% lidocaine injection.  At each site the vein a stab incision was made with an 11 blade and the vein was grasped with a vein hook and carefully retracted out of the incision and grasped with hemostats. Traction was applied on each end of the vein vein to expose more length and the vein was sequentially grasped with hemostats to remove the maximum length of each vein until it eventually avulsed. This technique was performed successfully at 10 stab incisions on the right leg below the knee. The incisions were dressed with steri strips, cotton balls, and tegaderm. Compression hose was applied over the dressing to both legs afterward. The patient tolerated the procedure well. Pain was well controlled.      The patient was able to get dressed in the post-procedure room went home in stable condition.      Estimated Blood loss: 20ml    Complications: none    UMANG Lara II, MD, VI  Vascular Surgery  [facility]

## 2023-05-02 ENCOUNTER — TELEPHONE (OUTPATIENT)
Dept: VASCULAR SURGERY | Facility: CLINIC | Age: 68
End: 2023-05-02
Payer: COMMERCIAL

## 2023-05-02 NOTE — TELEPHONE ENCOUNTER
Pt. Called wanting to know if she should take off dressing. Reminded that she should have taken them off on the 28th. Re-reviewed [facility] instructions. Patient states everything looks nice and dry. Instructed she can shower and allow tapes to fall off on her own. Bruising is normal. No other questions at this time.

## 2023-05-03 ENCOUNTER — OFFICE VISIT (OUTPATIENT)
Dept: RHEUMATOLOGY | Facility: CLINIC | Age: 68
End: 2023-05-03
Payer: MEDICARE

## 2023-05-03 VITALS
HEART RATE: 80 BPM | SYSTOLIC BLOOD PRESSURE: 140 MMHG | WEIGHT: 183.63 LBS | DIASTOLIC BLOOD PRESSURE: 86 MMHG | HEIGHT: 66 IN | BODY MASS INDEX: 29.51 KG/M2

## 2023-05-03 DIAGNOSIS — M15.9 PRIMARY OSTEOARTHRITIS INVOLVING MULTIPLE JOINTS: ICD-10-CM

## 2023-05-03 DIAGNOSIS — I73.00 RAYNAUD'S DISEASE WITHOUT GANGRENE: Primary | ICD-10-CM

## 2023-05-03 PROCEDURE — 99999 PR PBB SHADOW E&M-EST. PATIENT-LVL V: CPT | Mod: PBBFAC,,, | Performed by: STUDENT IN AN ORGANIZED HEALTH CARE EDUCATION/TRAINING PROGRAM

## 2023-05-03 PROCEDURE — 99999 PR PBB SHADOW E&M-EST. PATIENT-LVL V: ICD-10-PCS | Mod: PBBFAC,,, | Performed by: STUDENT IN AN ORGANIZED HEALTH CARE EDUCATION/TRAINING PROGRAM

## 2023-05-03 PROCEDURE — 99214 PR OFFICE/OUTPT VISIT, EST, LEVL IV, 30-39 MIN: ICD-10-PCS | Mod: S$PBB,,, | Performed by: STUDENT IN AN ORGANIZED HEALTH CARE EDUCATION/TRAINING PROGRAM

## 2023-05-03 PROCEDURE — 99215 OFFICE O/P EST HI 40 MIN: CPT | Mod: PBBFAC | Performed by: STUDENT IN AN ORGANIZED HEALTH CARE EDUCATION/TRAINING PROGRAM

## 2023-05-03 PROCEDURE — 99214 OFFICE O/P EST MOD 30 MIN: CPT | Mod: S$PBB,,, | Performed by: STUDENT IN AN ORGANIZED HEALTH CARE EDUCATION/TRAINING PROGRAM

## 2023-05-03 RX ORDER — ALBUTEROL SULFATE 0.83 MG/ML
SOLUTION RESPIRATORY (INHALATION)
COMMUNITY
Start: 2022-05-25 | End: 2023-10-18 | Stop reason: CLARIF

## 2023-05-03 RX ORDER — CETIRIZINE HYDROCHLORIDE 10 MG/1
5 TABLET ORAL DAILY
COMMUNITY

## 2023-05-03 RX ORDER — AMLODIPINE BESYLATE 10 MG/1
10 TABLET ORAL DAILY
Qty: 30 TABLET | Refills: 3 | Status: SHIPPED | OUTPATIENT
Start: 2023-05-03 | End: 2023-05-11 | Stop reason: SDUPTHER

## 2023-05-03 RX ORDER — NIFEDIPINE 30 MG/1
TABLET, FILM COATED, EXTENDED RELEASE ORAL
COMMUNITY
Start: 2022-06-28 | End: 2023-05-03 | Stop reason: ALTCHOICE

## 2023-05-03 NOTE — PATIENT INSTRUCTIONS
Try taking Tylenol up to 3000mg daily (from all sources)    Try taking Turmeric (make sure contains black pepper extract)    Try using Voltaren gel up to four times daily on painful joints
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
## 2023-05-08 RX ORDER — OLMESARTAN MEDOXOMIL 20 MG/1
TABLET ORAL
Qty: 90 TABLET | Refills: 1 | Status: SHIPPED | OUTPATIENT
Start: 2023-05-08 | End: 2023-05-11 | Stop reason: SDUPTHER

## 2023-05-11 ENCOUNTER — OFFICE VISIT (OUTPATIENT)
Dept: INTERNAL MEDICINE | Facility: CLINIC | Age: 68
End: 2023-05-11
Payer: MEDICARE

## 2023-05-11 ENCOUNTER — TELEPHONE (OUTPATIENT)
Dept: INTERNAL MEDICINE | Facility: CLINIC | Age: 68
End: 2023-05-11

## 2023-05-11 VITALS
HEART RATE: 70 BPM | BODY MASS INDEX: 28.27 KG/M2 | SYSTOLIC BLOOD PRESSURE: 110 MMHG | WEIGHT: 180.13 LBS | DIASTOLIC BLOOD PRESSURE: 80 MMHG | HEIGHT: 67 IN | RESPIRATION RATE: 10 BRPM | OXYGEN SATURATION: 96 %

## 2023-05-11 DIAGNOSIS — H53.8 BLURRED VISION: ICD-10-CM

## 2023-05-11 DIAGNOSIS — Z90.3 HISTORY OF SLEEVE GASTRECTOMY: ICD-10-CM

## 2023-05-11 DIAGNOSIS — Z00.00 ANNUAL PHYSICAL EXAM: Primary | ICD-10-CM

## 2023-05-11 DIAGNOSIS — E78.5 HYPERLIPIDEMIA ASSOCIATED WITH TYPE 2 DIABETES MELLITUS: Chronic | ICD-10-CM

## 2023-05-11 DIAGNOSIS — L97.529 ULCER OF LEFT FOOT, UNSPECIFIED ULCER STAGE: ICD-10-CM

## 2023-05-11 DIAGNOSIS — F32.A DEPRESSION, UNSPECIFIED DEPRESSION TYPE: Primary | ICD-10-CM

## 2023-05-11 DIAGNOSIS — E11.69 HYPERLIPIDEMIA ASSOCIATED WITH TYPE 2 DIABETES MELLITUS: Chronic | ICD-10-CM

## 2023-05-11 DIAGNOSIS — R30.0 DYSURIA: ICD-10-CM

## 2023-05-11 DIAGNOSIS — E55.9 VITAMIN D DEFICIENCY: ICD-10-CM

## 2023-05-11 DIAGNOSIS — E11.59 HYPERTENSION ASSOCIATED WITH DIABETES: Chronic | ICD-10-CM

## 2023-05-11 DIAGNOSIS — E11.65 TYPE 2 DIABETES MELLITUS WITH HYPERGLYCEMIA, WITHOUT LONG-TERM CURRENT USE OF INSULIN: ICD-10-CM

## 2023-05-11 DIAGNOSIS — F32.A DEPRESSION, UNSPECIFIED DEPRESSION TYPE: ICD-10-CM

## 2023-05-11 DIAGNOSIS — I15.2 HYPERTENSION ASSOCIATED WITH DIABETES: Chronic | ICD-10-CM

## 2023-05-11 PROCEDURE — 99999 PR PBB SHADOW E&M-EST. PATIENT-LVL IV: CPT | Mod: PBBFAC,,, | Performed by: INTERNAL MEDICINE

## 2023-05-11 PROCEDURE — 99397 PR PREVENTIVE VISIT,EST,65 & OVER: ICD-10-PCS | Mod: S$PBB,GZ,, | Performed by: INTERNAL MEDICINE

## 2023-05-11 PROCEDURE — 99214 OFFICE O/P EST MOD 30 MIN: CPT | Mod: PBBFAC,PO | Performed by: INTERNAL MEDICINE

## 2023-05-11 PROCEDURE — 99999 PR PBB SHADOW E&M-EST. PATIENT-LVL IV: ICD-10-PCS | Mod: PBBFAC,,, | Performed by: INTERNAL MEDICINE

## 2023-05-11 PROCEDURE — 99397 PER PM REEVAL EST PAT 65+ YR: CPT | Mod: S$PBB,GZ,, | Performed by: INTERNAL MEDICINE

## 2023-05-11 RX ORDER — ATORVASTATIN CALCIUM 40 MG/1
40 TABLET, FILM COATED ORAL DAILY
Qty: 90 TABLET | Refills: 3 | Status: SHIPPED | OUTPATIENT
Start: 2023-05-11 | End: 2024-03-13 | Stop reason: SDUPTHER

## 2023-05-11 RX ORDER — METFORMIN HYDROCHLORIDE 1000 MG/1
1000 TABLET ORAL 2 TIMES DAILY WITH MEALS
Qty: 180 TABLET | Refills: 3 | Status: SHIPPED | OUTPATIENT
Start: 2023-05-11 | End: 2024-07-01

## 2023-05-11 RX ORDER — DULAGLUTIDE 4.5 MG/.5ML
4.5 INJECTION, SOLUTION SUBCUTANEOUS
Qty: 4 PEN | Refills: 11 | Status: SHIPPED | OUTPATIENT
Start: 2023-05-11 | End: 2023-10-25

## 2023-05-11 RX ORDER — PANTOPRAZOLE SODIUM 40 MG/1
40 TABLET, DELAYED RELEASE ORAL DAILY
Qty: 90 TABLET | Refills: 3 | Status: SHIPPED | OUTPATIENT
Start: 2023-05-11 | End: 2023-09-25

## 2023-05-11 RX ORDER — AMLODIPINE BESYLATE 10 MG/1
10 TABLET ORAL DAILY
Qty: 90 TABLET | Refills: 3 | Status: SHIPPED | OUTPATIENT
Start: 2023-05-11 | End: 2023-07-21

## 2023-05-11 RX ORDER — OLMESARTAN MEDOXOMIL 20 MG/1
TABLET ORAL
Qty: 90 TABLET | Refills: 3 | Status: SHIPPED | OUTPATIENT
Start: 2023-05-11 | End: 2023-07-21

## 2023-05-11 NOTE — TELEPHONE ENCOUNTER
----- Message from Deven Aguilar sent at 5/11/2023  1:23 PM CDT -----  Regarding: JAX TINSLEY [[id number]]  Good Afternoon,  Patient JAX TINSLEY [[id number]] is requesting a call from the Nurse please. Pt is interested in a Psyc referral for depression. Thank you in advance.
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
## 2023-05-12 ENCOUNTER — TELEPHONE (OUTPATIENT)
Dept: INTERNAL MEDICINE | Facility: CLINIC | Age: 68
End: 2023-05-12
Payer: MEDICARE

## 2023-05-12 ENCOUNTER — OFFICE VISIT (OUTPATIENT)
Dept: OPTOMETRY | Facility: CLINIC | Age: 68
End: 2023-05-12
Payer: MEDICARE

## 2023-05-12 ENCOUNTER — LAB VISIT (OUTPATIENT)
Dept: LAB | Facility: HOSPITAL | Age: 68
End: 2023-05-12
Attending: INTERNAL MEDICINE
Payer: COMMERCIAL

## 2023-05-12 DIAGNOSIS — H53.8 BLURRED VISION: ICD-10-CM

## 2023-05-12 DIAGNOSIS — E55.9 VITAMIN D DEFICIENCY: ICD-10-CM

## 2023-05-12 DIAGNOSIS — H25.13 NUCLEAR SCLEROSIS OF BOTH EYES: ICD-10-CM

## 2023-05-12 DIAGNOSIS — E11.9 TYPE 2 DIABETES MELLITUS WITHOUT RETINOPATHY: Primary | ICD-10-CM

## 2023-05-12 DIAGNOSIS — E11.65 TYPE 2 DIABETES MELLITUS WITH HYPERGLYCEMIA, WITHOUT LONG-TERM CURRENT USE OF INSULIN: ICD-10-CM

## 2023-05-12 DIAGNOSIS — D64.9 ANEMIA, UNSPECIFIED TYPE: Primary | ICD-10-CM

## 2023-05-12 LAB
25(OH)D3+25(OH)D2 SERPL-MCNC: 45 NG/ML (ref 30–96)
ALBUMIN SERPL BCP-MCNC: 3.7 G/DL (ref 3.5–5.2)
ALP SERPL-CCNC: 88 U/L (ref 55–135)
ALT SERPL W/O P-5'-P-CCNC: 10 U/L (ref 10–44)
ANION GAP SERPL CALC-SCNC: 10 MMOL/L (ref 8–16)
AST SERPL-CCNC: 17 U/L (ref 10–40)
BASOPHILS # BLD AUTO: 0.02 K/UL (ref 0–0.2)
BASOPHILS NFR BLD: 0.5 % (ref 0–1.9)
BILIRUB SERPL-MCNC: 0.3 MG/DL (ref 0.1–1)
BUN SERPL-MCNC: 13 MG/DL (ref 8–23)
CALCIUM SERPL-MCNC: 10.2 MG/DL (ref 8.7–10.5)
CHLORIDE SERPL-SCNC: 105 MMOL/L (ref 95–110)
CHOLEST SERPL-MCNC: 223 MG/DL (ref 120–199)
CHOLEST/HDLC SERPL: 3.5 {RATIO} (ref 2–5)
CO2 SERPL-SCNC: 25 MMOL/L (ref 23–29)
CREAT SERPL-MCNC: 0.6 MG/DL (ref 0.5–1.4)
DIFFERENTIAL METHOD: ABNORMAL
EOSINOPHIL # BLD AUTO: 0.1 K/UL (ref 0–0.5)
EOSINOPHIL NFR BLD: 1.8 % (ref 0–8)
ERYTHROCYTE [DISTWIDTH] IN BLOOD BY AUTOMATED COUNT: 17.2 % (ref 11.5–14.5)
EST. GFR  (NO RACE VARIABLE): >60 ML/MIN/1.73 M^2
ESTIMATED AVG GLUCOSE: 148 MG/DL (ref 68–131)
GLUCOSE SERPL-MCNC: 123 MG/DL (ref 70–110)
HBA1C MFR BLD: 6.8 % (ref 4–5.6)
HCT VFR BLD AUTO: 33.4 % (ref 37–48.5)
HDLC SERPL-MCNC: 64 MG/DL (ref 40–75)
HDLC SERPL: 28.7 % (ref 20–50)
HGB BLD-MCNC: 9.8 G/DL (ref 12–16)
IMM GRANULOCYTES # BLD AUTO: 0.01 K/UL (ref 0–0.04)
IMM GRANULOCYTES NFR BLD AUTO: 0.2 % (ref 0–0.5)
LDLC SERPL CALC-MCNC: 139.4 MG/DL (ref 63–159)
LYMPHOCYTES # BLD AUTO: 2.1 K/UL (ref 1–4.8)
LYMPHOCYTES NFR BLD: 47.8 % (ref 18–48)
MCH RBC QN AUTO: 23.4 PG (ref 27–31)
MCHC RBC AUTO-ENTMCNC: 29.3 G/DL (ref 32–36)
MCV RBC AUTO: 80 FL (ref 82–98)
MONOCYTES # BLD AUTO: 0.6 K/UL (ref 0.3–1)
MONOCYTES NFR BLD: 13 % (ref 4–15)
NEUTROPHILS # BLD AUTO: 1.6 K/UL (ref 1.8–7.7)
NEUTROPHILS NFR BLD: 36.7 % (ref 38–73)
NONHDLC SERPL-MCNC: 159 MG/DL
NRBC BLD-RTO: 0 /100 WBC
PLATELET # BLD AUTO: 446 K/UL (ref 150–450)
PMV BLD AUTO: 11.5 FL (ref 9.2–12.9)
POTASSIUM SERPL-SCNC: 4.1 MMOL/L (ref 3.5–5.1)
PROT SERPL-MCNC: 7.4 G/DL (ref 6–8.4)
RBC # BLD AUTO: 4.18 M/UL (ref 4–5.4)
SODIUM SERPL-SCNC: 140 MMOL/L (ref 136–145)
TRIGL SERPL-MCNC: 98 MG/DL (ref 30–150)
TSH SERPL DL<=0.005 MIU/L-ACNC: 0.99 UIU/ML (ref 0.4–4)
WBC # BLD AUTO: 4.39 K/UL (ref 3.9–12.7)

## 2023-05-12 PROCEDURE — 80061 LIPID PANEL: CPT | Performed by: INTERNAL MEDICINE

## 2023-05-12 PROCEDURE — 92014 COMPRE OPH EXAM EST PT 1/>: CPT | Mod: S$PBB,,, | Performed by: OPTOMETRIST

## 2023-05-12 PROCEDURE — 85025 COMPLETE CBC W/AUTO DIFF WBC: CPT | Performed by: INTERNAL MEDICINE

## 2023-05-12 PROCEDURE — 99999 PR PBB SHADOW E&M-EST. PATIENT-LVL II: ICD-10-PCS | Mod: PBBFAC,,, | Performed by: OPTOMETRIST

## 2023-05-12 PROCEDURE — 92014 PR EYE EXAM, EST PATIENT,COMPREHESV: ICD-10-PCS | Mod: S$PBB,,, | Performed by: OPTOMETRIST

## 2023-05-12 PROCEDURE — 84443 ASSAY THYROID STIM HORMONE: CPT | Performed by: INTERNAL MEDICINE

## 2023-05-12 PROCEDURE — 80053 COMPREHEN METABOLIC PANEL: CPT | Performed by: INTERNAL MEDICINE

## 2023-05-12 PROCEDURE — 83036 HEMOGLOBIN GLYCOSYLATED A1C: CPT | Performed by: INTERNAL MEDICINE

## 2023-05-12 PROCEDURE — 82306 VITAMIN D 25 HYDROXY: CPT | Performed by: INTERNAL MEDICINE

## 2023-05-12 PROCEDURE — 99212 OFFICE O/P EST SF 10 MIN: CPT | Mod: PBBFAC | Performed by: OPTOMETRIST

## 2023-05-12 PROCEDURE — 36415 COLL VENOUS BLD VENIPUNCTURE: CPT | Performed by: INTERNAL MEDICINE

## 2023-05-12 PROCEDURE — 99999 PR PBB SHADOW E&M-EST. PATIENT-LVL II: CPT | Mod: PBBFAC,,, | Performed by: OPTOMETRIST

## 2023-05-12 RX ORDER — CIPROFLOXACIN 500 MG/1
500 TABLET ORAL EVERY 12 HOURS
Qty: 6 TABLET | Refills: 0 | Status: SHIPPED | OUTPATIENT
Start: 2023-05-12 | End: 2023-05-15

## 2023-05-12 NOTE — TELEPHONE ENCOUNTER
Your blood counts are improving from last check.  I am going to order further evaluation with iron studies.

## 2023-05-16 ENCOUNTER — LAB VISIT (OUTPATIENT)
Dept: LAB | Facility: HOSPITAL | Age: 68
End: 2023-05-16
Attending: INTERNAL MEDICINE
Payer: COMMERCIAL

## 2023-05-16 DIAGNOSIS — D64.9 ANEMIA, UNSPECIFIED TYPE: ICD-10-CM

## 2023-05-16 LAB
BASOPHILS # BLD AUTO: 0.02 K/UL (ref 0–0.2)
BASOPHILS NFR BLD: 0.4 % (ref 0–1.9)
DIFFERENTIAL METHOD: ABNORMAL
EOSINOPHIL # BLD AUTO: 0.1 K/UL (ref 0–0.5)
EOSINOPHIL NFR BLD: 1.6 % (ref 0–8)
ERYTHROCYTE [DISTWIDTH] IN BLOOD BY AUTOMATED COUNT: 17.4 % (ref 11.5–14.5)
FERRITIN SERPL-MCNC: 8 NG/ML (ref 20–300)
HCT VFR BLD AUTO: 30.8 % (ref 37–48.5)
HGB BLD-MCNC: 9.3 G/DL (ref 12–16)
IMM GRANULOCYTES # BLD AUTO: 0.01 K/UL (ref 0–0.04)
IMM GRANULOCYTES NFR BLD AUTO: 0.2 % (ref 0–0.5)
IRON SERPL-MCNC: 33 UG/DL (ref 30–160)
LYMPHOCYTES # BLD AUTO: 1.9 K/UL (ref 1–4.8)
LYMPHOCYTES NFR BLD: 42.1 % (ref 18–48)
MCH RBC QN AUTO: 24.2 PG (ref 27–31)
MCHC RBC AUTO-ENTMCNC: 30.2 G/DL (ref 32–36)
MCV RBC AUTO: 80 FL (ref 82–98)
MONOCYTES # BLD AUTO: 0.5 K/UL (ref 0.3–1)
MONOCYTES NFR BLD: 11.4 % (ref 4–15)
NEUTROPHILS # BLD AUTO: 2 K/UL (ref 1.8–7.7)
NEUTROPHILS NFR BLD: 44.3 % (ref 38–73)
NRBC BLD-RTO: 0 /100 WBC
PLATELET # BLD AUTO: 410 K/UL (ref 150–450)
PMV BLD AUTO: 11.4 FL (ref 9.2–12.9)
RBC # BLD AUTO: 3.85 M/UL (ref 4–5.4)
SATURATED IRON: 7 % (ref 20–50)
TOTAL IRON BINDING CAPACITY: 456 UG/DL (ref 250–450)
TRANSFERRIN SERPL-MCNC: 308 MG/DL (ref 200–375)
WBC # BLD AUTO: 4.47 K/UL (ref 3.9–12.7)

## 2023-05-16 PROCEDURE — 82728 ASSAY OF FERRITIN: CPT | Performed by: INTERNAL MEDICINE

## 2023-05-16 PROCEDURE — 85025 COMPLETE CBC W/AUTO DIFF WBC: CPT | Performed by: INTERNAL MEDICINE

## 2023-05-16 PROCEDURE — 84466 ASSAY OF TRANSFERRIN: CPT | Performed by: INTERNAL MEDICINE

## 2023-05-16 PROCEDURE — 36415 COLL VENOUS BLD VENIPUNCTURE: CPT | Mod: PO | Performed by: INTERNAL MEDICINE

## 2023-05-17 ENCOUNTER — TELEPHONE (OUTPATIENT)
Dept: INTERNAL MEDICINE | Facility: CLINIC | Age: 68
End: 2023-05-17
Payer: MEDICARE

## 2023-05-17 DIAGNOSIS — D50.9 IRON DEFICIENCY ANEMIA, UNSPECIFIED IRON DEFICIENCY ANEMIA TYPE: Primary | ICD-10-CM

## 2023-05-17 NOTE — TELEPHONE ENCOUNTER
Your blood counts are stable.  Iron studies indicate an iron deficiency anemia.  Please take over-the-counter iron tablets every other day.  Also, I am going to order a colonoscopy to evaluate further.

## 2023-05-17 NOTE — TELEPHONE ENCOUNTER
Lab results given , informed pt to take otc iron tablets every other day , informed that pcp would like her to get a colonoscopy . Pt jian .

## 2023-05-18 ENCOUNTER — TELEPHONE (OUTPATIENT)
Dept: INTERNAL MEDICINE | Facility: CLINIC | Age: 68
End: 2023-05-18
Payer: MEDICARE

## 2023-05-18 ENCOUNTER — OFFICE VISIT (OUTPATIENT)
Dept: UROLOGY | Facility: CLINIC | Age: 68
End: 2023-05-18
Payer: MEDICARE

## 2023-05-18 VITALS
DIASTOLIC BLOOD PRESSURE: 78 MMHG | HEIGHT: 67 IN | BODY MASS INDEX: 29.55 KG/M2 | WEIGHT: 188.25 LBS | SYSTOLIC BLOOD PRESSURE: 114 MMHG | HEART RATE: 85 BPM

## 2023-05-18 DIAGNOSIS — R30.0 DYSURIA: ICD-10-CM

## 2023-05-18 DIAGNOSIS — R32 URINARY INCONTINENCE, UNSPECIFIED TYPE: Primary | ICD-10-CM

## 2023-05-18 LAB — POC RESIDUAL URINE VOLUME: 13 ML (ref 0–100)

## 2023-05-18 PROCEDURE — 99214 PR OFFICE/OUTPT VISIT, EST, LEVL IV, 30-39 MIN: ICD-10-PCS | Mod: S$PBB,,, | Performed by: STUDENT IN AN ORGANIZED HEALTH CARE EDUCATION/TRAINING PROGRAM

## 2023-05-18 PROCEDURE — 51798 US URINE CAPACITY MEASURE: CPT | Mod: PBBFAC,PO | Performed by: STUDENT IN AN ORGANIZED HEALTH CARE EDUCATION/TRAINING PROGRAM

## 2023-05-18 PROCEDURE — 99999 PR PBB SHADOW E&M-EST. PATIENT-LVL V: CPT | Mod: PBBFAC,,, | Performed by: STUDENT IN AN ORGANIZED HEALTH CARE EDUCATION/TRAINING PROGRAM

## 2023-05-18 PROCEDURE — 99215 OFFICE O/P EST HI 40 MIN: CPT | Mod: PBBFAC,PO | Performed by: STUDENT IN AN ORGANIZED HEALTH CARE EDUCATION/TRAINING PROGRAM

## 2023-05-18 PROCEDURE — 99999 PR PBB SHADOW E&M-EST. PATIENT-LVL V: ICD-10-PCS | Mod: PBBFAC,,, | Performed by: STUDENT IN AN ORGANIZED HEALTH CARE EDUCATION/TRAINING PROGRAM

## 2023-05-18 PROCEDURE — 99214 OFFICE O/P EST MOD 30 MIN: CPT | Mod: S$PBB,,, | Performed by: STUDENT IN AN ORGANIZED HEALTH CARE EDUCATION/TRAINING PROGRAM
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
## 2023-05-18 NOTE — PROGRESS NOTES
HPI    CC: Pt states that she has been experiencing blurred Va OS>OD. States that   her distance vision is fine, but close up feels blurred. States that she   feels as though she has a screen over OS. States that she has a hard time   driving due to OS blurring.     MAREN:11/5/2022 Dr. Hutton    (+) Changes in vision   (-) Pain  (+) Irritation, OS  (+) Itching, OS  (-) Flashes  (+) Floaters OS occasionally; longstanding   (+) Glasses wearer: OTC Readers  (-) CL wearer  (+) Uses eye gtts; Visine OU PRN    Does patient want a refraction today? Yes    (-) Eye injury  (-) Eye surgery   (-)POHx  (+)FOHx: Brothers, Mother, Sister and Father: Cataracts   (+)DM  Hemoglobin A1C       Date                     Value               Ref Range             Status                02/17/2023               6.9 (H)             4.0 - 5.6 %           Final                 01/05/2023               7.2 (H)             4.0 - 5.6 %           Final                  09/12/2022               9.1 (H)             4.0 - 5.6 %           Final                   Last edited by Lis Rubin, OD on 5/12/2023  9:29 AM.            Assessment /Plan     For exam results, see Encounter Report.    Type 2 diabetes mellitus without retinopathy    Blurred vision  -     Ambulatory referral/consult to Optometry    Nuclear sclerosis of both eyes      No retinopathy noted, OU. Continue proper BS control and annual diabetic eye exams. Monitor yearly.      2-3. Educated pt on findings. Mac OCT WNL OU. Visually significant cataracts OU. Refer to Dr. Mcgraw for eval and removal.       RTC with Dr. Mcgraw as directed, me yearly or prn.

## 2023-05-22 ENCOUNTER — OFFICE VISIT (OUTPATIENT)
Dept: INTERNAL MEDICINE | Facility: CLINIC | Age: 68
End: 2023-05-22
Payer: MEDICARE

## 2023-05-22 VITALS
DIASTOLIC BLOOD PRESSURE: 70 MMHG | SYSTOLIC BLOOD PRESSURE: 110 MMHG | BODY MASS INDEX: 29.16 KG/M2 | HEART RATE: 77 BPM | HEIGHT: 66 IN | WEIGHT: 181.44 LBS | TEMPERATURE: 98 F | OXYGEN SATURATION: 99 %

## 2023-05-22 DIAGNOSIS — E08.43 DIABETES MELLITUS DUE TO UNDERLYING CONDITION WITH DIABETIC AUTONOMIC NEUROPATHY, WITHOUT LONG-TERM CURRENT USE OF INSULIN: Primary | ICD-10-CM

## 2023-05-22 DIAGNOSIS — R29.898 LEFT LEG WEAKNESS: ICD-10-CM

## 2023-05-22 PROBLEM — A41.9 SEPSIS DUE TO PNEUMONIA: Status: RESOLVED | Noted: 2023-02-17 | Resolved: 2023-05-22

## 2023-05-22 PROBLEM — J18.9 SEPSIS DUE TO PNEUMONIA: Status: RESOLVED | Noted: 2023-02-17 | Resolved: 2023-05-22

## 2023-05-22 PROBLEM — J18.9 MULTIFOCAL PNEUMONIA: Status: RESOLVED | Noted: 2023-02-17 | Resolved: 2023-05-22

## 2023-05-22 PROCEDURE — 99215 OFFICE O/P EST HI 40 MIN: CPT | Mod: PBBFAC,PO | Performed by: INTERNAL MEDICINE

## 2023-05-22 PROCEDURE — 99214 OFFICE O/P EST MOD 30 MIN: CPT | Mod: S$PBB,,, | Performed by: INTERNAL MEDICINE

## 2023-05-22 PROCEDURE — 99999 PR PBB SHADOW E&M-EST. PATIENT-LVL V: ICD-10-PCS | Mod: PBBFAC,,, | Performed by: INTERNAL MEDICINE

## 2023-05-22 PROCEDURE — 99999 PR PBB SHADOW E&M-EST. PATIENT-LVL V: CPT | Mod: PBBFAC,,, | Performed by: INTERNAL MEDICINE

## 2023-05-22 PROCEDURE — 99214 PR OFFICE/OUTPT VISIT, EST, LEVL IV, 30-39 MIN: ICD-10-PCS | Mod: S$PBB,,, | Performed by: INTERNAL MEDICINE

## 2023-05-22 NOTE — PROGRESS NOTES
Subjective:       Patient ID: Jill Perez is a 68 y.o. female.    Chief Complaint: Discuss Medical Concerns    HPI    68-year-old female here for to discuss medical concerns.  She needs diabetic shoes.    Patient is having trouble getting scheduled for cataract surgery.    Patient used to have a brace for walking 35 years ago and used it for 2 years.  She noticed no benefit to the brace, so discontinue use.  She has weakness in her left leg since she had surgery, and is afraid to go anywhere without her  to help her.    Review of Systems      Objective:      Physical Exam  Vitals reviewed.   Constitutional:       Appearance: She is well-developed.   HENT:      Head: Normocephalic and atraumatic.      Mouth/Throat:      Pharynx: No oropharyngeal exudate.   Eyes:      General: No scleral icterus.        Right eye: No [facility].         Left eye: No [facility].      Pupils: Pupils are equal, round, and reactive to light.   Neck:      Thyroid: No thyromegaly.      Trachea: No tracheal deviation.   Cardiovascular:      Rate and Rhythm: Normal rate and regular rhythm.      Heart sounds: Normal heart sounds. No murmur heard.    No friction rub. No gallop.   Pulmonary:      Effort: Pulmonary effort is normal. No respiratory distress.      Breath sounds: Normal breath sounds. No wheezing or rales.   Chest:      Chest wall: No tenderness.   Abdominal:      General: Bowel sounds are normal. There is no distension.      Palpations: Abdomen is soft. There is no mass.      Tenderness: There is no abdominal tenderness. There is no guarding or rebound.   Musculoskeletal:         General: No tenderness. Normal range of motion.      Cervical back: Normal range of motion and neck supple.   Skin:     General: Skin is warm and dry.      Coloration: Skin is not pale.      Findings: No erythema or rash.      Comments: Protective Sensation (w/ 10 gram monofilament):  Right: Decreased  Left: Decreased    Visual Inspection:  Callous  noted on heels of both feet.    Pedal Pulses:   Right: Present  Left: Present    Posterior Tibialis Pulses:   Right:Present  Left: Present       Neurological:      Mental Status: She is alert and oriented to person, place, and time.   Psychiatric:         Behavior: Behavior normal.       Assessment:       1. Diabetes mellitus due to underlying condition with diabetic autonomic neuropathy, without long-term current use of insulin    2. Left leg weakness  - Ambulatory referral/consult to Physical/Occupational Therapy; Future      Plan:       1. Prescription done for diabetic shoes.    2.  Refer to physical therapy.

## 2023-05-22 NOTE — TELEPHONE ENCOUNTER
----- Message from Liane Benton sent at 5/18/2023 10:12 AM CDT -----  Regarding: sooner appt  Contact: JILL PEREZ [[id number]]  Type:  Sooner Apoointment Request    Caller is requesting a sooner appointment.  Caller declined first available appointment listed below.  Caller will not accept being placed on the waitlist and is requesting a message be sent to doctor.  Name of Caller:Jill Perez    When is the first available appointment?7/21  Symptoms:rx consult and also to address some concerns she has    Would the patient rather a call back or a response via [facility]? Call back   Best Call Back Number:Home Phone      [phone]  Work Phone      Not on file.  Mobile          [phone]      Additional Information: \         No

## 2023-05-24 ENCOUNTER — OFFICE VISIT (OUTPATIENT)
Dept: VASCULAR SURGERY | Facility: CLINIC | Age: 68
End: 2023-05-24
Payer: MEDICARE

## 2023-05-24 VITALS — SYSTOLIC BLOOD PRESSURE: 123 MMHG | TEMPERATURE: 97 F | DIASTOLIC BLOOD PRESSURE: 69 MMHG | HEART RATE: 79 BPM

## 2023-05-24 DIAGNOSIS — I87.2 VENOUS INSUFFICIENCY OF RIGHT LEG: Primary | ICD-10-CM

## 2023-05-24 PROCEDURE — 3044F HG A1C LEVEL LT 7.0%: CPT | Mod: CPTII,S$GLB,, | Performed by: SURGERY

## 2023-05-24 PROCEDURE — 3074F SYST BP LT 130 MM HG: CPT | Mod: CPTII,S$GLB,, | Performed by: SURGERY

## 2023-05-24 PROCEDURE — 4010F PR ACE/ARB THEARPY RXD/TAKEN: ICD-10-PCS | Mod: CPTII,S$GLB,, | Performed by: SURGERY

## 2023-05-24 PROCEDURE — 99211 OFF/OP EST MAY X REQ PHY/QHP: CPT | Mod: S$GLB,,, | Performed by: SURGERY

## 2023-05-24 PROCEDURE — 3074F PR MOST RECENT SYSTOLIC BLOOD PRESSURE < 130 MM HG: ICD-10-PCS | Mod: CPTII,S$GLB,, | Performed by: SURGERY

## 2023-05-24 PROCEDURE — 1159F PR MEDICATION LIST DOCUMENTED IN MEDICAL RECORD: ICD-10-PCS | Mod: CPTII,S$GLB,, | Performed by: SURGERY

## 2023-05-24 PROCEDURE — 3044F PR MOST RECENT HEMOGLOBIN A1C LEVEL <7.0%: ICD-10-PCS | Mod: CPTII,S$GLB,, | Performed by: SURGERY

## 2023-05-24 PROCEDURE — 3078F DIAST BP <80 MM HG: CPT | Mod: CPTII,S$GLB,, | Performed by: SURGERY

## 2023-05-24 PROCEDURE — 1159F MED LIST DOCD IN RCRD: CPT | Mod: CPTII,S$GLB,, | Performed by: SURGERY

## 2023-05-24 PROCEDURE — 4010F ACE/ARB THERAPY RXD/TAKEN: CPT | Mod: CPTII,S$GLB,, | Performed by: SURGERY

## 2023-05-24 PROCEDURE — 99211 PR OFFICE/OUTPT VISIT, EST, LEVL I: ICD-10-PCS | Mod: S$GLB,,, | Performed by: SURGERY

## 2023-05-24 PROCEDURE — 3078F PR MOST RECENT DIASTOLIC BLOOD PRESSURE < 80 MM HG: ICD-10-PCS | Mod: CPTII,S$GLB,, | Performed by: SURGERY
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
## 2023-05-25 ENCOUNTER — PROCEDURE VISIT (OUTPATIENT)
Dept: UROLOGY | Facility: CLINIC | Age: 68
End: 2023-05-25
Payer: MEDICARE

## 2023-05-25 ENCOUNTER — TELEPHONE (OUTPATIENT)
Dept: INTERNAL MEDICINE | Facility: CLINIC | Age: 68
End: 2023-05-25
Payer: MEDICARE

## 2023-05-25 VITALS
DIASTOLIC BLOOD PRESSURE: 77 MMHG | SYSTOLIC BLOOD PRESSURE: 129 MMHG | WEIGHT: 181.69 LBS | HEART RATE: 94 BPM | BODY MASS INDEX: 29.32 KG/M2

## 2023-05-25 DIAGNOSIS — E11.59 HYPERTENSION ASSOCIATED WITH DIABETES: ICD-10-CM

## 2023-05-25 DIAGNOSIS — R30.0 DYSURIA: ICD-10-CM

## 2023-05-25 DIAGNOSIS — M54.16 LUMBAR RADICULOPATHY: Primary | ICD-10-CM

## 2023-05-25 DIAGNOSIS — R32 URINARY INCONTINENCE, UNSPECIFIED TYPE: ICD-10-CM

## 2023-05-25 DIAGNOSIS — I15.2 HYPERTENSION ASSOCIATED WITH DIABETES: ICD-10-CM

## 2023-05-25 PROCEDURE — 52000 PR CYSTOURETHROSCOPY: ICD-10-PCS | Mod: S$PBB,,, | Performed by: STUDENT IN AN ORGANIZED HEALTH CARE EDUCATION/TRAINING PROGRAM

## 2023-05-25 PROCEDURE — 52000 CYSTOURETHROSCOPY: CPT | Mod: PBBFAC,PO | Performed by: STUDENT IN AN ORGANIZED HEALTH CARE EDUCATION/TRAINING PROGRAM

## 2023-05-25 PROCEDURE — 52000 CYSTOURETHROSCOPY: CPT | Mod: S$PBB,,, | Performed by: STUDENT IN AN ORGANIZED HEALTH CARE EDUCATION/TRAINING PROGRAM

## 2023-05-25 RX ORDER — SULFAMETHOXAZOLE AND TRIMETHOPRIM 800; 160 MG/1; MG/1
1 TABLET ORAL 2 TIMES DAILY
Qty: 10 TABLET | Refills: 0 | Status: SHIPPED | OUTPATIENT
Start: 2023-05-25 | End: 2023-05-30

## 2023-05-25 NOTE — Clinical Note
Maxim Rousseau, renal US and cystoscopy were benign. I do not know what is causative of her sporadic left back pain and dysuria but might consider a back pain workup? Sincerely, Connie

## 2023-05-25 NOTE — TELEPHONE ENCOUNTER
----- Message from Connie Butler MD sent at 5/25/2023  9:38 AM CDT -----  Hi Soham, renal US and cystoscopy were benign. I do not know what is causative of her sporadic left back pain and dysuria but might consider a back pain workup?  Sincerely,  Connie

## 2023-05-25 NOTE — PROCEDURES
Procedures  Procedure:   1. Flexible cysto-uretheroscopy    Pre Procedure Diagnosis:  1. dysuria    Post Procedure Diagnosis:  1. same    Surgeon: Connie Butler MD    Anesthesia: 2% uro-jet lidocaine jelly for local analgesia    Procedure note:  A flexible cysto-urethroscopy was performed after consent was obtained.  The risks and benefits were explained. 2% lidocaine urojet was used for local analgesia. The genitalia was prepped and draped in the sterile fashion.     The flexible scope was advanced into the urethra and into the bladder. The bilateral ureteral orifices were identified in their normal orthotopic locations. Clear bilateral ureteral efflux was identified. The bladder was completely surveyed in a systematic fashion. No bladder tumors or lesions were seen.     As the flexible cystoscope was being removed, the urethra was evaluated and noted to be normal.     The patient tolerated the procedure well without complication.       Findings in summary:  No bladder or urethral abnormalities appreciated  Clear efflux from R and L ureteral orifices noted during cystoscopy      Assessment: 68 y.o. female with dysuria, left flank pain     Plan:  1. US from 1/2023 benign.  2. Cystoscopy benign.  3. Will message pcp to consider a back pain workup. No urologic abnormalities seen.  4. Bactrim x 5 days to cover procedure.

## 2023-05-25 NOTE — TELEPHONE ENCOUNTER
Urology did not find a cause for her symptoms, and does not think that it is urologic in nature.  X-ray of lumbar spine ordered.

## 2023-05-26 ENCOUNTER — OFFICE VISIT (OUTPATIENT)
Dept: PODIATRY | Facility: CLINIC | Age: 68
End: 2023-05-26
Payer: MEDICARE

## 2023-05-26 ENCOUNTER — HOSPITAL ENCOUNTER (OUTPATIENT)
Dept: RADIOLOGY | Facility: HOSPITAL | Age: 68
Discharge: HOME | End: 2023-05-26
Attending: INTERNAL MEDICINE
Payer: MEDICARE

## 2023-05-26 VITALS
HEART RATE: 91 BPM | DIASTOLIC BLOOD PRESSURE: 78 MMHG | SYSTOLIC BLOOD PRESSURE: 112 MMHG | HEIGHT: 66 IN | BODY MASS INDEX: 29.32 KG/M2

## 2023-05-26 DIAGNOSIS — Q66.89 CURLY TOE: Primary | ICD-10-CM

## 2023-05-26 DIAGNOSIS — M54.16 LUMBAR RADICULOPATHY: ICD-10-CM

## 2023-05-26 DIAGNOSIS — E11.65 TYPE 2 DIABETES MELLITUS WITH HYPERGLYCEMIA, WITHOUT LONG-TERM CURRENT USE OF INSULIN: ICD-10-CM

## 2023-05-26 DIAGNOSIS — E11.9 ENCOUNTER FOR COMPREHENSIVE DIABETIC FOOT EXAMINATION, TYPE 2 DIABETES MELLITUS: ICD-10-CM

## 2023-05-26 PROCEDURE — 99999 PR PBB SHADOW E&M-EST. PATIENT-LVL III: ICD-10-PCS | Mod: PBBFAC,,, | Performed by: STUDENT IN AN ORGANIZED HEALTH CARE EDUCATION/TRAINING PROGRAM

## 2023-05-26 PROCEDURE — 99999 PR PBB SHADOW E&M-EST. PATIENT-LVL III: CPT | Mod: PBBFAC,,, | Performed by: STUDENT IN AN ORGANIZED HEALTH CARE EDUCATION/TRAINING PROGRAM

## 2023-05-26 PROCEDURE — 99203 PR OFFICE/OUTPT VISIT, NEW, LEVL III, 30-44 MIN: ICD-10-PCS | Mod: S$PBB,,, | Performed by: STUDENT IN AN ORGANIZED HEALTH CARE EDUCATION/TRAINING PROGRAM

## 2023-05-26 PROCEDURE — 99213 OFFICE O/P EST LOW 20 MIN: CPT | Mod: PBBFAC,PN | Performed by: STUDENT IN AN ORGANIZED HEALTH CARE EDUCATION/TRAINING PROGRAM

## 2023-05-26 PROCEDURE — 72100 X-RAY EXAM L-S SPINE 2/3 VWS: CPT | Mod: TC,FY

## 2023-05-26 PROCEDURE — 72100 XR LUMBAR SPINE AP AND LATERAL: ICD-10-PCS | Mod: 26,,, | Performed by: RADIOLOGY

## 2023-05-26 PROCEDURE — 72100 X-RAY EXAM L-S SPINE 2/3 VWS: CPT | Mod: 26,,, | Performed by: RADIOLOGY

## 2023-05-26 PROCEDURE — 99203 OFFICE O/P NEW LOW 30 MIN: CPT | Mod: S$PBB,,, | Performed by: STUDENT IN AN ORGANIZED HEALTH CARE EDUCATION/TRAINING PROGRAM

## 2023-05-26 NOTE — TELEPHONE ENCOUNTER
Xray scheduled with pt .    Pt is requesting digital medicine program for blood pressure and b/s monitoring
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
## 2023-05-30 ENCOUNTER — TELEPHONE (OUTPATIENT)
Dept: PHARMACY | Facility: CLINIC | Age: 68
End: 2023-05-30
Payer: MEDICARE

## 2023-05-31 ENCOUNTER — CLINICAL SUPPORT (OUTPATIENT)
Dept: ENDOSCOPY | Facility: HOSPITAL | Age: 68
End: 2023-05-31
Attending: INTERNAL MEDICINE
Payer: COMMERCIAL

## 2023-05-31 DIAGNOSIS — D50.9 IRON DEFICIENCY ANEMIA, UNSPECIFIED IRON DEFICIENCY ANEMIA TYPE: ICD-10-CM

## 2023-05-31 RX ORDER — POLYETHYLENE GLYCOL 3350, SODIUM SULFATE ANHYDROUS, SODIUM BICARBONATE, SODIUM CHLORIDE, POTASSIUM CHLORIDE 236; 22.74; 6.74; 5.86; 2.97 G/4L; G/4L; G/4L; G/4L; G/4L
4 POWDER, FOR SOLUTION ORAL ONCE
Qty: 4000 ML | Refills: 0 | Status: SHIPPED | OUTPATIENT
Start: 2023-05-31 | End: 2023-06-03

## 2023-06-02 ENCOUNTER — TELEPHONE (OUTPATIENT)
Dept: ENDOSCOPY | Facility: HOSPITAL | Age: 68
End: 2023-06-02
Payer: MEDICARE

## 2023-06-02 ENCOUNTER — TELEPHONE (OUTPATIENT)
Dept: INTERNAL MEDICINE | Facility: CLINIC | Age: 68
End: 2023-06-02
Payer: MEDICARE

## 2023-06-02 RX ORDER — ZOLPIDEM TARTRATE 10 MG/1
10 TABLET ORAL NIGHTLY
Qty: 30 TABLET | Refills: 5 | Status: CANCELLED | OUTPATIENT
Start: 2023-06-02

## 2023-06-02 RX ORDER — ZOLPIDEM TARTRATE 10 MG/1
10 TABLET ORAL NIGHTLY
Qty: 30 TABLET | Refills: 0 | Status: SHIPPED | OUTPATIENT
Start: 2023-06-02 | End: 2023-07-03 | Stop reason: SDUPTHER

## 2023-06-02 NOTE — TELEPHONE ENCOUNTER
----- Message from Eros Wall sent at 6/2/2023  8:14 AM CDT -----  Contact: Pt.  .Type:  Needs Medical Advice    Who Called: pt  Pharmacy name and phone #:  [facility]   Phone: [phone]  Fax:  [phone]  Would the patient rather a call back or a response via [facility]?  Call back  Best Call Back Number: [phone]  Additional Information: Pt. Needs a new script for her zolpidem (AMBIEN) 10 mg Tab

## 2023-06-02 NOTE — TELEPHONE ENCOUNTER
No care due was identified.  Health [facility] Embedded Care Due Messages. Reference number: [id number].   6/02/2023 8:06:21 AM CDT

## 2023-06-02 NOTE — TELEPHONE ENCOUNTER
No care due was identified.  Health [facility] Embedded Care Due Messages. Reference number: [id number].   6/02/2023 8:09:06 AM CDT

## 2023-06-02 NOTE — TELEPHONE ENCOUNTER
Spoke with patient about arrival time @ 0800   Colon/Golytely    Prep instructions reviewed: Dulcolax tabs x4 at 12 noon; the day before the procedure, follow a clear liquid diet all day. Patient stated she has had a gastric bypass and cannot do usual times for prep solution. Instructed to start prep between 4-5pm the day before and to STOP drinking by 0500 the day of procedure. Also encouraged patient to review instructions sent to portal by [facility] .    Medications: Do not take Insulin or oral diabetic medications the day of the procedure.  Take as prescribed: heart, seizure and blood pressure medication in the morning with a sip of water (less than an ounce).  Take any breathing medications and bring inhalers to hospital with you Leave all valuables and jewelry at home.     Wear comfortable clothes to procedure to change into hospital gown You cannot drive for 24 hours after your procedure because you will receive sedation for your procedure to make you comfortable.  A ride must be provided at [facility].

## 2023-06-07 ENCOUNTER — ANESTHESIA (OUTPATIENT)
Dept: ENDOSCOPY | Facility: HOSPITAL | Age: 68
End: 2023-06-07
Payer: MEDICARE

## 2023-06-07 ENCOUNTER — HOSPITAL ENCOUNTER (OUTPATIENT)
Facility: HOSPITAL | Age: 68
Discharge: HOME | End: 2023-06-07
Attending: INTERNAL MEDICINE | Admitting: INTERNAL MEDICINE
Payer: MEDICARE

## 2023-06-07 ENCOUNTER — ANESTHESIA EVENT (OUTPATIENT)
Dept: ENDOSCOPY | Facility: HOSPITAL | Age: 68
End: 2023-06-07
Payer: MEDICARE

## 2023-06-07 VITALS
OXYGEN SATURATION: 97 % | TEMPERATURE: 98 F | HEIGHT: 66 IN | HEART RATE: 74 BPM | BODY MASS INDEX: 28.93 KG/M2 | WEIGHT: 180 LBS | SYSTOLIC BLOOD PRESSURE: 135 MMHG | DIASTOLIC BLOOD PRESSURE: 72 MMHG | RESPIRATION RATE: 27 BRPM

## 2023-06-07 DIAGNOSIS — Z12.11 ENCOUNTER FOR COLORECTAL CANCER SCREENING: Primary | ICD-10-CM

## 2023-06-07 DIAGNOSIS — Z12.12 ENCOUNTER FOR COLORECTAL CANCER SCREENING: Primary | ICD-10-CM

## 2023-06-07 LAB — POCT GLUCOSE: 143 MG/DL (ref 70–110)

## 2023-06-07 PROCEDURE — G0121 COLON CA SCRN NOT HI RSK IND: ICD-10-PCS | Mod: ,,, | Performed by: INTERNAL MEDICINE

## 2023-06-07 PROCEDURE — D9220A PRA ANESTHESIA: ICD-10-PCS | Mod: ANES,,, | Performed by: STUDENT IN AN ORGANIZED HEALTH CARE EDUCATION/TRAINING PROGRAM

## 2023-06-07 PROCEDURE — 37000009 HC ANESTHESIA EA ADD 15 MINS: Performed by: INTERNAL MEDICINE

## 2023-06-07 PROCEDURE — 25000003 PHARM REV CODE 250: Performed by: NURSE ANESTHETIST, CERTIFIED REGISTERED

## 2023-06-07 PROCEDURE — G0121 COLON CA SCRN NOT HI RSK IND: HCPCS | Mod: ,,, | Performed by: INTERNAL MEDICINE

## 2023-06-07 PROCEDURE — 25000003 PHARM REV CODE 250: Performed by: INTERNAL MEDICINE

## 2023-06-07 PROCEDURE — 63600175 PHARM REV CODE 636 W HCPCS: Performed by: NURSE ANESTHETIST, CERTIFIED REGISTERED

## 2023-06-07 PROCEDURE — D9220A PRA ANESTHESIA: Mod: ANES,,, | Performed by: STUDENT IN AN ORGANIZED HEALTH CARE EDUCATION/TRAINING PROGRAM

## 2023-06-07 PROCEDURE — D9220A PRA ANESTHESIA: Mod: CRNA,,, | Performed by: NURSE ANESTHETIST, CERTIFIED REGISTERED

## 2023-06-07 PROCEDURE — G0121 COLON CA SCRN NOT HI RSK IND: HCPCS | Performed by: INTERNAL MEDICINE

## 2023-06-07 PROCEDURE — D9220A PRA ANESTHESIA: ICD-10-PCS | Mod: CRNA,,, | Performed by: NURSE ANESTHETIST, CERTIFIED REGISTERED

## 2023-06-07 PROCEDURE — 37000008 HC ANESTHESIA 1ST 15 MINUTES: Performed by: INTERNAL MEDICINE

## 2023-06-07 RX ORDER — PROPOFOL 10 MG/ML
VIAL (ML) INTRAVENOUS CONTINUOUS PRN
Status: DISCONTINUED | OUTPATIENT
Start: 2023-06-07 | End: 2023-06-07

## 2023-06-07 RX ORDER — LIDOCAINE HYDROCHLORIDE 20 MG/ML
INJECTION, SOLUTION EPIDURAL; INFILTRATION; INTRACAUDAL; PERINEURAL
Status: DISCONTINUED | OUTPATIENT
Start: 2023-06-07 | End: 2023-06-07

## 2023-06-07 RX ORDER — PROPOFOL 10 MG/ML
VIAL (ML) INTRAVENOUS
Status: DISCONTINUED | OUTPATIENT
Start: 2023-06-07 | End: 2023-06-07

## 2023-06-07 RX ORDER — SODIUM CHLORIDE 0.9 % (FLUSH) 0.9 %
10 SYRINGE (ML) INJECTION
Status: DISCONTINUED | OUTPATIENT
Start: 2023-06-07 | End: 2023-06-07 | Stop reason: HOSPADM

## 2023-06-07 RX ORDER — SODIUM CHLORIDE 9 MG/ML
INJECTION, SOLUTION INTRAVENOUS CONTINUOUS
Status: DISCONTINUED | OUTPATIENT
Start: 2023-06-07 | End: 2023-06-07 | Stop reason: HOSPADM

## 2023-06-07 RX ADMIN — LIDOCAINE HYDROCHLORIDE 50 MG: 20 INJECTION, SOLUTION EPIDURAL; INFILTRATION; INTRACAUDAL; PERINEURAL at 09:06

## 2023-06-07 RX ADMIN — PROPOFOL 60 MG: 10 INJECTION, EMULSION INTRAVENOUS at 09:06

## 2023-06-07 RX ADMIN — PROPOFOL 150 MCG/KG/MIN: 10 INJECTION, EMULSION INTRAVENOUS at 09:06

## 2023-06-07 RX ADMIN — SODIUM CHLORIDE: 0.9 INJECTION, SOLUTION INTRAVENOUS at 08:06

## 2023-06-07 RX ADMIN — PROPOFOL 20 MG: 10 INJECTION, EMULSION INTRAVENOUS at 09:06

## 2023-06-07 NOTE — ANESTHESIA PREPROCEDURE EVALUATION
[facility]  Anesthesia Pre-Operative Evaluation         Patient Name: Jill Perez  YOB: 1955  MRN: [id number]    SUBJECTIVE:     06/07/2023    Procedure(s) (LRB):  COLONOSCOPY (N/A)    Jill Perez is a 68 y.o. female here for Procedure(s) (LRB):  COLONOSCOPY (N/A)    Drips:     Patient Active Problem List   Diagnosis    Poorly controlled diabetes mellitus    Type 2 diabetes mellitus with hyperglycemia, without long-term current use of insulin    Hypertension associated with diabetes    Hyperlipidemia associated with type 2 diabetes mellitus    Primary insomnia    S/P splenectomy    Polyarthralgia    Raynaud's disease without gangrene    Depression    Positive YOLIS (antinuclear antibody)    B12 deficiency    BMI 27.0-27.9,adult    Hiatal hernia    Esophageal dysphagia    Internal hernia    Venous insufficiency of both lower extremities    Varicose veins of right lower extremity with pain    Anemia of chronic disease    Chronic headache    Leg swelling    History of sleeve gastrectomy    Diabetes mellitus due to underlying condition with diabetic autonomic neuropathy, without long-term current use of insulin       Review of patient's allergies indicates:   Allergen Reactions    Pcn [penicillins] Hives and Itching     Has tolerated cephalosporins    Penicillin Hives       Current Facility-Administered Medications on File Prior to Encounter   Medication Dose Route Frequency Provider Last Rate Last Admin    LIDOcaine HCL 10 mg/ml (1%) injection 1 mL  1 mL Other 1 time in Clinic/HOD UMANG Lara II, MD         Current Outpatient Medications on File Prior to Encounter   Medication Sig Dispense Refill    albuterol (PROVENTIL) 2.5 mg /3 mL (0.083 %) nebulizer solution 3 ml as needed      ALPRAZolam (XANAX) 0.5 MG tablet Take 1 tablet (0.5 mg total) 1 hour prior to procedure  , then take 1 tablet when you arrive if needed for procedural anxiety for 1 day. 3 tablet 0    amLODIPine (NORVASC) 10 MG tablet Take 1 tablet (10 mg total) by mouth once daily. 90 tablet 3    ascorbic acid, vitamin C, (VITAMIN C) 500 MG tablet Take 500 mg by mouth once daily.      aspirin (ECOTRIN) 81 MG EC tablet Take 81 mg by mouth once daily.      atorvastatin (LIPITOR) 40 MG tablet Take 1 tablet (40 mg total) by mouth once daily. 90 tablet 3    blood sugar diagnostic (TRUE METRIX GLUCOSE TEST STRIP) Strp USE 3 TIMES A  each 12    blood sugar diagnostic Strp To check Blood Glucose 2 times daily, 100 each 0    blood-glucose meter (TRUE METRIX GLUCOSE METER) Misc USE 3 TIMES A DA 1 each 0    cetirizine (ZYRTEC) 10 MG tablet 1 tablet Orally Once a day 30 day(s)      dulaglutide (TRULICITY) 4.5 mg/0.5 mL pen injector Inject 4.5 mg into the skin every 7 days. 4 pen 11    DULoxetine (CYMBALTA) 60 MG capsule Take one capsule by mouth daily 90 capsule 1    gabapentin (NEURONTIN) 800 MG tablet Take 1 tablet by mouth 3 times daily as needed for 30 days. 90 tablet 0    gabapentin (NEURONTIN) 800 MG tablet Take 1 tablet (800 mg total) by mouth 3 (three) times daily as needed. 90 tablet 0    HYDROcodone-acetaminophen (NORCO)  mg per tablet Take 1 tablet by mouth three times daily as needed for pain. 90 tablet 0    HYDROcodone-acetaminophen (NORCO)  mg per tablet Take 1 tablet by mouth 3 (three) times daily as needed. 90 tablet 0    lancets (TRUEPLUS LANCETS) 30 gauge Misc USE 3 TIMES A  each 12    lancets 30 gauge Misc check blood glucose twice daily 100 each 0    metFORMIN (GLUCOPHAGE) 1000 MG tablet Take 1 tablet (1,000 mg total) by mouth 2 (two) times daily with meals. 180 tablet 3    methocarbamoL (ROBAXIN) 500 MG Tab Take 1 tablet by mouth once daily as needed for muscle spasms. 30 tablet 0    methocarbamoL (ROBAXIN) 500 MG Tab Take 1 tablet (500 mg total) by mouth daily as  needed for muscle spasms. 30 tablet 0    olmesartan (BENICAR) 20 MG tablet Take1 tablet Orally Once a day 90 day(s) 90 tablet 3    pantoprazole (PROTONIX) 40 MG tablet Take 1 tablet by mouth daily 90 tablet 3    pulse oximeter (PULSE OXIMETER) device by Apply Externally route 2 (two) times a day. Use twice daily at 8 AM and 3 PM and record the value in MyChart as directed. 1 each 0    vitamin D (VITAMIN D3) 1000 units Tab Take 1,000 Units by mouth once daily.         Past Surgical History:   Procedure Laterality Date    APPENDECTOMY      BACK SURGERY      laminectomy and fusion    BREAST SURGERY       SECTION      x2    CHOLECYSTECTOMY      COLONOSCOPY N/A 2019    Procedure: COLONOSCOPYSuprep;  Surgeon: Carmine Kearney MD;  Location: [facility];  Service: General;  Laterality: N/A;    DIAGNOSTIC LAPAROSCOPY N/A 2020    Procedure: LAPAROSCOPY, DIAGNOSTIC;  Surgeon: Solange Kent MD;  Location: [address];  Service: General;  Laterality: N/A;    ESOPHAGOGASTRODUODENOSCOPY N/A 2019    Procedure: EGD (ESOPHAGOGASTRODUODENOSCOPY);  Surgeon: Carmine Kearney MD;  Location: [facility];  Service: General;  Laterality: N/A;    ESOPHAGOGASTRODUODENOSCOPY N/A 2020    Procedure: EGD (ESOPHAGOGASTRODUODENOSCOPY);  Surgeon: Carmine Kearney MD;  Location: [facility];  Service: Endoscopy;  Laterality: N/A;    ESOPHAGOGASTRODUODENOSCOPY N/A 2020    Procedure: EGD (ESOPHAGOGASTRODUODENOSCOPY);  Surgeon: Solange Kent MD;  Location: [address];  Service: General;  Laterality: N/A;    GASTRIC BYPASS  2008    HERNIA REPAIR      JOINT REPLACEMENT      LAPAROSCOPIC LYSIS OF ADHESIONS N/A 2020    Procedure: LYSIS, ADHESIONS, LAPAROSCOPIC;  Surgeon: Solange Kent MD;  Location: [address];  Service: General;  Laterality: N/A;    LAPAROSCOPIC RESECTION OF SMALL INTESTINE N/A 2020    Procedure: EXCISION, SMALL INTESTINE, LAPAROSCOPIC;   Surgeon: Solange Kent MD;  Location: [facility] OR [address] [place] [address];  Service: General;  Laterality: N/A;    SHOULDER SURGERY Left     SPINE SURGERY      SPLENECTOMY, TOTAL      TOTAL KNEE ARTHROPLASTY Left     TOTAL REDUCTION MAMMOPLASTY Bilateral 2003    TUBAL LIGATION         Social History     Socioeconomic History    Marital status:      Spouse name: Link    Number of children: 3   Tobacco Use    Smoking status: Former     Packs/day: 0.00     Years: 5.00     Pack years: 0.00     Types: Cigarettes     Passive exposure: Never    Smokeless tobacco: Former    Tobacco comments:     Smoked as a teen   Substance and Sexual Activity    Alcohol use: Yes     Alcohol/week: 1.0 standard drink     Types: 1 Glasses of wine per week     Comment: special events    Drug use: No    Sexual activity: Yes     Partners: Male     Birth control/protection: Post-menopausal   Social History Narrative    10/21/20: moved here from [place] permanently a few years ago. Originally lived in [place].  is also a patient of mine. No kids at home. No pets at home. No smokers at home.          OBJECTIVE:     Vital Signs Range (Last 24H):       Significant Labs:  Lab Results   Component Value Date    WBC 4.47 05/16/2023    HGB 9.3 (L) 05/16/2023    HCT 30.8 (L) 05/16/2023     05/16/2023    CHOL 223 (H) 05/12/2023    TRIG 98 05/12/2023    HDL 64 05/12/2023    ALT 10 05/12/2023    AST 17 05/12/2023     05/12/2023    K 4.1 05/12/2023     05/12/2023    CREATININE 0.6 05/12/2023    BUN 13 05/12/2023    CO2 25 05/12/2023    TSH 0.992 05/12/2023    INR 0.9 04/22/2019    HGBA1C 6.8 (H) 05/12/2023       Diagnostic Studies:    EKG:   Results for orders placed or performed during the hospital encounter of 02/17/23   EKG 12-lead    Collection Time: 02/17/23 10:42 AM    Narrative    Test Reason : R07.9,    Vent. Rate : 096 BPM     Atrial Rate : 096 BPM     P-R Int : 152 ms          QRS Dur : 144 ms      QT  Int : 404 ms       P-R-T Axes : 030 -49 002 degrees     QTc Int : 510 ms    Normal sinus rhythm  Right bundle branch block  Left anterior fascicular block   Bifascicular block   Prolonged QT  Abnormal ECG  When compared with ECG of 14-DEC-2022 17:01,  No significant change was found  Confirmed by Анна Krishnamurthy MD ([id number]) on 2/17/2023 3:36:00 PM    Referred By: [email or web address]   SELF           Confirmed By:Анна Krishnamurthy MD       2D ECHO:  TTE:  No results found for this or any previous visit.  Results for orders placed or performed during the hospital encounter of 02/17/23   Echo   Result Value Ref Range    BSA 1.96 m2    TDI SEPTAL 0.06 m/s    LV LATERAL E/E' RATIO 7.33 m/s    LV SEPTAL E/E' RATIO 14.67 m/s    LA WIDTH 4.30 cm    Left Ventricular Outflow Tract Mean Velocity 0.81 cm/s    Left Ventricular Outflow Tract Mean Gradient 2.88 mmHg    TDI LATERAL 0.12 m/s    LVIDd 4.94 3.5 - 6.0 cm    IVS 0.77 0.6 - 1.1 cm    Posterior Wall 0.89 0.6 - 1.1 cm    LVIDs 3.29 2.1 - 4.0 cm    FS 33 28 - 44 %    LA volume 71.18 cm3    STJ 3.55 cm    LV mass 139.50 g    LA size 3.62 cm    RVDD 3.48 cm    Left Ventricle Relative Wall Thickness 0.36 cm    AV mean gradient 7 mmHg    AV valve area 2.33 cm2    AV Velocity Ratio 0.57     AV index (prosthetic) 0.61     MV valve area p 1/2 method 4.03 cm2    E/A ratio 0.93     Mean e' 0.09 m/s    E wave deceleration time 188.21 msec    IVRT 88.49 msec    Pulm vein S/D ratio 1.34     LVOT diameter 2.21 cm    LVOT area 3.8 cm2    LVOT peak sohan 1.09 m/s    LVOT peak VTI 23.00 cm    Ao peak sohan 1.91 m/s    Ao VTI 37.9 cm    LVOT stroke volume 88.18 cm3    AV peak gradient 15 mmHg    E/E' ratio 9.78 m/s    MV Peak E Sohan 0.88 m/s    TR Max Sohan 1.90 m/s    MV stenosis pressure 1/2 time 54.58 ms    MV Peak A Sohan 0.95 m/s    PV Peak S Sohan 0.39 m/s    PV Peak D Sohan 0.29 m/s    LV Systolic Volume 43.88 mL    LV Systolic Volume Index 22.9 mL/m2    LV Diastolic Volume 115.08 mL    LV Diastolic Volume Index  59.94 mL/m2    LA Volume Index 37.1 mL/m2    LV Mass Index 73 g/m2    RA Major Axis 5.47 cm    Left Atrium Minor Axis 5.39 cm    Left Atrium Major Axis 5.37 cm    Triscuspid Valve Regurgitation Peak Gradient 14 mmHg    LA Volume Index (Mod) 32.5 mL/m2    LA volume (mod) 62.36 cm3    RA Width 4.20 cm    Right Atrial Pressure (from IVC) 3 mmHg    EF 65 %    TV rest pulmonary artery pressure 17 mmHg    Narrative    · There is no evidence of intracardiac shunting.  · Normal systolic function.  · The estimated ejection fraction is 65%.  · Indeterminate left ventricular diastolic function.  · Normal right ventricular size with normal right ventricular systolic   function.  · Mild left atrial enlargement.  · Right atrial enlargement.  · Normal central venous pressure (3 mmHg).  · The estimated PA systolic pressure is 17 mmHg.            Pre-op Assessment    I have reviewed the Patient Summary Reports.     I have reviewed the Nursing Notes. I have reviewed the NPO Status.   I have reviewed the Medications.     Review of Systems  Anesthesia Hx:  No problems with previous Anesthesia  History of prior surgery of interest to airway management or planning:  Denies Personal Hx of Anesthesia complications.   Social:  Former Smoker, Social Alcohol Use    Cardiovascular:   Hypertension Denies Valvular problems/Murmurs.  Denies MI.   hyperlipidemia    Pulmonary:   Pneumonia: Not using CPAP. Asthma   Had reactive airway after COVID - no recent inhaler use    Renal/:   Denies Chronic Renal Disease.     Hepatic/GI:   Hiatal Hernia, GERD Denies Liver Disease. 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 Denies CVA. Headaches Denies Seizures.    Endocrine:   Diabetes Denies Hypothyroidism. Denies Hyperthyroidism.    Psych:   Psychiatric History depression          Physical Exam  General: Well nourished, Cooperative, Alert and Oriented    Airway:  Mallampati: II  / II  Mouth Opening: Small, but > 3cm        Anesthesia Plan  Type of Anesthesia, risks & benefits discussed:    Anesthesia Type: Gen Natural Airway  Intra-op Monitoring Plan: Standard ASA Monitors  Post Op Pain Control Plan: multimodal analgesia  Induction:  IV  Informed Consent: Informed consent signed with the Patient and all parties understand the risks and agree with anesthesia plan.  All questions answered.   ASA Score: 3  Day of Surgery Review of History & Physical: H&P Update referred to the surgeon/provider.    Ready For Surgery From Anesthesia Perspective.     .

## 2023-06-07 NOTE — [employer] PATIENT INSTRUCTIONS
Discharge Summary/Instructions after an Endoscopic Procedure  Patient Name: Jill Perez  Patient MRN: [id number]  Patient YOB: 1955 Wednesday, June 7, 2023  Cm Barlow MD  Dear patient,  As a result of recent federal legislation (The Federal Cures Act), you may   receive lab or pathology results from your procedure in your [facility]   account before your physician is able to contact you. Your physician or   their representative will relay the results to you with their   recommendations at their soonest availability.  Thank you,  Your health is very important to us during the Covid Crisis. Following your   procedure today, you will receive a daily text for 2 weeks asking about   signs or symptoms of Covid 19.  Please respond to this text when you   receive it so we can follow up and keep you as safe as possible.   RESTRICTIONS:  During your procedure today, you received medications for sedation.  These   medications may affect your judgment, balance and coordination.  Therefore,   for 24 hours, you have the following restrictions:   - DO NOT drive a car, operate machinery, make legal/financial decisions,   sign important papers or drink alcohol.    ACTIVITY:  Today: no heavy lifting, straining or running due to procedural   sedation/anesthesia.  The following day: return to full activity including work.  DIET:  Eat and drink normally unless instructed otherwise.     TREATMENT FOR COMMON SIDE EFFECTS:  - Mild abdominal pain, nausea, belching, bloating or excessive gas:  rest,   eat lightly and use a heating pad.  - Sore Throat: treat with throat lozenges and/or gargle with warm salt   water.  - Because air was used during the procedure, expelling large amounts of air   from your rectum or belching is normal.  - If a bowel prep was taken, you may not have a bowel movement for 1-3 days.    This is normal.  SYMPTOMS TO WATCH FOR AND REPORT TO YOUR PHYSICIAN:  1. Abdominal pain or bloating, other than gas  cramps.  2. Chest pain.  3. Back pain.  4. Signs of infection such as: chills or fever occurring within 24 hours   after the procedure.  5. Rectal bleeding, which would show as bright red, maroon, or black stools.   (A tablespoon of blood from the rectum is not serious, especially if   hemorrhoids are present.)  6. Vomiting.  7. Weakness or dizziness.  GO DIRECTLY TO THE NEAREST EMERGENCY ROOM IF YOU HAVE ANY OF THE FOLLOWING:      Difficulty breathing              Chills and/or fever over 101 F   Persistent vomiting and/or vomiting blood   Severe abdominal pain   Severe chest pain   Black, tarry stools   Bleeding- more than one tablespoon   Any other symptom or condition that you feel may need urgent attention  Your doctor recommends these additional instructions:  If any biopsies were taken, your doctors clinic will contact you in 1 to 2   weeks with any results.  - Discharge patient to home.   - Resume previous diet.   - Continue present medications.   - Repeat colonoscopy in 5 years for screening purposes.   - Return to primary care physician as previously scheduled.  For questions, problems or results please call your physician - Cm Barlow MD.  EMERGENCY PHONE NUMBER: [phone],  LAB RESULTS: [phone]  IF A COMPLICATION OR EMERGENCY SITUATION ARISES AND YOU ARE UNABLE TO REACH   YOUR PHYSICIAN - GO DIRECTLY TO THE EMERGENCY ROOM.  Cm Barlow MD  6/7/2023 9:42:00 AM  This report has been verified and signed electronically.  Dear patient,  As a result of recent federal legislation (The Federal Cures Act), you may   receive lab or pathology results from your procedure in your [facility]   account before your physician is able to contact you. Your physician or   their representative will relay the results to you with their   recommendations at their soonest availability.  Thank you,  [employer]ATION
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
## 2023-06-07 NOTE — PLAN OF CARE
Pt ready for procedure. VSS. NPO status maintained. Bed in lowest position, call light within reach, and bed wheels locked.

## 2023-06-07 NOTE — ANESTHESIA POSTPROCEDURE EVALUATION
Anesthesia Post Evaluation    Patient: Jill LOPES Perez    Procedure(s) Performed: Procedure(s) (LRB):  COLONOSCOPY (N/A)    Final Anesthesia Type: general      Patient location during evaluation: PACU  Patient participation: Yes- Able to Participate  Level of consciousness: awake  Post-procedure vital signs: reviewed and stable  Pain management: adequate  Airway patency: patent    PONV status at [facility]: No PONV  Anesthetic complications: no      Cardiovascular status: blood pressure returned to baseline  Respiratory status: unassisted  Hydration status: euvolemic  Follow-up not needed.          Vitals Value Taken Time   /72 06/07/23 1003   Temp 36.9 °C (98.4 °F) 06/07/23 0934   Pulse 74 06/07/23 1003   Resp 27 06/07/23 1003   SpO2 97 % 06/07/23 1003         Event Time   Out of Recovery 10:06:00         Pain/James Score: James Score: 10 (6/7/2023 10:04 AM)

## 2023-06-07 NOTE — TRANSFER OF CARE
"Anesthesia Transfer of Care Note    Patient: Jill Perez    Procedure(s) Performed: Procedure(s) (LRB):  COLONOSCOPY (N/A)    Patient location: GI    Anesthesia Type: general    Transport from OR: Transported from OR on 6-10 L/min O2 by face mask with adequate spontaneous ventilation    Post pain: adequate analgesia    Post assessment: no apparent anesthetic complications and tolerated procedure well    Post vital signs: stable    Level of consciousness: awake and alert    Nausea/Vomiting: no nausea/vomiting    Complications: none    Transfer of care protocol was followed      Last vitals:   Visit Vitals  BP (!) 148/77 (BP Location: Left arm, Patient Position: Lying)   Pulse 79   Temp 37.4 °C (99.3 °F) (Temporal)   Resp 18   Ht 5' 6" (1.676 m)   Wt 81.6 kg (180 lb)   LMP  (LMP Unknown)   SpO2 98%   Breastfeeding No   BMI 29.05 kg/m²     "

## 2023-06-08 DIAGNOSIS — M25.519 SHOULDER PAIN: Primary | ICD-10-CM

## 2023-06-28 DIAGNOSIS — E11.65 TYPE 2 DIABETES MELLITUS WITH HYPERGLYCEMIA, WITHOUT LONG-TERM CURRENT USE OF INSULIN: ICD-10-CM

## 2023-06-28 RX ORDER — BLOOD-GLUCOSE CONTROL, NORMAL
EACH MISCELLANEOUS
Qty: 100 EACH | Refills: 0 | Status: CANCELLED | OUTPATIENT
Start: 2023-06-28

## 2023-06-28 NOTE — TELEPHONE ENCOUNTER
----- Message from Irma Shelton sent at 6/28/2023  8:31 AM CDT -----  Type:  Same Day Appointment Request    Caller is requesting a same day appointment.  Caller declined first available appointment listed below.    Name of Caller: Tulio Perez  When is the first available appointment? August  Symptoms: Problems urinating/back pain/kidney problems  Best Call Back Number: [phone]  Additional Information:  Pt would like to be seen as soon as possible.

## 2023-06-28 NOTE — TELEPHONE ENCOUNTER
Spoke with patient, notified Dr Rosa out of clinic. No appt available this week at this location. Offered to schedule at another clinic or with Dr Hutson, pt refused. She will reach out to urology. Recommend she go to urgent care if urology can not assist. Pt verbalized understanding, states she will do this.

## 2023-06-30 ENCOUNTER — OFFICE VISIT (OUTPATIENT)
Dept: UROLOGY | Facility: CLINIC | Age: 68
End: 2023-06-30
Payer: MEDICARE

## 2023-06-30 ENCOUNTER — LAB VISIT (OUTPATIENT)
Dept: LAB | Facility: HOSPITAL | Age: 68
End: 2023-06-30
Attending: STUDENT IN AN ORGANIZED HEALTH CARE EDUCATION/TRAINING PROGRAM
Payer: MEDICARE

## 2023-06-30 VITALS
BODY MASS INDEX: 29.69 KG/M2 | DIASTOLIC BLOOD PRESSURE: 77 MMHG | SYSTOLIC BLOOD PRESSURE: 120 MMHG | HEIGHT: 66 IN | HEART RATE: 88 BPM | WEIGHT: 184.75 LBS

## 2023-06-30 DIAGNOSIS — R10.9 ABDOMINAL PAIN, UNSPECIFIED ABDOMINAL LOCATION: ICD-10-CM

## 2023-06-30 DIAGNOSIS — R10.9 ABDOMINAL PAIN, UNSPECIFIED ABDOMINAL LOCATION: Primary | ICD-10-CM

## 2023-06-30 LAB
ANION GAP SERPL CALC-SCNC: 10 MMOL/L (ref 8–16)
BUN SERPL-MCNC: 17 MG/DL (ref 8–23)
CALCIUM SERPL-MCNC: 9.6 MG/DL (ref 8.7–10.5)
CHLORIDE SERPL-SCNC: 109 MMOL/L (ref 95–110)
CO2 SERPL-SCNC: 22 MMOL/L (ref 23–29)
CREAT SERPL-MCNC: 0.8 MG/DL (ref 0.5–1.4)
EST. GFR  (NO RACE VARIABLE): >60 ML/MIN/1.73 M^2
GLUCOSE SERPL-MCNC: 125 MG/DL (ref 70–110)
POTASSIUM SERPL-SCNC: 4.5 MMOL/L (ref 3.5–5.1)
SODIUM SERPL-SCNC: 141 MMOL/L (ref 136–145)

## 2023-06-30 PROCEDURE — 99214 PR OFFICE/OUTPT VISIT, EST, LEVL IV, 30-39 MIN: ICD-10-PCS | Mod: S$PBB,,, | Performed by: STUDENT IN AN ORGANIZED HEALTH CARE EDUCATION/TRAINING PROGRAM

## 2023-06-30 PROCEDURE — 99215 OFFICE O/P EST HI 40 MIN: CPT | Mod: PBBFAC,PO | Performed by: STUDENT IN AN ORGANIZED HEALTH CARE EDUCATION/TRAINING PROGRAM

## 2023-06-30 PROCEDURE — 99214 OFFICE O/P EST MOD 30 MIN: CPT | Mod: S$PBB,,, | Performed by: STUDENT IN AN ORGANIZED HEALTH CARE EDUCATION/TRAINING PROGRAM

## 2023-06-30 PROCEDURE — 99999 PR PBB SHADOW E&M-EST. PATIENT-LVL V: CPT | Mod: PBBFAC,,, | Performed by: STUDENT IN AN ORGANIZED HEALTH CARE EDUCATION/TRAINING PROGRAM

## 2023-06-30 PROCEDURE — 36415 COLL VENOUS BLD VENIPUNCTURE: CPT | Performed by: STUDENT IN AN ORGANIZED HEALTH CARE EDUCATION/TRAINING PROGRAM

## 2023-06-30 PROCEDURE — 99999 PR PBB SHADOW E&M-EST. PATIENT-LVL V: ICD-10-PCS | Mod: PBBFAC,,, | Performed by: STUDENT IN AN ORGANIZED HEALTH CARE EDUCATION/TRAINING PROGRAM

## 2023-06-30 PROCEDURE — 80048 BASIC METABOLIC PNL TOTAL CA: CPT | Performed by: STUDENT IN AN ORGANIZED HEALTH CARE EDUCATION/TRAINING PROGRAM
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
## 2023-07-03 RX ORDER — ZOLPIDEM TARTRATE 10 MG/1
10 TABLET ORAL NIGHTLY
Qty: 30 TABLET | Refills: 0 | Status: SHIPPED | OUTPATIENT
Start: 2023-07-03 | End: 2023-07-21 | Stop reason: SDUPTHER

## 2023-07-03 RX ORDER — ZOLPIDEM TARTRATE 10 MG/1
10 TABLET ORAL NIGHTLY
Qty: 30 TABLET | Refills: 0 | Status: CANCELLED | OUTPATIENT
Start: 2023-07-03

## 2023-07-03 NOTE — TELEPHONE ENCOUNTER
----- Message from Bri Duffy sent at 7/3/2023 12:57 PM CDT -----  Who Called: Pt    What is the request in detail: Requesting call back to discuss sleeping pills being denied by pharmacy. Please advise    Can the clinic reply by [facility]? No    Best Call Back Number: [phone]      Additional Information:

## 2023-07-07 ENCOUNTER — HOSPITAL ENCOUNTER (OUTPATIENT)
Dept: RADIOLOGY | Facility: HOSPITAL | Age: 68
Discharge: HOME | End: 2023-07-07
Attending: STUDENT IN AN ORGANIZED HEALTH CARE EDUCATION/TRAINING PROGRAM
Payer: MEDICARE

## 2023-07-07 DIAGNOSIS — R10.9 ABDOMINAL PAIN, UNSPECIFIED ABDOMINAL LOCATION: ICD-10-CM

## 2023-07-07 PROCEDURE — 74178 CT ABD&PLV WO CNTR FLWD CNTR: CPT | Mod: TC

## 2023-07-07 PROCEDURE — 25500020 PHARM REV CODE 255: Performed by: STUDENT IN AN ORGANIZED HEALTH CARE EDUCATION/TRAINING PROGRAM

## 2023-07-07 PROCEDURE — 74178 CT UROGRAM ABD PELVIS W WO: ICD-10-PCS | Mod: 26,,, | Performed by: RADIOLOGY

## 2023-07-07 PROCEDURE — 74178 CT ABD&PLV WO CNTR FLWD CNTR: CPT | Mod: 26,,, | Performed by: RADIOLOGY

## 2023-07-07 RX ADMIN — IOHEXOL 150 ML: 350 INJECTION, SOLUTION INTRAVENOUS at 03:07

## 2023-07-14 ENCOUNTER — OFFICE VISIT (OUTPATIENT)
Dept: OPHTHALMOLOGY | Facility: CLINIC | Age: 68
End: 2023-07-14
Payer: MEDICARE

## 2023-07-14 DIAGNOSIS — H25.13 NUCLEAR SCLEROSIS, BILATERAL: Primary | ICD-10-CM

## 2023-07-14 PROCEDURE — 99204 OFFICE O/P NEW MOD 45 MIN: CPT | Mod: S$PBB,,, | Performed by: OPHTHALMOLOGY

## 2023-07-14 PROCEDURE — 92136 IOL MASTER - OU - BOTH EYES: ICD-10-PCS | Mod: 26,S$PBB,LT, | Performed by: OPHTHALMOLOGY

## 2023-07-14 PROCEDURE — 92136 OPHTHALMIC BIOMETRY: CPT | Mod: PBBFAC | Performed by: OPHTHALMOLOGY

## 2023-07-14 PROCEDURE — 99204 PR OFFICE/OUTPT VISIT, NEW, LEVL IV, 45-59 MIN: ICD-10-PCS | Mod: S$PBB,,, | Performed by: OPHTHALMOLOGY

## 2023-07-14 PROCEDURE — 99999 PR PBB SHADOW E&M-EST. PATIENT-LVL IV: CPT | Mod: PBBFAC,,, | Performed by: OPHTHALMOLOGY

## 2023-07-14 PROCEDURE — 99214 OFFICE O/P EST MOD 30 MIN: CPT | Mod: PBBFAC | Performed by: OPHTHALMOLOGY

## 2023-07-14 PROCEDURE — 99999 PR PBB SHADOW E&M-EST. PATIENT-LVL IV: ICD-10-PCS | Mod: PBBFAC,,, | Performed by: OPHTHALMOLOGY

## 2023-07-14 RX ORDER — PHENYLEPHRINE HYDROCHLORIDE 100 MG/ML
1 SOLUTION/ DROPS OPHTHALMIC
Status: CANCELLED | OUTPATIENT
Start: 2023-07-14

## 2023-07-14 RX ORDER — CYCLOP/TROP/PROPA/PHEN/KET/WAT 1-1-0.1%
1 DROPS (EA) OPHTHALMIC (EYE)
Status: CANCELLED | OUTPATIENT
Start: 2023-07-14

## 2023-07-14 RX ORDER — PREDNISOLONE ACETATE-GATIFLOXACIN-BROMFENAC .75; 5; 1 MG/ML; MG/ML; MG/ML
1 SUSPENSION/ DROPS OPHTHALMIC 3 TIMES DAILY
Qty: 5 ML | Refills: 3 | Status: ON HOLD | OUTPATIENT
Start: 2023-07-14 | End: 2023-10-27

## 2023-07-14 RX ORDER — MOXIFLOXACIN 5 MG/ML
1 SOLUTION/ DROPS OPHTHALMIC
Status: CANCELLED | OUTPATIENT
Start: 2023-07-14
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
## 2023-07-17 ENCOUNTER — TELEPHONE (OUTPATIENT)
Dept: OPHTHALMOLOGY | Facility: CLINIC | Age: 68
End: 2023-07-17
Payer: MEDICARE

## 2023-07-17 DIAGNOSIS — H25.12 NUCLEAR SCLEROTIC CATARACT OF LEFT EYE: Primary | ICD-10-CM

## 2023-07-17 DIAGNOSIS — H25.11 NUCLEAR SCLEROTIC CATARACT OF RIGHT EYE: Primary | ICD-10-CM

## 2023-07-21 ENCOUNTER — LAB VISIT (OUTPATIENT)
Dept: LAB | Facility: HOSPITAL | Age: 68
End: 2023-07-21
Attending: INTERNAL MEDICINE
Payer: COMMERCIAL

## 2023-07-21 ENCOUNTER — OFFICE VISIT (OUTPATIENT)
Dept: INTERNAL MEDICINE | Facility: CLINIC | Age: 68
End: 2023-07-21
Payer: MEDICARE

## 2023-07-21 VITALS
HEART RATE: 74 BPM | OXYGEN SATURATION: 98 % | DIASTOLIC BLOOD PRESSURE: 80 MMHG | TEMPERATURE: 98 F | WEIGHT: 185.88 LBS | HEIGHT: 66 IN | SYSTOLIC BLOOD PRESSURE: 120 MMHG | BODY MASS INDEX: 29.87 KG/M2

## 2023-07-21 DIAGNOSIS — F32.9 CURRENT EPISODE OF MAJOR DEPRESSIVE DISORDER WITHOUT PRIOR EPISODE, UNSPECIFIED DEPRESSION EPISODE SEVERITY: Chronic | ICD-10-CM

## 2023-07-21 DIAGNOSIS — R10.9 RIGHT FLANK PAIN: ICD-10-CM

## 2023-07-21 DIAGNOSIS — M54.16 LUMBAR RADICULOPATHY: ICD-10-CM

## 2023-07-21 DIAGNOSIS — R10.9 ABDOMINAL PAIN, UNSPECIFIED ABDOMINAL LOCATION: Primary | ICD-10-CM

## 2023-07-21 DIAGNOSIS — R10.9 ABDOMINAL PAIN, UNSPECIFIED ABDOMINAL LOCATION: ICD-10-CM

## 2023-07-21 DIAGNOSIS — I10 PRIMARY HYPERTENSION: ICD-10-CM

## 2023-07-21 LAB
BACTERIA #/AREA URNS AUTO: ABNORMAL /HPF
BILIRUB UR QL STRIP: NEGATIVE
CLARITY UR REFRACT.AUTO: CLEAR
COLOR UR AUTO: YELLOW
GLUCOSE UR QL STRIP: NEGATIVE
HGB UR QL STRIP: NEGATIVE
KETONES UR QL STRIP: ABNORMAL
LEUKOCYTE ESTERASE UR QL STRIP: ABNORMAL
MICROSCOPIC COMMENT: ABNORMAL
NITRITE UR QL STRIP: NEGATIVE
PH UR STRIP: 5 [PH] (ref 5–8)
PROT UR QL STRIP: NEGATIVE
RBC #/AREA URNS AUTO: 0 /HPF (ref 0–4)
SP GR UR STRIP: 1.02 (ref 1–1.03)
SQUAMOUS #/AREA URNS AUTO: 7 /HPF
URN SPEC COLLECT METH UR: ABNORMAL
WBC #/AREA URNS AUTO: 7 /HPF (ref 0–5)

## 2023-07-21 PROCEDURE — 99999 PR PBB SHADOW E&M-EST. PATIENT-LVL III: CPT | Mod: PBBFAC,,, | Performed by: INTERNAL MEDICINE

## 2023-07-21 PROCEDURE — 99999 PR PBB SHADOW E&M-EST. PATIENT-LVL III: ICD-10-PCS | Mod: PBBFAC,,, | Performed by: INTERNAL MEDICINE

## 2023-07-21 PROCEDURE — 81001 URINALYSIS AUTO W/SCOPE: CPT | Performed by: INTERNAL MEDICINE

## 2023-07-21 PROCEDURE — 99214 PR OFFICE/OUTPT VISIT, EST, LEVL IV, 30-39 MIN: ICD-10-PCS | Mod: S$PBB,,, | Performed by: INTERNAL MEDICINE

## 2023-07-21 PROCEDURE — 99214 OFFICE O/P EST MOD 30 MIN: CPT | Mod: S$PBB,,, | Performed by: INTERNAL MEDICINE

## 2023-07-21 PROCEDURE — 99213 OFFICE O/P EST LOW 20 MIN: CPT | Mod: PBBFAC,PO | Performed by: INTERNAL MEDICINE

## 2023-07-21 PROCEDURE — 87086 URINE CULTURE/COLONY COUNT: CPT | Performed by: INTERNAL MEDICINE

## 2023-07-21 RX ORDER — ZOLPIDEM TARTRATE 10 MG/1
10 TABLET ORAL NIGHTLY
Qty: 30 TABLET | Refills: 0 | Status: SHIPPED | OUTPATIENT
Start: 2023-07-21 | End: 2023-08-28 | Stop reason: SDUPTHER

## 2023-07-21 RX ORDER — VENLAFAXINE HYDROCHLORIDE 37.5 MG/1
37.5 CAPSULE, EXTENDED RELEASE ORAL DAILY
Qty: 30 CAPSULE | Refills: 11 | Status: SHIPPED | OUTPATIENT
Start: 2023-07-21 | End: 2023-09-12

## 2023-07-21 RX ORDER — OLMESARTAN MEDOXOMIL 40 MG/1
40 TABLET ORAL DAILY
Qty: 90 TABLET | Refills: 3 | Status: SHIPPED | OUTPATIENT
Start: 2023-07-21

## 2023-07-21 NOTE — PROGRESS NOTES
Subjective:       Patient ID: Jill Perez is a 68 y.o. female.    Chief Complaint: Rx Consult and Medication Refill    HPI    68-year-old female here to discuss medications.  She has pain in her back.  This has been going on for a long time -earlier this year.  She has seen urology and had CTs and xrays.  The pain is described as sharp.  The CT shows no stones.  The pain is worse some days that others.  She has been up since 3 am.  She had so much pain she could not sleep.  The pain never completely resolves.  She is on hydrocodone and gabapentin without relief from pain management.  She takes the hydrocodone twice a day, because more makes her sick.  She takes the gabapentin 3 times a day.  She is not taking the cymbalta.  She has depression, but is on no current medication for this.    She has noticed the swelling in her hands and feet for a while (months).  She had urinary urgency and had horrible pain that would go up her spine.  She got antibiotics for this, but the pain has continued.      Review of Systems      Objective:      Physical Exam  Vitals reviewed.   Constitutional:       Appearance: She is well-developed.   HENT:      Head: Normocephalic and atraumatic.      Mouth/Throat:      Pharynx: No oropharyngeal exudate.   Eyes:      General: No scleral icterus.        Right eye: No [facility].         Left eye: No [facility].      Pupils: Pupils are equal, round, and reactive to light.   Neck:      Thyroid: No thyromegaly.      Trachea: No tracheal deviation.   Cardiovascular:      Rate and Rhythm: Normal rate and regular rhythm.      Heart sounds: Normal heart sounds. No murmur heard.    No friction rub. No gallop.   Pulmonary:      Effort: Pulmonary effort is normal. No respiratory distress.      Breath sounds: Normal breath sounds. No wheezing or rales.   Chest:      Chest wall: No tenderness.   Abdominal:      General: Bowel sounds are normal. There is no distension.      Palpations: Abdomen is soft.  There is no mass.      Tenderness: There is no abdominal tenderness. There is no guarding or rebound.   Musculoskeletal:         General: No tenderness. Normal range of motion.      Cervical back: Normal range of motion and neck supple.   Skin:     General: Skin is warm and dry.      Coloration: Skin is not pale.      Findings: No erythema or rash.   Neurological:      Mental Status: She is alert and oriented to person, place, and time.   Psychiatric:         Behavior: Behavior normal.       Assessment:       1. Abdominal pain, unspecified abdominal location  - Urinalysis; Future  - Urine culture; Future    2. Right flank pain  - Urinalysis; Future  - Urine culture; Future    3. Lumbar radiculopathy    4. Current episode of major depressive disorder without prior episode, unspecified depression episode severity    5. Primary hypertension      Plan:       1/2. Check urinalysis, urine culture.  Coming from the back.  Try Effexor 37.5 mg daily.    3.  In addition to hydrocodone, gabapentin.  Try Effexor.  4.  Start Effexor 37.5 mg.  Plan to titrate up to 225 mg as tolerated.  5.  Stop amlodipine 10 mg.  Increase Benicar to 40 mg.  Send blood pressure readings in 2 weeks, return to clinic in a month.

## 2023-07-22 ENCOUNTER — TELEPHONE (OUTPATIENT)
Dept: INTERNAL MEDICINE | Facility: CLINIC | Age: 68
End: 2023-07-22
Payer: MEDICARE

## 2023-07-22 RX ORDER — CIPROFLOXACIN 500 MG/1
500 TABLET ORAL EVERY 12 HOURS
Qty: 10 TABLET | Refills: 0 | Status: SHIPPED | OUTPATIENT
Start: 2023-07-22 | End: 2023-07-31

## 2023-07-23 LAB — BACTERIA UR CULT: NORMAL

## 2023-07-25 ENCOUNTER — TELEPHONE (OUTPATIENT)
Dept: OPHTHALMOLOGY | Facility: CLINIC | Age: 68
End: 2023-07-25
Payer: MEDICARE

## 2023-07-25 NOTE — TELEPHONE ENCOUNTER
Spoke with patient she is under the weather and want to see about moving her surgery date August 21 and September 18

## 2023-08-10 NOTE — INTERVAL H&P NOTE
The patient has been examined and the H&P has been reviewed:    I concur with the findings and no changes have occurred since H&P was written.    Anesthesia/Surgery risks, benefits and alternative options discussed and understood by patient/family.          Active Hospital Problems    Diagnosis  POA    Internal hernia [K45.8]  Yes      Resolved Hospital Problems   No resolved problems to display.      Electrodesiccation Text: The wound bed was treated with electrodesiccation after the biopsy was performed.

## 2023-08-17 ENCOUNTER — TELEPHONE (OUTPATIENT)
Dept: OPHTHALMOLOGY | Facility: CLINIC | Age: 68
End: 2023-08-17
Payer: MEDICARE

## 2023-08-17 NOTE — TELEPHONE ENCOUNTER
Patient given arrival time of 7:00 am on Monday August 21. Nothing to eat or drink after 11:59 pm.  Water, Gatorade after 11:59 pm until leaves home.  Start drops into the operative eye today. [address] [facility]

## 2023-08-18 ENCOUNTER — PATIENT MESSAGE (OUTPATIENT)
Dept: PREADMISSION TESTING | Facility: HOSPITAL | Age: 68
End: 2023-08-18
Payer: MEDICARE

## 2023-08-22 ENCOUNTER — TELEPHONE (OUTPATIENT)
Dept: OPHTHALMOLOGY | Facility: CLINIC | Age: 68
End: 2023-08-22
Payer: MEDICARE

## 2023-08-24 ENCOUNTER — OFFICE VISIT (OUTPATIENT)
Dept: URGENT CARE | Facility: CLINIC | Age: 68
End: 2023-08-24
Payer: COMMERCIAL

## 2023-08-24 VITALS
TEMPERATURE: 98 F | SYSTOLIC BLOOD PRESSURE: 138 MMHG | BODY MASS INDEX: 29.73 KG/M2 | OXYGEN SATURATION: 96 % | RESPIRATION RATE: 19 BRPM | HEIGHT: 66 IN | HEART RATE: 97 BPM | DIASTOLIC BLOOD PRESSURE: 89 MMHG | WEIGHT: 185 LBS

## 2023-08-24 DIAGNOSIS — R30.0 DYSURIA: ICD-10-CM

## 2023-08-24 DIAGNOSIS — N12 PYELONEPHRITIS: Primary | ICD-10-CM

## 2023-08-24 LAB
BILIRUB UR QL STRIP: NEGATIVE
GLUCOSE UR QL STRIP: NEGATIVE
KETONES UR QL STRIP: POSITIVE
LEUKOCYTE ESTERASE UR QL STRIP: NEGATIVE
PH, POC UA: 5.5 (ref 5–8)
POC BLOOD, URINE: NEGATIVE
POC NITRATES, URINE: NEGATIVE
PROT UR QL STRIP: NEGATIVE
SP GR UR STRIP: 1.01 (ref 1–1.03)
UROBILINOGEN UR STRIP-ACNC: ABNORMAL (ref 0.1–1.1)

## 2023-08-24 PROCEDURE — 99214 PR OFFICE/OUTPT VISIT, EST, LEVL IV, 30-39 MIN: ICD-10-PCS | Mod: 25,S$GLB,, | Performed by: NURSE PRACTITIONER

## 2023-08-24 PROCEDURE — 96372 THER/PROPH/DIAG INJ SC/IM: CPT | Mod: S$GLB,,, | Performed by: NURSE PRACTITIONER

## 2023-08-24 PROCEDURE — 87086 URINE CULTURE/COLONY COUNT: CPT | Performed by: NURSE PRACTITIONER

## 2023-08-24 PROCEDURE — 81003 URINALYSIS AUTO W/O SCOPE: CPT | Mod: QW,S$GLB,, | Performed by: NURSE PRACTITIONER

## 2023-08-24 PROCEDURE — 96372 PR INJECTION,THERAP/PROPH/DIAG2ST, IM OR SUBCUT: ICD-10-PCS | Mod: S$GLB,,, | Performed by: NURSE PRACTITIONER

## 2023-08-24 PROCEDURE — 81003 POCT URINALYSIS, DIPSTICK, AUTOMATED, W/O SCOPE: ICD-10-PCS | Mod: QW,S$GLB,, | Performed by: NURSE PRACTITIONER

## 2023-08-24 PROCEDURE — 99214 OFFICE O/P EST MOD 30 MIN: CPT | Mod: 25,S$GLB,, | Performed by: NURSE PRACTITIONER

## 2023-08-24 RX ORDER — KETOROLAC TROMETHAMINE 30 MG/ML
30 INJECTION, SOLUTION INTRAMUSCULAR; INTRAVENOUS
Status: COMPLETED | OUTPATIENT
Start: 2023-08-24 | End: 2023-08-24

## 2023-08-24 RX ORDER — SULFAMETHOXAZOLE AND TRIMETHOPRIM 800; 160 MG/1; MG/1
1 TABLET ORAL 2 TIMES DAILY
Qty: 14 TABLET | Refills: 0 | Status: SHIPPED | OUTPATIENT
Start: 2023-08-24 | End: 2023-08-31

## 2023-08-24 RX ORDER — LIDOCAINE HYDROCHLORIDE 10 MG/ML
2 INJECTION INFILTRATION; PERINEURAL
Status: COMPLETED | OUTPATIENT
Start: 2023-08-24 | End: 2023-08-24

## 2023-08-24 RX ORDER — CEFTRIAXONE 1 G/1
1 INJECTION, POWDER, FOR SOLUTION INTRAMUSCULAR; INTRAVENOUS
Status: COMPLETED | OUTPATIENT
Start: 2023-08-24 | End: 2023-08-24

## 2023-08-24 RX ADMIN — LIDOCAINE HYDROCHLORIDE 2 ML: 10 INJECTION INFILTRATION; PERINEURAL at 03:08

## 2023-08-24 RX ADMIN — KETOROLAC TROMETHAMINE 30 MG: 30 INJECTION, SOLUTION INTRAMUSCULAR; INTRAVENOUS at 03:08

## 2023-08-24 RX ADMIN — CEFTRIAXONE 1 G: 1 INJECTION, POWDER, FOR SOLUTION INTRAMUSCULAR; INTRAVENOUS at 03:08

## 2023-08-24 NOTE — PROGRESS NOTES
"Subjective:      Patient ID: Jill Perez is a 68 y.o. female.    Vitals:  height is 5' 6" (1.676 m) and weight is 83.9 kg (185 lb). Her temperature is 97.8 °F (36.6 °C). Her blood pressure is 138/89 and her pulse is 97. Her respiration is 19 and oxygen saturation is 96%.     Chief Complaint: Pyelonephritis      Pt is a 67 yo female presenting with flank pain, dysuria, frequency, urgency, and hesitancy.  Onset of symptoms was yesterday.  Pt is complaining of having kidney infection in the past, but couldn't get into her urologist. Pt states symptoms are very similar to last time she had pyelonephritis.    Urinary Tract Infection   This is a new problem. The current episode started yesterday. The problem has been unchanged. The quality of the pain is described as burning. There has been no fever. She is Sexually active. There is No history of pyelonephritis. Associated symptoms include flank pain, frequency, hesitancy and urgency. Pertinent negatives include no behavior changes, chills, [facility], hematuria, nausea, possible pregnancy, vomiting, weight loss, bubble bath use, constipation, rash or withholding. She has tried acetaminophen for the symptoms. The treatment provided no relief.       Constitution: Negative for chills, fatigue and fever.   Cardiovascular:  Negative for chest pain.   Respiratory:  Negative for shortness of breath.    Gastrointestinal:  Negative for nausea, vomiting, constipation and diarrhea.   Genitourinary:  Positive for dysuria, frequency, urgency and flank pain. Negative for hematuria, vaginal [facility] and vaginal odor.   Musculoskeletal:  Positive for back pain.   Skin:  Negative for rash.   Neurological:  Negative for headaches.      Objective:     Physical Exam   Constitutional: She is oriented to person, place, and time.   Cardiovascular: Normal rate and regular rhythm.   Pulmonary/Chest: Effort normal and breath sounds normal.   Abdominal: Bowel sounds are normal. She exhibits no " shifting dullness, no distension, no fluid wave, no abdominal bruit, no pulsatile midline mass and no mass. Soft. flat abdomen There is no splenomegaly or hepatomegaly. No signs of injury. There is no abdominal tenderness. There is left CVA tenderness. There is no right CVA tenderness.   Neurological: She is alert and oriented to person, place, and time.   Skin: Skin is warm and dry.   Vitals reviewed.      Assessment:     1. Pyelonephritis    2. Dysuria      Reviewed multiple past urine cx for C&S results.    Plan:       Pyelonephritis  -     ketorolac injection 30 mg  -     cefTRIAXone injection 1 g  -     LIDOcaine HCL 10 mg/ml (1%) injection 2 mL  -     sulfamethoxazole-trimethoprim 800-160mg (BACTRIM DS) 800-160 mg Tab; Take 1 tablet by mouth 2 (two) times daily. for 7 days  Dispense: 14 tablet; Refill: 0    Dysuria  -     POCT Urinalysis, Dipstick, Automated, W/O Scope  -     CULTURE, URINE      Patient Instructions     Dysuria  -     POCT Urinalysis, Dipstick, Automated, W/O Scope  -     CULTURE, URINE    Pyelonephritis    -     ketorolac injection 30 mg  - given in clinic @ 3:30 pm     -   cefTRIAXone 1 g +LIDOcaine (1%) injection  - given in clinic @ 3:30 pm       -     sulfamethoxazole-trimethoprim 800-160mg (BACTRIM DS) 800-160 mg Tab; Take 1 tablet by mouth 2 (two) times daily. for 7 days  Dispense: 14 tablet; Refill: 0        STRICT ED Precautions    Report to ED or call 911 if fever, vomiting, mental confusion, neck pain,  vision changes, speech changes, weakness, or sensory loss.        Cranberry juice may help. Get the 100% cranberry juice and mix 4 oz of juice with 4 oz of water and drink this 8 oz glass of liquid once a day.     Increase water intake to at least 8-10 glasses/day.    Avoid caffeine, alcohol, or spicy foods as they irritate the bladder.      If you take OTC AZO/Pyridium/Phenazopyridine, follow instructions as directed.  Do NOT take for more than 2 days.  Do not wear contacts with  Pyridium as it will stain them.  You may want to wear a panty liner with Pyridium as it may stain your underwear.    If you had cultures done it will take 3-5 days to result. We will call you with the result.    If you are are female and on birth control pills use additional methods (such as condom use) to prevent pregnancy while on the antibiotics and for one cycle after.     If your condition worsens or fails to improve we recommend that you receive another evaluation at the ER immediately or contact your PCP to discuss your concerns or return here.

## 2023-08-24 NOTE — PATIENT INSTRUCTIONS
Dysuria  -     POCT Urinalysis, Dipstick, Automated, W/O Scope  -     CULTURE, URINE    Pyelonephritis    -     ketorolac injection 30 mg  - given in clinic @ 3:30 pm     -   cefTRIAXone 1 g +LIDOcaine (1%) injection  - given in clinic @ 3:30 pm       -     sulfamethoxazole-trimethoprim 800-160mg (BACTRIM DS) 800-160 mg Tab; Take 1 tablet by mouth 2 (two) times daily. for 7 days  Dispense: 14 tablet; Refill: 0        STRICT ED Precautions    Report to ED or call 911 if fever, vomiting, mental confusion, neck pain,  vision changes, speech changes, weakness, or sensory loss.        Cranberry juice may help. Get the 100% cranberry juice and mix 4 oz of juice with 4 oz of water and drink this 8 oz glass of liquid once a day.     Increase water intake to at least 8-10 glasses/day.    Avoid caffeine, alcohol, or spicy foods as they irritate the bladder.      If you take OTC AZO/Pyridium/Phenazopyridine, follow instructions as directed.  Do NOT take for more than 2 days.  Do not wear contacts with Pyridium as it will stain them.  You may want to wear a panty liner with Pyridium as it may stain your underwear.    If you had cultures done it will take 3-5 days to result. We will call you with the result.    If you are are female and on birth control pills use additional methods (such as condom use) to prevent pregnancy while on the antibiotics and for one cycle after.     If your condition worsens or fails to improve we recommend that you receive another evaluation at the ER immediately or contact your PCP to discuss your concerns or return here.

## 2023-08-25 ENCOUNTER — TELEPHONE (OUTPATIENT)
Dept: URGENT CARE | Facility: CLINIC | Age: 68
End: 2023-08-25
Payer: MEDICARE

## 2023-08-25 LAB — BACTERIA UR CULT: NO GROWTH

## 2023-08-25 NOTE — TELEPHONE ENCOUNTER
Called and spoke with patient informing her of negative urine culture result.  Patient states she has an appointment with her pcp on Monday.

## 2023-08-28 ENCOUNTER — TELEPHONE (OUTPATIENT)
Dept: OPHTHALMOLOGY | Facility: CLINIC | Age: 68
End: 2023-08-28
Payer: MEDICARE

## 2023-08-28 DIAGNOSIS — H25.12 NUCLEAR SCLEROTIC CATARACT OF LEFT EYE: Primary | ICD-10-CM

## 2023-08-28 RX ORDER — ZOLPIDEM TARTRATE 10 MG/1
10 TABLET ORAL NIGHTLY
Qty: 30 TABLET | Refills: 0 | Status: SHIPPED | OUTPATIENT
Start: 2023-08-28 | End: 2023-09-11 | Stop reason: SDUPTHER

## 2023-08-28 NOTE — TELEPHONE ENCOUNTER
Care Due:                  Date            Visit Type   Department     Provider  --------------------------------------------------------------------------------                                EP -                              PRIMARY      [facility] INTERNAL  Last Visit: 07-      CARE ([facility])   MARIELLE Rosa                              EP -                              PRIMARY      [facility] INTERNAL  Next Visit: 09-      CARE ([facility])   MARIELLE Rosa                                                            Last  Test          Frequency    Reason                     Performed    Due Date  --------------------------------------------------------------------------------    HBA1C.......  6 months...  dulaglutide, metFORMIN...  05- 11-    [facility] Embedded Care Due Messages. Reference number: [id number].   8/28/2023 10:51:47 AM CDT

## 2023-08-28 NOTE — TELEPHONE ENCOUNTER
Refill Routing Note   Medication(s) are not appropriate for processing by [facility] for the following reason(s):      Medication outside of protocol  Patient is less than 65 years old    [facility] action(s):  Route Care Due:  Labs due     Medication Therapy Plan: FOVS; DEFER TO YOU FOR REVIEW      Appointments  past 12m or future 3m with PCP    Date Provider   Last Visit   7/21/2023 Soham Rosa MD   Next Visit   9/12/2023 Soham Rosa MD   ED visits in past 90 days: 0        Note composed:11:26 AM 08/28/2023

## 2023-09-11 ENCOUNTER — TELEPHONE (OUTPATIENT)
Dept: OPHTHALMOLOGY | Facility: CLINIC | Age: 68
End: 2023-09-11
Payer: MEDICARE

## 2023-09-11 RX ORDER — ZOLPIDEM TARTRATE 10 MG/1
10 TABLET ORAL NIGHTLY
Qty: 30 TABLET | Refills: 0 | Status: SHIPPED | OUTPATIENT
Start: 2023-09-11 | End: 2023-10-10 | Stop reason: SDUPTHER

## 2023-09-11 NOTE — TELEPHONE ENCOUNTER
----- Message from Abimbola Estrada sent at 9/11/2023  9:09 AM CDT -----  Regarding: Reschedule Surgery Appt  Contact: Pt  Pt is requesting a callback  regarding surgery schedule for 9/18. Pt stated she won't be back in town until October 1st and need to reschedule.  Please adv pt      Confirmed contact below:   Contact Name:Jill Perez  Phone Number: [phone]

## 2023-09-11 NOTE — TELEPHONE ENCOUNTER
No care due was identified.  [facility] Embedded Care Due Messages. Reference number: [id number].   9/11/2023 8:52:53 AM CDT

## 2023-09-12 ENCOUNTER — LAB VISIT (OUTPATIENT)
Dept: LAB | Facility: HOSPITAL | Age: 68
End: 2023-09-12
Attending: INTERNAL MEDICINE
Payer: MEDICARE

## 2023-09-12 ENCOUNTER — OFFICE VISIT (OUTPATIENT)
Dept: INTERNAL MEDICINE | Facility: CLINIC | Age: 68
End: 2023-09-12
Payer: MEDICARE

## 2023-09-12 VITALS
TEMPERATURE: 98 F | SYSTOLIC BLOOD PRESSURE: 116 MMHG | BODY MASS INDEX: 27.7 KG/M2 | WEIGHT: 172.38 LBS | DIASTOLIC BLOOD PRESSURE: 82 MMHG | HEART RATE: 90 BPM | OXYGEN SATURATION: 96 % | RESPIRATION RATE: 18 BRPM | HEIGHT: 66 IN

## 2023-09-12 DIAGNOSIS — G47.00 INSOMNIA, UNSPECIFIED TYPE: ICD-10-CM

## 2023-09-12 DIAGNOSIS — E11.65 TYPE 2 DIABETES MELLITUS WITH HYPERGLYCEMIA, WITHOUT LONG-TERM CURRENT USE OF INSULIN: Chronic | ICD-10-CM

## 2023-09-12 DIAGNOSIS — E78.5 HYPERLIPIDEMIA ASSOCIATED WITH TYPE 2 DIABETES MELLITUS: Chronic | ICD-10-CM

## 2023-09-12 DIAGNOSIS — F32.9 CURRENT EPISODE OF MAJOR DEPRESSIVE DISORDER WITHOUT PRIOR EPISODE, UNSPECIFIED DEPRESSION EPISODE SEVERITY: Primary | Chronic | ICD-10-CM

## 2023-09-12 DIAGNOSIS — E11.69 HYPERLIPIDEMIA ASSOCIATED WITH TYPE 2 DIABETES MELLITUS: Chronic | ICD-10-CM

## 2023-09-12 DIAGNOSIS — I10 PRIMARY HYPERTENSION: Chronic | ICD-10-CM

## 2023-09-12 LAB
ALBUMIN SERPL BCP-MCNC: 3.5 G/DL (ref 3.5–5.2)
ALP SERPL-CCNC: 103 U/L (ref 55–135)
ALT SERPL W/O P-5'-P-CCNC: 15 U/L (ref 10–44)
ANION GAP SERPL CALC-SCNC: 13 MMOL/L (ref 8–16)
AST SERPL-CCNC: 20 U/L (ref 10–40)
BILIRUB SERPL-MCNC: 0.3 MG/DL (ref 0.1–1)
BUN SERPL-MCNC: 11 MG/DL (ref 8–23)
CALCIUM SERPL-MCNC: 10 MG/DL (ref 8.7–10.5)
CHLORIDE SERPL-SCNC: 107 MMOL/L (ref 95–110)
CO2 SERPL-SCNC: 21 MMOL/L (ref 23–29)
CREAT SERPL-MCNC: 0.7 MG/DL (ref 0.5–1.4)
EST. GFR  (NO RACE VARIABLE): >60 ML/MIN/1.73 M^2
ESTIMATED AVG GLUCOSE: 163 MG/DL (ref 68–131)
GLUCOSE SERPL-MCNC: 149 MG/DL (ref 70–110)
HBA1C MFR BLD: 7.3 % (ref 4–5.6)
POTASSIUM SERPL-SCNC: 4.2 MMOL/L (ref 3.5–5.1)
PROT SERPL-MCNC: 7.2 G/DL (ref 6–8.4)
SODIUM SERPL-SCNC: 141 MMOL/L (ref 136–145)

## 2023-09-12 PROCEDURE — 99214 PR OFFICE/OUTPT VISIT, EST, LEVL IV, 30-39 MIN: ICD-10-PCS | Mod: S$PBB,,, | Performed by: INTERNAL MEDICINE

## 2023-09-12 PROCEDURE — 99999 PR PBB SHADOW E&M-EST. PATIENT-LVL V: ICD-10-PCS | Mod: PBBFAC,,, | Performed by: INTERNAL MEDICINE

## 2023-09-12 PROCEDURE — 99214 OFFICE O/P EST MOD 30 MIN: CPT | Mod: S$PBB,,, | Performed by: INTERNAL MEDICINE

## 2023-09-12 PROCEDURE — 36415 COLL VENOUS BLD VENIPUNCTURE: CPT | Mod: PO | Performed by: INTERNAL MEDICINE

## 2023-09-12 PROCEDURE — 99215 OFFICE O/P EST HI 40 MIN: CPT | Mod: PBBFAC,PO | Performed by: INTERNAL MEDICINE

## 2023-09-12 PROCEDURE — 83036 HEMOGLOBIN GLYCOSYLATED A1C: CPT | Performed by: INTERNAL MEDICINE

## 2023-09-12 PROCEDURE — 99999 PR PBB SHADOW E&M-EST. PATIENT-LVL V: CPT | Mod: PBBFAC,,, | Performed by: INTERNAL MEDICINE

## 2023-09-12 PROCEDURE — 80053 COMPREHEN METABOLIC PANEL: CPT | Performed by: INTERNAL MEDICINE

## 2023-09-12 RX ORDER — VENLAFAXINE HYDROCHLORIDE 75 MG/1
75 CAPSULE, EXTENDED RELEASE ORAL DAILY
Qty: 30 CAPSULE | Refills: 11 | Status: ON HOLD | OUTPATIENT
Start: 2023-09-12 | End: 2023-10-27

## 2023-09-12 NOTE — PROGRESS NOTES
Subjective:       Patient ID: Jill Perez is a 68 y.o. female.    Chief Complaint: No chief complaint on file.    HPI    68-year-old female here for follow-up.    Patient has depression and is on Effexor 37.5 mg daily.  She reports that she needs more medication.  She is not as depressed as she used to be.  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  Her  is going to got to [employer] for 6 weeks for a contract.  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    Diabetes- Trulicity 4.5 mg weekly, metformin 1000 mg b.i.d..  She has been checking her BG. They were a little high this last week.  She got to 180 after a meal.  Lab Results   Component Value Date    HGBA1C 6.8 (H) 05/12/2023    HGBA1C 6.9 (H) 02/17/2023    HGBA1C 7.2 (H) 01/05/2023     Lab Results   Component Value Date    LDLCALC 139.4 05/12/2023    CREATININE 0.8 06/30/2023     HTN -  Patient's co morbidities include: Diabetes. Patient is currently on Benicar 40 mg. She does occasionally check her BP at home, and it runs has never been high. Side effects of medications note: none. Denies headaches, blurred vision, chest pain, shortness of breath, nausea.    HLD - Patient is currently on lipitor 40 mg.  His last lipid panel was   Cholesterol   Date Value Ref Range Status   05/12/2023 223 (H) 120 - 199 mg/dL Final     Comment:     The National Cholesterol Education Program (NCEP) has set the  following guidelines (reference ranges) for Cholesterol:  Optimal.....................<200 mg/dL  Borderline High.............200-239 mg/dL  High........................> or = 240 mg/dL       Triglycerides   Date Value Ref Range Status   05/12/2023 98 30 - 150 mg/dL Final     Comment:     The National Cholesterol Education Program (NCEP) has set the  following guidelines (reference values) for triglycerides:  Normal......................<150 mg/dL  Borderline High.............150-199 mg/dL  High........................200-499 mg/dL        HDL   Date Value Ref Range Status   05/12/2023 64 40 - 75 mg/dL Final     Comment:     The National Cholesterol Education Program (NCEP) has set the  following guidelines (reference values) for HDL Cholesterol:  Low...............<40 mg/dL  Optimal...........>60 mg/dL       LDL Cholesterol   Date Value Ref Range Status   05/12/2023 139.4 63.0 - 159.0 mg/dL Final     Comment:     The National Cholesterol Education Program (NCEP) has set the  following guidelines (reference values) for LDL Cholesterol:  Optimal.......................<130 mg/dL  Borderline High...............130-159 mg/dL  High..........................160-189 mg/dL  Very High.....................>190 mg/dL     .  Side effects of the medication: none.    Patient has insomnia and is on Ambien 10 mg nightly as needed.    Review of Systems      Objective:      Physical Exam  Vitals reviewed.   Constitutional:       Appearance: She is well-developed.   HENT:      Head: Normocephalic and atraumatic.      Mouth/Throat:      Pharynx: No oropharyngeal exudate.   Eyes:      General: No scleral icterus.        Right eye: No [facility].         Left eye: No [facility].      Pupils: Pupils are equal, round, and reactive to light.   Neck:      Thyroid: No thyromegaly.      Trachea: No tracheal deviation.   Cardiovascular:      Rate and Rhythm: Normal rate and regular rhythm.      Heart sounds: Normal heart sounds. No murmur heard.     No friction rub. No gallop.   Pulmonary:      Effort: Pulmonary effort is normal. No respiratory distress.      Breath sounds: Normal breath sounds. No wheezing or rales.   Chest:      Chest wall: No tenderness.   Abdominal:      General: Bowel sounds are normal. There is no distension.      Palpations: Abdomen is soft. There is no mass.      Tenderness: There is no abdominal tenderness. There is no guarding or rebound.   Musculoskeletal:         General: No tenderness. Normal range of motion.      Cervical back: Normal range of  motion and neck supple.   Skin:     General: Skin is warm and dry.      Coloration: Skin is not pale.      Findings: No erythema or rash.   Neurological:      Mental Status: She is alert and oriented to person, place, and time.   Psychiatric:         Behavior: Behavior normal.         Assessment:       1. Current episode of major depressive disorder without prior episode, unspecified depression episode severity    2. Type 2 diabetes mellitus with hyperglycemia, without long-term current use of insulin    3. Primary hypertension    4. Hyperlipidemia associated with type 2 diabetes mellitus      Plan:       1. Increase Effexor to 75 mg.  2.  Continue Trulicity 4.5 mg weekly, metformin 1000 mg b.i.d..  3.  Continue Benicar 40 mg p.o.  4.  Continue Lipitor 40 mg.

## 2023-09-21 ENCOUNTER — HOSPITAL ENCOUNTER (EMERGENCY)
Facility: HOSPITAL | Age: 68
Discharge: HOME | End: 2023-09-21
Attending: STUDENT IN AN ORGANIZED HEALTH CARE EDUCATION/TRAINING PROGRAM
Payer: MEDICARE

## 2023-09-21 VITALS
HEIGHT: 66 IN | WEIGHT: 178 LBS | BODY MASS INDEX: 28.61 KG/M2 | RESPIRATION RATE: 16 BRPM | SYSTOLIC BLOOD PRESSURE: 169 MMHG | OXYGEN SATURATION: 100 % | DIASTOLIC BLOOD PRESSURE: 77 MMHG | TEMPERATURE: 99 F | HEART RATE: 74 BPM

## 2023-09-21 DIAGNOSIS — R10.9 LEFT FLANK PAIN: Primary | ICD-10-CM

## 2023-09-21 DIAGNOSIS — K86.89 PANCREATIC ATROPHY: ICD-10-CM

## 2023-09-21 LAB
ALBUMIN SERPL BCP-MCNC: 3.8 G/DL (ref 3.5–5.2)
ALP SERPL-CCNC: 129 U/L (ref 55–135)
ALT SERPL W/O P-5'-P-CCNC: 13 U/L (ref 10–44)
ANION GAP SERPL CALC-SCNC: 14 MMOL/L (ref 8–16)
AST SERPL-CCNC: 16 U/L (ref 10–40)
BASOPHILS # BLD AUTO: ABNORMAL K/UL (ref 0–0.2)
BASOPHILS NFR BLD: 0 % (ref 0–1.9)
BILIRUB SERPL-MCNC: 0.5 MG/DL (ref 0.1–1)
BILIRUB UR QL STRIP: NEGATIVE
BUN SERPL-MCNC: 10 MG/DL (ref 8–23)
CALCIUM SERPL-MCNC: 9.8 MG/DL (ref 8.7–10.5)
CHLORIDE SERPL-SCNC: 106 MMOL/L (ref 95–110)
CLARITY UR: CLEAR
CO2 SERPL-SCNC: 22 MMOL/L (ref 23–29)
COLOR UR: COLORLESS
CREAT SERPL-MCNC: 0.8 MG/DL (ref 0.5–1.4)
DIFFERENTIAL METHOD: ABNORMAL
EOSINOPHIL # BLD AUTO: ABNORMAL K/UL (ref 0–0.5)
EOSINOPHIL NFR BLD: 0 % (ref 0–8)
ERYTHROCYTE [DISTWIDTH] IN BLOOD BY AUTOMATED COUNT: 19.3 % (ref 11.5–14.5)
EST. GFR  (NO RACE VARIABLE): >60 ML/MIN/1.73 M^2
GLUCOSE SERPL-MCNC: 127 MG/DL (ref 70–110)
GLUCOSE UR QL STRIP: NEGATIVE
HCT VFR BLD AUTO: 33 % (ref 37–48.5)
HGB BLD-MCNC: 10.4 G/DL (ref 12–16)
HGB UR QL STRIP: NEGATIVE
IMM GRANULOCYTES # BLD AUTO: ABNORMAL K/UL (ref 0–0.04)
IMM GRANULOCYTES NFR BLD AUTO: ABNORMAL % (ref 0–0.5)
KETONES UR QL STRIP: ABNORMAL
LEUKOCYTE ESTERASE UR QL STRIP: ABNORMAL
LIPASE SERPL-CCNC: 22 U/L (ref 4–60)
LYMPHOCYTES # BLD AUTO: ABNORMAL K/UL (ref 1–4.8)
LYMPHOCYTES NFR BLD: 50 % (ref 18–48)
MCH RBC QN AUTO: 24.3 PG (ref 27–31)
MCHC RBC AUTO-ENTMCNC: 31.5 G/DL (ref 32–36)
MCV RBC AUTO: 77 FL (ref 82–98)
MICROSCOPIC COMMENT: NORMAL
MONOCYTES # BLD AUTO: ABNORMAL K/UL (ref 0.3–1)
MONOCYTES NFR BLD: 8 % (ref 4–15)
NEUTROPHILS # BLD AUTO: ABNORMAL K/UL (ref 1.8–7.7)
NEUTROPHILS NFR BLD: 42 % (ref 38–73)
NITRITE UR QL STRIP: NEGATIVE
NRBC BLD-RTO: 0 /100 WBC
PH UR STRIP: 6 [PH] (ref 5–8)
PLATELET # BLD AUTO: 310 K/UL (ref 150–450)
PMV BLD AUTO: 9.8 FL (ref 9.2–12.9)
POCT GLUCOSE: 121 MG/DL (ref 70–110)
POTASSIUM SERPL-SCNC: 3.3 MMOL/L (ref 3.5–5.1)
PROT SERPL-MCNC: 7.6 G/DL (ref 6–8.4)
PROT UR QL STRIP: NEGATIVE
RBC # BLD AUTO: 4.28 M/UL (ref 4–5.4)
SODIUM SERPL-SCNC: 142 MMOL/L (ref 136–145)
SP GR UR STRIP: 1.01 (ref 1–1.03)
URN SPEC COLLECT METH UR: ABNORMAL
UROBILINOGEN UR STRIP-ACNC: NEGATIVE EU/DL
WBC # BLD AUTO: 8.06 K/UL (ref 3.9–12.7)
WBC #/AREA URNS HPF: 0 /HPF (ref 0–5)

## 2023-09-21 PROCEDURE — 80053 COMPREHEN METABOLIC PANEL: CPT

## 2023-09-21 PROCEDURE — 25000003 PHARM REV CODE 250: Performed by: STUDENT IN AN ORGANIZED HEALTH CARE EDUCATION/TRAINING PROGRAM

## 2023-09-21 PROCEDURE — 82962 GLUCOSE BLOOD TEST: CPT

## 2023-09-21 PROCEDURE — 93010 ELECTROCARDIOGRAM REPORT: CPT | Mod: ,,, | Performed by: INTERNAL MEDICINE

## 2023-09-21 PROCEDURE — 96374 THER/PROPH/DIAG INJ IV PUSH: CPT

## 2023-09-21 PROCEDURE — 99285 EMERGENCY DEPT VISIT HI MDM: CPT | Mod: 25

## 2023-09-21 PROCEDURE — 93010 EKG 12-LEAD: ICD-10-PCS | Mod: ,,, | Performed by: INTERNAL MEDICINE

## 2023-09-21 PROCEDURE — 93005 ELECTROCARDIOGRAM TRACING: CPT

## 2023-09-21 PROCEDURE — 83690 ASSAY OF LIPASE: CPT

## 2023-09-21 PROCEDURE — 96375 TX/PRO/DX INJ NEW DRUG ADDON: CPT

## 2023-09-21 PROCEDURE — 81000 URINALYSIS NONAUTO W/SCOPE: CPT

## 2023-09-21 PROCEDURE — 96361 HYDRATE IV INFUSION ADD-ON: CPT

## 2023-09-21 PROCEDURE — 85027 COMPLETE CBC AUTOMATED: CPT

## 2023-09-21 PROCEDURE — 85007 BL SMEAR W/DIFF WBC COUNT: CPT

## 2023-09-21 PROCEDURE — 63600175 PHARM REV CODE 636 W HCPCS: Performed by: STUDENT IN AN ORGANIZED HEALTH CARE EDUCATION/TRAINING PROGRAM

## 2023-09-21 RX ORDER — MORPHINE SULFATE 4 MG/ML
4 INJECTION, SOLUTION INTRAMUSCULAR; INTRAVENOUS
Status: COMPLETED | OUTPATIENT
Start: 2023-09-21 | End: 2023-09-21

## 2023-09-21 RX ORDER — SODIUM CHLORIDE 9 MG/ML
1000 INJECTION, SOLUTION INTRAVENOUS
Status: COMPLETED | OUTPATIENT
Start: 2023-09-21 | End: 2023-09-21

## 2023-09-21 RX ORDER — HYDROMORPHONE HYDROCHLORIDE 1 MG/ML
0.5 INJECTION, SOLUTION INTRAMUSCULAR; INTRAVENOUS; SUBCUTANEOUS
Status: COMPLETED | OUTPATIENT
Start: 2023-09-21 | End: 2023-09-21

## 2023-09-21 RX ORDER — ONDANSETRON 4 MG/1
4 TABLET, ORALLY DISINTEGRATING ORAL EVERY 6 HOURS PRN
Qty: 15 TABLET | Refills: 0 | Status: SHIPPED | OUTPATIENT
Start: 2023-09-21 | End: 2023-10-10 | Stop reason: SDUPTHER

## 2023-09-21 RX ORDER — NAPROXEN 500 MG/1
500 TABLET ORAL EVERY 12 HOURS PRN
Qty: 60 TABLET | Refills: 0 | OUTPATIENT
Start: 2023-09-21 | End: 2023-09-22

## 2023-09-21 RX ORDER — ONDANSETRON 2 MG/ML
4 INJECTION INTRAMUSCULAR; INTRAVENOUS
Status: COMPLETED | OUTPATIENT
Start: 2023-09-21 | End: 2023-09-21

## 2023-09-21 RX ORDER — KETOROLAC TROMETHAMINE 30 MG/ML
15 INJECTION, SOLUTION INTRAMUSCULAR; INTRAVENOUS
Status: COMPLETED | OUTPATIENT
Start: 2023-09-21 | End: 2023-09-21

## 2023-09-21 RX ADMIN — ONDANSETRON 4 MG: 2 INJECTION INTRAMUSCULAR; INTRAVENOUS at 07:09

## 2023-09-21 RX ADMIN — HYDROMORPHONE HYDROCHLORIDE 0.5 MG: 1 INJECTION, SOLUTION INTRAMUSCULAR; INTRAVENOUS; SUBCUTANEOUS at 09:09

## 2023-09-21 RX ADMIN — KETOROLAC TROMETHAMINE 15 MG: 30 INJECTION, SOLUTION INTRAMUSCULAR at 07:09

## 2023-09-21 RX ADMIN — SODIUM CHLORIDE 1000 ML: 9 INJECTION, SOLUTION INTRAVENOUS at 09:09

## 2023-09-21 RX ADMIN — MORPHINE SULFATE 4 MG: 4 INJECTION INTRAVENOUS at 08:09

## 2023-09-22 ENCOUNTER — HOSPITAL ENCOUNTER (EMERGENCY)
Facility: HOSPITAL | Age: 68
Discharge: HOME | End: 2023-09-22
Attending: EMERGENCY MEDICINE
Payer: MEDICARE

## 2023-09-22 ENCOUNTER — TELEPHONE (OUTPATIENT)
Dept: INTERNAL MEDICINE | Facility: CLINIC | Age: 68
End: 2023-09-22
Payer: MEDICARE

## 2023-09-22 VITALS
RESPIRATION RATE: 17 BRPM | BODY MASS INDEX: 28.73 KG/M2 | TEMPERATURE: 98 F | HEART RATE: 72 BPM | OXYGEN SATURATION: 97 % | SYSTOLIC BLOOD PRESSURE: 140 MMHG | WEIGHT: 178 LBS | DIASTOLIC BLOOD PRESSURE: 66 MMHG

## 2023-09-22 DIAGNOSIS — R10.9 FLANK PAIN: ICD-10-CM

## 2023-09-22 LAB
ALBUMIN SERPL BCP-MCNC: 3.2 G/DL (ref 3.5–5.2)
ALP SERPL-CCNC: 113 U/L (ref 55–135)
ALT SERPL W/O P-5'-P-CCNC: 11 U/L (ref 10–44)
ANION GAP SERPL CALC-SCNC: 6 MMOL/L (ref 8–16)
AST SERPL-CCNC: 14 U/L (ref 10–40)
BASOPHILS # BLD AUTO: 0.02 K/UL (ref 0–0.2)
BASOPHILS NFR BLD: 0.3 % (ref 0–1.9)
BILIRUB SERPL-MCNC: 0.3 MG/DL (ref 0.1–1)
BUN SERPL-MCNC: 14 MG/DL (ref 8–23)
CALCIUM SERPL-MCNC: 9 MG/DL (ref 8.7–10.5)
CHLORIDE SERPL-SCNC: 111 MMOL/L (ref 95–110)
CK SERPL-CCNC: 63 U/L (ref 20–180)
CO2 SERPL-SCNC: 26 MMOL/L (ref 23–29)
CREAT SERPL-MCNC: 0.7 MG/DL (ref 0.5–1.4)
DIFFERENTIAL METHOD: ABNORMAL
EOSINOPHIL # BLD AUTO: 0 K/UL (ref 0–0.5)
EOSINOPHIL NFR BLD: 0.4 % (ref 0–8)
ERYTHROCYTE [DISTWIDTH] IN BLOOD BY AUTOMATED COUNT: 19.3 % (ref 11.5–14.5)
EST. GFR  (NO RACE VARIABLE): >60 ML/MIN/1.73 M^2
GLUCOSE SERPL-MCNC: 174 MG/DL (ref 70–110)
HCT VFR BLD AUTO: 29 % (ref 37–48.5)
HGB BLD-MCNC: 9.2 G/DL (ref 12–16)
IMM GRANULOCYTES # BLD AUTO: 0.03 K/UL (ref 0–0.04)
IMM GRANULOCYTES NFR BLD AUTO: 0.4 % (ref 0–0.5)
LACTATE SERPL-SCNC: 1.7 MMOL/L (ref 0.5–2.2)
LIPASE SERPL-CCNC: 41 U/L (ref 4–60)
LYMPHOCYTES # BLD AUTO: 2.9 K/UL (ref 1–4.8)
LYMPHOCYTES NFR BLD: 42.6 % (ref 18–48)
MCH RBC QN AUTO: 24.4 PG (ref 27–31)
MCHC RBC AUTO-ENTMCNC: 31.7 G/DL (ref 32–36)
MCV RBC AUTO: 77 FL (ref 82–98)
MONOCYTES # BLD AUTO: 0.6 K/UL (ref 0.3–1)
MONOCYTES NFR BLD: 9.5 % (ref 4–15)
NEUTROPHILS # BLD AUTO: 3.2 K/UL (ref 1.8–7.7)
NEUTROPHILS NFR BLD: 46.8 % (ref 38–73)
NRBC BLD-RTO: 0 /100 WBC
PLATELET # BLD AUTO: 294 K/UL (ref 150–450)
PMV BLD AUTO: 10.2 FL (ref 9.2–12.9)
POTASSIUM SERPL-SCNC: 3.4 MMOL/L (ref 3.5–5.1)
PROT SERPL-MCNC: 6.5 G/DL (ref 6–8.4)
RBC # BLD AUTO: 3.77 M/UL (ref 4–5.4)
SODIUM SERPL-SCNC: 143 MMOL/L (ref 136–145)
TROPONIN I SERPL DL<=0.01 NG/ML-MCNC: <0.006 NG/ML (ref 0–0.03)
WBC # BLD AUTO: 6.74 K/UL (ref 3.9–12.7)

## 2023-09-22 PROCEDURE — 99284 EMERGENCY DEPT VISIT MOD MDM: CPT | Mod: 25

## 2023-09-22 PROCEDURE — 85025 COMPLETE CBC W/AUTO DIFF WBC: CPT | Performed by: EMERGENCY MEDICINE

## 2023-09-22 PROCEDURE — 82550 ASSAY OF CK (CPK): CPT | Performed by: EMERGENCY MEDICINE

## 2023-09-22 PROCEDURE — 93010 EKG 12-LEAD: ICD-10-PCS | Mod: ,,, | Performed by: INTERNAL MEDICINE

## 2023-09-22 PROCEDURE — 83690 ASSAY OF LIPASE: CPT | Performed by: EMERGENCY MEDICINE

## 2023-09-22 PROCEDURE — 96374 THER/PROPH/DIAG INJ IV PUSH: CPT

## 2023-09-22 PROCEDURE — 80053 COMPREHEN METABOLIC PANEL: CPT | Performed by: EMERGENCY MEDICINE

## 2023-09-22 PROCEDURE — 84484 ASSAY OF TROPONIN QUANT: CPT | Performed by: EMERGENCY MEDICINE

## 2023-09-22 PROCEDURE — 93010 ELECTROCARDIOGRAM REPORT: CPT | Mod: ,,, | Performed by: INTERNAL MEDICINE

## 2023-09-22 PROCEDURE — 96361 HYDRATE IV INFUSION ADD-ON: CPT

## 2023-09-22 PROCEDURE — 93005 ELECTROCARDIOGRAM TRACING: CPT

## 2023-09-22 PROCEDURE — 96375 TX/PRO/DX INJ NEW DRUG ADDON: CPT

## 2023-09-22 PROCEDURE — 25000003 PHARM REV CODE 250: Performed by: EMERGENCY MEDICINE

## 2023-09-22 PROCEDURE — 63600175 PHARM REV CODE 636 W HCPCS: Performed by: EMERGENCY MEDICINE

## 2023-09-22 PROCEDURE — 83605 ASSAY OF LACTIC ACID: CPT | Performed by: EMERGENCY MEDICINE

## 2023-09-22 RX ORDER — FAMOTIDINE 10 MG/ML
40 INJECTION INTRAVENOUS ONCE
Status: COMPLETED | OUTPATIENT
Start: 2023-09-22 | End: 2023-09-22

## 2023-09-22 RX ORDER — METHOCARBAMOL 500 MG/1
500 TABLET, FILM COATED ORAL 3 TIMES DAILY
Qty: 20 TABLET | Refills: 0 | Status: SHIPPED | OUTPATIENT
Start: 2023-09-22 | End: 2023-09-29

## 2023-09-22 RX ORDER — LORAZEPAM 2 MG/ML
1 INJECTION INTRAMUSCULAR
Status: COMPLETED | OUTPATIENT
Start: 2023-09-22 | End: 2023-09-22

## 2023-09-22 RX ORDER — KETOROLAC TROMETHAMINE 30 MG/ML
10 INJECTION, SOLUTION INTRAMUSCULAR; INTRAVENOUS
Status: COMPLETED | OUTPATIENT
Start: 2023-09-22 | End: 2023-09-22

## 2023-09-22 RX ORDER — DROPERIDOL 2.5 MG/ML
1.25 INJECTION, SOLUTION INTRAMUSCULAR; INTRAVENOUS ONCE
Status: COMPLETED | OUTPATIENT
Start: 2023-09-22 | End: 2023-09-22

## 2023-09-22 RX ORDER — KETOROLAC TROMETHAMINE 10 MG/1
10 TABLET, FILM COATED ORAL EVERY 8 HOURS
Qty: 15 TABLET | Refills: 0 | Status: SHIPPED | OUTPATIENT
Start: 2023-09-22 | End: 2023-09-27

## 2023-09-22 RX ADMIN — LORAZEPAM 1 MG: 2 INJECTION INTRAMUSCULAR; INTRAVENOUS at 07:09

## 2023-09-22 RX ADMIN — KETOROLAC TROMETHAMINE 10 MG: 30 INJECTION, SOLUTION INTRAMUSCULAR; INTRAVENOUS at 08:09

## 2023-09-22 RX ADMIN — SODIUM CHLORIDE 1000 ML: 9 INJECTION, SOLUTION INTRAVENOUS at 06:09

## 2023-09-22 RX ADMIN — DROPERIDOL 1.25 MG: 2.5 INJECTION, SOLUTION INTRAMUSCULAR; INTRAVENOUS at 06:09

## 2023-09-22 RX ADMIN — FAMOTIDINE 40 MG: 10 INJECTION, SOLUTION INTRAVENOUS at 08:09

## 2023-09-22 NOTE — TELEPHONE ENCOUNTER
----- Message from Jodi Bailey sent at 9/22/2023  9:36 AM CDT -----  Contact: self [phone]  Pt requesting a call in regards to appt.    Please call and advise

## 2023-09-22 NOTE — ED NOTES
Patient arrived to ER with L flank pain, dysuria and a lot of pressure in her bladder.   Patient reports pain increased and worsened at 0300. Seen the other day at a hospital in [place]. Reports they did not diagnosed her with anything and sent her home without prescriptions.   Hx of pylo.   Denies fever and vomiting, and blood in urine.     APPEARANCE: Alert, oriented and in no acute distress.  CARDIAC: Normal rate and rhythm, no murmur heard.   PERIPHERAL VASCULAR: peripheral pulses present. Normal cap refill. No edema. Warm to touch.    RESPIRATORY:Normal rate and effort, breath sounds clear bilaterally throughout chest. Respirations are equal and unlabored no obvious signs of distress.  GASTRO: L flank pain. Dysuria, bladder pressure.   MUSC: Full ROM. No bony tenderness or soft tissue tenderness. No obvious deformity.  SKIN: Skin is warm and dry, normal skin turgor, mucous membranes moist.  NEURO: 5/5 strength major flexors/extensors bilaterally. Sensory intact to light touch bilaterally. Dodie coma scale: eyes open spontaneously-4, oriented & converses-5, obeys commands-6. No neurological abnormalities.   MENTAL STATUS: awake, alert and aware of environment.  EYE: PERRL, both eyes: pupils brisk and reactive to light. Normal size.  ENT: EARS: no obvious drainage. NOSE: no active bleeding.
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
## 2023-09-22 NOTE — ED NOTES
Patient ambulated to bathroom, steady gait. Provided a urine sample. Urine slightly cloudy, no odor. Clear yellow in color.   Reports pain tolerable.

## 2023-09-22 NOTE — ED NOTES
Pt arrive from home complains of left flank pain that started Tuesday. Pt was seen in [facility] on Tuesday and here yesterday for left flank pain. Pt appears to be anxious but in no distress. Pt has a hx of DM, HTN, and splenomegaly.

## 2023-09-22 NOTE — ED NOTES
Patient reports she does not have to void at this time. Reports that she has not been drinking a lot because it hurts too much to pee.
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
## 2023-09-24 ENCOUNTER — HOSPITAL ENCOUNTER (EMERGENCY)
Facility: HOSPITAL | Age: 68
Discharge: HOME | End: 2023-09-24
Attending: EMERGENCY MEDICINE
Payer: MEDICARE

## 2023-09-24 VITALS
SYSTOLIC BLOOD PRESSURE: 131 MMHG | WEIGHT: 180 LBS | TEMPERATURE: 99 F | BODY MASS INDEX: 28.93 KG/M2 | RESPIRATION RATE: 19 BRPM | DIASTOLIC BLOOD PRESSURE: 66 MMHG | HEIGHT: 66 IN | HEART RATE: 78 BPM | OXYGEN SATURATION: 98 %

## 2023-09-24 DIAGNOSIS — E83.42 HYPOMAGNESEMIA: ICD-10-CM

## 2023-09-24 DIAGNOSIS — R10.9 ABDOMINAL PAIN: Primary | ICD-10-CM

## 2023-09-24 LAB
ALBUMIN SERPL BCP-MCNC: 3.7 G/DL (ref 3.5–5.2)
ALP SERPL-CCNC: 136 U/L (ref 55–135)
ALT SERPL W/O P-5'-P-CCNC: 13 U/L (ref 10–44)
ANION GAP SERPL CALC-SCNC: 11 MMOL/L (ref 8–16)
AST SERPL-CCNC: 19 U/L (ref 10–40)
BACTERIA #/AREA URNS AUTO: ABNORMAL /HPF
BASOPHILS # BLD AUTO: 0.01 K/UL (ref 0–0.2)
BASOPHILS NFR BLD: 0.2 % (ref 0–1.9)
BILIRUB SERPL-MCNC: 0.5 MG/DL (ref 0.1–1)
BILIRUB UR QL STRIP: NEGATIVE
BUN SERPL-MCNC: 8 MG/DL (ref 8–23)
CALCIUM SERPL-MCNC: 9.9 MG/DL (ref 8.7–10.5)
CHLORIDE SERPL-SCNC: 104 MMOL/L (ref 95–110)
CLARITY UR REFRACT.AUTO: CLEAR
CO2 SERPL-SCNC: 25 MMOL/L (ref 23–29)
COLOR UR AUTO: YELLOW
CREAT SERPL-MCNC: 0.7 MG/DL (ref 0.5–1.4)
DIFFERENTIAL METHOD: ABNORMAL
EOSINOPHIL # BLD AUTO: 0 K/UL (ref 0–0.5)
EOSINOPHIL NFR BLD: 0.4 % (ref 0–8)
ERYTHROCYTE [DISTWIDTH] IN BLOOD BY AUTOMATED COUNT: 19.7 % (ref 11.5–14.5)
EST. GFR  (NO RACE VARIABLE): >60 ML/MIN/1.73 M^2
GLUCOSE SERPL-MCNC: 128 MG/DL (ref 70–110)
GLUCOSE UR QL STRIP: NEGATIVE
HCT VFR BLD AUTO: 33.8 % (ref 37–48.5)
HCV AB SERPL QL IA: NORMAL
HGB BLD-MCNC: 10.7 G/DL (ref 12–16)
HGB UR QL STRIP: NEGATIVE
HIV 1+2 AB+HIV1 P24 AG SERPL QL IA: NORMAL
HYALINE CASTS UR QL AUTO: 8 /LPF
IMM GRANULOCYTES # BLD AUTO: 0.02 K/UL (ref 0–0.04)
IMM GRANULOCYTES NFR BLD AUTO: 0.4 % (ref 0–0.5)
KETONES UR QL STRIP: ABNORMAL
LACTATE SERPL-SCNC: 1 MMOL/L (ref 0.5–2.2)
LEUKOCYTE ESTERASE UR QL STRIP: NEGATIVE
LIPASE SERPL-CCNC: 13 U/L (ref 4–60)
LYMPHOCYTES # BLD AUTO: 2.5 K/UL (ref 1–4.8)
LYMPHOCYTES NFR BLD: 44.1 % (ref 18–48)
MAGNESIUM SERPL-MCNC: 1.5 MG/DL (ref 1.6–2.6)
MCH RBC QN AUTO: 24.9 PG (ref 27–31)
MCHC RBC AUTO-ENTMCNC: 31.7 G/DL (ref 32–36)
MCV RBC AUTO: 79 FL (ref 82–98)
MICROSCOPIC COMMENT: ABNORMAL
MONOCYTES # BLD AUTO: 0.4 K/UL (ref 0.3–1)
MONOCYTES NFR BLD: 7.5 % (ref 4–15)
NEUTROPHILS # BLD AUTO: 2.7 K/UL (ref 1.8–7.7)
NEUTROPHILS NFR BLD: 47.4 % (ref 38–73)
NITRITE UR QL STRIP: NEGATIVE
NRBC BLD-RTO: 0 /100 WBC
PH UR STRIP: 7 [PH] (ref 5–8)
PLATELET # BLD AUTO: 318 K/UL (ref 150–450)
PMV BLD AUTO: 11.2 FL (ref 9.2–12.9)
POTASSIUM SERPL-SCNC: 3.7 MMOL/L (ref 3.5–5.1)
PROT SERPL-MCNC: 7.6 G/DL (ref 6–8.4)
PROT UR QL STRIP: ABNORMAL
RBC # BLD AUTO: 4.29 M/UL (ref 4–5.4)
RBC #/AREA URNS AUTO: 0 /HPF (ref 0–4)
SODIUM SERPL-SCNC: 140 MMOL/L (ref 136–145)
SP GR UR STRIP: >1.03 (ref 1–1.03)
SQUAMOUS #/AREA URNS AUTO: 1 /HPF
TROPONIN I SERPL DL<=0.01 NG/ML-MCNC: <0.006 NG/ML (ref 0–0.03)
URN SPEC COLLECT METH UR: ABNORMAL
WBC # BLD AUTO: 5.62 K/UL (ref 3.9–12.7)
WBC #/AREA URNS AUTO: 3 /HPF (ref 0–5)

## 2023-09-24 PROCEDURE — 86803 HEPATITIS C AB TEST: CPT | Performed by: PHYSICIAN ASSISTANT

## 2023-09-24 PROCEDURE — 80053 COMPREHEN METABOLIC PANEL: CPT

## 2023-09-24 PROCEDURE — 83690 ASSAY OF LIPASE: CPT

## 2023-09-24 PROCEDURE — 96366 THER/PROPH/DIAG IV INF ADDON: CPT

## 2023-09-24 PROCEDURE — 83735 ASSAY OF MAGNESIUM: CPT

## 2023-09-24 PROCEDURE — 96376 TX/PRO/DX INJ SAME DRUG ADON: CPT

## 2023-09-24 PROCEDURE — 87389 HIV-1 AG W/HIV-1&-2 AB AG IA: CPT | Performed by: PHYSICIAN ASSISTANT

## 2023-09-24 PROCEDURE — 83605 ASSAY OF LACTIC ACID: CPT

## 2023-09-24 PROCEDURE — 84484 ASSAY OF TROPONIN QUANT: CPT

## 2023-09-24 PROCEDURE — 25000003 PHARM REV CODE 250

## 2023-09-24 PROCEDURE — 96365 THER/PROPH/DIAG IV INF INIT: CPT

## 2023-09-24 PROCEDURE — 93010 EKG 12-LEAD: ICD-10-PCS | Mod: ,,, | Performed by: INTERNAL MEDICINE

## 2023-09-24 PROCEDURE — 63600175 PHARM REV CODE 636 W HCPCS

## 2023-09-24 PROCEDURE — 96375 TX/PRO/DX INJ NEW DRUG ADDON: CPT

## 2023-09-24 PROCEDURE — 25500020 PHARM REV CODE 255: Performed by: EMERGENCY MEDICINE

## 2023-09-24 PROCEDURE — 85025 COMPLETE CBC W/AUTO DIFF WBC: CPT

## 2023-09-24 PROCEDURE — 99285 EMERGENCY DEPT VISIT HI MDM: CPT | Mod: 25

## 2023-09-24 PROCEDURE — 81001 URINALYSIS AUTO W/SCOPE: CPT

## 2023-09-24 PROCEDURE — 93005 ELECTROCARDIOGRAM TRACING: CPT

## 2023-09-24 PROCEDURE — 93010 ELECTROCARDIOGRAM REPORT: CPT | Mod: ,,, | Performed by: INTERNAL MEDICINE

## 2023-09-24 RX ORDER — KETOROLAC TROMETHAMINE 30 MG/ML
10 INJECTION, SOLUTION INTRAMUSCULAR; INTRAVENOUS
Status: COMPLETED | OUTPATIENT
Start: 2023-09-24 | End: 2023-09-24

## 2023-09-24 RX ORDER — MORPHINE SULFATE 4 MG/ML
4 INJECTION, SOLUTION INTRAMUSCULAR; INTRAVENOUS
Status: COMPLETED | OUTPATIENT
Start: 2023-09-24 | End: 2023-09-24

## 2023-09-24 RX ORDER — MAGNESIUM SULFATE HEPTAHYDRATE 40 MG/ML
2 INJECTION, SOLUTION INTRAVENOUS ONCE
Status: COMPLETED | OUTPATIENT
Start: 2023-09-24 | End: 2023-09-24

## 2023-09-24 RX ORDER — LIDOCAINE 50 MG/G
1 PATCH TOPICAL
Status: DISCONTINUED | OUTPATIENT
Start: 2023-09-24 | End: 2023-09-24 | Stop reason: HOSPADM

## 2023-09-24 RX ORDER — MAG HYDROX/ALUMINUM HYD/SIMETH 200-200-20
5 SUSPENSION, ORAL (FINAL DOSE FORM) ORAL
Status: COMPLETED | OUTPATIENT
Start: 2023-09-24 | End: 2023-09-24

## 2023-09-24 RX ORDER — ONDANSETRON 2 MG/ML
4 INJECTION INTRAMUSCULAR; INTRAVENOUS
Status: COMPLETED | OUTPATIENT
Start: 2023-09-24 | End: 2023-09-24

## 2023-09-24 RX ORDER — SIMETHICONE 80 MG
1 TABLET,CHEWABLE ORAL
Status: COMPLETED | OUTPATIENT
Start: 2023-09-24 | End: 2023-09-24

## 2023-09-24 RX ADMIN — ALUMINUM HYDROXIDE, MAGNESIUM HYDROXIDE, AND SIMETHICONE 5 ML: 200; 200; 20 SUSPENSION ORAL at 09:09

## 2023-09-24 RX ADMIN — ONDANSETRON 4 MG: 2 INJECTION INTRAMUSCULAR; INTRAVENOUS at 09:09

## 2023-09-24 RX ADMIN — LIDOCAINE 1 PATCH: 50 PATCH CUTANEOUS at 09:09

## 2023-09-24 RX ADMIN — KETOROLAC TROMETHAMINE 10 MG: 30 INJECTION, SOLUTION INTRAMUSCULAR; INTRAVENOUS at 09:09

## 2023-09-24 RX ADMIN — MAGNESIUM SULFATE HEPTAHYDRATE 2 G: 40 INJECTION, SOLUTION INTRAVENOUS at 10:09

## 2023-09-24 RX ADMIN — IOHEXOL 100 ML: 350 INJECTION, SOLUTION INTRAVENOUS at 11:09

## 2023-09-24 RX ADMIN — MORPHINE SULFATE 4 MG: 4 INJECTION INTRAVENOUS at 12:09

## 2023-09-24 RX ADMIN — SIMETHICONE 80 MG: 80 TABLET, CHEWABLE ORAL at 09:09

## 2023-09-24 RX ADMIN — MORPHINE SULFATE 4 MG: 4 INJECTION INTRAVENOUS at 10:09

## 2023-09-24 NOTE — DISCHARGE INSTRUCTIONS
Continue symptomatic management you were given in the emergency department  CT did not reveal any new changes  I recommend following up with Gastroenterology or your surgeon that performed the gastric bypass.

## 2023-09-24 NOTE — ED NOTES
I-STAT Chem-8+ Results:   Value Reference Range   Sodium 136 136-145 mmol/L   Potassium  4.3 3.5-5.1 mmol/L   Chloride 104  mmol/L   Ionized Calcium 1.21 1.06-1.42 mmol/L   CO2 (measured) 24 23-29 mmol/L   Glucose 85  mg/dL   BUN <3 6-30 mg/dL   Creatinine 0.3 0.5-1.4 mg/dL   Hematocrit 31 36-54%
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
## 2023-09-24 NOTE — ED NOTES
LOC: The patient is awake and alert; oriented x 3 and speaking appropriately.  APPEARANCE: Patient resting comfortably, patient is clean and well groomed  SKIN: warm and dry, normal skin turgor & moist mucus membranes, skin intact, no breakdown noted.  MUSCULOSKELETAL: Patient moving all extremities well, no obvious swelling or deformities noted  RESPIRATORY: Airway is open and patent, breath sounds clear throughout all lung fields; respirations are spontaneous, normal effort and rate  CARDIAC: Patient has a normal rate, no peripheral edema noted, capillary refill < 3 seconds; No complaints of chest pain   ABDOMEN: Soft and tender to palpation, on left side,  no distention noted. Not expelling flatus for last 2 dactyls. Last BM was liquid two days ago

## 2023-09-24 NOTE — ED TRIAGE NOTES
C/o pain in left side of abdomen- seen in ER at [facility] last week. Hx  revision of gastric bypass  several yrs ago. States she can't eat  or defecate. Last BM was 2 days ago and it was diarrhea. C/O nausea and vomiting - unable to keep anythn g down or take her meds. States she has not expelled flatus over the last two days.Seen at [facility] ER 3 days ago.

## 2023-09-24 NOTE — PROVIDER PROGRESS NOTES - EMERGENCY DEPT.
Encounter Date: 9/24/2023    ED Physician Progress Notes         EKG - STEMI Decision  Initial Reading: No STEMI present.

## 2023-09-25 ENCOUNTER — PATIENT OUTREACH (OUTPATIENT)
Dept: ADMINISTRATIVE | Facility: OTHER | Age: 68
End: 2023-09-25
Payer: MEDICARE

## 2023-09-25 ENCOUNTER — OFFICE VISIT (OUTPATIENT)
Dept: INTERNAL MEDICINE | Facility: CLINIC | Age: 68
End: 2023-09-25
Payer: MEDICARE

## 2023-09-25 VITALS
TEMPERATURE: 98 F | BODY MASS INDEX: 27.39 KG/M2 | OXYGEN SATURATION: 97 % | SYSTOLIC BLOOD PRESSURE: 126 MMHG | HEIGHT: 66 IN | HEART RATE: 65 BPM | WEIGHT: 170.44 LBS | RESPIRATION RATE: 18 BRPM | DIASTOLIC BLOOD PRESSURE: 68 MMHG

## 2023-09-25 DIAGNOSIS — E11.65 TYPE 2 DIABETES MELLITUS WITH HYPERGLYCEMIA, WITHOUT LONG-TERM CURRENT USE OF INSULIN: Chronic | ICD-10-CM

## 2023-09-25 DIAGNOSIS — I70.0 AORTIC ATHEROSCLEROSIS: ICD-10-CM

## 2023-09-25 DIAGNOSIS — R10.12 LEFT UPPER QUADRANT PAIN: Primary | ICD-10-CM

## 2023-09-25 DIAGNOSIS — M46.1 SACROILIITIS, NOT ELSEWHERE CLASSIFIED: ICD-10-CM

## 2023-09-25 DIAGNOSIS — F33.0 MAJOR DEPRESSIVE DISORDER, RECURRENT, MILD: ICD-10-CM

## 2023-09-25 PROCEDURE — 99214 OFFICE O/P EST MOD 30 MIN: CPT | Mod: S$PBB,,, | Performed by: INTERNAL MEDICINE

## 2023-09-25 PROCEDURE — 99214 PR OFFICE/OUTPT VISIT, EST, LEVL IV, 30-39 MIN: ICD-10-PCS | Mod: S$PBB,,, | Performed by: INTERNAL MEDICINE

## 2023-09-25 PROCEDURE — 99999 PR PBB SHADOW E&M-EST. PATIENT-LVL V: CPT | Mod: PBBFAC,,, | Performed by: INTERNAL MEDICINE

## 2023-09-25 PROCEDURE — 99999 PR PBB SHADOW E&M-EST. PATIENT-LVL V: ICD-10-PCS | Mod: PBBFAC,,, | Performed by: INTERNAL MEDICINE

## 2023-09-25 PROCEDURE — 99215 OFFICE O/P EST HI 40 MIN: CPT | Mod: PBBFAC,PO | Performed by: INTERNAL MEDICINE

## 2023-09-25 RX ORDER — SUCRALFATE 1 G/10ML
1 SUSPENSION ORAL 4 TIMES DAILY
Qty: 1260 ML | Refills: 0 | Status: ON HOLD | OUTPATIENT
Start: 2023-09-25 | End: 2023-10-27

## 2023-09-25 RX ORDER — PANTOPRAZOLE SODIUM 40 MG/1
TABLET, DELAYED RELEASE ORAL
COMMUNITY
End: 2023-09-25

## 2023-09-25 RX ORDER — ZOLPIDEM TARTRATE 10 MG/1
10 TABLET ORAL
COMMUNITY
End: 2023-10-10

## 2023-09-25 RX ORDER — PANTOPRAZOLE SODIUM 40 MG/1
40 TABLET, DELAYED RELEASE ORAL 2 TIMES DAILY
Qty: 180 TABLET | Refills: 3 | Status: SHIPPED | OUTPATIENT
Start: 2023-09-25

## 2023-09-25 NOTE — PROGRESS NOTES
[facility]: Health  Follow-Up    The history is provided by the patient.             Reason for review: Blood glucose not at goal and Blood pressure not at goal        Topics Covered on Call: physical activity and Diet    Additional Follow-up details: Average blood pressure today is 125/82. Her readings seem to be trending up but patient is still working on keeping her sodium intake low    Blood sugars recently have been really high. Asked her why they have been higher and she has been doing a lot of sweets. She is backing off on them now.     She is at her son's house in [place] and isn't sure when she is going to be coming back to [place].         Diet-Change    Patient reports eating or drinking the following: Patient reports that she has been doing a lot of pies and sweets. She knows exactly why her sugars have been so high. She is going to reduce this now and is getting her blood sugar readings down. She is also trying to watch her sodium intake.     Intervention(s): carb reduction and DASH diet      Physical Activity-no change to routine  No change to exercise routine.       Additional physical activity details: Patient reports that she isn't exercising. It is too hot in [place] for her to exercise. Asked her if she can do anything inside and she declined. Recommend getting in exercise when she can to help with her readings.       Medication Adherence-Medication adherence was asssessed.  Patient continue taking medication as prescribed.        Substance, Sleep, Stress-Not assessed      Provided patient education. Decrease carbs and sodium intake, increase exercise.        Addressed any questions or concerns and patient has my contact information if needed prior to next outreach. Patient verbalizes understanding.          There are no preventive care reminders to display for this patient.    Last 5 Patient Entered Readings                                      Current 30 Day Average: 125/82      Recent Readings 8/27/2020 8/26/2020 8/24/2020 8/22/2020 8/21/2020    SBP (mmHg) 128 138 129 121 138    DBP (mmHg) 87 90 82 86 91    Pulse 77 70 74 86 80        Last 6 Patient Entered Readings                                          Most Recent A1c: 6.4% on 6/29/2020  (Goal: 7%)     Recent Readings 8/27/2020 8/26/2020 8/24/2020 8/22/2020 8/21/2020    Blood Glucose (mg/dL) 126 99 290 205 209              Rituxan Counseling:  I discussed with the patient the risks of Rituxan infusions. Side effects can include infusion reactions, severe drug rashes including mucocutaneous reactions, reactivation of latent hepatitis and other infections and rarely progressive multifocal leukoencephalopathy.  All of the patient's questions and concerns were addressed.

## 2023-09-25 NOTE — PROGRESS NOTES
Subjective:       Patient ID: Jill Perez is a 68 y.o. female.    Chief Complaint: Follow-up    HPI    68-year-old female sacroiliitis, major depressive disorder, aortic atherosclerosis doses here for follow-up.    Diabetes-Trulicity 4.5 mg weekly, metformin 1000 mg b.i.d.  Lab Results   Component Value Date    HGBA1C 7.3 (H) 09/12/2023    HGBA1C 6.8 (H) 05/12/2023    HGBA1C 6.9 (H) 02/17/2023     Lab Results   Component Value Date    LDLCALC 139.4 05/12/2023    CREATININE 0.7 09/24/2023     She has been in the ER 4 times for abdominal pain in the last week.  She went to the ER in [facility].  She gets severe pain in her abdomin to her left flank.  She feels like something is wrong with her bypass.  She has not been able to eat and has lost 10# since her last appointment.  She has burning pain in her left flank and radiates to her left back.  She drank water and got nauseous.  She has not had a BM in days, because she cannot eat or keep much down.  She had a gastric sleeve 12 yrs ago.  The burning pain is constant.  She is on protonix 40 mg daily.  Her  is working in [facility].  She did nothing unusual.      Review of Systems      Objective:      Physical Exam  Vitals reviewed.   Constitutional:       Appearance: She is well-developed.   HENT:      Head: Normocephalic and atraumatic.      Mouth/Throat:      Pharynx: No oropharyngeal exudate.   Eyes:      General: No scleral icterus.        Right eye: No [facility].         Left eye: No [facility].      Pupils: Pupils are equal, round, and reactive to light.   Neck:      Thyroid: No thyromegaly.      Trachea: No tracheal deviation.   Cardiovascular:      Rate and Rhythm: Normal rate and regular rhythm.      Heart sounds: Normal heart sounds. No murmur heard.     No friction rub. No gallop.   Pulmonary:      Effort: Pulmonary effort is normal. No respiratory distress.      Breath sounds: Normal breath sounds. No wheezing or rales.   Chest:      Chest wall: No  tenderness.   Abdominal:      General: Bowel sounds are normal. There is no distension.      Palpations: Abdomen is soft. There is no mass.      Tenderness: There is no abdominal tenderness. There is no guarding or rebound.   Musculoskeletal:         General: No tenderness. Normal range of motion.      Cervical back: Normal range of motion and neck supple.   Skin:     General: Skin is warm and dry.      Coloration: Skin is not pale.      Findings: No erythema or rash.   Neurological:      Mental Status: She is alert and oriented to person, place, and time.   Psychiatric:         Behavior: Behavior normal.         Assessment:       1. Left upper quadrant pain  - Ambulatory referral/consult to Gastroenterology; Future    2. Type 2 diabetes mellitus with hyperglycemia, without long-term current use of insulin    3. Sacroiliitis, not elsewhere classified    4. Major depressive disorder, recurrent, mild    5. Aortic atherosclerosis      Plan:       1. Do not think this is acute pancreatitis given normal lipases and no signs of acute inflammation noted on CT scan.  However, stop Trulicity 4.5 mg for now.  Increase Protonix to 40 mg twice daily, start Carafate 1 g times daily.  Urgent GI referral.    2.  Continue metformin 1000 mg b.i.d..    3. Continue gabapentin.  4.  Continue Effexor 75 mg  5.  Continue Lipitor 40 mg.

## 2023-09-26 ENCOUNTER — OFFICE VISIT (OUTPATIENT)
Dept: GASTROENTEROLOGY | Facility: CLINIC | Age: 68
End: 2023-09-26
Payer: MEDICARE

## 2023-09-26 ENCOUNTER — HOSPITAL ENCOUNTER (EMERGENCY)
Facility: HOSPITAL | Age: 68
Discharge: HOME | End: 2023-09-26
Attending: EMERGENCY MEDICINE
Payer: MEDICARE

## 2023-09-26 VITALS
BODY MASS INDEX: 27.12 KG/M2 | OXYGEN SATURATION: 98 % | HEART RATE: 102 BPM | RESPIRATION RATE: 18 BRPM | DIASTOLIC BLOOD PRESSURE: 87 MMHG | WEIGHT: 168 LBS | SYSTOLIC BLOOD PRESSURE: 135 MMHG | TEMPERATURE: 99 F

## 2023-09-26 VITALS
BODY MASS INDEX: 27.1 KG/M2 | HEIGHT: 66 IN | SYSTOLIC BLOOD PRESSURE: 130 MMHG | WEIGHT: 168.63 LBS | HEART RATE: 100 BPM | OXYGEN SATURATION: 95 % | DIASTOLIC BLOOD PRESSURE: 84 MMHG

## 2023-09-26 DIAGNOSIS — R11.2 NAUSEA AND VOMITING, UNSPECIFIED VOMITING TYPE: ICD-10-CM

## 2023-09-26 DIAGNOSIS — K59.00 CONSTIPATION, UNSPECIFIED CONSTIPATION TYPE: ICD-10-CM

## 2023-09-26 DIAGNOSIS — R10.12 LEFT UPPER QUADRANT PAIN: Primary | ICD-10-CM

## 2023-09-26 DIAGNOSIS — K59.04 CHRONIC IDIOPATHIC CONSTIPATION: ICD-10-CM

## 2023-09-26 DIAGNOSIS — R10.9 ABDOMINAL PAIN, UNSPECIFIED ABDOMINAL LOCATION: Primary | ICD-10-CM

## 2023-09-26 LAB
ALBUMIN SERPL BCP-MCNC: 3.6 G/DL (ref 3.5–5.2)
ALP SERPL-CCNC: 126 U/L (ref 55–135)
ALT SERPL W/O P-5'-P-CCNC: 11 U/L (ref 10–44)
ANION GAP SERPL CALC-SCNC: 12 MMOL/L (ref 8–16)
AST SERPL-CCNC: 25 U/L (ref 10–40)
BACTERIA #/AREA URNS HPF: NORMAL /HPF
BASOPHILS # BLD AUTO: 0.01 K/UL (ref 0–0.2)
BASOPHILS NFR BLD: 0.2 % (ref 0–1.9)
BILIRUB SERPL-MCNC: 0.3 MG/DL (ref 0.1–1)
BILIRUB UR QL STRIP: NEGATIVE
BUN SERPL-MCNC: 10 MG/DL (ref 8–23)
CALCIUM SERPL-MCNC: 9.4 MG/DL (ref 8.7–10.5)
CHLORIDE SERPL-SCNC: 106 MMOL/L (ref 95–110)
CLARITY UR: CLEAR
CO2 SERPL-SCNC: 22 MMOL/L (ref 23–29)
COLOR UR: YELLOW
CREAT SERPL-MCNC: 0.9 MG/DL (ref 0.5–1.4)
DIFFERENTIAL METHOD: ABNORMAL
EOSINOPHIL # BLD AUTO: 0 K/UL (ref 0–0.5)
EOSINOPHIL NFR BLD: 0.2 % (ref 0–8)
ERYTHROCYTE [DISTWIDTH] IN BLOOD BY AUTOMATED COUNT: 19.9 % (ref 11.5–14.5)
EST. GFR  (NO RACE VARIABLE): >60 ML/MIN/1.73 M^2
GLUCOSE SERPL-MCNC: 130 MG/DL (ref 70–110)
GLUCOSE UR QL STRIP: NEGATIVE
HCT VFR BLD AUTO: 33.5 % (ref 37–48.5)
HGB BLD-MCNC: 10.7 G/DL (ref 12–16)
HGB UR QL STRIP: NEGATIVE
HYALINE CASTS #/AREA URNS LPF: 1 /LPF
IMM GRANULOCYTES # BLD AUTO: 0.02 K/UL (ref 0–0.04)
IMM GRANULOCYTES NFR BLD AUTO: 0.4 % (ref 0–0.5)
KETONES UR QL STRIP: ABNORMAL
LEUKOCYTE ESTERASE UR QL STRIP: ABNORMAL
LIPASE SERPL-CCNC: 11 U/L (ref 4–60)
LYMPHOCYTES # BLD AUTO: 1.6 K/UL (ref 1–4.8)
LYMPHOCYTES NFR BLD: 29.9 % (ref 18–48)
MCH RBC QN AUTO: 24.4 PG (ref 27–31)
MCHC RBC AUTO-ENTMCNC: 31.9 G/DL (ref 32–36)
MCV RBC AUTO: 76 FL (ref 82–98)
MICROSCOPIC COMMENT: NORMAL
MONOCYTES # BLD AUTO: 0.5 K/UL (ref 0.3–1)
MONOCYTES NFR BLD: 9.5 % (ref 4–15)
NEUTROPHILS # BLD AUTO: 3.2 K/UL (ref 1.8–7.7)
NEUTROPHILS NFR BLD: 59.8 % (ref 38–73)
NITRITE UR QL STRIP: NEGATIVE
NRBC BLD-RTO: 0 /100 WBC
PH UR STRIP: 7 [PH] (ref 5–8)
PLATELET # BLD AUTO: 351 K/UL (ref 150–450)
PMV BLD AUTO: 10.1 FL (ref 9.2–12.9)
POTASSIUM SERPL-SCNC: 4.5 MMOL/L (ref 3.5–5.1)
PROT SERPL-MCNC: 7.7 G/DL (ref 6–8.4)
PROT UR QL STRIP: ABNORMAL
RBC # BLD AUTO: 4.39 M/UL (ref 4–5.4)
RBC #/AREA URNS HPF: 1 /HPF (ref 0–4)
SODIUM SERPL-SCNC: 140 MMOL/L (ref 136–145)
SP GR UR STRIP: >1.03 (ref 1–1.03)
SQUAMOUS #/AREA URNS HPF: 1 /HPF
URN SPEC COLLECT METH UR: ABNORMAL
UROBILINOGEN UR STRIP-ACNC: ABNORMAL EU/DL
WBC # BLD AUTO: 5.36 K/UL (ref 3.9–12.7)
WBC #/AREA URNS HPF: 2 /HPF (ref 0–5)

## 2023-09-26 PROCEDURE — 99215 OFFICE O/P EST HI 40 MIN: CPT | Mod: PBBFAC,25,27,PO | Performed by: NURSE PRACTITIONER

## 2023-09-26 PROCEDURE — 81000 URINALYSIS NONAUTO W/SCOPE: CPT | Performed by: PHYSICIAN ASSISTANT

## 2023-09-26 PROCEDURE — 85025 COMPLETE CBC W/AUTO DIFF WBC: CPT | Performed by: PHYSICIAN ASSISTANT

## 2023-09-26 PROCEDURE — 25000003 PHARM REV CODE 250: Performed by: PHYSICIAN ASSISTANT

## 2023-09-26 PROCEDURE — 99499 NO LOS: ICD-10-PCS | Mod: ,,, | Performed by: NURSE PRACTITIONER

## 2023-09-26 PROCEDURE — 99214 PR OFFICE/OUTPT VISIT, EST, LEVL IV, 30-39 MIN: ICD-10-PCS | Mod: S$PBB,,, | Performed by: NURSE PRACTITIONER

## 2023-09-26 PROCEDURE — 83690 ASSAY OF LIPASE: CPT | Performed by: PHYSICIAN ASSISTANT

## 2023-09-26 PROCEDURE — 99214 OFFICE O/P EST MOD 30 MIN: CPT | Mod: S$PBB,,, | Performed by: NURSE PRACTITIONER

## 2023-09-26 PROCEDURE — 96375 TX/PRO/DX INJ NEW DRUG ADDON: CPT

## 2023-09-26 PROCEDURE — 99999 PR PBB SHADOW E&M-EST. PATIENT-LVL V: ICD-10-PCS | Mod: PBBFAC,,, | Performed by: NURSE PRACTITIONER

## 2023-09-26 PROCEDURE — 63600175 PHARM REV CODE 636 W HCPCS: Performed by: PHYSICIAN ASSISTANT

## 2023-09-26 PROCEDURE — 96361 HYDRATE IV INFUSION ADD-ON: CPT

## 2023-09-26 PROCEDURE — 99284 EMERGENCY DEPT VISIT MOD MDM: CPT | Mod: 25

## 2023-09-26 PROCEDURE — 99999 PR PBB SHADOW E&M-EST. PATIENT-LVL V: CPT | Mod: PBBFAC,,, | Performed by: NURSE PRACTITIONER

## 2023-09-26 PROCEDURE — 80053 COMPREHEN METABOLIC PANEL: CPT | Performed by: PHYSICIAN ASSISTANT

## 2023-09-26 PROCEDURE — 96374 THER/PROPH/DIAG INJ IV PUSH: CPT

## 2023-09-26 PROCEDURE — 99499 UNLISTED E&M SERVICE: CPT | Mod: ,,, | Performed by: NURSE PRACTITIONER

## 2023-09-26 RX ORDER — ONDANSETRON 2 MG/ML
4 INJECTION INTRAMUSCULAR; INTRAVENOUS
Status: COMPLETED | OUTPATIENT
Start: 2023-09-26 | End: 2023-09-26

## 2023-09-26 RX ORDER — DICYCLOMINE HYDROCHLORIDE 20 MG/1
20 TABLET ORAL 3 TIMES DAILY PRN
Qty: 60 TABLET | Refills: 2 | Status: ON HOLD | OUTPATIENT
Start: 2023-09-26 | End: 2023-10-27

## 2023-09-26 RX ORDER — MORPHINE SULFATE 4 MG/ML
4 INJECTION, SOLUTION INTRAMUSCULAR; INTRAVENOUS
Status: COMPLETED | OUTPATIENT
Start: 2023-09-26 | End: 2023-09-26

## 2023-09-26 RX ADMIN — ONDANSETRON 4 MG: 2 INJECTION INTRAMUSCULAR; INTRAVENOUS at 10:09

## 2023-09-26 RX ADMIN — SODIUM CHLORIDE 1000 ML: 9 INJECTION, SOLUTION INTRAVENOUS at 10:09

## 2023-09-26 RX ADMIN — MORPHINE SULFATE 4 MG: 4 INJECTION INTRAVENOUS at 10:09
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
## 2023-09-28 ENCOUNTER — TELEPHONE (OUTPATIENT)
Dept: EMERGENCY MEDICINE | Facility: HOSPITAL | Age: 68
End: 2023-09-28
Payer: MEDICARE

## 2023-10-02 ENCOUNTER — TELEPHONE (OUTPATIENT)
Dept: GASTROENTEROLOGY | Facility: CLINIC | Age: 68
End: 2023-10-02
Payer: MEDICARE

## 2023-10-03 ENCOUNTER — OUTPATIENT CASE MANAGEMENT (OUTPATIENT)
Dept: ADMINISTRATIVE | Facility: OTHER | Age: 68
End: 2023-10-03
Payer: MEDICARE

## 2023-10-03 NOTE — LETTER
October 3, 2023             Dear Ms. Melchor,    Welcome to [facility]s Complex Care Management Program.  It was a pleasure talking with you today.  My name is Kindra Diggs RN and I look forward to being your Care Manager.  My goal is to help you function at the healthiest and highest level possible.  You can contact me directly at [phone].    As an [facility] patient, some of the services we may be able to provide include:     Development of an individualized care plan with a Registered Nurse   Connection with a   Connection with available resources and services    Coordinate communication among your care team members   Provide coaching and education   Help you understand your doctors treatment plan  Help you obtain information about your insurance coverage.     All services provided by [facility]s Complex Care Managers and other care team members are coordinated with and communicated to your primary care team.      As part of your enrollment, you will be receiving education materials and more information about these services in your [facility] account, by phone or through the mail.  If you do not wish to participate or receive information, please contact our office at [phone].      Sincerely,        Kindra Diggs RN  [facility]   Out-patient RN Complex Care Manager  [phone]

## 2023-10-03 NOTE — PROGRESS NOTES
Outpatient Care Management  Initial Patient Assessment    Patient: Jill Perez  MRN: [id number]  Date of Service: 10/03/2023  Completed by: Kindra Diggs RN  Referral Date: 09/22/2023  Program: High Risk  Status: Ongoing  Effective Dates: 10/3/2023 - present  Responsible Staff: Kindra Diggs, RN        Reason for Visit   Patient presents with    OPCM Enrollment Call     10/3/23    Nursing Assessment     10/3/23       Brief Summary:  Jill Perez was referred by Dr. Warren for Flank Pain. Patient qualifies for program based on identified as high risk.   Active problem list, medical, surgical and social history reviewed. Active comorbidities include chronic pain. Areas of need identified by patient include pain management.   Next steps: Discuss home management of pain further on next f/u call.     Disability Status  Is the patient alert and oriented (person, place, time, and situation)?: Alert and oriented x 4  Hearing Difficulty or Deaf: no  Visual Difficulty or Blind: yes  Visual and Hearing Needs Conclusion: Having cataract surgery in the furture (wears reading glasses)  Vision Management: Other  Difficulty Concentrating, Remembering or Making Decisions: yes  Concentration Management: forgettful  Communication Difficulty: no  Eating/Swallowing Difficulty: yes  Eating/Swallowing: swallowing solid food  Eating/Swallowing Management: Had gastic bypass and has to chew food very fine or it will cause pain when digesting or will make her throw up  Walking or Climbing Stairs Difficulty: yes  Walking or Climbing Stairs: -- (total knee surgeries)  Mobility Management: none  Dressing/Bathing Difficulty: no  Toileting : Independent  Continence : Continence - Not a problem  Difficulty Managing Errands Independently: no  Equipment Currently Used at Home: glucometer  ADL Conclusion Statement: Independent with ADLs  Change in Functional Status Since Onset of Current Illness/Injury: no        Spiritual Beliefs  Spiritual,  Cultural Beliefs, Scientology Practices, Values that Affect Care: no      Social History     Socioeconomic History    Marital status:      Spouse name: Link    Number of children: 3   Tobacco Use    Smoking status: Former     Current packs/day: 0.00     Types: Cigarettes     Passive exposure: Never    Smokeless tobacco: Former    Tobacco comments:     Smoked as a teen   Substance and Sexual Activity    Alcohol use: Yes     Alcohol/week: 1.0 standard drink of alcohol     Types: 1 Glasses of wine per week     Comment: special events    Drug use: No    Sexual activity: Yes     Partners: Male     Birth control/protection: Post-menopausal   Social History Narrative    10/21/20: moved here from [place] a few years ago. Originally lived in [place].  is also a patient of mine. No kids at home. No pets at home. No smokers at home.      Social Determinants of Health     Financial Resource Strain: Low Risk  (4/22/2020)    Overall Financial Resource Strain (CARDIA)     Difficulty of Paying Living Expenses: Not hard at all   Recent Concern: Financial Resource Strain - Medium Risk (4/4/2020)    Overall Financial Resource Strain (CARDIA)     Difficulty of Paying Living Expenses: Somewhat hard   Food Insecurity: No Food Insecurity (4/4/2020)    Hunger Vital Sign     Worried About Running Out of Food in the Last Year: Never true     Ran Out of Food in the Last Year: Never true   Transportation Needs: No Transportation Needs (4/4/2020)    PRAPARE - Transportation     Lack of Transportation (Medical): No     Lack of Transportation (Non-Medical): No   Physical Activity: Unknown (4/22/2020)    Exercise Vital Sign     Days of Exercise per Week: 1 day   Stress: Stress Concern Present (4/22/2020)    [employer] - Occupational Stress Questionnaire     Feeling of Stress : To some extent   Social Connections: Unknown (4/22/2020)    Social Connection and Isolation Panel [NHANES]      Frequency of Social Gatherings with Friends and Family: Twice a week     Active Member of Clubs or Organizations: Yes     Attends Club or Organization Meetings: Patient refused       Roles and Relationships  Primary Source of Support/Comfort: spouse  Name of Support/Comfort Primary Source: Link      Advance Directives (For Healthcare)  Advance Directive  (If Adv Dir status is received, view document under Adv Dir in header or Chart Review Media tab): Patient has advance directive, copy not received.  Patient Requests Assistance: Patient will do independently        Patient Reported Insurance  Verified current insurance plan:: Medicare; Medicaid            10/3/2023     4:24 PM 9/12/2023     9:44 AM 4/27/2023     8:39 AM 2/28/2023    12:59 PM 6/16/2020     2:17 PM   Depression Patient Health Questionnaire   Over the last two weeks how often have you been bothered by little interest or pleasure in doing things Several days Not at all Not at all Several days More than half the days   Over the last two weeks how often have you been bothered by feeling down, depressed or hopeless Several days More than half the days Not at all Several days Several days   PHQ-2 Total Score 2 2 0 2 3   Over the last two weeks how often have you been bothered by trouble falling or staying asleep, or sleeping too much     Not at all   Over the last two weeks how often have you been bothered by feeling tired or having little energy     Several days   Over the last two weeks how often have you been bothered by a poor appetite or overeating     Several days   Over the last two weeks how often have you been bothered by feeling bad about yourself - or that you are a failure or have let yourself or your family down     Not at all   Over the last two weeks how often have you been bothered by trouble concentrating on things, such as reading the newspaper or watching television     Not at all   Over the last two weeks how often have you been  bothered by moving or speaking so slowly that other people could have noticed. Or the opposite - being so fidgety or restless that you have been moving around a lot more than usual.     Several days   Over the last two weeks how often have you been bothered by thoughts that you would be better off dead, or of hurting yourself     Not at all   If you checked off any problems, how difficult have these problems made it for you to do your work, take care of things at home or get along with other people?     Somewhat difficult   PHQ-9 Score     6       Learning Assessment       02/17/2023 2100 [facility] - Med Surg (2/17/2023 - 2/19/2023)   Created by Shannon Grayson, RN - RN (Nurse) Status: Complete                 PRIMARY LEARNER     Primary Learner Name:  Maine Perez BL - 02/17/2023 2100    Relationship:  Patient BL - 02/17/2023 2100    Does the primary learner have any barriers to learning?:  No Barriers BL - 02/17/2023 2100    What is the preferred language of the primary learner?:  English BL - 02/17/2023 2100    Is an  required?:  No BL - 02/17/2023 2100    How does the primary learner prefer to learn new concepts?:  Listening BL - 02/17/2023 2100    How often do you need to have someone help you read instructions, pamphlets, or written material from your doctor or pharmacy?:  Rarely BL - 02/17/2023 2100        CO-LEARNER #1     No question answered        CO-LEARNER #2     No question answered        SPECIAL TOPICS     No question answered        ANSWERED BY:     No question answered        Edit History       Shannon Grayson, RN - RN (Nurse)   02/17/2023 2100

## 2023-10-06 ENCOUNTER — PATIENT OUTREACH (OUTPATIENT)
Dept: ADMINISTRATIVE | Facility: OTHER | Age: 68
End: 2023-10-06
Payer: MEDICARE

## 2023-10-06 ENCOUNTER — TELEPHONE (OUTPATIENT)
Dept: INTERNAL MEDICINE | Facility: CLINIC | Age: 68
End: 2023-10-06
Payer: MEDICARE

## 2023-10-06 ENCOUNTER — DOCUMENTATION ONLY (OUTPATIENT)
Dept: ADMINISTRATIVE | Facility: OTHER | Age: 68
End: 2023-10-06
Payer: MEDICARE

## 2023-10-06 DIAGNOSIS — E11.69 HYPERLIPIDEMIA ASSOCIATED WITH TYPE 2 DIABETES MELLITUS: ICD-10-CM

## 2023-10-06 DIAGNOSIS — E78.5 HYPERLIPIDEMIA ASSOCIATED WITH TYPE 2 DIABETES MELLITUS: ICD-10-CM

## 2023-10-06 DIAGNOSIS — I10 PRIMARY HYPERTENSION: Primary | ICD-10-CM

## 2023-10-06 PROBLEM — R13.19 ESOPHAGEAL DYSPHAGIA: Status: RESOLVED | Noted: 2020-06-17 | Resolved: 2023-10-06

## 2023-10-06 NOTE — PROGRESS NOTES
Outpatient Care Management  Plan of Care Follow Up Visit    Patient: Jill Perez  MRN: [id number]  Date of Service: 10/06/2023  Completed by: Kindra Diggs RN  Referral Date: 09/22/2023      Brief Summary: Pt interested in digital medicine to monitor BS and BP. Contacted PCP for referral on pt's behalf.   Next Steps: F/U concerning digital medicine.

## 2023-10-06 NOTE — PROGRESS NOTES
This Community Health Worker (CHW) completed Social Determinant of Health (SDOH)  Questionnaire with patient/caregiver via telephone today.  [facility]que [facility] of completion.  Mrs. Perez stated she would like to apply for SNAP, after informing her and her husbands income they do not qualify. She has declined food ham and stated they are fine. She would like to sign up fir [facility] medicine, CHW has informed RN. Patient denied any additional SDOH needs at this time.

## 2023-10-06 NOTE — TELEPHONE ENCOUNTER
----- Message from Kindra Diggs RN sent at 10/6/2023  1:08 PM CDT -----  Regarding: Digitial medicine  Contact: Kindra Loera    This is Kindra with Outpatient Case Management. Pt requesting referral to digital medicine for blood pressure and glucose monitoring. May we please have a referral. Thank You    PARAMJIT Casas, RN  [facility]  [email or web address]  TEL:  [phone]

## 2023-10-10 RX ORDER — ZOLPIDEM TARTRATE 10 MG/1
10 TABLET ORAL NIGHTLY
Qty: 30 TABLET | Refills: 0 | Status: ON HOLD | OUTPATIENT
Start: 2023-10-10 | End: 2023-10-28 | Stop reason: HOSPADM

## 2023-10-10 RX ORDER — ONDANSETRON 4 MG/1
4 TABLET, ORALLY DISINTEGRATING ORAL EVERY 6 HOURS PRN
Qty: 15 TABLET | Refills: 0 | Status: ON HOLD | OUTPATIENT
Start: 2023-10-10 | End: 2023-10-28 | Stop reason: HOSPADM

## 2023-10-10 NOTE — TELEPHONE ENCOUNTER
LRF 09/21/2023  LOV 09/12/2023  Spoke to pt and she stated that she can't keep anything down the only thing she is able to eat is soup. Pt have an appt with the gastro  10/18/2023 pt states she can't wait that long and she refused to go to the ED because she feel like she isn't getting much help  Please advise

## 2023-10-10 NOTE — TELEPHONE ENCOUNTER
No care due was identified.  [facility] Embedded Care Due Messages. Reference number: [id number].   10/10/2023 8:31:36 AM CDT

## 2023-10-16 ENCOUNTER — OUTPATIENT CASE MANAGEMENT (OUTPATIENT)
Dept: ADMINISTRATIVE | Facility: OTHER | Age: 68
End: 2023-10-16
Payer: MEDICARE

## 2023-10-16 ENCOUNTER — TELEPHONE (OUTPATIENT)
Dept: OPHTHALMOLOGY | Facility: CLINIC | Age: 68
End: 2023-10-16
Payer: MEDICARE

## 2023-10-16 NOTE — TELEPHONE ENCOUNTER
Patient given arrival time of 11:00 am on Thursday October 19. Nothing to eat or drink after 11:59 pm.Start drops into the operative eye today. [address] [facility]

## 2023-10-17 ENCOUNTER — TELEPHONE (OUTPATIENT)
Dept: INTERNAL MEDICINE | Facility: CLINIC | Age: 68
End: 2023-10-17
Payer: MEDICARE

## 2023-10-17 PROBLEM — E08.43 DIABETES MELLITUS DUE TO UNDERLYING CONDITION WITH DIABETIC AUTONOMIC NEUROPATHY, WITHOUT LONG-TERM CURRENT USE OF INSULIN: Chronic | Status: ACTIVE | Noted: 2023-05-22

## 2023-10-17 PROBLEM — E11.43 TYPE 2 DIABETES MELLITUS WITH DIABETIC AUTONOMIC NEUROPATHY, WITHOUT LONG-TERM CURRENT USE OF INSULIN: Status: ACTIVE | Noted: 2023-05-22

## 2023-10-17 PROBLEM — E11.43 TYPE 2 DIABETES MELLITUS WITH DIABETIC AUTONOMIC NEUROPATHY, WITHOUT LONG-TERM CURRENT USE OF INSULIN: Chronic | Status: ACTIVE | Noted: 2023-05-22

## 2023-10-17 NOTE — TELEPHONE ENCOUNTER
----- Message from Shivam Ortiz MA sent at 10/16/2023  4:14 PM CDT -----  Regarding: FW: digitial medicine  Contact: Kindra Diggs RN    ----- Message -----  From: Kindra Diggs RN  Sent: 10/16/2023   4:01 PM CDT  To: Moe Rousseau Staff  Subject: digitial medicine                                Evaristo    This is Kindra with Outpatient Case Management. Digital medicine received the orders for BP and glucose monitoring. They said the pt is eligible for the BP but not the glucose because of exclusions. They said the exclusion was that there are multiple active diabetes diagnoses on her problem list. They said her diabetes diagnoses are too broad (doesn't distinguish if patient has type 1 vs. 2, etc.), if this Dx is removed or adjusted correctly, patient is expected to become eligible. Is Dr. Rosa able to assist us with this? Please Advise. Thank You      PARAMJIT Casas, RN  [facility]  [email or web address]  TEL:  [phone]

## 2023-10-18 ENCOUNTER — OFFICE VISIT (OUTPATIENT)
Dept: SURGERY | Facility: CLINIC | Age: 68
End: 2023-10-18
Payer: MEDICARE

## 2023-10-18 VITALS
WEIGHT: 170.88 LBS | SYSTOLIC BLOOD PRESSURE: 167 MMHG | DIASTOLIC BLOOD PRESSURE: 85 MMHG | HEIGHT: 66 IN | HEART RATE: 89 BPM | BODY MASS INDEX: 27.46 KG/M2

## 2023-10-18 DIAGNOSIS — I73.00 RAYNAUD'S DISEASE WITHOUT GANGRENE: ICD-10-CM

## 2023-10-18 DIAGNOSIS — I10 PRIMARY HYPERTENSION: Chronic | ICD-10-CM

## 2023-10-18 DIAGNOSIS — R10.84 GENERALIZED ABDOMINAL PAIN: Primary | ICD-10-CM

## 2023-10-18 DIAGNOSIS — E11.65 TYPE 2 DIABETES MELLITUS WITH HYPERGLYCEMIA, WITHOUT LONG-TERM CURRENT USE OF INSULIN: Chronic | ICD-10-CM

## 2023-10-18 DIAGNOSIS — R10.12 LUQ ABDOMINAL PAIN: Primary | ICD-10-CM

## 2023-10-18 DIAGNOSIS — E11.69 HYPERLIPIDEMIA ASSOCIATED WITH TYPE 2 DIABETES MELLITUS: Chronic | ICD-10-CM

## 2023-10-18 DIAGNOSIS — E78.5 HYPERLIPIDEMIA ASSOCIATED WITH TYPE 2 DIABETES MELLITUS: Chronic | ICD-10-CM

## 2023-10-18 DIAGNOSIS — Z90.81 S/P SPLENECTOMY: ICD-10-CM

## 2023-10-18 DIAGNOSIS — Z98.84 H/O GASTRIC BYPASS: ICD-10-CM

## 2023-10-18 PROCEDURE — 99205 OFFICE O/P NEW HI 60 MIN: CPT | Mod: S$PBB,,, | Performed by: SURGERY

## 2023-10-18 PROCEDURE — 99999 PR PBB SHADOW E&M-EST. PATIENT-LVL IV: CPT | Mod: PBBFAC,,, | Performed by: SURGERY

## 2023-10-18 PROCEDURE — 99214 OFFICE O/P EST MOD 30 MIN: CPT | Mod: PBBFAC | Performed by: SURGERY

## 2023-10-18 PROCEDURE — 99999 PR PBB SHADOW E&M-EST. PATIENT-LVL IV: ICD-10-PCS | Mod: PBBFAC,,, | Performed by: SURGERY

## 2023-10-18 PROCEDURE — 99205 PR OFFICE/OUTPT VISIT, NEW, LEVL V, 60-74 MIN: ICD-10-PCS | Mod: S$PBB,,, | Performed by: SURGERY
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
## 2023-10-19 ENCOUNTER — HOSPITAL ENCOUNTER (OUTPATIENT)
Facility: HOSPITAL | Age: 68
Discharge: HOME | End: 2023-10-19
Attending: SURGERY | Admitting: SURGERY
Payer: MEDICARE

## 2023-10-19 ENCOUNTER — ANESTHESIA (OUTPATIENT)
Dept: SURGERY | Facility: HOSPITAL | Age: 68
End: 2023-10-19
Payer: MEDICARE

## 2023-10-19 ENCOUNTER — ANESTHESIA EVENT (OUTPATIENT)
Dept: SURGERY | Facility: HOSPITAL | Age: 68
End: 2023-10-19
Payer: MEDICARE

## 2023-10-19 VITALS
OXYGEN SATURATION: 96 % | RESPIRATION RATE: 19 BRPM | WEIGHT: 170.88 LBS | HEART RATE: 67 BPM | BODY MASS INDEX: 27.46 KG/M2 | SYSTOLIC BLOOD PRESSURE: 126 MMHG | DIASTOLIC BLOOD PRESSURE: 66 MMHG | HEIGHT: 66 IN | TEMPERATURE: 98 F

## 2023-10-19 DIAGNOSIS — R10.84 GENERALIZED ABDOMINAL PAIN: ICD-10-CM

## 2023-10-19 DIAGNOSIS — K45.8 INTERNAL HERNIA: Primary | ICD-10-CM

## 2023-10-19 PROBLEM — M21.371 FOOT DROP, RIGHT FOOT: Status: ACTIVE | Noted: 2021-11-02

## 2023-10-19 PROBLEM — R10.9 ABDOMINAL PAIN: Status: ACTIVE | Noted: 2023-09-18

## 2023-10-19 PROBLEM — Z98.84 H/O GASTRIC BYPASS: Status: ACTIVE | Noted: 2023-05-11

## 2023-10-19 LAB
POCT GLUCOSE: 137 MG/DL (ref 70–110)
POCT GLUCOSE: 164 MG/DL (ref 70–110)

## 2023-10-19 PROCEDURE — 25000003 PHARM REV CODE 250: Performed by: NURSE ANESTHETIST, CERTIFIED REGISTERED

## 2023-10-19 PROCEDURE — 37000008 HC ANESTHESIA 1ST 15 MINUTES: Performed by: SURGERY

## 2023-10-19 PROCEDURE — 43235 PR EGD, FLEX, DIAGNOSTIC: ICD-10-PCS | Mod: 51,GZ,, | Performed by: SURGERY

## 2023-10-19 PROCEDURE — 71000015 HC POSTOP RECOV 1ST HR: Performed by: SURGERY

## 2023-10-19 PROCEDURE — 37000009 HC ANESTHESIA EA ADD 15 MINS: Performed by: SURGERY

## 2023-10-19 PROCEDURE — D9220A PRA ANESTHESIA: ICD-10-PCS | Mod: CRNA,,, | Performed by: NURSE ANESTHETIST, CERTIFIED REGISTERED

## 2023-10-19 PROCEDURE — 44238 UNLISTED LAPS PX INTESTINE: CPT | Mod: ,,, | Performed by: SURGERY

## 2023-10-19 PROCEDURE — 63600175 PHARM REV CODE 636 W HCPCS

## 2023-10-19 PROCEDURE — 63600175 PHARM REV CODE 636 W HCPCS: Performed by: SURGERY

## 2023-10-19 PROCEDURE — 71000044 HC DOSC ROUTINE RECOVERY FIRST HOUR: Performed by: SURGERY

## 2023-10-19 PROCEDURE — 63600175 PHARM REV CODE 636 W HCPCS: Performed by: ANESTHESIOLOGY

## 2023-10-19 PROCEDURE — 82962 GLUCOSE BLOOD TEST: CPT | Performed by: SURGERY

## 2023-10-19 PROCEDURE — 71000016 HC POSTOP RECOV ADDL HR: Performed by: SURGERY

## 2023-10-19 PROCEDURE — D9220A PRA ANESTHESIA: Mod: ANES,,, | Performed by: ANESTHESIOLOGY

## 2023-10-19 PROCEDURE — D9220A PRA ANESTHESIA: Mod: CRNA,,, | Performed by: NURSE ANESTHETIST, CERTIFIED REGISTERED

## 2023-10-19 PROCEDURE — 43235 EGD DIAGNOSTIC BRUSH WASH: CPT | Mod: 51,GZ,, | Performed by: SURGERY

## 2023-10-19 PROCEDURE — 36000709 HC OR TIME LEV III EA ADD 15 MIN: Performed by: SURGERY

## 2023-10-19 PROCEDURE — 63600175 PHARM REV CODE 636 W HCPCS: Performed by: NURSE ANESTHETIST, CERTIFIED REGISTERED

## 2023-10-19 PROCEDURE — 44238 PR LAPAROSCOPY; REDUCT/REPAIR  INTERNAL HERNIA/VOLVULUS/DEFECT: ICD-10-PCS | Mod: ,,, | Performed by: SURGERY

## 2023-10-19 PROCEDURE — D9220A PRA ANESTHESIA: ICD-10-PCS | Mod: ANES,,, | Performed by: ANESTHESIOLOGY

## 2023-10-19 PROCEDURE — 36000708 HC OR TIME LEV III 1ST 15 MIN: Performed by: SURGERY

## 2023-10-19 PROCEDURE — 25000003 PHARM REV CODE 250: Performed by: ANESTHESIOLOGY

## 2023-10-19 PROCEDURE — 27201423 OPTIME MED/SURG SUP & DEVICES STERILE SUPPLY: Performed by: SURGERY

## 2023-10-19 RX ORDER — ACETAMINOPHEN 10 MG/ML
INJECTION, SOLUTION INTRAVENOUS
Status: DISCONTINUED | OUTPATIENT
Start: 2023-10-19 | End: 2023-10-19

## 2023-10-19 RX ORDER — BUPIVACAINE HYDROCHLORIDE 2.5 MG/ML
INJECTION, SOLUTION EPIDURAL; INFILTRATION; INTRACAUDAL
Status: DISCONTINUED | OUTPATIENT
Start: 2023-10-19 | End: 2023-10-19 | Stop reason: HOSPADM

## 2023-10-19 RX ORDER — SODIUM CHLORIDE 9 MG/ML
INJECTION, SOLUTION INTRAVENOUS CONTINUOUS
Status: ACTIVE | OUTPATIENT
Start: 2023-10-19

## 2023-10-19 RX ORDER — ONDANSETRON 8 MG/1
8 TABLET, ORALLY DISINTEGRATING ORAL EVERY 8 HOURS PRN
Status: ACTIVE | OUTPATIENT
Start: 2023-10-19

## 2023-10-19 RX ORDER — ROCURONIUM BROMIDE 10 MG/ML
INJECTION, SOLUTION INTRAVENOUS
Status: DISCONTINUED | OUTPATIENT
Start: 2023-10-19 | End: 2023-10-19

## 2023-10-19 RX ORDER — HYDROMORPHONE HYDROCHLORIDE 2 MG/ML
INJECTION, SOLUTION INTRAMUSCULAR; INTRAVENOUS; SUBCUTANEOUS
Status: DISCONTINUED | OUTPATIENT
Start: 2023-10-19 | End: 2023-10-19

## 2023-10-19 RX ORDER — HALOPERIDOL 5 MG/ML
0.5 INJECTION INTRAMUSCULAR EVERY 10 MIN PRN
Status: DISCONTINUED | OUTPATIENT
Start: 2023-10-19 | End: 2023-10-19 | Stop reason: HOSPADM

## 2023-10-19 RX ORDER — HYDROCODONE BITARTRATE AND ACETAMINOPHEN 10; 325 MG/1; MG/1
1 TABLET ORAL 4 TIMES DAILY PRN
Status: ON HOLD | COMMUNITY
Start: 2023-09-29 | End: 2023-10-19 | Stop reason: HOSPADM

## 2023-10-19 RX ORDER — MIDAZOLAM HYDROCHLORIDE 1 MG/ML
INJECTION INTRAMUSCULAR; INTRAVENOUS
Status: DISCONTINUED | OUTPATIENT
Start: 2023-10-19 | End: 2023-10-19

## 2023-10-19 RX ORDER — PROPOFOL 10 MG/ML
VIAL (ML) INTRAVENOUS
Status: DISCONTINUED | OUTPATIENT
Start: 2023-10-19 | End: 2023-10-19

## 2023-10-19 RX ORDER — CEFAZOLIN SODIUM 1 G/3ML
INJECTION, POWDER, FOR SOLUTION INTRAMUSCULAR; INTRAVENOUS
Status: DISCONTINUED | OUTPATIENT
Start: 2023-10-19 | End: 2023-10-19

## 2023-10-19 RX ORDER — SODIUM CHLORIDE, SODIUM LACTATE, POTASSIUM CHLORIDE, CALCIUM CHLORIDE 600; 310; 30; 20 MG/100ML; MG/100ML; MG/100ML; MG/100ML
INJECTION, SOLUTION INTRAVENOUS CONTINUOUS PRN
Status: DISCONTINUED | OUTPATIENT
Start: 2023-10-19 | End: 2023-10-19

## 2023-10-19 RX ORDER — DEXAMETHASONE SODIUM PHOSPHATE 4 MG/ML
INJECTION, SOLUTION INTRA-ARTICULAR; INTRALESIONAL; INTRAMUSCULAR; INTRAVENOUS; SOFT TISSUE
Status: DISCONTINUED | OUTPATIENT
Start: 2023-10-19 | End: 2023-10-19

## 2023-10-19 RX ORDER — ONDANSETRON 2 MG/ML
INJECTION INTRAMUSCULAR; INTRAVENOUS
Status: DISCONTINUED | OUTPATIENT
Start: 2023-10-19 | End: 2023-10-19

## 2023-10-19 RX ORDER — OXYCODONE AND ACETAMINOPHEN 5; 325 MG/1; MG/1
1 TABLET ORAL
Status: DISCONTINUED | OUTPATIENT
Start: 2023-10-19 | End: 2023-10-19 | Stop reason: HOSPADM

## 2023-10-19 RX ORDER — HYDROMORPHONE HYDROCHLORIDE 1 MG/ML
0.2 INJECTION, SOLUTION INTRAMUSCULAR; INTRAVENOUS; SUBCUTANEOUS EVERY 10 MIN PRN
Status: DISCONTINUED | OUTPATIENT
Start: 2023-10-19 | End: 2023-10-19 | Stop reason: HOSPADM

## 2023-10-19 RX ORDER — OXYCODONE HYDROCHLORIDE 5 MG/1
5 TABLET ORAL EVERY 4 HOURS PRN
Qty: 7 TABLET | Refills: 0 | Status: ON HOLD | OUTPATIENT
Start: 2023-10-19 | End: 2023-10-27

## 2023-10-19 RX ORDER — HYDROMORPHONE HYDROCHLORIDE 1 MG/ML
INJECTION, SOLUTION INTRAMUSCULAR; INTRAVENOUS; SUBCUTANEOUS
Status: COMPLETED
Start: 2023-10-19 | End: 2023-10-19

## 2023-10-19 RX ORDER — LIDOCAINE HYDROCHLORIDE 20 MG/ML
INJECTION INTRAVENOUS
Status: DISCONTINUED | OUTPATIENT
Start: 2023-10-19 | End: 2023-10-19

## 2023-10-19 RX ORDER — ACETAMINOPHEN 500 MG
1000 TABLET ORAL EVERY 8 HOURS
Refills: 0 | Status: ON HOLD | COMMUNITY
Start: 2023-10-19 | End: 2023-10-27

## 2023-10-19 RX ORDER — OXYCODONE AND ACETAMINOPHEN 5; 325 MG/1; MG/1
TABLET ORAL
Status: DISCONTINUED
Start: 2023-10-19 | End: 2023-10-19 | Stop reason: HOSPADM

## 2023-10-19 RX ORDER — FENTANYL CITRATE 50 UG/ML
INJECTION, SOLUTION INTRAMUSCULAR; INTRAVENOUS
Status: DISCONTINUED | OUTPATIENT
Start: 2023-10-19 | End: 2023-10-19

## 2023-10-19 RX ADMIN — HYDROMORPHONE HYDROCHLORIDE 0.2 MG: 1 INJECTION, SOLUTION INTRAMUSCULAR; INTRAVENOUS; SUBCUTANEOUS at 02:10

## 2023-10-19 RX ADMIN — LIDOCAINE HYDROCHLORIDE 100 MG: 20 INJECTION INTRAVENOUS at 12:10

## 2023-10-19 RX ADMIN — PROPOFOL 200 MG: 10 INJECTION, EMULSION INTRAVENOUS at 12:10

## 2023-10-19 RX ADMIN — ROCURONIUM BROMIDE 30 MG: 10 INJECTION, SOLUTION INTRAVENOUS at 12:10

## 2023-10-19 RX ADMIN — SODIUM CHLORIDE, SODIUM LACTATE, POTASSIUM CHLORIDE, AND CALCIUM CHLORIDE: 600; 310; 30; 20 INJECTION, SOLUTION INTRAVENOUS at 12:10

## 2023-10-19 RX ADMIN — DEXAMETHASONE SODIUM PHOSPHATE 4 MG: 4 INJECTION, SOLUTION INTRAMUSCULAR; INTRAVENOUS at 12:10

## 2023-10-19 RX ADMIN — MIDAZOLAM HYDROCHLORIDE 2 MG: 1 INJECTION INTRAMUSCULAR; INTRAVENOUS at 12:10

## 2023-10-19 RX ADMIN — HYDROMORPHONE HYDROCHLORIDE 1 MG: 2 INJECTION INTRAMUSCULAR; INTRAVENOUS; SUBCUTANEOUS at 02:10

## 2023-10-19 RX ADMIN — SUGAMMADEX 200 MG: 100 INJECTION, SOLUTION INTRAVENOUS at 01:10

## 2023-10-19 RX ADMIN — ACETAMINOPHEN 1000 MG: 10 INJECTION, SOLUTION INTRAVENOUS at 12:10

## 2023-10-19 RX ADMIN — FENTANYL CITRATE 100 MCG: 50 INJECTION, SOLUTION INTRAMUSCULAR; INTRAVENOUS at 12:10

## 2023-10-19 RX ADMIN — OXYCODONE HYDROCHLORIDE AND ACETAMINOPHEN 1 TABLET: 5; 325 TABLET ORAL at 02:10

## 2023-10-19 RX ADMIN — CEFAZOLIN 2 G: 330 INJECTION, POWDER, FOR SOLUTION INTRAMUSCULAR; INTRAVENOUS at 12:10

## 2023-10-19 RX ADMIN — ONDANSETRON 4 MG: 2 INJECTION INTRAMUSCULAR; INTRAVENOUS at 12:10

## 2023-10-19 NOTE — BRIEF OP NOTE
[facility] - Surgery ([facility])  Brief Operative Note    Surgery Date: 10/19/2023     Surgeon(s) and Role:     * Marbella Stahl MD - Primary     * Elton Merino MD - Resident - Assisting        Pre-op Diagnosis:  Generalized abdominal pain [R10.84]    Post-op Diagnosis:  Post-Op Diagnosis Codes:     * Internal hernia [K45.8]    Procedure(s):  DIAGNOSTIC LAPAROSCOPY, REPAIR OF INTERNAL HERNIA , AND UPPER ENDOSCOPY    Anesthesia: * No anesthesia type entered *    Operative Findings:   Internal hernia defect identified at Valentin's space without incarcerated bowel. Defect repaired with running 2-0 ethibond.  EGD with appropriately sized gastric pouch and patent anastomosis without evidence of ulceration, mucosal abnormalities, or esophagitis.     Estimated Blood Loss: * No values recorded between 10/19/2023 12:51 PM and 10/19/2023  2:18 PM *         Specimens:   Specimen (24h ago, onward)      None              Discharge Note    OUTCOME: Patient tolerated treatment/procedure well without complication and is now ready for [facility].    DISPOSITION: Home or Self Care    FINAL DIAGNOSIS:  <principal problem not specified>    FOLLOWUP: In clinic    DISCHARGE INSTRUCTIONS:    Discharge Procedure Orders   Diet Adult Regular     Lifting restrictions   Order Comments: Do not lift anything greater than 15 lbs for 6 weeks following surgery, or until cleared at your post-operative visit.     No dressing needed   Order Comments: Do not pick at surgical glue. This will fall off on its own over the next 1-3 weeks or we will remove it at your post op follow up appointment.     Notify your health care provider if you experience any of the following:  temperature >100.4     Notify your health care provider if you experience any of the following:  persistent nausea and vomiting or diarrhea     Notify your health care provider if you experience any of the following:  severe uncontrolled pain     Notify your health care provider if you  experience any of the following:  redness, tenderness, or signs of infection (pain, swelling, redness, odor or green/yellow [facility] around incision site)     Notify your health care provider if you experience any of the following:  difficulty breathing or increased cough     Notify your health care provider if you experience any of the following:  severe persistent headache     Notify your health care provider if you experience any of the following:  worsening rash     Notify your health care provider if you experience any of the following:  persistent dizziness, light-headedness, or visual disturbances     Notify your health care provider if you experience any of the following:  increased confusion or weakness     Activity as tolerated        Clinical Reference Documents Added to Patient Instructions         Document    HERNIA REPAIR DISCHARGE INSTRUCTIONS (ENGLISH)    LAPAROSCOPY (ENGLISH)

## 2023-10-19 NOTE — OP NOTE
Pre-Operative Diagnosis:  Generalized abdominal pain [R10.84]  Rule out internal hernia  Rule out marginal ulcer  S/p Maribel-en-Y gastric bypass    Post Operative Diagnosis: Internial hernia [K45.8]     Procedure Performed:  Procedure(s):  DIAGNOSTIC LAPAROSCOPY   SUTURE REPAIR OF INTERNAL HERNIA DEFECT  UPPER ENDOSCOPY    Date of Procedure: 10/19/2023    Name of Surgeon: Marbella Stahl MD    Name of any assistant(s): Elton Merino MD - Resident, Assisting    Circulator: Vianey Lopez RN; Winsome Ozuna RN  Relief Circulator: Leann Ibrahim RN  Relief Scrub: Bethel Grace ST  Scrub Person: Amanda Ibarra ST     Type of Anesthesia Used: General    Description of Findings:   Antecolic-antegastric maribel-en-y configuration with patent GJ and JJ anastomosis  Internal hernia defect present at Abraham's space, closed primarily with nonabsorbable suture.  There did not appear to be any bowel herniated at this time.  No internal hernia defect present at JJ mesentery  EGD performed with patent GJ and appropriately sized gastric pouch without evidence of esophagitis, ulceration, or mucosal abnormalities    Estimated Blood Loss: Minimal    Specimen(s) Removed: * No specimens in log *    Complications: None, patient tolerated surgery well.    Drains: none    Fluids: see anesthesia flowsheet     Indications: Jill Perez is a 68 y.o. female who is having surgery for 1 month history of abdominal pain following remote history of gastric bypass procedure with imaging findings concerning for an internal hernia.    Procedure Details:  The patient was seen in the Pre-Operative Holding Area. The risks, benefits, complications, treatment options, non-operative alternatives, expected recovery and outcomes were discussed in detail with the patient. In particular, the possibilities of reaction to medication, pulmonary aspiration, injury to surrounding structures, bleeding, recurrent infection, the need for  additional procedures, failure to diagnose a condition, and creating a complication requiring transfusion or operation were discussed. They concurred with the proposed plan, giving informed consent. The site of surgery was properly noted/marked, if necessary per policy. The patient has been actively warmed in pre-operative area. Pre-operative antibiotics and venous thrombosis prophylaxis were ordered.    The patient was taken to the operating room, placed supine on the operating table. Bilateral sequential compression devices were applied. After induction of general endotracheal anesthesia, her abdomen was prepped and draped in standard surgical fashion. A surgical timeout was done confirming the correct patient operation and all the anticipated supplies. Instruments were available within the room.    A small incision was made in the left upper abdomen at Church's point and a Veress needle inserted into the peritoneal cavity.  Following a saline drop test, the abdomen was insufflated to 15mmHg without hemodynamic changes. A subcentimeter incision was made approximately 15 cm inferior to the xyphoid and slightly left of midline and an 5-mm Optiview trocar was used to enter the abdomen under direct visualization. Two additional 5-mm trocars were placed under direct visualization in the left upper and mid abdomen, during which, the initial trocar site was up-sized to a 12mm Versastep port.  Despite all her numerous surgeries, she did not have significant intra-abdominal adhesions. The small bowel appeared of normal caliber and was initially run proximally in the left upper quadrant until the gastrojejunal anastomosis was identified.  She had previously had a resection of a redundant candy-cane limb approximately 3 years ago and there was no significant a friend loop present at the gastrojejunostomy at this time.  The Priya limb was then run caudally to the jejunojejunal anastomosis which was somewhat patulous but  clearly patent and without evidence of intussusception.  From here, the biliopancreatic limb was run back to the ligament of Treitz, and then back to the jejunojejunal anastomosis.  The mesenteric closure at the JJ anastomosis was nicely sealed and there was no defect here.  The Priya limb mesentery was elevated with the transverse colon taken cephalad to explore Valentin space and an internal hernia defect was identified here with clear visualization of the ligament of Treitz and the proximal biliopancreatic limb, though at this time no bowel was clearly herniated across the defect. This was closed with running 2-0 Ethibond suture, approximating the mesenteries of the Priya limb and transverse colon, up to the border of the colon. The small bowel was run in its entirety to rule out additional pathology, starting from the terminal ileum to the jejunojejunostomy and then up the Priya limb to the gastric pouch and up the biliopancreatic limb to the ligament of Treitz once more. The bowel appeared healthy and non-dilated without evidence of adhesive disease, stricture, or other abnormality. The abdomen was inspected and hemostasis ensured. Following this, the ports were removed under direct visualization, allowing the abdomen to desufflate prior to removal of the last port. The skin was then closed with 4-0 Monocryl and Dermabond.     Endoscopy was performed first introducing the endoscope through the mouth into the esophagus and into the gastric pouch. The GEJ was identified at 40 cm from the incisors and was easily passed revealing an appropriately sized gastric pouch and patent gastrojejunal anastomosis without evidence of ulceration, stricture, mucosal abnormalities. There was no evidence of esophagitis. The endoscope was withdrawn and the patient was awoken from anesthesia and extubated without incident. She was then brought to the PACU in good condition having tolerated the operation well.     All sponge, needle and  instrument counts were correct at the conclusion of the case. I was present for the entirety of the procedure.      Disposition: PACU - hemodynamically stable.      Marbella Stahl MD FACS  Minimally Invasive General & Bariatric Surgery

## 2023-10-19 NOTE — ANESTHESIA PROCEDURE NOTES
Intubation    Date/Time: 10/19/2023 12:30 PM    Performed by: Salinas Zambrano CRNA  Authorized by: Salinas Zambrano CRNA    Intubation:     Induction:  Intravenous    Intubated:  Postinduction    Mask Ventilation:  Easy mask    Attempts:  1    Attempted By:  CRNA    Method of Intubation:  Direct    Blade:  George 3    Laryngeal View Grade: Grade I - full view of cords      Difficult Airway Encountered?: No      Complications:  None    Airway Device:  Oral endotracheal tube    Airway Device Size:  7.0    Style/Cuff Inflation:  Cuffed    Inflation Amount (mL):  6    Tube secured:  21    Placement Verified By:  Capnometry    Complicating Factors:  None    Findings Post-Intubation:  BS equal bilateral
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
## 2023-10-19 NOTE — TRANSFER OF CARE
"Anesthesia Transfer of Care Note    Patient: Jill Perez    Procedure(s) Performed: Procedure(s):  DIAGNOSTIC LAPAROSCOPY, REPAIR OF INTERNAL HERNIA , AND UPPER ENDOSCOPY    Patient location: PACU    Anesthesia Type: general    Transport from OR: Transported from OR on room air with adequate spontaneous ventilation    Post pain: adequate analgesia    Post assessment: no apparent anesthetic complications    Post vital signs: stable    Level of consciousness: awake, alert and oriented    Nausea/Vomiting: no nausea/vomiting    Complications: none    Transfer of care protocol was followed      Last vitals:   Visit Vitals  BP (!) 144/98   Pulse 75   Temp 36.6 °C (97.9 °F) (Temporal)   Resp (!) 9   Ht 5' 6" (1.676 m)   Wt 77.5 kg (170 lb 13.7 oz)   LMP  (LMP Unknown)   SpO2 97%   Breastfeeding No   BMI 27.58 kg/m²     "

## 2023-10-20 NOTE — ANESTHESIA POSTPROCEDURE EVALUATION
Anesthesia Post Evaluation    Patient: Jill Perez    Procedure(s) Performed: Procedure(s):  DIAGNOSTIC LAPAROSCOPY, REPAIR OF INTERNAL HERNIA , AND UPPER ENDOSCOPY    Final Anesthesia Type: general      Patient location during evaluation: PACU  Patient participation: Yes- Able to Participate  Level of consciousness: awake and alert and oriented  Pain management: adequate  Airway patency: patent    PONV status at [facility]: No PONV  Anesthetic complications: no      Cardiovascular status: blood pressure returned to baseline and hemodynamically stable  Respiratory status: unassisted  Hydration status: euvolemic  Follow-up not needed.          Vitals Value Taken Time   /66 10/19/23 1601   Temp 36.6 °C (97.9 °F) 10/19/23 1424   Pulse 64 10/19/23 1607   Resp 15 10/19/23 1607   SpO2 96 % 10/19/23 1607   Vitals shown include unvalidated device data.      No case tracking events are documented in the log.      Pain/James Score: Pain Rating Prior to Med Admin: 8 (10/19/2023  2:56 PM)  Pain Rating Post Med Admin: 2 (10/19/2023  3:40 PM)  James Score: 10 (10/19/2023  2:45 PM)

## 2023-10-21 ENCOUNTER — PATIENT MESSAGE (OUTPATIENT)
Dept: INTERNAL MEDICINE | Facility: CLINIC | Age: 68
End: 2023-10-21
Payer: MEDICARE

## 2023-10-22 ENCOUNTER — NURSE TRIAGE (OUTPATIENT)
Dept: ADMINISTRATIVE | Facility: CLINIC | Age: 68
End: 2023-10-22
Payer: MEDICARE

## 2023-10-22 ENCOUNTER — HOSPITAL ENCOUNTER (EMERGENCY)
Facility: HOSPITAL | Age: 68
Discharge: HOME | End: 2023-10-22
Attending: EMERGENCY MEDICINE
Payer: MEDICARE

## 2023-10-22 VITALS
WEIGHT: 170 LBS | OXYGEN SATURATION: 98 % | TEMPERATURE: 98 F | HEART RATE: 72 BPM | RESPIRATION RATE: 18 BRPM | BODY MASS INDEX: 27.44 KG/M2 | DIASTOLIC BLOOD PRESSURE: 93 MMHG | SYSTOLIC BLOOD PRESSURE: 178 MMHG

## 2023-10-22 DIAGNOSIS — R10.32 LEFT LOWER QUADRANT ABDOMINAL PAIN: ICD-10-CM

## 2023-10-22 DIAGNOSIS — G89.18 POST-OPERATIVE PAIN: Primary | ICD-10-CM

## 2023-10-22 LAB
ALBUMIN SERPL BCP-MCNC: 3.1 G/DL (ref 3.5–5.2)
ALP SERPL-CCNC: 147 U/L (ref 55–135)
ALT SERPL W/O P-5'-P-CCNC: 18 U/L (ref 10–44)
ANION GAP SERPL CALC-SCNC: 11 MMOL/L (ref 8–16)
AST SERPL-CCNC: 32 U/L (ref 10–40)
BASOPHILS # BLD AUTO: 0.03 K/UL (ref 0–0.2)
BASOPHILS NFR BLD: 0.5 % (ref 0–1.9)
BILIRUB SERPL-MCNC: 0.2 MG/DL (ref 0.1–1)
BILIRUB UR QL STRIP: NEGATIVE
BUN SERPL-MCNC: 8 MG/DL (ref 6–30)
BUN SERPL-MCNC: 9 MG/DL (ref 8–23)
CALCIUM SERPL-MCNC: 9 MG/DL (ref 8.7–10.5)
CHLORIDE SERPL-SCNC: 108 MMOL/L (ref 95–110)
CHLORIDE SERPL-SCNC: 108 MMOL/L (ref 95–110)
CLARITY UR REFRACT.AUTO: CLEAR
CO2 SERPL-SCNC: 22 MMOL/L (ref 23–29)
COLOR UR AUTO: COLORLESS
CREAT SERPL-MCNC: 0.4 MG/DL (ref 0.5–1.4)
CREAT SERPL-MCNC: 0.6 MG/DL (ref 0.5–1.4)
DIFFERENTIAL METHOD: ABNORMAL
EOSINOPHIL # BLD AUTO: 0.1 K/UL (ref 0–0.5)
EOSINOPHIL NFR BLD: 1.5 % (ref 0–8)
ERYTHROCYTE [DISTWIDTH] IN BLOOD BY AUTOMATED COUNT: 21 % (ref 11.5–14.5)
EST. GFR  (NO RACE VARIABLE): >60 ML/MIN/1.73 M^2
GLUCOSE SERPL-MCNC: 181 MG/DL (ref 70–110)
GLUCOSE SERPL-MCNC: 183 MG/DL (ref 70–110)
GLUCOSE UR QL STRIP: ABNORMAL
HCT VFR BLD AUTO: 30.4 % (ref 37–48.5)
HCT VFR BLD CALC: 32 %PCV (ref 36–54)
HGB BLD-MCNC: 9.3 G/DL (ref 12–16)
HGB UR QL STRIP: NEGATIVE
IMM GRANULOCYTES # BLD AUTO: 0.01 K/UL (ref 0–0.04)
IMM GRANULOCYTES NFR BLD AUTO: 0.2 % (ref 0–0.5)
KETONES UR QL STRIP: NEGATIVE
LACTATE SERPL-SCNC: 1.8 MMOL/L (ref 0.5–2.2)
LEUKOCYTE ESTERASE UR QL STRIP: ABNORMAL
LIPASE SERPL-CCNC: 10 U/L (ref 4–60)
LYMPHOCYTES # BLD AUTO: 2.4 K/UL (ref 1–4.8)
LYMPHOCYTES NFR BLD: 40.9 % (ref 18–48)
MCH RBC QN AUTO: 24.2 PG (ref 27–31)
MCHC RBC AUTO-ENTMCNC: 30.6 G/DL (ref 32–36)
MCV RBC AUTO: 79 FL (ref 82–98)
MICROSCOPIC COMMENT: NORMAL
MONOCYTES # BLD AUTO: 0.7 K/UL (ref 0.3–1)
MONOCYTES NFR BLD: 12.6 % (ref 4–15)
NEUTROPHILS # BLD AUTO: 2.6 K/UL (ref 1.8–7.7)
NEUTROPHILS NFR BLD: 44.3 % (ref 38–73)
NITRITE UR QL STRIP: NEGATIVE
NRBC BLD-RTO: 0 /100 WBC
PH UR STRIP: 6 [PH] (ref 5–8)
PLATELET # BLD AUTO: 394 K/UL (ref 150–450)
PMV BLD AUTO: 10.6 FL (ref 9.2–12.9)
POC IONIZED CALCIUM: 1.21 MMOL/L (ref 1.06–1.42)
POC TCO2 (MEASURED): 24 MMOL/L (ref 23–29)
POTASSIUM BLD-SCNC: 4.1 MMOL/L (ref 3.5–5.1)
POTASSIUM SERPL-SCNC: 4.1 MMOL/L (ref 3.5–5.1)
PROT SERPL-MCNC: 6.7 G/DL (ref 6–8.4)
PROT UR QL STRIP: NEGATIVE
RBC # BLD AUTO: 3.85 M/UL (ref 4–5.4)
RBC #/AREA URNS AUTO: 1 /HPF (ref 0–4)
SAMPLE: ABNORMAL
SODIUM BLD-SCNC: 140 MMOL/L (ref 136–145)
SODIUM SERPL-SCNC: 141 MMOL/L (ref 136–145)
SP GR UR STRIP: 1 (ref 1–1.03)
SQUAMOUS #/AREA URNS AUTO: 1 /HPF
URN SPEC COLLECT METH UR: ABNORMAL
WBC # BLD AUTO: 5.85 K/UL (ref 3.9–12.7)
WBC #/AREA URNS AUTO: 0 /HPF (ref 0–5)

## 2023-10-22 PROCEDURE — 96374 THER/PROPH/DIAG INJ IV PUSH: CPT | Mod: 59

## 2023-10-22 PROCEDURE — 85025 COMPLETE CBC W/AUTO DIFF WBC: CPT

## 2023-10-22 PROCEDURE — 96361 HYDRATE IV INFUSION ADD-ON: CPT

## 2023-10-22 PROCEDURE — 25000003 PHARM REV CODE 250

## 2023-10-22 PROCEDURE — 99285 EMERGENCY DEPT VISIT HI MDM: CPT | Mod: 25

## 2023-10-22 PROCEDURE — 83605 ASSAY OF LACTIC ACID: CPT | Performed by: SURGERY

## 2023-10-22 PROCEDURE — 80048 BASIC METABOLIC PNL TOTAL CA: CPT | Mod: XB

## 2023-10-22 PROCEDURE — 25500020 PHARM REV CODE 255: Performed by: EMERGENCY MEDICINE

## 2023-10-22 PROCEDURE — 96375 TX/PRO/DX INJ NEW DRUG ADDON: CPT

## 2023-10-22 PROCEDURE — 80053 COMPREHEN METABOLIC PANEL: CPT

## 2023-10-22 PROCEDURE — 96376 TX/PRO/DX INJ SAME DRUG ADON: CPT

## 2023-10-22 PROCEDURE — 81001 URINALYSIS AUTO W/SCOPE: CPT

## 2023-10-22 PROCEDURE — 83690 ASSAY OF LIPASE: CPT

## 2023-10-22 PROCEDURE — 63600175 PHARM REV CODE 636 W HCPCS

## 2023-10-22 RX ORDER — SYRING-NEEDL,DISP,INSUL,0.3 ML 29 G X1/2"
296 SYRINGE, EMPTY DISPOSABLE MISCELLANEOUS
Qty: 296 ML | Refills: 0 | Status: ON HOLD | OUTPATIENT
Start: 2023-10-22 | End: 2023-10-27

## 2023-10-22 RX ORDER — ACETAMINOPHEN 325 MG/1
650 TABLET ORAL
Status: COMPLETED | OUTPATIENT
Start: 2023-10-22 | End: 2023-10-22

## 2023-10-22 RX ORDER — MORPHINE SULFATE 4 MG/ML
8 INJECTION, SOLUTION INTRAMUSCULAR; INTRAVENOUS
Status: COMPLETED | OUTPATIENT
Start: 2023-10-22 | End: 2023-10-22

## 2023-10-22 RX ORDER — ONDANSETRON 2 MG/ML
4 INJECTION INTRAMUSCULAR; INTRAVENOUS
Status: COMPLETED | OUTPATIENT
Start: 2023-10-22 | End: 2023-10-22

## 2023-10-22 RX ADMIN — MORPHINE SULFATE 8 MG: 4 INJECTION INTRAVENOUS at 05:10

## 2023-10-22 RX ADMIN — MORPHINE SULFATE 8 MG: 4 INJECTION INTRAVENOUS at 02:10

## 2023-10-22 RX ADMIN — IOHEXOL 15 ML: 350 INJECTION, SOLUTION INTRAVENOUS at 03:10

## 2023-10-22 RX ADMIN — IOHEXOL 100 ML: 350 INJECTION, SOLUTION INTRAVENOUS at 04:10

## 2023-10-22 RX ADMIN — SODIUM CHLORIDE, POTASSIUM CHLORIDE, SODIUM LACTATE AND CALCIUM CHLORIDE 1000 ML: 600; 310; 30; 20 INJECTION, SOLUTION INTRAVENOUS at 02:10

## 2023-10-22 RX ADMIN — ONDANSETRON 4 MG: 2 INJECTION INTRAMUSCULAR; INTRAVENOUS at 02:10

## 2023-10-22 RX ADMIN — ACETAMINOPHEN 650 MG: 325 TABLET ORAL at 05:10

## 2023-10-22 NOTE — HPI
"Patient is a 68 y.o. female with BMI 27.58 kg/m2, HTN, DLD, NIDDM2, Raynaud's disease +YOLIS, depression, insomnia, and h/o prior splenectomy, cholecystectomy, and maribel-en-y gastric bypass at [facility] in [place] about 15 years ago c/b "[place] syndrome" for which Dr. 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 She reports straining to use the restroom last night when she experienced sharp left upper quadrant pain, similar to pain she had intermittently experienced prior to surgery. She endorses no change in baseline chronic nausea. She is passing gas. No vomiting. She has been tolerating full liquids. The pain persisted into today, prompting her to come to ED.    Vitals WNL and labs unremarkable on admission. CT scan without bariatric complication, but does show large stool and gas burden within the colon.   "

## 2023-10-22 NOTE — TELEPHONE ENCOUNTER
Pt states that she had Gastric Sleeve sx 15 years ago and had   Laparoscopy sx on 10/19/23. Pt tearful and c/o abdominal pain 10/10 today, no BP since Wednesday and unable to eat/drink. Advised to go to ED now per protocol. VU. Encounter routed to provider.     Reason for Disposition   [1] SEVERE pain (e.g., excruciating) AND [2] present > 1 hour    Additional Information   Negative: Shock suspected (e.g., cold/pale/clammy skin, too weak to stand, low BP, rapid pulse)   Negative: Difficult to awaken or acting confused (e.g., disoriented, slurred speech)   Negative: Passed out (i.e., lost consciousness, collapsed and was not responding)   Negative: Sounds like a life-threatening emergency to the triager    Protocols used: Abdominal Pain - Female-A-

## 2023-10-22 NOTE — ASSESSMENT & PLAN NOTE
68 year old female with above history including maribel en y gastric bypass, revisional surgery 2020, and most recently diagnostic laparoscopy 10/19/2023 for subacute abdominal pain. Although a mesenteric defect was found, no bowel was actually herniated through this area. She returns today with return of her pre-operative pain. No other new symptoms. Vitals and labs unremarkable. CT shows normal small bowel, heavy stool and gas burden within the colon. This pain is likely attributable to chronic constipation.     - No acute surgical intervention indicated. Safe for [facility] home  - Recommend increasing bowel regimen - consider daily magnesium citrate.    - Recommend simethicone to help with gas burden  - Follow up with gastroenterology (newly established) for management of constipation   - Maintain scheduled post operative follow up with MIS/bariatric surgery   - Case and imaging reviewed and discussed with bariatric staff
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
## 2023-10-22 NOTE — DISCHARGE INSTRUCTIONS
You were evaluated in the emergency department today for post-op abdominal pain.  Although there were no findings of concern to necessitate admission to the hospital or warrant immediate surgical intervention, disease exists on a spectrum and your disease process may progress.  If this is the case, please watch your symptoms and return to the emergency department if you feel worse and are unable to discuss care with your primary care doctor in follow up in the next several days.  Specific information regarding your complaint has been provided.    You were seen by General surgery who reviewed your case and found no need for surgical intervention and recommended stronger bowel regimen with magnesium and follow-up with your bariatric surgeon.    Please follow-up with your bariatric surgeon regarding your ED visit.  Additionally, please follow-up with gastroenterology to establish new patient contact regarding persistent constipation.     If your symptoms are to worsen or evolve please return to the ED for further evaluation.    Thank you for choosing [facility]!
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
## 2023-10-22 NOTE — Clinical Note
"Jlil"Wilder Perez was seen and treated in our emergency department on 10/22/2023.  She may return to work on 10/25/2023.       If you have any questions or concerns, please don't hesitate to call.      Yovana Monzon MD"
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
## 2023-10-22 NOTE — ED NOTES
I-STAT Chem-8+ Results:   Value Reference Range   Sodium 140 136-145 mmol/L   Potassium  4.1 3.5-5.1 mmol/L   Chloride 108  mmol/L   Ionized Calcium 1.21 1.06-1.42 mmol/L   CO2 (measured) 24 23-29 mmol/L   Glucose 183  mg/dL   BUN 8 6-30 mg/dL   Creatinine 0.4 0.5-1.4 mg/dL   Hematocrit 32 36-54%

## 2023-10-23 ENCOUNTER — TELEPHONE (OUTPATIENT)
Dept: BARIATRICS | Facility: CLINIC | Age: 68
End: 2023-10-23
Payer: MEDICARE

## 2023-10-23 NOTE — TELEPHONE ENCOUNTER
----- Message from Shani Suazo sent at 10/23/2023 10:07 AM CDT -----  Regarding: Appt  Contact: [phone]    Pt called in to schedule an appt; no available appts in Epic. Pt is asking for a call back soon to schedule an appt states she was in ER over the weekend and was told to contact physician for a appt  ASAP  Thanks.         Reason appt not schedule:         Patient's DX:  Pain and was seen in ER        Patient requesting call back or [facility] [phone]

## 2023-10-23 NOTE — TELEPHONE ENCOUNTER
Spoke with patient  She is not able to  the rx from ER until 10/24  Advised to try two capfuls of miralax tonight since she can't get the rx until tomorrow (she has Miralax on hand)  Take mag citrate tomorrow if no result from Miralax and if still no result to please call me back.  Pt verbalized understanding to all and has no further questions at this time.

## 2023-10-25 ENCOUNTER — TELEPHONE (OUTPATIENT)
Dept: INTERNAL MEDICINE | Facility: CLINIC | Age: 68
End: 2023-10-25

## 2023-10-25 ENCOUNTER — OFFICE VISIT (OUTPATIENT)
Dept: INTERNAL MEDICINE | Facility: CLINIC | Age: 68
End: 2023-10-25
Payer: MEDICARE

## 2023-10-25 ENCOUNTER — TELEPHONE (OUTPATIENT)
Dept: SURGERY | Facility: CLINIC | Age: 68
End: 2023-10-25
Payer: MEDICARE

## 2023-10-25 VITALS
SYSTOLIC BLOOD PRESSURE: 160 MMHG | RESPIRATION RATE: 18 BRPM | HEIGHT: 66 IN | BODY MASS INDEX: 28.7 KG/M2 | OXYGEN SATURATION: 97 % | WEIGHT: 178.56 LBS | TEMPERATURE: 98 F | DIASTOLIC BLOOD PRESSURE: 88 MMHG | HEART RATE: 84 BPM

## 2023-10-25 DIAGNOSIS — E78.5 HYPERLIPIDEMIA ASSOCIATED WITH TYPE 2 DIABETES MELLITUS: Chronic | ICD-10-CM

## 2023-10-25 DIAGNOSIS — K59.00 CONSTIPATION, UNSPECIFIED CONSTIPATION TYPE: ICD-10-CM

## 2023-10-25 DIAGNOSIS — E11.65 TYPE 2 DIABETES MELLITUS WITH HYPERGLYCEMIA, WITHOUT LONG-TERM CURRENT USE OF INSULIN: Primary | Chronic | ICD-10-CM

## 2023-10-25 DIAGNOSIS — I70.0 AORTIC ATHEROSCLEROSIS: ICD-10-CM

## 2023-10-25 DIAGNOSIS — R10.12 LUQ PAIN: ICD-10-CM

## 2023-10-25 DIAGNOSIS — E11.43 TYPE 2 DIABETES MELLITUS WITH DIABETIC AUTONOMIC NEUROPATHY, WITHOUT LONG-TERM CURRENT USE OF INSULIN: Chronic | ICD-10-CM

## 2023-10-25 DIAGNOSIS — E11.69 HYPERLIPIDEMIA ASSOCIATED WITH TYPE 2 DIABETES MELLITUS: Chronic | ICD-10-CM

## 2023-10-25 PROCEDURE — 99999 PR PBB SHADOW E&M-EST. PATIENT-LVL V: CPT | Mod: PBBFAC,,, | Performed by: INTERNAL MEDICINE

## 2023-10-25 PROCEDURE — 99214 OFFICE O/P EST MOD 30 MIN: CPT | Mod: S$PBB,,, | Performed by: INTERNAL MEDICINE

## 2023-10-25 PROCEDURE — 99215 OFFICE O/P EST HI 40 MIN: CPT | Mod: PBBFAC,PO | Performed by: INTERNAL MEDICINE

## 2023-10-25 PROCEDURE — 99999 PR PBB SHADOW E&M-EST. PATIENT-LVL V: ICD-10-PCS | Mod: PBBFAC,,, | Performed by: INTERNAL MEDICINE

## 2023-10-25 PROCEDURE — 99214 PR OFFICE/OUTPT VISIT, EST, LEVL IV, 30-39 MIN: ICD-10-PCS | Mod: S$PBB,,, | Performed by: INTERNAL MEDICINE

## 2023-10-25 NOTE — TELEPHONE ENCOUNTER
Dr Rosa, can you clarify what we are doing with this patient. Coordinator said gastrograffin could only be scheduled in [place] so she is trying to find out more about how to get this done.       ----- Message from La Chiu RN sent at 10/25/2023 11:42 AM CDT -----  Regarding: Procedure to disempact patient  Ms. Chris Loera just called our office in tears asking if we had set up the procedure Dr. Rosa wanted Dr. Stahl to do on the patient to disimpact her.  Dr. Stahl has not yet heard from Dr. Rosa, nor does he know what procedure the patient is speaking of.  Dr. Stahl's recommendation was that given the patient is declining the ducolax suppositories and smooth move tea, that she go to the ER to be disimpacted.  She declined.  She asked that I please reach out to Dr. Rosa's office to see what he was talking about.  Please let me know.    Thank you,  La Chiu RN  Nurse Navigator - Gen Surgery

## 2023-10-25 NOTE — TELEPHONE ENCOUNTER
She was supposed to talk to our  before leaving.  It is a gastrografin enema to disimpact the patient.

## 2023-10-25 NOTE — TELEPHONE ENCOUNTER
Pt still in pain no BM.  Dr. Stahl offered Dulcolax supp with smooth move tea or ER for disimpaction.  Pt declines both and wants me to contact Dr. Rosa's office about a procedure he wanted Dr. Stahl to do for patient.  Msg sent to Dr. Rosa and copied Dr. Stahl    ----- Message from Sara Lopez RN sent at 10/25/2023  9:24 AM CDT -----  Regarding: FW: advise; current symptoms  Contact: PT @ [phone]  Please advise.  ----- Message -----  From: Vania Roman  Sent: 10/25/2023   8:23 AM CDT  To: [facility] Staff  Subject: advise; current symptoms                         Pt is calling to speak to someone in the office to discuss symptoms they are having. Pt is asking for a call back today. Please call to advise. Thanks.     Symptoms: pt states that she still has not had a BM and is very concerned    How long has patient had these symptoms: ongoing since yesterday, 10/24/2023    Pharmacy name and phone: n/a      Best call back number: PT @ [phone]    Additional Information: While speaking with the pt, she was unable to stay on the line and disconnected the call.

## 2023-10-25 NOTE — Clinical Note
She had surgery with you on Thursday last week and feel significantly worse today.  I was hoping you could push up her follow-up from next week.  I think some of this is constipation and am trying to get her a gastrografin enema for this - she has been using miralax and mag citrate along with fleet's enemas without effect.  Her colon is distended on the last CT scan (10/22).  Soham Rosa

## 2023-10-25 NOTE — PROGRESS NOTES
Subjective:       Patient ID: Jill Perez is a 68 y.o. female.    Chief Complaint: Follow-up    HPI    68-year-old female here for follow-up.  She had surgery last Thursday.  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  The intestines twisted under it and needed to have this sewed back together.  She cannot eat or have BMs.  She is not eating much but soups.  The pain is in her left upper quadrant.  Soup stays down.  When she eats solids, she has pain 30 minutes later.  She went to the ER, because she cannot stand the pain any longer.  She had the pouch done 15 yrs ago at [facility].  Three years ago she had a revision.  She reports that the pain worsened after surgery. The pain is aching and has stabbing pains.  She cannot walk around very much.  She sits a lot.  She is exhausted from the pain.  It feels like a dull burning pain and stabbing pain as well.  She has had no change at all in taking the prilosec twice daily.  She has taken miralax twice daily and mag citrate as well without BM. She has done a fleet's enema.  She has significant constipation on the CT scan.    Diabetes-metformin 1000 mg b.i.d..  Patient has been off Trulicity since last appointment.  Lab Results   Component Value Date    HGBA1C 7.3 (H) 09/12/2023    HGBA1C 6.8 (H) 05/12/2023    HGBA1C 6.9 (H) 02/17/2023     Lab Results   Component Value Date    LDLCALC 139.4 05/12/2023    CREATININE 0.6 10/22/2023     HLD - Patient is currently on Lipitor 40 mg.  Her last lipid panel was   Cholesterol   Date Value Ref Range Status   05/12/2023 223 (H) 120 - 199 mg/dL Final     Comment:     The National Cholesterol Education Program (NCEP) has set the  following guidelines (reference ranges) for Cholesterol:  Optimal.....................<200 mg/dL  Borderline High.............200-239 mg/dL  High........................> or = 240 mg/dL       Triglycerides   Date Value Ref Range Status   05/12/2023 98 30 - 150 mg/dL Final      Comment:     The National Cholesterol Education Program (NCEP) has set the  following guidelines (reference values) for triglycerides:  Normal......................<150 mg/dL  Borderline High.............150-199 mg/dL  High........................200-499 mg/dL       HDL   Date Value Ref Range Status   05/12/2023 64 40 - 75 mg/dL Final     Comment:     The National Cholesterol Education Program (NCEP) has set the  following guidelines (reference values) for HDL Cholesterol:  Low...............<40 mg/dL  Optimal...........>60 mg/dL       LDL Cholesterol   Date Value Ref Range Status   05/12/2023 139.4 63.0 - 159.0 mg/dL Final     Comment:     The National Cholesterol Education Program (NCEP) has set the  following guidelines (reference values) for LDL Cholesterol:  Optimal.......................<130 mg/dL  Borderline High...............130-159 mg/dL  High..........................160-189 mg/dL  Very High.....................>190 mg/dL     .  Side effects of the medication: none.    Review of Systems      Objective:      Physical Exam  Vitals reviewed.   Constitutional:       Appearance: She is well-developed.   HENT:      Head: Normocephalic and atraumatic.      Mouth/Throat:      Pharynx: No oropharyngeal exudate.   Eyes:      General: No scleral icterus.        Right eye: No [facility].         Left eye: No [facility].      Pupils: Pupils are equal, round, and reactive to light.   Neck:      Thyroid: No thyromegaly.      Trachea: No tracheal deviation.   Cardiovascular:      Rate and Rhythm: Normal rate and regular rhythm.      Heart sounds: Normal heart sounds. No murmur heard.     No friction rub. No gallop.   Pulmonary:      Effort: Pulmonary effort is normal. No respiratory distress.      Breath sounds: Normal breath sounds. No wheezing or rales.   Chest:      Chest wall: No tenderness.   Abdominal:      General: Bowel sounds are normal. There is no distension.      Palpations: Abdomen is soft. There is no mass.       Tenderness: There is abdominal tenderness in the left upper quadrant. There is no guarding or rebound.   Musculoskeletal:         General: No tenderness. Normal range of motion.      Cervical back: Normal range of motion and neck supple.   Skin:     General: Skin is warm and dry.      Coloration: Skin is not pale.      Findings: No erythema or rash.   Neurological:      Mental Status: She is alert and oriented to person, place, and time.   Psychiatric:         Behavior: Behavior normal.         Assessment:       1. Type 2 diabetes mellitus with hyperglycemia, without long-term current use of insulin    2. Type 2 diabetes mellitus with diabetic autonomic neuropathy, without long-term current use of insulin    3. Hyperlipidemia associated with type 2 diabetes mellitus    4. Aortic atherosclerosis    5. LUQ pain  - [place] Gastrografin Enema Therapeutic; Future    6. Constipation, unspecified constipation type  - [place] Gastrografin Enema Therapeutic; Future      Plan:       1/2. Continue metformin 1000 mg b.i.d..    3/4. Continue Lipitor 40 mg.    5/6.  Gastrografin enema ordered.  Message sent to General surgery.

## 2023-10-26 ENCOUNTER — HOSPITAL ENCOUNTER (OUTPATIENT)
Dept: RADIOLOGY | Facility: HOSPITAL | Age: 68
Discharge: HOME | End: 2023-10-26
Attending: INTERNAL MEDICINE
Payer: MEDICARE

## 2023-10-26 ENCOUNTER — HOSPITAL ENCOUNTER (OUTPATIENT)
Dept: RADIOLOGY | Facility: HOSPITAL | Age: 68
Discharge: HOME | End: 2023-10-26
Attending: INTERNAL MEDICINE
Payer: COMMERCIAL

## 2023-10-26 ENCOUNTER — TELEPHONE (OUTPATIENT)
Dept: INTERNAL MEDICINE | Facility: CLINIC | Age: 68
End: 2023-10-26
Payer: MEDICARE

## 2023-10-26 ENCOUNTER — TELEPHONE (OUTPATIENT)
Dept: ENDOSCOPY | Facility: HOSPITAL | Age: 68
End: 2023-10-26
Payer: MEDICARE

## 2023-10-26 ENCOUNTER — HOSPITAL ENCOUNTER (OUTPATIENT)
Facility: HOSPITAL | Age: 68
Discharge: HOME | End: 2023-10-28
Attending: EMERGENCY MEDICINE | Admitting: HOSPITALIST
Payer: COMMERCIAL

## 2023-10-26 DIAGNOSIS — K59.00 CONSTIPATION, UNSPECIFIED CONSTIPATION TYPE: Primary | ICD-10-CM

## 2023-10-26 DIAGNOSIS — K59.00 CONSTIPATION, UNSPECIFIED CONSTIPATION TYPE: ICD-10-CM

## 2023-10-26 DIAGNOSIS — R10.12 LUQ PAIN: ICD-10-CM

## 2023-10-26 DIAGNOSIS — R10.9 ABDOMINAL PAIN, UNSPECIFIED ABDOMINAL LOCATION: ICD-10-CM

## 2023-10-26 DIAGNOSIS — K59.00 CONSTIPATION: ICD-10-CM

## 2023-10-26 LAB
ALBUMIN SERPL BCP-MCNC: 3.3 G/DL (ref 3.5–5.2)
ALP SERPL-CCNC: 190 U/L (ref 55–135)
ALT SERPL W/O P-5'-P-CCNC: 33 U/L (ref 10–44)
ANION GAP SERPL CALC-SCNC: 9 MMOL/L (ref 8–16)
AST SERPL-CCNC: 24 U/L (ref 10–40)
BACTERIA #/AREA URNS HPF: NORMAL /HPF
BASOPHILS # BLD AUTO: 0.02 K/UL (ref 0–0.2)
BASOPHILS NFR BLD: 0.3 % (ref 0–1.9)
BILIRUB SERPL-MCNC: 0.2 MG/DL (ref 0.1–1)
BILIRUB UR QL STRIP: NEGATIVE
BUN SERPL-MCNC: 9 MG/DL (ref 8–23)
CALCIUM SERPL-MCNC: 9.6 MG/DL (ref 8.7–10.5)
CHLORIDE SERPL-SCNC: 108 MMOL/L (ref 95–110)
CLARITY UR: CLEAR
CO2 SERPL-SCNC: 23 MMOL/L (ref 23–29)
COLOR UR: COLORLESS
CREAT SERPL-MCNC: 0.7 MG/DL (ref 0.5–1.4)
DIFFERENTIAL METHOD: ABNORMAL
EOSINOPHIL # BLD AUTO: 0 K/UL (ref 0–0.5)
EOSINOPHIL NFR BLD: 0.4 % (ref 0–8)
ERYTHROCYTE [DISTWIDTH] IN BLOOD BY AUTOMATED COUNT: 21.9 % (ref 11.5–14.5)
EST. GFR  (NO RACE VARIABLE): >60 ML/MIN/1.73 M^2
GLUCOSE SERPL-MCNC: 302 MG/DL (ref 70–110)
GLUCOSE UR QL STRIP: ABNORMAL
HCT VFR BLD AUTO: 29.6 % (ref 37–48.5)
HGB BLD-MCNC: 9.3 G/DL (ref 12–16)
HGB UR QL STRIP: NEGATIVE
IMM GRANULOCYTES # BLD AUTO: 0.01 K/UL (ref 0–0.04)
IMM GRANULOCYTES NFR BLD AUTO: 0.1 % (ref 0–0.5)
KETONES UR QL STRIP: NEGATIVE
LACTATE SERPL-SCNC: 1 MMOL/L (ref 0.5–2.2)
LEUKOCYTE ESTERASE UR QL STRIP: ABNORMAL
LIPASE SERPL-CCNC: 16 U/L (ref 4–60)
LYMPHOCYTES # BLD AUTO: 2.2 K/UL (ref 1–4.8)
LYMPHOCYTES NFR BLD: 31 % (ref 18–48)
MCH RBC QN AUTO: 24.6 PG (ref 27–31)
MCHC RBC AUTO-ENTMCNC: 31.4 G/DL (ref 32–36)
MCV RBC AUTO: 78 FL (ref 82–98)
MICROSCOPIC COMMENT: NORMAL
MONOCYTES # BLD AUTO: 0.7 K/UL (ref 0.3–1)
MONOCYTES NFR BLD: 9.8 % (ref 4–15)
NEUTROPHILS # BLD AUTO: 4.2 K/UL (ref 1.8–7.7)
NEUTROPHILS NFR BLD: 58.4 % (ref 38–73)
NITRITE UR QL STRIP: NEGATIVE
NRBC BLD-RTO: 0 /100 WBC
PH UR STRIP: 8 [PH] (ref 5–8)
PLATELET # BLD AUTO: 419 K/UL (ref 150–450)
PMV BLD AUTO: 10.7 FL (ref 9.2–12.9)
POTASSIUM SERPL-SCNC: 4.1 MMOL/L (ref 3.5–5.1)
PROT SERPL-MCNC: 7.1 G/DL (ref 6–8.4)
PROT UR QL STRIP: NEGATIVE
RBC # BLD AUTO: 3.78 M/UL (ref 4–5.4)
SODIUM SERPL-SCNC: 140 MMOL/L (ref 136–145)
SP GR UR STRIP: 1.01 (ref 1–1.03)
SQUAMOUS #/AREA URNS HPF: 0 /HPF
URN SPEC COLLECT METH UR: ABNORMAL
UROBILINOGEN UR STRIP-ACNC: NEGATIVE EU/DL
WBC # BLD AUTO: 7.22 K/UL (ref 3.9–12.7)
WBC #/AREA URNS HPF: 1 /HPF (ref 0–5)
YEAST URNS QL MICRO: NORMAL

## 2023-10-26 PROCEDURE — 81000 URINALYSIS NONAUTO W/SCOPE: CPT | Performed by: NURSE PRACTITIONER

## 2023-10-26 PROCEDURE — 83690 ASSAY OF LIPASE: CPT | Performed by: NURSE PRACTITIONER

## 2023-10-26 PROCEDURE — 63600175 PHARM REV CODE 636 W HCPCS: Performed by: EMERGENCY MEDICINE

## 2023-10-26 PROCEDURE — 25000003 PHARM REV CODE 250: Performed by: EMERGENCY MEDICINE

## 2023-10-26 PROCEDURE — 74240 FL UPPER GI WATER SOLUABLE: ICD-10-PCS | Mod: 26,,, | Performed by: RADIOLOGY

## 2023-10-26 PROCEDURE — 96372 THER/PROPH/DIAG INJ SC/IM: CPT | Performed by: EMERGENCY MEDICINE

## 2023-10-26 PROCEDURE — 74240 X-RAY XM UPR GI TRC 1CNTRST: CPT | Mod: TC,FY

## 2023-10-26 PROCEDURE — 99285 EMERGENCY DEPT VISIT HI MDM: CPT | Mod: 25

## 2023-10-26 PROCEDURE — 96361 HYDRATE IV INFUSION ADD-ON: CPT

## 2023-10-26 PROCEDURE — 25500020 PHARM REV CODE 255: Performed by: EMERGENCY MEDICINE

## 2023-10-26 PROCEDURE — 74240 X-RAY XM UPR GI TRC 1CNTRST: CPT | Mod: 26,,, | Performed by: RADIOLOGY

## 2023-10-26 PROCEDURE — 80053 COMPREHEN METABOLIC PANEL: CPT | Performed by: NURSE PRACTITIONER

## 2023-10-26 PROCEDURE — 25500020 PHARM REV CODE 255: Performed by: INTERNAL MEDICINE

## 2023-10-26 PROCEDURE — 85025 COMPLETE CBC W/AUTO DIFF WBC: CPT | Performed by: NURSE PRACTITIONER

## 2023-10-26 PROCEDURE — 83605 ASSAY OF LACTIC ACID: CPT | Performed by: EMERGENCY MEDICINE

## 2023-10-26 PROCEDURE — 96375 TX/PRO/DX INJ NEW DRUG ADDON: CPT

## 2023-10-26 RX ORDER — POLYETHYLENE GLYCOL 3350 17 G/17G
17 POWDER, FOR SOLUTION ORAL ONCE
Status: COMPLETED | OUTPATIENT
Start: 2023-10-26 | End: 2023-10-26

## 2023-10-26 RX ORDER — HYDROMORPHONE HYDROCHLORIDE 1 MG/ML
0.5 INJECTION, SOLUTION INTRAMUSCULAR; INTRAVENOUS; SUBCUTANEOUS
Status: COMPLETED | OUTPATIENT
Start: 2023-10-27 | End: 2023-10-27

## 2023-10-26 RX ORDER — HYDRALAZINE HYDROCHLORIDE 20 MG/ML
10 INJECTION INTRAMUSCULAR; INTRAVENOUS
Status: COMPLETED | OUTPATIENT
Start: 2023-10-26 | End: 2023-10-26

## 2023-10-26 RX ORDER — MAG HYDROX/ALUMINUM HYD/SIMETH 200-200-20
30 SUSPENSION, ORAL (FINAL DOSE FORM) ORAL
Status: COMPLETED | OUTPATIENT
Start: 2023-10-26 | End: 2023-10-26

## 2023-10-26 RX ORDER — HYDROMORPHONE HYDROCHLORIDE 1 MG/ML
0.5 INJECTION, SOLUTION INTRAMUSCULAR; INTRAVENOUS; SUBCUTANEOUS
Status: COMPLETED | OUTPATIENT
Start: 2023-10-26 | End: 2023-10-26

## 2023-10-26 RX ORDER — LACTULOSE 10 G/15ML
10 SOLUTION ORAL
Status: COMPLETED | OUTPATIENT
Start: 2023-10-26 | End: 2023-10-26

## 2023-10-26 RX ORDER — ONDANSETRON 2 MG/ML
4 INJECTION INTRAMUSCULAR; INTRAVENOUS
Status: COMPLETED | OUTPATIENT
Start: 2023-10-26 | End: 2023-10-26

## 2023-10-26 RX ORDER — DICYCLOMINE HYDROCHLORIDE 10 MG/ML
20 INJECTION INTRAMUSCULAR
Status: COMPLETED | OUTPATIENT
Start: 2023-10-26 | End: 2023-10-26

## 2023-10-26 RX ORDER — MORPHINE SULFATE 4 MG/ML
4 INJECTION, SOLUTION INTRAMUSCULAR; INTRAVENOUS
Status: COMPLETED | OUTPATIENT
Start: 2023-10-26 | End: 2023-10-26

## 2023-10-26 RX ADMIN — ALUMINUM HYDROXIDE, MAGNESIUM HYDROXIDE, AND SIMETHICONE 30 ML: 200; 200; 20 SUSPENSION ORAL at 10:10

## 2023-10-26 RX ADMIN — Medication 1 ENEMA: at 09:10

## 2023-10-26 RX ADMIN — IOHEXOL 75 ML: 350 INJECTION, SOLUTION INTRAVENOUS at 08:10

## 2023-10-26 RX ADMIN — SODIUM CHLORIDE 1000 ML: 9 INJECTION, SOLUTION INTRAVENOUS at 07:10

## 2023-10-26 RX ADMIN — DICYCLOMINE HYDROCHLORIDE 20 MG: 20 INJECTION, SOLUTION INTRAMUSCULAR at 08:10

## 2023-10-26 RX ADMIN — HYDRALAZINE HYDROCHLORIDE 10 MG: 20 INJECTION, SOLUTION INTRAMUSCULAR; INTRAVENOUS at 10:10

## 2023-10-26 RX ADMIN — POLYETHYLENE GLYCOL 3350 17 G: 17 POWDER, FOR SOLUTION ORAL at 11:10

## 2023-10-26 RX ADMIN — MORPHINE SULFATE 4 MG: 4 INJECTION INTRAVENOUS at 07:10

## 2023-10-26 RX ADMIN — ONDANSETRON 4 MG: 2 INJECTION INTRAMUSCULAR; INTRAVENOUS at 07:10

## 2023-10-26 RX ADMIN — LACTULOSE 10 G: 20 SOLUTION ORAL at 09:10

## 2023-10-26 RX ADMIN — HYDROMORPHONE HYDROCHLORIDE 0.5 MG: 1 INJECTION, SOLUTION INTRAMUSCULAR; INTRAVENOUS; SUBCUTANEOUS at 08:10

## 2023-10-26 RX ADMIN — DIATRIZOATE MEGLUMINE AND DIATRIZOATE SODIUM 120 ML: 660; 100 LIQUID ORAL; RECTAL at 09:10

## 2023-10-26 NOTE — TELEPHONE ENCOUNTER
Spoke with radiology. They are unable to complete the small bowl follow up now because of time frame required to do testing. They will schedule pt for this. Also, they are unable to schedule gastrografin enema at present as it requires a prior auth which they are waiting for an approval on. Please advise.   Pt called and is very upset about this!

## 2023-10-26 NOTE — TELEPHONE ENCOUNTER
Called patient to ask her to go to the ER.  I explained that were unable to obtain insurance authorization for Gastrografin enema.  On review of CT scan, she has significant stool in her left colon.  There is distention of the rest of the colon.  Needs to have a large volume enema to get her stool moving.  Patient has used over-the-counter MiraLax regularly since her surgery, and since the ER visit on 10/22 without effect.

## 2023-10-26 NOTE — TELEPHONE ENCOUNTER
----- Message from Elvira Laird sent at 10/26/2023  2:33 PM CDT -----  Contact: [phone]  Patient called, requested a nurse call back, stating that she is in pain, can not wait more, want to know what is going to happen with the procedure. Thank you.

## 2023-10-26 NOTE — Clinical Note
Diagnosis: Constipation [579243]   Future Attending Provider: RAQUEL PEREZ [[id number]]   Admitting Provider:: RAQUEL PEREZ [[id number]]

## 2023-10-26 NOTE — TELEPHONE ENCOUNTER
----- Message from Yola No sent at 10/26/2023 12:00 PM CDT -----  Dr Soham Rosa is trying to get the Pt an urgent appointment for a Consult to Colorectal Surgery.  Can you assist with scheduling?    Pt's Contact # [phone]      Diagnosis:Constipation, unspecified constipation type [K59.00]  Abdominal pain, unspecified abdominal location [R10.9]

## 2023-10-26 NOTE — TELEPHONE ENCOUNTER
Returned call. Apologized for continued issues with scheduling procedures, isntructed Dr avila has spoken with Lead physician and spoke with colorectal. He is attempted to get stat referral to resolve abd pain, disimpaction for constipation in their office. Notified they are making calls and she will be contacted to get scheduled asap. Requested she return call to discuss matter further with this office.

## 2023-10-26 NOTE — TELEPHONE ENCOUNTER
----- Message from Yola No sent at 10/26/2023 12:00 PM CDT -----  Dr Soham Rosa is trying to get the Pt an urgent appointment for a Consult to Colorectal Surgery.  Can you assist with scheduling?    Pt's Contact # [phone]      Diagnosis:Constipation, unspecified constipation type [K59.00]  Abdominal pain, unspecified abdominal location [R10.9]

## 2023-10-26 NOTE — TELEPHONE ENCOUNTER
----- Message from Lindsey Epperson sent at 10/26/2023 11:50 AM CDT -----  Contact: [phone] Patient  Patient is returning a phone call.  Who left a message for the patient: Dr Rosa   Does patient know what this is regarding:  N/A  Would you like a call back, or a response through your [facility] portal?:   call back  Comments:

## 2023-10-26 NOTE — FIRST PROVIDER EVALUATION
Emergency Department TeleTriage Encounter Note      CHIEF COMPLAINT    Chief Complaint   Patient presents with    Constipation     Pt complains of constipation, last bm was 1 week ago, with nausea, and LUQ pain, pt has hx of gastric sx        VITAL SIGNS   Initial Vitals [10/26/23 1735]   BP Pulse Resp Temp SpO2   (!) 192/91 89 18 98 °F (36.7 °C) 98 %      MAP       --            ALLERGIES    Review of patient's allergies indicates:   Allergen Reactions    Pcn [penicillins] Hives and Itching     Has tolerated cephalosporins    Penicillin Hives       PROVIDER TRIAGE NOTE  Verbal consent for the teletriage evaluation was given by the patient at the start of the evaluation.  All efforts will be made to maintain patient's privacy during the evaluation.      This is a teletriage evaluation of a 68 y.o. female presenting to the ED with c/o constipation for days with nausea and vomiting.  H/O gastric sleeve.  No relief with OTC medications.  Gastro MD instructed to come to the ED.  Limited physical exam via telehealth: The patient is awake, alert, answering questions appropriately and is not in respiratory distress.  As the Teletriage provider, I performed an initial assessment and ordered appropriate labs and imaging studies, if any, to facilitate the patient's care once placed in the ED. Once a room is available, care and a full evaluation will be completed by an alternate ED provider.  Any additional orders and the final disposition will be determined by that provider.  All imaging and labs will not be followed-up by the Teletriage Team, including myself.          ORDERS  Labs Reviewed - No data to display    ED Orders (720h ago, onward)      None              Virtual Visit Note: The provider triage portion of this emergency department evaluation and documentation was performed via [employer], a HIPAA-compliant telemedicine application, in concert with a tele-presenter in the room. A face to face patient evaluation  with one of my colleagues will occur once the patient is placed in an emergency department room.      DISCLAIMER: This note was prepared with [employer] voice recognition transcription software. Garbled syntax, mangled pronouns, and other bizarre constructions may be attributed to that software system.

## 2023-10-27 LAB
ALBUMIN SERPL BCP-MCNC: 3 G/DL (ref 3.5–5.2)
ALP SERPL-CCNC: 164 U/L (ref 55–135)
ALT SERPL W/O P-5'-P-CCNC: 26 U/L (ref 10–44)
ANION GAP SERPL CALC-SCNC: 8 MMOL/L (ref 8–16)
AST SERPL-CCNC: 19 U/L (ref 10–40)
BASOPHILS # BLD AUTO: 0.03 K/UL (ref 0–0.2)
BASOPHILS NFR BLD: 0.4 % (ref 0–1.9)
BILIRUB SERPL-MCNC: 0.2 MG/DL (ref 0.1–1)
BUN SERPL-MCNC: 7 MG/DL (ref 8–23)
CALCIUM SERPL-MCNC: 9.2 MG/DL (ref 8.7–10.5)
CHLORIDE SERPL-SCNC: 109 MMOL/L (ref 95–110)
CO2 SERPL-SCNC: 23 MMOL/L (ref 23–29)
CREAT SERPL-MCNC: 0.6 MG/DL (ref 0.5–1.4)
DIFFERENTIAL METHOD: ABNORMAL
EOSINOPHIL # BLD AUTO: 0 K/UL (ref 0–0.5)
EOSINOPHIL NFR BLD: 0.4 % (ref 0–8)
ERYTHROCYTE [DISTWIDTH] IN BLOOD BY AUTOMATED COUNT: 21.5 % (ref 11.5–14.5)
EST. GFR  (NO RACE VARIABLE): >60 ML/MIN/1.73 M^2
GLUCOSE SERPL-MCNC: 228 MG/DL (ref 70–110)
HCT VFR BLD AUTO: 25.7 % (ref 37–48.5)
HGB BLD-MCNC: 8.5 G/DL (ref 12–16)
IMM GRANULOCYTES # BLD AUTO: 0.03 K/UL (ref 0–0.04)
IMM GRANULOCYTES NFR BLD AUTO: 0.4 % (ref 0–0.5)
LYMPHOCYTES # BLD AUTO: 1.6 K/UL (ref 1–4.8)
LYMPHOCYTES NFR BLD: 19.4 % (ref 18–48)
MAGNESIUM SERPL-MCNC: 1.6 MG/DL (ref 1.6–2.6)
MCH RBC QN AUTO: 25 PG (ref 27–31)
MCHC RBC AUTO-ENTMCNC: 33.1 G/DL (ref 32–36)
MCV RBC AUTO: 76 FL (ref 82–98)
MONOCYTES # BLD AUTO: 0.7 K/UL (ref 0.3–1)
MONOCYTES NFR BLD: 7.8 % (ref 4–15)
NEUTROPHILS # BLD AUTO: 6 K/UL (ref 1.8–7.7)
NEUTROPHILS NFR BLD: 71.6 % (ref 38–73)
NRBC BLD-RTO: 0 /100 WBC
PHOSPHATE SERPL-MCNC: 3.2 MG/DL (ref 2.7–4.5)
PLATELET # BLD AUTO: 363 K/UL (ref 150–450)
PMV BLD AUTO: 10.4 FL (ref 9.2–12.9)
POCT GLUCOSE: 152 MG/DL (ref 70–110)
POCT GLUCOSE: 152 MG/DL (ref 70–110)
POCT GLUCOSE: 231 MG/DL (ref 70–110)
POCT GLUCOSE: 297 MG/DL (ref 70–110)
POTASSIUM SERPL-SCNC: 3.7 MMOL/L (ref 3.5–5.1)
PROT SERPL-MCNC: 6.4 G/DL (ref 6–8.4)
RBC # BLD AUTO: 3.4 M/UL (ref 4–5.4)
SODIUM SERPL-SCNC: 140 MMOL/L (ref 136–145)
WBC # BLD AUTO: 8.41 K/UL (ref 3.9–12.7)

## 2023-10-27 PROCEDURE — 83735 ASSAY OF MAGNESIUM: CPT

## 2023-10-27 PROCEDURE — 36415 COLL VENOUS BLD VENIPUNCTURE: CPT

## 2023-10-27 PROCEDURE — 63600175 PHARM REV CODE 636 W HCPCS

## 2023-10-27 PROCEDURE — 85025 COMPLETE CBC W/AUTO DIFF WBC: CPT

## 2023-10-27 PROCEDURE — 25000003 PHARM REV CODE 250

## 2023-10-27 PROCEDURE — 96376 TX/PRO/DX INJ SAME DRUG ADON: CPT

## 2023-10-27 PROCEDURE — 96372 THER/PROPH/DIAG INJ SC/IM: CPT | Mod: 59

## 2023-10-27 PROCEDURE — 63600175 PHARM REV CODE 636 W HCPCS: Performed by: EMERGENCY MEDICINE

## 2023-10-27 PROCEDURE — 96366 THER/PROPH/DIAG IV INF ADDON: CPT

## 2023-10-27 PROCEDURE — 96365 THER/PROPH/DIAG IV INF INIT: CPT

## 2023-10-27 PROCEDURE — 96375 TX/PRO/DX INJ NEW DRUG ADDON: CPT

## 2023-10-27 PROCEDURE — 84100 ASSAY OF PHOSPHORUS: CPT

## 2023-10-27 PROCEDURE — G0378 HOSPITAL OBSERVATION PER HR: HCPCS

## 2023-10-27 PROCEDURE — 25000003 PHARM REV CODE 250: Performed by: FAMILY MEDICINE

## 2023-10-27 PROCEDURE — 80053 COMPREHEN METABOLIC PANEL: CPT

## 2023-10-27 RX ORDER — AMOXICILLIN 250 MG
1 CAPSULE ORAL 2 TIMES DAILY
Status: DISCONTINUED | OUTPATIENT
Start: 2023-10-27 | End: 2023-10-28 | Stop reason: HOSPADM

## 2023-10-27 RX ORDER — PROCHLORPERAZINE EDISYLATE 5 MG/ML
5 INJECTION INTRAMUSCULAR; INTRAVENOUS EVERY 6 HOURS PRN
Status: DISCONTINUED | OUTPATIENT
Start: 2023-10-27 | End: 2023-10-28 | Stop reason: HOSPADM

## 2023-10-27 RX ORDER — GABAPENTIN 400 MG/1
800 CAPSULE ORAL DAILY
Status: DISCONTINUED | OUTPATIENT
Start: 2023-10-27 | End: 2023-10-28 | Stop reason: HOSPADM

## 2023-10-27 RX ORDER — ZOLPIDEM TARTRATE 5 MG/1
10 TABLET ORAL NIGHTLY
Status: DISCONTINUED | OUTPATIENT
Start: 2023-10-27 | End: 2023-10-28 | Stop reason: HOSPADM

## 2023-10-27 RX ORDER — INSULIN ASPART 100 [IU]/ML
1-10 INJECTION, SOLUTION INTRAVENOUS; SUBCUTANEOUS
Status: DISCONTINUED | OUTPATIENT
Start: 2023-10-27 | End: 2023-10-28 | Stop reason: HOSPADM

## 2023-10-27 RX ORDER — LACTULOSE 10 G/15ML
20 SOLUTION ORAL 2 TIMES DAILY
Status: DISCONTINUED | OUTPATIENT
Start: 2023-10-27 | End: 2023-10-28 | Stop reason: HOSPADM

## 2023-10-27 RX ORDER — ZOLPIDEM TARTRATE 5 MG/1
5 TABLET ORAL NIGHTLY PRN
Status: DISCONTINUED | OUTPATIENT
Start: 2023-10-27 | End: 2023-10-27

## 2023-10-27 RX ORDER — SCOLOPAMINE TRANSDERMAL SYSTEM 1 MG/1
1 PATCH, EXTENDED RELEASE TRANSDERMAL
Status: DISCONTINUED | OUTPATIENT
Start: 2023-10-27 | End: 2023-10-28 | Stop reason: HOSPADM

## 2023-10-27 RX ORDER — VENLAFAXINE HYDROCHLORIDE 75 MG/1
75 CAPSULE, EXTENDED RELEASE ORAL DAILY
Status: DISCONTINUED | OUTPATIENT
Start: 2023-10-27 | End: 2023-10-28 | Stop reason: HOSPADM

## 2023-10-27 RX ORDER — IBUPROFEN 200 MG
24 TABLET ORAL
Status: DISCONTINUED | OUTPATIENT
Start: 2023-10-27 | End: 2023-10-28 | Stop reason: HOSPADM

## 2023-10-27 RX ORDER — MORPHINE SULFATE 2 MG/ML
2 INJECTION, SOLUTION INTRAMUSCULAR; INTRAVENOUS ONCE
Status: COMPLETED | OUTPATIENT
Start: 2023-10-27 | End: 2023-10-27

## 2023-10-27 RX ORDER — ACETAMINOPHEN 325 MG/1
650 TABLET ORAL EVERY 8 HOURS PRN
Status: DISCONTINUED | OUTPATIENT
Start: 2023-10-27 | End: 2023-10-28 | Stop reason: HOSPADM

## 2023-10-27 RX ORDER — ENOXAPARIN SODIUM 100 MG/ML
40 INJECTION SUBCUTANEOUS EVERY 24 HOURS
Status: DISCONTINUED | OUTPATIENT
Start: 2023-10-27 | End: 2023-10-28 | Stop reason: HOSPADM

## 2023-10-27 RX ORDER — PANTOPRAZOLE SODIUM 40 MG/1
40 TABLET, DELAYED RELEASE ORAL 2 TIMES DAILY
Status: DISCONTINUED | OUTPATIENT
Start: 2023-10-27 | End: 2023-10-28 | Stop reason: HOSPADM

## 2023-10-27 RX ORDER — ATORVASTATIN CALCIUM 40 MG/1
40 TABLET, FILM COATED ORAL DAILY
Status: DISCONTINUED | OUTPATIENT
Start: 2023-10-27 | End: 2023-10-28 | Stop reason: HOSPADM

## 2023-10-27 RX ORDER — DICYCLOMINE HYDROCHLORIDE 20 MG/1
20 TABLET ORAL 3 TIMES DAILY PRN
Status: DISCONTINUED | OUTPATIENT
Start: 2023-10-27 | End: 2023-10-28 | Stop reason: HOSPADM

## 2023-10-27 RX ORDER — ONDANSETRON 2 MG/ML
4 INJECTION INTRAMUSCULAR; INTRAVENOUS EVERY 8 HOURS PRN
Status: DISCONTINUED | OUTPATIENT
Start: 2023-10-27 | End: 2023-10-28 | Stop reason: HOSPADM

## 2023-10-27 RX ORDER — IBUPROFEN 200 MG
16 TABLET ORAL
Status: DISCONTINUED | OUTPATIENT
Start: 2023-10-27 | End: 2023-10-28 | Stop reason: HOSPADM

## 2023-10-27 RX ORDER — LOSARTAN POTASSIUM 50 MG/1
100 TABLET ORAL DAILY
Status: DISCONTINUED | OUTPATIENT
Start: 2023-10-27 | End: 2023-10-28 | Stop reason: HOSPADM

## 2023-10-27 RX ORDER — ASPIRIN 81 MG/1
81 TABLET ORAL NIGHTLY
Status: DISCONTINUED | OUTPATIENT
Start: 2023-10-27 | End: 2023-10-28 | Stop reason: HOSPADM

## 2023-10-27 RX ORDER — SODIUM CHLORIDE 0.9 % (FLUSH) 0.9 %
5 SYRINGE (ML) INJECTION
Status: DISCONTINUED | OUTPATIENT
Start: 2023-10-27 | End: 2023-10-28 | Stop reason: HOSPADM

## 2023-10-27 RX ORDER — LIDOCAINE 50 MG/G
1 PATCH TOPICAL DAILY PRN
Status: DISCONTINUED | OUTPATIENT
Start: 2023-10-27 | End: 2023-10-28 | Stop reason: HOSPADM

## 2023-10-27 RX ORDER — OXYCODONE AND ACETAMINOPHEN 5; 325 MG/1; MG/1
1 TABLET ORAL EVERY 8 HOURS PRN
Status: DISCONTINUED | OUTPATIENT
Start: 2023-10-27 | End: 2023-10-28 | Stop reason: HOSPADM

## 2023-10-27 RX ORDER — GLUCAGON 1 MG
1 KIT INJECTION
Status: DISCONTINUED | OUTPATIENT
Start: 2023-10-27 | End: 2023-10-28 | Stop reason: HOSPADM

## 2023-10-27 RX ORDER — POLYETHYLENE GLYCOL 3350 17 G/17G
17 POWDER, FOR SOLUTION ORAL 2 TIMES DAILY
Status: DISCONTINUED | OUTPATIENT
Start: 2023-10-27 | End: 2023-10-28 | Stop reason: HOSPADM

## 2023-10-27 RX ORDER — MAGNESIUM SULFATE HEPTAHYDRATE 40 MG/ML
2 INJECTION, SOLUTION INTRAVENOUS ONCE
Status: COMPLETED | OUTPATIENT
Start: 2023-10-27 | End: 2023-10-27

## 2023-10-27 RX ADMIN — POLYETHYLENE GLYCOL 3350 17 G: 17 POWDER, FOR SOLUTION ORAL at 09:10

## 2023-10-27 RX ADMIN — PROCHLORPERAZINE EDISYLATE 5 MG: 5 INJECTION INTRAMUSCULAR; INTRAVENOUS at 11:10

## 2023-10-27 RX ADMIN — ZOLPIDEM TARTRATE 10 MG: 5 TABLET, COATED ORAL at 02:10

## 2023-10-27 RX ADMIN — ONDANSETRON 4 MG: 2 INJECTION INTRAMUSCULAR; INTRAVENOUS at 08:10

## 2023-10-27 RX ADMIN — SCOPALAMINE 1 PATCH: 1 PATCH, EXTENDED RELEASE TRANSDERMAL at 11:10

## 2023-10-27 RX ADMIN — LACTULOSE 20 G: 20 SOLUTION ORAL at 09:10

## 2023-10-27 RX ADMIN — MAGNESIUM SULFATE HEPTAHYDRATE 2 G: 40 INJECTION, SOLUTION INTRAVENOUS at 08:10

## 2023-10-27 RX ADMIN — DOCUSATE SODIUM AND SENNOSIDES 1 TABLET: 8.6; 5 TABLET, FILM COATED ORAL at 09:10

## 2023-10-27 RX ADMIN — OXYCODONE HYDROCHLORIDE AND ACETAMINOPHEN 1 TABLET: 5; 325 TABLET ORAL at 09:10

## 2023-10-27 RX ADMIN — ASPIRIN 81 MG: 81 TABLET, COATED ORAL at 02:10

## 2023-10-27 RX ADMIN — PANTOPRAZOLE SODIUM 40 MG: 40 TABLET, DELAYED RELEASE ORAL at 09:10

## 2023-10-27 RX ADMIN — ZOLPIDEM TARTRATE 10 MG: 5 TABLET, COATED ORAL at 09:10

## 2023-10-27 RX ADMIN — ENOXAPARIN SODIUM 40 MG: 40 INJECTION SUBCUTANEOUS at 05:10

## 2023-10-27 RX ADMIN — INSULIN ASPART 3 UNITS: 100 INJECTION, SOLUTION INTRAVENOUS; SUBCUTANEOUS at 09:10

## 2023-10-27 RX ADMIN — ACETAMINOPHEN 650 MG: 325 TABLET ORAL at 05:10

## 2023-10-27 RX ADMIN — LACTULOSE 20 G: 20 SOLUTION ORAL at 11:10

## 2023-10-27 RX ADMIN — POLYETHYLENE GLYCOL 3350 17 G: 17 POWDER, FOR SOLUTION ORAL at 08:10

## 2023-10-27 RX ADMIN — OXYCODONE HYDROCHLORIDE AND ACETAMINOPHEN 1 TABLET: 5; 325 TABLET ORAL at 08:10

## 2023-10-27 RX ADMIN — ONDANSETRON 4 MG: 2 INJECTION INTRAMUSCULAR; INTRAVENOUS at 09:10

## 2023-10-27 RX ADMIN — MORPHINE SULFATE 2 MG: 2 INJECTION, SOLUTION INTRAMUSCULAR; INTRAVENOUS at 02:10

## 2023-10-27 RX ADMIN — ASPIRIN 81 MG: 81 TABLET, COATED ORAL at 09:10

## 2023-10-27 RX ADMIN — ONDANSETRON 4 MG: 2 INJECTION INTRAMUSCULAR; INTRAVENOUS at 02:10

## 2023-10-27 RX ADMIN — INSULIN DETEMIR 10 UNITS: 100 INJECTION, SOLUTION SUBCUTANEOUS at 08:10

## 2023-10-27 RX ADMIN — HYDROMORPHONE HYDROCHLORIDE 0.5 MG: 1 INJECTION, SOLUTION INTRAMUSCULAR; INTRAVENOUS; SUBCUTANEOUS at 12:10

## 2023-10-27 RX ADMIN — INSULIN ASPART 4 UNITS: 100 INJECTION, SOLUTION INTRAVENOUS; SUBCUTANEOUS at 06:10

## 2023-10-27 NOTE — ED NOTES
Report received from CHARY Mendoza. Patient resting in bed. Updated on plan of care. Ambulated to bathroom to void.

## 2023-10-27 NOTE — PLAN OF CARE
AAOx4. Complaints of LUQ abdominal pain. Emesis x1. Clear liquid diet,not tolerating po intake. Glucose monitored. Medications administered per MAR. Safety maintained. Call bell within reach.
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
## 2023-10-27 NOTE — ACP (ADVANCE CARE PLANNING)
Awake, alert, reported nausea is improved, patient report left-sided abdominal pain.  Reports no BM or flatus   On exam, skin is clear without rash, no palpable tenderness, bowel sounds present.  Updated patient imaging  Plan   Continue pain medication   Add lactulose to present so softener  Appreciate surgery recs recommend transferred to [facility] if pain is unresolved with treatment of constipation

## 2023-10-27 NOTE — CONSULTS
Chart reviewed  Pt had surgery one week ago at [facility] with Bariatric Surgeon for internal hernia  Admitted for postop abdominal pain  ER discussed with pt's surgeon last night and no surgical issues felt to be present  No Bariatric surgery available at [facility]  Recommend transfer to [facility] if pain unresolved with treatment of constipation    Connor Thomas M.D., F.A.C.S.  [id number] General Surgery  [facility] [place] [facility] [place]

## 2023-10-27 NOTE — ASSESSMENT & PLAN NOTE
Patient with history of gastric bypass status post recent diagnostic laparoscopy with internal hernia repair.  Patient presenting with left upper quadrant abdominal pain and constipation for approximately 7 days CT abdomen and pelvis largely unremarkable for any specific etiology that could explain patient's current pain.  Some stool noted in the bowel on CT abdomen.  Currently status post various laxative treatments without any success.  Digital rectal exam performed in the ED without any evidence of stool impaction in the rectum.    Plan   Clear liquid diet for now we will advance as tolerated.  General surgery consult for further recommendations and evaluation of potential adhesions.  Bentyl 20 mg t.i.d.  MiraLax and senna scheduled b.i.d. Will consider other modalities if no improvement.    Serial abdominal exams.

## 2023-10-27 NOTE — ED NOTES
Patient tolerating PO. Denies nausea. Pain 7/10. Patient tearful. Reports she hears stomach gurgling but reports she is not passing gas at this time. Bowel sounds normal active. MD speaking with patient about plan of care.
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
## 2023-10-27 NOTE — ED NOTES
RN in room with MD. MD performing rectal exam. Procedure explained to patient prior. Patient verbalized understanding understanding.

## 2023-10-27 NOTE — ED NOTES
RN spoke with MD. No improvement in patient's pain and symptoms. Patient s till hypertensive. Patient attempted to have a BM after enema with no relief and no stool. Orders received. MD to perform rectal exam.

## 2023-10-27 NOTE — PLAN OF CARE
10/27/23 0139   Admission   Initial VN Admission Questions Complete   Communication Issues? None   Shift   Virtual Nurse - Rounding Complete   Virtual Nurse - Patient Verbalized Approval Of Camera Use;VN Rounding   Type of Frequent Check   Type Patient Rounds   Safety/Activity   Patient Rounds bed in low position;bed wheels locked;call light in patient/parent reach;clutter free environment maintained;ID band on;visualized patient;placement of personal items at bedside   Safety Promotion/Fall Prevention assistive device/personal item within reach;medications reviewed;side rails raised x 2   Positioning   Body Position supine   Head of Bed (HOB) Positioning HOB elevated   Pain/Comfort/Sleep   Comfort/Acceptable Pain Level 2     Admission questions completed. Introduced patient to VIP model, patient verbalized understanding. Educated patient on fall prevention protocol, updated plan of care. Opportunity given for pt's questions. All questions answered.

## 2023-10-27 NOTE — ASSESSMENT & PLAN NOTE
History of diabetes type 2 with current use of metformin a 1000 mg b.i.d. and Trulicity 4.5 mg at home.  Most recent A1c done 1 month ago noted to be 7.3.  Blood glucose on presentation noted to be 302    Plan  SSI  Detemir 10 units nightly  Maintain blood glucose goal of 140-180 while inpatient.
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
## 2023-10-27 NOTE — PLAN OF CARE
Patient A&Ox4. Nausea unrelieved by zofran Md informed and added compazine and scopolamine. PRN oxy given for pain. Soap channing enema done. MD Fernandez informed about the outcome. Call light within reach.
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
## 2023-10-27 NOTE — HOSPITAL COURSE
67 yo female presented to ED for abdominal pain x 1 month and constipation over 1 week.  Extensive surgical history including splenectomy, appendectomy, cholecystectomy, Priya-en-Y gastric bypass (15 yrs ago) with revision to remove Candy-cane limb (2020), and diagnostic laparoscopy with hernia repair (10/19/23).  Pt reported last BM prior to 10/19 and unrelieved with Miralax, fiber, Ducolax, Glycolax, oral Mg, lactulose 20mg, and sodium phosphate enema.  No stool found in rectal vault; no obstruction on CT abdomen. General surgery consulted and no surgical issues. Patient still has some burning pain in LUQ of abdomen, but improved since admission. Patient given lactulose and passed some liquid stools. Discussed dietary recommendations, movement, and other lifestyle modifications to reduce constipation and her abdominal discomfort. Pt has follow up appointment with her surgeon on M 10/30, and upcoming with her PCP. All questions were answered at bedside and patient was [facility]d home.

## 2023-10-27 NOTE — HPI
"68 y.o F with PMHx of HTN,  HDL, DM type II, Raynaud's, Depression, Insomnia and H/o splenectomy, cholecystectomy and maribel-en-y gastric bypass done at [facility] in [place] approximately 15 years ago who presents  with LUQ pain and constipation for approximately 1 week.  Patient states she has been having complications due to her gastric bypass approximately 3 years ago. On chart review patient diagnosed with "Candy Cane Syndrome" in 2020 and subsequently underwent an diagnostic laparotomy with lysis of adhesions, hiatal hernia repair and resection of  blind maribel pouch in 7/20. Patient has been having LUQ and issues with constipation for approximately 1 month for which she has been to the ED various times. Patient underwent diagnostic laparoscopy on 10/19 for which she had an internal hernia repair. EGD done at that time w/o any evidence of ulceration, mucosal pathology or evidence of  esophagitis.    Patient presented to the ED based on reccomendations from PCP for an enema due to concern of stool in bowels located in LUQ.Patient endorses she has tried multiple modalities of laxative agents such as MiraLax, fiber and Dulcolax without success.  Endorses adequate hydration.  Reports that she has been having difficulty eating for the past couple of days.  Endorses pain with consumption of solid foods.  Denies any pain with consumption of liquids.  Per chart review patient has had similar episodes in the past which led to revision in 10/ 19.  Denies any fevers, chills, vomiting, shortness on breath, chest pain.  Endorses some nausea which occurs when she consumes a large amount of liquids.    In the ED initial VS notable for /91 mmHg, HR 89, Temp 98 ° F, O2 sat 98% on room air.  CMP notable for BG of 302, BUN/Cr 9/0.7 respectively.  CBC notable for H/H of 9.3/20.6 respectively.  Lipase 16.  Lactic 1.0.  UA notable for 3+ glucose, 2+ leukocytes.  Urine microscopy negative.  Fluoroscopy of upper GI tract ordered by " PCP without evidence of any leak.  CT abdomen and pelvis no with no specific etiology that can explain patient's abdominal pain.  Severe fatty atrophy of the pancreas noted.  Rectal exam performed by ED physician without any evidence of impaction.  Patient given Glycolax, magnesium citrate, lactulose 20 mg, and an enema without any success.  Patient given 4 mg of morphine and 0.5 mg of hydromorphone due to pain.  Patient admitted to [facility] for management of left upper quadrant abdominal pain with concern for adhesions and constipation.

## 2023-10-27 NOTE — ASSESSMENT & PLAN NOTE
Noted to be hypertensive on admission likely secondary to pain.  Home regimen consists of olmesartan 40 mg.  Status post 10 mg of IV hydralazine for control of blood pressure.    Plan  Home medication non formulary.    Will use losartan 100 mg as alternative medication while inpatient.
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
## 2023-10-27 NOTE — PLAN OF CARE
SOCIAL WORK DISCHARGE PLANNING ASSESSMENT     completed [facility] planning assessment with pt. Pt was easily engaged and education on the role of  was provided. Pt reported she lives with her . Pt has great support from her  Link [phone] and two sons Link Rodriguez [phone] and Vincent [phone]. Pt stated the only DME she uses is a blood pressure machine. Pt reported she is not current with  services. Pt drives herself to doctor appointments and Link will provide transportation home following [facility]. Pt was encouraged to call with any questions or concerns. Pt verbalized understanding.     Future Appointments   Date Time Provider Department Center   10/30/2023  9:30 AM Marbella Stahl MD [facility] CARLITO [facility]   10/30/2023 10:30 AM [id number]   11/2/2023  9:45 AM Marbella Stahl MD [facility] CARLITO Gonzalez [facility]   11/7/2023  3:00 PM Michael Cutler MD [facility] [place]   11/13/2023  8:40 AM Soham Rosa MD [facility] IM [place]   2/12/2024  7:00 AM Soham Rosa MD [facility] [place]        Patient Active Problem List   Diagnosis    Poorly controlled diabetes mellitus    Type 2 diabetes mellitus with hyperglycemia, without long-term current use of insulin    Primary hypertension    Hyperlipidemia associated with type 2 diabetes mellitus    Primary insomnia    S/P splenectomy    Polyarthralgia    Raynaud's disease without gangrene    Depression    Positive YOLIS (antinuclear antibody)    B12 deficiency    BMI 27.0-27.9,adult    Hiatal hernia    Internal hernia    Venous insufficiency of both lower extremities    Varicose veins of right lower extremity with pain    Anemia of chronic disease    Chronic headache    Leg swelling    H/O gastric bypass    Type 2 diabetes mellitus with diabetic autonomic neuropathy, without long-term current use of insulin    Sacroiliitis, not elsewhere classified    Major depressive disorder, recurrent, mild    Aortic atherosclerosis     Abdominal pain    Foot drop, right foot      10/27/23 0846   Discharge Planning   Assessment Type Discharge Planning Brief Assessment   Resource/Environmental Concerns none   Support Systems Spouse/significant other;Children  (pt's  Link [phone], pt's son Link Rodriguez [phone], and pt's son Vincent [phone])   Equipment Currently Used at Home blood pressure machine   Current Living Arrangements home   Patient/Family Anticipates Transition to home;home with family   DME Needed Upon Discharge    (TBD)   Discharge Plan A Home;Home with family   Discharge Plan B Home Health

## 2023-10-27 NOTE — PLAN OF CARE
Nutrition Plan of Care:    Recommendations     1. Encourage po intake intake and diet advancement as tolerated   2. Recommend robert BID to promote wound healing   3. Monitor labs and weights   4. Collaboration with medical providers     Goals: Patient to consume >50% of EEN /EPN prior to RD follow up  Nutrition Goal Status: new  Communication of RD Recs: other (comment) (poc)     Assessment and Plan     Nutrition Problem  Inadequate nutrition      Related to (etiology):   Conditions associated with medical diagnosis: GI issues s/p GI surgery for internal hernia      Signs and Symptoms (as evidenced by):   Decreased po intake  Abdominal pain      Interventions(treatment strategy):  Commercial beverages  Collaboration with medical providers        Nutrition Diagnosis Status:   Veto Ugarte MS, RDN, LDN

## 2023-10-28 VITALS
BODY MASS INDEX: 27.28 KG/M2 | HEIGHT: 66 IN | DIASTOLIC BLOOD PRESSURE: 80 MMHG | HEART RATE: 72 BPM | WEIGHT: 169.75 LBS | SYSTOLIC BLOOD PRESSURE: 166 MMHG | TEMPERATURE: 98 F | RESPIRATION RATE: 18 BRPM | OXYGEN SATURATION: 96 %

## 2023-10-28 LAB
ALBUMIN SERPL BCP-MCNC: 2.7 G/DL (ref 3.5–5.2)
ALP SERPL-CCNC: 147 U/L (ref 55–135)
ALT SERPL W/O P-5'-P-CCNC: 22 U/L (ref 10–44)
ANION GAP SERPL CALC-SCNC: 8 MMOL/L (ref 8–16)
AST SERPL-CCNC: 18 U/L (ref 10–40)
BASOPHILS # BLD AUTO: 0.03 K/UL (ref 0–0.2)
BASOPHILS NFR BLD: 0.4 % (ref 0–1.9)
BILIRUB SERPL-MCNC: 0.3 MG/DL (ref 0.1–1)
BUN SERPL-MCNC: 5 MG/DL (ref 8–23)
CALCIUM SERPL-MCNC: 9 MG/DL (ref 8.7–10.5)
CHLORIDE SERPL-SCNC: 110 MMOL/L (ref 95–110)
CO2 SERPL-SCNC: 25 MMOL/L (ref 23–29)
CREAT SERPL-MCNC: 0.6 MG/DL (ref 0.5–1.4)
DIFFERENTIAL METHOD: ABNORMAL
EOSINOPHIL # BLD AUTO: 0.1 K/UL (ref 0–0.5)
EOSINOPHIL NFR BLD: 1.2 % (ref 0–8)
ERYTHROCYTE [DISTWIDTH] IN BLOOD BY AUTOMATED COUNT: 21.8 % (ref 11.5–14.5)
EST. GFR  (NO RACE VARIABLE): >60 ML/MIN/1.73 M^2
GLUCOSE SERPL-MCNC: 155 MG/DL (ref 70–110)
HCT VFR BLD AUTO: 25.8 % (ref 37–48.5)
HGB BLD-MCNC: 8.3 G/DL (ref 12–16)
IMM GRANULOCYTES # BLD AUTO: 0.02 K/UL (ref 0–0.04)
IMM GRANULOCYTES NFR BLD AUTO: 0.3 % (ref 0–0.5)
LYMPHOCYTES # BLD AUTO: 2.9 K/UL (ref 1–4.8)
LYMPHOCYTES NFR BLD: 40 % (ref 18–48)
MAGNESIUM SERPL-MCNC: 1.8 MG/DL (ref 1.6–2.6)
MCH RBC QN AUTO: 25 PG (ref 27–31)
MCHC RBC AUTO-ENTMCNC: 32.2 G/DL (ref 32–36)
MCV RBC AUTO: 78 FL (ref 82–98)
MONOCYTES # BLD AUTO: 0.7 K/UL (ref 0.3–1)
MONOCYTES NFR BLD: 9.4 % (ref 4–15)
NEUTROPHILS # BLD AUTO: 3.6 K/UL (ref 1.8–7.7)
NEUTROPHILS NFR BLD: 49 % (ref 38–73)
NRBC BLD-RTO: 0 /100 WBC
PHOSPHATE SERPL-MCNC: 3.7 MG/DL (ref 2.7–4.5)
PLATELET # BLD AUTO: 401 K/UL (ref 150–450)
PMV BLD AUTO: 11.3 FL (ref 9.2–12.9)
POCT GLUCOSE: 154 MG/DL (ref 70–110)
POCT GLUCOSE: 161 MG/DL (ref 70–110)
POTASSIUM SERPL-SCNC: 4.2 MMOL/L (ref 3.5–5.1)
PROT SERPL-MCNC: 5.8 G/DL (ref 6–8.4)
RBC # BLD AUTO: 3.32 M/UL (ref 4–5.4)
SODIUM SERPL-SCNC: 143 MMOL/L (ref 136–145)
WBC # BLD AUTO: 7.33 K/UL (ref 3.9–12.7)

## 2023-10-28 PROCEDURE — G0008 ADMIN INFLUENZA VIRUS VAC: HCPCS | Performed by: FAMILY MEDICINE

## 2023-10-28 PROCEDURE — 84100 ASSAY OF PHOSPHORUS: CPT

## 2023-10-28 PROCEDURE — 96375 TX/PRO/DX INJ NEW DRUG ADDON: CPT

## 2023-10-28 PROCEDURE — 25000003 PHARM REV CODE 250: Performed by: FAMILY MEDICINE

## 2023-10-28 PROCEDURE — 25000003 PHARM REV CODE 250

## 2023-10-28 PROCEDURE — 83735 ASSAY OF MAGNESIUM: CPT

## 2023-10-28 PROCEDURE — 90686 IIV4 VACC NO PRSV 0.5 ML IM: CPT | Performed by: FAMILY MEDICINE

## 2023-10-28 PROCEDURE — 85025 COMPLETE CBC W/AUTO DIFF WBC: CPT

## 2023-10-28 PROCEDURE — 36415 COLL VENOUS BLD VENIPUNCTURE: CPT

## 2023-10-28 PROCEDURE — G0378 HOSPITAL OBSERVATION PER HR: HCPCS

## 2023-10-28 PROCEDURE — 90471 IMMUNIZATION ADMIN: CPT | Performed by: FAMILY MEDICINE

## 2023-10-28 PROCEDURE — 63600175 PHARM REV CODE 636 W HCPCS: Performed by: STUDENT IN AN ORGANIZED HEALTH CARE EDUCATION/TRAINING PROGRAM

## 2023-10-28 PROCEDURE — 63600175 PHARM REV CODE 636 W HCPCS: Performed by: FAMILY MEDICINE

## 2023-10-28 PROCEDURE — 80053 COMPREHEN METABOLIC PANEL: CPT

## 2023-10-28 RX ORDER — PANTOPRAZOLE SODIUM 40 MG/1
40 TABLET, DELAYED RELEASE ORAL DAILY
Qty: 30 TABLET | Refills: 0 | Status: CANCELLED | OUTPATIENT
Start: 2023-10-28 | End: 2023-11-27

## 2023-10-28 RX ORDER — KETOROLAC TROMETHAMINE 30 MG/ML
15 INJECTION, SOLUTION INTRAMUSCULAR; INTRAVENOUS ONCE
Status: COMPLETED | OUTPATIENT
Start: 2023-10-28 | End: 2023-10-28

## 2023-10-28 RX ADMIN — KETOROLAC TROMETHAMINE 15 MG: 30 INJECTION, SOLUTION INTRAMUSCULAR at 12:10

## 2023-10-28 RX ADMIN — GABAPENTIN 800 MG: 400 CAPSULE ORAL at 09:10

## 2023-10-28 RX ADMIN — INSULIN ASPART 2 UNITS: 100 INJECTION, SOLUTION INTRAVENOUS; SUBCUTANEOUS at 06:10

## 2023-10-28 RX ADMIN — VENLAFAXINE HYDROCHLORIDE 75 MG: 75 CAPSULE, EXTENDED RELEASE ORAL at 09:10

## 2023-10-28 RX ADMIN — ATORVASTATIN CALCIUM 40 MG: 40 TABLET, FILM COATED ORAL at 09:10

## 2023-10-28 RX ADMIN — INFLUENZA VIRUS VACCINE 0.5 ML: 15; 15; 15; 15 SUSPENSION INTRAMUSCULAR at 12:10

## 2023-10-28 RX ADMIN — LACTULOSE 20 G: 20 SOLUTION ORAL at 09:10

## 2023-10-28 RX ADMIN — LOSARTAN POTASSIUM 100 MG: 50 TABLET, FILM COATED ORAL at 09:10

## 2023-10-28 RX ADMIN — POLYETHYLENE GLYCOL 3350 17 G: 17 POWDER, FOR SOLUTION ORAL at 09:10

## 2023-10-28 RX ADMIN — PANTOPRAZOLE SODIUM 40 MG: 40 TABLET, DELAYED RELEASE ORAL at 09:10

## 2023-10-28 RX ADMIN — INSULIN DETEMIR 10 UNITS: 100 INJECTION, SOLUTION SUBCUTANEOUS at 09:10

## 2023-10-28 RX ADMIN — DOCUSATE SODIUM AND SENNOSIDES 1 TABLET: 8.6; 5 TABLET, FILM COATED ORAL at 09:10

## 2023-10-28 RX ADMIN — OXYCODONE HYDROCHLORIDE AND ACETAMINOPHEN 1 TABLET: 5; 325 TABLET ORAL at 09:10

## 2023-10-28 NOTE — ASSESSMENT & PLAN NOTE
Patient with history of gastric bypass status post recent diagnostic laparoscopy with internal hernia repair.  Patient presenting with left upper quadrant abdominal pain and constipation for approximately 7 days CT abdomen and pelvis largely unremarkable for any specific etiology that could explain patient's current pain.  Some stool noted in the bowel on CT abdomen.  Currently status post various laxative treatments without any success.  Digital rectal exam performed in the ED without any evidence of stool impaction in the rectum.    Plan   Clear liquid diet for now we will advance as tolerated.  General surgery consult for further recommendations and evaluation of potential adhesions.- No current recs. Follow up with her surgeon outpatient  Bentyl 20 mg t.i.d.  MiraLax and senna scheduled b.i.d. Lactulose  Serial abdominal exams.  Discussed dietary and medication changes to discuss with her PCP that might reduce occurrences or hospitalizations for abdominal discomfort.

## 2023-10-28 NOTE — NURSING
One time dose of Toradol IVP administered per MD order. PIV removed for [facility]. AVS given to patient. VN notified.

## 2023-10-28 NOTE — DISCHARGE SUMMARY
"[facility] Medicine  Discharge Summary      Patient Name: Jill Perez  MRN: [id number]  SINDI: [id number]  Patient Class: OP- Observation  Admission Date: 10/26/2023  Hospital Length of Stay: 0 days  Discharge Date and Time: 10/28/2023  1:10 PM  Attending Physician: No att. providers found   Discharging Provider: Darren Eric MD  Primary Care Provider: Shoam Rosa MD    Primary Care Team: Networked reference to record PCT     HPI:   68 y.o F with PMHx of HTN,  HDL, DM type II, Raynaud's, Depression, Insomnia and H/o splenectomy, cholecystectomy and maribel-en-y gastric bypass done at [facility] in [place] approximately 15 years ago who presents  with LUQ pain and constipation for approximately 1 week.  Patient states she has been having complications due to her gastric bypass approximately 3 years ago. On chart review patient diagnosed with "Candy Cane Syndrome" in 2020 and subsequently underwent an diagnostic laparotomy with lysis of adhesions, hiatal hernia repair and resection of  blind maribel pouch in 7/20. Patient has been having LUQ and issues with constipation for approximately 1 month for which she has been to the ED various times. Patient underwent diagnostic laparoscopy on 10/19 for which she had an internal hernia repair. EGD done at that time w/o any evidence of ulceration, mucosal pathology or evidence of  esophagitis.    Patient presented to the ED based on reccomendations from PCP for an enema due to concern of stool in bowels located in LUQ.Patient endorses she has tried multiple modalities of laxative agents such as MiraLax, fiber and Dulcolax without success.  Endorses adequate hydration.  Reports that she has been having difficulty eating for the past couple of days.  Endorses pain with consumption of solid foods.  Denies any pain with consumption of liquids.  Per chart review patient has had similar episodes in the past which led to revision in 10/ 19.  Denies any fevers, chills, " vomiting, shortness on breath, chest pain.  Endorses some nausea which occurs when she consumes a large amount of liquids.    In the ED initial VS notable for /91 mmHg, HR 89, Temp 98 ° F, O2 sat 98% on room air.  CMP notable for BG of 302, BUN/Cr 9/0.7 respectively.  CBC notable for H/H of 9.3/20.6 respectively.  Lipase 16.  Lactic 1.0.  UA notable for 3+ glucose, 2+ leukocytes.  Urine microscopy negative.  Fluoroscopy of upper GI tract ordered by PCP without evidence of any leak.  CT abdomen and pelvis no with no specific etiology that can explain patient's abdominal pain.  Severe fatty atrophy of the pancreas noted.  Rectal exam performed by ED physician without any evidence of impaction.  Patient given Glycolax, magnesium citrate, lactulose 20 mg, and an enema without any success.  Patient given 4 mg of morphine and 0.5 mg of hydromorphone due to pain.  Patient admitted to [facility] for management of left upper quadrant abdominal pain with concern for adhesions and constipation.     * No surgery found *      Hospital Course:   67 yo female presented to ED for abdominal pain x 1 month and constipation over 1 week.  Extensive surgical history including splenectomy, appendectomy, cholecystectomy, Priya-en-Y gastric bypass (15 yrs ago) with revision to remove Candy-cane limb (2020), and diagnostic laparoscopy with hernia repair (10/19/23).  Pt reported last BM prior to 10/19 and unrelieved with Miralax, fiber, Ducolax, Glycolax, oral Mg, lactulose 20mg, and sodium phosphate enema.  No stool found in rectal vault; no obstruction on CT abdomen. General surgery consulted and no surgical issues. Patient still has some burning pain in LUQ of abdomen, but improved since admission. Patient given lactulose and passed some liquid stools. Discussed dietary recommendations, movement, and other lifestyle modifications to reduce constipation and her abdominal discomfort. Pt has follow up appointment with her  surgeon on M 10/30, and upcoming with her PCP. All questions were answered at bedside and patient was [facility]d home.       Physical Exam  Constitutional:       Appearance: Normal appearance. She is obese.   HENT:      Head: Normocephalic and atraumatic.      Mouth/Throat:      Pharynx: Oropharynx is clear.   Eyes:      Extraocular Movements: Extraocular movements intact.      Pupils: Pupils are equal, round, and reactive to light.   Cardiovascular:      Rate and Rhythm: Normal rate and regular rhythm.      Pulses: Normal pulses.      Heart sounds: Normal heart sounds.   Pulmonary:      Effort: Pulmonary effort is normal.      Breath sounds: Normal breath sounds.   Abdominal:      General: Abdomen is flat. Bowel sounds are normal. There is no distension.      Palpations: Abdomen is soft.      Comments: Laparoscopic incisions noted in the abdomen.  No evidence of infection.  Some old bruising noted at areas of incision.  Bowel sounds present x4.   No guarding noted in rest of the abdomen.  Abdomen noted to be soft in all 4 quadrants.   Musculoskeletal:         General: Normal range of motion.      Cervical back: Normal range of motion and neck supple.   Skin:     General: Skin is warm and dry.   Neurological:      General: No focal deficit present.      Mental Status: She is alert and oriented to person, place, and time.   Psychiatric:         Mood and Affect: Mood normal.         Behavior: Behavior normal.     Goals of Care Treatment Preferences:  Code Status: Full Code      Consults:   Consults (From admission, onward)          Status Ordering Provider     General Surgery  Once        Provider:  (Not yet assigned)    Completed LITO GARNICA            GI  * Abdominal pain  Patient with history of gastric bypass status post recent diagnostic laparoscopy with internal hernia repair.  Patient presenting with left upper quadrant abdominal pain and constipation for approximately 7 days CT abdomen and pelvis  largely unremarkable for any specific etiology that could explain patient's current pain.  Some stool noted in the bowel on CT abdomen.  Currently status post various laxative treatments without any success.  Digital rectal exam performed in the ED without any evidence of stool impaction in the rectum.    Plan   Clear liquid diet for now we will advance as tolerated.  General surgery consult for further recommendations and evaluation of potential adhesions.- No current recs. Follow up with her surgeon outpatient  Bentyl 20 mg t.i.d.  MiraLax and senna scheduled b.i.d. Lactulose  Serial abdominal exams.  Discussed dietary and medication changes to discuss with her PCP that might reduce occurrences or hospitalizations for abdominal discomfort.        Final Active Diagnoses:    Diagnosis Date Noted POA    PRINCIPAL PROBLEM:  Abdominal pain [R10.9] 09/18/2023 Yes    Depression [F32.A] 06/11/2019 Yes     Chronic    Primary insomnia [F51.01] 10/30/2018 Yes    Type 2 diabetes mellitus with hyperglycemia, without long-term current use of insulin [E11.65] 10/23/2018 Yes     Chronic    Hyperlipidemia associated with type 2 diabetes mellitus [E11.69, E78.5] 10/23/2018 Yes     Chronic    Primary hypertension [I10] 10/23/2018 Yes     Chronic      Problems Resolved During this Admission:       Discharged Condition: fair    Disposition: Home or Self Care    Follow Up:   Follow-up Information       Soham Rosa MD. Schedule an appointment as soon as possible for a visit in 1 week(s).    Specialty: Internal Medicine  Contact information:  [address] [facility] [address]  [phone]                           Patient Instructions:   No [facility] procedures on file.    Significant Diagnostic Studies: Labs: All labs within the past 24 hours have been reviewed    Pending Diagnostic Studies:       None           Medications:  Reconciled Home Medications:      Medication List        CONTINUE taking these medications       atorvastatin 40 MG tablet  Commonly known as: LIPITOR  Take 1 tablet (40 mg total) by mouth once daily.     cetirizine 10 MG tablet  Commonly known as: ZYRTEC  Take 5 mg by mouth once daily.     dulaglutide 4.5 mg/0.5 mL pen injector  Commonly known as: TRULICITY  Inject 4.5 mg into the skin every 7 days. On Mondays; pt hasn't taken in 3 weeks     EASY TOUCH TWIST LANCETS 30 gauge Misc  Generic drug: lancets  USE 3 TIMES A DAY     gabapentin 800 MG tablet  Commonly known as: NEURONTIN  take 1 tablet by mouth three times a day     metFORMIN 1000 MG tablet  Commonly known as: GLUCOPHAGE  Take 1 tablet (1,000 mg total) by mouth 2 (two) times daily with meals.     methocarbamoL 750 MG Tab  Commonly known as: ROBAXIN  take 1 tablet by mouth twice a day as needed     olmesartan 40 MG tablet  Commonly known as: BENICAR  Take 1 tablet (40 mg total) by mouth once daily.     pantoprazole 40 MG tablet  Commonly known as: PROTONIX  Take 1 tablet (40 mg total) by mouth 2 (two) times daily.     TRUE METRIX GLUCOSE METER Misc  Generic drug: blood-glucose meter  USE 3 TIMES A DA     * TRUE METRIX GLUCOSE TEST STRIP Strp  Generic drug: blood sugar diagnostic  USE 3 TIMES A DAY     * blood sugar diagnostic Strp  To check Blood Glucose 2 times daily,           * This list has 2 medication(s) that are the same as other medications prescribed for you. Read the directions carefully, and ask your doctor or other care provider to review them with you.                STOP taking these medications      HYDROcodone-acetaminophen  mg per tablet  Commonly known as: NORCO     ondansetron 4 MG Tbdl  Commonly known as: ZOFRAN-ODT     zolpidem 10 mg Tab  Commonly known as: AMBIEN              Indwelling Lines/Drains at time of [facility]:   Lines/Drains/Airways       None                   Time spent on the [facility] of patient: marylin Eric MD  Department of Hospital Medicine  [facility]

## 2023-10-28 NOTE — PLAN OF CARE
SW met with patient via [employer] to discuss [facility] planning. Pt will dc with no DME or HH needs noted. Pt appts previously scheduled and added to pt AVS. Pt family will assist pt with transportation home at time of [facility].     Pt cleared from CM standpoint. Bedside nurse and VN notified.    Future Appointments   Date Time Provider Department Center   10/30/2023  9:30 AM Marbella Stahl MD [facility] GENSUR [facility]   10/30/2023 10:30 AM [facility] [id number] [facility] XRAY [facility]   11/2/2023  9:45 AM Marbella Stahl MD [facility] CARLITO [facility]   11/7/2023  3:00 PM Michael Cutler MD [facility]   11/13/2023  8:40 AM Soham Rosa MD [facility] [place]   2/12/2024  7:00 AM Soham Rosa MD [facility] [place]        10/28/23 1200   Final Note   Assessment Type Final Discharge Note   Anticipated Discharge Disposition Home   What phone number can be called within the next 1-3 days to see how you are doing after [facility]? [phone]   Hospital Resources/Appts/Education Provided Appointments scheduled and added to AVS   Post-Acute Status   Discharge Delays None known at this time

## 2023-10-30 ENCOUNTER — TELEPHONE (OUTPATIENT)
Dept: SURGERY | Facility: CLINIC | Age: 68
End: 2023-10-30
Payer: MEDICARE

## 2023-10-30 ENCOUNTER — HOSPITAL ENCOUNTER (OUTPATIENT)
Dept: RADIOLOGY | Facility: HOSPITAL | Age: 68
Discharge: HOME | End: 2023-10-30
Attending: INTERNAL MEDICINE
Payer: MEDICARE

## 2023-10-30 DIAGNOSIS — K59.00 CONSTIPATION, UNSPECIFIED CONSTIPATION TYPE: ICD-10-CM

## 2023-10-30 PROCEDURE — 25500020 PHARM REV CODE 255: Performed by: INTERNAL MEDICINE

## 2023-10-30 PROCEDURE — 74250 X-RAY XM SM INT 1CNTRST STD: CPT | Mod: 26,,, | Performed by: INTERNAL MEDICINE

## 2023-10-30 PROCEDURE — 74250 X-RAY XM SM INT 1CNTRST STD: CPT | Mod: TC,FY

## 2023-10-30 PROCEDURE — 74250 FL SMALL BOWEL FOLLOW THROUGH: ICD-10-PCS | Mod: 26,,, | Performed by: INTERNAL MEDICINE

## 2023-10-30 PROCEDURE — A9698 NON-RAD CONTRAST MATERIALNOC: HCPCS | Performed by: INTERNAL MEDICINE

## 2023-10-30 RX ADMIN — BARIUM SULFATE 355 ML: 0.6 SUSPENSION ORAL at 10:10

## 2023-10-30 RX ADMIN — BARIUM SULFATE 355 ML: 0.6 SUSPENSION ORAL at 11:10

## 2023-10-30 NOTE — TELEPHONE ENCOUNTER
----- Message from Danyell Cid sent at 10/30/2023  9:15 AM CDT -----  Regarding: Procedure  Contact: [phone]  Type:  Needs Medical Advice    Who Called: Jill  Would the patient rather a call back or a response via [facility]? Call  Best Call Back Number: [phone]  Additional Information: Patient is cancelling her appt for today at 9:30. She has a procedure at 10:30 that she needs.

## 2023-10-30 NOTE — TELEPHONE ENCOUNTER
LVM for patient to call me back at [phone] re: cancelled her appt for today, but left her original appt for 11/2 on the books and for her to call and let me know if we need to get that one rescheduled.

## 2023-10-31 NOTE — TELEPHONE ENCOUNTER
Spoke with patient  She confirmed her appt for 11/2 with Dr. Stahl  States she is feeling much better

## 2023-11-02 ENCOUNTER — OFFICE VISIT (OUTPATIENT)
Dept: SURGERY | Facility: CLINIC | Age: 68
End: 2023-11-02
Payer: COMMERCIAL

## 2023-11-02 VITALS
SYSTOLIC BLOOD PRESSURE: 154 MMHG | HEIGHT: 66 IN | DIASTOLIC BLOOD PRESSURE: 70 MMHG | BODY MASS INDEX: 28.84 KG/M2 | HEART RATE: 65 BPM | WEIGHT: 179.44 LBS

## 2023-11-02 DIAGNOSIS — K59.00 CONSTIPATION, UNSPECIFIED CONSTIPATION TYPE: ICD-10-CM

## 2023-11-02 DIAGNOSIS — K45.8 INTERNAL HERNIA: ICD-10-CM

## 2023-11-02 DIAGNOSIS — Z98.84 H/O GASTRIC BYPASS: Primary | ICD-10-CM

## 2023-11-02 PROCEDURE — 3288F FALL RISK ASSESSMENT DOCD: CPT | Mod: CPTII,S$GLB,, | Performed by: SURGERY

## 2023-11-02 PROCEDURE — 3051F HG A1C>EQUAL 7.0%<8.0%: CPT | Mod: CPTII,S$GLB,, | Performed by: SURGERY

## 2023-11-02 PROCEDURE — 3078F PR MOST RECENT DIASTOLIC BLOOD PRESSURE < 80 MM HG: ICD-10-PCS | Mod: CPTII,S$GLB,, | Performed by: SURGERY

## 2023-11-02 PROCEDURE — 99024 PR POST-OP FOLLOW-UP VISIT: ICD-10-PCS | Mod: S$GLB,,, | Performed by: SURGERY

## 2023-11-02 PROCEDURE — 3077F PR MOST RECENT SYSTOLIC BLOOD PRESSURE >= 140 MM HG: ICD-10-PCS | Mod: CPTII,S$GLB,, | Performed by: SURGERY

## 2023-11-02 PROCEDURE — 3051F PR MOST RECENT HEMOGLOBIN A1C LEVEL 7.0 - < 8.0%: ICD-10-PCS | Mod: CPTII,S$GLB,, | Performed by: SURGERY

## 2023-11-02 PROCEDURE — 1126F PR PAIN SEVERITY QUANTIFIED, NO PAIN PRESENT: ICD-10-PCS | Mod: CPTII,S$GLB,, | Performed by: SURGERY

## 2023-11-02 PROCEDURE — 1101F PR PT FALLS ASSESS DOC 0-1 FALLS W/OUT INJ PAST YR: ICD-10-PCS | Mod: CPTII,S$GLB,, | Performed by: SURGERY

## 2023-11-02 PROCEDURE — 99999 PR PBB SHADOW E&M-EST. PATIENT-LVL III: CPT | Mod: PBBFAC,,, | Performed by: SURGERY

## 2023-11-02 PROCEDURE — 4010F ACE/ARB THERAPY RXD/TAKEN: CPT | Mod: CPTII,S$GLB,, | Performed by: SURGERY

## 2023-11-02 PROCEDURE — 99999 PR PBB SHADOW E&M-EST. PATIENT-LVL III: ICD-10-PCS | Mod: PBBFAC,,, | Performed by: SURGERY

## 2023-11-02 PROCEDURE — 1159F MED LIST DOCD IN RCRD: CPT | Mod: CPTII,S$GLB,, | Performed by: SURGERY

## 2023-11-02 PROCEDURE — 1101F PT FALLS ASSESS-DOCD LE1/YR: CPT | Mod: CPTII,S$GLB,, | Performed by: SURGERY

## 2023-11-02 PROCEDURE — 3077F SYST BP >= 140 MM HG: CPT | Mod: CPTII,S$GLB,, | Performed by: SURGERY

## 2023-11-02 PROCEDURE — 1159F PR MEDICATION LIST DOCUMENTED IN MEDICAL RECORD: ICD-10-PCS | Mod: CPTII,S$GLB,, | Performed by: SURGERY

## 2023-11-02 PROCEDURE — 99024 POSTOP FOLLOW-UP VISIT: CPT | Mod: S$GLB,,, | Performed by: SURGERY

## 2023-11-02 PROCEDURE — 3288F PR FALLS RISK ASSESSMENT DOCUMENTED: ICD-10-PCS | Mod: CPTII,S$GLB,, | Performed by: SURGERY

## 2023-11-02 PROCEDURE — 4010F PR ACE/ARB THEARPY RXD/TAKEN: ICD-10-PCS | Mod: CPTII,S$GLB,, | Performed by: SURGERY

## 2023-11-02 PROCEDURE — 3078F DIAST BP <80 MM HG: CPT | Mod: CPTII,S$GLB,, | Performed by: SURGERY

## 2023-11-02 PROCEDURE — 1126F AMNT PAIN NOTED NONE PRSNT: CPT | Mod: CPTII,S$GLB,, | Performed by: SURGERY

## 2023-11-02 NOTE — PROGRESS NOTES
[facility]  General Surgery  Post-Operative Note    Subjective:       Jill Perez is a 68F w/ PMH of HTN, DLD, T2DM, Raynaud's +YOLIS, and complex surgical history including RYGB followed by revisions w/ Dr. Kent on 7/6/20 who presents to the clinic 2 weeks following diagnostic lap with closure of Valentin's space defect on 10/17/23. She presented to the ED at [facility] on 10/22 for concerns of LUQ and constipation for which she was [facility]d with a more aggressive bowel regimen. She represented to [facility] ED on 10/26 for similar complaints for which she was admitted for 2 days. Was [facility]d on 10/28 with additional bowel regimen and SBFT on 10/30 showing normal small bowel transit time.    Today she is in good spirits and reports feeling much better. No significant pain at this time. Is having bowel function including flatus and small BMs. Tolerating a diet. Overall, reports that her diet tolerance is improved from pre-op and while she still has mild sporadic nausea (thinks she ate too fast yesterday), the trend to this is significantly improved as well.      Objective:     Pulse:  [65]   BP: (154)/(70)     General:  alert, appears stated age, and cooperative   Abdomen: soft, bowel sounds active, non-tender, small lap port site incisions well healed   Incision:   healing well, no drainage, no erythema, no hernia     Imaging:   Reviewed CT x2 and SBFT personally.    Assessment:     Jill Perez is a 68 y.o. female with a history of maribel-en-y gastric bypass s/p diagnostic laparoscopy and repair of internal hernia at Valentin's space. Post-op course notable for abdominal pain found to be related to constipation.   This has now resolved and she feels well.    Plan:     1. Continue any current medications.  2. Wound care discussed.  3. Follow up PRN  4. Continue bariatric appropriate diet and lifestyle      Marbella Stahl MD FACS  Minimally Invasive General & Bariatric Surgery

## 2023-11-03 ENCOUNTER — TELEPHONE (OUTPATIENT)
Dept: INTERNAL MEDICINE | Facility: CLINIC | Age: 68
End: 2023-11-03
Payer: MEDICARE

## 2023-11-03 RX ORDER — ZOLPIDEM TARTRATE 10 MG/1
10 TABLET ORAL NIGHTLY PRN
Qty: 30 TABLET | Refills: 0 | Status: SHIPPED | OUTPATIENT
Start: 2023-11-03 | End: 2023-12-05 | Stop reason: SDUPTHER

## 2023-11-03 RX ORDER — ONDANSETRON 8 MG/1
8 TABLET, ORALLY DISINTEGRATING ORAL EVERY 12 HOURS PRN
Qty: 30 TABLET | Refills: 2 | Status: SHIPPED | OUTPATIENT
Start: 2023-11-03 | End: 2023-11-28 | Stop reason: SDUPTHER

## 2023-11-03 NOTE — TELEPHONE ENCOUNTER
----- Message from Liane Adan sent at 11/3/2023  9:53 AM CDT -----  Type:  Needs Medical Advice/medication concerns     Who Called: pt    Would the patient rather a call back or a response via [facility]? call  Best Call Back Number: [phone]  Additional Information: Pt requesting a call back to discuss medication hospital dr you patient med's

## 2023-11-03 NOTE — TELEPHONE ENCOUNTER
Spoke to pt and she wants to know why was the Ambien discontinued at [facility], and if she could go back to taking it? Pt also c/o of nausea and request med for that.

## 2023-11-07 ENCOUNTER — OFFICE VISIT (OUTPATIENT)
Dept: RHEUMATOLOGY | Facility: CLINIC | Age: 68
End: 2023-11-07
Payer: MEDICARE

## 2023-11-07 VITALS
DIASTOLIC BLOOD PRESSURE: 91 MMHG | HEART RATE: 65 BPM | WEIGHT: 177.5 LBS | SYSTOLIC BLOOD PRESSURE: 155 MMHG | BODY MASS INDEX: 28.53 KG/M2 | HEIGHT: 66 IN

## 2023-11-07 DIAGNOSIS — I73.00 RAYNAUD'S DISEASE WITHOUT GANGRENE: Primary | ICD-10-CM

## 2023-11-07 DIAGNOSIS — M15.9 PRIMARY OSTEOARTHRITIS INVOLVING MULTIPLE JOINTS: ICD-10-CM

## 2023-11-07 PROCEDURE — 99214 PR OFFICE/OUTPT VISIT, EST, LEVL IV, 30-39 MIN: ICD-10-PCS | Mod: S$PBB,,, | Performed by: STUDENT IN AN ORGANIZED HEALTH CARE EDUCATION/TRAINING PROGRAM

## 2023-11-07 PROCEDURE — 99999 PR PBB SHADOW E&M-EST. PATIENT-LVL IV: ICD-10-PCS | Mod: PBBFAC,,, | Performed by: STUDENT IN AN ORGANIZED HEALTH CARE EDUCATION/TRAINING PROGRAM

## 2023-11-07 PROCEDURE — 99214 OFFICE O/P EST MOD 30 MIN: CPT | Mod: S$PBB,,, | Performed by: STUDENT IN AN ORGANIZED HEALTH CARE EDUCATION/TRAINING PROGRAM

## 2023-11-07 PROCEDURE — 99999 PR PBB SHADOW E&M-EST. PATIENT-LVL IV: CPT | Mod: PBBFAC,,, | Performed by: STUDENT IN AN ORGANIZED HEALTH CARE EDUCATION/TRAINING PROGRAM

## 2023-11-07 PROCEDURE — 99214 OFFICE O/P EST MOD 30 MIN: CPT | Mod: PBBFAC | Performed by: STUDENT IN AN ORGANIZED HEALTH CARE EDUCATION/TRAINING PROGRAM

## 2023-11-07 RX ORDER — DICLOFENAC SODIUM 10 MG/G
2 GEL TOPICAL 4 TIMES DAILY
Qty: 100 G | Refills: 3 | Status: SHIPPED | OUTPATIENT
Start: 2023-11-07 | End: 2024-01-12 | Stop reason: SDUPTHER
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
## 2023-11-09 ENCOUNTER — PATIENT MESSAGE (OUTPATIENT)
Dept: INTERNAL MEDICINE | Facility: CLINIC | Age: 68
End: 2023-11-09
Payer: MEDICARE

## 2023-11-13 ENCOUNTER — LAB VISIT (OUTPATIENT)
Dept: LAB | Facility: HOSPITAL | Age: 68
End: 2023-11-13
Attending: INTERNAL MEDICINE
Payer: COMMERCIAL

## 2023-11-13 ENCOUNTER — OFFICE VISIT (OUTPATIENT)
Dept: INTERNAL MEDICINE | Facility: CLINIC | Age: 68
End: 2023-11-13
Payer: MEDICARE

## 2023-11-13 VITALS
RESPIRATION RATE: 12 BRPM | DIASTOLIC BLOOD PRESSURE: 66 MMHG | HEIGHT: 66 IN | SYSTOLIC BLOOD PRESSURE: 128 MMHG | OXYGEN SATURATION: 99 % | HEART RATE: 75 BPM | BODY MASS INDEX: 29.69 KG/M2 | WEIGHT: 184.75 LBS | TEMPERATURE: 98 F

## 2023-11-13 DIAGNOSIS — K59.00 CONSTIPATION, UNSPECIFIED CONSTIPATION TYPE: ICD-10-CM

## 2023-11-13 DIAGNOSIS — N30.00 ACUTE CYSTITIS WITHOUT HEMATURIA: ICD-10-CM

## 2023-11-13 DIAGNOSIS — R10.9 ABDOMINAL PAIN, UNSPECIFIED ABDOMINAL LOCATION: ICD-10-CM

## 2023-11-13 DIAGNOSIS — I10 PRIMARY HYPERTENSION: Chronic | ICD-10-CM

## 2023-11-13 DIAGNOSIS — F51.01 PRIMARY INSOMNIA: ICD-10-CM

## 2023-11-13 DIAGNOSIS — R68.81 EARLY SATIETY: ICD-10-CM

## 2023-11-13 DIAGNOSIS — E11.65 TYPE 2 DIABETES MELLITUS WITH HYPERGLYCEMIA, WITHOUT LONG-TERM CURRENT USE OF INSULIN: Chronic | ICD-10-CM

## 2023-11-13 DIAGNOSIS — I70.0 AORTIC ATHEROSCLEROSIS: Chronic | ICD-10-CM

## 2023-11-13 DIAGNOSIS — E11.43 TYPE 2 DIABETES MELLITUS WITH DIABETIC AUTONOMIC NEUROPATHY, WITHOUT LONG-TERM CURRENT USE OF INSULIN: Primary | Chronic | ICD-10-CM

## 2023-11-13 DIAGNOSIS — E11.69 HYPERLIPIDEMIA ASSOCIATED WITH TYPE 2 DIABETES MELLITUS: Chronic | ICD-10-CM

## 2023-11-13 DIAGNOSIS — E78.5 HYPERLIPIDEMIA ASSOCIATED WITH TYPE 2 DIABETES MELLITUS: Chronic | ICD-10-CM

## 2023-11-13 LAB
BACTERIA #/AREA URNS AUTO: NORMAL /HPF
BILIRUB UR QL STRIP: NEGATIVE
CLARITY UR REFRACT.AUTO: CLEAR
COLOR UR AUTO: YELLOW
GLUCOSE UR QL STRIP: ABNORMAL
HGB UR QL STRIP: NEGATIVE
KETONES UR QL STRIP: NEGATIVE
LEUKOCYTE ESTERASE UR QL STRIP: NEGATIVE
MICROSCOPIC COMMENT: NORMAL
NITRITE UR QL STRIP: NEGATIVE
PH UR STRIP: 7 [PH] (ref 5–8)
PROT UR QL STRIP: NEGATIVE
RBC #/AREA URNS AUTO: 0 /HPF (ref 0–4)
SP GR UR STRIP: 1.01 (ref 1–1.03)
SQUAMOUS #/AREA URNS AUTO: 0 /HPF
URN SPEC COLLECT METH UR: ABNORMAL
WBC #/AREA URNS AUTO: 1 /HPF (ref 0–5)
YEAST UR QL AUTO: NORMAL

## 2023-11-13 PROCEDURE — 99214 OFFICE O/P EST MOD 30 MIN: CPT | Mod: S$PBB,,, | Performed by: INTERNAL MEDICINE

## 2023-11-13 PROCEDURE — 81001 URINALYSIS AUTO W/SCOPE: CPT | Performed by: INTERNAL MEDICINE

## 2023-11-13 PROCEDURE — 99999 PR PBB SHADOW E&M-EST. PATIENT-LVL V: ICD-10-PCS | Mod: PBBFAC,,, | Performed by: INTERNAL MEDICINE

## 2023-11-13 PROCEDURE — 87086 URINE CULTURE/COLONY COUNT: CPT | Performed by: INTERNAL MEDICINE

## 2023-11-13 PROCEDURE — 99215 OFFICE O/P EST HI 40 MIN: CPT | Mod: PBBFAC,PO | Performed by: INTERNAL MEDICINE

## 2023-11-13 PROCEDURE — 99999 PR PBB SHADOW E&M-EST. PATIENT-LVL V: CPT | Mod: PBBFAC,,, | Performed by: INTERNAL MEDICINE

## 2023-11-13 PROCEDURE — 99214 PR OFFICE/OUTPT VISIT, EST, LEVL IV, 30-39 MIN: ICD-10-PCS | Mod: S$PBB,,, | Performed by: INTERNAL MEDICINE

## 2023-11-13 RX ORDER — CIPROFLOXACIN 500 MG/1
500 TABLET ORAL EVERY 12 HOURS
Qty: 6 TABLET | Refills: 0 | Status: SHIPPED | OUTPATIENT
Start: 2023-11-13 | End: 2023-11-23

## 2023-11-13 NOTE — PROGRESS NOTES
Subjective:       Patient ID: Jill Perez is a 68 y.o. female.    Chief Complaint: Follow-up    HPI    68-year-old female here for follow-up.  She went to the hospital and was told that there was no reason for her to be done.  She went home without going to the bathroom and ice chips.  She is not eating much.  This am, she had some soupy looking grits.  She is not having small BMs.  Her hands and her feet are very swollen.  She cannot go to the bathroom and gets pains like she needs to go, but cannot.  She took an herb from her home to have a BM, which cleaned her out.    Diabetes-Her BG have been really good.  It was 112 yesterday and 109 this am.  Her BP has been going up and down.  She is on metformin.  Lab Results   Component Value Date    HGBA1C 7.3 (H) 09/12/2023    HGBA1C 6.8 (H) 05/12/2023    HGBA1C 6.9 (H) 02/17/2023     Lab Results   Component Value Date    LDLCALC 139.4 05/12/2023    CREATININE 0.6 10/28/2023     HTN -  Patient's co morbidities include:  Diabetes. Patient is currently on Benicar 40 mg. She does check her BP at home, and it runs 160/90 yesterday. Side effects of medications note: none. Denies headaches, blurred vision, chest pain, shortness of breath, nausea.    HLD - Patient is currently on Lipitor 40 mg.  Her last lipid panel was   Cholesterol   Date Value Ref Range Status   05/12/2023 223 (H) 120 - 199 mg/dL Final     Comment:     The National Cholesterol Education Program (NCEP) has set the  following guidelines (reference ranges) for Cholesterol:  Optimal.....................<200 mg/dL  Borderline High.............200-239 mg/dL  High........................> or = 240 mg/dL       Triglycerides   Date Value Ref Range Status   05/12/2023 98 30 - 150 mg/dL Final     Comment:     The National Cholesterol Education Program (NCEP) has set the  following guidelines (reference values) for triglycerides:  Normal......................<150 mg/dL  Borderline High.............150-199  mg/dL  High........................200-499 mg/dL       HDL   Date Value Ref Range Status   05/12/2023 64 40 - 75 mg/dL Final     Comment:     The National Cholesterol Education Program (NCEP) has set the  following guidelines (reference values) for HDL Cholesterol:  Low...............<40 mg/dL  Optimal...........>60 mg/dL       LDL Cholesterol   Date Value Ref Range Status   05/12/2023 139.4 63.0 - 159.0 mg/dL Final     Comment:     The National Cholesterol Education Program (NCEP) has set the  following guidelines (reference values) for LDL Cholesterol:  Optimal.......................<130 mg/dL  Borderline High...............130-159 mg/dL  High..........................160-189 mg/dL  Very High.....................>190 mg/dL     .  Side effects of the medication: none.    Patient is on Ambien nightly as needed.    Review of Systems      Objective:      Physical Exam  Vitals reviewed.   Constitutional:       Appearance: She is well-developed.   HENT:      Head: Normocephalic and atraumatic.      Mouth/Throat:      Pharynx: No oropharyngeal exudate.   Eyes:      General: No scleral icterus.        Right eye: No [facility].         Left eye: No [facility].      Pupils: Pupils are equal, round, and reactive to light.   Neck:      Thyroid: No thyromegaly.      Trachea: No tracheal deviation.   Cardiovascular:      Rate and Rhythm: Normal rate and regular rhythm.      Heart sounds: Normal heart sounds. No murmur heard.     No friction rub. No gallop.   Pulmonary:      Effort: Pulmonary effort is normal. No respiratory distress.      Breath sounds: Normal breath sounds. No wheezing or rales.   Chest:      Chest wall: No tenderness.   Abdominal:      General: Bowel sounds are normal. There is no distension.      Palpations: Abdomen is soft. There is no mass.      Tenderness: There is no abdominal tenderness. There is no guarding or rebound.   Musculoskeletal:         General: No tenderness. Normal range of motion.       Cervical back: Normal range of motion and neck supple.   Skin:     General: Skin is warm and dry.      Coloration: Skin is not pale.      Findings: No erythema or rash.   Neurological:      Mental Status: She is alert and oriented to person, place, and time.   Psychiatric:         Behavior: Behavior normal.         Assessment:       1. Type 2 diabetes mellitus with diabetic autonomic neuropathy, without long-term current use of insulin    2. Type 2 diabetes mellitus with hyperglycemia, without long-term current use of insulin    3. Primary hypertension    4. Hyperlipidemia associated with type 2 diabetes mellitus    5. Aortic atherosclerosis    6. Primary insomnia    7. Acute cystitis without hematuria  - Urinalysis; Future  - CULTURE, URINE; Future    8. Constipation, unspecified constipation type  - Ambulatory referral/consult to Gastroenterology; Future    9. Abdominal pain, unspecified abdominal location  - Ambulatory referral/consult to Gastroenterology; Future    10. Early satiety  - Ambulatory referral/consult to Gastroenterology; Future  - NM Gastric Emptying; Future      Plan:       1/2.  Metformin 500 mg b.i.d..    3.  Continue Benicar 40 mg p.o.  4/5.  Continue Lipitor 40 mg p.o.   6. Continue Ambien 10 mg nightly as needed.    7. Check urinalysis, urine culture.    8/9/10.  Refer to GI and check x-ray emptying study.

## 2023-11-14 LAB — BACTERIA UR CULT: NORMAL

## 2023-11-15 DIAGNOSIS — E11.9 TYPE 2 DIABETES MELLITUS WITHOUT COMPLICATION: ICD-10-CM

## 2023-11-27 ENCOUNTER — OUTPATIENT CASE MANAGEMENT (OUTPATIENT)
Dept: ADMINISTRATIVE | Facility: OTHER | Age: 68
End: 2023-11-27
Payer: MEDICARE

## 2023-11-28 ENCOUNTER — OFFICE VISIT (OUTPATIENT)
Dept: GASTROENTEROLOGY | Facility: CLINIC | Age: 68
End: 2023-11-28
Payer: MEDICARE

## 2023-11-28 VITALS
HEART RATE: 73 BPM | WEIGHT: 174.81 LBS | DIASTOLIC BLOOD PRESSURE: 81 MMHG | SYSTOLIC BLOOD PRESSURE: 127 MMHG | HEIGHT: 67 IN | BODY MASS INDEX: 27.44 KG/M2

## 2023-11-28 DIAGNOSIS — D50.9 IRON DEFICIENCY ANEMIA, UNSPECIFIED IRON DEFICIENCY ANEMIA TYPE: ICD-10-CM

## 2023-11-28 DIAGNOSIS — K59.00 CONSTIPATION, UNSPECIFIED CONSTIPATION TYPE: ICD-10-CM

## 2023-11-28 DIAGNOSIS — R11.0 NAUSEA: ICD-10-CM

## 2023-11-28 DIAGNOSIS — R68.81 EARLY SATIETY: ICD-10-CM

## 2023-11-28 DIAGNOSIS — R10.9 ABDOMINAL PAIN, UNSPECIFIED ABDOMINAL LOCATION: ICD-10-CM

## 2023-11-28 DIAGNOSIS — K58.1 IRRITABLE BOWEL SYNDROME WITH CONSTIPATION: Primary | ICD-10-CM

## 2023-11-28 PROCEDURE — 99214 PR OFFICE/OUTPT VISIT, EST, LEVL IV, 30-39 MIN: ICD-10-PCS | Mod: S$PBB,,, | Performed by: STUDENT IN AN ORGANIZED HEALTH CARE EDUCATION/TRAINING PROGRAM

## 2023-11-28 PROCEDURE — 99215 OFFICE O/P EST HI 40 MIN: CPT | Mod: PBBFAC | Performed by: STUDENT IN AN ORGANIZED HEALTH CARE EDUCATION/TRAINING PROGRAM

## 2023-11-28 PROCEDURE — 99214 OFFICE O/P EST MOD 30 MIN: CPT | Mod: S$PBB,,, | Performed by: STUDENT IN AN ORGANIZED HEALTH CARE EDUCATION/TRAINING PROGRAM

## 2023-11-28 PROCEDURE — 99999 PR PBB SHADOW E&M-EST. PATIENT-LVL V: ICD-10-PCS | Mod: PBBFAC,,, | Performed by: STUDENT IN AN ORGANIZED HEALTH CARE EDUCATION/TRAINING PROGRAM

## 2023-11-28 PROCEDURE — 99999 PR PBB SHADOW E&M-EST. PATIENT-LVL V: CPT | Mod: PBBFAC,,, | Performed by: STUDENT IN AN ORGANIZED HEALTH CARE EDUCATION/TRAINING PROGRAM

## 2023-11-28 RX ORDER — ONDANSETRON 8 MG/1
8 TABLET, ORALLY DISINTEGRATING ORAL EVERY 12 HOURS PRN
Qty: 30 TABLET | Refills: 2 | Status: SHIPPED | OUTPATIENT
Start: 2023-11-28

## 2023-11-28 NOTE — PROGRESS NOTES
[facility] Consultation Note    Reason for Consult:  The primary encounter diagnosis was Irritable bowel syndrome with constipation. Diagnoses of Constipation, unspecified constipation type, Abdominal pain, unspecified abdominal location, Early satiety, Nausea, and Iron deficiency anemia, unspecified iron deficiency anemia type were also pertinent to this visit.    PCP:   Soham Rosa   [address] [facility] / [place] [id number]    Referring MD:  Soham Rosa Md  [address] [facility]  [place] [id number]    HPI:  This is a 68 y.o. female here for evaluation of constipation, nausea and abdominal pain. PMHx of DM, HTN, HLD. She also has a history of gastric bypass in 2008 we well as prior cholecystectomy and splenectomy. She has had a few months of worsening constipation with BM occurring once a week or less. No blood in stool. She also will have abdominal pain which starts after she eats and is associated with bloating and discomfort in the left central abdomen. In 10/2023, the pain episodes were severe and she actually had a surgery which repaired an internal hernia. Within this surgery, they also did an EGD which mentions a normal appearing esophagus, patent GJ and appropriate sized gastric pouch without any ulceration. Unfortunately, she had not improvement with pain after the surgery. She has tried miralax, dulcolax, milk of mag and fiber all without improvement.     She is also chronically iron deficient without overt GI blood loss. Blood work last month showed microcytic anemia and hypoalbuminemia. Iron profile from 5/2023 showed PINEDA. She had a cscope in 6/2023 which showed normal colon. TI not examined. Last EGD by GI was in 2020 which showed small hiatal hernia and normal appearing esophagus which was empirically dilated to 51Fr with no mucosal change noted. Healthy appearing gastric bypass. Small appearing diverticulum distal to esophagus. She noted no improvement with  "dilation.     Most recent abdominal imaging last month showed CT and SBFT without acute findings or post op leak.        She also continues to have dysphagia to solids like breads. She feels they stick in her chest and cause pain. Nausea is chronic and she uses zofran.        Objective Findings:    Vital Signs:  /81   Pulse 73   Ht 5' 7" (1.702 m)   Wt 79.3 kg (174 lb 13.2 oz)   LMP  (LMP Unknown)   BMI 27.38 kg/m²   Body mass index is 27.38 kg/m².    Physical Exam:  General Appearance: Well appearing in no acute distress  Head:   Normocephalic, without obvious abnormality  Eyes:    No scleral icterus    Lungs: CTA bilaterally in anterior and posterior fields   Heart:  Regular rate and rhythm, no murmurs heard  Abdomen: Soft, non tender, non distended with positive bowel sounds in all four quadrants.      Imaging:    SBFT 2023  FINDINGS:  Preliminary radiograph: Postoperative changes from cholecystectomy, gastric bypass, splenectomy, appendectomy, and lower lumbar instrumented fusion.     Transit time: 120 minutes, within normal limits.     Small bowel: Contrast passed through the gastrojejunostomy and jejunojejunostomy without holdup.  No extraluminal contrast was seen.  No small bowel stricture, dilatation, or mass. The terminal ileum is normal.     Other findings: None.     Impression:     Extensive postoperative changes as described.  No obstruction or anastomotic leak.  Normal small bowel transit time.    CT 2023   Impression:     1. No specific etiology to explain patient's abdominal pain.  No bowel obstruction.  2. Postoperative changes of Priya-en-Y gastric bypass, cholecystectomy, appendectomy, and splenectomy.  3. Severe fatty atrophy of the pancreas.    Endoscopy:      Cscope 2023   Findings:       The entire examined colon appeared normal on direct and retroflexion        views.     EGD 2020   indings:       No endoscopic abnormality was evident in the esophagus to explain        the patient's " complaint of dysphagia. It was decided, however, to        proceed with dilation of the entire esophagus. A guidewire was        placed and the scope was withdrawn. Dilation was performed with a        Savary dilator with no resistance at 45 Fr and 51 Fr. The dilation        site was examined following endoscope reinsertion and showed no        change.        A small hiatal hernia was present.        Evidence of a gastric bypass was found. A gastric pouch with a        normal size was found. The gastrojejunal anastomosis was        characterized by healthy appearing mucosa. This was traversed. The        pouch-to-jejunum limb was characterized by healthy appearing mucosa.        Immediately on entry of the gastric pouch, there is small pouch-type        almost diverticulum that is most directly distal to the esophagus        and may preferentially collect small pills/ food and create the        sensation of food sticking . The esophagus and GEJ are wipe open.        The examined jejunum was normal.     Surgery op report 10/2023   Description of Findings:   Antecolic-antegastric maribel-en-y configuration with patent GJ and JJ anastomosis  Internal hernia defect present at Abraham's space, closed primarily with nonabsorbable suture.  There did not appear to be any bowel herniated at this time.  No internal hernia defect present at JJ mesentery  EGD performed with patent GJ and appropriately sized gastric pouch without evidence of esophagitis, ulceration, or mucosal abnormalities    Assessment:    Abdominal pain   Constipation   Nausea   Dysphagia  PINEDA   History of gastric bypass     Patient with a few months or worsening constipation which has failed OTC laxatives. Will start linzess. Refilled zofran for nausea but counseled can worsen constipation. For dysphagia, order HRM. Her PCP also ordered a GES which I will have our clinic schedule for the patient. For PINEDA, she has had cscope in 6/2023 and EGD in 10/2023 without  source of bleeding. Likely malabsorption in setting of bypass. Will refer to heme onc. Due to multiple abdominal surgeries, would like to avoid VCE      Recommendations:    - GES ordered and will follow those results   - Start linzess, would also continue miralax   - HRM  - Refer to heme onc for IV fe      RTC 3 months     Order summary:  Orders Placed This Encounter    Ambulatory referral/consult to Hematology / Oncology    linaCLOtide (LINZESS) 72 mcg Cap capsule    ondansetron (ZOFRAN-ODT) 8 MG TbDL         Thank you so much for allowing me to participate in the care of Jill Diggs MD  Gastroenterology   [facility]

## 2023-11-29 ENCOUNTER — TELEPHONE (OUTPATIENT)
Dept: ENDOSCOPY | Facility: HOSPITAL | Age: 68
End: 2023-11-29
Payer: MEDICARE

## 2023-11-29 VITALS — HEIGHT: 66 IN | WEIGHT: 175 LBS | BODY MASS INDEX: 28.12 KG/M2

## 2023-11-29 DIAGNOSIS — R13.10 DYSPHAGIA, UNSPECIFIED TYPE: Primary | ICD-10-CM

## 2023-11-29 NOTE — TELEPHONE ENCOUNTER
"----- Message from Denice Masterson sent at 2023 10:34 AM CST -----    ----- Message -----  From: Saadia Diggs MD  Sent: 2023  10:16 AM CST  To: [facility]    Procedure: Esophageal manometry      Diagnosis: Dysphagia    Procedure Timin-12 weeks    #If within 4 weeks selected, please donaldo as high priority#    #If greater than 12 weeks, please select "5-12 weeks" and delay sending until 3 months prior to requested date#     Provider: Myself    Location: [address]    Additional Scheduling Information: No scheduling concerns    Prep Specifications:N/A    Is the patient taking a GLP-1 Agonist:no    Have you attached a patient to this message: yes         "

## 2023-11-29 NOTE — TELEPHONE ENCOUNTER
Spoke to [facility] to schedule procedure(s) Esophageal manometry       Physician to perform procedure(s) Dr. ERVIN Diggs  Date of Procedure (s) 1/19/24  Arrival Time 9:00 AM  Time of Procedure(s) 10:00 AM   Location of Procedure(s) [place] 4th Floor  Type of Rx Prep sent to patient: Other  Instructions provided to patient via [facility]    Patient was informed on the following information and verbalized understanding. Screening questionnaire reviewed with patient and complete. If procedure requires anesthesia, a responsible adult needs to be present to accompany the patient home, patient cannot drive after receiving anesthesia. Appointment details are tentative, especially check-in time. Patient will receive a prep-op call 7 days prior to confirm check-in time for procedure. If applicable the patient should contact their pharmacy to verify Rx for procedure prep is ready for pick-up. Patient was advised to call the scheduling department at [phone] if pharmacy states no Rx is available. Patient was advised to call the endoscopy scheduling department if any questions or concerns arise.      [facility] Scheduling Department
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
## 2023-12-01 ENCOUNTER — LAB VISIT (OUTPATIENT)
Dept: LAB | Facility: HOSPITAL | Age: 68
End: 2023-12-01
Attending: INTERNAL MEDICINE
Payer: COMMERCIAL

## 2023-12-01 ENCOUNTER — OFFICE VISIT (OUTPATIENT)
Dept: OBSTETRICS AND GYNECOLOGY | Facility: CLINIC | Age: 68
End: 2023-12-01
Payer: MEDICARE

## 2023-12-01 ENCOUNTER — OFFICE VISIT (OUTPATIENT)
Dept: HEMATOLOGY/ONCOLOGY | Facility: CLINIC | Age: 68
End: 2023-12-01
Payer: MEDICARE

## 2023-12-01 VITALS
DIASTOLIC BLOOD PRESSURE: 86 MMHG | SYSTOLIC BLOOD PRESSURE: 144 MMHG | OXYGEN SATURATION: 95 % | WEIGHT: 177.69 LBS | BODY MASS INDEX: 28.68 KG/M2 | HEART RATE: 82 BPM

## 2023-12-01 VITALS
BODY MASS INDEX: 28.53 KG/M2 | DIASTOLIC BLOOD PRESSURE: 82 MMHG | HEIGHT: 66 IN | SYSTOLIC BLOOD PRESSURE: 134 MMHG | WEIGHT: 177.5 LBS

## 2023-12-01 DIAGNOSIS — S30.814A ABRASION OF VAGINA, INITIAL ENCOUNTER: ICD-10-CM

## 2023-12-01 DIAGNOSIS — Z11.51 SCREENING FOR HPV (HUMAN PAPILLOMAVIRUS): ICD-10-CM

## 2023-12-01 DIAGNOSIS — Z12.4 ENCOUNTER FOR PAPANICOLAOU SMEAR FOR CERVICAL CANCER SCREENING: ICD-10-CM

## 2023-12-01 DIAGNOSIS — Z12.31 ENCOUNTER FOR SCREENING MAMMOGRAM FOR BREAST CANCER: ICD-10-CM

## 2023-12-01 DIAGNOSIS — Z71.89 ADVANCE CARE PLANNING: ICD-10-CM

## 2023-12-01 DIAGNOSIS — Z90.3 HISTORY OF SLEEVE GASTRECTOMY: ICD-10-CM

## 2023-12-01 DIAGNOSIS — D50.8 ACQUIRED IRON DEFICIENCY ANEMIA DUE TO DECREASED ABSORPTION: ICD-10-CM

## 2023-12-01 DIAGNOSIS — D50.8 ACQUIRED IRON DEFICIENCY ANEMIA DUE TO DECREASED ABSORPTION: Primary | ICD-10-CM

## 2023-12-01 DIAGNOSIS — L90.0 LICHEN SCLEROSUS: Primary | ICD-10-CM

## 2023-12-01 LAB
BASOPHILS # BLD AUTO: 0.02 K/UL (ref 0–0.2)
BASOPHILS NFR BLD: 0.4 % (ref 0–1.9)
DIFFERENTIAL METHOD: ABNORMAL
EOSINOPHIL # BLD AUTO: 0.1 K/UL (ref 0–0.5)
EOSINOPHIL NFR BLD: 1.3 % (ref 0–8)
ERYTHROCYTE [DISTWIDTH] IN BLOOD BY AUTOMATED COUNT: 18.9 % (ref 11.5–14.5)
FERRITIN SERPL-MCNC: 9 NG/ML (ref 20–300)
HCT VFR BLD AUTO: 30.6 % (ref 37–48.5)
HGB BLD-MCNC: 9.6 G/DL (ref 12–16)
IMM GRANULOCYTES # BLD AUTO: 0 K/UL (ref 0–0.04)
IMM GRANULOCYTES NFR BLD AUTO: 0 % (ref 0–0.5)
IRON SERPL-MCNC: 22 UG/DL (ref 30–160)
LYMPHOCYTES # BLD AUTO: 2.6 K/UL (ref 1–4.8)
LYMPHOCYTES NFR BLD: 48.4 % (ref 18–48)
MCH RBC QN AUTO: 25.1 PG (ref 27–31)
MCHC RBC AUTO-ENTMCNC: 31.4 G/DL (ref 32–36)
MCV RBC AUTO: 80 FL (ref 82–98)
MONOCYTES # BLD AUTO: 0.6 K/UL (ref 0.3–1)
MONOCYTES NFR BLD: 11.2 % (ref 4–15)
NEUTROPHILS # BLD AUTO: 2.1 K/UL (ref 1.8–7.7)
NEUTROPHILS NFR BLD: 38.7 % (ref 38–73)
NRBC BLD-RTO: 0 /100 WBC
PLATELET # BLD AUTO: 491 K/UL (ref 150–450)
PMV BLD AUTO: 11 FL (ref 9.2–12.9)
RBC # BLD AUTO: 3.83 M/UL (ref 4–5.4)
SATURATED IRON: 4 % (ref 20–50)
TOTAL IRON BINDING CAPACITY: 500 UG/DL (ref 250–450)
TRANSFERRIN SERPL-MCNC: 338 MG/DL (ref 200–375)
WBC # BLD AUTO: 5.45 K/UL (ref 3.9–12.7)

## 2023-12-01 PROCEDURE — 84466 ASSAY OF TRANSFERRIN: CPT | Performed by: INTERNAL MEDICINE

## 2023-12-01 PROCEDURE — 83540 ASSAY OF IRON: CPT | Performed by: INTERNAL MEDICINE

## 2023-12-01 PROCEDURE — 99214 OFFICE O/P EST MOD 30 MIN: CPT | Mod: PBBFAC,PN | Performed by: NURSE PRACTITIONER

## 2023-12-01 PROCEDURE — 88175 CYTOPATH C/V AUTO FLUID REDO: CPT | Performed by: NURSE PRACTITIONER

## 2023-12-01 PROCEDURE — 99204 PR OFFICE/OUTPT VISIT, NEW, LEVL IV, 45-59 MIN: ICD-10-PCS | Mod: S$PBB,,, | Performed by: INTERNAL MEDICINE

## 2023-12-01 PROCEDURE — 99497 PR ADVNCD CARE PLAN 30 MIN: ICD-10-PCS | Mod: S$PBB,,, | Performed by: INTERNAL MEDICINE

## 2023-12-01 PROCEDURE — 36415 COLL VENOUS BLD VENIPUNCTURE: CPT | Performed by: INTERNAL MEDICINE

## 2023-12-01 PROCEDURE — 99213 PR OFFICE/OUTPT VISIT, EST, LEVL III, 20-29 MIN: ICD-10-PCS | Mod: S$PBB,,, | Performed by: NURSE PRACTITIONER

## 2023-12-01 PROCEDURE — 99497 ADVNCD CARE PLAN 30 MIN: CPT | Mod: S$PBB,,, | Performed by: INTERNAL MEDICINE

## 2023-12-01 PROCEDURE — 99204 OFFICE O/P NEW MOD 45 MIN: CPT | Mod: S$PBB,,, | Performed by: INTERNAL MEDICINE

## 2023-12-01 PROCEDURE — 99999 PR PBB SHADOW E&M-EST. PATIENT-LVL IV: CPT | Mod: PBBFAC,,, | Performed by: INTERNAL MEDICINE

## 2023-12-01 PROCEDURE — 99999 PR PBB SHADOW E&M-EST. PATIENT-LVL IV: CPT | Mod: PBBFAC,,, | Performed by: NURSE PRACTITIONER

## 2023-12-01 PROCEDURE — 82728 ASSAY OF FERRITIN: CPT | Performed by: INTERNAL MEDICINE

## 2023-12-01 PROCEDURE — 87529 HSV DNA AMP PROBE: CPT | Mod: 59 | Performed by: NURSE PRACTITIONER

## 2023-12-01 PROCEDURE — 87624 HPV HI-RISK TYP POOLED RSLT: CPT | Performed by: NURSE PRACTITIONER

## 2023-12-01 PROCEDURE — 99999 PR PBB SHADOW E&M-EST. PATIENT-LVL IV: ICD-10-PCS | Mod: PBBFAC,,, | Performed by: NURSE PRACTITIONER

## 2023-12-01 PROCEDURE — 85025 COMPLETE CBC W/AUTO DIFF WBC: CPT | Performed by: INTERNAL MEDICINE

## 2023-12-01 PROCEDURE — 99213 OFFICE O/P EST LOW 20 MIN: CPT | Mod: S$PBB,,, | Performed by: NURSE PRACTITIONER

## 2023-12-01 PROCEDURE — 99999 PR PBB SHADOW E&M-EST. PATIENT-LVL IV: ICD-10-PCS | Mod: PBBFAC,,, | Performed by: INTERNAL MEDICINE

## 2023-12-01 PROCEDURE — 99497 ADVNCD CARE PLAN 30 MIN: CPT | Mod: PBBFAC,PO | Performed by: INTERNAL MEDICINE

## 2023-12-01 RX ORDER — SODIUM CHLORIDE 0.9 % (FLUSH) 0.9 %
10 SYRINGE (ML) INJECTION
Status: CANCELLED | OUTPATIENT
Start: 2023-12-06

## 2023-12-01 RX ORDER — HEPARIN 100 UNIT/ML
500 SYRINGE INTRAVENOUS
Status: CANCELLED | OUTPATIENT
Start: 2023-12-13

## 2023-12-01 RX ORDER — EPINEPHRINE 0.3 MG/.3ML
0.3 INJECTION SUBCUTANEOUS ONCE AS NEEDED
Status: CANCELLED | OUTPATIENT
Start: 2023-12-13

## 2023-12-01 RX ORDER — DIPHENHYDRAMINE HYDROCHLORIDE 50 MG/ML
50 INJECTION INTRAMUSCULAR; INTRAVENOUS ONCE AS NEEDED
Status: CANCELLED | OUTPATIENT
Start: 2023-12-13

## 2023-12-01 RX ORDER — CLOBETASOL PROPIONATE 0.5 MG/G
OINTMENT TOPICAL 2 TIMES DAILY
Qty: 30 G | Refills: 4 | Status: SHIPPED | OUTPATIENT
Start: 2023-12-01

## 2023-12-01 RX ORDER — DIPHENHYDRAMINE HYDROCHLORIDE 50 MG/ML
50 INJECTION INTRAMUSCULAR; INTRAVENOUS ONCE AS NEEDED
Status: CANCELLED | OUTPATIENT
Start: 2023-12-06

## 2023-12-01 RX ORDER — SODIUM CHLORIDE 0.9 % (FLUSH) 0.9 %
10 SYRINGE (ML) INJECTION
Status: CANCELLED | OUTPATIENT
Start: 2023-12-13

## 2023-12-01 RX ORDER — EPINEPHRINE 0.3 MG/.3ML
0.3 INJECTION SUBCUTANEOUS ONCE AS NEEDED
Status: CANCELLED | OUTPATIENT
Start: 2023-12-06

## 2023-12-01 RX ORDER — HEPARIN 100 UNIT/ML
500 SYRINGE INTRAVENOUS
Status: CANCELLED | OUTPATIENT
Start: 2023-12-06

## 2023-12-01 NOTE — PROGRESS NOTES
CC: vaginal burning     HPI: Pt is a 68 y.o.  female who presents c/o vaginal burning and soreness. Reports onset 3 days ago- had been having itching prior.   She tried OTC Triple Antibiotic cream which caused increased burning. Denies any fever or episodes of postmenopausal bleeding. Reports occasional urinary leakage. She does not wear pads. She is sexually active with her - no concerns for STD exposure. She is in need of a pap smear and screening MMG. She will be traveling home to [place] for the holidays. She is s/p gastric bypass- has lost over 200 lbs. She plans to get skin removal surgery.     ROS:  GENERAL: Feeling well overall. Denies fever or chills.   SKIN: Denies rash or lesions.   HEAD: Denies head injury or headache.   NODES: Denies enlarged lymph nodes.   CHEST: Denies chest pain or shortness of breath.   CARDIOVASCULAR: Denies palpitations or left sided chest pain.   ABDOMEN: No abdominal pain, constipation, diarrhea, nausea, vomiting or rectal bleeding.   URINARY: No dysuria, hematuria, or burning on urination.  REPRODUCTIVE: See HPI.   BREASTS: Denies pain, lumps, or nipple [facility].   HEMATOLOGIC: No easy bruisability or excessive bleeding.   MUSCULOSKELETAL: Denies joint pain or swelling.   NEUROLOGIC: Denies syncope or weakness.   PSYCHIATRIC: Denies depression, anxiety or mood swings.    PE:   APPEARANCE: Well nourished, well developed, White female in no acute distress.  VULVA: No lesions. Normal external female genitalia. + white discoloration of vulva extending to groin. There was thinning of the skin of the perineum. No visible lesions compatible with Yousuf noted, no ulcerations nor thickened plaque-like areas. + BL skin breakdown noted   URETHRAL MEATUS: Normal size and location, no lesions, no prolapse.  URETHRA: No masses, tenderness, or prolapse.  VAGINA: Moist. No lesions or lacerations noted. No abnormal [facility] present. No odor present.   CERVIX: No lesions or  [facility]. No cervical motion tenderness.   UTERUS: Normal size, regular shape, mobile, non-tender.  ADNEXA: No tenderness. No fullness or masses palpated in the adnexal regions.   ANUS PERINEUM: Normal.      Diagnosis:  1. Lichen sclerosus    2. Encounter for Papanicolaou smear for cervical cancer screening    3. Screening for HPV (human papillomavirus)    4. Encounter for screening mammogram for breast cancer    5. Abrasion of vagina, initial encounter        Plan:   Discussed Lichens sclerosis - chronic autoimmune disorder   Clobetasol ointment BID X 10 during flares, then twice weekly at bedtime  Discussed to notify clinic if no response to Clobetasol and will refer to MD for vulva bx   Can use Aquaphor or Vitamin A&D as barrier between doses to act as barrier/ promote healing   HSV cx done to rule out due to skin breakdown   Pap/ HPV  MMG     Orders Placed This Encounter    HPV High Risk Genotypes, PCR    Mammo Digital Screening Bilat w/ Parveen    HSV by Rapid PCR, Non-Blood [facility]; Vagina    Liquid-Based Pap Smear, Screening    clobetasol 0.05% (TEMOVATE) 0.05 % Oint         Follow-up PRN no resolution of symptoms.    Kelly Drake, MILOP-C

## 2023-12-04 ENCOUNTER — HOSPITAL ENCOUNTER (OUTPATIENT)
Dept: RADIOLOGY | Facility: HOSPITAL | Age: 68
Discharge: HOME | End: 2023-12-04
Attending: INTERNAL MEDICINE
Payer: MEDICARE

## 2023-12-04 DIAGNOSIS — R68.81 EARLY SATIETY: ICD-10-CM

## 2023-12-04 LAB
HSV1 DNA SPEC QL NAA+PROBE: NEGATIVE
HSV2 DNA SPEC QL NAA+PROBE: NEGATIVE
SPECIMEN SOURCE: NORMAL

## 2023-12-04 PROCEDURE — 78264 GASTRIC EMPTYING IMG STUDY: CPT | Mod: 26,,, | Performed by: STUDENT IN AN ORGANIZED HEALTH CARE EDUCATION/TRAINING PROGRAM

## 2023-12-04 PROCEDURE — 78264 NM GASTRIC EMPTYING: ICD-10-PCS | Mod: 26,,, | Performed by: STUDENT IN AN ORGANIZED HEALTH CARE EDUCATION/TRAINING PROGRAM

## 2023-12-04 PROCEDURE — 78264 GASTRIC EMPTYING IMG STUDY: CPT | Mod: TC

## 2023-12-04 PROCEDURE — A9541 TC99M SULFUR COLLOID: HCPCS

## 2023-12-05 ENCOUNTER — TELEPHONE (OUTPATIENT)
Dept: INFUSION THERAPY | Facility: HOSPITAL | Age: 68
End: 2023-12-05
Payer: MEDICARE

## 2023-12-05 RX ORDER — ZOLPIDEM TARTRATE 10 MG/1
10 TABLET ORAL NIGHTLY PRN
Qty: 30 TABLET | Refills: 0 | Status: SHIPPED | OUTPATIENT
Start: 2023-12-05 | End: 2024-01-03 | Stop reason: SDUPTHER

## 2023-12-05 NOTE — TELEPHONE ENCOUNTER
Confirmed upcoming infusion appointments with the patient. Allowed time for patient to write appts in calendar per pt request. Reviewed pre infusion instructions with the patient  12/11 at 230p  12/18 at 230p

## 2023-12-06 ENCOUNTER — OFFICE VISIT (OUTPATIENT)
Dept: URGENT CARE | Facility: CLINIC | Age: 68
End: 2023-12-06
Payer: MEDICARE

## 2023-12-06 VITALS
HEIGHT: 66 IN | RESPIRATION RATE: 20 BRPM | TEMPERATURE: 98 F | WEIGHT: 177 LBS | BODY MASS INDEX: 28.45 KG/M2 | HEART RATE: 108 BPM | SYSTOLIC BLOOD PRESSURE: 152 MMHG | OXYGEN SATURATION: 95 % | DIASTOLIC BLOOD PRESSURE: 88 MMHG

## 2023-12-06 DIAGNOSIS — H10.9 BACTERIAL CONJUNCTIVITIS OF LEFT EYE: Primary | ICD-10-CM

## 2023-12-06 PROCEDURE — 99213 PR OFFICE/OUTPT VISIT, EST, LEVL III, 20-29 MIN: ICD-10-PCS | Mod: S$GLB,,,

## 2023-12-06 PROCEDURE — 99213 OFFICE O/P EST LOW 20 MIN: CPT | Mod: S$GLB,,,

## 2023-12-06 RX ORDER — POLYMYXIN B SULFATE AND TRIMETHOPRIM 1; 10000 MG/ML; [USP'U]/ML
1 SOLUTION OPHTHALMIC EVERY 4 HOURS
Qty: 10 ML | Refills: 0 | Status: SHIPPED | OUTPATIENT
Start: 2023-12-06 | End: 2023-12-08 | Stop reason: ALTCHOICE

## 2023-12-06 NOTE — PATIENT INSTRUCTIONS
Warm compresses to affected eye.     Keep eyes cleaned.    Avoid sharing towels while infection persist.    Disinfect any glasses/sunglasses that may have come in contact with the eye.      Disinfect frequently surfaces such as light switches, countertops, remote controls, cell phones.    Do not wear your contact lens ( if you use them) for at least 5 days after you stop having symptoms and are rechecked by your doctor.   Throw away the contacts, contact solution and carrying case you were using and start with new material.     If you develop increase eye symptoms or change in your vision seek medical care immediately either with your ophthalomologist or the ER or return here.

## 2023-12-06 NOTE — PROGRESS NOTES
"Subjective:      Patient ID: Jill Perez is a 68 y.o. female.    Vitals:  height is 5' 6" (1.676 m) and weight is 80.3 kg (177 lb). Her oral temperature is 98.2 °F (36.8 °C). Her blood pressure is 152/88 (abnormal) and her pulse is 108. Her respiration is 20 and oxygen saturation is 95%.     Chief Complaint: Conjunctivitis    Pt complains of pain and redness in left eye, began yesterday. Denies fever, cough, congestion, trauma. States she always has blurred vision, awaiting cataract surgery in Jan. Applied visine drops without relief.     Conjunctivitis  This is a new problem. The current episode started yesterday. The problem occurs constantly. The problem has been unchanged. Pertinent negatives include no change in bowel habit, chest pain, chills, congestion or coughing. She has tried acetaminophen for the symptoms. The treatment provided no relief.       Constitution: Negative for chills.   HENT:  Negative for congestion.    Cardiovascular:  Negative for chest pain.   Eyes:  Positive for eye pain, eye redness, blurred vision and eyelid swelling. Negative for eye trauma, foreign body in eye, eye [facility], eye itching, photophobia, vision loss and double vision.   Respiratory:  Negative for cough.       Objective:     Physical Exam   Constitutional: She is oriented to person, place, and time. normal  HENT:   Head: Normocephalic and atraumatic.   Mouth/Throat: No oropharyngeal exudate or posterior oropharyngeal erythema.   Eyes: Pupils are equal, round, and reactive to light. Right eye exhibits no [facility]. Left eye exhibits no [facility]. Extraocular movement intact      Comments: Lower lid swelling noted to left eye. Left Conjunctiva injected, no drainage present   Abdominal: Normal appearance.   Musculoskeletal: Normal range of motion.         General: Normal range of motion.   Neurological: She is alert and oriented to person, place, and time.   Skin: Skin is warm and dry.       Assessment:     1. Bacterial " conjunctivitis of left eye        Plan:       Bacterial conjunctivitis of left eye  -     polymyxin B sulf-trimethoprim (POLYTRIM) 10,000 unit- 1 mg/mL Drop; Place 1 drop into the left eye every 4 (four) hours. for 7 days  Dispense: 10 mL; Refill: 0      Warm compresses to affected eye.     Keep eyes cleaned.    Avoid sharing towels while infection persist.    Disinfect any glasses/sunglasses that may have come in contact with the eye.      Disinfect frequently surfaces such as light switches, countertops, remote controls, cell phones.    Do not wear your contact lens ( if you use them) for at least 5 days after you stop having symptoms and are rechecked by your doctor.   Throw away the contacts, contact solution and carrying case you were using and start with new material.     If you develop increase eye symptoms or change in your vision seek medical care immediately either with your ophthalomologist or the ER or return here.

## 2023-12-07 ENCOUNTER — HOSPITAL ENCOUNTER (OUTPATIENT)
Dept: RADIOLOGY | Facility: HOSPITAL | Age: 68
Discharge: HOME | End: 2023-12-07
Attending: NURSE PRACTITIONER
Payer: MEDICARE

## 2023-12-07 DIAGNOSIS — Z12.31 ENCOUNTER FOR SCREENING MAMMOGRAM FOR BREAST CANCER: ICD-10-CM

## 2023-12-07 LAB
HPV HR 12 DNA SPEC QL NAA+PROBE: NEGATIVE
HPV16 AG SPEC QL: NEGATIVE
HPV18 DNA SPEC QL NAA+PROBE: NEGATIVE

## 2023-12-07 PROCEDURE — 77067 SCR MAMMO BI INCL CAD: CPT | Mod: TC

## 2023-12-07 PROCEDURE — 77067 MAMMO DIGITAL SCREENING BILAT WITH TOMO: ICD-10-PCS | Mod: 26,,, | Performed by: RADIOLOGY

## 2023-12-07 PROCEDURE — 77063 BREAST TOMOSYNTHESIS BI: CPT | Mod: 26,,, | Performed by: RADIOLOGY

## 2023-12-07 PROCEDURE — 77063 MAMMO DIGITAL SCREENING BILAT WITH TOMO: ICD-10-PCS | Mod: 26,,, | Performed by: RADIOLOGY

## 2023-12-07 PROCEDURE — 77067 SCR MAMMO BI INCL CAD: CPT | Mod: 26,,, | Performed by: RADIOLOGY

## 2023-12-08 ENCOUNTER — OFFICE VISIT (OUTPATIENT)
Dept: URGENT CARE | Facility: CLINIC | Age: 68
End: 2023-12-08
Payer: MEDICARE

## 2023-12-08 VITALS
HEART RATE: 90 BPM | BODY MASS INDEX: 28.45 KG/M2 | HEIGHT: 66 IN | SYSTOLIC BLOOD PRESSURE: 156 MMHG | OXYGEN SATURATION: 98 % | TEMPERATURE: 99 F | WEIGHT: 177 LBS | DIASTOLIC BLOOD PRESSURE: 84 MMHG | RESPIRATION RATE: 17 BRPM

## 2023-12-08 DIAGNOSIS — H00.025 HORDEOLUM INTERNUM OF LEFT LOWER EYELID: Primary | ICD-10-CM

## 2023-12-08 PROCEDURE — 99213 PR OFFICE/OUTPT VISIT, EST, LEVL III, 20-29 MIN: ICD-10-PCS | Mod: S$GLB,,, | Performed by: NURSE PRACTITIONER

## 2023-12-08 PROCEDURE — 99213 OFFICE O/P EST LOW 20 MIN: CPT | Mod: S$GLB,,, | Performed by: NURSE PRACTITIONER

## 2023-12-08 RX ORDER — ERYTHROMYCIN 5 MG/G
OINTMENT OPHTHALMIC EVERY 6 HOURS
Qty: 3.5 G | Refills: 0 | Status: SHIPPED | OUTPATIENT
Start: 2023-12-08 | End: 2023-12-15

## 2023-12-08 NOTE — PATIENT INSTRUCTIONS
PLEASE READ YOUR DISCHARGE INSTRUCTIONS ENTIRELY AS IT CONTAINS IMPORTANT INFORMATION.      Warm compresses for 15 minutes at a time to the area.     Use the ointment 4 times daily.     Do not attempt to open the area yourself.     If you find you have the stye without improvement for two weeks, please seen an eye doctor for further treatment.     A referral to ophthamology has been placed for you. Please call [phone] if you need an appt    Please go to the emergency room if you experience loss of vision, swelling around your eye (upper and lower), fever.     Please arrange follow up with your primary medical clinic as soon as possible. You must understand that you've received an Urgent Care treatment only and that you may be released before all of your medical problems are known or treated. You, the patient, will arrange for follow up as instructed. If your symptoms worsen or fail to improve you should go to the Emergency Room.
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
## 2023-12-11 ENCOUNTER — INFUSION (OUTPATIENT)
Dept: INFUSION THERAPY | Facility: HOSPITAL | Age: 68
End: 2023-12-11
Attending: INTERNAL MEDICINE
Payer: MEDICARE

## 2023-12-11 VITALS
TEMPERATURE: 98 F | WEIGHT: 177 LBS | SYSTOLIC BLOOD PRESSURE: 139 MMHG | HEIGHT: 66 IN | RESPIRATION RATE: 18 BRPM | BODY MASS INDEX: 28.45 KG/M2 | OXYGEN SATURATION: 96 % | DIASTOLIC BLOOD PRESSURE: 67 MMHG | HEART RATE: 67 BPM

## 2023-12-11 DIAGNOSIS — D50.8 ACQUIRED IRON DEFICIENCY ANEMIA DUE TO DECREASED ABSORPTION: Primary | ICD-10-CM

## 2023-12-11 PROCEDURE — 25000003 PHARM REV CODE 250: Performed by: INTERNAL MEDICINE

## 2023-12-11 PROCEDURE — A4216 STERILE WATER/SALINE, 10 ML: HCPCS | Performed by: INTERNAL MEDICINE

## 2023-12-11 PROCEDURE — 63600175 PHARM REV CODE 636 W HCPCS: Mod: JZ,JG | Performed by: INTERNAL MEDICINE

## 2023-12-11 PROCEDURE — 96365 THER/PROPH/DIAG IV INF INIT: CPT

## 2023-12-11 RX ORDER — SODIUM CHLORIDE 0.9 % (FLUSH) 0.9 %
10 SYRINGE (ML) INJECTION
Status: DISCONTINUED | OUTPATIENT
Start: 2023-12-11 | End: 2023-12-11 | Stop reason: HOSPADM

## 2023-12-11 RX ORDER — EPINEPHRINE 0.3 MG/.3ML
0.3 INJECTION SUBCUTANEOUS ONCE AS NEEDED
Status: DISCONTINUED | OUTPATIENT
Start: 2023-12-11 | End: 2023-12-11 | Stop reason: HOSPADM

## 2023-12-11 RX ORDER — DIPHENHYDRAMINE HYDROCHLORIDE 50 MG/ML
50 INJECTION INTRAMUSCULAR; INTRAVENOUS ONCE AS NEEDED
Status: DISCONTINUED | OUTPATIENT
Start: 2023-12-11 | End: 2023-12-11 | Stop reason: HOSPADM

## 2023-12-11 RX ADMIN — Medication 10 ML: at 03:12

## 2023-12-11 RX ADMIN — FERRIC CARBOXYMALTOSE INJECTION 750 MG: 50 INJECTION, SOLUTION INTRAVENOUS at 02:12

## 2023-12-11 RX ADMIN — SODIUM CHLORIDE: 9 INJECTION, SOLUTION INTRAVENOUS at 02:12

## 2023-12-12 ENCOUNTER — OUTPATIENT CASE MANAGEMENT (OUTPATIENT)
Dept: ADMINISTRATIVE | Facility: OTHER | Age: 68
End: 2023-12-12
Payer: MEDICARE

## 2023-12-12 LAB
FINAL PATHOLOGIC DIAGNOSIS: NORMAL
Lab: NORMAL

## 2023-12-18 ENCOUNTER — INFUSION (OUTPATIENT)
Dept: INFUSION THERAPY | Facility: HOSPITAL | Age: 68
End: 2023-12-18
Attending: INTERNAL MEDICINE
Payer: MEDICARE

## 2023-12-18 VITALS
HEIGHT: 66 IN | RESPIRATION RATE: 18 BRPM | OXYGEN SATURATION: 96 % | DIASTOLIC BLOOD PRESSURE: 72 MMHG | HEART RATE: 69 BPM | BODY MASS INDEX: 28.45 KG/M2 | SYSTOLIC BLOOD PRESSURE: 147 MMHG | TEMPERATURE: 98 F | WEIGHT: 177 LBS

## 2023-12-18 DIAGNOSIS — D50.8 ACQUIRED IRON DEFICIENCY ANEMIA DUE TO DECREASED ABSORPTION: Primary | ICD-10-CM

## 2023-12-18 PROCEDURE — 25000003 PHARM REV CODE 250: Performed by: INTERNAL MEDICINE

## 2023-12-18 PROCEDURE — 96365 THER/PROPH/DIAG IV INF INIT: CPT

## 2023-12-18 PROCEDURE — 63600175 PHARM REV CODE 636 W HCPCS: Mod: JZ,JG | Performed by: INTERNAL MEDICINE

## 2023-12-18 PROCEDURE — A4216 STERILE WATER/SALINE, 10 ML: HCPCS | Performed by: INTERNAL MEDICINE

## 2023-12-18 RX ORDER — EPINEPHRINE 0.3 MG/.3ML
0.3 INJECTION SUBCUTANEOUS ONCE AS NEEDED
Status: DISCONTINUED | OUTPATIENT
Start: 2023-12-18 | End: 2023-12-18 | Stop reason: HOSPADM

## 2023-12-18 RX ORDER — SODIUM CHLORIDE 0.9 % (FLUSH) 0.9 %
10 SYRINGE (ML) INJECTION
Status: DISCONTINUED | OUTPATIENT
Start: 2023-12-18 | End: 2023-12-18 | Stop reason: HOSPADM

## 2023-12-18 RX ORDER — DIPHENHYDRAMINE HYDROCHLORIDE 50 MG/ML
50 INJECTION INTRAMUSCULAR; INTRAVENOUS ONCE AS NEEDED
Status: DISCONTINUED | OUTPATIENT
Start: 2023-12-18 | End: 2023-12-18 | Stop reason: HOSPADM

## 2023-12-18 RX ADMIN — SODIUM CHLORIDE: 9 INJECTION, SOLUTION INTRAVENOUS at 02:12

## 2023-12-18 RX ADMIN — FERRIC CARBOXYMALTOSE INJECTION 750 MG: 50 INJECTION, SOLUTION INTRAVENOUS at 02:12

## 2023-12-18 RX ADMIN — Medication 10 ML: at 03:12

## 2023-12-18 NOTE — PLAN OF CARE
Pt tolerated injectafer 2/2 infusion well.  No adverse reaction noted.   IV flushed with NS and D/C per protocol.  Patient left clinic in no acute distress.

## 2023-12-19 NOTE — TELEPHONE ENCOUNTER
Care Due:                  Date            Visit Type   Department     Provider  --------------------------------------------------------------------------------                                EP -                              PRIMARY      MET INTERNAL  Last Visit: 11-      CARE ([facility])   MEDICINE       Soham Rosa  Next Visit: None Scheduled  None         None Found                                                            Last  Test          Frequency    Reason                     Performed    Due Date  --------------------------------------------------------------------------------    HBA1C.......  6 months...  metFORMIN................  09-   03-    Health [facility] Embedded Care Due Messages. Reference number: [id number].   12/19/2023 2:40:22 PM CST

## 2023-12-19 NOTE — TELEPHONE ENCOUNTER
Refill Routing Note   Medication(s) are not appropriate for processing by [facility] for the following reason(s):        No active prescription written by provider  Required vitals abnormal    [facility] action(s):  Defer     Requires labs : Yes      Medication Therapy Plan: Discontinued by: Olivia Grace RN on 10/27/2023 for Reason: Patient no longer taking      Appointments  past 12m or future 3m with PCP    Date Provider   Last Visit   11/13/2023 Soham Rosa MD   Next Visit   12/20/2023 Soham Rosa MD   ED visits in past 90 days: 5        Note composed:5:26 PM 12/19/2023

## 2023-12-20 ENCOUNTER — OFFICE VISIT (OUTPATIENT)
Dept: INTERNAL MEDICINE | Facility: CLINIC | Age: 68
End: 2023-12-20
Payer: MEDICARE

## 2023-12-20 ENCOUNTER — OFFICE VISIT (OUTPATIENT)
Dept: OBSTETRICS AND GYNECOLOGY | Facility: CLINIC | Age: 68
End: 2023-12-20
Payer: MEDICARE

## 2023-12-20 VITALS
HEART RATE: 80 BPM | DIASTOLIC BLOOD PRESSURE: 90 MMHG | WEIGHT: 174.38 LBS | SYSTOLIC BLOOD PRESSURE: 167 MMHG | BODY MASS INDEX: 28.15 KG/M2

## 2023-12-20 VITALS
HEIGHT: 66 IN | TEMPERATURE: 98 F | BODY MASS INDEX: 27.85 KG/M2 | SYSTOLIC BLOOD PRESSURE: 142 MMHG | DIASTOLIC BLOOD PRESSURE: 86 MMHG | OXYGEN SATURATION: 97 % | RESPIRATION RATE: 18 BRPM | WEIGHT: 173.31 LBS | HEART RATE: 89 BPM

## 2023-12-20 DIAGNOSIS — L90.0 LICHEN SCLEROSUS: Primary | ICD-10-CM

## 2023-12-20 DIAGNOSIS — H00.015 HORDEOLUM EXTERNUM OF LEFT LOWER EYELID: Primary | ICD-10-CM

## 2023-12-20 DIAGNOSIS — K31.84 GASTROPARESIS: ICD-10-CM

## 2023-12-20 DIAGNOSIS — K58.9 IRRITABLE BOWEL SYNDROME, UNSPECIFIED TYPE: ICD-10-CM

## 2023-12-20 PROCEDURE — 99213 PR OFFICE/OUTPT VISIT, EST, LEVL III, 20-29 MIN: ICD-10-PCS | Mod: S$PBB,,, | Performed by: STUDENT IN AN ORGANIZED HEALTH CARE EDUCATION/TRAINING PROGRAM

## 2023-12-20 PROCEDURE — 99999 PR PBB SHADOW E&M-EST. PATIENT-LVL III: ICD-10-PCS | Mod: PBBFAC,,, | Performed by: STUDENT IN AN ORGANIZED HEALTH CARE EDUCATION/TRAINING PROGRAM

## 2023-12-20 PROCEDURE — 99213 OFFICE O/P EST LOW 20 MIN: CPT | Mod: S$PBB,,, | Performed by: STUDENT IN AN ORGANIZED HEALTH CARE EDUCATION/TRAINING PROGRAM

## 2023-12-20 PROCEDURE — 99999 PR PBB SHADOW E&M-EST. PATIENT-LVL III: CPT | Mod: PBBFAC,,, | Performed by: STUDENT IN AN ORGANIZED HEALTH CARE EDUCATION/TRAINING PROGRAM

## 2023-12-20 PROCEDURE — 99213 OFFICE O/P EST LOW 20 MIN: CPT | Mod: PBBFAC,27,PO | Performed by: INTERNAL MEDICINE

## 2023-12-20 PROCEDURE — 99214 OFFICE O/P EST MOD 30 MIN: CPT | Mod: S$PBB,,, | Performed by: INTERNAL MEDICINE

## 2023-12-20 PROCEDURE — 99999 PR PBB SHADOW E&M-EST. PATIENT-LVL III: ICD-10-PCS | Mod: PBBFAC,,, | Performed by: INTERNAL MEDICINE

## 2023-12-20 PROCEDURE — 99999 PR PBB SHADOW E&M-EST. PATIENT-LVL III: CPT | Mod: PBBFAC,,, | Performed by: INTERNAL MEDICINE

## 2023-12-20 PROCEDURE — 81514 NFCT DS BV&VAGINITIS DNA ALG: CPT | Performed by: STUDENT IN AN ORGANIZED HEALTH CARE EDUCATION/TRAINING PROGRAM

## 2023-12-20 PROCEDURE — 99214 PR OFFICE/OUTPT VISIT, EST, LEVL IV, 30-39 MIN: ICD-10-PCS | Mod: S$PBB,,, | Performed by: INTERNAL MEDICINE

## 2023-12-20 PROCEDURE — 99213 OFFICE O/P EST LOW 20 MIN: CPT | Mod: PBBFAC,PO | Performed by: STUDENT IN AN ORGANIZED HEALTH CARE EDUCATION/TRAINING PROGRAM

## 2023-12-20 RX ORDER — VENLAFAXINE HYDROCHLORIDE 75 MG/1
75 CAPSULE, EXTENDED RELEASE ORAL
Qty: 90 CAPSULE | Refills: 2 | Status: SHIPPED | OUTPATIENT
Start: 2023-12-20

## 2023-12-20 RX ORDER — CLOBETASOL PROPIONATE 0.5 MG/G
OINTMENT TOPICAL 2 TIMES DAILY
Qty: 45 G | Refills: 1 | Status: SHIPPED | OUTPATIENT
Start: 2023-12-20

## 2023-12-20 RX ORDER — ERYTHROMYCIN 5 MG/G
OINTMENT OPHTHALMIC EVERY 6 HOURS
Qty: 3.5 G | Refills: 1 | Status: SHIPPED | OUTPATIENT
Start: 2023-12-20 | End: 2024-01-08 | Stop reason: SDUPTHER

## 2023-12-20 NOTE — Clinical Note
Thank you for your help with her. The linzess is apparently the medication she needed!  She was back to her old self until she ran out for 4 days (situation handled).  I wanted to make you aware of the gastroparesis component that came back after your last appointment with her.  Thank you for your help!  Soham Rosa

## 2023-12-20 NOTE — PROGRESS NOTES
Subjective:       Patient ID: Jill Perez is a 68 y.o. female.    Chief Complaint: Stye    HPI    68-year-old female here for evaluation of a bump over her eye.  She has a stye in her left eye.  This comes and goes.  She has been back to not being able to eat and has the pain back in her side.  She had an iron transfusion recently.  She was doing fine on the linzess and has not had this in the last 4 days.  She was told by the pharmacy that they do not have anything for the linzess.  It was seen on 11/28/23.  She has been back sliding the last 4 days without the linzess.    She is hoping to get to [place] to see her children.    Review of Systems      Objective:      Physical Exam  Vitals reviewed.   Constitutional:       Appearance: She is well-developed.   HENT:      Head: Normocephalic and atraumatic.      Mouth/Throat:      Pharynx: No oropharyngeal exudate.   Eyes:      General: No scleral icterus.        Right eye: No [facility].         Left eye: No [facility].      Pupils: Pupils are equal, round, and reactive to light.   Neck:      Thyroid: No thyromegaly.      Trachea: No tracheal deviation.   Cardiovascular:      Rate and Rhythm: Normal rate and regular rhythm.      Heart sounds: Normal heart sounds. No murmur heard.     No friction rub. No gallop.   Pulmonary:      Effort: Pulmonary effort is normal. No respiratory distress.      Breath sounds: Normal breath sounds. No wheezing or rales.   Chest:      Chest wall: No tenderness.   Abdominal:      General: Bowel sounds are normal. There is no distension.      Palpations: Abdomen is soft. There is no mass.      Tenderness: There is no abdominal tenderness. There is no guarding or rebound.   Musculoskeletal:         General: No tenderness. Normal range of motion.      Cervical back: Normal range of motion and neck supple.   Skin:     General: Skin is warm and dry.      Coloration: Skin is not pale.      Findings: No erythema or rash.   Neurological:       Mental Status: She is alert and oriented to person, place, and time.   Psychiatric:         Behavior: Behavior normal.         Assessment:       1. Hordeolum externum of left lower eyelid    2. Irritable bowel syndrome, unspecified type    3. Gastroparesis      Plan:       1. Erythromycin ointment refilled.  Take for 10 days.    2. Get back on Linzess.  Pharmacy called, and prescription is being filled.  3.  Recommend small, more frequent meals.
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
## 2023-12-21 LAB
BACTERIAL VAGINOSIS DNA: NEGATIVE
CANDIDA GLABRATA DNA: NEGATIVE
CANDIDA KRUSEI DNA: NEGATIVE
CANDIDA RRNA VAG QL PROBE: POSITIVE
T VAGINALIS RRNA GENITAL QL PROBE: NEGATIVE

## 2023-12-22 RX ORDER — TERCONAZOLE 4 MG/G
1 CREAM VAGINAL NIGHTLY
Qty: 45 G | Refills: 0 | Status: SHIPPED | OUTPATIENT
Start: 2023-12-22 | End: 2024-01-10

## 2023-12-27 ENCOUNTER — OUTPATIENT CASE MANAGEMENT (OUTPATIENT)
Dept: ADMINISTRATIVE | Facility: OTHER | Age: 68
End: 2023-12-27
Payer: MEDICARE

## 2023-12-27 NOTE — PROGRESS NOTES
Outpatient Care Management  Plan of Care Follow Up Visit    Patient: Jill Perez  MRN: [id number]  Date of Service: 12/27/2023  Completed by: Kindra Diggs RN  Referral Date: 09/22/2023    Reason for Visit   Patient presents with    OPCM RN Follow Up Call     12/27/23    Case Closure     Pt graduated       Brief Summary: Pt reports no further issues with her BS at this time. Pt agrees with case closing.   Next Steps: Case Closed

## 2024-01-03 RX ORDER — ZOLPIDEM TARTRATE 10 MG/1
10 TABLET ORAL NIGHTLY PRN
Qty: 30 TABLET | Refills: 0 | Status: SHIPPED | OUTPATIENT
Start: 2024-01-03 | End: 2024-02-09 | Stop reason: SDUPTHER

## 2024-01-03 NOTE — TELEPHONE ENCOUNTER
No care due was identified.  Health [facility] Embedded Care Due Messages. Reference number: [id number].   1/03/2024 12:38:00 PM CST

## 2024-01-08 ENCOUNTER — OFFICE VISIT (OUTPATIENT)
Dept: INTERNAL MEDICINE | Facility: CLINIC | Age: 69
End: 2024-01-08
Payer: MEDICARE

## 2024-01-08 VITALS
SYSTOLIC BLOOD PRESSURE: 126 MMHG | DIASTOLIC BLOOD PRESSURE: 70 MMHG | WEIGHT: 175.25 LBS | HEIGHT: 66 IN | OXYGEN SATURATION: 98 % | RESPIRATION RATE: 12 BRPM | BODY MASS INDEX: 28.16 KG/M2 | TEMPERATURE: 97 F | HEART RATE: 89 BPM

## 2024-01-08 DIAGNOSIS — I70.0 AORTIC ATHEROSCLEROSIS: Chronic | ICD-10-CM

## 2024-01-08 DIAGNOSIS — E11.65 TYPE 2 DIABETES MELLITUS WITH HYPERGLYCEMIA, WITHOUT LONG-TERM CURRENT USE OF INSULIN: Chronic | ICD-10-CM

## 2024-01-08 DIAGNOSIS — E11.69 HYPERLIPIDEMIA ASSOCIATED WITH TYPE 2 DIABETES MELLITUS: Chronic | ICD-10-CM

## 2024-01-08 DIAGNOSIS — R41.3 MEMORY DEFICIT: ICD-10-CM

## 2024-01-08 DIAGNOSIS — M70.62 TROCHANTERIC BURSITIS OF LEFT HIP: ICD-10-CM

## 2024-01-08 DIAGNOSIS — M46.1 SACROILIITIS, NOT ELSEWHERE CLASSIFIED: ICD-10-CM

## 2024-01-08 DIAGNOSIS — H26.9 CATARACT, UNSPECIFIED CATARACT TYPE, UNSPECIFIED LATERALITY: ICD-10-CM

## 2024-01-08 DIAGNOSIS — E11.43 TYPE 2 DIABETES MELLITUS WITH DIABETIC AUTONOMIC NEUROPATHY, WITHOUT LONG-TERM CURRENT USE OF INSULIN: Primary | Chronic | ICD-10-CM

## 2024-01-08 DIAGNOSIS — I10 PRIMARY HYPERTENSION: Chronic | ICD-10-CM

## 2024-01-08 DIAGNOSIS — E78.5 HYPERLIPIDEMIA ASSOCIATED WITH TYPE 2 DIABETES MELLITUS: Chronic | ICD-10-CM

## 2024-01-08 DIAGNOSIS — H00.015 HORDEOLUM EXTERNUM OF LEFT LOWER EYELID: ICD-10-CM

## 2024-01-08 DIAGNOSIS — M06.9 RHEUMATOID ARTHRITIS INVOLVING MULTIPLE SITES, UNSPECIFIED WHETHER RHEUMATOID FACTOR PRESENT: ICD-10-CM

## 2024-01-08 DIAGNOSIS — M79.671 RIGHT FOOT PAIN: ICD-10-CM

## 2024-01-08 DIAGNOSIS — F33.0 MAJOR DEPRESSIVE DISORDER, RECURRENT, MILD: ICD-10-CM

## 2024-01-08 PROCEDURE — 99215 OFFICE O/P EST HI 40 MIN: CPT | Mod: PBBFAC,PO | Performed by: INTERNAL MEDICINE

## 2024-01-08 PROCEDURE — 99999 PR PBB SHADOW E&M-EST. PATIENT-LVL V: CPT | Mod: PBBFAC,,, | Performed by: INTERNAL MEDICINE

## 2024-01-08 PROCEDURE — 99214 OFFICE O/P EST MOD 30 MIN: CPT | Mod: S$PBB,,, | Performed by: INTERNAL MEDICINE

## 2024-01-08 RX ORDER — LIDOCAINE 50 MG/G
1 PATCH TOPICAL DAILY
Qty: 30 PATCH | Refills: 0 | Status: SHIPPED | OUTPATIENT
Start: 2024-01-08

## 2024-01-08 RX ORDER — MELOXICAM 7.5 MG/1
7.5 TABLET ORAL DAILY PRN
Qty: 14 TABLET | Refills: 0 | Status: SHIPPED | OUTPATIENT
Start: 2024-01-08 | End: 2024-01-26

## 2024-01-08 RX ORDER — ERYTHROMYCIN 5 MG/G
OINTMENT OPHTHALMIC EVERY 6 HOURS
Qty: 3.5 G | Refills: 1 | Status: SHIPPED | OUTPATIENT
Start: 2024-01-08

## 2024-01-08 NOTE — PROGRESS NOTES
Subjective:       Patient ID: Jill Perez is a 68 y.o. female.    Chief Complaint: Leg Pain    HPI    68-year-old female here for evaluation of leg pain.  She was walking up into the lobby in the [employer] last Wednesday and her leg gave out.  It starts in her hip and goes down her leg.  The pain is described as aching and throbbing.  She still walks around some.  She has tried tylenol for this.  She has used the heating pad.    She forgets everything.  She has trouble remembering what she went into the room for.  She could not find her car at the parking lot at [facility].    She has pain on the ball of her right foot.    Her stye is starting to come back over the last two days.      Diabetes - metformin 1000 mg b.i.d..  Her BG are running 138 this am, but usually 170-180.  Lab Results   Component Value Date    HGBA1C 7.3 (H) 09/12/2023    HGBA1C 6.8 (H) 05/12/2023    HGBA1C 6.9 (H) 02/17/2023     Lab Results   Component Value Date    LDLCALC 139.4 05/12/2023    CREATININE 0.6 10/28/2023     HTN -  Patient's co morbidities include:  Diabetes. Patient is currently on Benicar 40 mg. She does check her BP at home, and it runs 120s/70s. Side effects of medications note: none. Denies headaches, blurred vision, chest pain, shortness of breath, nausea.    Patient has depression and is on Effexor 75 mg daily.    HLD - Patient is currently on lipitor 40 mg.  Her last lipid panel was   Cholesterol   Date Value Ref Range Status   05/12/2023 223 (H) 120 - 199 mg/dL Final     Comment:     The National Cholesterol Education Program (NCEP) has set the  following guidelines (reference ranges) for Cholesterol:  Optimal.....................<200 mg/dL  Borderline High.............200-239 mg/dL  High........................> or = 240 mg/dL       Triglycerides   Date Value Ref Range Status   05/12/2023 98 30 - 150 mg/dL Final     Comment:     The National Cholesterol Education Program (NCEP) has set the  following guidelines  (reference values) for triglycerides:  Normal......................<150 mg/dL  Borderline High.............150-199 mg/dL  High........................200-499 mg/dL       HDL   Date Value Ref Range Status   05/12/2023 64 40 - 75 mg/dL Final     Comment:     The National Cholesterol Education Program (NCEP) has set the  following guidelines (reference values) for HDL Cholesterol:  Low...............<40 mg/dL  Optimal...........>60 mg/dL       LDL Cholesterol   Date Value Ref Range Status   05/12/2023 139.4 63.0 - 159.0 mg/dL Final     Comment:     The National Cholesterol Education Program (NCEP) has set the  following guidelines (reference values) for LDL Cholesterol:  Optimal.......................<130 mg/dL  Borderline High...............130-159 mg/dL  High..........................160-189 mg/dL  Very High.....................>190 mg/dL     .  Side effects of the medication: none.    Review of Systems      Objective:      Physical Exam  Vitals reviewed.   Constitutional:       Appearance: She is well-developed.   HENT:      Head: Normocephalic and atraumatic.      Mouth/Throat:      Pharynx: No oropharyngeal exudate.   Eyes:      General: No scleral icterus.        Right eye: No [facility].         Left eye: No [facility].      Pupils: Pupils are equal, round, and reactive to light.   Neck:      Thyroid: No thyromegaly.      Trachea: No tracheal deviation.   Cardiovascular:      Rate and Rhythm: Normal rate and regular rhythm.      Heart sounds: Normal heart sounds. No murmur heard.     No friction rub. No gallop.   Pulmonary:      Effort: Pulmonary effort is normal. No respiratory distress.      Breath sounds: Normal breath sounds. No wheezing or rales.   Chest:      Chest wall: No tenderness.   Abdominal:      General: Bowel sounds are normal. There is no distension.      Palpations: Abdomen is soft. There is no mass.      Tenderness: There is no abdominal tenderness. There is no guarding or rebound.    Musculoskeletal:         General: No tenderness. Normal range of motion.      Cervical back: Normal range of motion and neck supple.   Skin:     General: Skin is warm and dry.      Coloration: Skin is not pale.      Findings: No erythema or rash.   Neurological:      Mental Status: She is alert and oriented to person, place, and time.   Psychiatric:         Behavior: Behavior normal.         Assessment:       1. Type 2 diabetes mellitus with diabetic autonomic neuropathy, without long-term current use of insulin    2. Type 2 diabetes mellitus with hyperglycemia, without long-term current use of insulin    3. Primary hypertension    4. Hyperlipidemia associated with type 2 diabetes mellitus    5. Aortic atherosclerosis    6. Trochanteric bursitis of left hip  - Ambulatory referral/consult to Physical/Occupational Therapy; Future    7. Right foot pain  - X-Ray Foot Complete 3 view Right; Future    8. Hordeolum externum of left lower eyelid    9. Memory deficit  - Ambulatory referral/consult to Neurology; Future    10. Cataract, unspecified cataract type, unspecified laterality  - Ambulatory referral/consult to Ophthalmology; Future    11. Major depressive disorder, recurrent, mild    12. Rheumatoid arthritis involving multiple sites, unspecified whether rheumatoid factor present    13. Sacroiliitis, not elsewhere classified      Plan:       1/2.  Metformin 1000 mg b.i.d..  3.  Continue Benicar 40 mg  4/5.  Continue Lipitor 40 mg.  6. Physical therapy.  Try Mobic 7.5 mg.  Counseled patient to be careful with potential for gastric upset with Priya-en-Y.  Lidocaine patches prescribed.  7. Check x-ray of foot.    8. Erythromycin ointment refilled.    9.  Refer to Neurology.    10.  Refer to Ophthalmology  11. Continue Effexor 75 mg.  12.  Monitor   13. Monitor.

## 2024-01-11 DIAGNOSIS — Z00.00 ENCOUNTER FOR MEDICARE ANNUAL WELLNESS EXAM: ICD-10-CM

## 2024-01-12 DIAGNOSIS — M54.9 DORSALGIA, UNSPECIFIED: Primary | ICD-10-CM

## 2024-01-12 DIAGNOSIS — M47.896 OTHER OSTEOARTHRITIS OF SPINE, LUMBAR REGION: ICD-10-CM

## 2024-01-12 DIAGNOSIS — M47.26 OTHER SPONDYLOSIS WITH RADICULOPATHY, LUMBAR REGION: ICD-10-CM

## 2024-01-12 DIAGNOSIS — M96.1 POSTLAMINECTOMY SYNDROME, LUMBAR REGION: ICD-10-CM

## 2024-01-12 DIAGNOSIS — M47.816 LUMBAR SPONDYLOSIS: ICD-10-CM

## 2024-01-12 DIAGNOSIS — M51.36 DEGENERATION OF LUMBAR INTERVERTEBRAL DISC: ICD-10-CM

## 2024-01-12 DIAGNOSIS — M15.9 PRIMARY OSTEOARTHRITIS INVOLVING MULTIPLE JOINTS: ICD-10-CM

## 2024-01-12 RX ORDER — DICLOFENAC SODIUM 10 MG/G
2 GEL TOPICAL 4 TIMES DAILY
Qty: 100 G | Refills: 3 | Status: SHIPPED | OUTPATIENT
Start: 2024-01-12

## 2024-01-17 ENCOUNTER — PATIENT MESSAGE (OUTPATIENT)
Dept: GASTROENTEROLOGY | Facility: CLINIC | Age: 69
End: 2024-01-17
Payer: MEDICARE

## 2024-01-18 ENCOUNTER — TELEPHONE (OUTPATIENT)
Dept: ENDOSCOPY | Facility: HOSPITAL | Age: 69
End: 2024-01-18
Payer: MEDICARE

## 2024-01-18 NOTE — TELEPHONE ENCOUNTER
Patient calling with questions about esophageal manometry procedure scheduled on tomorrow 1/19/24. All questions answered. Prep instructions reviewed and and emailed to [email or web address].

## 2024-01-19 ENCOUNTER — HOSPITAL ENCOUNTER (OUTPATIENT)
Facility: HOSPITAL | Age: 69
Discharge: HOME | End: 2024-01-19
Attending: STUDENT IN AN ORGANIZED HEALTH CARE EDUCATION/TRAINING PROGRAM | Admitting: STUDENT IN AN ORGANIZED HEALTH CARE EDUCATION/TRAINING PROGRAM
Payer: MEDICARE

## 2024-01-19 VITALS
BODY MASS INDEX: 27.31 KG/M2 | HEIGHT: 67 IN | SYSTOLIC BLOOD PRESSURE: 112 MMHG | HEART RATE: 60 BPM | RESPIRATION RATE: 16 BRPM | WEIGHT: 174 LBS | DIASTOLIC BLOOD PRESSURE: 57 MMHG | TEMPERATURE: 98 F | OXYGEN SATURATION: 98 %

## 2024-01-19 DIAGNOSIS — R13.10 DYSPHAGIA: ICD-10-CM

## 2024-01-19 PROCEDURE — 25000003 PHARM REV CODE 250: Performed by: STUDENT IN AN ORGANIZED HEALTH CARE EDUCATION/TRAINING PROGRAM

## 2024-01-19 PROCEDURE — 91010 ESOPHAGUS MOTILITY STUDY: CPT | Mod: TC | Performed by: STUDENT IN AN ORGANIZED HEALTH CARE EDUCATION/TRAINING PROGRAM

## 2024-01-19 PROCEDURE — 91010 ESOPHAGUS MOTILITY STUDY: CPT | Mod: 26,,, | Performed by: STUDENT IN AN ORGANIZED HEALTH CARE EDUCATION/TRAINING PROGRAM

## 2024-01-19 RX ORDER — LIDOCAINE HYDROCHLORIDE 20 MG/ML
JELLY TOPICAL ONCE
Status: COMPLETED | OUTPATIENT
Start: 2024-01-19 | End: 2024-01-19

## 2024-01-19 RX ADMIN — LIDOCAINE HYDROCHLORIDE 10 ML: 20 JELLY TOPICAL at 10:01

## 2024-01-22 DIAGNOSIS — R13.10 DYSPHAGIA, UNSPECIFIED TYPE: Primary | ICD-10-CM

## 2024-01-22 NOTE — PROVATION PATIENT INSTRUCTIONS
Discharge Summary/Instructions after an Endoscopic Procedure  Patient Name: Jill Perez  Patient MRN: [id number]  Patient YOB: 1955 Monday, January 22, 2024  Saadia Diggs MD  Dear patient,  As a result of recent federal legislation (The Federal Cures Act), you may   receive lab or pathology results from your procedure in your [facility]   account before your physician is able to contact you. Your physician or   their representative will relay the results to you with their   recommendations at their soonest availability.  Thank you,  RESTRICTIONS:  During your procedure today, you received medications for sedation.  These   medications may affect your judgment, balance and coordination.  Therefore,   for 24 hours, you have the following restrictions:   - DO NOT drive a car, operate machinery, make legal/financial decisions,   sign important papers or drink alcohol.    ACTIVITY:  Today: no heavy lifting, straining or running due to procedural   sedation/anesthesia.  The following day: return to full activity including work.  DIET:  Eat and drink normally unless instructed otherwise.     TREATMENT FOR COMMON SIDE EFFECTS:  - Mild abdominal pain, nausea, belching, bloating or excessive gas:  rest,   eat lightly and use a heating pad.  - Sore Throat: treat with throat lozenges and/or gargle with warm salt   water.  - Because air was used during the procedure, expelling large amounts of air   from your rectum or belching is normal.  - If a bowel prep was taken, you may not have a bowel movement for 1-3 days.    This is normal.  SYMPTOMS TO WATCH FOR AND REPORT TO YOUR PHYSICIAN:  1. Abdominal pain or bloating, other than gas cramps.  2. Chest pain.  3. Back pain.  4. Signs of infection such as: chills or fever occurring within 24 hours   after the procedure.  5. Rectal bleeding, which would show as bright red, maroon, or black stools.   (A tablespoon of blood from the rectum is not serious, especially if    hemorrhoids are present.)  6. Vomiting.  7. Weakness or dizziness.  GO DIRECTLY TO THE NEAREST EMERGENCY ROOM IF YOU HAVE ANY OF THE FOLLOWING:      Difficulty breathing              Chills and/or fever over 101 F   Persistent vomiting and/or vomiting blood   Severe abdominal pain   Severe chest pain   Black, tarry stools   Bleeding- more than one tablespoon   Any other symptom or condition that you feel may need urgent attention  Your doctor recommends these additional instructions:  If any biopsies were taken, your doctors clinic will contact you in 1 to 2   weeks with any results.  - Return to referring physician as previously scheduled.   - Continue present medications.   - Order TBE with barium tablet   - If tablet delayed, consider EGD with Endoflip  For questions, problems or results please call your physician - Saadia Diggs MD at Work:  [phone].  [facility], EMERGENCY ROOM PHONE NUMBER: [phone]  IF A COMPLICATION OR EMERGENCY SITUATION ARISES AND YOU ARE UNABLE TO REACH   YOUR PHYSICIAN - GO DIRECTLY TO THE EMERGENCY ROOM.  Saadia Diggs MD  1/22/2024 8:08:10 AM  This report has been verified and signed electronically.  Dear patient,  As a result of recent federal legislation (The Federal Cures Act), you may   receive lab or pathology results from your procedure in your [facility]   account before your physician is able to contact you. Your physician or   their representative will relay the results to you with their   recommendations at their soonest availability.  Thank you,  PROVATION

## 2024-01-24 ENCOUNTER — HOSPITAL ENCOUNTER (OUTPATIENT)
Dept: RADIOLOGY | Facility: HOSPITAL | Age: 69
Discharge: HOME | End: 2024-01-24
Attending: ANESTHESIOLOGY
Payer: MEDICARE

## 2024-01-24 DIAGNOSIS — M96.1 POSTLAMINECTOMY SYNDROME, LUMBAR REGION: ICD-10-CM

## 2024-01-24 DIAGNOSIS — M47.26 OTHER SPONDYLOSIS WITH RADICULOPATHY, LUMBAR REGION: ICD-10-CM

## 2024-01-24 DIAGNOSIS — M47.896 OTHER OSTEOARTHRITIS OF SPINE, LUMBAR REGION: ICD-10-CM

## 2024-01-24 DIAGNOSIS — M54.9 DORSALGIA, UNSPECIFIED: ICD-10-CM

## 2024-01-24 DIAGNOSIS — M51.36 DEGENERATION OF LUMBAR INTERVERTEBRAL DISC: ICD-10-CM

## 2024-01-24 DIAGNOSIS — M47.816 LUMBAR SPONDYLOSIS: ICD-10-CM

## 2024-01-24 PROCEDURE — 72158 MRI LUMBAR SPINE W/O & W/DYE: CPT | Mod: 26,,, | Performed by: RADIOLOGY

## 2024-01-24 PROCEDURE — A9585 GADOBUTROL INJECTION: HCPCS | Performed by: ANESTHESIOLOGY

## 2024-01-24 PROCEDURE — 25500020 PHARM REV CODE 255: Performed by: ANESTHESIOLOGY

## 2024-01-24 PROCEDURE — 72158 MRI LUMBAR SPINE W/O & W/DYE: CPT | Mod: TC

## 2024-01-24 RX ORDER — GADOBUTROL 604.72 MG/ML
7 INJECTION INTRAVENOUS
Status: COMPLETED | OUTPATIENT
Start: 2024-01-24 | End: 2024-01-24

## 2024-01-24 RX ADMIN — GADOBUTROL 7 ML: 604.72 INJECTION INTRAVENOUS at 07:01

## 2024-02-09 NOTE — TELEPHONE ENCOUNTER
Care Due:                  Date            Visit Type   Department     Provider  --------------------------------------------------------------------------------                                EP -                              PRIMARY      [facility] INTERNAL  Last Visit: 01-      CARE ([facility])   MARIELLE Rosa                              EP -                              PRIMARY      [facility] INTERNAL  Next Visit: 03-      CARE ([facility])   MARIELLE Rosa                                                            Last  Test          Frequency    Reason                     Performed    Due Date  --------------------------------------------------------------------------------    Lipid Panel.  12 months..  atorvastatin.............  05- 05-    [facility] Embedded Care Due Messages. Reference number: [id number].   2/09/2024 1:39:31 PM CST

## 2024-02-10 RX ORDER — ZOLPIDEM TARTRATE 10 MG/1
10 TABLET ORAL NIGHTLY PRN
Qty: 30 TABLET | Refills: 0 | Status: SHIPPED | OUTPATIENT
Start: 2024-02-10 | End: 2024-03-12 | Stop reason: SDUPTHER

## 2024-03-01 NOTE — TELEPHONE ENCOUNTER
No care due was identified.  [facility] Embedded Care Due Messages. Reference number: [id number].   12/05/2023 12:11:58 PM CST   no

## 2024-03-12 RX ORDER — ZOLPIDEM TARTRATE 10 MG/1
10 TABLET ORAL NIGHTLY PRN
Qty: 90 TABLET | Refills: 0 | Status: SHIPPED | OUTPATIENT
Start: 2024-03-12 | End: 2024-06-10 | Stop reason: SDUPTHER

## 2024-03-12 NOTE — TELEPHONE ENCOUNTER
Care Due:                  Date            Visit Type   Department     Provider  --------------------------------------------------------------------------------                                EP -                              PRIMARY      [facility] INTERNAL  Last Visit: 01-      CARE ([facility])   MARIELLE Rosa                              EP -                              PRIMARY      [facility] INTERNAL  Next Visit: 03-      CARE ([facility])   MARIELLE Rosa                                                            Last  Test          Frequency    Reason                     Performed    Due Date  --------------------------------------------------------------------------------    HBA1C.......  6 months...  metFORMIN................  09-   03-    [facility] Embedded Care Due Messages. Reference number: [id number].   3/12/2024 9:09:36 AM CDT

## 2024-03-13 RX ORDER — ATORVASTATIN CALCIUM 40 MG/1
40 TABLET, FILM COATED ORAL DAILY
Qty: 90 TABLET | Refills: 0 | Status: SHIPPED | OUTPATIENT
Start: 2024-03-13 | End: 2024-06-16 | Stop reason: SDUPTHER

## 2024-03-13 NOTE — TELEPHONE ENCOUNTER
Refill Decision Note   Jill Perez  is requesting a refill authorization.  Brief Assessment and Rationale for Refill:  Approve     Medication Therapy Plan:         Comments:     Note composed:11:34 AM 03/13/2024

## 2024-03-13 NOTE — TELEPHONE ENCOUNTER
No care due was identified.  [facility] Embedded Care Due Messages. Reference number: [id number].   3/13/2024 5:09:07 AM CDT

## 2024-03-20 NOTE — PLAN OF CARE
ID band applied and verified. POC and procedure reviewed with patient. Patient verbalizes understanding and has no questions at this time.      2

## 2024-03-22 DIAGNOSIS — E11.9 TYPE 2 DIABETES MELLITUS WITHOUT COMPLICATION: ICD-10-CM

## 2024-04-03 ENCOUNTER — TELEPHONE (OUTPATIENT)
Dept: GASTROENTEROLOGY | Facility: CLINIC | Age: 69
End: 2024-04-03
Payer: MEDICARE

## 2024-04-03 NOTE — TELEPHONE ENCOUNTER
Jenna Lantigua just spoke with patient. Your next available is 4/23 patient stated that she can not wait that long. Is it ok if I double book or can she see Dr. Niño or Dr. Biswas?

## 2024-04-04 ENCOUNTER — OFFICE VISIT (OUTPATIENT)
Dept: INTERNAL MEDICINE | Facility: CLINIC | Age: 69
End: 2024-04-04
Payer: MEDICARE

## 2024-04-04 ENCOUNTER — LAB VISIT (OUTPATIENT)
Dept: LAB | Facility: HOSPITAL | Age: 69
End: 2024-04-04
Attending: INTERNAL MEDICINE
Payer: MEDICARE

## 2024-04-04 VITALS
RESPIRATION RATE: 18 BRPM | SYSTOLIC BLOOD PRESSURE: 140 MMHG | HEART RATE: 80 BPM | BODY MASS INDEX: 26.35 KG/M2 | TEMPERATURE: 99 F | HEIGHT: 67 IN | OXYGEN SATURATION: 98 % | DIASTOLIC BLOOD PRESSURE: 88 MMHG | WEIGHT: 167.88 LBS

## 2024-04-04 DIAGNOSIS — M25.552 LEFT HIP PAIN: ICD-10-CM

## 2024-04-04 DIAGNOSIS — D50.8 ACQUIRED IRON DEFICIENCY ANEMIA DUE TO DECREASED ABSORPTION: ICD-10-CM

## 2024-04-04 DIAGNOSIS — R19.7 DIARRHEA, UNSPECIFIED TYPE: ICD-10-CM

## 2024-04-04 DIAGNOSIS — M70.62 TROCHANTERIC BURSITIS OF LEFT HIP: Primary | ICD-10-CM

## 2024-04-04 DIAGNOSIS — I73.00 RAYNAUD'S DISEASE WITHOUT GANGRENE: ICD-10-CM

## 2024-04-04 LAB
BASOPHILS # BLD AUTO: 0.02 K/UL (ref 0–0.2)
BASOPHILS NFR BLD: 0.3 % (ref 0–1.9)
DIFFERENTIAL METHOD BLD: ABNORMAL
EOSINOPHIL # BLD AUTO: 0 K/UL (ref 0–0.5)
EOSINOPHIL NFR BLD: 0.5 % (ref 0–8)
ERYTHROCYTE [DISTWIDTH] IN BLOOD BY AUTOMATED COUNT: 15.7 % (ref 11.5–14.5)
FERRITIN SERPL-MCNC: 352 NG/ML (ref 20–300)
HCT VFR BLD AUTO: 42.2 % (ref 37–48.5)
HGB BLD-MCNC: 13.5 G/DL (ref 12–16)
IMM GRANULOCYTES # BLD AUTO: 0.02 K/UL (ref 0–0.04)
IMM GRANULOCYTES NFR BLD AUTO: 0.3 % (ref 0–0.5)
IRON SERPL-MCNC: 130 UG/DL (ref 30–160)
LYMPHOCYTES # BLD AUTO: 2 K/UL (ref 1–4.8)
LYMPHOCYTES NFR BLD: 34.2 % (ref 18–48)
MCH RBC QN AUTO: 29.1 PG (ref 27–31)
MCHC RBC AUTO-ENTMCNC: 32 G/DL (ref 32–36)
MCV RBC AUTO: 91 FL (ref 82–98)
MONOCYTES # BLD AUTO: 0.6 K/UL (ref 0.3–1)
MONOCYTES NFR BLD: 9.9 % (ref 4–15)
NEUTROPHILS # BLD AUTO: 3.2 K/UL (ref 1.8–7.7)
NEUTROPHILS NFR BLD: 54.8 % (ref 38–73)
NRBC BLD-RTO: 0 /100 WBC
PLATELET # BLD AUTO: 308 K/UL (ref 150–450)
PMV BLD AUTO: 11.2 FL (ref 9.2–12.9)
RBC # BLD AUTO: 4.64 M/UL (ref 4–5.4)
SATURATED IRON: 43 % (ref 20–50)
TOTAL IRON BINDING CAPACITY: 300 UG/DL (ref 250–450)
TRANSFERRIN SERPL-MCNC: 203 MG/DL (ref 200–375)
WBC # BLD AUTO: 5.88 K/UL (ref 3.9–12.7)

## 2024-04-04 PROCEDURE — 99213 OFFICE O/P EST LOW 20 MIN: CPT | Mod: PBBFAC,PO | Performed by: INTERNAL MEDICINE

## 2024-04-04 PROCEDURE — 85025 COMPLETE CBC W/AUTO DIFF WBC: CPT | Performed by: INTERNAL MEDICINE

## 2024-04-04 PROCEDURE — 36415 COLL VENOUS BLD VENIPUNCTURE: CPT | Mod: PO | Performed by: INTERNAL MEDICINE

## 2024-04-04 PROCEDURE — 82728 ASSAY OF FERRITIN: CPT | Performed by: INTERNAL MEDICINE

## 2024-04-04 PROCEDURE — 99214 OFFICE O/P EST MOD 30 MIN: CPT | Mod: S$PBB,,, | Performed by: INTERNAL MEDICINE

## 2024-04-04 PROCEDURE — 83540 ASSAY OF IRON: CPT | Performed by: INTERNAL MEDICINE

## 2024-04-04 PROCEDURE — 99999 PR PBB SHADOW E&M-EST. PATIENT-LVL III: CPT | Mod: PBBFAC,,, | Performed by: INTERNAL MEDICINE

## 2024-04-04 NOTE — Clinical Note
Hi, she is still having significant symptoms of her Reynaud's disease.  Can you get her in to re-evaulate her?  They are significantly impacting her quality of life.  Soham Rosa

## 2024-04-04 NOTE — PROGRESS NOTES
Subjective:       Patient ID: Jill Perez is a 68 y.o. female.    Chief Complaint: Leg Pain (fall) and Fall    HPI    68-year-old female here for evaluation of left leg pain.  Patient was seen for this in January, and prescribed Mobic for 1-2 weeks as well as lidocaine patches.  Patient was referred to physical therapy.  She fell again about 4 days ago.  She has a little walkway and comes up the house and stepped on a corner of concrete.  She fell on her left hip.  She did not go to PT, because she is concerned about hurting worse after therapy.    There are days when she cannot get her feet warm.  She has Raynaud's phenomenon.  She has pain really bad and cannot get them warm some days.    Her stomach is still bothering her.  If she eats anything like oatmeal or cream of wheat or mashed potatoes, sometimes peas, soft boiled egg.  She does not eat meat, because she cannot digest it.  She takes the linzess every morning with miralax.  She has had liquid bowel movements.  Her stool has become green since yesterday.  She has abdominal cramping.      Review of Systems      Objective:      Physical Exam  Vitals reviewed.   Constitutional:       Appearance: She is well-developed.   HENT:      Head: Normocephalic and atraumatic.      Mouth/Throat:      Pharynx: No oropharyngeal exudate.   Eyes:      General: No scleral icterus.        Right eye: No [facility].         Left eye: No [facility].      Pupils: Pupils are equal, round, and reactive to light.   Neck:      Thyroid: No thyromegaly.      Trachea: No tracheal deviation.   Cardiovascular:      Rate and Rhythm: Normal rate and regular rhythm.      Heart sounds: Normal heart sounds. No murmur heard.     No friction rub. No gallop.   Pulmonary:      Effort: Pulmonary effort is normal. No respiratory distress.      Breath sounds: Normal breath sounds. No wheezing or rales.   Chest:      Chest wall: No tenderness.   Abdominal:      General: Bowel sounds are normal. There  is no distension.      Palpations: Abdomen is soft. There is no mass.      Tenderness: There is no abdominal tenderness. There is no guarding or rebound.   Musculoskeletal:         General: No tenderness. Normal range of motion.      Cervical back: Normal range of motion and neck supple.   Skin:     General: Skin is warm and dry.      Coloration: Skin is not pale.      Findings: No erythema or rash.   Neurological:      Mental Status: She is alert and oriented to person, place, and time.   Psychiatric:         Behavior: Behavior normal.         Assessment:       1. Trochanteric bursitis of left hip  - Ambulatory referral/consult to Physical/Occupational Therapy; Future    2. Left hip pain  - Ambulatory referral/consult to Physical/Occupational Therapy; Future    3. Raynaud's disease without gangrene    4. Diarrhea, unspecified type  - WBC, Stool; Future  - Stool culture; Future  - CLOSTRIDIUM DIFFICILE; Future      Plan:       1/2.  Refer to physical therapy.  3.  Message sent to rheumatology.  4. Check stool for white blood cell, culture, C diff.

## 2024-04-05 ENCOUNTER — LAB VISIT (OUTPATIENT)
Dept: LAB | Facility: HOSPITAL | Age: 69
End: 2024-04-05
Attending: INTERNAL MEDICINE
Payer: COMMERCIAL

## 2024-04-05 ENCOUNTER — OFFICE VISIT (OUTPATIENT)
Dept: UROLOGY | Facility: CLINIC | Age: 69
End: 2024-04-05
Payer: MEDICARE

## 2024-04-05 ENCOUNTER — TELEPHONE (OUTPATIENT)
Dept: RHEUMATOLOGY | Facility: CLINIC | Age: 69
End: 2024-04-05
Payer: MEDICARE

## 2024-04-05 VITALS
HEIGHT: 67 IN | DIASTOLIC BLOOD PRESSURE: 94 MMHG | HEART RATE: 100 BPM | SYSTOLIC BLOOD PRESSURE: 136 MMHG | WEIGHT: 167.56 LBS | BODY MASS INDEX: 26.3 KG/M2

## 2024-04-05 DIAGNOSIS — E11.9 TYPE 2 DIABETES MELLITUS WITHOUT COMPLICATION: ICD-10-CM

## 2024-04-05 DIAGNOSIS — R30.0 DYSURIA: Primary | ICD-10-CM

## 2024-04-05 DIAGNOSIS — R19.7 DIARRHEA, UNSPECIFIED TYPE: ICD-10-CM

## 2024-04-05 DIAGNOSIS — R10.9 FLANK PAIN: ICD-10-CM

## 2024-04-05 LAB
BACTERIA #/AREA URNS AUTO: NORMAL /HPF
BILIRUB SERPL-MCNC: ABNORMAL MG/DL
BLOOD URINE, POC: ABNORMAL
C DIFF GDH STL QL: NEGATIVE
C DIFF TOX A+B STL QL IA: NEGATIVE
CLARITY, POC UA: CLEAR
COLOR, POC UA: YELLOW
GLUCOSE UR QL STRIP: 1000
KETONES UR QL STRIP: ABNORMAL
LEUKOCYTE ESTERASE URINE, POC: ABNORMAL
MICROSCOPIC COMMENT: NORMAL
NITRITE, POC UA: ABNORMAL
PH, POC UA: 5
PROTEIN, POC: ABNORMAL
RBC #/AREA URNS AUTO: 0 /HPF (ref 0–4)
SPECIFIC GRAVITY, POC UA: 1.01
SQUAMOUS #/AREA URNS AUTO: 0 /HPF
UROBILINOGEN, POC UA: ABNORMAL
WBC #/AREA URNS AUTO: 1 /HPF (ref 0–5)

## 2024-04-05 PROCEDURE — 99214 OFFICE O/P EST MOD 30 MIN: CPT | Mod: S$PBB,,, | Performed by: UROLOGY

## 2024-04-05 PROCEDURE — 81002 URINALYSIS NONAUTO W/O SCOPE: CPT | Mod: PBBFAC,59,PO | Performed by: UROLOGY

## 2024-04-05 PROCEDURE — 99999PBSHW POCT URINE DIPSTICK WITHOUT MICROSCOPE: Mod: PBBFAC,,,

## 2024-04-05 PROCEDURE — 87086 URINE CULTURE/COLONY COUNT: CPT | Performed by: UROLOGY

## 2024-04-05 PROCEDURE — 81001 URINALYSIS AUTO W/SCOPE: CPT | Performed by: UROLOGY

## 2024-04-05 PROCEDURE — 99215 OFFICE O/P EST HI 40 MIN: CPT | Mod: PBBFAC,PO | Performed by: UROLOGY

## 2024-04-05 PROCEDURE — 87449 NOS EACH ORGANISM AG IA: CPT | Performed by: INTERNAL MEDICINE

## 2024-04-05 PROCEDURE — 99999 PR PBB SHADOW E&M-EST. PATIENT-LVL V: CPT | Mod: PBBFAC,,, | Performed by: UROLOGY

## 2024-04-05 PROCEDURE — 87324 CLOSTRIDIUM AG IA: CPT | Performed by: INTERNAL MEDICINE

## 2024-04-05 RX ORDER — TERCONAZOLE 4 MG/G
CREAM VAGINAL
COMMUNITY
Start: 2023-12-22

## 2024-04-05 NOTE — TELEPHONE ENCOUNTER
"Left message for patient to return call    Appt scheduled for FU per recommended PCP for Raynauds    "Hi, she is still having significant symptoms of her Reynaud's disease.  Can you get her in to re-evaulate her?  They are significantly impacting her quality of life.     Soham Rosa "        "

## 2024-04-05 NOTE — PROGRESS NOTES
Subjective:       Patient ID: Jill Perez is a 68 y.o. female.    Chief Complaint: pinching pain in kidneys     This is a 68 y.o.  female patient that is new to me.    Past patient of Dr. Butler, summary:  H/o  urinary urge incontinence. She wears a pad when she is out of the house.   Hydration patterns - water, hot tea.   Day time urination patterns - 3-4x  Night time urination patterns - 3-4x  Bowel movements - takes miralax - BM daily  Medications on list that might be contributing to urinary patterns -   Most bothersome aspect of voiding patterns that patient would like to focus on -    2 stones in her life, last episode 15 yrs ago. Passed without surgery. Left flank discomfort.   jan 2023, pvr 13cc.  Oxybutynin did not help with her sx.     She underwent a benign cystoscopy on 5/25/23.     Established with me 4/2024.   Pinching sensation in left kidney with burning with urination for 3 days.  Some subjective chills, no fever.  No hematuria.    Before symptoms, no issues with voiding.          Lab Results   Component Value Date    CREATININE 0.8 01/24/2024       ---  PMH/PSH/Medications/Allergies/Social history reviewed and as in chart.    Review of Systems   Constitutional:  Positive for chills. Negative for activity change, fatigue and fever.   Respiratory:  Negative for cough, chest tightness and shortness of breath.    Cardiovascular:  Negative for chest pain.   Gastrointestinal:  Negative for abdominal distention, abdominal pain, nausea and vomiting.   Genitourinary:  Positive for dysuria. Negative for difficulty urinating, flank pain, hematuria and pelvic pain.   Musculoskeletal:  Negative for back pain and gait problem.       Objective:      Physical Exam  HENT:      Head: Normocephalic.   Pulmonary:      Effort: Pulmonary effort is normal.   Abdominal:      General: Abdomen is flat.   Musculoskeletal:      Cervical back: Normal range of motion.   Skin:     General: Skin is warm.   Neurological:       General: No focal deficit present.      Mental Status: She is alert.         Assessment:     Problem Noted   Dysuria 4/5/2024   Flank Pain 4/5/2024       Plan:     UA with micro, urine culture (catheterized).  Further workup pending results.    Discussed pinching pain may be related to back issues.   Follow up to review.     Ramana Ronquillo MD    The above referenced imaging and interpretations were personally reviewed.  Disclaimer: This note has been generated using voice-recognition software. There may be typographical errors that have been missed during proof-reading.

## 2024-04-06 LAB — BACTERIA UR CULT: NO GROWTH

## 2024-04-08 ENCOUNTER — TELEPHONE (OUTPATIENT)
Dept: UROLOGY | Facility: CLINIC | Age: 69
End: 2024-04-08
Payer: MEDICARE

## 2024-04-08 DIAGNOSIS — R11.0 NAUSEA: ICD-10-CM

## 2024-04-08 RX ORDER — ONDANSETRON 8 MG/1
8 TABLET, ORALLY DISINTEGRATING ORAL EVERY 12 HOURS PRN
Qty: 30 TABLET | Refills: 2 | Status: SHIPPED | OUTPATIENT
Start: 2024-04-08

## 2024-04-08 RX ORDER — TERCONAZOLE 4 MG/G
1 CREAM VAGINAL NIGHTLY
Qty: 45 G | Refills: 0 | Status: CANCELLED | OUTPATIENT
Start: 2024-04-08 | End: 2024-04-15

## 2024-04-08 NOTE — TELEPHONE ENCOUNTER
Refill Routing Note   Medication(s) are not appropriate for processing by [facility] for the following reason(s):        Outside of protocol    [facility] action(s):  Route               Appointments  past 12m or future 3m with PCP    Date Provider   Last Visit   12/20/2023 Uli Jin MD   Next Visit   Visit date not found Uli Jin MD   ED visits in past 90 days: 0        Note composed:4:41 PM 04/08/2024

## 2024-04-08 NOTE — TELEPHONE ENCOUNTER
----- Message from Ramana Ronquillo MD sent at 4/7/2024  5:40 AM CDT -----  Your urine was negative for blood or infection.  Please let me know if you would like to have imaging, CT scan, of kidneys for persistent discomfort.  Given the results of your urine and past imaging, doubt pinching discomfort you are having on left is from kidney.

## 2024-04-08 NOTE — TELEPHONE ENCOUNTER
Called the pt. Per : Your urine was negative for blood or infection.  Please let me know if you would like to have imaging, CT scan, of kidneys for persistent discomfort.  Given the results of your urine and past imaging, doubt pinching discomfort you are having on left is from kidney. Pt voiced that she would like to proceed with the CT.

## 2024-04-09 ENCOUNTER — TELEPHONE (OUTPATIENT)
Dept: GASTROENTEROLOGY | Facility: CLINIC | Age: 69
End: 2024-04-09
Payer: MEDICARE

## 2024-04-09 RX ORDER — TERCONAZOLE 4 MG/G
1 CREAM VAGINAL NIGHTLY
Qty: 45 G | Refills: 0 | Status: CANCELLED | OUTPATIENT
Start: 2024-04-08 | End: 2024-04-15

## 2024-04-09 NOTE — TELEPHONE ENCOUNTER
----- Message from Kecia Liz sent at 4/9/2024  9:47 AM CDT -----  Regarding: sooner appt request  Contact: [phone]  Pt is calling because she is requesting to be seen sooner than 4/23. Pt is stating her pcp told her she has to be seen sooner and that the nurse would be giving her a call to schedule her sooner. Please give her a call back soon.

## 2024-04-09 NOTE — TELEPHONE ENCOUNTER
Refill Routing Note   Medication(s) are not appropriate for processing by [facility] for the following reason(s):        Outside of protocol    [facility] action(s):  Route               Appointments  past 12m or future 3m with PCP    Date Provider   Last Visit   12/20/2023 Uli Jin MD   Next Visit   Visit date not found Uli Jin MD   ED visits in past 90 days: 0        Note composed:9:14 AM 04/09/2024

## 2024-04-09 NOTE — TELEPHONE ENCOUNTER
MA called and talked to patient and scheduled patient to been seen on 4/15 at 11:00 with provider and patient verbalize understanding

## 2024-04-10 RX ORDER — TERCONAZOLE 4 MG/G
1 CREAM VAGINAL NIGHTLY
Qty: 45 G | Refills: 0 | Status: CANCELLED | OUTPATIENT
Start: 2024-04-08 | End: 2024-04-15

## 2024-04-10 NOTE — TELEPHONE ENCOUNTER
Refill Routing Note   Medication(s) are not appropriate for processing by [facility] for the following reason(s):        Outside of protocol    [facility] action(s):  Route               Appointments  past 12m or future 3m with PCP    Date Provider   Last Visit   12/20/2023 Uli Jin MD   Next Visit   Visit date not found Uli Jin MD   ED visits in past 90 days: 0        Note composed:11:37 AM 04/10/2024

## 2024-04-11 RX ORDER — TERCONAZOLE 4 MG/G
1 CREAM VAGINAL NIGHTLY
Qty: 45 G | Refills: 0 | Status: CANCELLED | OUTPATIENT
Start: 2024-04-08 | End: 2024-04-15

## 2024-04-12 DIAGNOSIS — E11.9 TYPE 2 DIABETES MELLITUS WITHOUT COMPLICATION: ICD-10-CM

## 2024-04-15 ENCOUNTER — PATIENT MESSAGE (OUTPATIENT)
Dept: GASTROENTEROLOGY | Facility: CLINIC | Age: 69
End: 2024-04-15

## 2024-04-15 ENCOUNTER — PATIENT MESSAGE (OUTPATIENT)
Dept: GASTROENTEROLOGY | Facility: CLINIC | Age: 69
End: 2024-04-15
Payer: MEDICARE

## 2024-04-15 ENCOUNTER — OFFICE VISIT (OUTPATIENT)
Dept: GASTROENTEROLOGY | Facility: CLINIC | Age: 69
End: 2024-04-15
Payer: MEDICARE

## 2024-04-15 VITALS
BODY MASS INDEX: 27.71 KG/M2 | HEIGHT: 67 IN | DIASTOLIC BLOOD PRESSURE: 85 MMHG | SYSTOLIC BLOOD PRESSURE: 155 MMHG | HEART RATE: 80 BPM | WEIGHT: 176.56 LBS

## 2024-04-15 DIAGNOSIS — K59.04 CHRONIC IDIOPATHIC CONSTIPATION: ICD-10-CM

## 2024-04-15 DIAGNOSIS — R13.10 DYSPHAGIA, UNSPECIFIED TYPE: Primary | ICD-10-CM

## 2024-04-15 DIAGNOSIS — K58.1 IRRITABLE BOWEL SYNDROME WITH CONSTIPATION: ICD-10-CM

## 2024-04-15 PROCEDURE — 99214 OFFICE O/P EST MOD 30 MIN: CPT | Mod: S$PBB,,, | Performed by: STUDENT IN AN ORGANIZED HEALTH CARE EDUCATION/TRAINING PROGRAM

## 2024-04-15 PROCEDURE — 99213 OFFICE O/P EST LOW 20 MIN: CPT | Mod: PBBFAC | Performed by: STUDENT IN AN ORGANIZED HEALTH CARE EDUCATION/TRAINING PROGRAM

## 2024-04-15 PROCEDURE — 99999 PR PBB SHADOW E&M-EST. PATIENT-LVL III: CPT | Mod: PBBFAC,,, | Performed by: STUDENT IN AN ORGANIZED HEALTH CARE EDUCATION/TRAINING PROGRAM

## 2024-04-15 RX ORDER — TERCONAZOLE 4 MG/G
1 CREAM VAGINAL NIGHTLY
Qty: 45 G | Refills: 0 | Status: CANCELLED | OUTPATIENT
Start: 2024-04-08 | End: 2024-04-15

## 2024-04-15 NOTE — PROGRESS NOTES
[facility] Follow-UP Note    Reason for Follow-Up:  The primary encounter diagnosis was Dysphagia, unspecified type. Diagnoses of Chronic idiopathic constipation and Irritable bowel syndrome with constipation were also pertinent to this visit.    PCP:   Soham Rosa         HPI:  This is a 68 y.o. female last seen in GI clinic on 11/2023 for constipation, nausea and abdominal pain. PMHx of DM, HTN, HLD. She also has a history of gastric bypass in 2008 we well as prior cholecystectomy and splenectomy. She has had a few months of worsening constipation with BM occurring once a week or less. No blood in stool. She also will have abdominal pain which starts after she eats and is associated with bloating and discomfort in the left central abdomen. In 10/2023, the pain episodes were severe and she actually had a surgery which repaired an internal hernia. Within this surgery, they also did an EGD which mentions a normal appearing esophagus, patent GJ and appropriate sized gastric pouch without any ulceration. Unfortunately, she had not improvement with pain after the surgery. She has tried miralax, dulcolax, milk of mag and fiber all without improvement.      She is also chronically iron deficient without overt GI blood loss. Blood work last month showed microcytic anemia and hypoalbuminemia. Iron profile from 5/2023 showed PINEDA. She had a cscope in 6/2023 which showed normal colon. TI not examined. Last EGD by GI was in 2020 which showed small hiatal hernia and normal appearing esophagus which was empirically dilated to 51Fr with no mucosal change noted. Healthy appearing gastric bypass. Small appearing diverticulum distal to esophagus. She noted no improvement with dilation.      Most recent abdominal imaging CT and SBFT without acute findings or post op leak.        At last visit, she continued to have dysphagia to solids like breads. She feels they stick in her chest and cause pain. Nausea is  "chronic and she uses zofran.     Interval History:  HRM was obtained after last visit and was consistent with EGJOO. Also noted a few premature contractions. I ordered a timed barium but this was not done it seems. She did have a GES which showed delayed gastric emptying. Still having issues with swallowing certain solid foods at meal time. No issues with liquids. She does also have abdominal pain after eating and poor appetite. She does take opioids occasionally for back pain. Also at last visit due to constipation which was not responsive to OTC meds, started linzess. This has not helped her very much at that dose. No blood in stool         Objective Findings:    Vital Signs:  BP (!) 155/85   Pulse 80   Ht 5' 7" (1.702 m)   Wt 80.1 kg (176 lb 9.4 oz)   LMP  (LMP Unknown)   BMI 27.66 kg/m²   Body mass index is 27.66 kg/m².    Physical Exam:  General Appearance: Well appearing in no acute distress  Head:   Normocephalic, without obvious abnormality  Eyes:    No scleral icterus    Lungs: CTA bilaterally in anterior and posterior fields   Heart:  Regular rate and rhythm, no murmurs heard  Abdomen: Soft, non tender, non distended with positive bowel sounds in all four quadrants.        Imaging:     GES     Impression:     Delayed gastric emptying time.    SBFT 2023  FINDINGS:  Preliminary radiograph: Postoperative changes from cholecystectomy, gastric bypass, splenectomy, appendectomy, and lower lumbar instrumented fusion.     Transit time: 120 minutes, within normal limits.     Small bowel: Contrast passed through the gastrojejunostomy and jejunojejunostomy without holdup.  No extraluminal contrast was seen.  No small bowel stricture, dilatation, or mass. The terminal ileum is normal.     Other findings: None.     Impression:     Extensive postoperative changes as described.  No obstruction or anastomotic leak.  Normal small bowel transit time.     CT 2023   Impression:     1. No specific etiology to explain " patient's abdominal pain.  No bowel obstruction.  2. Postoperative changes of Priya-en-Y gastric bypass, cholecystectomy, appendectomy, and splenectomy.  3. Severe fatty atrophy of the pancreas.     Endoscopy:      HRM 2024  - Esophagogastric junction (EGJ) outflow                          obstruction.                          - Elevated IRP with some premature contractions      Cscope 2023   Findings:       The entire examined colon appeared normal on direct and retroflexion        views.      EGD 2020   indings:       No endoscopic abnormality was evident in the esophagus to explain        the patient's complaint of dysphagia. It was decided, however, to        proceed with dilation of the entire esophagus. A guidewire was        placed and the scope was withdrawn. Dilation was performed with a        Savary dilator with no resistance at 45 Fr and 51 Fr. The dilation        site was examined following endoscope reinsertion and showed no        change.        A small hiatal hernia was present.        Evidence of a gastric bypass was found. A gastric pouch with a        normal size was found. The gastrojejunal anastomosis was        characterized by healthy appearing mucosa. This was traversed. The        pouch-to-jejunum limb was characterized by healthy appearing mucosa.        Immediately on entry of the gastric pouch, there is small pouch-type        almost diverticulum that is most directly distal to the esophagus        and may preferentially collect small pills/ food and create the        sensation of food sticking . The esophagus and GEJ are wipe open.        The examined jejunum was normal.      Surgery op report 10/2023   Description of Findings:   Antecolic-antegastric priya-en-y configuration with patent GJ and JJ anastomosis  Internal hernia defect present at Abraham's space, closed primarily with nonabsorbable suture.  There did not appear to be any bowel herniated at this time.  No internal hernia  defect present at JJ mesentery  EGD performed with patent GJ and appropriately sized gastric pouch without evidence of esophagitis, ulceration, or mucosal abnormalities     Assessment:     IBS-C   Gastroparesis   Dysphagia (EGJOO on HRM)   PINEDA   History of gastric bypass   Multiple abdominal surgeries      Patient with continued dysphagia with normal EGD in the past with HRM showing EGJOO. Will reorder timed barium esophagram as this was not done after ordered in 1/2024. If barium tablet is delayed, will consider EGD with ENDOFlip. Recommend to stop opioids as this can cause dysmotility. She has also been found to have delayed gastric emptying in the meantime, will refer to GI nutrition for GP diet. Continue PPI. Would continue linzess for constipation but increase the dose. She is following with heme onc now for iron replacement.      Recommendations:     - Continue linzess and increase dose, would also continue miralax   - Timed barium esophagram   - Refer to GI nutrition for gastroparesis diet instruction   - Iron replacement per heme onc   - Stop opioids and see if dysphagia improves     RTC 3 months       Order summary:  Orders Placed This Encounter    FL Esophagram With Barium Tablet    linaCLOtide (LINZESS) 290 mcg Cap capsule         Thank you so much for allowing me to participate in the care of Jill Diggs MD  Gastroenterology   [facility]

## 2024-04-15 NOTE — TELEPHONE ENCOUNTER
Refill Routing Note   Medication(s) are not appropriate for processing by [facility] for the following reason(s):        Outside of protocol    [facility] action(s):  Route        Medication Therapy Plan: Pharmacy has directly requested refill on behalf of the patient.      Appointments  past 12m or future 3m with PCP    Date Provider   Last Visit   12/20/2023 Uli Jin MD   Next Visit   Visit date not found Uli Jin MD   ED visits in past 90 days: 0        Note composed:2:25 PM 04/15/2024

## 2024-04-17 ENCOUNTER — PATIENT MESSAGE (OUTPATIENT)
Dept: GASTROENTEROLOGY | Facility: CLINIC | Age: 69
End: 2024-04-17

## 2024-04-18 RX ORDER — TERCONAZOLE 4 MG/G
1 CREAM VAGINAL NIGHTLY
Qty: 45 G | Refills: 0 | Status: SHIPPED | OUTPATIENT
Start: 2024-04-18 | End: 2024-04-29

## 2024-04-26 ENCOUNTER — TELEPHONE (OUTPATIENT)
Dept: GASTROENTEROLOGY | Facility: CLINIC | Age: 69
End: 2024-04-26
Payer: MEDICARE

## 2024-04-26 NOTE — TELEPHONE ENCOUNTER
MA spoke to patient. MA informed patient that there is no availability at the moment. MA asked patient if she wants to be put on the waiting list patient stated no she does not, and hung the phone up on the Ma.

## 2024-04-26 NOTE — TELEPHONE ENCOUNTER
----- Message from Skyla Ny sent at 2024  1:51 PM CDT -----  Regarding: no availability  Contact: Pt @[phone]  Pt called in to schedule an appt; no available appts in Epic. Pt is asking for a call back soon to schedule. Thanks.         Reason appt not schedule: no availability          Patient's DX: IBS         Patient requesting call back or [facility] [phone]

## 2024-05-02 ENCOUNTER — PATIENT MESSAGE (OUTPATIENT)
Dept: INTERNAL MEDICINE | Facility: CLINIC | Age: 69
End: 2024-05-02
Payer: MEDICARE

## 2024-05-21 DIAGNOSIS — E11.9 TYPE 2 DIABETES MELLITUS WITHOUT COMPLICATION: ICD-10-CM

## 2024-05-31 NOTE — TELEPHONE ENCOUNTER
No care due was identified.  [facility] [place] Embedded Care Due Messages. Reference number: [id number].   7/03/2023 2:33:27 PM CDT   yes...

## 2024-06-05 ENCOUNTER — PATIENT OUTREACH (OUTPATIENT)
Dept: ADMINISTRATIVE | Facility: HOSPITAL | Age: 69
End: 2024-06-05
Payer: MEDICARE

## 2024-06-10 RX ORDER — ZOLPIDEM TARTRATE 10 MG/1
10 TABLET ORAL NIGHTLY PRN
Qty: 90 TABLET | Refills: 0 | Status: SHIPPED | OUTPATIENT
Start: 2024-06-10 | End: 2024-12-09

## 2024-06-10 NOTE — TELEPHONE ENCOUNTER
Care Due:                  Date            Visit Type   Department     Provider  --------------------------------------------------------------------------------                                SAME DAY -                              ESTABLISHED   MET INTERNAL  Last Visit: 04-      PATIENT      MEDICINE       Soham Rosa                              EP -                              PRIMARY      [facility] INTERNAL  Next Visit: 07-      CARE ([facility])   MEDICINE       Soham Rosa                                                            Last  Test          Frequency    Reason                     Performed    Due Date  --------------------------------------------------------------------------------    HBA1C.......  6 months...  metFORMIN................  09-   03-    Lipid Panel.  12 months..  atorvastatin.............  05- 05-    [employer] Embedded Care Due Messages. Reference number: [id number].   6/10/2024 9:55:08 AM CDT

## 2024-06-11 DIAGNOSIS — R10.9 FLANK PAIN: Primary | ICD-10-CM

## 2024-06-16 NOTE — TELEPHONE ENCOUNTER
No care due was identified.  [facility] Embedded Care Due Messages. Reference number: [id number].   6/16/2024 5:09:00 AM CDT

## 2024-06-16 NOTE — TELEPHONE ENCOUNTER
Refill Routing Note   Medication(s) are not appropriate for processing by [facility] for the following reason(s):        Required labs outdated    [facility] action(s):  Defer               Appointments  past 12m or future 3m with PCP    Date Provider   Last Visit   4/4/2024 Soham Rosa MD   Next Visit   7/1/2024 Soham Rosa MD   ED visits in past 90 days: 0        Note composed:5:04 PM 06/16/2024

## 2024-06-17 RX ORDER — ATORVASTATIN CALCIUM 40 MG/1
40 TABLET, FILM COATED ORAL DAILY
Qty: 90 TABLET | Refills: 2 | Status: SHIPPED | OUTPATIENT
Start: 2024-06-17

## 2024-06-24 ENCOUNTER — PATIENT OUTREACH (OUTPATIENT)
Dept: ADMINISTRATIVE | Facility: HOSPITAL | Age: 69
End: 2024-06-24
Payer: MEDICARE

## 2024-06-24 NOTE — PROGRESS NOTES
Population Health Chart Review & Patient Outreach Details      Additional [facility] Health Notes:               Updates Requested / Reviewed:      Care Everywhere, , and Immunizations Reconciliation Completed or Queried: [place]         Health Maintenance Topics Overdue:      [facility] Score: 4     Hemoglobin A1c  Lipid Panel  Foot Exam  Uncontrolled BP    RSV Vaccine                  Health Maintenance Topic(s) Outreach Outcomes & Actions Taken:    Primary Care Appt - Outreach Outcomes & Actions Taken  : Primary Care Appt Scheduled    Eye Exam - Outreach Outcomes & Actions Taken  : Eye Camera Scheduled or Optometry/Ophthalmology Referral Placed/Appt Scheduled

## 2024-06-27 RX ORDER — METFORMIN HYDROCHLORIDE 1000 MG/1
1000 TABLET ORAL 2 TIMES DAILY WITH MEALS
Qty: 180 TABLET | Refills: 2 | Status: SHIPPED | OUTPATIENT
Start: 2024-06-27 | End: 2025-03-24

## 2024-06-27 NOTE — TELEPHONE ENCOUNTER
Refill Routing Note   Medication(s) are not appropriate for processing by [facility] for the following reason(s):        Required labs outdated    [facility] action(s):  Defer             Appointments  past 12m or future 3m with PCP    Date Provider   Last Visit   4/4/2024 Soham Rosa MD   Next Visit   7/1/2024 Soham Rosa MD   ED visits in past 90 days: 0        Note composed:6:14 AM 06/27/2024

## 2024-06-27 NOTE — TELEPHONE ENCOUNTER
No care due was identified.  [facility] Embedded Care Due Messages. Reference number: [id number].   6/27/2024 5:09:30 AM CDT

## 2024-07-01 ENCOUNTER — OFFICE VISIT (OUTPATIENT)
Dept: INTERNAL MEDICINE | Facility: CLINIC | Age: 69
End: 2024-07-01
Payer: COMMERCIAL

## 2024-07-01 ENCOUNTER — HOSPITAL ENCOUNTER (OUTPATIENT)
Dept: RADIOLOGY | Facility: HOSPITAL | Age: 69
Discharge: HOME | End: 2024-07-01
Attending: INTERNAL MEDICINE
Payer: COMMERCIAL

## 2024-07-01 ENCOUNTER — LAB VISIT (OUTPATIENT)
Dept: LAB | Facility: HOSPITAL | Age: 69
End: 2024-07-01
Attending: INTERNAL MEDICINE
Payer: COMMERCIAL

## 2024-07-01 VITALS — HEIGHT: 67 IN | WEIGHT: 169.31 LBS | BODY MASS INDEX: 26.57 KG/M2 | RESPIRATION RATE: 10 BRPM

## 2024-07-01 DIAGNOSIS — E78.5 HYPERLIPIDEMIA ASSOCIATED WITH TYPE 2 DIABETES MELLITUS: Chronic | ICD-10-CM

## 2024-07-01 DIAGNOSIS — I10 PRIMARY HYPERTENSION: Chronic | ICD-10-CM

## 2024-07-01 DIAGNOSIS — E11.65 TYPE 2 DIABETES MELLITUS WITH HYPERGLYCEMIA, WITHOUT LONG-TERM CURRENT USE OF INSULIN: Chronic | ICD-10-CM

## 2024-07-01 DIAGNOSIS — E11.43 TYPE 2 DIABETES MELLITUS WITH DIABETIC AUTONOMIC NEUROPATHY, WITHOUT LONG-TERM CURRENT USE OF INSULIN: Chronic | ICD-10-CM

## 2024-07-01 DIAGNOSIS — E11.65 TYPE 2 DIABETES MELLITUS WITH HYPERGLYCEMIA, WITHOUT LONG-TERM CURRENT USE OF INSULIN: Primary | Chronic | ICD-10-CM

## 2024-07-01 DIAGNOSIS — I70.0 AORTIC ATHEROSCLEROSIS: Chronic | ICD-10-CM

## 2024-07-01 DIAGNOSIS — E11.69 HYPERLIPIDEMIA ASSOCIATED WITH TYPE 2 DIABETES MELLITUS: Chronic | ICD-10-CM

## 2024-07-01 DIAGNOSIS — R07.9 CHEST PAIN, UNSPECIFIED TYPE: ICD-10-CM
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
## 2024-07-01 LAB
ALBUMIN SERPL BCP-MCNC: 3.2 G/DL (ref 3.5–5.2)
ALP SERPL-CCNC: 102 U/L (ref 55–135)
ALT SERPL W/O P-5'-P-CCNC: 12 U/L (ref 10–44)
ANION GAP SERPL CALC-SCNC: 6 MMOL/L (ref 8–16)
AST SERPL-CCNC: 20 U/L (ref 10–40)
BILIRUB SERPL-MCNC: 0.3 MG/DL (ref 0.1–1)
BUN SERPL-MCNC: 16 MG/DL (ref 8–23)
CALCIUM SERPL-MCNC: 9.4 MG/DL (ref 8.7–10.5)
CHLORIDE SERPL-SCNC: 106 MMOL/L (ref 95–110)
CO2 SERPL-SCNC: 26 MMOL/L (ref 23–29)
CREAT SERPL-MCNC: 0.8 MG/DL (ref 0.5–1.4)
EST. GFR  (NO RACE VARIABLE): >60 ML/MIN/1.73 M^2
ESTIMATED AVG GLUCOSE: 280 MG/DL (ref 68–131)
GLUCOSE SERPL-MCNC: 265 MG/DL (ref 70–110)
HBA1C MFR BLD: 11.4 % (ref 4–5.6)
POTASSIUM SERPL-SCNC: 4.2 MMOL/L (ref 3.5–5.1)
PROT SERPL-MCNC: 6.6 G/DL (ref 6–8.4)
SODIUM SERPL-SCNC: 138 MMOL/L (ref 136–145)

## 2024-07-01 PROCEDURE — 99999 PR PBB SHADOW E&M-EST. PATIENT-LVL III: CPT | Mod: PBBFAC,,, | Performed by: INTERNAL MEDICINE

## 2024-07-01 PROCEDURE — 71046 X-RAY EXAM CHEST 2 VIEWS: CPT | Mod: 26,,, | Performed by: RADIOLOGY

## 2024-07-01 PROCEDURE — 99214 OFFICE O/P EST MOD 30 MIN: CPT | Mod: S$GLB,,, | Performed by: INTERNAL MEDICINE

## 2024-07-01 PROCEDURE — 1160F RVW MEDS BY RX/DR IN RCRD: CPT | Mod: CPTII,S$GLB,, | Performed by: INTERNAL MEDICINE

## 2024-07-01 PROCEDURE — 3008F BODY MASS INDEX DOCD: CPT | Mod: CPTII,S$GLB,, | Performed by: INTERNAL MEDICINE

## 2024-07-01 PROCEDURE — 83036 HEMOGLOBIN GLYCOSYLATED A1C: CPT | Performed by: INTERNAL MEDICINE

## 2024-07-01 PROCEDURE — 71046 X-RAY EXAM CHEST 2 VIEWS: CPT | Mod: TC,PO

## 2024-07-01 PROCEDURE — G2211 COMPLEX E/M VISIT ADD ON: HCPCS | Mod: S$GLB,,, | Performed by: INTERNAL MEDICINE

## 2024-07-01 PROCEDURE — 36415 COLL VENOUS BLD VENIPUNCTURE: CPT | Mod: PO | Performed by: INTERNAL MEDICINE

## 2024-07-01 PROCEDURE — 1159F MED LIST DOCD IN RCRD: CPT | Mod: CPTII,S$GLB,, | Performed by: INTERNAL MEDICINE

## 2024-07-01 PROCEDURE — 80053 COMPREHEN METABOLIC PANEL: CPT | Performed by: INTERNAL MEDICINE

## 2024-07-01 RX ORDER — GLIPIZIDE 10 MG/1
10 TABLET, FILM COATED, EXTENDED RELEASE ORAL
Qty: 90 TABLET | Refills: 3 | Status: SHIPPED | OUTPATIENT
Start: 2024-07-01 | End: 2025-07-01

## 2024-07-01 RX ORDER — AMLODIPINE BESYLATE 5 MG/1
5 TABLET ORAL DAILY
Qty: 90 TABLET | Refills: 3 | Status: SHIPPED | OUTPATIENT
Start: 2024-07-01 | End: 2025-07-01

## 2024-07-01 NOTE — PROGRESS NOTES
Subjective:       Patient ID: Jill Perez is a 69 y.o. female.    Chief Complaint: Follow-up    HPI    69-year-old female here for follow-up.  She fell Friday and was in the garden and tripped with the hose.  She thinks she hit a sprinkler on her left side, because she has significant chest pain on the left side.  She has some pain medication, but she gets constipated.  Her whole left side hurts and fell on her arm.    She has had issues with scheduling a test from GI.  She has gastroparesis and has trouble swallowing (FL barium swallow study).    Diabetes-metformin 1000 mg b.i.d..  She is not checking her BG.  Lab Results   Component Value Date    HGBA1C 7.3 (H) 09/12/2023    HGBA1C 6.8 (H) 05/12/2023    HGBA1C 6.9 (H) 02/17/2023     Lab Results   Component Value Date    LDLCALC 139.4 05/12/2023    CREATININE 0.8 01/24/2024     HTN -  Patient is currently on Benicar 40 mg. She does check her BP at home, and it runs 186/90 yesterday.  It was 150/88 this am. They run about 140/80. Side effects of medications note: none. Denies headaches, blurred vision, chest pain, shortness of breath, nausea.    HLD/aortic atherosclerosis - Patient is currently on Lipitor 40 mg.  Her last lipid panel was   Cholesterol   Date Value Ref Range Status   05/12/2023 223 (H) 120 - 199 mg/dL Final     Comment:     The National Cholesterol Education Program (NCEP) has set the  following guidelines (reference ranges) for Cholesterol:  Optimal.....................<200 mg/dL  Borderline High.............200-239 mg/dL  High........................> or = 240 mg/dL       Triglycerides   Date Value Ref Range Status   05/12/2023 98 30 - 150 mg/dL Final     Comment:     The National Cholesterol Education Program (NCEP) has set the  following guidelines (reference values) for triglycerides:  Normal......................<150 mg/dL  Borderline High.............150-199 mg/dL  High........................200-499 mg/dL       HDL   Date Value Ref Range  Status   05/12/2023 64 40 - 75 mg/dL Final     Comment:     The National Cholesterol Education Program (NCEP) has set the  following guidelines (reference values) for HDL Cholesterol:  Low...............<40 mg/dL  Optimal...........>60 mg/dL       LDL Cholesterol   Date Value Ref Range Status   05/12/2023 139.4 63.0 - 159.0 mg/dL Final     Comment:     The National Cholesterol Education Program (NCEP) has set the  following guidelines (reference values) for LDL Cholesterol:  Optimal.......................<130 mg/dL  Borderline High...............130-159 mg/dL  High..........................160-189 mg/dL  Very High.....................>190 mg/dL     .  Side effects of the medication: none.    Review of Systems      Objective:      Physical Exam  Vitals reviewed.   Constitutional:       Appearance: She is well-developed.   HENT:      Head: Normocephalic and atraumatic.      Mouth/Throat:      Pharynx: No oropharyngeal exudate.   Eyes:      General: No scleral icterus.        Right eye: No [facility].         Left eye: No [facility].      Pupils: Pupils are equal, round, and reactive to light.   Neck:      Thyroid: No thyromegaly.      Trachea: No tracheal deviation.   Cardiovascular:      Rate and Rhythm: Normal rate and regular rhythm.      Heart sounds: Normal heart sounds. No murmur heard.     No friction rub. No gallop.   Pulmonary:      Effort: Pulmonary effort is normal. No respiratory distress.      Breath sounds: Normal breath sounds. No wheezing or rales.   Chest:      Chest wall: No tenderness.   Abdominal:      General: Bowel sounds are normal. There is no distension.      Palpations: Abdomen is soft. There is no mass.      Tenderness: There is no abdominal tenderness. There is no guarding or rebound.   Musculoskeletal:         General: No tenderness. Normal range of motion.      Cervical back: Normal range of motion and neck supple.   Skin:     General: Skin is warm and dry.      Coloration: Skin is not  pale.      Findings: No erythema or rash.   Neurological:      Mental Status: She is alert and oriented to person, place, and time.   Psychiatric:         Behavior: Behavior normal.         Assessment:       1. Type 2 diabetes mellitus with hyperglycemia, without long-term current use of insulin  - Hemoglobin A1C; Future  - Comprehensive Metabolic Panel; Future    2. Type 2 diabetes mellitus with diabetic autonomic neuropathy, without long-term current use of insulin    3. Primary hypertension  - MYC E-VISIT    4. Hyperlipidemia associated with type 2 diabetes mellitus    5. Aortic atherosclerosis    6. Fall, initial encounter  - X-Ray Chest PA And Lateral; Future    7. Chest pain, unspecified type  - X-Ray Chest PA And Lateral; Future      Plan:       1/2.  Continue metformin 1000 mg b.i.d..    3.  Continue Benicar 40 mg, start amlodipine 5 mg.  E visit in 2 weeks.    4/5.  Continue Lipitor 40 mg.  6/7.  Check chest x-ray.

## 2024-07-01 NOTE — PROGRESS NOTES
Your electrolytes, kidney/liver function are normal.  A1c indicates very poor control of diabetes.  I want to start you on glipizide 10 mg and have you follow-up with me in 3 months as opposed to 4.

## 2024-07-02 ENCOUNTER — TELEPHONE (OUTPATIENT)
Dept: INTERNAL MEDICINE | Facility: CLINIC | Age: 69
End: 2024-07-02
Payer: MEDICARE

## 2024-07-02 DIAGNOSIS — M25.519 SHOULDER PAIN, UNSPECIFIED CHRONICITY, UNSPECIFIED LATERALITY: Primary | ICD-10-CM

## 2024-07-02 NOTE — TELEPHONE ENCOUNTER
"Informed patient of note, patient gave VU. Patient already scheduled for a 3m f/u.    Patient was asking about her shoulder    "Your electrolytes, kidney/liver function are normal. A1c indicates very poor control of diabetes. I want to start you on glipizide 10 mg and have you follow-up with me in 3 months as opposed to 4"  "

## 2024-07-03 ENCOUNTER — TELEPHONE (OUTPATIENT)
Dept: INTERNAL MEDICINE | Facility: CLINIC | Age: 69
End: 2024-07-03
Payer: MEDICARE

## 2024-07-03 NOTE — TELEPHONE ENCOUNTER
SYDNEE,    Received message below that this needs to be completed by radiology only not speech.         ----- Message from Rosalina Lomeli MA sent at 7/3/2024 11:12 AM CDT -----  Regarding: RE: test  Sydnee left a msg about a FL esophagram with barium tablet.  ----- Message -----  From: Meghan Ibarra LPN  Sent: 7/3/2024  11:03 AM CDT  To: Rosalina Lomeli MA  Subject: RE: test                                         What test are you referring to? I do not see a test ordered by Dr Rosa, only physical therapy/OT    Thanks  yifan Christianson  ----- Message -----  From: Rosalina Lomeli MA  Sent: 7/3/2024  10:53 AM CDT  To: [facility] Staff  Subject: test                                             Hello, I received a msg regarding this pt needed a test completed. This test is completed by radiology only not the speech department. Please call [phone] for scheduling.  Thanks    Rosalina ext [phone]

## 2024-07-05 ENCOUNTER — HOSPITAL ENCOUNTER (EMERGENCY)
Facility: HOSPITAL | Age: 69
Discharge: HOME | End: 2024-07-05
Attending: EMERGENCY MEDICINE
Payer: COMMERCIAL

## 2024-07-05 VITALS
WEIGHT: 176 LBS | SYSTOLIC BLOOD PRESSURE: 156 MMHG | TEMPERATURE: 98 F | HEART RATE: 83 BPM | RESPIRATION RATE: 18 BRPM | BODY MASS INDEX: 28.28 KG/M2 | OXYGEN SATURATION: 97 % | HEIGHT: 66 IN | DIASTOLIC BLOOD PRESSURE: 91 MMHG
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
## 2024-07-05 DIAGNOSIS — M25.512 LEFT SHOULDER PAIN, UNSPECIFIED CHRONICITY: Primary | ICD-10-CM

## 2024-07-05 DIAGNOSIS — M79.602 LEFT ARM PAIN: ICD-10-CM

## 2024-07-05 PROCEDURE — 25000003 PHARM REV CODE 250

## 2024-07-05 PROCEDURE — 96372 THER/PROPH/DIAG INJ SC/IM: CPT

## 2024-07-05 PROCEDURE — 63600175 PHARM REV CODE 636 W HCPCS

## 2024-07-05 PROCEDURE — 99284 EMERGENCY DEPT VISIT MOD MDM: CPT | Mod: 25

## 2024-07-05 RX ORDER — KETOROLAC TROMETHAMINE 30 MG/ML
15 INJECTION, SOLUTION INTRAMUSCULAR; INTRAVENOUS
Status: COMPLETED | OUTPATIENT
Start: 2024-07-05 | End: 2024-07-05

## 2024-07-05 RX ORDER — NAPROXEN 500 MG/1
500 TABLET ORAL 2 TIMES DAILY WITH MEALS
Qty: 30 TABLET | Refills: 0 | Status: SHIPPED | OUTPATIENT
Start: 2024-07-05

## 2024-07-05 RX ORDER — LIDOCAINE 50 MG/G
1 PATCH TOPICAL
Status: DISCONTINUED | OUTPATIENT
Start: 2024-07-05 | End: 2024-07-05 | Stop reason: HOSPADM

## 2024-07-05 RX ORDER — LIDOCAINE 50 MG/G
1 PATCH TOPICAL DAILY
Qty: 9 PATCH | Refills: 0 | Status: SHIPPED | OUTPATIENT
Start: 2024-07-05

## 2024-07-05 RX ADMIN — KETOROLAC TROMETHAMINE 15 MG: 30 INJECTION, SOLUTION INTRAMUSCULAR; INTRAVENOUS at 09:07

## 2024-07-05 RX ADMIN — LIDOCAINE 1 PATCH: 700 PATCH TOPICAL at 10:07

## 2024-07-05 NOTE — DISCHARGE INSTRUCTIONS
Ms. Perez,        Thank you for letting me care for you today! It was nice meeting you, and I hope you feel better soon.   If you would like access to your chart and what was done today please utilize the [facility].   Please don't hesitate to return if your symptoms worsen or you develop any other worrisome symptoms.    Our goal in the emergency department is to always give you outstanding care and exceptional service. You may receive a survey by mail or e-mail in the next week regarding your experience in our ED. We would greatly appreciate you completing and returning the survey. Your feedback provides us with a way to recognize our staff who give very good care and it helps us learn how to improve when your experience was below our aspiration of excellence.     Sincerely,    Sharron Bassett PA-C  Emergency Department Physician Assistant  [facility] [place] [place]

## 2024-07-05 NOTE — ED TRIAGE NOTES
"Pt presents to ED today c/o left axillary pain secondary to fall that occurred on Monday   Reports LUE "feels cold, weak, and tingling to fingertips" onset 0300 this am   Pt answering questions appropriately   Secure chat message sent to triage nurse for further eval of pt   "
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
## 2024-07-10 ENCOUNTER — HOSPITAL ENCOUNTER (OUTPATIENT)
Dept: RADIOLOGY | Facility: HOSPITAL | Age: 69
Discharge: HOME | End: 2024-07-10
Attending: STUDENT IN AN ORGANIZED HEALTH CARE EDUCATION/TRAINING PROGRAM
Payer: MEDICARE

## 2024-07-10 DIAGNOSIS — R13.10 DYSPHAGIA, UNSPECIFIED TYPE: ICD-10-CM

## 2024-07-10 DIAGNOSIS — K59.03 THERAPEUTIC OPIOID INDUCED CONSTIPATION: Primary | ICD-10-CM

## 2024-07-10 DIAGNOSIS — T40.2X5A THERAPEUTIC OPIOID INDUCED CONSTIPATION: Primary | ICD-10-CM

## 2024-07-10 PROCEDURE — 74220 X-RAY XM ESOPHAGUS 1CNTRST: CPT | Mod: 26,,, | Performed by: RADIOLOGY

## 2024-07-10 PROCEDURE — 74220 X-RAY XM ESOPHAGUS 1CNTRST: CPT | Mod: TC

## 2024-07-10 PROCEDURE — 25500020 PHARM REV CODE 255: Performed by: STUDENT IN AN ORGANIZED HEALTH CARE EDUCATION/TRAINING PROGRAM

## 2024-07-10 PROCEDURE — A9698 NON-RAD CONTRAST MATERIALNOC: HCPCS | Performed by: STUDENT IN AN ORGANIZED HEALTH CARE EDUCATION/TRAINING PROGRAM

## 2024-07-10 RX ORDER — NALOXEGOL OXALATE 12.5 MG/1
12.5 TABLET, FILM COATED ORAL DAILY
Qty: 30 TABLET | Refills: 11 | Status: SHIPPED | OUTPATIENT
Start: 2024-07-10 | End: 2025-07-05

## 2024-07-10 RX ADMIN — BARIUM SULFATE 200 ML: 0.6 SUSPENSION ORAL at 10:07

## 2024-07-10 RX ADMIN — BARIUM SULFATE 200 ML: 0.81 POWDER, FOR SUSPENSION ORAL at 10:07

## 2024-07-10 NOTE — PROGRESS NOTES
GI plan of care     Discussed esophagram results and prior testing with Dr Dumont. Patient is interested in EGD with endoflip and botox if EGJOO is present. Discussed alternative for TCA but she would rather procedure. She is also on other psychotropic and sedating medications so would prefer to avoid this if possible. I also discussed the possibility that her opioids are contributing to her dysphagia but she feels she is unable to stop taking these for her back pain. She is also having continued constipation despite 290 mcg linzess and miralax. Will try movantik for OIC    Saadia TINOCO

## 2024-07-18 ENCOUNTER — TELEPHONE (OUTPATIENT)
Dept: ENDOSCOPY | Facility: HOSPITAL | Age: 69
End: 2024-07-18
Payer: MEDICARE

## 2024-07-18 VITALS — HEIGHT: 66 IN | WEIGHT: 170 LBS | BODY MASS INDEX: 27.32 KG/M2

## 2024-07-18 DIAGNOSIS — R13.10 DYSPHAGIA, UNSPECIFIED TYPE: Primary | ICD-10-CM

## 2024-07-18 NOTE — TELEPHONE ENCOUNTER
Spoke to [facility] to schedule procedure(s) Upper Endoscopy (EGD)/Endoflip       Physician to perform procedure(s) Dr. MARGIE Dumont   Date of Procedure (s) 9/9/24  Arrival Time 10:00 AM  Time of Procedure(s) 11:00 AM   Location of Procedure(s) [address]  Type of Rx Prep sent to patient: Other  Instructions provided to patient via [facility]    Patient was informed on the following information and verbalized understanding. Screening questionnaire reviewed with patient and complete. If procedure requires anesthesia, a responsible adult needs to be present to accompany the patient home, patient cannot drive after receiving anesthesia. Appointment details are tentative, especially check-in time. Patient will receive a prep-op call 7 days prior to confirm check-in time for procedure. If applicable the patient should contact their pharmacy to verify Rx for procedure prep is ready for pick-up. Patient was advised to call the scheduling department at [phone] if pharmacy states no Rx is available. Patient was advised to call the endoscopy scheduling department if any questions or concerns arise.      [facility] Scheduling Department

## 2024-07-18 NOTE — TELEPHONE ENCOUNTER
----- Message from aDksha Dumont MD sent at 7/10/2024  3:30 PM CDT -----  Regarding: EGD with Endoflip and possible botox  [facility] PROCEDURE ORDERS    CLEARANCE FOR PROCEDURES:  [ ] Not needed     PROCEDURES    [ ] EGD with EndoFlip     FLOOR:    [ ] 2nd Floor    Reason for 2nd Floor:   [ ] Gastroparesis     PREP  [ ] Full liquid diet 3 days     MEDICATIONS    Motility Studies (esophageal manometry/anorectal manometry)  Hold narcotics x 1 days if able   Hold TCA x 1 days if able  Propofol/lidocaine only during sedation.  Discuss with Dr. Haque if additional sedation needed.   Hold baclofen for thee days (at least one day) if able   Hold muscle relaxants x 1 day if able     Anticoagulation/antiplt agents:   No    ORDER OF TESTING:  Day 1: EGD with Endoflip and possible Botox      SCHEDULING PRIORITY  Routine    URGENCY     [x] Medically NOT Urgent      DIAGNOSIS   [ ] Dysphagia     PHYSICIAN  [ ]  Ok with Dr. Dumont

## 2024-07-30 ENCOUNTER — PATIENT OUTREACH (OUTPATIENT)
Dept: ADMINISTRATIVE | Facility: HOSPITAL | Age: 69
End: 2024-07-30
Payer: MEDICARE

## 2024-07-30 ENCOUNTER — TELEPHONE (OUTPATIENT)
Dept: GASTROENTEROLOGY | Facility: CLINIC | Age: 69
End: 2024-07-30
Payer: MEDICARE

## 2024-07-30 NOTE — TELEPHONE ENCOUNTER
----- Message from Patel Willis sent at 7/30/2024  8:33 AM CDT -----  Contact: [phone]@[email or web address]  Good morning patient would like a call back to discuss getting a apt. Patient says she has IBS and she hasn't made a bowl movement in 4 days. Please call patient to advise [phone]

## 2024-07-30 NOTE — TELEPHONE ENCOUNTER
MA Spoke to patient, offered next available, patient was not happy and proceeded to say she would look elsewhere and hanged call.

## 2024-07-30 NOTE — PROGRESS NOTES
Population Health Chart Review & Patient Outreach Details      Additional [facility] Notes:               Updates Requested / Reviewed:      Care Everywhere, , and Immunizations Reconciliation Completed or Queried: [place]         Health Maintenance Topics Overdue:      [facility] Score: 3     Lipid Panel  Foot Exam  Uncontrolled BP    RSV Vaccine                  Health Maintenance Topic(s) Outreach Outcomes & Actions Taken:    Primary Care Appt - Outreach Outcomes & Actions Taken  : Primary Care Appt Scheduled

## 2024-08-05 ENCOUNTER — OFFICE VISIT (OUTPATIENT)
Dept: URGENT CARE | Facility: CLINIC | Age: 69
End: 2024-08-05
Payer: MEDICARE

## 2024-08-05 VITALS
DIASTOLIC BLOOD PRESSURE: 89 MMHG | TEMPERATURE: 98 F | SYSTOLIC BLOOD PRESSURE: 138 MMHG | HEART RATE: 85 BPM | RESPIRATION RATE: 20 BRPM | WEIGHT: 170 LBS | BODY MASS INDEX: 27.32 KG/M2 | HEIGHT: 66 IN | OXYGEN SATURATION: 100 %

## 2024-08-05 DIAGNOSIS — R51.9 NONINTRACTABLE HEADACHE, UNSPECIFIED CHRONICITY PATTERN, UNSPECIFIED HEADACHE TYPE: ICD-10-CM

## 2024-08-05 DIAGNOSIS — R30.0 DYSURIA: Primary | ICD-10-CM

## 2024-08-05 DIAGNOSIS — N39.0 COMPLICATED UTI (URINARY TRACT INFECTION): ICD-10-CM

## 2024-08-05 DIAGNOSIS — R11.0 NAUSEA: ICD-10-CM

## 2024-08-05 LAB
BILIRUBIN, UA POC OHS: NEGATIVE
BLOOD, UA POC OHS: NEGATIVE
CLARITY, UA POC OHS: ABNORMAL
COLOR, UA POC OHS: YELLOW
GLUCOSE, UA POC OHS: NEGATIVE
KETONES, UA POC OHS: 40
LEUKOCYTES, UA POC OHS: NEGATIVE
NITRITE, UA POC OHS: NEGATIVE
PH, UA POC OHS: 5
PROTEIN, UA POC OHS: NEGATIVE
SPECIFIC GRAVITY, UA POC OHS: 1.02
UROBILINOGEN, UA POC OHS: 2

## 2024-08-05 PROCEDURE — 99214 OFFICE O/P EST MOD 30 MIN: CPT | Mod: 25,,,

## 2024-08-05 PROCEDURE — 81003 URINALYSIS AUTO W/O SCOPE: CPT | Mod: QW,,,

## 2024-08-05 PROCEDURE — 87086 URINE CULTURE/COLONY COUNT: CPT

## 2024-08-05 PROCEDURE — 96372 THER/PROPH/DIAG INJ SC/IM: CPT | Mod: ,,,

## 2024-08-05 RX ORDER — ONDANSETRON 8 MG/1
8 TABLET, ORALLY DISINTEGRATING ORAL EVERY 12 HOURS PRN
Qty: 30 TABLET | Refills: 2 | Status: CANCELLED | OUTPATIENT
Start: 2024-08-05

## 2024-08-05 RX ORDER — CIPROFLOXACIN 500 MG/1
500 TABLET ORAL 2 TIMES DAILY
Qty: 14 TABLET | Refills: 0 | Status: SHIPPED | OUTPATIENT
Start: 2024-08-05 | End: 2024-08-06

## 2024-08-05 RX ORDER — KETOROLAC TROMETHAMINE 30 MG/ML
30 INJECTION, SOLUTION INTRAMUSCULAR; INTRAVENOUS
Status: COMPLETED | OUTPATIENT
Start: 2024-08-05 | End: 2024-08-05

## 2024-08-05 RX ADMIN — KETOROLAC TROMETHAMINE 30 MG: 30 INJECTION, SOLUTION INTRAMUSCULAR; INTRAVENOUS at 03:08

## 2024-08-06 ENCOUNTER — HOSPITAL ENCOUNTER (OUTPATIENT)
Dept: RADIOLOGY | Facility: HOSPITAL | Age: 69
Discharge: HOME | End: 2024-08-06
Attending: INTERNAL MEDICINE
Payer: MEDICARE

## 2024-08-06 ENCOUNTER — OFFICE VISIT (OUTPATIENT)
Dept: INTERNAL MEDICINE | Facility: CLINIC | Age: 69
End: 2024-08-06
Payer: MEDICARE

## 2024-08-06 VITALS
DIASTOLIC BLOOD PRESSURE: 82 MMHG | HEART RATE: 74 BPM | BODY MASS INDEX: 26.14 KG/M2 | SYSTOLIC BLOOD PRESSURE: 134 MMHG | HEIGHT: 66 IN | TEMPERATURE: 99 F | WEIGHT: 162.69 LBS | RESPIRATION RATE: 20 BRPM | OXYGEN SATURATION: 99 %

## 2024-08-06 DIAGNOSIS — R10.9 ABDOMINAL PAIN, UNSPECIFIED ABDOMINAL LOCATION: ICD-10-CM

## 2024-08-06 DIAGNOSIS — M15.9 PRIMARY OSTEOARTHRITIS INVOLVING MULTIPLE JOINTS: ICD-10-CM

## 2024-08-06 DIAGNOSIS — R11.0 NAUSEA: ICD-10-CM

## 2024-08-06 DIAGNOSIS — K59.00 CONSTIPATION, UNSPECIFIED CONSTIPATION TYPE: ICD-10-CM

## 2024-08-06 DIAGNOSIS — R10.9 ABDOMINAL PAIN, UNSPECIFIED ABDOMINAL LOCATION: Primary | ICD-10-CM

## 2024-08-06 LAB
BACTERIA UR CULT: NORMAL
BACTERIA UR CULT: NORMAL

## 2024-08-06 PROCEDURE — 74019 RADEX ABDOMEN 2 VIEWS: CPT | Mod: 26,,, | Performed by: INTERNAL MEDICINE

## 2024-08-06 PROCEDURE — 99214 OFFICE O/P EST MOD 30 MIN: CPT | Mod: S$PBB,,, | Performed by: INTERNAL MEDICINE

## 2024-08-06 PROCEDURE — G2211 COMPLEX E/M VISIT ADD ON: HCPCS | Mod: S$PBB,,, | Performed by: INTERNAL MEDICINE

## 2024-08-06 PROCEDURE — 99999 PR PBB SHADOW E&M-EST. PATIENT-LVL V: CPT | Mod: PBBFAC,,, | Performed by: INTERNAL MEDICINE

## 2024-08-06 PROCEDURE — 74019 RADEX ABDOMEN 2 VIEWS: CPT | Mod: TC,PO

## 2024-08-06 PROCEDURE — 99215 OFFICE O/P EST HI 40 MIN: CPT | Mod: PBBFAC,PO,25 | Performed by: INTERNAL MEDICINE

## 2024-08-06 RX ORDER — DICLOFENAC SODIUM 10 MG/G
2 GEL TOPICAL 4 TIMES DAILY
Qty: 100 G | Refills: 3 | Status: SHIPPED | OUTPATIENT
Start: 2024-08-06

## 2024-08-06 RX ORDER — ONDANSETRON 8 MG/1
8 TABLET, ORALLY DISINTEGRATING ORAL EVERY 12 HOURS PRN
Qty: 30 TABLET | Refills: 2 | Status: SHIPPED | OUTPATIENT
Start: 2024-08-06 | End: 2024-08-06 | Stop reason: SDUPTHER

## 2024-08-06 RX ORDER — ONDANSETRON 8 MG/1
8 TABLET, ORALLY DISINTEGRATING ORAL EVERY 12 HOURS PRN
Qty: 30 TABLET | Refills: 2 | Status: SHIPPED | OUTPATIENT
Start: 2024-08-06 | End: 2024-08-07

## 2024-08-07 DIAGNOSIS — R11.0 NAUSEA: ICD-10-CM

## 2024-08-07 RX ORDER — ONDANSETRON 8 MG/1
8 TABLET, ORALLY DISINTEGRATING ORAL EVERY 12 HOURS PRN
Qty: 30 TABLET | Refills: 2 | Status: SHIPPED | OUTPATIENT
Start: 2024-08-07

## 2024-08-20 ENCOUNTER — PATIENT MESSAGE (OUTPATIENT)
Dept: RHEUMATOLOGY | Facility: CLINIC | Age: 69
End: 2024-08-20
Payer: MEDICARE

## 2024-08-26 ENCOUNTER — HOSPITAL ENCOUNTER (EMERGENCY)
Facility: HOSPITAL | Age: 69
Discharge: HOME | End: 2024-08-26
Attending: EMERGENCY MEDICINE
Payer: COMMERCIAL

## 2024-08-26 VITALS
HEART RATE: 67 BPM | WEIGHT: 160 LBS | SYSTOLIC BLOOD PRESSURE: 144 MMHG | DIASTOLIC BLOOD PRESSURE: 77 MMHG | RESPIRATION RATE: 18 BRPM | TEMPERATURE: 98 F | OXYGEN SATURATION: 96 % | HEIGHT: 66 IN | BODY MASS INDEX: 25.71 KG/M2
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
## 2024-08-26 DIAGNOSIS — M53.3 PAIN IN THE COCCYX: ICD-10-CM

## 2024-08-26 DIAGNOSIS — S09.90XA MINOR TRAUMATIC INJURY OF HEAD WITH NORMAL MENTAL STATUS: Primary | ICD-10-CM

## 2024-08-26 PROCEDURE — 25000003 PHARM REV CODE 250: Performed by: NURSE PRACTITIONER

## 2024-08-26 PROCEDURE — 25000003 PHARM REV CODE 250

## 2024-08-26 PROCEDURE — 99285 EMERGENCY DEPT VISIT HI MDM: CPT | Mod: 25

## 2024-08-26 RX ORDER — METHOCARBAMOL 500 MG/1
500 TABLET, FILM COATED ORAL 4 TIMES DAILY
Qty: 40 TABLET | Refills: 0 | Status: SHIPPED | OUTPATIENT
Start: 2024-08-26 | End: 2024-09-05

## 2024-08-26 RX ORDER — LIDOCAINE 50 MG/G
1 PATCH TOPICAL
Status: DISCONTINUED | OUTPATIENT
Start: 2024-08-26 | End: 2024-08-26 | Stop reason: HOSPADM

## 2024-08-26 RX ORDER — DICLOFENAC SODIUM 10 MG/G
2 GEL TOPICAL DAILY
Qty: 100 G | Refills: 0 | Status: SHIPPED | OUTPATIENT
Start: 2024-08-26

## 2024-08-26 RX ORDER — OXYCODONE AND ACETAMINOPHEN 5; 325 MG/1; MG/1
1 TABLET ORAL
Status: COMPLETED | OUTPATIENT
Start: 2024-08-26 | End: 2024-08-26

## 2024-08-26 RX ORDER — LIDOCAINE 50 MG/G
1 PATCH TOPICAL DAILY
Qty: 7 PATCH | Refills: 0 | Status: SHIPPED | OUTPATIENT
Start: 2024-08-26 | End: 2024-09-02

## 2024-08-26 RX ADMIN — OXYCODONE HYDROCHLORIDE AND ACETAMINOPHEN 1 TABLET: 5; 325 TABLET ORAL at 10:08

## 2024-08-26 RX ADMIN — LIDOCAINE 1 PATCH: 50 PATCH CUTANEOUS at 12:08
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
## 2024-08-26 NOTE — DISCHARGE INSTRUCTIONS
Thank you for letting me care for you today - it was nice to meet you and I hope you feel better soon. Please return to the ER if your symptoms don't improve or get worse. And be sure to follow up with your primary care provider within the next week. I have also placed a referral to Physical Medicine and Rehabilitation for follow up care. [facility] will call you within 48 hours to make an appointment, or you can call [phone] to schedule.     Medication:   Rotate between Tylenol and ibuprofen (Motrin), switching every 3 hours. For example, Tylenol at 8am, ibuprofen at 11am, tylenol at 2pm.  Do not take more than 3000 mg of Tylenol in 1 day or more than 2400 mg of ibuprofen and 1 day.     I have sent in prescriptions for the following:   - Voltaren Gel: You can rub this on the area that is causing you pain 4 times per day.    - I have prescribed a muscle relaxer, Robaxin. You can take this 3 times per day, but it can make you sleepy. So I recommend only taking this at night. Please do note drive or operate heavy machinery while taking.    - Lidocaine patches: leave on for 12 hours, take off for 12 hours. Then apply a new patch.    Our goal at [facility] is to always give you outstanding care and exceptional service. You may receive a survey by mail or email in the next week about your experience in our ED. We would greatly appreciate you completing and returning the survey. Your feedback provides us with a way to recognize our staff who give very good care and it helps us learn how to improve when your experience was below our aspiration of excellence.     All the best,     Maia Zabala, MPH, PA-C  Emergency Department Physician Assistant  [facility], [facility]

## 2024-08-26 NOTE — ED TRIAGE NOTES
Pt presents to ED today c/o slip and fall in kitchen x 3 days ago   Unsure LOC denies use of ASA or blood thinners  Pt also c/o abd pain and nausea

## 2024-08-27 ENCOUNTER — PATIENT OUTREACH (OUTPATIENT)
Dept: EMERGENCY MEDICINE | Facility: HOSPITAL | Age: 69
End: 2024-08-27
Payer: MEDICARE

## 2024-08-27 NOTE — PROGRESS NOTES
Patient was seen in the ED on 8/26/24 for a fall. Patient was contacted for post ED [facility] navigation. They have an appointment scheduled with Tory Austin NP on 9/12/24 at 10:00. ED navigator will remind patient of appointment.

## 2024-09-05 ENCOUNTER — TELEPHONE (OUTPATIENT)
Dept: ENDOSCOPY | Facility: HOSPITAL | Age: 69
End: 2024-09-05
Payer: MEDICARE

## 2024-09-05 NOTE — TELEPHONE ENCOUNTER
Spoke to patient for pre-call to confirm scheduled Upper Endoscopy (EGD) and patient verbalized understanding of the following:       Date of Procedure (s)  verified 9/9/24  Arrival Time 7:15 AM verified.  Location of Procedure(s) [address] verified.  NPO status reinforced. Ok to continue clear liquids up until 4 hours prior to the Endoscopy procedure.     Pt confirmed receipt of prep instructions and Rx prep (if applicable).  Instructions provided to patient via [facility]  Pt confirmed ride home after procedure if procedure requires anesthesia.   Pre-call screening questionnaire reviewed and completed with patient.   Appointment details are tentative, including check-in time.  If the patient begins taking any blood thinning medications, injectable weight loss/diabetes medications (other than insulin), or Adipex (phentermine) patient was instructed to contact the endoscopy scheduling department as soon as possible.  Patient was advised to call the endoscopy scheduling department if any questions or concerns arise.       [facility] Scheduling Department

## 2024-09-06 RX ORDER — ZOLPIDEM TARTRATE 10 MG/1
10 TABLET ORAL NIGHTLY PRN
Qty: 90 TABLET | Refills: 0 | Status: SHIPPED | OUTPATIENT
Start: 2024-09-06 | End: 2025-03-07

## 2024-09-06 NOTE — TELEPHONE ENCOUNTER
Care Due:                  Date            Visit Type   Department     Provider  --------------------------------------------------------------------------------                                EP -                              PRIMARY      MET INTERNAL  Last Visit: 08-      CARE ([facility])   MARIELLE Rosa                              EP -                              PRIMARY      [facility] INTERNAL  Next Visit: 10-      CARE ([facility])   MARIELLE Rosa                                                            Last  Test          Frequency    Reason                     Performed    Due Date  --------------------------------------------------------------------------------    Lipid Panel.  12 months..  atorvastatin.............  05- 05-    [facility] Embedded Care Due Messages. Reference number: [id number].   9/06/2024 4:15:17 PM CDT

## 2024-09-09 ENCOUNTER — HOSPITAL ENCOUNTER (OUTPATIENT)
Facility: HOSPITAL | Age: 69
Discharge: HOME | End: 2024-09-09
Attending: INTERNAL MEDICINE | Admitting: INTERNAL MEDICINE
Payer: COMMERCIAL

## 2024-09-09 ENCOUNTER — ANESTHESIA (OUTPATIENT)
Dept: ENDOSCOPY | Facility: HOSPITAL | Age: 69
End: 2024-09-09
Payer: COMMERCIAL

## 2024-09-09 ENCOUNTER — ANESTHESIA EVENT (OUTPATIENT)
Dept: ENDOSCOPY | Facility: HOSPITAL | Age: 69
End: 2024-09-09
Payer: COMMERCIAL

## 2024-09-09 VITALS
HEIGHT: 66 IN | TEMPERATURE: 98 F | BODY MASS INDEX: 25.71 KG/M2 | RESPIRATION RATE: 17 BRPM | OXYGEN SATURATION: 94 % | HEART RATE: 50 BPM | DIASTOLIC BLOOD PRESSURE: 78 MMHG | WEIGHT: 160 LBS | SYSTOLIC BLOOD PRESSURE: 133 MMHG

## 2024-09-09 DIAGNOSIS — R13.10 DYSPHAGIA: ICD-10-CM

## 2024-09-09 DIAGNOSIS — R13.10 DYSPHAGIA, UNSPECIFIED TYPE: Primary | ICD-10-CM

## 2024-09-09 LAB
POCT GLUCOSE: 187 MG/DL (ref 70–110)
POCT GLUCOSE: 196 MG/DL (ref 70–110)

## 2024-09-09 PROCEDURE — 82962 GLUCOSE BLOOD TEST: CPT | Performed by: INTERNAL MEDICINE

## 2024-09-09 PROCEDURE — C1726 CATH, BAL DIL, NON-VASCULAR: HCPCS | Performed by: INTERNAL MEDICINE

## 2024-09-09 PROCEDURE — 37000008 HC ANESTHESIA 1ST 15 MINUTES: Performed by: INTERNAL MEDICINE

## 2024-09-09 PROCEDURE — 27201012 HC FORCEPS, HOT/COLD, DISP: Performed by: INTERNAL MEDICINE

## 2024-09-09 PROCEDURE — 43249 ESOPH EGD DILATION <30 MM: CPT | Performed by: INTERNAL MEDICINE

## 2024-09-09 PROCEDURE — 37000009 HC ANESTHESIA EA ADD 15 MINS: Performed by: INTERNAL MEDICINE

## 2024-09-09 PROCEDURE — 91040 ESOPH BALLOON DISTENSION TST: CPT | Performed by: INTERNAL MEDICINE

## 2024-09-09 PROCEDURE — 43239 EGD BIOPSY SINGLE/MULTIPLE: CPT | Mod: 59,,, | Performed by: INTERNAL MEDICINE

## 2024-09-09 PROCEDURE — 43239 EGD BIOPSY SINGLE/MULTIPLE: CPT | Mod: 59 | Performed by: INTERNAL MEDICINE

## 2024-09-09 PROCEDURE — 63600175 PHARM REV CODE 636 W HCPCS: Performed by: NURSE ANESTHETIST, CERTIFIED REGISTERED

## 2024-09-09 PROCEDURE — 43249 ESOPH EGD DILATION <30 MM: CPT | Mod: ,,, | Performed by: INTERNAL MEDICINE

## 2024-09-09 PROCEDURE — 88305 TISSUE EXAM BY PATHOLOGIST: CPT | Performed by: PATHOLOGY

## 2024-09-09 PROCEDURE — 25000003 PHARM REV CODE 250: Performed by: INTERNAL MEDICINE

## 2024-09-09 RX ORDER — FENTANYL CITRATE 50 UG/ML
25 INJECTION, SOLUTION INTRAMUSCULAR; INTRAVENOUS EVERY 5 MIN PRN
Status: DISCONTINUED | OUTPATIENT
Start: 2024-09-09 | End: 2024-09-09 | Stop reason: HOSPADM

## 2024-09-09 RX ORDER — SODIUM CHLORIDE 9 MG/ML
INJECTION, SOLUTION INTRAVENOUS CONTINUOUS
Status: DISCONTINUED | OUTPATIENT
Start: 2024-09-09 | End: 2024-09-09 | Stop reason: HOSPADM

## 2024-09-09 RX ORDER — ONDANSETRON HYDROCHLORIDE 2 MG/ML
4 INJECTION, SOLUTION INTRAVENOUS ONCE AS NEEDED
Status: DISCONTINUED | OUTPATIENT
Start: 2024-09-09 | End: 2024-09-09 | Stop reason: HOSPADM

## 2024-09-09 RX ORDER — PROPOFOL 10 MG/ML
VIAL (ML) INTRAVENOUS CONTINUOUS PRN
Status: DISCONTINUED | OUTPATIENT
Start: 2024-09-09 | End: 2024-09-09

## 2024-09-09 RX ORDER — DIPHENHYDRAMINE HYDROCHLORIDE 50 MG/ML
25 INJECTION INTRAMUSCULAR; INTRAVENOUS EVERY 6 HOURS PRN
Status: DISCONTINUED | OUTPATIENT
Start: 2024-09-09 | End: 2024-09-09 | Stop reason: HOSPADM

## 2024-09-09 RX ORDER — HYDROMORPHONE HYDROCHLORIDE 1 MG/ML
0.2 INJECTION, SOLUTION INTRAMUSCULAR; INTRAVENOUS; SUBCUTANEOUS EVERY 5 MIN PRN
Status: DISCONTINUED | OUTPATIENT
Start: 2024-09-09 | End: 2024-09-09 | Stop reason: HOSPADM

## 2024-09-09 RX ORDER — PROPOFOL 10 MG/ML
VIAL (ML) INTRAVENOUS
Status: DISCONTINUED | OUTPATIENT
Start: 2024-09-09 | End: 2024-09-09

## 2024-09-09 RX ORDER — PROCHLORPERAZINE EDISYLATE 5 MG/ML
5 INJECTION INTRAMUSCULAR; INTRAVENOUS EVERY 30 MIN PRN
Status: DISCONTINUED | OUTPATIENT
Start: 2024-09-09 | End: 2024-09-09 | Stop reason: HOSPADM

## 2024-09-09 RX ADMIN — PROPOFOL 80 MG: 10 INJECTION, EMULSION INTRAVENOUS at 08:09

## 2024-09-09 RX ADMIN — PROPOFOL 20 MG: 10 INJECTION, EMULSION INTRAVENOUS at 08:09

## 2024-09-09 RX ADMIN — SODIUM CHLORIDE: 0.9 INJECTION, SOLUTION INTRAVENOUS at 07:09

## 2024-09-09 RX ADMIN — PROPOFOL 50 MG: 10 INJECTION, EMULSION INTRAVENOUS at 08:09

## 2024-09-09 RX ADMIN — PROPOFOL 200 MCG/KG/MIN: 10 INJECTION, EMULSION INTRAVENOUS at 08:09

## 2024-09-09 NOTE — ANESTHESIA POSTPROCEDURE EVALUATION
Anesthesia Post Evaluation    Patient: Jill Perez    Procedure(s) Performed: Procedure(s) (LRB):  EGD (ESOPHAGOGASTRODUODENOSCOPY) (N/A)    Final Anesthesia Type: general      Level of consciousness: awake and alert  Post-procedure vital signs: reviewed and stable  Pain control: Pain has been treated.  Airway patency: patent    PONV status: Absent or treated.  Anesthetic complications: no      Cardiovascular status: hemodynamically stable  Respiratory status: unassisted  Hydration status: euvolemic                Vitals Value Taken Time   /64 09/09/24 0900   Temp 36.6 °C (97.9 °F) 09/09/24 0850   Pulse 51 09/09/24 0901   Resp 16 09/09/24 0900   SpO2 99 % 09/09/24 0901   Vitals shown include unfiled device data.      No case tracking events are documented in the log.      Pain/James Score: James Score: 9 (9/9/2024  8:50 AM)

## 2024-09-09 NOTE — TRANSFER OF CARE
"Anesthesia Transfer of Care Note    Patient: Jill Perez    Procedure(s) Performed: Procedure(s) (LRB):  EGD (ESOPHAGOGASTRODUODENOSCOPY) (N/A)    Patient location: [facility]    Anesthesia Type: general    Transport from OR: Transported from OR on room air with adequate spontaneous ventilation    Post pain: adequate analgesia    Post assessment: no apparent anesthetic complications and tolerated procedure well    Post vital signs: stable    Level of consciousness: awake, alert and oriented    Nausea/Vomiting: no nausea/vomiting    Complications: none    Transfer of care protocol was followed    Last vitals: Visit Vitals  BP (!) 98/56   Pulse (!) 59   Temp 36.7 °C (98 °F) (Temporal)   Resp 14   Ht 5' 6" (1.676 m)   Wt 72.6 kg (160 lb)   LMP  (LMP Unknown)   SpO2 99%   Breastfeeding No   BMI 25.82 kg/m²     "
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
## 2024-09-09 NOTE — PROVATION PATIENT INSTRUCTIONS
Discharge Summary/Instructions after an Endoscopic Procedure  Patient Name: Jill Perez  Patient MRN: [id number]  Patient YOB: 1955 Monday, September 9, 2024  Daksha Dumont MD  Dear patient,  As a result of recent federal legislation (The Federal Cures Act), you may   receive lab or pathology results from your procedure in your [facility]   account before your physician is able to contact you. Your physician or   their representative will relay the results to you with their   recommendations at their soonest availability.  Thank you,  RESTRICTIONS:  During your procedure today, you received medications for sedation.  These   medications may affect your judgment, balance and coordination.  Therefore,   for 24 hours, you have the following restrictions:   - DO NOT drive a car, operate machinery, make legal/financial decisions,   sign important papers or drink alcohol.    ACTIVITY:  Today: no heavy lifting, straining or running due to procedural   sedation/anesthesia.  The following day: return to full activity including work.  DIET:  Eat and drink normally unless instructed otherwise.     TREATMENT FOR COMMON SIDE EFFECTS:  - Mild abdominal pain, nausea, belching, bloating or excessive gas:  rest,   eat lightly and use a heating pad.  - Sore Throat: treat with throat lozenges and/or gargle with warm salt   water.  - Because air was used during the procedure, expelling large amounts of air   from your rectum or belching is normal.  - If a bowel prep was taken, you may not have a bowel movement for 1-3 days.    This is normal.  SYMPTOMS TO WATCH FOR AND REPORT TO YOUR PHYSICIAN:  1. Abdominal pain or bloating, other than gas cramps.  2. Chest pain.  3. Back pain.  4. Signs of infection such as: chills or fever occurring within 24 hours   after the procedure.  5. Rectal bleeding, which would show as bright red, maroon, or black stools.   (A tablespoon of blood from the rectum is not serious, especially  if   hemorrhoids are present.)  6. Vomiting.  7. Weakness or dizziness.  GO DIRECTLY TO THE NEAREST EMERGENCY ROOM IF YOU HAVE ANY OF THE FOLLOWING:      Difficulty breathing              Chills and/or fever over 101 F   Persistent vomiting and/or vomiting blood   Severe abdominal pain   Severe chest pain   Black, tarry stools   Bleeding- more than one tablespoon   Any other symptom or condition that you feel may need urgent attention  Your doctor recommends these additional instructions:  If any biopsies were taken, your doctors clinic will contact you in 1 to 2   weeks with any results.  - Discharge patient to home.   - Resume previous diet.   - Continue present medications.   - Await pathology results.  For questions, problems or results please call your physician - Daksha Dumont MD at Work:  [phone].  [facility], EMERGENCY ROOM PHONE NUMBER: [phone]  IF A COMPLICATION OR EMERGENCY SITUATION ARISES AND YOU ARE UNABLE TO REACH   YOUR PHYSICIAN - GO DIRECTLY TO THE EMERGENCY ROOM.  Daksha Dumont MD  9/9/2024 8:49:40 AM  This report has been verified and signed electronically.  Dear patient,  As a result of recent federal legislation (The Federal Cures Act), you may   receive lab or pathology results from your procedure in your [facility]   account before your physician is able to contact you. Your physician or   their representative will relay the results to you with their   recommendations at their soonest availability.  Thank you,  PROVATION

## 2024-09-11 ENCOUNTER — PATIENT OUTREACH (OUTPATIENT)
Dept: EMERGENCY MEDICINE | Facility: HOSPITAL | Age: 69
End: 2024-09-11
Payer: MEDICARE

## 2024-09-12 LAB
FINAL PATHOLOGIC DIAGNOSIS: NORMAL
GROSS: NORMAL
Lab: NORMAL

## 2024-09-15 NOTE — TELEPHONE ENCOUNTER
No care due was identified.  Health [facility] Embedded Care Due Messages. Reference number: [id number].   9/15/2024 5:08:54 AM CDT

## 2024-09-16 RX ORDER — VENLAFAXINE HYDROCHLORIDE 75 MG/1
75 CAPSULE, EXTENDED RELEASE ORAL
Qty: 90 CAPSULE | Refills: 0 | Status: SHIPPED | OUTPATIENT
Start: 2024-09-16

## 2024-09-16 NOTE — TELEPHONE ENCOUNTER
Refill Routing Note   Medication(s) are not appropriate for processing by [facility] for the following reason(s):        ED/Hospital Visit since last OV with provider  Drug-disease interaction    [facility] action(s):  Defer      Medication Therapy Plan: Drug-Disease: venlafaxine and Primary hypertension;    Pharmacist review requested: Yes   Extended chart review required: Yes     Appointments  past 12m or future 3m with PCP    Date Provider   Last Visit   8/6/2024 Soham Rosa MD   Next Visit   10/1/2024 Soham Rosa MD   ED visits in past 90 days: 2        Note composed:10:40 AM 09/16/2024

## 2024-09-17 ENCOUNTER — PATIENT OUTREACH (OUTPATIENT)
Dept: ADMINISTRATIVE | Facility: HOSPITAL | Age: 69
End: 2024-09-17
Payer: MEDICARE

## 2024-09-17 DIAGNOSIS — Z12.31 ENCOUNTER FOR SCREENING MAMMOGRAM FOR MALIGNANT NEOPLASM OF BREAST: Primary | ICD-10-CM

## 2024-09-17 NOTE — PROGRESS NOTES
Population Health Chart Review & Patient Outreach Details      Additional [facility] Health Notes:               Updates Requested / Reviewed:      Immunizations Reconciliation Completed or Queried: [place]         Health Maintenance Topics Overdue:      [facility] Score: 3     Eye Exam  Lipid Panel  Foot Exam    Influenza Vaccine  RSV Vaccine                  Health Maintenance Topic(s) Outreach Outcomes & Actions Taken:    Primary Care Appt - Outreach Outcomes & Actions Taken  : Primary Care Appt Scheduled    Breast Cancer Screening - Outreach Outcomes & Actions Taken  : Mammogram Order Placed

## 2024-09-30 ENCOUNTER — TELEPHONE (OUTPATIENT)
Dept: INTERNAL MEDICINE | Facility: CLINIC | Age: 69
End: 2024-09-30
Payer: MEDICARE

## 2024-10-01 ENCOUNTER — OFFICE VISIT (OUTPATIENT)
Dept: INTERNAL MEDICINE | Facility: CLINIC | Age: 69
End: 2024-10-01
Payer: COMMERCIAL

## 2024-10-01 ENCOUNTER — LAB VISIT (OUTPATIENT)
Dept: LAB | Facility: HOSPITAL | Age: 69
End: 2024-10-01
Attending: INTERNAL MEDICINE
Payer: COMMERCIAL

## 2024-10-01 VITALS
TEMPERATURE: 98 F | WEIGHT: 166.69 LBS | OXYGEN SATURATION: 96 % | RESPIRATION RATE: 16 BRPM | SYSTOLIC BLOOD PRESSURE: 138 MMHG | HEIGHT: 66 IN | DIASTOLIC BLOOD PRESSURE: 84 MMHG | BODY MASS INDEX: 26.79 KG/M2 | HEART RATE: 77 BPM

## 2024-10-01 DIAGNOSIS — E11.65 TYPE 2 DIABETES MELLITUS WITH HYPERGLYCEMIA, WITHOUT LONG-TERM CURRENT USE OF INSULIN: Chronic | ICD-10-CM

## 2024-10-01 DIAGNOSIS — M06.9 RHEUMATOID ARTHRITIS INVOLVING MULTIPLE SITES, UNSPECIFIED WHETHER RHEUMATOID FACTOR PRESENT: ICD-10-CM

## 2024-10-01 DIAGNOSIS — I10 PRIMARY HYPERTENSION: Chronic | ICD-10-CM

## 2024-10-01 DIAGNOSIS — E78.5 HYPERLIPIDEMIA ASSOCIATED WITH TYPE 2 DIABETES MELLITUS: Chronic | ICD-10-CM

## 2024-10-01 DIAGNOSIS — F51.01 PRIMARY INSOMNIA: Chronic | ICD-10-CM

## 2024-10-01 DIAGNOSIS — E11.43 TYPE 2 DIABETES MELLITUS WITH DIABETIC AUTONOMIC NEUROPATHY, WITHOUT LONG-TERM CURRENT USE OF INSULIN: Chronic | ICD-10-CM

## 2024-10-01 DIAGNOSIS — E11.65 TYPE 2 DIABETES MELLITUS WITH HYPERGLYCEMIA, WITHOUT LONG-TERM CURRENT USE OF INSULIN: Primary | Chronic | ICD-10-CM

## 2024-10-01 DIAGNOSIS — G44.321 INTRACTABLE CHRONIC POST-TRAUMATIC HEADACHE: ICD-10-CM

## 2024-10-01 DIAGNOSIS — I70.0 AORTIC ATHEROSCLEROSIS: Chronic | ICD-10-CM

## 2024-10-01 DIAGNOSIS — E11.69 HYPERLIPIDEMIA ASSOCIATED WITH TYPE 2 DIABETES MELLITUS: Chronic | ICD-10-CM

## 2024-10-01 DIAGNOSIS — F33.0 MAJOR DEPRESSIVE DISORDER, RECURRENT, MILD: Chronic | ICD-10-CM
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
## 2024-10-01 LAB
ALBUMIN SERPL BCP-MCNC: 3.6 G/DL (ref 3.5–5.2)
ALP SERPL-CCNC: 121 U/L (ref 55–135)
ALT SERPL W/O P-5'-P-CCNC: 15 U/L (ref 10–44)
ANION GAP SERPL CALC-SCNC: 5 MMOL/L (ref 8–16)
AST SERPL-CCNC: 15 U/L (ref 10–40)
BILIRUB SERPL-MCNC: 0.4 MG/DL (ref 0.1–1)
BUN SERPL-MCNC: 15 MG/DL (ref 8–23)
CALCIUM SERPL-MCNC: 9.9 MG/DL (ref 8.7–10.5)
CHLORIDE SERPL-SCNC: 106 MMOL/L (ref 95–110)
CO2 SERPL-SCNC: 29 MMOL/L (ref 23–29)
CREAT SERPL-MCNC: 0.7 MG/DL (ref 0.5–1.4)
EST. GFR  (NO RACE VARIABLE): >60 ML/MIN/1.73 M^2
ESTIMATED AVG GLUCOSE: 229 MG/DL (ref 68–131)
GLUCOSE SERPL-MCNC: 245 MG/DL (ref 70–110)
HBA1C MFR BLD: 9.6 % (ref 4–5.6)
POTASSIUM SERPL-SCNC: 4.8 MMOL/L (ref 3.5–5.1)
PROT SERPL-MCNC: 7 G/DL (ref 6–8.4)
SODIUM SERPL-SCNC: 140 MMOL/L (ref 136–145)

## 2024-10-01 PROCEDURE — 99999 PR PBB SHADOW E&M-EST. PATIENT-LVL IV: CPT | Mod: PBBFAC,,, | Performed by: INTERNAL MEDICINE

## 2024-10-01 PROCEDURE — 80053 COMPREHEN METABOLIC PANEL: CPT | Performed by: INTERNAL MEDICINE

## 2024-10-01 PROCEDURE — 36415 COLL VENOUS BLD VENIPUNCTURE: CPT | Mod: PO | Performed by: INTERNAL MEDICINE

## 2024-10-01 PROCEDURE — 83036 HEMOGLOBIN GLYCOSYLATED A1C: CPT | Performed by: INTERNAL MEDICINE

## 2024-10-01 RX ORDER — VENLAFAXINE HYDROCHLORIDE 150 MG/1
150 CAPSULE, EXTENDED RELEASE ORAL DAILY
Qty: 90 CAPSULE | Refills: 3 | Status: SHIPPED | OUTPATIENT
Start: 2024-10-01

## 2024-10-01 NOTE — PROGRESS NOTES
"Subjective:       Patient ID: Jill Perez is a 69 y.o. female.    Chief Complaint: Follow-up, Fall, and Headache    HPI    69-year-old female here for follow-up.    Patient has diabetes on metformin 1000 mg twice daily, glipizide 10 mg daily.  Lab Results   Component Value Date    HGBA1C 11.4 (H) 07/01/2024    HGBA1C 7.3 (H) 09/12/2023    HGBA1C 6.8 (H) 05/12/2023     Lab Results   Component Value Date    LDLCALC 139.4 05/12/2023    CREATININE 0.8 07/01/2024     Patient has hypertension on amlodipine 5 mg, Benicar 40 mg.    Patient has hyperlipidemia with aortic atherosclerosis on Lipitor 40 mg.    Patient has insomnia on Ambien 10 mg nightly as needed.    History of Present Illness    CHIEF COMPLAINT:  Ms. Perez presents today for follow up.    DIABETES:  She is on metformin 1000 mg twice daily and glipizide 10 mg daily. Her recent A1C was 11.4, indicating poor glycemic control. However, her blood sugar has been "really good" in the last couple of months, showing improvement since her last visit.    HYPERTENSION:  She reports home blood pressure readings typically ranging from 130 to 145. The highest reading was 150 last week, which she attributes to life stressors. She mentions a reading of 138 on the day of the appointment. She is currently taking amlodipine 5 mg and Benicar 40 mg for management.    HYPERLIPIDEMIA:  She reports hyperlipidemia with aortic atherosclerosis. She is currently taking Lipitor 40 mg for management.    RECENT FALL AND POST-FALL SYMPTOMS:  She reports a recent fall in the kitchen where she hit the back of her head. A CT at the ER showed no acute findings. Since the fall, she has been experiencing throbbing headaches in the back of her head, occurring every other day or sometimes twice daily. The headaches are associated with a persistent bump at the site of impact and are sometimes severe, requiring her to lie down in a dark room. She has been taking Norco for pain relief. She denies " constant headaches or any other neurological symptoms.    INSOMNIA:  She reports taking Ambien 10 mg nightly as needed for insomnia. She takes the medication at 9:00 PM and wakes up at 6:30 AM, denying any side effects.    DEPRESSION:  She reports feeling depressed and unmotivated, experiencing a significant decrease in activity level compared to six months ago. She describes mood swings, alternating between feeling okay and becoming tearful. She is currently taking Effexor 75 mg for depression management.    OTHER SYMPTOMS:  She reports a sore throat with difficulty eating solid foods, primarily consuming soft foods such as mashed potatoes and soups. She occasionally attempts to eat bread but finds it challenging.    PAST MEDICAL PROCEDURES:  She reports having a recent EGD, which did not show significant abnormalities. She also had a recent EKG that revealed chronic changes over white bundle, with no additional findings.    SOCIAL HISTORY:  She reports living away from her home in [place] for work-related reasons for 20 years. She expresses dissatisfaction with her current living situation. Her  works for a bridge company, which necessitated their move. She indicates her  plans to retire next year, and she is eager to return home.      ROS:  Head: +headaches  ENT: +sore throat  Psychiatric: -sleep difficulty, +mood swings         Review of Systems          Objective:      Physical Exam  Vitals reviewed.   Constitutional:       Appearance: She is well-developed.   HENT:      Head: Normocephalic and atraumatic.      Mouth/Throat:      Pharynx: No oropharyngeal exudate.   Eyes:      General: No scleral icterus.        Right eye: No [facility].         Left eye: No [facility].      Pupils: Pupils are equal, round, and reactive to light.   Neck:      Thyroid: No thyromegaly.      Trachea: No tracheal deviation.   Cardiovascular:      Rate and Rhythm: Normal rate and regular rhythm.      Heart sounds:  Normal heart sounds. No murmur heard.     No friction rub. No gallop.   Pulmonary:      Effort: Pulmonary effort is normal. No respiratory distress.      Breath sounds: Normal breath sounds. No wheezing or rales.   Chest:      Chest wall: No tenderness.   Abdominal:      General: Bowel sounds are normal. There is no distension.      Palpations: Abdomen is soft. There is no mass.      Tenderness: There is no abdominal tenderness. There is no guarding or rebound.   Musculoskeletal:         General: No tenderness. Normal range of motion.      Cervical back: Normal range of motion and neck supple.   Skin:     General: Skin is warm and dry.      Coloration: Skin is not pale.      Findings: No erythema or rash.   Neurological:      Mental Status: She is alert and oriented to person, place, and time.   Psychiatric:         Behavior: Behavior normal.         Assessment:       1. Type 2 diabetes mellitus with hyperglycemia, without long-term current use of insulin  - Hemoglobin A1C; Future  - Comprehensive Metabolic Panel; Future    2. Type 2 diabetes mellitus with diabetic autonomic neuropathy, without long-term current use of insulin    3. Primary hypertension    4. Hyperlipidemia associated with type 2 diabetes mellitus    5. Aortic atherosclerosis    6. Primary insomnia    7. Fall, initial encounter    8. Intractable chronic post-traumatic headache  - Ambulatory referral/consult to Neurology; Future    9. Major depressive disorder, recurrent, mild    10. Rheumatoid arthritis involving multiple sites, unspecified whether rheumatoid factor present  - Ambulatory referral/consult to Rheumatology; Future      Plan:       3. Continue amlodipine 5 mg, Benicar 40 mg p.o.   4/5.  Continue Lipitor 40 mg    Assessment & Plan    IMPRESSION:  1. Assessed patient's diabetes management; noted recent A1C was 11.4, indicating poor glycemic control  2. Evaluated recent fall; reviewed ER CTs showing no acute findings in head, cervical  spine, or lumbar spine  3. Considered patient's recurring headaches post-fall as likely due to concussion  4. Determined patient's depression symptoms warrant medication adjustment  5. Reviewed recent EGD results, noting no significant findings  6. Evaluated EKG, noting chronic changes over white bundle with no acute concerns    POST-CONCUSSION SYNDROME:   Explained that recurring headaches are likely due to concussion and should improve over time.   Referred to Neurology for post-concussion headaches.    DEPRESSION:   Increased Effexor from 75 mg to 150 mg daily for depression.    INSOMNIA:   Continued Ambien 10 mg nightly as needed for insomnia.    LABS:   Ordered A1C test, electrolytes panel, kidney function test, and liver function test.    RHEUMATOLOGY REFERRAL:   Referred to Rheumatology for arthritis evaluation.                 This note was generated with the assistance of ambient listening technology. Verbal consent was obtained by the patient and accompanying visitor(s) for the recording of patient appointment to facilitate this note. I attest to having reviewed and edited the generated note for accuracy, though some syntax or spelling errors may persist. Please contact the author of this note for any clarification.

## 2024-10-07 ENCOUNTER — TELEPHONE (OUTPATIENT)
Dept: SPINE | Facility: CLINIC | Age: 69
End: 2024-10-07
Payer: MEDICARE

## 2024-10-07 ENCOUNTER — PATIENT MESSAGE (OUTPATIENT)
Dept: SPINE | Facility: CLINIC | Age: 69
End: 2024-10-07
Payer: MEDICARE

## 2024-10-07 NOTE — TELEPHONE ENCOUNTER
Staff lvm and also sent pt a portal message regards of the cancellation on 10/7 with Abimbola Short .

## 2024-10-08 DIAGNOSIS — M50.30 DDD (DEGENERATIVE DISC DISEASE), CERVICAL: Primary | ICD-10-CM

## 2024-10-09 NOTE — TELEPHONE ENCOUNTER
No care due was identified.  Health [facility] Embedded Care Due Messages. Reference number: [id number].   10/09/2024 11:04:20 AM CDT

## 2024-10-10 RX ORDER — DICLOFENAC SODIUM 10 MG/G
2 GEL TOPICAL DAILY
Qty: 100 G | Refills: 0 | Status: ON HOLD | OUTPATIENT
Start: 2024-10-10

## 2024-10-10 NOTE — TELEPHONE ENCOUNTER
Refill Routing Note   Medication(s) are not appropriate for processing by [facility] for the following reason(s):        Outside of protocol    [facility] action(s):  Route               Appointments  past 12m or future 3m with PCP    Date Provider   Last Visit   10/1/2024 Soham Rosa MD   Next Visit   2/3/2025 Soham Rosa MD   ED visits in past 90 days: 1        Note composed:8:28 PM 10/09/2024

## 2024-10-11 ENCOUNTER — HOSPITAL ENCOUNTER (OUTPATIENT)
Facility: HOSPITAL | Age: 69
Discharge: ANOTHER HOSPITAL | End: 2024-10-12
Attending: STUDENT IN AN ORGANIZED HEALTH CARE EDUCATION/TRAINING PROGRAM | Admitting: FAMILY MEDICINE
Payer: COMMERCIAL

## 2024-10-11 ENCOUNTER — TELEPHONE (OUTPATIENT)
Dept: SPINE | Facility: CLINIC | Age: 69
End: 2024-10-11
Payer: MEDICARE

## 2024-10-11 DIAGNOSIS — K56.7 ILEUS: Primary | ICD-10-CM

## 2024-10-11 DIAGNOSIS — E11.65 TYPE 2 DIABETES MELLITUS WITH HYPERGLYCEMIA, WITHOUT LONG-TERM CURRENT USE OF INSULIN: ICD-10-CM

## 2024-10-11 DIAGNOSIS — R07.9 CHEST PAIN: ICD-10-CM

## 2024-10-11 LAB
ALBUMIN SERPL BCP-MCNC: 3.6 G/DL (ref 3.5–5.2)
ALP SERPL-CCNC: 115 U/L (ref 55–135)
ALT SERPL W/O P-5'-P-CCNC: 21 U/L (ref 10–44)
ANION GAP SERPL CALC-SCNC: 12 MMOL/L (ref 8–16)
AST SERPL-CCNC: 21 U/L (ref 10–40)
BASOPHILS # BLD AUTO: 0 K/UL (ref 0–0.2)
BASOPHILS NFR BLD: 0 % (ref 0–1.9)
BILIRUB SERPL-MCNC: 0.3 MG/DL (ref 0.1–1)
BUN SERPL-MCNC: 11 MG/DL (ref 8–23)
CALCIUM SERPL-MCNC: 9.9 MG/DL (ref 8.7–10.5)
CHLORIDE SERPL-SCNC: 101 MMOL/L (ref 95–110)
CO2 SERPL-SCNC: 21 MMOL/L (ref 23–29)
CREAT SERPL-MCNC: 1.1 MG/DL (ref 0.5–1.4)
DIFFERENTIAL METHOD BLD: ABNORMAL
EOSINOPHIL # BLD AUTO: 0 K/UL (ref 0–0.5)
EOSINOPHIL NFR BLD: 0 % (ref 0–8)
ERYTHROCYTE [DISTWIDTH] IN BLOOD BY AUTOMATED COUNT: 14.3 % (ref 11.5–14.5)
EST. GFR  (NO RACE VARIABLE): 55 ML/MIN/1.73 M^2
GLUCOSE SERPL-MCNC: 581 MG/DL (ref 70–110)
HCT VFR BLD AUTO: 36.8 % (ref 37–48.5)
HGB BLD-MCNC: 12.2 G/DL (ref 12–16)
IMM GRANULOCYTES # BLD AUTO: 0.02 K/UL (ref 0–0.04)
IMM GRANULOCYTES NFR BLD AUTO: 0.4 % (ref 0–0.5)
LIPASE SERPL-CCNC: 9 U/L (ref 4–60)
LYMPHOCYTES # BLD AUTO: 0.7 K/UL (ref 1–4.8)
LYMPHOCYTES NFR BLD: 13.6 % (ref 18–48)
MCH RBC QN AUTO: 30.3 PG (ref 27–31)
MCHC RBC AUTO-ENTMCNC: 33.2 G/DL (ref 32–36)
MCV RBC AUTO: 92 FL (ref 82–98)
MONOCYTES # BLD AUTO: 0.1 K/UL (ref 0.3–1)
MONOCYTES NFR BLD: 1.9 % (ref 4–15)
NEUTROPHILS # BLD AUTO: 4.5 K/UL (ref 1.8–7.7)
NEUTROPHILS NFR BLD: 84.1 % (ref 38–73)
NRBC BLD-RTO: 0 /100 WBC
PLATELET # BLD AUTO: 274 K/UL (ref 150–450)
PMV BLD AUTO: 10.1 FL (ref 9.2–12.9)
POCT GLUCOSE: >500 MG/DL (ref 70–110)
POTASSIUM SERPL-SCNC: 4.9 MMOL/L (ref 3.5–5.1)
PROT SERPL-MCNC: 7.3 G/DL (ref 6–8.4)
RBC # BLD AUTO: 4.02 M/UL (ref 4–5.4)
SODIUM SERPL-SCNC: 134 MMOL/L (ref 136–145)
WBC # BLD AUTO: 5.29 K/UL (ref 3.9–12.7)

## 2024-10-11 PROCEDURE — 85025 COMPLETE CBC W/AUTO DIFF WBC: CPT

## 2024-10-11 PROCEDURE — 63600175 PHARM REV CODE 636 W HCPCS: Performed by: STUDENT IN AN ORGANIZED HEALTH CARE EDUCATION/TRAINING PROGRAM

## 2024-10-11 PROCEDURE — G0378 HOSPITAL OBSERVATION PER HR: HCPCS

## 2024-10-11 PROCEDURE — 82962 GLUCOSE BLOOD TEST: CPT

## 2024-10-11 PROCEDURE — 99285 EMERGENCY DEPT VISIT HI MDM: CPT | Mod: 25

## 2024-10-11 PROCEDURE — 96374 THER/PROPH/DIAG INJ IV PUSH: CPT

## 2024-10-11 PROCEDURE — 83690 ASSAY OF LIPASE: CPT

## 2024-10-11 PROCEDURE — 96375 TX/PRO/DX INJ NEW DRUG ADDON: CPT

## 2024-10-11 PROCEDURE — 80053 COMPREHEN METABOLIC PANEL: CPT

## 2024-10-11 PROCEDURE — 25500020 PHARM REV CODE 255

## 2024-10-11 RX ORDER — METOCLOPRAMIDE HYDROCHLORIDE 5 MG/ML
5 INJECTION INTRAMUSCULAR; INTRAVENOUS
Status: COMPLETED | OUTPATIENT
Start: 2024-10-11 | End: 2024-10-11

## 2024-10-11 RX ORDER — KETOROLAC TROMETHAMINE 30 MG/ML
15 INJECTION, SOLUTION INTRAMUSCULAR; INTRAVENOUS
Status: COMPLETED | OUTPATIENT
Start: 2024-10-11 | End: 2024-10-11

## 2024-10-11 RX ADMIN — METOCLOPRAMIDE 5 MG: 5 INJECTION, SOLUTION INTRAMUSCULAR; INTRAVENOUS at 09:10

## 2024-10-11 RX ADMIN — KETOROLAC TROMETHAMINE 15 MG: 30 INJECTION, SOLUTION INTRAMUSCULAR; INTRAVENOUS at 09:10

## 2024-10-11 RX ADMIN — IOHEXOL 75 ML: 350 INJECTION, SOLUTION INTRAVENOUS at 08:10

## 2024-10-11 RX ADMIN — HUMAN INSULIN 5 UNITS: 100 INJECTION, SOLUTION SUBCUTANEOUS at 09:10

## 2024-10-12 ENCOUNTER — HOSPITAL ENCOUNTER (INPATIENT)
Facility: HOSPITAL | Age: 69
LOS: 9 days | Discharge: HOME | DRG: 392 | End: 2024-10-21
Attending: SURGERY | Admitting: SURGERY
Payer: MEDICARE

## 2024-10-12 VITALS
BODY MASS INDEX: 28.17 KG/M2 | RESPIRATION RATE: 18 BRPM | HEART RATE: 76 BPM | SYSTOLIC BLOOD PRESSURE: 138 MMHG | OXYGEN SATURATION: 97 % | HEIGHT: 63 IN | DIASTOLIC BLOOD PRESSURE: 78 MMHG | TEMPERATURE: 98 F | WEIGHT: 159 LBS

## 2024-10-12 DIAGNOSIS — K56.609 SMALL BOWEL OBSTRUCTION: ICD-10-CM

## 2024-10-12 DIAGNOSIS — E11.43 TYPE 2 DIABETES MELLITUS WITH DIABETIC AUTONOMIC NEUROPATHY, WITHOUT LONG-TERM CURRENT USE OF INSULIN: Primary | Chronic | ICD-10-CM

## 2024-10-12 DIAGNOSIS — R10.84 GENERALIZED ABDOMINAL PAIN: Primary | ICD-10-CM

## 2024-10-12 DIAGNOSIS — K59.04 CHRONIC IDIOPATHIC CONSTIPATION: ICD-10-CM

## 2024-10-12 PROBLEM — K56.7 ILEUS: Status: ACTIVE | Noted: 2024-10-12

## 2024-10-12 LAB
ANION GAP SERPL CALC-SCNC: 9 MMOL/L (ref 8–16)
BUN SERPL-MCNC: 16 MG/DL (ref 8–23)
CALCIUM SERPL-MCNC: 9.6 MG/DL (ref 8.7–10.5)
CHLORIDE SERPL-SCNC: 109 MMOL/L (ref 95–110)
CO2 SERPL-SCNC: 21 MMOL/L (ref 23–29)
CREAT SERPL-MCNC: 0.7 MG/DL (ref 0.5–1.4)
ERYTHROCYTE [DISTWIDTH] IN BLOOD BY AUTOMATED COUNT: 14.2 % (ref 11.5–14.5)
EST. GFR  (NO RACE VARIABLE): >60 ML/MIN/1.73 M^2
GLUCOSE SERPL-MCNC: 228 MG/DL (ref 70–110)
HCT VFR BLD AUTO: 33.9 % (ref 37–48.5)
HGB BLD-MCNC: 11.3 G/DL (ref 12–16)
MCH RBC QN AUTO: 30.5 PG (ref 27–31)
MCHC RBC AUTO-ENTMCNC: 33.3 G/DL (ref 32–36)
MCV RBC AUTO: 91 FL (ref 82–98)
PLATELET # BLD AUTO: 270 K/UL (ref 150–450)
PMV BLD AUTO: 10.6 FL (ref 9.2–12.9)
POCT GLUCOSE: 170 MG/DL (ref 70–110)
POCT GLUCOSE: 177 MG/DL (ref 70–110)
POCT GLUCOSE: 229 MG/DL (ref 70–110)
POCT GLUCOSE: 238 MG/DL (ref 70–110)
POCT GLUCOSE: 241 MG/DL (ref 70–110)
POCT GLUCOSE: 274 MG/DL (ref 70–110)
POTASSIUM SERPL-SCNC: 3.7 MMOL/L (ref 3.5–5.1)
RBC # BLD AUTO: 3.71 M/UL (ref 4–5.4)
SODIUM SERPL-SCNC: 139 MMOL/L (ref 136–145)
T3 SERPL-MCNC: 62 NG/DL (ref 60–180)
T4 FREE SERPL-MCNC: 1 NG/DL (ref 0.71–1.51)
TSH SERPL DL<=0.005 MIU/L-ACNC: 0.28 UIU/ML (ref 0.4–4)
WBC # BLD AUTO: 9.19 K/UL (ref 3.9–12.7)

## 2024-10-12 PROCEDURE — 63600175 PHARM REV CODE 636 W HCPCS: Mod: JZ,JG | Performed by: STUDENT IN AN ORGANIZED HEALTH CARE EDUCATION/TRAINING PROGRAM

## 2024-10-12 PROCEDURE — 82962 GLUCOSE BLOOD TEST: CPT

## 2024-10-12 PROCEDURE — 96374 THER/PROPH/DIAG INJ IV PUSH: CPT | Mod: 59

## 2024-10-12 PROCEDURE — 11000001 HC ACUTE MED/SURG PRIVATE ROOM

## 2024-10-12 PROCEDURE — G0378 HOSPITAL OBSERVATION PER HR: HCPCS

## 2024-10-12 PROCEDURE — 96372 THER/PROPH/DIAG INJ SC/IM: CPT | Performed by: INTERNAL MEDICINE

## 2024-10-12 PROCEDURE — 63600175 PHARM REV CODE 636 W HCPCS: Performed by: INTERNAL MEDICINE

## 2024-10-12 PROCEDURE — 25000003 PHARM REV CODE 250: Performed by: INTERNAL MEDICINE

## 2024-10-12 PROCEDURE — 63600175 PHARM REV CODE 636 W HCPCS: Performed by: FAMILY MEDICINE

## 2024-10-12 PROCEDURE — 80048 BASIC METABOLIC PNL TOTAL CA: CPT | Performed by: INTERNAL MEDICINE

## 2024-10-12 PROCEDURE — 84480 ASSAY TRIIODOTHYRONINE (T3): CPT | Performed by: INTERNAL MEDICINE

## 2024-10-12 PROCEDURE — 99223 1ST HOSP IP/OBS HIGH 75: CPT | Mod: ,,, | Performed by: SURGERY

## 2024-10-12 PROCEDURE — 25500020 PHARM REV CODE 255

## 2024-10-12 PROCEDURE — 21400001 HC TELEMETRY ROOM

## 2024-10-12 PROCEDURE — 63600175 PHARM REV CODE 636 W HCPCS: Performed by: HOSPITALIST

## 2024-10-12 PROCEDURE — 63600175 PHARM REV CODE 636 W HCPCS: Performed by: STUDENT IN AN ORGANIZED HEALTH CARE EDUCATION/TRAINING PROGRAM

## 2024-10-12 PROCEDURE — 85027 COMPLETE CBC AUTOMATED: CPT | Performed by: INTERNAL MEDICINE

## 2024-10-12 PROCEDURE — 96372 THER/PROPH/DIAG INJ SC/IM: CPT | Performed by: STUDENT IN AN ORGANIZED HEALTH CARE EDUCATION/TRAINING PROGRAM

## 2024-10-12 PROCEDURE — 84443 ASSAY THYROID STIM HORMONE: CPT | Performed by: INTERNAL MEDICINE

## 2024-10-12 PROCEDURE — 96372 THER/PROPH/DIAG INJ SC/IM: CPT | Performed by: HOSPITALIST

## 2024-10-12 PROCEDURE — 84439 ASSAY OF FREE THYROXINE: CPT | Performed by: INTERNAL MEDICINE

## 2024-10-12 PROCEDURE — 25000003 PHARM REV CODE 250: Performed by: STUDENT IN AN ORGANIZED HEALTH CARE EDUCATION/TRAINING PROGRAM

## 2024-10-12 RX ORDER — SODIUM CHLORIDE 9 MG/ML
INJECTION, SOLUTION INTRAVENOUS CONTINUOUS
Status: DISCONTINUED | OUTPATIENT
Start: 2024-10-12 | End: 2024-10-12 | Stop reason: HOSPADM

## 2024-10-12 RX ORDER — TALC
6 POWDER (GRAM) TOPICAL NIGHTLY PRN
Status: DISCONTINUED | OUTPATIENT
Start: 2024-10-12 | End: 2024-10-21 | Stop reason: HOSPADM

## 2024-10-12 RX ORDER — ONDANSETRON 8 MG/1
8 TABLET, ORALLY DISINTEGRATING ORAL EVERY 8 HOURS PRN
Status: DISCONTINUED | OUTPATIENT
Start: 2024-10-12 | End: 2024-10-12

## 2024-10-12 RX ORDER — LIDOCAINE HYDROCHLORIDE 10 MG/ML
1 INJECTION, SOLUTION EPIDURAL; INFILTRATION; INTRACAUDAL; PERINEURAL ONCE AS NEEDED
Status: DISCONTINUED | OUTPATIENT
Start: 2024-10-12 | End: 2024-10-21 | Stop reason: HOSPADM

## 2024-10-12 RX ORDER — VENLAFAXINE HYDROCHLORIDE 37.5 MG/1
150 CAPSULE, EXTENDED RELEASE ORAL DAILY
OUTPATIENT
Start: 2024-10-13

## 2024-10-12 RX ORDER — VENLAFAXINE HYDROCHLORIDE 37.5 MG/1
150 CAPSULE, EXTENDED RELEASE ORAL DAILY
Status: DISCONTINUED | OUTPATIENT
Start: 2024-10-12 | End: 2024-10-12 | Stop reason: HOSPADM

## 2024-10-12 RX ORDER — ONDANSETRON HYDROCHLORIDE 2 MG/ML
4 INJECTION, SOLUTION INTRAVENOUS EVERY 8 HOURS PRN
Status: DISCONTINUED | OUTPATIENT
Start: 2024-10-12 | End: 2024-10-12 | Stop reason: HOSPADM

## 2024-10-12 RX ORDER — GLUCAGON 1 MG
1 KIT INJECTION
OUTPATIENT
Start: 2024-10-12

## 2024-10-12 RX ORDER — IBUPROFEN 200 MG
16 TABLET ORAL
Status: DISCONTINUED | OUTPATIENT
Start: 2024-10-12 | End: 2024-10-12 | Stop reason: HOSPADM

## 2024-10-12 RX ORDER — SUCRALFATE 1 G/10ML
1 SUSPENSION ORAL EVERY 6 HOURS
Status: DISCONTINUED | OUTPATIENT
Start: 2024-10-12 | End: 2024-10-12

## 2024-10-12 RX ORDER — ACETAMINOPHEN 10 MG/ML
1000 INJECTION, SOLUTION INTRAVENOUS EVERY 8 HOURS
Status: DISCONTINUED | OUTPATIENT
Start: 2024-10-12 | End: 2024-10-12

## 2024-10-12 RX ORDER — IPRATROPIUM BROMIDE AND ALBUTEROL SULFATE 2.5; .5 MG/3ML; MG/3ML
3 SOLUTION RESPIRATORY (INHALATION) EVERY 6 HOURS PRN
Status: DISCONTINUED | OUTPATIENT
Start: 2024-10-12 | End: 2024-10-12 | Stop reason: HOSPADM

## 2024-10-12 RX ORDER — ENOXAPARIN SODIUM 100 MG/ML
40 INJECTION SUBCUTANEOUS EVERY 24 HOURS
Status: DISCONTINUED | OUTPATIENT
Start: 2024-10-12 | End: 2024-10-12 | Stop reason: HOSPADM

## 2024-10-12 RX ORDER — NALOXONE HCL 0.4 MG/ML
0.02 VIAL (ML) INJECTION
OUTPATIENT
Start: 2024-10-12

## 2024-10-12 RX ORDER — IPRATROPIUM BROMIDE AND ALBUTEROL SULFATE 2.5; .5 MG/3ML; MG/3ML
3 SOLUTION RESPIRATORY (INHALATION) EVERY 6 HOURS PRN
OUTPATIENT
Start: 2024-10-12

## 2024-10-12 RX ORDER — MULTIVITAMIN
1 TABLET ORAL DAILY
COMMUNITY

## 2024-10-12 RX ORDER — GLUCAGON 1 MG
1 KIT INJECTION
Status: DISCONTINUED | OUTPATIENT
Start: 2024-10-12 | End: 2024-10-12 | Stop reason: HOSPADM

## 2024-10-12 RX ORDER — ALUMINUM HYDROXIDE, MAGNESIUM HYDROXIDE, AND SIMETHICONE 1200; 120; 1200 MG/30ML; MG/30ML; MG/30ML
30 SUSPENSION ORAL
Status: DISCONTINUED | OUTPATIENT
Start: 2024-10-12 | End: 2024-10-12

## 2024-10-12 RX ORDER — MORPHINE SULFATE 2 MG/ML
2 INJECTION, SOLUTION INTRAMUSCULAR; INTRAVENOUS ONCE
Status: COMPLETED | OUTPATIENT
Start: 2024-10-12 | End: 2024-10-12

## 2024-10-12 RX ORDER — INSULIN ASPART 100 [IU]/ML
0-10 INJECTION, SOLUTION INTRAVENOUS; SUBCUTANEOUS EVERY 6 HOURS PRN
Status: DISCONTINUED | OUTPATIENT
Start: 2024-10-12 | End: 2024-10-12 | Stop reason: HOSPADM

## 2024-10-12 RX ORDER — ACETAMINOPHEN 325 MG/1
650 TABLET ORAL EVERY 4 HOURS PRN
Status: DISCONTINUED | OUTPATIENT
Start: 2024-10-12 | End: 2024-10-12 | Stop reason: HOSPADM

## 2024-10-12 RX ORDER — ACETAMINOPHEN 325 MG/1
650 TABLET ORAL EVERY 8 HOURS PRN
Status: DISCONTINUED | OUTPATIENT
Start: 2024-10-12 | End: 2024-10-21 | Stop reason: HOSPADM

## 2024-10-12 RX ORDER — IBUPROFEN 200 MG
16 TABLET ORAL
OUTPATIENT
Start: 2024-10-12

## 2024-10-12 RX ORDER — SODIUM CHLORIDE 0.9 % (FLUSH) 0.9 %
10 SYRINGE (ML) INJECTION EVERY 12 HOURS PRN
OUTPATIENT
Start: 2024-10-12

## 2024-10-12 RX ORDER — BISACODYL 10 MG/1
10 SUPPOSITORY RECTAL DAILY
Status: DISCONTINUED | OUTPATIENT
Start: 2024-10-13 | End: 2024-10-17

## 2024-10-12 RX ORDER — ATORVASTATIN CALCIUM 20 MG/1
40 TABLET, FILM COATED ORAL DAILY
OUTPATIENT
Start: 2024-10-13

## 2024-10-12 RX ORDER — AMLODIPINE BESYLATE 5 MG/1
5 TABLET ORAL DAILY
OUTPATIENT
Start: 2024-10-13

## 2024-10-12 RX ORDER — SODIUM CHLORIDE 0.9 % (FLUSH) 0.9 %
10 SYRINGE (ML) INJECTION EVERY 12 HOURS PRN
Status: DISCONTINUED | OUTPATIENT
Start: 2024-10-12 | End: 2024-10-12 | Stop reason: HOSPADM

## 2024-10-12 RX ORDER — ACETAMINOPHEN 500 MG
1000 TABLET ORAL EVERY 8 HOURS
Status: DISCONTINUED | OUTPATIENT
Start: 2024-10-13 | End: 2024-10-12

## 2024-10-12 RX ORDER — ENOXAPARIN SODIUM 100 MG/ML
40 INJECTION SUBCUTANEOUS EVERY 24 HOURS
OUTPATIENT
Start: 2024-10-12

## 2024-10-12 RX ORDER — GLUCAGON 1 MG
1 KIT INJECTION
Status: DISCONTINUED | OUTPATIENT
Start: 2024-10-12 | End: 2024-10-21 | Stop reason: HOSPADM

## 2024-10-12 RX ORDER — ENOXAPARIN SODIUM 100 MG/ML
40 INJECTION SUBCUTANEOUS EVERY 24 HOURS
Status: DISCONTINUED | OUTPATIENT
Start: 2024-10-12 | End: 2024-10-21 | Stop reason: HOSPADM

## 2024-10-12 RX ORDER — ONDANSETRON HYDROCHLORIDE 2 MG/ML
8 INJECTION, SOLUTION INTRAVENOUS EVERY 6 HOURS PRN
Status: DISCONTINUED | OUTPATIENT
Start: 2024-10-12 | End: 2024-10-21 | Stop reason: HOSPADM

## 2024-10-12 RX ORDER — ALUMINUM HYDROXIDE, MAGNESIUM HYDROXIDE, AND SIMETHICONE 1200; 120; 1200 MG/30ML; MG/30ML; MG/30ML
30 SUSPENSION ORAL EVERY 6 HOURS PRN
OUTPATIENT
Start: 2024-10-12

## 2024-10-12 RX ORDER — INSULIN ASPART 100 [IU]/ML
0-10 INJECTION, SOLUTION INTRAVENOUS; SUBCUTANEOUS EVERY 6 HOURS PRN
OUTPATIENT
Start: 2024-10-12

## 2024-10-12 RX ORDER — ONDANSETRON HYDROCHLORIDE 2 MG/ML
4 INJECTION, SOLUTION INTRAVENOUS EVERY 8 HOURS PRN
OUTPATIENT
Start: 2024-10-12

## 2024-10-12 RX ORDER — ALUMINUM HYDROXIDE, MAGNESIUM HYDROXIDE, AND SIMETHICONE 1200; 120; 1200 MG/30ML; MG/30ML; MG/30ML
30 SUSPENSION ORAL EVERY 6 HOURS PRN
Status: DISCONTINUED | OUTPATIENT
Start: 2024-10-12 | End: 2024-10-12 | Stop reason: HOSPADM

## 2024-10-12 RX ORDER — ATORVASTATIN CALCIUM 20 MG/1
40 TABLET, FILM COATED ORAL DAILY
Status: DISCONTINUED | OUTPATIENT
Start: 2024-10-12 | End: 2024-10-12 | Stop reason: HOSPADM

## 2024-10-12 RX ORDER — ACETAMINOPHEN 10 MG/ML
1000 INJECTION, SOLUTION INTRAVENOUS EVERY 8 HOURS
Status: COMPLETED | OUTPATIENT
Start: 2024-10-12 | End: 2024-10-13

## 2024-10-12 RX ORDER — POLYETHYLENE GLYCOL 3350 17 G/17G
17 POWDER, FOR SOLUTION ORAL 2 TIMES DAILY
OUTPATIENT
Start: 2024-10-12

## 2024-10-12 RX ORDER — DIATRIZOATE MEGLUMINE AND DIATRIZOATE SODIUM 660; 100 MG/ML; MG/ML
100 SOLUTION ORAL; RECTAL ONCE
Status: COMPLETED | OUTPATIENT
Start: 2024-10-12 | End: 2024-10-12

## 2024-10-12 RX ORDER — INSULIN ASPART 100 [IU]/ML
0-5 INJECTION, SOLUTION INTRAVENOUS; SUBCUTANEOUS EVERY 6 HOURS PRN
Status: DISCONTINUED | OUTPATIENT
Start: 2024-10-12 | End: 2024-10-21 | Stop reason: HOSPADM

## 2024-10-12 RX ORDER — SODIUM CHLORIDE 9 MG/ML
INJECTION, SOLUTION INTRAVENOUS CONTINUOUS
Status: CANCELLED | OUTPATIENT
Start: 2024-10-12 | End: 2024-10-13

## 2024-10-12 RX ORDER — ACETAMINOPHEN 500 MG
1000 TABLET ORAL EVERY 8 HOURS
Status: DISCONTINUED | OUTPATIENT
Start: 2024-10-13 | End: 2024-10-21 | Stop reason: HOSPADM

## 2024-10-12 RX ORDER — ACETAMINOPHEN 325 MG/1
650 TABLET ORAL EVERY 4 HOURS PRN
OUTPATIENT
Start: 2024-10-12

## 2024-10-12 RX ORDER — SODIUM CHLORIDE 0.9 % (FLUSH) 0.9 %
10 SYRINGE (ML) INJECTION
Status: DISCONTINUED | OUTPATIENT
Start: 2024-10-12 | End: 2024-10-21 | Stop reason: HOSPADM

## 2024-10-12 RX ORDER — POLYETHYLENE GLYCOL 3350 17 G/17G
17 POWDER, FOR SOLUTION ORAL 2 TIMES DAILY
Status: DISCONTINUED | OUTPATIENT
Start: 2024-10-12 | End: 2024-10-12 | Stop reason: HOSPADM

## 2024-10-12 RX ORDER — HYDROCODONE BITARTRATE AND ACETAMINOPHEN 5; 325 MG/1; MG/1
1 TABLET ORAL EVERY 6 HOURS PRN
Status: DISCONTINUED | OUTPATIENT
Start: 2024-10-12 | End: 2024-10-12

## 2024-10-12 RX ORDER — IBUPROFEN 200 MG
24 TABLET ORAL
Status: DISCONTINUED | OUTPATIENT
Start: 2024-10-12 | End: 2024-10-12 | Stop reason: HOSPADM

## 2024-10-12 RX ORDER — INSULIN GLARGINE 100 [IU]/ML
10 INJECTION, SOLUTION SUBCUTANEOUS DAILY
Status: DISCONTINUED | OUTPATIENT
Start: 2024-10-12 | End: 2024-10-12 | Stop reason: HOSPADM

## 2024-10-12 RX ORDER — INSULIN ASPART 100 [IU]/ML
0-10 INJECTION, SOLUTION INTRAVENOUS; SUBCUTANEOUS EVERY 6 HOURS PRN
Status: DISCONTINUED | OUTPATIENT
Start: 2024-10-12 | End: 2024-10-12 | Stop reason: SDUPTHER

## 2024-10-12 RX ORDER — POLYETHYLENE GLYCOL 3350 17 G/17G
17 POWDER, FOR SOLUTION ORAL NIGHTLY
Status: ON HOLD | COMMUNITY
End: 2024-10-21

## 2024-10-12 RX ORDER — SODIUM CHLORIDE 9 MG/ML
INJECTION, SOLUTION INTRAVENOUS CONTINUOUS
Status: DISCONTINUED | OUTPATIENT
Start: 2024-10-12 | End: 2024-10-15

## 2024-10-12 RX ORDER — AMLODIPINE BESYLATE 5 MG/1
5 TABLET ORAL DAILY
Status: DISCONTINUED | OUTPATIENT
Start: 2024-10-12 | End: 2024-10-12 | Stop reason: HOSPADM

## 2024-10-12 RX ORDER — IBUPROFEN 200 MG
24 TABLET ORAL
OUTPATIENT
Start: 2024-10-12

## 2024-10-12 RX ORDER — ZOLPIDEM TARTRATE 5 MG/1
5 TABLET ORAL NIGHTLY PRN
Status: DISCONTINUED | OUTPATIENT
Start: 2024-10-12 | End: 2024-10-12 | Stop reason: HOSPADM

## 2024-10-12 RX ORDER — NALOXONE HCL 0.4 MG/ML
0.02 VIAL (ML) INJECTION
Status: DISCONTINUED | OUTPATIENT
Start: 2024-10-12 | End: 2024-10-12 | Stop reason: HOSPADM

## 2024-10-12 RX ORDER — GABAPENTIN 400 MG/1
800 CAPSULE ORAL 3 TIMES DAILY
Status: DISCONTINUED | OUTPATIENT
Start: 2024-10-12 | End: 2024-10-12 | Stop reason: HOSPADM

## 2024-10-12 RX ORDER — INSULIN GLARGINE 100 [IU]/ML
10 INJECTION, SOLUTION SUBCUTANEOUS DAILY
OUTPATIENT
Start: 2024-10-13

## 2024-10-12 RX ORDER — ACETAMINOPHEN 500 MG
500 TABLET ORAL
COMMUNITY

## 2024-10-12 RX ADMIN — ACETAMINOPHEN 650 MG: 325 TABLET ORAL at 04:10

## 2024-10-12 RX ADMIN — INSULIN ASPART 2 UNITS: 100 INJECTION, SOLUTION INTRAVENOUS; SUBCUTANEOUS at 05:10

## 2024-10-12 RX ADMIN — VENLAFAXINE HYDROCHLORIDE 150 MG: 37.5 CAPSULE, EXTENDED RELEASE ORAL at 08:10

## 2024-10-12 RX ADMIN — INSULIN GLARGINE 10 UNITS: 100 INJECTION, SOLUTION SUBCUTANEOUS at 08:10

## 2024-10-12 RX ADMIN — ENOXAPARIN SODIUM 40 MG: 40 INJECTION SUBCUTANEOUS at 06:10

## 2024-10-12 RX ADMIN — DIATRIZOATE MEGLUMINE AND DIATRIZOATE SODIUM 100 ML: 660; 100 LIQUID ORAL; RECTAL at 11:10

## 2024-10-12 RX ADMIN — SODIUM CHLORIDE: 9 INJECTION, SOLUTION INTRAVENOUS at 02:10

## 2024-10-12 RX ADMIN — ENOXAPARIN SODIUM 40 MG: 40 INJECTION SUBCUTANEOUS at 10:10

## 2024-10-12 RX ADMIN — SODIUM CHLORIDE: 9 INJECTION, SOLUTION INTRAVENOUS at 10:10

## 2024-10-12 RX ADMIN — INSULIN ASPART 4 UNITS: 100 INJECTION, SOLUTION INTRAVENOUS; SUBCUTANEOUS at 06:10

## 2024-10-12 RX ADMIN — Medication 6 MG: at 10:10

## 2024-10-12 RX ADMIN — ATORVASTATIN CALCIUM 40 MG: 20 TABLET, FILM COATED ORAL at 08:10

## 2024-10-12 RX ADMIN — GABAPENTIN 800 MG: 400 CAPSULE ORAL at 08:10

## 2024-10-12 RX ADMIN — AMLODIPINE BESYLATE 5 MG: 5 TABLET ORAL at 08:10

## 2024-10-12 RX ADMIN — METHYLNALTREXONE BROMIDE 5.4 MG: 12 INJECTION, SOLUTION SUBCUTANEOUS at 01:10

## 2024-10-12 RX ADMIN — MORPHINE SULFATE 2 MG: 2 INJECTION, SOLUTION INTRAMUSCULAR; INTRAVENOUS at 08:10

## 2024-10-12 RX ADMIN — ACETAMINOPHEN 1000 MG: 10 INJECTION INTRAVENOUS at 10:10

## 2024-10-12 NOTE — ASSESSMENT & PLAN NOTE
Patient's FSGs are uncontrolled due to hyperglycemia on current medication regimen.  Last A1c reviewed-   Lab Results   Component Value Date    HGBA1C 9.6 (H) 10/01/2024     Most recent fingerstick glucose reviewed-   Recent Labs   Lab 10/11/24  2117 10/12/24  0106   POCTGLUCOSE >500* 274*     Current correctional scale  Medium  Maintain anti-hyperglycemic dose as follows-   Antihyperglycemics (From admission, onward)      Start     Stop Route Frequency Ordered    10/12/24 0231  insulin aspart U-100 pen 0-10 Units         -- SubQ Every 6 hours PRN 10/12/24 0135          Hold Oral hypoglycemics while patient is in the hospital.    -we will consider adding long-acting insulin if glucose levels remain elevated

## 2024-10-12 NOTE — ED NOTES
Patient is conscious, coherent, oriented, alert, awake, resting comfortably on bed, not in distress and vitals stable.

## 2024-10-12 NOTE — ED NOTES
Report rcv'd from off going RN, assumed care. Pt resting in bed quietly, chest rising and falling, bed in lowest position. Denies needs at this time. Family bedside. Pt pending transport for main [facility] transfer.Off going RN Dwaine Roman gave report to Janice Coleman RN at [facility]

## 2024-10-12 NOTE — HOSPITAL COURSE
Ms. 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  CT scan concerning for obstruction as well.  Discussed with General surgery; plan for Gastrografin challenge today but recommend transfer to [facility] for bariatric surgery support given her complicated prior history.  Spoke with patient flow center and patient is accepted for transfer.

## 2024-10-12 NOTE — NURSING
Report called to Janice Coleman RN at [facility]; meds reviewed and all questions answered.

## 2024-10-12 NOTE — PHARMACY MED REC
"      [facility]           Pharmacy  Admission Medication History     The home medication history was taken by Cha Mcqueen.      Medication history obtained from Medications listed below were obtained from: Patient/family    Based on information gathered for medication list, you may go to "Admission" then "Reconcile Home Medications" tabs to review and/or act upon those items.     The home medication list has been updated by the Pharmacy department.   Please read ALL comments highlighted in yellow.   Please address this information as you see fit.    Feel free to contact us if you have any questions or require assistance.      Current Facility-Administered Medications on File Prior to Encounter   Medication Dose Route Frequency Provider Last Rate Last Admin    0.9%  NaCl infusion   Intravenous Continuous Silvano Mak MD        ceFAZolin 2 g in dextrose 5 % in water (D5W) 50 mL IVPB (MB+)  2 g Intravenous On Call Procedure Silvano Mak MD        ondansetron disintegrating tablet 8 mg  8 mg Oral Q8H PRN Silvano Mak MD         Current Outpatient Medications on File Prior to Encounter   Medication Sig Dispense Refill    acetaminophen (TYLENOL) 500 MG tablet Take 500 mg by mouth as needed for Pain.      ALPRAZolam (XANAX) 0.5 MG tablet 1 Tablet 1 hour prior to procedure, then 1-2 tablets when you arrive to office  for procedural anxiety. 3 tablet 0    amLODIPine (NORVASC) 5 MG tablet Take 1 tablet (5 mg total) by mouth once daily. 90 tablet 3    atorvastatin (LIPITOR) 40 MG tablet Take 1 tablet (40 mg total) by mouth once daily. 90 tablet 2    CYANOCOBALAMIN, VITAMIN B-12, ORAL Take 1 tablet by mouth once daily.      diclofenac sodium (VOLTAREN ARTHRITIS PAIN) 1 % Gel Apply 2 g topically once daily. 100 g 0    ferrous sulfate (IRON ORAL) Take 1 tablet by mouth once daily.      gabapentin (NEURONTIN) 800 MG tablet Take 1 tablet (800 mg total) by mouth 3 (three) times daily. 90 tablet " 0    glipiZIDE (GLUCOTROL) 10 MG TR24 Take 1 tablet (10 mg total) by mouth daily with breakfast. 90 tablet 3    HYDROcodone-acetaminophen (NORCO)  mg per tablet Take 1 tablet by mouth 4 (four) times daily as needed for pain 120 tablet 0    linaCLOtide (LINZESS) 290 mcg Cap capsule Take 1 capsule (290 mcg total) by mouth before breakfast. 30 capsule 11    metFORMIN (GLUCOPHAGE) 1000 MG tablet Take 1 tablet (1,000 mg total) by mouth 2 (two) times daily with meals. 180 tablet 2    methocarbamoL (ROBAXIN) 750 MG Tab Take 1 tablet (750 mg total) by mouth 2 (two) times daily as needed. 60 tablet 0    multivitamin (THERAGRAN) per tablet Take 1 tablet by mouth once daily.      naloxegoL (MOVANTIK) 12.5 mg Tab Take 12.5 mg by mouth once daily. 30 tablet 11    olmesartan (BENICAR) 40 MG tablet Take 1 tablet (40 mg total) by mouth once daily. 90 tablet 3    ondansetron (ZOFRAN-ODT) 8 MG TbDL Take 1 tablet (8 mg total) by mouth every 12 (twelve) hours as needed. 30 tablet 2    pantoprazole (PROTONIX) 40 MG tablet Take 1 tablet (40 mg total) by mouth 2 (two) times daily. (Patient taking differently: Take 40 mg by mouth once daily.) 180 tablet 3    polyethylene glycol (GLYCOLAX) 17 gram/dose powder Take 17 g by mouth every evening.      venlafaxine (EFFEXOR-XR) 150 MG Cp24 Take 1 capsule (150 mg total) by mouth once daily. 90 capsule 3    vitamin E acetate (VITAMIN E ORAL) Take 1 tablet by mouth once daily.      zolpidem (AMBIEN) 10 mg Tab Take 1 tablet (10 mg total) by mouth nightly as needed (Insomnia). 90 tablet 0    blood sugar diagnostic (TRUE METRIX GLUCOSE TEST STRIP) Strp USE 3 TIMES A  each 12    blood sugar diagnostic Strp To check Blood Glucose 2 times daily, 100 each 0    blood-glucose meter (TRUE METRIX GLUCOSE METER) Misc USE 3 TIMES A DA 1 each 0    COVID [id number],12up,,andu,,PF, (SPIKEVAX 9641-5012,12Y UP,,PF,) 50 mcg/0.5 mL injection Inject into the muscle. 0.5 mL 0    diclofenac sodium (VOLTAREN  ARTHRITIS PAIN) 1 % Gel Apply 2 g topically 4 (four) times daily. 100 g 3    lancets (TRUEPLUS LANCETS) 30 gauge Misc USE 3 TIMES A  each 12    methocarbamoL (ROBAXIN) 750 MG Tab Take 1 tablet (750 mg total) by mouth 2 (two) times a day. 60 tablet 1    naloxone (NARCAN) 4 mg/actuation Spry 1 actuation in one nostril once;  may repeat dose in alternate nostrils q2-3min. Call  2 each 0       Please address this information as you see fit.  Feel free to contact us if you have any questions or require assistance.    Cha Mcqueen  [phone]            .

## 2024-10-12 NOTE — ED TRIAGE NOTES
States h/o gastric bypass >35 years ago with subsequent revisions s/t obstruction resulting in chronic gastroparesis and constipation. Presents tonight with c/o generalized abdominal pain with N/V. Reports constipation with LBM 4 days ago. Taking multiple laxatives without relief. Tolerating sips of fluids but with continuous nausea. No fever reported. Presents awake, alert.

## 2024-10-12 NOTE — ED NOTES
Patient is resting comfortably on bed, sleeping, not in distress, breathing normal, conscious, coherent, alert and resting to verbal commands.

## 2024-10-12 NOTE — ASSESSMENT & PLAN NOTE
Patient has obstructive ileus which is obstructive in etiology which is  monitored . Will treat conservatively with bowel rest, IV fluids, serial abdominal exams and avoidance of GI paralytics such as narcotics or anti-spasmodics. Monitor patient closely.     -abdominal pain has worsened over the last 4 days  -last bowel movement was 4 days ago  -past medical history of gastric bypass, gastroparesis, adhesions  -patient observes poor diet at home; does not eat vegetables, low fiber, poor hydration  -NPO for bowel rest  -general surgery consult  -continue with IV fluids

## 2024-10-12 NOTE — ASSESSMENT & PLAN NOTE
-abdominal pain has worsened over the last 4 days  -last bowel movement was 4 days ago  -past medical history of gastric bypass, gastroparesis, adhesions  -poor diet  -NPO for bowel rest  -general surgery consult  -continue with IV fluids
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
## 2024-10-12 NOTE — HPI
"Jill Perez is a 69 y.o. 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 She has not required any surgeries since that time but has continued to follow with GI for gastroparesis, dysphagia and constipation. She presented to the ED last night with complaints of worsening abdominal pain for the last 4 days and nausea, vomiting and constipation. CT scan was obtained which showed some dilated small bowel without obvious transition point. Labs significant for glucose >500. WBC wnl. General Surgery consulted for concern for small bowel obstruction. On exam patient does state her pain has improved since admission. She has a history of chronic constipation and denies any bowel movement or flatus for the past 4 days. Denies nausea/emesis currently.      "

## 2024-10-12 NOTE — ED NOTES
Patient is resting comfortably on bed, alert, awake, responding to verbal commands, conscious, coherent, oriented, not in distress and breathing normal.

## 2024-10-12 NOTE — ASSESSMENT & PLAN NOTE
"Jill Perez is a 69 y.o. 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 She presented to the ED last night with complaints of worsening abdominal pain for the last 4 days and nausea, vomiting and constipation. CT scan was obtained which showed some dilated small bowel without obvious transition point. General Surgery consulted for concern for small bowel obstruction.    - No acute surgical intervention required. Would recommend initiating transfer to [facility] for bariatric surgery services should patient need surgery during this hospitalization.   - Would keep NPO at this time.   - Recommend bowel regimen  - Gastrograffin challenge ordered. 100cc undiluted gastrograffin PO  - Rest per primary  - Please call with any questions     "

## 2024-10-12 NOTE — DISCHARGE SUMMARY
[facility] Emergency Dept  [facility]  Discharge Summary      Patient Name: Jill Perez  MRN: [id number]  SINDI: [id number]  Patient Class: OP- Observation  Admission Date: 10/11/2024  Hospital Length of Stay: 0 days  Discharge Date and Time:  10/12/2024 2:17 PM  Attending Physician: Raymond Henning.,*   Discharging Provider: Raymond Henning MD  Primary Care Provider: Soham Rosa MD    Primary Care Team: Networked reference to record PCT     HPI:   69-year-old female with a past medical history of gastric bypass, type 2 diabetes, hypertension, anemia, renal disease, hyperlipidemia; came to emergency department due to abdominal pain that has worsened over the last 3 days.  She states her last bowel movement was 4 days ago.  She states she has a history of adhesions and bowel obstruction; and this episode feels familiar.  Patient was also has a history of gastroparesis.  She states abdominal discomfort is associated with nausea and vomiting, she was had to episodes of vomiting over the last 24 hours.  Patient does admit to having a bad diet, she suddenly eats vegetables more fiber.  Patient denies chest pain, palpitation, shortness for breath,  symptoms.  CT angiogram was done in the emergency department and shows features of bowel obstruction with fluid distended loops of small bowel and fecalization of the terminal ileum. Zone of transition is not confidently identified.  Patient was made NPO, given IV fluids and General surgery consult placed.          * No surgery found *      Hospital Course:   Ms. 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  CT scan concerning for obstruction as well.  Discussed with General surgery; plan for Gastrografin challenge today but recommend transfer to [facility] for bariatric surgery support given  her complicated prior history.  Spoke with patient flow center and patient is accepted for transfer.      Goals of Care Treatment Preferences:  Code Status: DNR         Consults:   Consults (From admission, onward)          Status Ordering Provider     Inpatient consult to General Surgery  Once        Provider:  (Not yet assigned)    Completed MANDEEP PEREZ            No new Assessment & Plan notes have been filed under this hospital service since the last note was generated.  Service: Hospital Medicine    Final Active Diagnoses:    Diagnosis Date Noted POA    PRINCIPAL PROBLEM:  Ileus [K56.7] 10/12/2024 Yes    Abdominal pain [R10.9] 09/18/2023 Yes    Primary hypertension [I10] 10/23/2018 Yes     Chronic    Poorly controlled diabetes mellitus [E11.65] 08/24/2015 Yes      Problems Resolved During this Admission:       Discharged Condition: stable    Disposition:     Follow Up:    Patient Instructions:   No [facility] procedures on file.    Significant Diagnostic Studies: N/A    Pending Diagnostic Studies:       Procedure Component Value Units Date/Time    X-Ray Abdomen AP 1 View [[id number]]     Order Status: Sent Lab Status: No result            Medications:  Transfer Medications (for Discharge Readmit only):   Current Facility-Administered Medications   Medication Dose Route Frequency Provider Last Rate Last Admin    0.9%  NaCl infusion   Intravenous Continuous Mandeep Perez  mL/hr at 10/12/24 0247 New Bag at 10/12/24 0247    acetaminophen tablet 650 mg  650 mg Oral Q4H PRN Mandeep Perez MD        albuterol-ipratropium 2.5 mg-0.5 mg/3 mL nebulizer solution 3 mL  3 mL Nebulization Q6H PRN Mandeep Perez MD        aluminum-magnesium hydroxide-simethicone 200-200-20 mg/5 mL suspension 30 mL  30 mL Oral Q6H PRN Mandeep Perez MD        amLODIPine tablet 5 mg  5 mg Oral Daily Mandeep Perez MD   5 mg at 10/12/24 0815    atorvastatin tablet 40 mg  40 mg Oral Daily Mandeep Perez MD   40 mg at 10/12/24 0814     dextrose 10% bolus 125 mL 125 mL  12.5 g Intravenous PRN Mandeep Medina MD        dextrose 10% bolus 125 mL 125 mL  12.5 g Intravenous PRN Raymond Henning MD        dextrose 10% bolus 250 mL 250 mL  25 g Intravenous PRN Mandeep Medina MD        dextrose 10% bolus 250 mL 250 mL  25 g Intravenous PRN Raymond Henning MD        enoxaparin injection 40 mg  40 mg Subcutaneous Daily Mandeep Medina MD        gabapentin capsule 800 mg  800 mg Oral TID Mandeep Medina MD   800 mg at 10/12/24 0814    glucagon (human recombinant) injection 1 mg  1 mg Intramuscular PRN Mandeep Medina MD        glucagon (human recombinant) injection 1 mg  1 mg Intramuscular PRN Mandeep Medina MD        glucagon (human recombinant) injection 1 mg  1 mg Intramuscular PRN Raymond Henning MD        glucose chewable tablet 16 g  16 g Oral PRN Mandeep Medina MD        glucose chewable tablet 24 g  24 g Oral PRN Mandeep Medina MD        insulin aspart U-100 pen 0-10 Units  0-10 Units Subcutaneous Q6H PRN Mandeep Medina MD   4 Units at 10/12/24 0616    insulin glargine U-100 (Lantus) pen 10 Units  10 Units Subcutaneous Daily Raymond Henning MD   10 Units at 10/12/24 0814    naloxone 0.4 mg/mL injection 0.02 mg  0.02 mg Intravenous PRN Mandeep Medina MD        ondansetron injection 4 mg  4 mg Intravenous Q8H PRN Mandeep Medina MD        polyethylene glycol packet 17 g  17 g Oral BID Raymond Henning MD        sodium chloride 0.9% flush 10 mL  10 mL Intravenous Q12H PRN Mandeep Medina MD        venlafaxine 24 hr capsule 150 mg  150 mg Oral Daily Mandeep Medina MD   150 mg at 10/12/24 0814    zolpidem tablet 5 mg  5 mg Oral Nightly PRN Mandeep Medina MD         Current Outpatient Medications   Medication Sig Dispense Refill    acetaminophen (TYLENOL) 500 MG tablet Take 500 mg by mouth as needed for Pain.      ALPRAZolam (XANAX) 0.5 MG tablet 1 Tablet 1 hour prior to procedure, then 1-2 tablets when  you arrive to office  for procedural anxiety. 3 tablet 0    amLODIPine (NORVASC) 5 MG tablet Take 1 tablet (5 mg total) by mouth once daily. 90 tablet 3    atorvastatin (LIPITOR) 40 MG tablet Take 1 tablet (40 mg total) by mouth once daily. 90 tablet 2    CYANOCOBALAMIN, VITAMIN B-12, ORAL Take 1 tablet by mouth once daily.      diclofenac sodium (VOLTAREN ARTHRITIS PAIN) 1 % Gel Apply 2 g topically once daily. 100 g 0    ferrous sulfate (IRON ORAL) Take 1 tablet by mouth once daily.      gabapentin (NEURONTIN) 800 MG tablet Take 1 tablet (800 mg total) by mouth 3 (three) times daily. 90 tablet 0    glipiZIDE (GLUCOTROL) 10 MG TR24 Take 1 tablet (10 mg total) by mouth daily with breakfast. 90 tablet 3    HYDROcodone-acetaminophen (NORCO)  mg per tablet Take 1 tablet by mouth 4 (four) times daily as needed for pain 120 tablet 0    linaCLOtide (LINZESS) 290 mcg Cap capsule Take 1 capsule (290 mcg total) by mouth before breakfast. 30 capsule 11    metFORMIN (GLUCOPHAGE) 1000 MG tablet Take 1 tablet (1,000 mg total) by mouth 2 (two) times daily with meals. 180 tablet 2    methocarbamoL (ROBAXIN) 750 MG Tab Take 1 tablet (750 mg total) by mouth 2 (two) times daily as needed. 60 tablet 0    multivitamin (THERAGRAN) per tablet Take 1 tablet by mouth once daily.      naloxegoL (MOVANTIK) 12.5 mg Tab Take 12.5 mg by mouth once daily. 30 tablet 11    olmesartan (BENICAR) 40 MG tablet Take 1 tablet (40 mg total) by mouth once daily. 90 tablet 3    ondansetron (ZOFRAN-ODT) 8 MG TbDL Take 1 tablet (8 mg total) by mouth every 12 (twelve) hours as needed. 30 tablet 2    pantoprazole (PROTONIX) 40 MG tablet Take 1 tablet (40 mg total) by mouth 2 (two) times daily. (Patient taking differently: Take 40 mg by mouth once daily.) 180 tablet 3    polyethylene glycol (GLYCOLAX) 17 gram/dose powder Take 17 g by mouth every evening.      venlafaxine (EFFEXOR-XR) 150 MG Cp24 Take 1 capsule (150 mg total) by mouth once daily. 90 capsule  3    vitamin E acetate (VITAMIN E ORAL) Take 1 tablet by mouth once daily.      zolpidem (AMBIEN) 10 mg Tab Take 1 tablet (10 mg total) by mouth nightly as needed (Insomnia). 90 tablet 0    blood sugar diagnostic (TRUE METRIX GLUCOSE TEST STRIP) Strp USE 3 TIMES A  each 12    blood sugar diagnostic Strp To check Blood Glucose 2 times daily, 100 each 0    blood-glucose meter (TRUE METRIX GLUCOSE METER) Misc USE 3 TIMES A DA 1 each 0    COVID [id number],,andu,,PF, (SPIKEVAX [id number],12Y UP,,PF,) 50 mcg/0.5 mL injection Inject into the muscle. 0.5 mL 0    diclofenac sodium (VOLTAREN ARTHRITIS PAIN) 1 % Gel Apply 2 g topically 4 (four) times daily. 100 g 3    lancets (TRUEPLUS LANCETS) 30 gauge Misc USE 3 TIMES A  each 12    methocarbamoL (ROBAXIN) 750 MG Tab Take 1 tablet (750 mg total) by mouth 2 (two) times a day. 60 tablet 1    naloxone (NARCAN) 4 mg/actuation Spry 1 actuation in one nostril once;  may repeat dose in alternate nostrils q2-3min. Call  2 each 0     Facility-Administered Medications Ordered in Other Encounters   Medication Dose Route Frequency Provider Last Rate Last Admin    0.9%  NaCl infusion   Intravenous Continuous Silvano Mak MD        ceFAZolin 2 g in dextrose 5 % in water (D5W) 50 mL IVPB (MB+)  2 g Intravenous On Call Procedure Silvano Mak MD        ondansetron disintegrating tablet 8 mg  8 mg Oral Q8H PRN Silvano Mak MD           Indwelling Lines/Drains at time of [facility]:   Lines/Drains/Airways       None                   Time spent on the [facility] of patient: 35 minutes         Raymond Henning MD  Department of Hospital Medicine  [place] - Emergency Dept
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
## 2024-10-12 NOTE — TREATMENT PLAN
Seen and examined.  Ms. 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  CT scan concerning for obstruction as well.  Discussed with General surgery; plan for Gastrografin challenge today but recommend transfer to [facility] for bariatric surgery support given her complicated prior history.  Spoke with patient flow center and patient is accepted for transfer.
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
## 2024-10-12 NOTE — ED NOTES
Patient admitted with c/o vomiting, nausea, pain abdomen and constipation since 4 days. Patient is conscious, coherent, oriented, alert, able to respond to verbal commands. Patient is connected to continuous cardiac monitor and vitals monitor. Vitals stable.

## 2024-10-12 NOTE — ASSESSMENT & PLAN NOTE
Patients blood pressure range in the last 24 hours was: BP  Min: 129/88  Max: 159/97.The patient's inpatient anti-hypertensive regimen is listed below:  Current Antihypertensives  amLODIPine tablet 5 mg, Daily, Oral    Plan  - BP is controlled, no changes needed to their regimen  - monitor vitals

## 2024-10-12 NOTE — HPI
69-year-old female with a past medical history of gastric bypass, type 2 diabetes, hypertension, anemia, renal disease, hyperlipidemia; came to emergency department due to abdominal pain that has worsened over the last 3 days.  She states her last bowel movement was 4 days ago.  She states she has a history of adhesions and bowel obstruction; and this episode feels familiar.  Patient was also has a history of gastroparesis.  She states abdominal discomfort is associated with nausea and vomiting, she was had to episodes of vomiting over the last 24 hours.  Patient does admit to having a bad diet, she suddenly eats vegetables more fiber.  Patient denies chest pain, palpitation, shortness for breath,  symptoms.  CT angiogram was done in the emergency department and shows features of bowel obstruction with fluid distended loops of small bowel and fecalization of the terminal ileum. Zone of transition is not confidently identified.  Patient was made NPO, given IV fluids and General surgery consult placed.

## 2024-10-12 NOTE — ED NOTES
Patient is alert, awake, conscious, coherent, oriented, awake , not in distress, vitals stable and patient is resting comfortably on bed.

## 2024-10-13 PROBLEM — E11.65 TYPE 2 DIABETES MELLITUS WITH HYPERGLYCEMIA, WITHOUT LONG-TERM CURRENT USE OF INSULIN: Chronic | Status: RESOLVED | Noted: 2018-10-23 | Resolved: 2024-10-13

## 2024-10-13 PROBLEM — E11.43 DIABETES MELLITUS WITH GASTROPARESIS: Status: RESOLVED | Noted: 2023-12-20 | Resolved: 2024-10-13

## 2024-10-13 PROBLEM — E11.43 DIABETES MELLITUS WITH GASTROPARESIS: Status: ACTIVE | Noted: 2023-12-20

## 2024-10-13 LAB
ALBUMIN SERPL BCP-MCNC: 2.8 G/DL (ref 3.5–5.2)
ALP SERPL-CCNC: 89 U/L (ref 55–135)
ALT SERPL W/O P-5'-P-CCNC: 17 U/L (ref 10–44)
ANION GAP SERPL CALC-SCNC: 9 MMOL/L (ref 8–16)
AST SERPL-CCNC: 19 U/L (ref 10–40)
BASOPHILS # BLD AUTO: 0.02 K/UL (ref 0–0.2)
BASOPHILS NFR BLD: 0.3 % (ref 0–1.9)
BILIRUB SERPL-MCNC: 0.5 MG/DL (ref 0.1–1)
BUN SERPL-MCNC: 11 MG/DL (ref 8–23)
CALCIUM SERPL-MCNC: 8.6 MG/DL (ref 8.7–10.5)
CHLORIDE SERPL-SCNC: 109 MMOL/L (ref 95–110)
CO2 SERPL-SCNC: 24 MMOL/L (ref 23–29)
CREAT SERPL-MCNC: 0.6 MG/DL (ref 0.5–1.4)
DIFFERENTIAL METHOD BLD: ABNORMAL
EOSINOPHIL # BLD AUTO: 0.1 K/UL (ref 0–0.5)
EOSINOPHIL NFR BLD: 1.7 % (ref 0–8)
ERYTHROCYTE [DISTWIDTH] IN BLOOD BY AUTOMATED COUNT: 14.6 % (ref 11.5–14.5)
EST. GFR  (NO RACE VARIABLE): >60 ML/MIN/1.73 M^2
GLUCOSE SERPL-MCNC: 123 MG/DL (ref 70–110)
HCT VFR BLD AUTO: 33.5 % (ref 37–48.5)
HGB BLD-MCNC: 11.1 G/DL (ref 12–16)
IMM GRANULOCYTES # BLD AUTO: 0.01 K/UL (ref 0–0.04)
IMM GRANULOCYTES NFR BLD AUTO: 0.2 % (ref 0–0.5)
LYMPHOCYTES # BLD AUTO: 3.7 K/UL (ref 1–4.8)
LYMPHOCYTES NFR BLD: 55.3 % (ref 18–48)
MAGNESIUM SERPL-MCNC: 1.4 MG/DL (ref 1.6–2.6)
MCH RBC QN AUTO: 30.4 PG (ref 27–31)
MCHC RBC AUTO-ENTMCNC: 33.1 G/DL (ref 32–36)
MCV RBC AUTO: 92 FL (ref 82–98)
MONOCYTES # BLD AUTO: 0.6 K/UL (ref 0.3–1)
MONOCYTES NFR BLD: 8.3 % (ref 4–15)
NEUTROPHILS # BLD AUTO: 2.3 K/UL (ref 1.8–7.7)
NEUTROPHILS NFR BLD: 34.2 % (ref 38–73)
NRBC BLD-RTO: 0 /100 WBC
PHOSPHATE SERPL-MCNC: 3.2 MG/DL (ref 2.7–4.5)
PLATELET # BLD AUTO: 256 K/UL (ref 150–450)
PMV BLD AUTO: 11.1 FL (ref 9.2–12.9)
POCT GLUCOSE: 121 MG/DL (ref 70–110)
POCT GLUCOSE: 132 MG/DL (ref 70–110)
POCT GLUCOSE: 152 MG/DL (ref 70–110)
POCT GLUCOSE: 159 MG/DL (ref 70–110)
POCT GLUCOSE: 176 MG/DL (ref 70–110)
POTASSIUM SERPL-SCNC: 3.5 MMOL/L (ref 3.5–5.1)
PROT SERPL-MCNC: 5.6 G/DL (ref 6–8.4)
RBC # BLD AUTO: 3.65 M/UL (ref 4–5.4)
SODIUM SERPL-SCNC: 142 MMOL/L (ref 136–145)
WBC # BLD AUTO: 6.65 K/UL (ref 3.9–12.7)

## 2024-10-13 PROCEDURE — 83735 ASSAY OF MAGNESIUM: CPT | Performed by: STUDENT IN AN ORGANIZED HEALTH CARE EDUCATION/TRAINING PROGRAM

## 2024-10-13 PROCEDURE — 99222 1ST HOSP IP/OBS MODERATE 55: CPT | Mod: ,,, | Performed by: SURGERY

## 2024-10-13 PROCEDURE — 63600175 PHARM REV CODE 636 W HCPCS: Performed by: STUDENT IN AN ORGANIZED HEALTH CARE EDUCATION/TRAINING PROGRAM

## 2024-10-13 PROCEDURE — 21400001 HC TELEMETRY ROOM

## 2024-10-13 PROCEDURE — 85025 COMPLETE CBC W/AUTO DIFF WBC: CPT | Performed by: STUDENT IN AN ORGANIZED HEALTH CARE EDUCATION/TRAINING PROGRAM

## 2024-10-13 PROCEDURE — 80053 COMPREHEN METABOLIC PANEL: CPT | Performed by: STUDENT IN AN ORGANIZED HEALTH CARE EDUCATION/TRAINING PROGRAM

## 2024-10-13 PROCEDURE — 94761 N-INVAS EAR/PLS OXIMETRY MLT: CPT

## 2024-10-13 PROCEDURE — 36415 COLL VENOUS BLD VENIPUNCTURE: CPT | Performed by: STUDENT IN AN ORGANIZED HEALTH CARE EDUCATION/TRAINING PROGRAM

## 2024-10-13 PROCEDURE — 25000003 PHARM REV CODE 250: Performed by: STUDENT IN AN ORGANIZED HEALTH CARE EDUCATION/TRAINING PROGRAM

## 2024-10-13 PROCEDURE — 84100 ASSAY OF PHOSPHORUS: CPT | Performed by: STUDENT IN AN ORGANIZED HEALTH CARE EDUCATION/TRAINING PROGRAM

## 2024-10-13 PROCEDURE — 25000003 PHARM REV CODE 250

## 2024-10-13 PROCEDURE — 99223 1ST HOSP IP/OBS HIGH 75: CPT | Mod: ,,, | Performed by: INTERNAL MEDICINE

## 2024-10-13 RX ORDER — TRAMADOL HYDROCHLORIDE 50 MG/1
50 TABLET ORAL EVERY 6 HOURS PRN
Status: DISCONTINUED | OUTPATIENT
Start: 2024-10-13 | End: 2024-10-21 | Stop reason: HOSPADM

## 2024-10-13 RX ORDER — POLYETHYLENE GLYCOL 3350, SODIUM SULFATE ANHYDROUS, SODIUM BICARBONATE, SODIUM CHLORIDE, POTASSIUM CHLORIDE 236; 22.74; 6.74; 5.86; 2.97 G/4L; G/4L; G/4L; G/4L; G/4L
1000 POWDER, FOR SOLUTION ORAL ONCE
Status: COMPLETED | OUTPATIENT
Start: 2024-10-13 | End: 2024-10-13

## 2024-10-13 RX ORDER — AMLODIPINE BESYLATE 5 MG/1
5 TABLET ORAL DAILY
Status: DISCONTINUED | OUTPATIENT
Start: 2024-10-14 | End: 2024-10-21 | Stop reason: HOSPADM

## 2024-10-13 RX ORDER — ATORVASTATIN CALCIUM 40 MG/1
40 TABLET, FILM COATED ORAL NIGHTLY
Status: DISCONTINUED | OUTPATIENT
Start: 2024-10-13 | End: 2024-10-21 | Stop reason: HOSPADM

## 2024-10-13 RX ORDER — POLYETHYLENE GLYCOL 3350 17 G/17G
17 POWDER, FOR SOLUTION ORAL 2 TIMES DAILY
Status: DISCONTINUED | OUTPATIENT
Start: 2024-10-13 | End: 2024-10-17

## 2024-10-13 RX ADMIN — Medication 6 MG: at 09:10

## 2024-10-13 RX ADMIN — ACETAMINOPHEN 1000 MG: 10 INJECTION INTRAVENOUS at 02:10

## 2024-10-13 RX ADMIN — TRAMADOL HYDROCHLORIDE 50 MG: 50 TABLET, COATED ORAL at 03:10

## 2024-10-13 RX ADMIN — METHOCARBAMOL 500 MG: 100 INJECTION INTRAMUSCULAR; INTRAVENOUS at 04:10

## 2024-10-13 RX ADMIN — METHOCARBAMOL 500 MG: 100 INJECTION INTRAMUSCULAR; INTRAVENOUS at 08:10

## 2024-10-13 RX ADMIN — ATORVASTATIN CALCIUM 40 MG: 40 TABLET, FILM COATED ORAL at 08:10

## 2024-10-13 RX ADMIN — ACETAMINOPHEN 1000 MG: 10 INJECTION INTRAVENOUS at 06:10

## 2024-10-13 RX ADMIN — POLYETHYLENE GLYCOL 3350 17 G: 17 POWDER, FOR SOLUTION ORAL at 08:10

## 2024-10-13 RX ADMIN — ACETAMINOPHEN 1000 MG: 500 TABLET ORAL at 09:10

## 2024-10-13 RX ADMIN — BISACODYL 10 MG: 10 SUPPOSITORY RECTAL at 08:10

## 2024-10-13 RX ADMIN — METHOCARBAMOL 500 MG: 100 INJECTION INTRAMUSCULAR; INTRAVENOUS at 12:10

## 2024-10-13 RX ADMIN — POLYETHYLENE GLYCOL 3350, SODIUM SULFATE ANHYDROUS, SODIUM BICARBONATE, SODIUM CHLORIDE, POTASSIUM CHLORIDE 1000 ML: 236; 22.74; 6.74; 5.86; 2.97 POWDER, FOR SOLUTION ORAL at 11:10

## 2024-10-13 RX ADMIN — ENOXAPARIN SODIUM 40 MG: 40 INJECTION SUBCUTANEOUS at 05:10

## 2024-10-13 RX ADMIN — TRAMADOL HYDROCHLORIDE 50 MG: 50 TABLET, COATED ORAL at 09:10

## 2024-10-13 NOTE — HPI
Jill Perez is a 69 y.o. 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 She continues to follow with GI for gastroparesis, dysphagia and constipation. She presented on 10/11/2024 to [facility] with worsening abdominal pain for the last 4 days and nausea, vomiting and constipation. CT scan was obtained which showed bowel obstruction with fluid distended loops of small bowel and fecalization of the terminal ileum, however the transition point was not confidently. She was transferred to [facility] for surgical services with the bariatric surgery group.
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
## 2024-10-13 NOTE — ASSESSMENT & PLAN NOTE
Patient's FSGs are controlled on current medication regimen.  Last A1c reviewed-   Lab Results   Component Value Date    HGBA1C 9.6 (H) 10/01/2024     Most recent fingerstick glucose reviewed-   Recent Labs   Lab 10/12/24  1630 10/13/24  0027 10/13/24  0559 10/13/24  1134   POCTGLUCOSE 170* 121* 132* 152*     Current correctional scale  Low  Maintain anti-hyperglycemic dose as follows-   Antihyperglycemics (From admission, onward)      Start     Stop Route Frequency Ordered    10/12/24 2219  insulin aspart U-100 pen 0-5 Units         -- SubQ Every 6 hours PRN 10/12/24 2120          Hold Oral hypoglycemics while patient is in the hospital.  Would benefit from ambulatory referral to endocrinology (placed on 10/13)

## 2024-10-13 NOTE — PLAN OF CARE
Problem: Adult Inpatient Plan of Care  Goal: Plan of Care Review  Outcome: Progressing  Goal: Patient-Specific Goal (Individualized)  Outcome: Progressing  Goal: Absence of Hospital-Acquired Illness or Injury  Outcome: Progressing  Goal: Optimal Comfort and Wellbeing  Outcome: Progressing  Goal: Readiness for Transition of Care  Outcome: Progressing     Problem: Diabetes Comorbidity  Goal: Blood Glucose Level Within Targeted Range  Outcome: Progressing     Problem: Pain Acute  Goal: Optimal Pain Control and Function  Outcome: Progressing     Addressed patients pain, pump, position, need for potty, and possessions in reach. Patient remains free of falls, and verbalizes understanding in fall prevention and safety. Safety measures remain in place. Reinforced fall prevention and safety education. Call light and personal belongings within reach, bed in lowest position with wheels locked, side rails up x2, Instructed to call with any needs or complaints, verbalized understanding. Continue current plan of care.

## 2024-10-13 NOTE — ASSESSMENT & PLAN NOTE
Patient is a 69y F w/ hx of RYGB, chronic constipation, gastroparesis, HTN, HLD, DMII (on insulin) who presents as transfer from [place] with abdominal pain, no BM in 4 days, no gas since yesterday. [place] [facility] [place] by the bariatric surgery team due to hx of RYGB and SBO.     CT Abdomen and Pelvis with features of bowel obstruction with fluid distended loops of small bowel and fecalization of the terminal ileum.  Zone of transition is not confidently identified.    Abdominal Exam is benign and reassuring.     Plan:   - GI consulted, appreciate recs  - Serial abdominal exams  - Ambulate daily   - Dietary consult, appreciate recs  - Continue with IV fluids

## 2024-10-13 NOTE — HPI
Jill Perez is a 69 y.o. 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 She continues to follow with GI for gastroparesis, dysphagia and constipation. She presented on 10/11/2024 to [facility] with worsening abdominal pain for the last 4 days and nausea, vomiting and constipation. CT scan was obtained which showed bowel obstruction with fluid distended loops of small bowel and fecalization of the terminal ileum, however the transition point was not confidently. She was transferred to [facility] for surgical services with the bariatric surgery group.

## 2024-10-13 NOTE — CONSULTS
[facility]  Gastroenterology  Consult Note    Patient Name: Jill Perez  MRN: [id number]  Admission Date: 10/12/2024  Hospital Length of Stay: 1 days  Code Status: Full Code   Attending Provider: Robson Das MD   Consulting Provider: Claude Rogers MD  Primary Care Physician: Soham Rosa MD  Principal Problem:Abdominal pain    Inpatient consult to Gastroenterology  Consult performed by: Claude Rogers MD  Consult ordered by: Amanda Maguire MD        Subjective:     HPI: Jill Perez is a 69 y.o. 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 CT showed concern for SBO with fecalization of terminal ileum and fluid distended loops of small bowel. Patient was transferred to [facility] for further surgical evaluation and management. Surgery does not believe patient has SBO and does not believe her significant constipation has to do with her surgical history and bypass anatomy. GI consulted to provide assistance with patient's history of dysmotility and constipation.    Patient closely follows with Dr. Diggs in GI clinic and has seen Dr. Dumont for some of her outpatient scopes. She had a manometry study done in 1/2024 for workup of dysphagia showing concerns for EGJOO. Follow up EGD with [facility] on 9/9/24 was negative for EGJOO and she received empiric dilation of the GE junction to 20mm using balloon dilator. Last colonoscopy was in 6/2023 which was unremarkable and repeat recommended in 5 years.    Patient currently reports no abdominal pain and reports having loose bowel movements today and is passing gas. Denies N/V. Reports significant improvement after the empiric dilation done on 9/9/24 for her dysphagia but notices that  symptoms are slowly returning. She takes linzess, miralax, movantik at home for her constipation. She takes norco 2-3 times a day at least for her chronic back pain.      Objective:     Vitals:    10/13/24 1553   BP: 136/72   Pulse: 75   Resp: 18   Temp: 98.1 °F (36.7 °C)         Constitutional:  not in acute distress and well developed  HENT: Head: Normal, normocephalic, atraumatic.  Eyes: conjunctiva clear and sclera nonicteric  GI: soft, non-tender, without masses or organomegaly  Skin: normal color  Neurological: alert, oriented x3  Psychiatric: mood and affect are within normal limits, pt is a good historian; no memory problems were noted    Significant Labs:  Reviewed pertinent laboratory tests    Significant Imaging:  Reviewed       Assessment/Plan:     Jill Perez is a 69 y.o. 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 CT showed concern for SBO with fecalization of terminal ileum and fluid distended loops of small bowel. Patient was transferred to [facility] for further surgical evaluation and management. Surgery does not believe patient has SBO and does not believe her significant constipation has to do with her surgical history and bypass anatomy. GI consulted to provide assistance with patient's history of dysmotility and constipation. Currently pt without abdominal pain and having bowel movements. Reports return of some dysphagia since empiric GE dilation on 9/9/24 but overall improved from prior to the dilation. Overall has had upper GI dysmotility workup done including manometry study and EGD with [facility] but has not had anal manometry or other lower GI dysmotility workup done. Colonoscopy unremarkable in 6/2023. Significant constipation is  exacerbated by opioid use and patient may have possible lower GI dysmotility.       Problem List:  Acute on chronic constipation  Chronic opioid use for back pain  History of esophageal dysphagia  T2DM  History of maribel-en-y gastric bypass in 2008 complicated by candy cane syndrome s/p multiple revisions and history of adhesion/bowel obstruction      Recommendations:  - Recommend switching golytely to miralax with gatorade so patient may tolerate it better  - Resume home linzess, miralax, and movantik  - Ok with enemas and PRN bisacodyl suppositories if above does not help with relieving constipation  - Consider evaluation of back pain and assess if any other workup needs to be done to help patient improve pain  - Recommend pain management consult to help limit opioids and hopefully lead to discontinuation of opioids for chronic back pain as this will only make patient's history of constipation worse  - Will discuss with her outpatient GI physicians to evaluate need for outpatient lower GI dysmotility studies    Thank you for involving us in the care of Jill Perez. Please call with any additional questions, concerns or changes in the patient's clinical status. \    Discussed with Dr. Mcgraw.    Claude Rogers MD  Gastroenterology Fellow PGY-IV  [facility]

## 2024-10-13 NOTE — PROGRESS NOTES
[facility] - [facility]  General Surgery  Progress Note    Subjective:     History of Present Illness:  Jill Perez is a 69 y.o. 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 She continues to follow with GI for gastroparesis, dysphagia and constipation. She presented on 10/11/2024 to [facility] with worsening abdominal pain for the last 4 days and nausea, vomiting and constipation. CT scan was obtained which showed bowel obstruction with fluid distended loops of small bowel and fecalization of the terminal ileum, however the transition point was not confidently. She was transferred to [facility] for surgical services with the bariatric surgery group.    Post-Op Info:  * No surgery found *         Interval History:   PADDY WILLIS  Had large clear liquid bowel movement yesterday  Still rates her pain at 6/10   Not passing much gas    Medications:  Continuous Infusions:   0.9% NaCl   Intravenous Continuous 75 mL/hr at 10/13/24 0110 Rate Verify at 10/13/24 0110     Scheduled Meds:   acetaminophen  1,000 mg Intravenous Q8H    Followed by    acetaminophen  1,000 mg Oral Q8H    bisacodyL  10 mg Rectal Daily    enoxparin  40 mg Subcutaneous Daily     PRN Meds:  Current Facility-Administered Medications:     acetaminophen, 650 mg, Oral, Q8H PRN    dextrose 10%, 12.5 g, Intravenous, PRN    dextrose 10%, 25 g, Intravenous, PRN    glucagon (human recombinant), 1 mg, Intramuscular, PRN    insulin aspart U-100, 0-5 Units, Subcutaneous, Q6H PRN    LIDOcaine (PF) 10 mg/ml (1%), 1 mL, Intradermal, Once PRN    melatonin, 6 mg, Oral, Nightly PRN    methocarbamol (ROBAXIN) IVPB, 500 mg, Intravenous, Q6H PRN    ondansetron, 8 mg,  Intravenous, Q6H PRN    sodium chloride 0.9%, 10 mL, Intravenous, PRN     Review of patient's allergies indicates:   Allergen Reactions    Pcn [penicillins] Hives and Itching     Has tolerated cephalosporins    Penicillin Hives     Objective:     Vital Signs (Most Recent):  Temp: 98.4 °F (36.9 °C) (10/13/24 0758)  Pulse: 72 (10/13/24 0758)  Resp: 18 (10/13/24 0758)  BP: (!) 152/74 (10/13/24 0758)  SpO2: 95 % (10/13/24 0758) Vital Signs (24h Range):  Temp:  [96.7 °F (35.9 °C)-98.4 °F (36.9 °C)] 98.4 °F (36.9 °C)  Pulse:  [60-76] 72  Resp:  [13-20] 18  SpO2:  [94 %-100 %] 95 %  BP: (118-169)/(61-85) 152/74     Weight: 68 kg (150 lb)  Body mass index is 26.57 kg/m².    Intake/Output - Last 3 Shifts         10/11 0700  10/12 0659 10/12 0700  10/13 0659 10/13 0700  10/14 0659    P.O.  0     I.V. (mL/kg)  141.3 (2.1)     IV Piggyback  183.9     Total Intake(mL/kg)  325.2 (4.8)     Net  +325.2            Urine Occurrence  1 x              Physical Exam  Constitutional:       General: She is not in acute distress.     Appearance: Normal appearance.      Comments: crying   HENT:      Head: Normocephalic and atraumatic.      Mouth/Throat:      Mouth: Mucous membranes are moist.   Eyes:      Pupils: Pupils are equal, round, and reactive to light.   Cardiovascular:      Rate and Rhythm: Normal rate.   Pulmonary:      Effort: Pulmonary effort is normal. No respiratory distress.   Abdominal:      General: Abdomen is flat. There is no distension.      Palpations: Abdomen is soft.      Tenderness: There is abdominal tenderness.      Comments: Firmness palpated yesterday has improved / almost disappeared   Musculoskeletal:         General: Normal range of motion.      Cervical back: Normal range of motion.   Skin:     General: Skin is warm and dry.   Neurological:      General: No focal deficit present.      Mental Status: She is alert and oriented to person, place, and time.   Psychiatric:         Mood and Affect: Mood normal.          Behavior: Behavior normal.          Significant Labs:  I have reviewed all pertinent lab results within the past 24 hours.    Significant Diagnostics:  I have reviewed all pertinent imaging results/findings within the past 24 hours.  Assessment/Plan:     * Abdominal pain  Patient is a 69y F w/ hx of RYGB, chronic constipation, gastroparesis, HTN, HLD, DMII (on insulin) who presents as transfer from [facility] with abdominal pain, no BM in 4 days, no gas since yesterday. Transfer to [facility] for evaluation by the bariatric surgery team due to hx of RYGB and SBO.     Patient appears well and is moving throughout the room without difficulty. Abdominal exam is benign and not consistent with a high grade small bowel obstruction. Fecalization of small bowel with what appears to be a chronic dysmotility issue with back-up extending from the TI to the level of the gastric pouch. No dilation of BP limb or remnant stomach. No evidence of transition point on imaging. Do not believe this chronic issue to be 2/2 her bypass anatomy.     No acute surgical issue or any plans for surgical intervention in the near future. Concern for severe chronic constipation/dysmotility in setting of multiple medical comorbidities.    Gastroenterology consult for management of global dysmotility issues  Discuss transition to medicine team today  Golytely prep today  NPO  mIVF  IV meds  Daily suppository  Ambulate daily  DVT ppx        Amanda Maguire MD  General Surgery  [facility] - Surgery

## 2024-10-13 NOTE — ASSESSMENT & PLAN NOTE
Patient's FSGs are controlled on current medication regimen.  Last A1c reviewed-   Lab Results   Component Value Date    HGBA1C 9.6 (H) 10/01/2024     Most recent fingerstick glucose reviewed-   Recent Labs   Lab 10/12/24  1630 10/13/24  0027 10/13/24  0559 10/13/24  1134   POCTGLUCOSE 170* 121* 132* 152*       Current correctional scale  Low  Maintain anti-hyperglycemic dose as follows-   Antihyperglycemics (From admission, onward)      Start     Stop Route Frequency Ordered    10/12/24 2219  insulin aspart U-100 pen 0-5 Units         -- SubQ Every 6 hours PRN 10/12/24 2120          - NM Gastric Emptying with delayed gastric emptying. Consider adding metoclopramide or erythromycin  - Consider resuming gabapentin prn for neuropathy  - Hold Oral hypoglycemics while patient is in the hospital.  - Would benefit from ambulatory referral to endocrinology (placed on 10/13)

## 2024-10-13 NOTE — PLAN OF CARE
Problem: Adult Inpatient Plan of Care  Goal: Absence of Hospital-Acquired Illness or Injury  Outcome: Progressing  Goal: Optimal Comfort and Wellbeing  Outcome: Progressing  Goal: Readiness for Transition of Care  Outcome: Progressing     Problem: Adult Inpatient Plan of Care  Goal: Absence of Hospital-Acquired Illness or Injury  Outcome: Progressing     Problem: Adult Inpatient Plan of Care  Goal: Optimal Comfort and Wellbeing  Outcome: Progressing     Problem: Adult Inpatient Plan of Care  Goal: Readiness for Transition of Care  Outcome: Progressing   Remain SR on telemetry monitor. PRN medication effective for headache . Explained plan of care, verbalized understanding.  Remained NPO Educated pt .on new medicine . No injury during shift, Side rails up x 2, call light by bedside.

## 2024-10-13 NOTE — SUBJECTIVE & OBJECTIVE
Interval History:   PADDY WILLIS  Had large clear liquid bowel movement yesterday  Still rates her pain at 6/10   Not passing much gas    Medications:  Continuous Infusions:   0.9% NaCl   Intravenous Continuous 75 mL/hr at 10/13/24 0110 Rate Verify at 10/13/24 0110     Scheduled Meds:   acetaminophen  1,000 mg Intravenous Q8H    Followed by    acetaminophen  1,000 mg Oral Q8H    bisacodyL  10 mg Rectal Daily    enoxparin  40 mg Subcutaneous Daily     PRN Meds:  Current Facility-Administered Medications:     acetaminophen, 650 mg, Oral, Q8H PRN    dextrose 10%, 12.5 g, Intravenous, PRN    dextrose 10%, 25 g, Intravenous, PRN    glucagon (human recombinant), 1 mg, Intramuscular, PRN    insulin aspart U-100, 0-5 Units, Subcutaneous, Q6H PRN    LIDOcaine (PF) 10 mg/ml (1%), 1 mL, Intradermal, Once PRN    melatonin, 6 mg, Oral, Nightly PRN    methocarbamol (ROBAXIN) IVPB, 500 mg, Intravenous, Q6H PRN    ondansetron, 8 mg, Intravenous, Q6H PRN    sodium chloride 0.9%, 10 mL, Intravenous, PRN     Review of patient's allergies indicates:   Allergen Reactions    Pcn [penicillins] Hives and Itching     Has tolerated cephalosporins    Penicillin Hives     Objective:     Vital Signs (Most Recent):  Temp: 98.4 °F (36.9 °C) (10/13/24 0758)  Pulse: 72 (10/13/24 0758)  Resp: 18 (10/13/24 0758)  BP: (!) 152/74 (10/13/24 0758)  SpO2: 95 % (10/13/24 0758) Vital Signs (24h Range):  Temp:  [96.7 °F (35.9 °C)-98.4 °F (36.9 °C)] 98.4 °F (36.9 °C)  Pulse:  [60-76] 72  Resp:  [13-20] 18  SpO2:  [94 %-100 %] 95 %  BP: (118-169)/(61-85) 152/74     Weight: 68 kg (150 lb)  Body mass index is 26.57 kg/m².    Intake/Output - Last 3 Shifts         10/11 0700  10/12 0659 10/12 0700  10/13 0659 10/13 0700  10/14 0659    P.O.  0     I.V. (mL/kg)  141.3 (2.1)     IV Piggyback  183.9     Total Intake(mL/kg)  325.2 (4.8)     Net  +325.2            Urine Occurrence  1 x              Physical Exam  Constitutional:       General: She is not in acute  distress.     Appearance: Normal appearance.      Comments: crying   HENT:      Head: Normocephalic and atraumatic.      Mouth/Throat:      Mouth: Mucous membranes are moist.   Eyes:      Pupils: Pupils are equal, round, and reactive to light.   Cardiovascular:      Rate and Rhythm: Normal rate.   Pulmonary:      Effort: Pulmonary effort is normal. No respiratory distress.   Abdominal:      General: Abdomen is flat. There is no distension.      Palpations: Abdomen is soft.      Tenderness: There is abdominal tenderness.      Comments: Firmness palpated yesterday has improved / almost disappeared   Musculoskeletal:         General: Normal range of motion.      Cervical back: Normal range of motion.   Skin:     General: Skin is warm and dry.   Neurological:      General: No focal deficit present.      Mental Status: She is alert and oriented to person, place, and time.   Psychiatric:         Mood and Affect: Mood normal.         Behavior: Behavior normal.          Significant Labs:  I have reviewed all pertinent lab results within the past 24 hours.    Significant Diagnostics:  I have reviewed all pertinent imaging results/findings within the past 24 hours.

## 2024-10-13 NOTE — ASSESSMENT & PLAN NOTE
Patient is a 69y F w/ hx of RYGB, chronic constipation, gastroparesis, HTN, HLD, DMII (on insulin) who presents as transfer from [facility] with abdominal pain, no BM in 4 days, no gas since yesterday. Transfer to [facility] [place] for evaluation by the bariatric surgery team due to hx of RYGB and SBO.     Patient appears well and is moving throughout the room without difficulty. Abdominal exam is benign and not consistent with a high grade small bowel obstruction. Fecalization of small bowel with what appears to be a chronic dysmotility issue with back-up extending from the TI to the level of the gastric pouch. No dilation of BP limb or remnant stomach. No evidence of transition point on imaging. Do not believe this chronic issue to be 2/2 her bypass anatomy.     No acute surgical issue or any plans for surgical intervention in the near future. Concern for severe chronic constipation/dysmotility in setting of multiple medical comorbidities.    Gastroenterology consult for management of global dysmotility issues  Discuss transition to medicine team today  Golytely prep today  NPO  mIVF  IV meds  Daily suppository  Ambulate daily  DVT ppx

## 2024-10-13 NOTE — NURSING
Update: Patient arrived to the unit via [facility] with transport. Currently on room air Telemetry monitor placed. VS and H2T exam completed and recorded in Epic. Admissions navigator completed. NAD noted. Fall/Safety precautions maintained. Call light and personal items within reach. Communication board updated. Will remain NPO

## 2024-10-13 NOTE — CONSULTS
[facility]  Consult Note    Patient Name: Jill Perez  MRN: [id number]  Admission Date: 10/12/2024  Hospital Length of Stay: 1 days  Attending Physician: Robson Das MD   Primary Care Provider: Soham Rosa MD           Patient information was obtained from patient and ER records.     Consults  Subjective:     Principal Problem: Abdominal pain    Chief Complaint: No chief complaint on file.       HPI: Jill Perez is a 69 y.o. 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 She continues to follow with GI for gastroparesis, dysphagia and constipation. She presented on 10/11/2024 to [facility] with worsening abdominal pain for the last 4 days and nausea, vomiting and constipation. CT scan was obtained which showed bowel obstruction with fluid distended loops of small bowel and fecalization of the terminal ileum, however the transition point was not confidently. She was transferred to [facility] for surgical services with the bariatric surgery group.     Medicine consulted for evaluation of patient prior to transfer to hospital medicine. Patient is endorsing left upper quadrant abdominal pain. She denies chest pain, shortness of breath, dysuria, hematuria, and fevers/chills.        No new subjective & objective note has been filed under this hospital service since the last note was generated.    Assessment/Plan:     * Abdominal pain  See Chronic Idiopathic Constipation    Chronic idiopathic constipation  Patient is a 69y F w/ hx of RYGB, chronic constipation, gastroparesis, HTN, HLD, DMII (on insulin) who presents as transfer from [facility] with abdominal pain, no BM in 4 days, no  gas since yesterday. [place] [facility] [place]ric surgery team due to hx of RYGB and SBO.     CT Abdomen and Pelvis with features of bowel obstruction with fluid distended loops of small bowel and fecalization of the terminal ileum.  Zone of transition is not confidently identified.    Abdominal Exam is benign and reassuring.     Plan:   - GI consulted, appreciate recs  - Serial abdominal exams  - Ambulate daily   - Dietary consult, appreciate recs  - Continue with IV fluids        Type 2 diabetes mellitus with diabetic autonomic neuropathy, without long-term current use of insulin  Patient's FSGs are controlled on current medication regimen.  Last A1c reviewed-   Lab Results   Component Value Date    HGBA1C 9.6 (H) 10/01/2024     Most recent fingerstick glucose reviewed-   Recent Labs   Lab 10/12/24  1630 10/13/24  0027 10/13/24  0559 10/13/24  1134   POCTGLUCOSE 170* 121* 132* 152*       Current correctional scale  Low  Maintain anti-hyperglycemic dose as follows-   Antihyperglycemics (From admission, onward)      Start     Stop Route Frequency Ordered    10/12/24 2219  insulin aspart U-100 pen 0-5 Units         -- SubQ Every 6 hours PRN 10/12/24 2120          NM Gastric Emptying with delayed gastric emptying. Consider adding metoclopramide or erythromycin  GI consulted, appreciate recs.   Consider resuming gabapentin prn for neuropathy  Hold Oral hypoglycemics while patient is in the hospital.  Would benefit from ambulatory referral to endocrinology (placed on 10/13)    Hyperlipidemia associated with type 2 diabetes mellitus  Noted history on chart review.     Plan:   - resume home atorvastatin 40mg qd        Primary hypertension  Patients blood pressure range in the last 24 hours was: BP  Min: 118/72  Max: 169/85.      Plan  - BP is uncontrolled, will adjust as follows: restart home amlodipine 5 mg daily  (does not cause constipation)  - Continue to monitor vitals      VTE Risk Mitigation  (From admission, onward)           Ordered     enoxaparin injection 40 mg  Daily         10/12/24 2059     IP VTE HIGH RISK PATIENT  Once         10/12/24 2059     Place sequential compression device  Until discontinued         10/12/24 2059                        Thank you for your consult. I will sign off. Please contact us if you have any additional questions.    Mawadah Samad, MD  Department of Hospital Medicine   [facility] - Surgery
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
## 2024-10-13 NOTE — ASSESSMENT & PLAN NOTE
Patients blood pressure range in the last 24 hours was: BP  Min: 118/72  Max: 169/85.      Plan  - BP is uncontrolled, will adjust as follows: restart home amlodipine 5 mg daily  (does not cause constipation)  - Continue to monitor vitals

## 2024-10-14 ENCOUNTER — TELEPHONE (OUTPATIENT)
Dept: GASTROENTEROLOGY | Facility: CLINIC | Age: 69
End: 2024-10-14
Payer: MEDICARE

## 2024-10-14 PROBLEM — E44.0 MODERATE PROTEIN-CALORIE MALNUTRITION: Status: ACTIVE | Noted: 2024-10-14

## 2024-10-14 LAB
ALBUMIN SERPL BCP-MCNC: 2.8 G/DL (ref 3.5–5.2)
ALP SERPL-CCNC: 83 U/L (ref 55–135)
ALT SERPL W/O P-5'-P-CCNC: 15 U/L (ref 10–44)
ANION GAP SERPL CALC-SCNC: 8 MMOL/L (ref 8–16)
AST SERPL-CCNC: 13 U/L (ref 10–40)
BASOPHILS # BLD AUTO: 0.02 K/UL (ref 0–0.2)
BASOPHILS NFR BLD: 0.3 % (ref 0–1.9)
BILIRUB SERPL-MCNC: 0.4 MG/DL (ref 0.1–1)
BUN SERPL-MCNC: 7 MG/DL (ref 8–23)
CALCIUM SERPL-MCNC: 8.6 MG/DL (ref 8.7–10.5)
CHLORIDE SERPL-SCNC: 109 MMOL/L (ref 95–110)
CO2 SERPL-SCNC: 23 MMOL/L (ref 23–29)
CREAT SERPL-MCNC: 0.6 MG/DL (ref 0.5–1.4)
DIFFERENTIAL METHOD BLD: ABNORMAL
EOSINOPHIL # BLD AUTO: 0.1 K/UL (ref 0–0.5)
EOSINOPHIL NFR BLD: 1.9 % (ref 0–8)
ERYTHROCYTE [DISTWIDTH] IN BLOOD BY AUTOMATED COUNT: 14.1 % (ref 11.5–14.5)
EST. GFR  (NO RACE VARIABLE): >60 ML/MIN/1.73 M^2
GLUCOSE SERPL-MCNC: 125 MG/DL (ref 70–110)
HCT VFR BLD AUTO: 31.6 % (ref 37–48.5)
HGB BLD-MCNC: 10.5 G/DL (ref 12–16)
IMM GRANULOCYTES # BLD AUTO: 0.01 K/UL (ref 0–0.04)
IMM GRANULOCYTES NFR BLD AUTO: 0.1 % (ref 0–0.5)
LYMPHOCYTES # BLD AUTO: 3.9 K/UL (ref 1–4.8)
LYMPHOCYTES NFR BLD: 56.7 % (ref 18–48)
MAGNESIUM SERPL-MCNC: 1.4 MG/DL (ref 1.6–2.6)
MCH RBC QN AUTO: 30.2 PG (ref 27–31)
MCHC RBC AUTO-ENTMCNC: 33.2 G/DL (ref 32–36)
MCV RBC AUTO: 91 FL (ref 82–98)
MONOCYTES # BLD AUTO: 0.8 K/UL (ref 0.3–1)
MONOCYTES NFR BLD: 11.5 % (ref 4–15)
NEUTROPHILS # BLD AUTO: 2 K/UL (ref 1.8–7.7)
NEUTROPHILS NFR BLD: 29.5 % (ref 38–73)
NRBC BLD-RTO: 0 /100 WBC
PHOSPHATE SERPL-MCNC: 2.9 MG/DL (ref 2.7–4.5)
PLATELET # BLD AUTO: 239 K/UL (ref 150–450)
PMV BLD AUTO: 9.9 FL (ref 9.2–12.9)
POCT GLUCOSE: 139 MG/DL (ref 70–110)
POCT GLUCOSE: 144 MG/DL (ref 70–110)
POCT GLUCOSE: 147 MG/DL (ref 70–110)
POCT GLUCOSE: 190 MG/DL (ref 70–110)
POCT GLUCOSE: 207 MG/DL (ref 70–110)
POTASSIUM SERPL-SCNC: 3.3 MMOL/L (ref 3.5–5.1)
PROT SERPL-MCNC: 5.4 G/DL (ref 6–8.4)
RBC # BLD AUTO: 3.48 M/UL (ref 4–5.4)
SODIUM SERPL-SCNC: 140 MMOL/L (ref 136–145)
WBC # BLD AUTO: 6.88 K/UL (ref 3.9–12.7)

## 2024-10-14 PROCEDURE — 80053 COMPREHEN METABOLIC PANEL: CPT | Performed by: STUDENT IN AN ORGANIZED HEALTH CARE EDUCATION/TRAINING PROGRAM

## 2024-10-14 PROCEDURE — 63600175 PHARM REV CODE 636 W HCPCS: Performed by: STUDENT IN AN ORGANIZED HEALTH CARE EDUCATION/TRAINING PROGRAM

## 2024-10-14 PROCEDURE — 99232 SBSQ HOSP IP/OBS MODERATE 35: CPT | Mod: ,,,

## 2024-10-14 PROCEDURE — 85025 COMPLETE CBC W/AUTO DIFF WBC: CPT | Performed by: STUDENT IN AN ORGANIZED HEALTH CARE EDUCATION/TRAINING PROGRAM

## 2024-10-14 PROCEDURE — 84100 ASSAY OF PHOSPHORUS: CPT | Performed by: STUDENT IN AN ORGANIZED HEALTH CARE EDUCATION/TRAINING PROGRAM

## 2024-10-14 PROCEDURE — 94761 N-INVAS EAR/PLS OXIMETRY MLT: CPT

## 2024-10-14 PROCEDURE — 36415 COLL VENOUS BLD VENIPUNCTURE: CPT | Performed by: STUDENT IN AN ORGANIZED HEALTH CARE EDUCATION/TRAINING PROGRAM

## 2024-10-14 PROCEDURE — 25000003 PHARM REV CODE 250: Performed by: HOSPITALIST

## 2024-10-14 PROCEDURE — 25000003 PHARM REV CODE 250: Performed by: STUDENT IN AN ORGANIZED HEALTH CARE EDUCATION/TRAINING PROGRAM

## 2024-10-14 PROCEDURE — 63600175 PHARM REV CODE 636 W HCPCS: Performed by: HOSPITALIST

## 2024-10-14 PROCEDURE — 83735 ASSAY OF MAGNESIUM: CPT | Performed by: STUDENT IN AN ORGANIZED HEALTH CARE EDUCATION/TRAINING PROGRAM

## 2024-10-14 PROCEDURE — 25000003 PHARM REV CODE 250

## 2024-10-14 PROCEDURE — 21400001 HC TELEMETRY ROOM

## 2024-10-14 RX ORDER — MAGNESIUM SULFATE HEPTAHYDRATE 40 MG/ML
2 INJECTION, SOLUTION INTRAVENOUS ONCE
Status: COMPLETED | OUTPATIENT
Start: 2024-10-14 | End: 2024-10-14

## 2024-10-14 RX ORDER — POTASSIUM CHLORIDE 7.45 MG/ML
10 INJECTION INTRAVENOUS
Status: COMPLETED | OUTPATIENT
Start: 2024-10-14 | End: 2024-10-14

## 2024-10-14 RX ADMIN — POTASSIUM CHLORIDE 10 MEQ: 7.46 INJECTION, SOLUTION INTRAVENOUS at 12:10

## 2024-10-14 RX ADMIN — POTASSIUM CHLORIDE 10 MEQ: 7.46 INJECTION, SOLUTION INTRAVENOUS at 08:10

## 2024-10-14 RX ADMIN — SODIUM CHLORIDE: 9 INJECTION, SOLUTION INTRAVENOUS at 06:10

## 2024-10-14 RX ADMIN — AMLODIPINE BESYLATE 5 MG: 5 TABLET ORAL at 09:10

## 2024-10-14 RX ADMIN — POLYETHYLENE GLYCOL 3350 17 G: 17 POWDER, FOR SOLUTION ORAL at 09:10

## 2024-10-14 RX ADMIN — POTASSIUM CHLORIDE 10 MEQ: 7.46 INJECTION, SOLUTION INTRAVENOUS at 10:10

## 2024-10-14 RX ADMIN — ONDANSETRON 8 MG: 2 INJECTION INTRAMUSCULAR; INTRAVENOUS at 03:10

## 2024-10-14 RX ADMIN — MAGNESIUM SULFATE HEPTAHYDRATE 2 G: 40 INJECTION, SOLUTION INTRAVENOUS at 06:10

## 2024-10-14 RX ADMIN — ATORVASTATIN CALCIUM 40 MG: 40 TABLET, FILM COATED ORAL at 09:10

## 2024-10-14 RX ADMIN — ENOXAPARIN SODIUM 40 MG: 40 INJECTION SUBCUTANEOUS at 04:10

## 2024-10-14 RX ADMIN — TRAMADOL HYDROCHLORIDE 50 MG: 50 TABLET, COATED ORAL at 04:10

## 2024-10-14 RX ADMIN — ACETAMINOPHEN 1000 MG: 500 TABLET ORAL at 09:10

## 2024-10-14 RX ADMIN — METHOCARBAMOL 500 MG: 100 INJECTION INTRAMUSCULAR; INTRAVENOUS at 05:10

## 2024-10-14 RX ADMIN — TRAMADOL HYDROCHLORIDE 50 MG: 50 TABLET, COATED ORAL at 03:10

## 2024-10-14 RX ADMIN — ACETAMINOPHEN 1000 MG: 500 TABLET ORAL at 03:10

## 2024-10-14 RX ADMIN — BISACODYL 10 MG: 10 SUPPOSITORY RECTAL at 09:10

## 2024-10-14 RX ADMIN — Medication 6 MG: at 09:10

## 2024-10-14 RX ADMIN — ACETAMINOPHEN 650 MG: 500 TABLET ORAL at 09:10

## 2024-10-14 RX ADMIN — TRAMADOL HYDROCHLORIDE 50 MG: 50 TABLET, COATED ORAL at 09:10

## 2024-10-14 RX ADMIN — LINACLOTIDE 290 MCG: 290 CAPSULE, GELATIN COATED ORAL at 09:10

## 2024-10-14 RX ADMIN — ACETAMINOPHEN 1000 MG: 500 TABLET ORAL at 02:10

## 2024-10-14 NOTE — ASSESSMENT & PLAN NOTE
Patient is a 69y F w/ hx of RYGB, chronic constipation, gastroparesis, HTN, HLD, DMII (on insulin) who presents as transfer from [facility] with abdominal pain, no BM in 4 days, no gas since yesterday. [place] [facility] [place] by the bariatric surgery team due to hx of RYGB and SBO.     CT Abdomen and Pelvis with features of bowel obstruction with fluid distended loops of small bowel and fecalization of the terminal ileum.  Zone of transition is not confidently identified.    Abdominal Exam is benign and reassuring.     - GI consulted  -- Continue Linzess  -- Continue Miralax and Bisacody suppositories.   -- Consider Relistor while inpatient  -- Would increase Movantik to 25mg upon [facility] to ensure good bowel regimen.   - Serial abdominal exams  - Ambulate daily   - Dietary consulted  - Continue with IV fluids  - ADAT

## 2024-10-14 NOTE — PLAN OF CARE
Recommendations  ADAT per MD, add Boost Plus BID  RD to provide diet education for [facility] at follow up  RD following  Goals: meet % een/epn by next RD f/u  Nutrition Goal Status: new  Communication of RD Recs: other (comment) (poc)

## 2024-10-14 NOTE — SUBJECTIVE & OBJECTIVE
Interval History:   Transferred to medicine service. ADAT per bariatric surgery. Continue bowel regimen per GI.       Review of Systems   Constitutional:  Positive for appetite change. Negative for chills and fever.   Respiratory:  Negative for shortness of breath.    Cardiovascular:  Negative for chest pain and leg swelling.   Gastrointestinal:  Positive for abdominal pain and constipation. Negative for diarrhea, nausea and vomiting.   Genitourinary:  Negative for dysuria and hematuria.     Objective:     Vital Signs (Most Recent):  Temp: 96.6 °F (35.9 °C) (10/14/24 1107)  Pulse: (!) 56 (10/14/24 1139)  Resp: 18 (10/14/24 1107)  BP: 136/83 (10/14/24 1107)  SpO2: 95 % (10/14/24 1107) Vital Signs (24h Range):  Temp:  [96.6 °F (35.9 °C)-98.6 °F (37 °C)] 96.6 °F (35.9 °C)  Pulse:  [55-75] 56  Resp:  [15-18] 18  SpO2:  [94 %-96 %] 95 %  BP: (111-151)/(54-83) 136/83     Weight: 68 kg (150 lb)  Body mass index is 26.57 kg/m².    Intake/Output Summary (Last 24 hours) at 10/14/2024 1324  Last data filed at 10/14/2024 0051  Gross per 24 hour   Intake 0 ml   Output 2 ml   Net -2 ml         Physical Exam  Vitals reviewed.   Constitutional:       General: She is not in acute distress.     Appearance: Normal appearance. She is not ill-appearing.   HENT:      Mouth/Throat:      Mouth: Mucous membranes are moist.      Pharynx: Oropharynx is clear. No oropharyngeal exudate.   Eyes:      General: No scleral icterus.     Extraocular Movements: Extraocular movements intact.   Cardiovascular:      Rate and Rhythm: Normal rate and regular rhythm.   Abdominal:      General: Abdomen is flat. Bowel sounds are normal. There is no distension.      Palpations: Abdomen is soft. There is no mass.      Tenderness: There is abdominal tenderness in the left upper quadrant and left lower quadrant.      Hernia: No hernia is present.   Skin:     General: Skin is warm and dry.      Coloration: Skin is not jaundiced or pale.      Findings: No bruising  or erythema.   Neurological:      Mental Status: She is alert and oriented to person, place, and time. Mental status is at baseline.             Significant Labs: All pertinent labs within the past 24 hours have been reviewed.  CBC:   Recent Labs   Lab 10/13/24  0521 10/14/24  0228   WBC 6.65 6.88   HGB 11.1* 10.5*   HCT 33.5* 31.6*    239     CMP:   Recent Labs   Lab 10/13/24  0521 10/14/24  0228    140   K 3.5 3.3*    109   CO2 24 23   * 125*   BUN 11 7*   CREATININE 0.6 0.6   CALCIUM 8.6* 8.6*   PROT 5.6* 5.4*   ALBUMIN 2.8* 2.8*   BILITOT 0.5 0.4   ALKPHOS 89 83   AST 19 13   ALT 17 15   ANIONGAP 9 8       Significant Imaging: I have reviewed all pertinent imaging results/findings within the past 24 hours.

## 2024-10-14 NOTE — SUBJECTIVE & OBJECTIVE
Subjective:     Interval History: Followed up with patient for ongoing abdominal pain related to her constipation. She reports that she still will get a sharp left sided abdominal pain. Getting Linzess for the first time this AM. When she is trying to move her bowels, she will only have water that comes out. CT done 10/11 noted fecalization of the TI. No transition point noted.     Review of Systems   Constitutional:  Negative for activity change, diaphoresis, fever and unexpected weight change.   HENT:  Negative for sore throat and trouble swallowing.    Eyes:  Negative for redness.   Gastrointestinal:  Positive for abdominal pain and constipation. Negative for abdominal distention, anal bleeding, blood in stool, diarrhea, nausea, rectal pain and vomiting.   Musculoskeletal:  Positive for back pain.   Skin:  Negative for color change, pallor and rash.   Neurological:  Negative for dizziness, syncope, weakness and headaches.     Objective:     Vital Signs (Most Recent):  Temp: 98.3 °F (36.8 °C) (10/14/24 0740)  Pulse: (!) 59 (10/14/24 0740)  Resp: 18 (10/14/24 0946)  BP: 133/61 (10/14/24 0740)  SpO2: (!) 94 % (10/14/24 0747) Vital Signs (24h Range):  Temp:  [97.9 °F (36.6 °C)-98.6 °F (37 °C)] 98.3 °F (36.8 °C)  Pulse:  [55-88] 59  Resp:  [15-18] 18  SpO2:  [94 %-98 %] 94 %  BP: (111-156)/(54-78) 133/61     Weight: 68 kg (150 lb) (10/13/24 1930)  Body mass index is 26.57 kg/m².      Intake/Output Summary (Last 24 hours) at 10/14/2024 1016  Last data filed at 10/14/2024 0051  Gross per 24 hour   Intake 0 ml   Output 2 ml   Net -2 ml       Lines/Drains/Airways       Peripheral Intravenous Line  Duration                  Peripheral IV - Single Lumen 10/13/24 2038 20 G 1 in Right Antecubital <1 day                     Physical Exam  Vitals reviewed.   Constitutional:       General: She is not in acute distress.     Appearance: Normal appearance. She is not ill-appearing.   HENT:      Mouth/Throat:      Mouth: Mucous  "membranes are moist.      Pharynx: Oropharynx is clear. No oropharyngeal exudate.   Eyes:      General: No scleral icterus.  Abdominal:      General: Abdomen is flat. Bowel sounds are normal. There is no distension.      Palpations: Abdomen is soft. There is no mass.      Tenderness: There is abdominal tenderness in the left upper quadrant and left lower quadrant.      Hernia: No hernia is present.   Skin:     General: Skin is warm and dry.      Capillary Refill: Capillary refill takes less than 2 seconds.      Coloration: Skin is not jaundiced or pale.      Findings: No bruising or erythema.   Neurological:      Mental Status: She is alert and oriented to person, place, and time. Mental status is at baseline.          Significant Labs:  CBC:   Recent Labs   Lab 10/13/24  0521 10/14/24  0228   WBC 6.65 6.88   HGB 11.1* 10.5*   HCT 33.5* 31.6*    239     CMP:   Recent Labs   Lab 10/14/24  0228   *   CALCIUM 8.6*   ALBUMIN 2.8*   PROT 5.4*      K 3.3*   CO2 23      BUN 7*   CREATININE 0.6   ALKPHOS 83   ALT 15   AST 13   BILITOT 0.4     Coagulation: No results for input(s): "PT", "INR", "APTT" in the last 48 hours.      Significant Imaging:  Imaging results within the past 24 hours have been reviewed.  "

## 2024-10-14 NOTE — ASSESSMENT & PLAN NOTE
Malnutrition Type:  Context: chronic illness  Level: moderate    Related to (etiology):   Abdominal pain, gastroparesis     Signs and Symptoms (as evidenced by):   Unintentional weight loss, significant muscle and fat depletion     Malnutrition Characteristic Summary:  Weight Loss (Malnutrition): greater than 7.5% in 3 months (- 20#/11.7% wt loss x 3 months)  Subcutaneous Fat (Malnutrition): moderate depletion  Muscle Mass (Malnutrition): moderate depletion      Interventions/Recommendations (treatment strategy):  ADAT per MD, add Boost Plus BID  RD following    Nutrition Diagnosis Status:   New

## 2024-10-14 NOTE — CONSULTS
[facility]  Adult Nutrition  Consult Note    SUMMARY     Recommendations  ADAT per MD, add Boost Plus BID  RD to provide diet education for [facility] at follow up  RD following  Goals: meet % een/epn by next RD f/u  Nutrition Goal Status: new  Communication of RD Recs: other (comment) (poc)    Assessment and Plan    Endocrine  Moderate protein-calorie malnutrition  Malnutrition Type:  Context: chronic illness  Level: moderate    Related to (etiology):   Abdominal pain, gastroparesis     Signs and Symptoms (as evidenced by):   Unintentional weight loss, significant muscle and fat depletion     Malnutrition Characteristic Summary:  Weight Loss (Malnutrition): greater than 7.5% in 3 months (- 20#/11.7% wt loss x 3 months)  Subcutaneous Fat (Malnutrition): moderate depletion  Muscle Mass (Malnutrition): moderate depletion      Interventions/Recommendations (treatment strategy):  ADAT per MD, add Boost Plus BID  RD following    Nutrition Diagnosis Status:   New         Malnutrition Assessment  Malnutrition Context: chronic illness  Malnutrition Level: moderate  Skin (Micronutrient): none  Nails (Micronutrient): none  Hair/Scalp (Micronutrient): none  Eyes (Micronutrient): none  Extraoral (Micronutrient): none  Gums (Micronutrient): none  Lips/Mucous Membranes (Micronutrient): none  Teeth (Micronutrient): none  Tongue (Micronutrient): none  Neck/Chest (Micronutrient): none       Weight Loss (Malnutrition): greater than 7.5% in 3 months (- 20#/11.7% wt loss x 3 months)  Subcutaneous Fat (Malnutrition): moderate depletion  Muscle Mass (Malnutrition): moderate depletion   Orbital Region (Subcutaneous Fat Loss): moderate depletion  Upper Arm Region (Subcutaneous Fat Loss): mild depletion   [place] Region (Muscle Loss): moderate depletion  Clavicle Bone Region (Muscle Loss): moderate depletion  Clavicle and Acromion Bone Region (Muscle Loss): moderate depletion  Scapular Bone Region (Muscle Loss): mild  "depletion  Dorsal Hand (Muscle Loss): moderate depletion  Patellar Region (Muscle Loss): mild depletion  Anterior Thigh Region (Muscle Loss): mild depletion  Posterior Calf Region (Muscle Loss): mild depletion                 Reason for Assessment    Reason For Assessment: consult  Diagnosis: gastrointestinal disease  General Information Comments: PMHx: hx of RYGB, chronic constipation, gastroparesis, HTN, HLD, DMII (on insulin), splenectomy, s/p cholecystectomy, adhesion/bowel obstruction, and chronic opioid use. ~ Admitted with abd pain after no BM x 4 days, initiated after eating a meal that normally "hurts her". RD consulted for chronic constipation. Recently underwent EGD for dilation for dysphagia. Take linzess, miralax, movantik daily to stimulate BMs and endorses wt loss the past few months d/t fear of pain after eating. No wounds noted. Team does not suspect SBO at this time. ~ RD saw pt at bedside. Pt tolerated clear for breakfast today. Reports eating 1 meals/day at home and 2-3 [employer] protein shakes. Wt history= 1/8/24 175#, 7/18/24 170#, #. (- 20#/11.7% wt loss x 3 months) NFPE completed, wasting note. Pt states her loose arm skin is from past bariatric surgery. Discussed keeping a food journal of foods that trigger stomach upset. RD to provide diet education as pt requested at follow up.  Nutrition Discharge Planning: heart healthy, diabetic diet    Nutrition Related Social Determinants of Health: SDOH: Unable to assess at this time.      Nutrition Risk Screen    Nutrition Risk Screen: no indicators present    Nutrition/Diet History    Food Allergies: NKFA    Anthropometrics    Temp: 96.6 °F (35.9 °C)  Height Method: Stated  Height: 5' 3" (160 cm)  Height (inches): 63 in  Weight Method: Stated  Weight: 68 kg (150 lb)  Weight (lb): 150 lb  Ideal Body Weight (IBW), Female: 115 lb  % Ideal Body Weight, Female (lb): 130.43 %  BMI (Calculated): 26.6  BMI Grade: 25 - 29.9 - overweight   "     Lab/Procedures/Meds    Pertinent Labs Reviewed: reviewed  Pertinent Labs Comments: H/H: 10.5/31.6, potassium: 3.3, Glu: 125, ma.4  Pertinent Medications Reviewed: reviewed  Pertinent Medications Comments: Atorvastatin, enoxaparin, mag sulfate, polyethylene glycol, potassium chloride, NaCl        Estimated/Assessed Needs    Weight Used For Calorie Calculations: 68 kg (149 lb 14.6 oz)  Energy Calorie Requirements (kcal): 1539 (msj * 1.3 PAL)  Energy Need Method: [place]-St Jeor  Protein Requirements: 81 (1.2 g/kg)  Weight Used For Protein Calculations: 68 kg (149 lb 14.6 oz)     Estimated Fluid Requirement Method: RDA Method  RDA Method (mL): 1539         Nutrition Prescription Ordered    Current Diet Order: NPO    Evaluation of Received Nutrient/Fluid Intake    I/O: +323 mL since admit  Comments: LBM 10/12  % Intake of Estimated Energy Needs: 0 - 25 %  % Meal Intake: NPO    Nutrition Risk    Level of Risk/Frequency of Follow-up: low - moderate (f/u 1-2 x/week)       Monitor and Evaluation    Food and Nutrient Intake: energy intake  Food and Nutrient Adminstration: diet order  Knowledge/Beliefs/Attitudes: food and nutrition knowledge/skill, beliefs and attitudes  Physical Activity and Function: nutrition-related ADLs and IADLs  Anthropometric Measurements: height/length, weight, weight change, body mass index  Biochemical Data, Medical Tests and Procedures: gastrointestinal profile, electrolyte and renal panel, glucose/endocrine profile, inflammatory profile, lipid profile  Nutrition-Focused Physical Findings: overall appearance, extremities, muscles and bones, head and eyes, skin       Nutrition Follow-Up    RD Follow-up?: Yes    Lynn Muñoz RD, LDN

## 2024-10-14 NOTE — PROGRESS NOTES
[facility]  Gastroenterology  Progress Note    Patient Name: Jill Perez  MRN: [id number]  Admission Date: 10/12/2024  Hospital Length of Stay: 2 days  Code Status: Full Code   Attending Provider: Chun Wu MD  Consulting Provider: Abimbola Whitmore NP  Primary Care Physician: Soham Rosa MD  Principal Problem: Abdominal pain    Subjective:     Interval History: Followed up with patient for ongoing abdominal pain related to her constipation. She reports that she still will get a sharp left sided abdominal pain. Getting Linzess for the first time this AM. When she is trying to move her bowels, she will only have water that comes out. CT done 10/11 noted fecalization of the TI. No transition point noted.     Review of Systems   Constitutional:  Negative for activity change, diaphoresis, fever and unexpected weight change.   HENT:  Negative for sore throat and trouble swallowing.    Eyes:  Negative for redness.   Gastrointestinal:  Positive for abdominal pain and constipation. Negative for abdominal distention, anal bleeding, blood in stool, diarrhea, nausea, rectal pain and vomiting.   Musculoskeletal:  Positive for back pain.   Skin:  Negative for color change, pallor and rash.   Neurological:  Negative for dizziness, syncope, weakness and headaches.     Objective:     Vital Signs (Most Recent):  Temp: 98.3 °F (36.8 °C) (10/14/24 0740)  Pulse: (!) 59 (10/14/24 0740)  Resp: 18 (10/14/24 0946)  BP: 133/61 (10/14/24 0740)  SpO2: (!) 94 % (10/14/24 0747) Vital Signs (24h Range):  Temp:  [97.9 °F (36.6 °C)-98.6 °F (37 °C)] 98.3 °F (36.8 °C)  Pulse:  [55-88] 59  Resp:  [15-18] 18  SpO2:  [94 %-98 %] 94 %  BP: (111-156)/(54-78) 133/61     Weight: 68 kg (150 lb) (10/13/24 1930)  Body mass index is 26.57 kg/m².      Intake/Output Summary (Last 24 hours) at 10/14/2024 1016  Last data filed at 10/14/2024 0051  Gross per 24 hour   Intake 0 ml   Output 2 ml   Net -2 ml       Lines/Drains/Airways       Peripheral  "Intravenous Line  Duration                  Peripheral IV - Single Lumen 10/13/24 2038 20 G 1 in Right Antecubital <1 day                     Physical Exam  Vitals reviewed.   Constitutional:       General: She is not in acute distress.     Appearance: Normal appearance. She is not ill-appearing.   HENT:      Mouth/Throat:      Mouth: Mucous membranes are moist.      Pharynx: Oropharynx is clear. No oropharyngeal exudate.   Eyes:      General: No scleral icterus.  Abdominal:      General: Abdomen is flat. Bowel sounds are normal. There is no distension.      Palpations: Abdomen is soft. There is no mass.      Tenderness: There is abdominal tenderness in the left upper quadrant and left lower quadrant.      Hernia: No hernia is present.   Skin:     General: Skin is warm and dry.      Capillary Refill: Capillary refill takes less than 2 seconds.      Coloration: Skin is not jaundiced or pale.      Findings: No bruising or erythema.   Neurological:      Mental Status: She is alert and oriented to person, place, and time. Mental status is at baseline.          Significant Labs:  CBC:   Recent Labs   Lab 10/13/24  0521 10/14/24  0228   WBC 6.65 6.88   HGB 11.1* 10.5*   HCT 33.5* 31.6*    239     CMP:   Recent Labs   Lab 10/14/24  0228   *   CALCIUM 8.6*   ALBUMIN 2.8*   PROT 5.4*      K 3.3*   CO2 23      BUN 7*   CREATININE 0.6   ALKPHOS 83   ALT 15   AST 13   BILITOT 0.4     Coagulation: No results for input(s): "PT", "INR", "APTT" in the last 48 hours.      Significant Imaging:  Imaging results within the past 24 hours have been reviewed.  Assessment/Plan:     GI  Chronic idiopathic constipation  Patient takes Linzess, Movantik and Miralax at home for her constipation regimen.     --Agree with addin Linzess to her regimen. Can consider Relistor while inpatient. Would increase Movantik to 25mg upon [facility] to ensure good bowel regimen.  --Continue with Miralax and Bisacody suppositories. "   --Will arrange for outpatient clinic visit with her established GI MD, next available.         Thank you for your consult. After careful review of labs and patient current status with the GI staff and fellow, it has been decided that I will sign off. Please contact us if you have any additional questions.    Abimbola Whitmore NP  Gastroenterology  [facility]

## 2024-10-14 NOTE — TELEPHONE ENCOUNTER
----- Message from Abimbola Whitmore NP sent at 10/14/2024 10:14 AM CDT -----  Regarding: clinic followup  Good morning:  Please assist patient in scheduling a hospital followup for management of her dysmotility with Dr. Diggs as soon as able.     Thanks,  Abimbola HORN

## 2024-10-14 NOTE — PLAN OF CARE
Problem: Adult Inpatient Plan of Care  Goal: Plan of Care Review  Outcome: Progressing  Goal: Patient-Specific Goal (Individualized)  Outcome: Progressing  Goal: Absence of Hospital-Acquired Illness or Injury  Outcome: Progressing  Goal: Optimal Comfort and Wellbeing  Outcome: Progressing  Goal: Readiness for Transition of Care  Outcome: Progressing     Problem: Adult Inpatient Plan of Care  Goal: Plan of Care Review  Outcome: Progressing     Problem: Adult Inpatient Plan of Care  Goal: Patient-Specific Goal (Individualized)  Outcome: Progressing     Problem: Adult Inpatient Plan of Care  Goal: Absence of Hospital-Acquired Illness or Injury  Outcome: Progressing     Problem: Diabetes Comorbidity  Goal: Blood Glucose Level Within Targeted Range  Outcome: Progressing   Remain SR  with 1st degree AV block and on telemetry monitor. PRN medication effective. Explained plan of care, verbalized understanding. Educated pt .on new medicine . No injury during shift, Side rails up x 2, call light by bedside Prepped with Gatorade and miralax .

## 2024-10-14 NOTE — PLAN OF CARE
Problem: Adult Inpatient Plan of Care  Goal: Absence of Hospital-Acquired Illness or Injury  Outcome: Progressing     Problem: Adult Inpatient Plan of Care  Goal: Optimal Comfort and Wellbeing  Outcome: Progressing     Problem: Adult Inpatient Plan of Care  Goal: Readiness for Transition of Care  Outcome: Progressing     Problem: Diabetes Comorbidity  Goal: Blood Glucose Level Within Targeted Range  Outcome: Progressing     Problem: Pain Acute  Goal: Optimal Pain Control and Function  Outcome: Progressing     Pt's plan of care is progressing. Pt is currently resting comfortably. Safety measures remain in place. Pain level is under control with scheduled and prn medication. Pt is now on full liquid diet. Pt still has not had a bowel movement despite interventions given. Skin integrity is maintained. Pt is able to ambulate independently.Call light within reach. No complaints or concerns at this time. Plan of care ongoing.

## 2024-10-14 NOTE — TELEPHONE ENCOUNTER
MA spoke to patient. Patient stated that she will give the office a call back to schedule an appointment after she is released from the hospital.

## 2024-10-14 NOTE — ASSESSMENT & PLAN NOTE
Patients blood pressure range in the last 24 hours was: BP  Min: 111/54  Max: 169/85.    - BP is uncontrolled, will adjust as follows: Home amlodipine 5 mg daily  (does not cause constipation)  - Continue to monitor vitals

## 2024-10-14 NOTE — ASSESSMENT & PLAN NOTE
Patient takes Linzess, Movantik and Miralax at home for her constipation regimen.     --Agree with addin Linzess to her regimen. Can consider Relistor while inpatient. Would increase Movantik to 25mg upon [facility] to ensure good bowel regimen.  --Continue with Miralax and Bisacody suppositories.   --Will arrange for outpatient clinic visit with her established GI MD, next available.

## 2024-10-14 NOTE — PROGRESS NOTES
[facility] Medicine  Progress Note    Patient Name: Jill Perez  MRN: [id number]  Patient Class: IP- Inpatient   Admission Date: 10/12/2024  Length of Stay: 2 days  Attending Physician: Chun Wu MD  Primary Care Provider: Soham Rosa MD        Subjective:     Principal Problem:Abdominal pain        HPI:  Jill Perez is a 69 y.o. 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 She continues to follow with GI for gastroparesis, dysphagia and constipation. She presented on 10/11/2024 to [facility] with worsening abdominal pain for the last 4 days and nausea, vomiting and constipation. CT scan was obtained which showed bowel obstruction with fluid distended loops of small bowel and fecalization of the terminal ileum, however the transition point was not confidently. She was transferred to [facility] for surgical services with the bariatric surgery group.     Medicine consulted for evaluation of patient prior to transfer to hospital medicine. Patient is endorsing left upper quadrant abdominal pain. She denies chest pain, shortness of breath, dysuria, hematuria, and fevers/chills.        Overview/Hospital Course:  No notes on file    Interval History:   Transferred to medicine service. ADAT per bariatric surgery. Continue bowel regimen per GI.       Review of Systems   Constitutional:  Positive for appetite change. Negative for chills and fever.   Respiratory:  Negative for shortness of breath.    Cardiovascular:  Negative for chest pain and leg swelling.   Gastrointestinal:  Positive for abdominal pain and constipation. Negative for diarrhea, nausea and vomiting.   Genitourinary:   Negative for dysuria and hematuria.     Objective:     Vital Signs (Most Recent):  Temp: 96.6 °F (35.9 °C) (10/14/24 1107)  Pulse: (!) 56 (10/14/24 1139)  Resp: 18 (10/14/24 1107)  BP: 136/83 (10/14/24 1107)  SpO2: 95 % (10/14/24 1107) Vital Signs (24h Range):  Temp:  [96.6 °F (35.9 °C)-98.6 °F (37 °C)] 96.6 °F (35.9 °C)  Pulse:  [55-75] 56  Resp:  [15-18] 18  SpO2:  [94 %-96 %] 95 %  BP: (111-151)/(54-83) 136/83     Weight: 68 kg (150 lb)  Body mass index is 26.57 kg/m².    Intake/Output Summary (Last 24 hours) at 10/14/2024 1324  Last data filed at 10/14/2024 0051  Gross per 24 hour   Intake 0 ml   Output 2 ml   Net -2 ml         Physical Exam  Vitals reviewed.   Constitutional:       General: She is not in acute distress.     Appearance: Normal appearance. She is not ill-appearing.   HENT:      Mouth/Throat:      Mouth: Mucous membranes are moist.      Pharynx: Oropharynx is clear. No oropharyngeal exudate.   Eyes:      General: No scleral icterus.     Extraocular Movements: Extraocular movements intact.   Cardiovascular:      Rate and Rhythm: Normal rate and regular rhythm.   Abdominal:      General: Abdomen is flat. Bowel sounds are normal. There is no distension.      Palpations: Abdomen is soft. There is no mass.      Tenderness: There is abdominal tenderness in the left upper quadrant and left lower quadrant.      Hernia: No hernia is present.   Skin:     General: Skin is warm and dry.      Coloration: Skin is not jaundiced or pale.      Findings: No bruising or erythema.   Neurological:      Mental Status: She is alert and oriented to person, place, and time. Mental status is at baseline.             Significant Labs: All pertinent labs within the past 24 hours have been reviewed.  CBC:   Recent Labs   Lab 10/13/24  0521 10/14/24  0228   WBC 6.65 6.88   HGB 11.1* 10.5*   HCT 33.5* 31.6*    239     CMP:   Recent Labs   Lab 10/13/24  0521 10/14/24  0228    140   K 3.5 3.3*    109   CO2 24  23   * 125*   BUN 11 7*   CREATININE 0.6 0.6   CALCIUM 8.6* 8.6*   PROT 5.6* 5.4*   ALBUMIN 2.8* 2.8*   BILITOT 0.5 0.4   ALKPHOS 89 83   AST 19 13   ALT 17 15   ANIONGAP 9 8       Significant Imaging: I have reviewed all pertinent imaging results/findings within the past 24 hours.    Assessment/Plan:      * Abdominal pain  - See Chronic Idiopathic Constipation    Chronic idiopathic constipation  Patient is a 69y F w/ hx of RYGB, chronic constipation, gastroparesis, HTN, HLD, DMII (on insulin) who presents as transfer from [place] with abdominal pain, no BM in 4 days, no gas since yesterday. Transfer to [facility] for evaluation by the bariatric surgery team due to hx of RYGB and SBO.     CT Abdomen and Pelvis with features of bowel obstruction with fluid distended loops of small bowel and fecalization of the terminal ileum.  Zone of transition is not confidently identified.    Abdominal Exam is benign and reassuring.     - GI consulted  -- Continue Linzess  -- Continue Miralax and Bisacody suppositories.   -- Consider Relistor while inpatient  -- Would increase Movantik to 25mg upon [facility] to ensure good bowel regimen.   - Serial abdominal exams  - Ambulate daily   - Dietary consulted  - Continue with IV fluids  - ADAT    Type 2 diabetes mellitus with diabetic autonomic neuropathy, without long-term current use of insulin  Patient's FSGs are controlled on current medication regimen.  Last A1c reviewed-   Lab Results   Component Value Date    HGBA1C 9.6 (H) 10/01/2024     Most recent fingerstick glucose reviewed-   Recent Labs   Lab 10/12/24  1630 10/13/24  0027 10/13/24  0559 10/13/24  1134   POCTGLUCOSE 170* 121* 132* 152*       Current correctional scale  Low  Maintain anti-hyperglycemic dose as follows-   Antihyperglycemics (From admission, onward)      Start     Stop Route Frequency Ordered    10/12/24 2219  insulin aspart U-100 pen 0-5 Units         -- SubQ Every 6 hours PRN 10/12/24 2120          - NM Gastric  Emptying with delayed gastric emptying. Consider adding metoclopramide or erythromycin  - Consider resuming gabapentin prn for neuropathy  - Hold Oral hypoglycemics while patient is in the hospital.  - Would benefit from ambulatory referral to endocrinology (placed on 10/13)    Hyperlipidemia associated with type 2 diabetes mellitus  - Home atorvastatin 40mg qd        Primary hypertension  Patients blood pressure range in the last 24 hours was: BP  Min: 111/54  Max: 169/85.    - BP is uncontrolled, will adjust as follows: Home amlodipine 5 mg daily  (does not cause constipation)  - Continue to monitor vitals      VTE Risk Mitigation (From admission, onward)           Ordered     enoxaparin injection 40 mg  Daily         10/12/24 2059     IP VTE HIGH RISK PATIENT  Once         10/12/24 2059     Place sequential compression device  Until discontinued         10/12/24 2059                    Discharge Planning   JAMES: 10/16/2024     Code Status: Full Code   Is the patient medically ready for [facility]?:     Reason for patient still in hospital (select all that apply): Patient trending condition, Laboratory test, Treatment, Consult recommendations, and Pending disposition                     Chun Wu MD  Department of Hospital Medicine   [facility] - Surgery

## 2024-10-15 LAB
ALBUMIN SERPL BCP-MCNC: 3.2 G/DL (ref 3.5–5.2)
ALP SERPL-CCNC: 93 U/L (ref 55–135)
ALT SERPL W/O P-5'-P-CCNC: 15 U/L (ref 10–44)
ANION GAP SERPL CALC-SCNC: 11 MMOL/L (ref 8–16)
AST SERPL-CCNC: 13 U/L (ref 10–40)
BASOPHILS # BLD AUTO: 0.01 K/UL (ref 0–0.2)
BASOPHILS NFR BLD: 0.2 % (ref 0–1.9)
BILIRUB SERPL-MCNC: 0.5 MG/DL (ref 0.1–1)
BUN SERPL-MCNC: 4 MG/DL (ref 8–23)
CALCIUM SERPL-MCNC: 9.1 MG/DL (ref 8.7–10.5)
CHLORIDE SERPL-SCNC: 110 MMOL/L (ref 95–110)
CO2 SERPL-SCNC: 21 MMOL/L (ref 23–29)
CREAT SERPL-MCNC: 0.6 MG/DL (ref 0.5–1.4)
DIFFERENTIAL METHOD BLD: ABNORMAL
EOSINOPHIL # BLD AUTO: 0.1 K/UL (ref 0–0.5)
EOSINOPHIL NFR BLD: 2.3 % (ref 0–8)
ERYTHROCYTE [DISTWIDTH] IN BLOOD BY AUTOMATED COUNT: 14.3 % (ref 11.5–14.5)
EST. GFR  (NO RACE VARIABLE): >60 ML/MIN/1.73 M^2
GLUCOSE SERPL-MCNC: 201 MG/DL (ref 70–110)
HCT VFR BLD AUTO: 36.3 % (ref 37–48.5)
HGB BLD-MCNC: 11.5 G/DL (ref 12–16)
IMM GRANULOCYTES # BLD AUTO: 0.01 K/UL (ref 0–0.04)
IMM GRANULOCYTES NFR BLD AUTO: 0.2 % (ref 0–0.5)
LYMPHOCYTES # BLD AUTO: 3.4 K/UL (ref 1–4.8)
LYMPHOCYTES NFR BLD: 59.4 % (ref 18–48)
MAGNESIUM SERPL-MCNC: 1.5 MG/DL (ref 1.6–2.6)
MCH RBC QN AUTO: 29.3 PG (ref 27–31)
MCHC RBC AUTO-ENTMCNC: 31.7 G/DL (ref 32–36)
MCV RBC AUTO: 93 FL (ref 82–98)
MONOCYTES # BLD AUTO: 0.6 K/UL (ref 0.3–1)
MONOCYTES NFR BLD: 11.2 % (ref 4–15)
NEUTROPHILS # BLD AUTO: 1.5 K/UL (ref 1.8–7.7)
NEUTROPHILS NFR BLD: 26.7 % (ref 38–73)
NRBC BLD-RTO: 0 /100 WBC
PHOSPHATE SERPL-MCNC: 2.5 MG/DL (ref 2.7–4.5)
PLATELET # BLD AUTO: 264 K/UL (ref 150–450)
PMV BLD AUTO: 10.7 FL (ref 9.2–12.9)
POCT GLUCOSE: 164 MG/DL (ref 70–110)
POCT GLUCOSE: 196 MG/DL (ref 70–110)
POCT GLUCOSE: 208 MG/DL (ref 70–110)
POCT GLUCOSE: 211 MG/DL (ref 70–110)
POCT GLUCOSE: 215 MG/DL (ref 70–110)
POTASSIUM SERPL-SCNC: 3.3 MMOL/L (ref 3.5–5.1)
PROT SERPL-MCNC: 6.4 G/DL (ref 6–8.4)
RBC # BLD AUTO: 3.92 M/UL (ref 4–5.4)
SODIUM SERPL-SCNC: 142 MMOL/L (ref 136–145)
WBC # BLD AUTO: 5.69 K/UL (ref 3.9–12.7)

## 2024-10-15 PROCEDURE — 85025 COMPLETE CBC W/AUTO DIFF WBC: CPT | Performed by: STUDENT IN AN ORGANIZED HEALTH CARE EDUCATION/TRAINING PROGRAM

## 2024-10-15 PROCEDURE — 84100 ASSAY OF PHOSPHORUS: CPT | Performed by: STUDENT IN AN ORGANIZED HEALTH CARE EDUCATION/TRAINING PROGRAM

## 2024-10-15 PROCEDURE — 63600175 PHARM REV CODE 636 W HCPCS: Mod: JZ,JG | Performed by: STUDENT IN AN ORGANIZED HEALTH CARE EDUCATION/TRAINING PROGRAM

## 2024-10-15 PROCEDURE — 99232 SBSQ HOSP IP/OBS MODERATE 35: CPT | Mod: ,,,

## 2024-10-15 PROCEDURE — 63600175 PHARM REV CODE 636 W HCPCS: Performed by: STUDENT IN AN ORGANIZED HEALTH CARE EDUCATION/TRAINING PROGRAM

## 2024-10-15 PROCEDURE — 21400001 HC TELEMETRY ROOM

## 2024-10-15 PROCEDURE — 25000003 PHARM REV CODE 250: Performed by: NURSE PRACTITIONER

## 2024-10-15 PROCEDURE — 25000003 PHARM REV CODE 250

## 2024-10-15 PROCEDURE — 83735 ASSAY OF MAGNESIUM: CPT | Performed by: STUDENT IN AN ORGANIZED HEALTH CARE EDUCATION/TRAINING PROGRAM

## 2024-10-15 PROCEDURE — 25000003 PHARM REV CODE 250: Performed by: HOSPITALIST

## 2024-10-15 PROCEDURE — 80053 COMPREHEN METABOLIC PANEL: CPT | Performed by: STUDENT IN AN ORGANIZED HEALTH CARE EDUCATION/TRAINING PROGRAM

## 2024-10-15 PROCEDURE — 36415 COLL VENOUS BLD VENIPUNCTURE: CPT | Performed by: STUDENT IN AN ORGANIZED HEALTH CARE EDUCATION/TRAINING PROGRAM

## 2024-10-15 PROCEDURE — 25000003 PHARM REV CODE 250: Performed by: STUDENT IN AN ORGANIZED HEALTH CARE EDUCATION/TRAINING PROGRAM

## 2024-10-15 RX ORDER — DOCUSATE SODIUM 100 MG
200 CAPSULE ORAL
Status: DISCONTINUED | OUTPATIENT
Start: 2024-10-15 | End: 2024-10-15

## 2024-10-15 RX ORDER — POTASSIUM CHLORIDE 7.45 MG/ML
10 INJECTION INTRAVENOUS
Status: DISCONTINUED | OUTPATIENT
Start: 2024-10-15 | End: 2024-10-15

## 2024-10-15 RX ORDER — POTASSIUM CHLORIDE 20 MEQ/1
40 TABLET, EXTENDED RELEASE ORAL ONCE
Status: COMPLETED | OUTPATIENT
Start: 2024-10-15 | End: 2024-10-15

## 2024-10-15 RX ORDER — MAGNESIUM SULFATE HEPTAHYDRATE 40 MG/ML
2 INJECTION, SOLUTION INTRAVENOUS ONCE
Status: COMPLETED | OUTPATIENT
Start: 2024-10-15 | End: 2024-10-15

## 2024-10-15 RX ADMIN — ACETAMINOPHEN 1000 MG: 500 TABLET ORAL at 09:10

## 2024-10-15 RX ADMIN — AMLODIPINE BESYLATE 5 MG: 5 TABLET ORAL at 09:10

## 2024-10-15 RX ADMIN — MAGNESIUM SULFATE HEPTAHYDRATE 2 G: 40 INJECTION, SOLUTION INTRAVENOUS at 09:10

## 2024-10-15 RX ADMIN — INSULIN ASPART 2 UNITS: 100 INJECTION, SOLUTION INTRAVENOUS; SUBCUTANEOUS at 11:10

## 2024-10-15 RX ADMIN — POLYETHYLENE GLYCOL 3350 17 G: 17 POWDER, FOR SOLUTION ORAL at 09:10

## 2024-10-15 RX ADMIN — BISACODYL 10 MG: 10 SUPPOSITORY RECTAL at 09:10

## 2024-10-15 RX ADMIN — ACETAMINOPHEN 1000 MG: 500 TABLET ORAL at 05:10

## 2024-10-15 RX ADMIN — METHYLNALTREXONE BROMIDE 12 MG: 12 INJECTION, SOLUTION SUBCUTANEOUS at 11:10

## 2024-10-15 RX ADMIN — ATORVASTATIN CALCIUM 40 MG: 40 TABLET, FILM COATED ORAL at 09:10

## 2024-10-15 RX ADMIN — INSULIN ASPART 2 UNITS: 100 INJECTION, SOLUTION INTRAVENOUS; SUBCUTANEOUS at 06:10

## 2024-10-15 RX ADMIN — Medication 1 ENEMA: at 12:10

## 2024-10-15 RX ADMIN — TRAMADOL HYDROCHLORIDE 50 MG: 50 TABLET, COATED ORAL at 09:10

## 2024-10-15 RX ADMIN — Medication 200 ML: at 09:10

## 2024-10-15 RX ADMIN — ONDANSETRON 8 MG: 2 INJECTION INTRAMUSCULAR; INTRAVENOUS at 05:10

## 2024-10-15 RX ADMIN — LINACLOTIDE 290 MCG: 290 CAPSULE, GELATIN COATED ORAL at 05:10

## 2024-10-15 RX ADMIN — TRAMADOL HYDROCHLORIDE 50 MG: 50 TABLET, COATED ORAL at 05:10

## 2024-10-15 RX ADMIN — TRAMADOL HYDROCHLORIDE 50 MG: 50 TABLET, COATED ORAL at 04:10

## 2024-10-15 RX ADMIN — POTASSIUM CHLORIDE 40 MEQ: 1500 TABLET, EXTENDED RELEASE ORAL at 09:10

## 2024-10-15 RX ADMIN — ENOXAPARIN SODIUM 40 MG: 40 INJECTION SUBCUTANEOUS at 04:10

## 2024-10-15 NOTE — SUBJECTIVE & OBJECTIVE
Subjective:     Interval History: Followed up with patient for ongoing abdominal pain related to her constipation. She reports that she still will get a sharp left sided abdominal pain. Received Linzess yesterday as well as an enema and suppository. Small BM. Has the urge to move her bowels this AM, but unable to. CT done 10/11 noted fecalization of the TI. No transition point noted.     Review of Systems   Constitutional:  Negative for activity change, diaphoresis, fever and unexpected weight change.   HENT:  Negative for sore throat and trouble swallowing.    Eyes:  Negative for redness.   Gastrointestinal:  Positive for abdominal pain and constipation. Negative for abdominal distention, anal bleeding, blood in stool, diarrhea, nausea, rectal pain and vomiting.   Musculoskeletal:  Positive for back pain.   Skin:  Negative for color change, pallor and rash.   Neurological:  Negative for dizziness, syncope, weakness and headaches.     Objective:     Vital Signs (Most Recent):  Temp: 98.1 °F (36.7 °C) (10/15/24 0722)  Pulse: 65 (10/15/24 0722)  Resp: 17 (10/15/24 0722)  BP: 124/70 (10/15/24 0722)  SpO2: 100 % (10/15/24 0722) Vital Signs (24h Range):  Temp:  [96.6 °F (35.9 °C)-98.8 °F (37.1 °C)] 98.1 °F (36.7 °C)  Pulse:  [50-91] 65  Resp:  [15-20] 17  SpO2:  [95 %-100 %] 100 %  BP: (118-151)/(60-84) 124/70     Weight: 68 kg (150 lb) (10/13/24 1930)  Body mass index is 26.57 kg/m².      Intake/Output Summary (Last 24 hours) at 10/15/2024 0922  Last data filed at 10/15/2024 0902  Gross per 24 hour   Intake 200 ml   Output --   Net 200 ml       Lines/Drains/Airways       Peripheral Intravenous Line  Duration                  Peripheral IV - Single Lumen 10/15/24 0628 22 G No Anterior;Right Forearm <1 day                     Physical Exam  Vitals reviewed.   Constitutional:       General: She is not in acute distress.     Appearance: Normal appearance. She is not ill-appearing.   HENT:      Mouth/Throat:      Mouth:  "Mucous membranes are moist.      Pharynx: Oropharynx is clear. No oropharyngeal exudate.   Eyes:      General: No scleral icterus.  Abdominal:      General: Abdomen is flat. Bowel sounds are normal. There is no distension.      Palpations: Abdomen is soft. There is no mass.      Tenderness: There is abdominal tenderness in the left upper quadrant and left lower quadrant.      Hernia: No hernia is present.   Skin:     General: Skin is warm and dry.      Capillary Refill: Capillary refill takes less than 2 seconds.      Coloration: Skin is not jaundiced or pale.      Findings: No bruising or erythema.   Neurological:      Mental Status: She is alert and oriented to person, place, and time. Mental status is at baseline.          Significant Labs:  CBC:   Recent Labs   Lab 10/14/24  0228 10/15/24  0506   WBC 6.88 5.69   HGB 10.5* 11.5*   HCT 31.6* 36.3*    264     CMP:   Recent Labs   Lab 10/15/24  0506   *   CALCIUM 9.1   ALBUMIN 3.2*   PROT 6.4      K 3.3*   CO2 21*      BUN 4*   CREATININE 0.6   ALKPHOS 93   ALT 15   AST 13   BILITOT 0.5     Coagulation: No results for input(s): "PT", "INR", "APTT" in the last 48 hours.      Significant Imaging:  Imaging results within the past 24 hours have been reviewed.  "

## 2024-10-15 NOTE — PROGRESS NOTES
[facility]  Gastroenterology  Progress Note    Patient Name: Jill Perez  MRN: [id number]  Admission Date: 10/12/2024  Hospital Length of Stay: 3 days  Code Status: Full Code   Attending Provider: Chun Wu MD  Consulting Provider: Abimbola Whitmore NP  Primary Care Physician: Soham Rosa MD  Principal Problem: Abdominal pain    Subjective:     Interval History: Followed up with patient for ongoing abdominal pain related to her constipation. She reports that she still will get a sharp left sided abdominal pain. Received Linzess yesterday as well as an enema and suppository. Small BM. Has the urge to move her bowels this AM, but unable to. CT done 10/11 noted fecalization of the TI. No transition point noted.     Review of Systems   Constitutional:  Negative for activity change, diaphoresis, fever and unexpected weight change.   HENT:  Negative for sore throat and trouble swallowing.    Eyes:  Negative for redness.   Gastrointestinal:  Positive for abdominal pain and constipation. Negative for abdominal distention, anal bleeding, blood in stool, diarrhea, nausea, rectal pain and vomiting.   Musculoskeletal:  Positive for back pain.   Skin:  Negative for color change, pallor and rash.   Neurological:  Negative for dizziness, syncope, weakness and headaches.     Objective:     Vital Signs (Most Recent):  Temp: 98.1 °F (36.7 °C) (10/15/24 0722)  Pulse: 65 (10/15/24 0722)  Resp: 17 (10/15/24 0722)  BP: 124/70 (10/15/24 0722)  SpO2: 100 % (10/15/24 0722) Vital Signs (24h Range):  Temp:  [96.6 °F (35.9 °C)-98.8 °F (37.1 °C)] 98.1 °F (36.7 °C)  Pulse:  [50-91] 65  Resp:  [15-20] 17  SpO2:  [95 %-100 %] 100 %  BP: (118-151)/(60-84) 124/70     Weight: 68 kg (150 lb) (10/13/24 1930)  Body mass index is 26.57 kg/m².      Intake/Output Summary (Last 24 hours) at 10/15/2024 0922  Last data filed at 10/15/2024 0902  Gross per 24 hour   Intake 200 ml   Output --   Net 200 ml       Lines/Drains/Airways        "Peripheral Intravenous Line  Duration                  Peripheral IV - Single Lumen 10/15/24 0628 22 G No Anterior;Right Forearm <1 day                     Physical Exam  Vitals reviewed.   Constitutional:       General: She is not in acute distress.     Appearance: Normal appearance. She is not ill-appearing.   HENT:      Mouth/Throat:      Mouth: Mucous membranes are moist.      Pharynx: Oropharynx is clear. No oropharyngeal exudate.   Eyes:      General: No scleral icterus.  Abdominal:      General: Abdomen is flat. Bowel sounds are normal. There is no distension.      Palpations: Abdomen is soft. There is no mass.      Tenderness: There is abdominal tenderness in the left upper quadrant and left lower quadrant.      Hernia: No hernia is present.   Skin:     General: Skin is warm and dry.      Capillary Refill: Capillary refill takes less than 2 seconds.      Coloration: Skin is not jaundiced or pale.      Findings: No bruising or erythema.   Neurological:      Mental Status: She is alert and oriented to person, place, and time. Mental status is at baseline.          Significant Labs:  CBC:   Recent Labs   Lab 10/14/24  0228 10/15/24  0506   WBC 6.88 5.69   HGB 10.5* 11.5*   HCT 31.6* 36.3*    264     CMP:   Recent Labs   Lab 10/15/24  0506   *   CALCIUM 9.1   ALBUMIN 3.2*   PROT 6.4      K 3.3*   CO2 21*      BUN 4*   CREATININE 0.6   ALKPHOS 93   ALT 15   AST 13   BILITOT 0.5     Coagulation: No results for input(s): "PT", "INR", "APTT" in the last 48 hours.      Significant Imaging:  Imaging results within the past 24 hours have been reviewed.  Assessment/Plan:     GI  Chronic idiopathic constipation  Patient takes Linzess, Movantik and Miralax at home for her constipation regimen.     --Can consider Relistor while inpatient. Would increase Movantik to 25mg upon [facility] to ensure good bowel regimen.  --Can try Mineral oil enema  --Agree with adding Linzess to her " regimen.  --Continue with Miralax and Bisacody suppositories.   --Will arrange for outpatient clinic visit with her established GI MD, next available.         Thank you for your consult. I will follow-up with patient. Please contact us if you have any additional questions.    Abimbola Whitmore NP  Gastroenterology  [facility]

## 2024-10-15 NOTE — PROGRESS NOTES
[facility]  Adult Nutrition  Progress Note    SUMMARY   Recommendations  Continue bariatric soft diet   RD added Boost Plus BID  RD following  Goals: meet % een/epn by next RD f/u  Nutrition Goal Status: progressing towards goal  Communication of RD Recs: other (comment) (poc)    Assessment and Plan    Endocrine  Moderate protein-calorie malnutrition  Malnutrition Type:  Context: chronic illness  Level: moderate    Related to (etiology):   Abdominal pain, gastroparesis     Signs and Symptoms (as evidenced by):   Unintentional weight loss, significant muscle and fat depletion     Malnutrition Characteristic Summary:  Weight Loss (Malnutrition): greater than 7.5% in 3 months (- 20#/11.7% wt loss x 3 months)  Subcutaneous Fat (Malnutrition): moderate depletion  Muscle Mass (Malnutrition): moderate depletion      Interventions/Recommendations (treatment strategy):  ADAT per MD, add Boost Plus BID  RD following    Nutrition Diagnosis Status:   Continues     Malnutrition Assessment  Malnutrition Context: chronic illness  Malnutrition Level: moderate  Skin (Micronutrient): none  Nails (Micronutrient): none  Hair/Scalp (Micronutrient): none  Eyes (Micronutrient): none  Extraoral (Micronutrient): none  Gums (Micronutrient): none  Lips/Mucous Membranes (Micronutrient): none  Teeth (Micronutrient): none  Tongue (Micronutrient): none  Neck/Chest (Micronutrient): none       Weight Loss (Malnutrition): greater than 7.5% in 3 months (- 20#/11.7% wt loss x 3 months)  Subcutaneous Fat (Malnutrition): moderate depletion  Muscle Mass (Malnutrition): moderate depletion   Orbital Region (Subcutaneous Fat Loss): moderate depletion  Upper Arm Region (Subcutaneous Fat Loss): mild depletion   [place] Region (Muscle Loss): moderate depletion  Clavicle Bone Region (Muscle Loss): moderate depletion  Clavicle and Acromion Bone Region (Muscle Loss): moderate depletion  Scapular Bone Region (Muscle Loss): mild depletion  Dorsal Hand  "(Muscle Loss): moderate depletion  Patellar Region (Muscle Loss): mild depletion  Anterior Thigh Region (Muscle Loss): mild depletion  Posterior Calf Region (Muscle Loss): mild depletion                 Reason for Assessment    Reason For Assessment: RD follow-up  Diagnosis: gastrointestinal disease  General Information Comments: RD saw pt at bedside for f/u. Endorse some stomach pain. Pt was excited about relistor for constipation. Gastroparesis, heart healthy, and carb controlled diet education provided. Following.  Nutrition Discharge Planning: heart healthy, diabetic diet    Nutrition Risk Screen    Nutrition Risk Screen: reduced oral intake over the last month    Nutrition/Diet History    Food Allergies: NKFA    Anthropometrics    Temp: 98 °F (36.7 °C)  Height Method: Stated  Height: 5' 3" (160 cm)  Height (inches): 63 in  Weight Method: Stated  Weight: 68 kg (150 lb)  Weight (lb): 150 lb  Ideal Body Weight (IBW), Female: 115 lb  % Ideal Body Weight, Female (lb): 130.43 %  BMI (Calculated): 26.6  BMI Grade: 25 - 29.9 - overweight       Lab/Procedures/Meds    Pertinent Labs Reviewed: reviewed  Pertinent Labs Comments: -  Pertinent Medications Reviewed: reviewed  Pertinent Medications Comments: -      Estimated/Assessed Needs    Weight Used For Calorie Calculations: 68 kg (149 lb 14.6 oz)  Energy Calorie Requirements (kcal): 1539 (msj * 1.3 PAL)  Energy Need Method: Karthikeyan Reid  Protein Requirements: 81 (1.2 g/kg)  Weight Used For Protein Calculations: 68 kg (149 lb 14.6 oz)     Estimated Fluid Requirement Method: RDA Method  RDA Method (mL): 1539         Nutrition Prescription Ordered    Current Diet Order: Bariatric soft    Evaluation of Received Nutrient/Fluid Intake    I/O: +323 mL since admit  Comments: LBM 10/12  % Intake of Estimated Energy Needs: 25 - 50 %  % Meal Intake: 25 - 50 %    Nutrition Risk    Level of Risk/Frequency of Follow-up: low - moderate (f/u 1-2 x/week)     Monitor and " Evaluation    Food and Nutrient Intake: energy intake  Food and Nutrient Adminstration: diet order  Knowledge/Beliefs/Attitudes: food and nutrition knowledge/skill, beliefs and attitudes  Physical Activity and Function: nutrition-related ADLs and IADLs  Anthropometric Measurements: height/length, weight, weight change, body mass index  Biochemical Data, Medical Tests and Procedures: gastrointestinal profile, electrolyte and renal panel, glucose/endocrine profile, inflammatory profile, lipid profile  Nutrition-Focused Physical Findings: overall appearance, extremities, muscles and bones, head and eyes, skin     Nutrition Follow-Up    RD Follow-up?: Yes    Lynn Muñoz RD, LDN

## 2024-10-15 NOTE — PLAN OF CARE
Problem: Adult Inpatient Plan of Care  Goal: Plan of Care Review  Outcome: Progressing  Goal: Patient-Specific Goal (Individualized)  Outcome: Progressing  Goal: Absence of Hospital-Acquired Illness or Injury  Outcome: Progressing  Goal: Optimal Comfort and Wellbeing  Outcome: Progressing  Goal: Readiness for Transition of Care  Outcome: Progressing     Problem: Adult Inpatient Plan of Care  Goal: Plan of Care Review  Outcome: Progressing     Problem: Adult Inpatient Plan of Care  Goal: Patient-Specific Goal (Individualized)  Outcome: Progressing     Problem: Adult Inpatient Plan of Care  Goal: Absence of Hospital-Acquired Illness or Injury  Outcome: Progressing     Problem: Adult Inpatient Plan of Care  Goal: Optimal Comfort and Wellbeing  Outcome: Progressing   Remain Sinus Terrell on telemetry monitor. PRN medication effective for pain . Enema provided, small BM resulted Explained plan of care, verbalized understanding. Educated pt .on new medicine . No injury during shift, Side rails up x 2, call light by bedside.

## 2024-10-15 NOTE — PLAN OF CARE
[facility]  Initial Discharge Assessment       Primary Care Provider: Soham Rosa MD    Admission Diagnosis: Small bowel obstruction [K56.609]    Admission Date: 10/12/2024  Expected Discharge Date: 10/16/2024    Transition of Care Barriers: None    Payor: MEDICARE / Plan: MEDICARE PART A & B / Product Type: Government /     Extended Emergency Contact Information  Primary Emergency Contact: Link Perez  Address: [address]           [place] [address] [facility]  Home Phone: [phone]  Mobile Phone: [phone]  Relation: Spouse  Secondary Emergency Contact: Link Perez Jr   [place]  Home Phone: [phone]  Mobile Phone: [phone]  Relation: Son    Discharge Plan A: Home with family  Discharge Plan B: Home Health, Home with family      [facility]  [address]  [place] [address]  Phone: [phone] Fax: [phone]    [id number] - [place] - [address] AT [facility]  [address]  [place] [address]  Phone: [phone] Fax: [phone]    [id number] - [place] - [address] AT [facility] [address]  [address]  [place] [address]  Phone: [phone] Fax: [phone]    [facility] [place] [facility] [address]  [address], [facility] [address]  [facility] [address]  Phone: [phone] Fax: [phone]      Initial Assessment (most recent)       Adult Discharge Assessment - 10/15/24 1227          Discharge Assessment    Assessment Type Discharge Planning Assessment     Source of Information patient     Communicated JAMES with patient/caregiver Yes     People in Home spouse     Do you expect to return to your current living situation? Yes     Do you have help at home or someone to help you manage your care at home? Yes     Who are your caregiver(s) and their phone number(s)? spouse     Prior to hospitilization cognitive status:  Alert/Oriented     Current cognitive status: Alert/Oriented     Home Layout Able to live on 1st floor     Equipment Currently Used at Home glucometer;nebulizer;blood pressure machine     Do you currently have service(s) that help you manage your care at home? No     Do you take prescription medications? Yes     Do you have prescription coverage? Yes     Do you have any problems affording any of your prescribed medications? No     Is the patient taking medications as prescribed? yes     How do you get to doctors appointments? car, drives self     Are you on dialysis? No     Do you take coumadin? No     Discharge Plan A Home with family     Discharge Plan B Home Health;Home with family     DME Needed Upon Discharge  none     Discharge Plan discussed with: Patient     Transition of Care Barriers None                   Patient lives with spouse in a single story home with no entry steps. Patient does not feel she will need any post acute services upon hospital [facility]. Patient spouse is primary caregiver and will assist in patient care upon hospital [facility] and provide transportation home.

## 2024-10-15 NOTE — ASSESSMENT & PLAN NOTE
Patient takes Linzess, Movantik and Miralax at home for her constipation regimen.     --Can consider Relistor while inpatient. Would increase Movantik to 25mg upon [facility] to ensure good bowel regimen.  --Can try Mineral oil enema  --Agree with adding Linzess to her regimen.  --Continue with Miralax and Bisacody suppositories.   --Will arrange for outpatient clinic visit with her established GI MD, next available.

## 2024-10-15 NOTE — PLAN OF CARE
Recommendations  Continue bariatric soft diet   RD added Boost Plus BID  RD following  Goals: meet % een/epn by next RD f/u  Nutrition Goal Status: progressing towards goal  Communication of RD Recs: other (comment) (poc)

## 2024-10-15 NOTE — PROGRESS NOTES
[facility] Hospital Medicine  Progress Note    Patient Name: Jill Perez  MRN: [id number]  Patient Class: IP- Inpatient   Admission Date: 10/12/2024  Length of Stay: 3 days  Attending Physician: Chun Wu MD  Primary Care Provider: Soham Rosa MD        Subjective:     Principal Problem:Abdominal pain        HPI:  Jill Perez is a 69 y.o. 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 She continues to follow with GI for gastroparesis, dysphagia and constipation. She presented on 10/11/2024 to [facility] with worsening abdominal pain for the last 4 days and nausea, vomiting and constipation. CT scan was obtained which showed bowel obstruction with fluid distended loops of small bowel and fecalization of the terminal ileum, however the transition point was not confidently. She was transferred to [facility] for surgical services with the bariatric surgery group.     Medicine consulted for evaluation of patient prior to transfer to hospital medicine. Patient is endorsing left upper quadrant abdominal pain. She denies chest pain, shortness of breath, dysuria, hematuria, and fevers/chills.        Overview/Hospital Course:  No notes on file    Interval History:   Small BM overnight but still with mostly clear liquid output per rectum. Continued LLQ pain. GI following. Methylnaltrexone dose x1 today. Advanace diet to soft bland.      Review of Systems   Constitutional:  Positive for appetite change. Negative for chills and fever.   Respiratory:  Negative for shortness of breath.    Cardiovascular:  Negative for chest pain and leg swelling.   Gastrointestinal:  Positive for abdominal pain,  constipation, nausea and vomiting. Negative for diarrhea.   Genitourinary:  Negative for dysuria and hematuria.     Objective:     Vital Signs (Most Recent):  Temp: 98 °F (36.7 °C) (10/15/24 1041)  Pulse: 81 (10/15/24 1130)  Resp: 18 (10/15/24 1041)  BP: (!) 122/57 (10/15/24 1041)  SpO2: 99 % (10/15/24 1041) Vital Signs (24h Range):  Temp:  [96.6 °F (35.9 °C)-98.8 °F (37.1 °C)] 98 °F (36.7 °C)  Pulse:  [50-91] 81  Resp:  [15-20] 18  SpO2:  [96 %-100 %] 99 %  BP: (118-151)/(57-84) 122/57     Weight: 68 kg (150 lb)  Body mass index is 26.57 kg/m².    Intake/Output Summary (Last 24 hours) at 10/15/2024 1300  Last data filed at 10/15/2024 0902  Gross per 24 hour   Intake 200 ml   Output --   Net 200 ml         Physical Exam  Vitals reviewed.   Constitutional:       General: She is not in acute distress.     Appearance: Normal appearance. She is not ill-appearing.   HENT:      Mouth/Throat:      Mouth: Mucous membranes are moist.      Pharynx: Oropharynx is clear. No oropharyngeal exudate.   Eyes:      General: No scleral icterus.     Extraocular Movements: Extraocular movements intact.   Cardiovascular:      Rate and Rhythm: Normal rate and regular rhythm.   Abdominal:      General: Abdomen is flat. Bowel sounds are normal. There is no distension.      Palpations: Abdomen is soft. There is no mass.      Tenderness: There is abdominal tenderness in the left upper quadrant and left lower quadrant.      Hernia: No hernia is present.   Skin:     General: Skin is warm and dry.      Coloration: Skin is not jaundiced or pale.      Findings: No bruising or erythema.   Neurological:      Mental Status: She is alert and oriented to person, place, and time. Mental status is at baseline.             Significant Labs: All pertinent labs within the past 24 hours have been reviewed.  CBC:   Recent Labs   Lab 10/14/24  0228 10/15/24  0506   WBC 6.88 5.69   HGB 10.5* 11.5*   HCT 31.6* 36.3*    264     CMP:   Recent Labs   Lab  10/14/24  0228 10/15/24  0506    142   K 3.3* 3.3*    110   CO2 23 21*   * 201*   BUN 7* 4*   CREATININE 0.6 0.6   CALCIUM 8.6* 9.1   PROT 5.4* 6.4   ALBUMIN 2.8* 3.2*   BILITOT 0.4 0.5   ALKPHOS 83 93   AST 13 13   ALT 15 15   ANIONGAP 8 11       Significant Imaging: I have reviewed all pertinent imaging results/findings within the past 24 hours.    Assessment/Plan:      * Abdominal pain  - See Chronic Idiopathic Constipation    Chronic idiopathic constipation  Patient is a 69y F w/ hx of RYGB, chronic constipation, gastroparesis, HTN, HLD, DMII (on insulin) who presents as transfer from [place] with abdominal pain, no BM in 4 days, no gas since yesterday. Transfer to [facility] for evaluation by the bariatric surgery team due to hx of RYGB and SBO.     CT Abdomen and Pelvis with features of bowel obstruction with fluid distended loops of small bowel and fecalization of the terminal ileum.  Zone of transition is not confidently identified.    Abdominal Exam is benign and reassuring.     - GI consulted  -- Continue Linzess  -- Continue Miralax and Bisacody suppositories.   -- Relistor 12 mg x1 on 10/15  -- Would increase Movantik to 25mg upon [facility] to ensure good bowel regimen.   - Serial abdominal exams  - Ambulate daily   - Dietary consulted  - s/p IV fluids, encourage PO hydration   - ADAT    Type 2 diabetes mellitus with diabetic autonomic neuropathy, without long-term current use of insulin  Patient's FSGs are controlled on current medication regimen.  Last A1c reviewed-   Lab Results   Component Value Date    HGBA1C 9.6 (H) 10/01/2024     Most recent fingerstick glucose reviewed-   Recent Labs   Lab 10/12/24  1630 10/13/24  0027 10/13/24  0559 10/13/24  1134   POCTGLUCOSE 170* 121* 132* 152*       Current correctional scale  Low  Maintain anti-hyperglycemic dose as follows-   Antihyperglycemics (From admission, onward)      Start     Stop Route Frequency Ordered    10/12/24 5931  insulin aspart  U-100 pen 0-5 Units         -- SubQ Every 6 hours PRN 10/12/24 2120          - NM Gastric Emptying with delayed gastric emptying. Consider adding metoclopramide or erythromycin  - Consider resuming gabapentin prn for neuropathy  - Hold Oral hypoglycemics while patient is in the hospital.  - Would benefit from ambulatory referral to endocrinology (placed on 10/13)    Hyperlipidemia associated with type 2 diabetes mellitus  - Home atorvastatin 40mg qd        Primary hypertension  Patients blood pressure range in the last 24 hours was: BP  Min: 111/54  Max: 169/85.    - BP is uncontrolled, will adjust as follows: Home amlodipine 5 mg daily  (does not cause constipation)  - Continue to monitor vitals      VTE Risk Mitigation (From admission, onward)           Ordered     enoxaparin injection 40 mg  Daily         10/12/24 2059     IP VTE HIGH RISK PATIENT  Once         10/12/24 2059     Place sequential compression device  Until discontinued         10/12/24 2059                    Discharge Planning   JAMES: 10/16/2024     Code Status: Full Code   Is the patient medically ready for [facility]?:     Reason for patient still in hospital (select all that apply): Patient trending condition, Laboratory test, Treatment, Consult recommendations, and Pending disposition  Discharge Plan A: Home with family                  Chun Wu MD  Department of Hospital Medicine   [facility] - Surgery

## 2024-10-15 NOTE — NURSING
Pt given fleet enema (mineral oil). Pt knowledgeable of administration and request to self administer.

## 2024-10-15 NOTE — SUBJECTIVE & OBJECTIVE
Interval History:   Small BM overnight but still with mostly clear liquid output per rectum. Continued LLQ pain. GI following. Methylnaltrexone dose x1 today. Advanace diet to soft bland.      Review of Systems   Constitutional:  Positive for appetite change. Negative for chills and fever.   Respiratory:  Negative for shortness of breath.    Cardiovascular:  Negative for chest pain and leg swelling.   Gastrointestinal:  Positive for abdominal pain, constipation, nausea and vomiting. Negative for diarrhea.   Genitourinary:  Negative for dysuria and hematuria.     Objective:     Vital Signs (Most Recent):  Temp: 98 °F (36.7 °C) (10/15/24 1041)  Pulse: 81 (10/15/24 1130)  Resp: 18 (10/15/24 1041)  BP: (!) 122/57 (10/15/24 1041)  SpO2: 99 % (10/15/24 1041) Vital Signs (24h Range):  Temp:  [96.6 °F (35.9 °C)-98.8 °F (37.1 °C)] 98 °F (36.7 °C)  Pulse:  [50-91] 81  Resp:  [15-20] 18  SpO2:  [96 %-100 %] 99 %  BP: (118-151)/(57-84) 122/57     Weight: 68 kg (150 lb)  Body mass index is 26.57 kg/m².    Intake/Output Summary (Last 24 hours) at 10/15/2024 1300  Last data filed at 10/15/2024 0902  Gross per 24 hour   Intake 200 ml   Output --   Net 200 ml         Physical Exam  Vitals reviewed.   Constitutional:       General: She is not in acute distress.     Appearance: Normal appearance. She is not ill-appearing.   HENT:      Mouth/Throat:      Mouth: Mucous membranes are moist.      Pharynx: Oropharynx is clear. No oropharyngeal exudate.   Eyes:      General: No scleral icterus.     Extraocular Movements: Extraocular movements intact.   Cardiovascular:      Rate and Rhythm: Normal rate and regular rhythm.   Abdominal:      General: Abdomen is flat. Bowel sounds are normal. There is no distension.      Palpations: Abdomen is soft. There is no mass.      Tenderness: There is abdominal tenderness in the left upper quadrant and left lower quadrant.      Hernia: No hernia is present.   Skin:     General: Skin is warm and dry.       Coloration: Skin is not jaundiced or pale.      Findings: No bruising or erythema.   Neurological:      Mental Status: She is alert and oriented to person, place, and time. Mental status is at baseline.             Significant Labs: All pertinent labs within the past 24 hours have been reviewed.  CBC:   Recent Labs   Lab 10/14/24  0228 10/15/24  0506   WBC 6.88 5.69   HGB 10.5* 11.5*   HCT 31.6* 36.3*    264     CMP:   Recent Labs   Lab 10/14/24  0228 10/15/24  0506    142   K 3.3* 3.3*    110   CO2 23 21*   * 201*   BUN 7* 4*   CREATININE 0.6 0.6   CALCIUM 8.6* 9.1   PROT 5.4* 6.4   ALBUMIN 2.8* 3.2*   BILITOT 0.4 0.5   ALKPHOS 83 93   AST 13 13   ALT 15 15   ANIONGAP 8 11       Significant Imaging: I have reviewed all pertinent imaging results/findings within the past 24 hours.

## 2024-10-15 NOTE — ASSESSMENT & PLAN NOTE
Patient is a 69y F w/ hx of RYGB, chronic constipation, gastroparesis, HTN, HLD, DMII (on insulin) who presents as transfer from [facility] with abdominal pain, no BM in 4 days, no gas since yesterday. Transfer to [facility] for evaluation by the bariatric surgery team due to hx of RYGB and SBO.     CT Abdomen and Pelvis with features of bowel obstruction with fluid distended loops of small bowel and fecalization of the terminal ileum.  Zone of transition is not confidently identified.    Abdominal Exam is benign and reassuring.     - GI consulted  -- Continue Linzess  -- Continue Miralax and Bisacody suppositories.   -- Relistor 12 mg x1 on 10/15  -- Would increase Movantik to 25mg upon [facility] to ensure good bowel regimen.   - Serial abdominal exams  - Ambulate daily   - Dietary consulted  - s/p IV fluids, encourage PO hydration   - ADAT

## 2024-10-16 LAB
ALBUMIN SERPL BCP-MCNC: 3 G/DL (ref 3.5–5.2)
ALP SERPL-CCNC: 81 U/L (ref 55–135)
ALT SERPL W/O P-5'-P-CCNC: 16 U/L (ref 10–44)
ANION GAP SERPL CALC-SCNC: 7 MMOL/L (ref 8–16)
AST SERPL-CCNC: 20 U/L (ref 10–40)
BASOPHILS # BLD AUTO: 0.04 K/UL (ref 0–0.2)
BASOPHILS NFR BLD: 0.6 % (ref 0–1.9)
BILIRUB SERPL-MCNC: 0.4 MG/DL (ref 0.1–1)
BUN SERPL-MCNC: 6 MG/DL (ref 8–23)
CALCIUM SERPL-MCNC: 8.8 MG/DL (ref 8.7–10.5)
CHLORIDE SERPL-SCNC: 113 MMOL/L (ref 95–110)
CO2 SERPL-SCNC: 22 MMOL/L (ref 23–29)
CREAT SERPL-MCNC: 0.6 MG/DL (ref 0.5–1.4)
DIFFERENTIAL METHOD BLD: ABNORMAL
EOSINOPHIL # BLD AUTO: 0.2 K/UL (ref 0–0.5)
EOSINOPHIL NFR BLD: 2.7 % (ref 0–8)
ERYTHROCYTE [DISTWIDTH] IN BLOOD BY AUTOMATED COUNT: 14.3 % (ref 11.5–14.5)
EST. GFR  (NO RACE VARIABLE): >60 ML/MIN/1.73 M^2
GLUCOSE SERPL-MCNC: 158 MG/DL (ref 70–110)
HCT VFR BLD AUTO: 34.2 % (ref 37–48.5)
HGB BLD-MCNC: 11.3 G/DL (ref 12–16)
IMM GRANULOCYTES # BLD AUTO: 0.01 K/UL (ref 0–0.04)
IMM GRANULOCYTES NFR BLD AUTO: 0.2 % (ref 0–0.5)
LYMPHOCYTES # BLD AUTO: 3.5 K/UL (ref 1–4.8)
LYMPHOCYTES NFR BLD: 56.4 % (ref 18–48)
MAGNESIUM SERPL-MCNC: 1.8 MG/DL (ref 1.6–2.6)
MCH RBC QN AUTO: 29.8 PG (ref 27–31)
MCHC RBC AUTO-ENTMCNC: 33 G/DL (ref 32–36)
MCV RBC AUTO: 90 FL (ref 82–98)
MONOCYTES # BLD AUTO: 0.6 K/UL (ref 0.3–1)
MONOCYTES NFR BLD: 10.2 % (ref 4–15)
NEUTROPHILS # BLD AUTO: 1.9 K/UL (ref 1.8–7.7)
NEUTROPHILS NFR BLD: 29.9 % (ref 38–73)
NRBC BLD-RTO: 0 /100 WBC
PHOSPHATE SERPL-MCNC: 3 MG/DL (ref 2.7–4.5)
PLATELET # BLD AUTO: 243 K/UL (ref 150–450)
PMV BLD AUTO: 10.6 FL (ref 9.2–12.9)
POCT GLUCOSE: 154 MG/DL (ref 70–110)
POCT GLUCOSE: 176 MG/DL (ref 70–110)
POCT GLUCOSE: 203 MG/DL (ref 70–110)
POCT GLUCOSE: 274 MG/DL (ref 70–110)
POTASSIUM SERPL-SCNC: 4 MMOL/L (ref 3.5–5.1)
PROT SERPL-MCNC: 5.9 G/DL (ref 6–8.4)
RBC # BLD AUTO: 3.79 M/UL (ref 4–5.4)
SODIUM SERPL-SCNC: 142 MMOL/L (ref 136–145)
WBC # BLD AUTO: 6.26 K/UL (ref 3.9–12.7)

## 2024-10-16 PROCEDURE — 36415 COLL VENOUS BLD VENIPUNCTURE: CPT | Performed by: STUDENT IN AN ORGANIZED HEALTH CARE EDUCATION/TRAINING PROGRAM

## 2024-10-16 PROCEDURE — 25500020 PHARM REV CODE 255: Performed by: STUDENT IN AN ORGANIZED HEALTH CARE EDUCATION/TRAINING PROGRAM

## 2024-10-16 PROCEDURE — 99232 SBSQ HOSP IP/OBS MODERATE 35: CPT | Mod: ,,,

## 2024-10-16 PROCEDURE — 25000003 PHARM REV CODE 250

## 2024-10-16 PROCEDURE — 85025 COMPLETE CBC W/AUTO DIFF WBC: CPT | Performed by: STUDENT IN AN ORGANIZED HEALTH CARE EDUCATION/TRAINING PROGRAM

## 2024-10-16 PROCEDURE — 25000003 PHARM REV CODE 250: Performed by: STUDENT IN AN ORGANIZED HEALTH CARE EDUCATION/TRAINING PROGRAM

## 2024-10-16 PROCEDURE — 63600175 PHARM REV CODE 636 W HCPCS: Performed by: STUDENT IN AN ORGANIZED HEALTH CARE EDUCATION/TRAINING PROGRAM

## 2024-10-16 PROCEDURE — 80053 COMPREHEN METABOLIC PANEL: CPT | Performed by: STUDENT IN AN ORGANIZED HEALTH CARE EDUCATION/TRAINING PROGRAM

## 2024-10-16 PROCEDURE — 21400001 HC TELEMETRY ROOM

## 2024-10-16 PROCEDURE — 94761 N-INVAS EAR/PLS OXIMETRY MLT: CPT

## 2024-10-16 PROCEDURE — 84100 ASSAY OF PHOSPHORUS: CPT | Performed by: STUDENT IN AN ORGANIZED HEALTH CARE EDUCATION/TRAINING PROGRAM

## 2024-10-16 PROCEDURE — 25000003 PHARM REV CODE 250: Performed by: HOSPITALIST

## 2024-10-16 PROCEDURE — 83735 ASSAY OF MAGNESIUM: CPT | Performed by: STUDENT IN AN ORGANIZED HEALTH CARE EDUCATION/TRAINING PROGRAM

## 2024-10-16 PROCEDURE — 63600175 PHARM REV CODE 636 W HCPCS: Mod: JZ,JG | Performed by: STUDENT IN AN ORGANIZED HEALTH CARE EDUCATION/TRAINING PROGRAM

## 2024-10-16 RX ORDER — POLYETHYLENE GLYCOL 3350 17 G/17G
68 POWDER, FOR SOLUTION ORAL ONCE
Status: COMPLETED | OUTPATIENT
Start: 2024-10-16 | End: 2024-10-16

## 2024-10-16 RX ADMIN — METHYLNALTREXONE BROMIDE 12 MG: 12 INJECTION, SOLUTION SUBCUTANEOUS at 01:10

## 2024-10-16 RX ADMIN — ACETAMINOPHEN 1000 MG: 500 TABLET ORAL at 01:10

## 2024-10-16 RX ADMIN — ACETAMINOPHEN 1000 MG: 500 TABLET ORAL at 08:10

## 2024-10-16 RX ADMIN — INSULIN ASPART 2 UNITS: 100 INJECTION, SOLUTION INTRAVENOUS; SUBCUTANEOUS at 08:10

## 2024-10-16 RX ADMIN — AMLODIPINE BESYLATE 5 MG: 5 TABLET ORAL at 08:10

## 2024-10-16 RX ADMIN — TRAMADOL HYDROCHLORIDE 50 MG: 50 TABLET, COATED ORAL at 06:10

## 2024-10-16 RX ADMIN — TRAMADOL HYDROCHLORIDE 50 MG: 50 TABLET, COATED ORAL at 07:10

## 2024-10-16 RX ADMIN — POLYETHYLENE GLYCOL 3350 68 G: 17 POWDER, FOR SOLUTION ORAL at 01:10

## 2024-10-16 RX ADMIN — ACETAMINOPHEN 1000 MG: 500 TABLET ORAL at 05:10

## 2024-10-16 RX ADMIN — ATORVASTATIN CALCIUM 40 MG: 40 TABLET, FILM COATED ORAL at 08:10

## 2024-10-16 RX ADMIN — ENOXAPARIN SODIUM 40 MG: 40 INJECTION SUBCUTANEOUS at 06:10

## 2024-10-16 RX ADMIN — IOHEXOL 75 ML: 350 INJECTION, SOLUTION INTRAVENOUS at 06:10

## 2024-10-16 RX ADMIN — ONDANSETRON 8 MG: 2 INJECTION INTRAMUSCULAR; INTRAVENOUS at 08:10

## 2024-10-16 RX ADMIN — LINACLOTIDE 290 MCG: 290 CAPSULE, GELATIN COATED ORAL at 07:10

## 2024-10-16 RX ADMIN — POLYETHYLENE GLYCOL 3350 17 G: 17 POWDER, FOR SOLUTION ORAL at 08:10

## 2024-10-16 RX ADMIN — INSULIN ASPART 3 UNITS: 100 INJECTION, SOLUTION INTRAVENOUS; SUBCUTANEOUS at 04:10

## 2024-10-16 RX ADMIN — ONDANSETRON 8 MG: 2 INJECTION INTRAMUSCULAR; INTRAVENOUS at 04:10

## 2024-10-16 RX ADMIN — BISACODYL 10 MG: 10 SUPPOSITORY RECTAL at 08:10

## 2024-10-16 NOTE — PROGRESS NOTES
[facility] Medicine  Progress Note    Patient Name: Jill Perez  MRN: [id number]  Patient Class: IP- Inpatient   Admission Date: 10/12/2024  Length of Stay: 4 days  Attending Physician: Chun Wu MD  Primary Care Provider: Soham Rosa MD        Subjective:     Principal Problem:Abdominal pain        HPI:  Jill Perez is a 69 y.o. 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 She continues to follow with GI for gastroparesis, dysphagia and constipation. She presented on 10/11/2024 to [facility] with worsening abdominal pain for the last 4 days and nausea, vomiting and constipation. CT scan was obtained which showed bowel obstruction with fluid distended loops of small bowel and fecalization of the terminal ileum, however the transition point was not confidently. She was transferred to [facility] for surgical services with the bariatric surgery group.     Medicine consulted for evaluation of patient prior to transfer to hospital medicine. Patient is endorsing left upper quadrant abdominal pain. She denies chest pain, shortness of breath, dysuria, hematuria, and fevers/chills.        Overview/Hospital Course:  No notes on file    Interval History:   Patient with worsening nausea and vomiting this morning. No BM in past 24. GI following. Will give another dose of Relistor and Miralax 4 packets today.       Review of Systems   Constitutional:  Positive for appetite change. Negative for chills and fever.   Respiratory:  Negative for shortness of breath.    Cardiovascular:  Negative for chest pain and leg swelling.   Gastrointestinal:  Positive for abdominal pain, constipation, nausea  and vomiting. Negative for diarrhea.   Genitourinary:  Negative for dysuria and hematuria.     Objective:     Vital Signs (Most Recent):  Temp: 97.9 °F (36.6 °C) (10/16/24 1121)  Pulse: 76 (10/16/24 1123)  Resp: 16 (10/16/24 1121)  BP: 132/78 (10/16/24 1121)  SpO2: 99 % (10/16/24 1121) Vital Signs (24h Range):  Temp:  [97.9 °F (36.6 °C)-98.3 °F (36.8 °C)] 97.9 °F (36.6 °C)  Pulse:  [54-77] 76  Resp:  [12-18] 16  SpO2:  [97 %-99 %] 99 %  BP: (114-159)/(58-78) 132/78     Weight: 68 kg (150 lb)  Body mass index is 26.57 kg/m².  No intake or output data in the 24 hours ending 10/16/24 1535      Physical Exam  Vitals reviewed.   Constitutional:       General: She is not in acute distress.     Appearance: Normal appearance. She is not ill-appearing.   HENT:      Mouth/Throat:      Mouth: Mucous membranes are moist.      Pharynx: Oropharynx is clear. No oropharyngeal exudate.   Eyes:      General: No scleral icterus.  Cardiovascular:      Rate and Rhythm: Normal rate and regular rhythm.   Abdominal:      General: Abdomen is flat. Bowel sounds are normal. There is no distension.      Palpations: Abdomen is soft. There is no mass.      Tenderness: There is abdominal tenderness in the left upper quadrant and left lower quadrant.      Hernia: No hernia is present.   Skin:     General: Skin is warm and dry.      Coloration: Skin is not jaundiced or pale.      Findings: No bruising or erythema.   Neurological:      Mental Status: She is alert and oriented to person, place, and time. Mental status is at baseline.             Significant Labs: All pertinent labs within the past 24 hours have been reviewed.  CBC:   Recent Labs   Lab 10/15/24  0506 10/16/24  0305   WBC 5.69 6.26   HGB 11.5* 11.3*   HCT 36.3* 34.2*    243     CMP:   Recent Labs   Lab 10/15/24  0506 10/16/24  0305    142   K 3.3* 4.0    113*   CO2 21* 22*   * 158*   BUN 4* 6*   CREATININE 0.6 0.6   CALCIUM 9.1 8.8   PROT 6.4 5.9*   ALBUMIN  3.2* 3.0*   BILITOT 0.5 0.4   ALKPHOS 93 81   AST 13 20   ALT 15 16   ANIONGAP 11 7*       Significant Imaging: I have reviewed all pertinent imaging results/findings within the past 24 hours.    Assessment/Plan:      * Abdominal pain  - See Chronic Idiopathic Constipation    Chronic idiopathic constipation  Patient is a 69y F w/ hx of RYGB, chronic constipation, gastroparesis, HTN, HLD, DMII (on insulin) who presents as transfer from [facility] with abdominal pain, no BM in 4 days, no gas since yesterday. Transfer to [facility] for [place] by the bariatric surgery team due to hx of RYGB and SBO.     CT Abdomen and Pelvis with features of bowel obstruction with fluid distended loops of small bowel and fecalization of the terminal ileum.  Zone of transition is not confidently identified.    Abdominal Exam is benign and reassuring.     - GI consulted  -- Continue Linzess  -- Continue Miralax and Bisacody suppositories.   -- Relistor 12 mg on 10/15, 10/16  -- Consider Gastrograffin enema   -- Would increase Movantik to 25mg upon [facility] to ensure good bowel regimen.   - Serial abdominal exams  - Ambulate daily   - Dietary consulted  - s/p IV fluids, encourage PO hydration   - ADAT    Type 2 diabetes mellitus with diabetic autonomic neuropathy, without long-term current use of insulin  Patient's FSGs are controlled on current medication regimen.  Last A1c reviewed-   Lab Results   Component Value Date    HGBA1C 9.6 (H) 10/01/2024     Most recent fingerstick glucose reviewed-   Recent Labs   Lab 10/15/24  1551 10/15/24  2055 10/16/24  0743 10/16/24  1125   POCTGLUCOSE 196* 164* 203* 176*       Current correctional scale  Low  Maintain anti-hyperglycemic dose as follows-   Antihyperglycemics (From admission, onward)      Start     Stop Route Frequency Ordered    10/12/24 2219  insulin aspart U-100 pen 0-5 Units         -- SubQ Every 6 hours PRN 10/12/24 2120          - NM Gastric Emptying with delayed gastric emptying.   -  Consider resuming gabapentin prn for neuropathy  - Hold Oral hypoglycemics while patient is in the hospital.  - Would benefit from ambulatory referral to endocrinology (placed on 10/13)    Hyperlipidemia associated with type 2 diabetes mellitus  - Home atorvastatin 40mg qd        Primary hypertension  Patients blood pressure range in the last 24 hours was: BP  Min: 111/54  Max: 169/85.    - BP is uncontrolled, will adjust as follows: Home amlodipine 5 mg daily  (does not cause constipation)  - Continue to monitor vitals      VTE Risk Mitigation (From admission, onward)           Ordered     enoxaparin injection 40 mg  Daily         10/12/24 2059     IP VTE HIGH RISK PATIENT  Once         10/12/24 2059     Place sequential compression device  Until discontinued         10/12/24 2059                    Discharge Planning   JAMES: 10/16/2024     Code Status: Full Code   Is the patient medically ready for [facility]?:     Reason for patient still in hospital (select all that apply): Patient trending condition, Laboratory test, Treatment, Consult recommendations, and Pending disposition  Discharge Plan A: Home with family                  Chun Wu MD  Department of [facility] Medicine   [facility] - Surgery

## 2024-10-16 NOTE — PROGRESS NOTES
[facility] - Surgery  Gastroenterology  Progress Note    Patient Name: Jill Perez  MRN: [id number]  Admission Date: 10/12/2024  Hospital Length of Stay: 4 days  Code Status: Full Code   Attending Provider: Chun Wu MD  Consulting Provider: Abimbola Whitmore NP  Primary Care Physician: Soham Rosa MD  Principal Problem: Abdominal pain    Subjective:     Interval History: Followed up with patient for ongoing abdominal pain related to her constipation. Tried Mineral oil enema. Receiving Linzess and Relistor as well as suppositories. Small BM. Has produced small amounts of stool with enemas, but nothing substantial. Still with left sided abdominal pain. No abdominal distention, acute abdomen noted. CT done 10/11 noted fecalization of the TI. No transition point noted.     Review of Systems   Constitutional:  Negative for activity change, diaphoresis, fever and unexpected weight change.   HENT:  Negative for sore throat and trouble swallowing.    Eyes:  Negative for redness.   Gastrointestinal:  Positive for abdominal pain and constipation. Negative for abdominal distention, anal bleeding, blood in stool, diarrhea, nausea, rectal pain and vomiting.   Musculoskeletal:  Positive for back pain.   Skin:  Negative for color change, pallor and rash.   Neurological:  Negative for dizziness, syncope, weakness and headaches.     Objective:     Vital Signs (Most Recent):  Temp: 97.9 °F (36.6 °C) (10/16/24 1121)  Pulse: 76 (10/16/24 1123)  Resp: 16 (10/16/24 1121)  BP: 132/78 (10/16/24 1121)  SpO2: 99 % (10/16/24 1121) Vital Signs (24h Range):  Temp:  [97.9 °F (36.6 °C)-98.8 °F (37.1 °C)] 97.9 °F (36.6 °C)  Pulse:  [54-82] 76  Resp:  [12-18] 16  SpO2:  [97 %-99 %] 99 %  BP: (114-159)/(58-78) 132/78     Weight: 68 kg (150 lb) (10/13/24 1930)  Body mass index is 26.57 kg/m².    No intake or output data in the 24 hours ending 10/16/24 1133      Lines/Drains/Airways       Peripheral Intravenous Line  Duration                   "Peripheral IV - Single Lumen 10/15/24 0628 22 G No Anterior;Right Forearm 1 day                     Physical Exam  Vitals reviewed.   Constitutional:       General: She is not in acute distress.     Appearance: Normal appearance. She is not ill-appearing.   HENT:      Mouth/Throat:      Mouth: Mucous membranes are moist.      Pharynx: Oropharynx is clear. No oropharyngeal exudate.   Eyes:      General: No scleral icterus.  Abdominal:      General: Abdomen is flat. Bowel sounds are normal. There is no distension.      Palpations: Abdomen is soft. There is no mass.      Tenderness: There is abdominal tenderness in the left upper quadrant and left lower quadrant.      Hernia: No hernia is present.   Skin:     General: Skin is warm and dry.      Capillary Refill: Capillary refill takes less than 2 seconds.      Coloration: Skin is not jaundiced or pale.      Findings: No bruising or erythema.   Neurological:      Mental Status: She is alert and oriented to person, place, and time. Mental status is at baseline.          Significant Labs:  CBC:   Recent Labs   Lab 10/15/24  0506 10/16/24  0305   WBC 5.69 6.26   HGB 11.5* 11.3*   HCT 36.3* 34.2*    243     CMP:   Recent Labs   Lab 10/16/24  0305   *   CALCIUM 8.8   ALBUMIN 3.0*   PROT 5.9*      K 4.0   CO2 22*   *   BUN 6*   CREATININE 0.6   ALKPHOS 81   ALT 16   AST 20   BILITOT 0.4     Coagulation: No results for input(s): "PT", "INR", "APTT" in the last 48 hours.      Significant Imaging:  Imaging results within the past 24 hours have been reviewed.  Assessment/Plan:     GI  Chronic idiopathic constipation  Patient takes Linzess, Movantik and Miralax at home for her constipation regimen.     --Continue Relistor and Linzess.   --Miralax 4 packets with 32 oz of gatorade  --Can consider Gastrograffin enema  --Will arrange for outpatient clinic visit with her established GI MD, next available.         Thank you for your consult. I will follow-up " with patient. Please contact us if you have any additional questions.    Abimbola Whitmore NP  Gastroenterology  [facility] - Surgery

## 2024-10-16 NOTE — ASSESSMENT & PLAN NOTE
Patient's FSGs are controlled on current medication regimen.  Last A1c reviewed-   Lab Results   Component Value Date    HGBA1C 9.6 (H) 10/01/2024     Most recent fingerstick glucose reviewed-   Recent Labs   Lab 10/15/24  1551 10/15/24  2055 10/16/24  0743 10/16/24  1125   POCTGLUCOSE 196* 164* 203* 176*       Current correctional scale  Low  Maintain anti-hyperglycemic dose as follows-   Antihyperglycemics (From admission, onward)      Start     Stop Route Frequency Ordered    10/12/24 2219  insulin aspart U-100 pen 0-5 Units         -- SubQ Every 6 hours PRN 10/12/24 2120          - NM Gastric Emptying with delayed gastric emptying.   - Consider resuming gabapentin prn for neuropathy  - Hold Oral hypoglycemics while patient is in the hospital.  - Would benefit from ambulatory referral to endocrinology (placed on 10/13)

## 2024-10-16 NOTE — SUBJECTIVE & OBJECTIVE
Interval History:   Patient with worsening nausea and vomiting this morning. No BM in past 24. GI following. Will give another dose of Relistor and Miralax 4 packets today.       Review of Systems   Constitutional:  Positive for appetite change. Negative for chills and fever.   Respiratory:  Negative for shortness of breath.    Cardiovascular:  Negative for chest pain and leg swelling.   Gastrointestinal:  Positive for abdominal pain, constipation, nausea and vomiting. Negative for diarrhea.   Genitourinary:  Negative for dysuria and hematuria.     Objective:     Vital Signs (Most Recent):  Temp: 97.9 °F (36.6 °C) (10/16/24 1121)  Pulse: 76 (10/16/24 1123)  Resp: 16 (10/16/24 1121)  BP: 132/78 (10/16/24 1121)  SpO2: 99 % (10/16/24 1121) Vital Signs (24h Range):  Temp:  [97.9 °F (36.6 °C)-98.3 °F (36.8 °C)] 97.9 °F (36.6 °C)  Pulse:  [54-77] 76  Resp:  [12-18] 16  SpO2:  [97 %-99 %] 99 %  BP: (114-159)/(58-78) 132/78     Weight: 68 kg (150 lb)  Body mass index is 26.57 kg/m².  No intake or output data in the 24 hours ending 10/16/24 1535      Physical Exam  Vitals reviewed.   Constitutional:       General: She is not in acute distress.     Appearance: Normal appearance. She is not ill-appearing.   HENT:      Mouth/Throat:      Mouth: Mucous membranes are moist.      Pharynx: Oropharynx is clear. No oropharyngeal exudate.   Eyes:      General: No scleral icterus.  Cardiovascular:      Rate and Rhythm: Normal rate and regular rhythm.   Abdominal:      General: Abdomen is flat. Bowel sounds are normal. There is no distension.      Palpations: Abdomen is soft. There is no mass.      Tenderness: There is abdominal tenderness in the left upper quadrant and left lower quadrant.      Hernia: No hernia is present.   Skin:     General: Skin is warm and dry.      Coloration: Skin is not jaundiced or pale.      Findings: No bruising or erythema.   Neurological:      Mental Status: She is alert and oriented to person, place, and  time. Mental status is at baseline.             Significant Labs: All pertinent labs within the past 24 hours have been reviewed.  CBC:   Recent Labs   Lab 10/15/24  0506 10/16/24  0305   WBC 5.69 6.26   HGB 11.5* 11.3*   HCT 36.3* 34.2*    243     CMP:   Recent Labs   Lab 10/15/24  0506 10/16/24  0305    142   K 3.3* 4.0    113*   CO2 21* 22*   * 158*   BUN 4* 6*   CREATININE 0.6 0.6   CALCIUM 9.1 8.8   PROT 6.4 5.9*   ALBUMIN 3.2* 3.0*   BILITOT 0.5 0.4   ALKPHOS 93 81   AST 13 20   ALT 15 16   ANIONGAP 11 7*       Significant Imaging: I have reviewed all pertinent imaging results/findings within the past 24 hours.

## 2024-10-16 NOTE — ASSESSMENT & PLAN NOTE
Patient takes Linzess, Movantik and Miralax at home for her constipation regimen.     --Continue Relistor and Linzess.   --Miralax 4 packets with 32 oz of gatorade  --Can consider Gastrograffin enema  --Will arrange for outpatient clinic visit with her established GI MD, next available.

## 2024-10-16 NOTE — ASSESSMENT & PLAN NOTE
Patient is a 69y F w/ hx of RYGB, chronic constipation, gastroparesis, HTN, HLD, DMII (on insulin) who presents as transfer from [facility] with abdominal pain, no BM in 4 days, no gas since yesterday. [place] [facility] [place]ric surgery team due to hx of RYGB and SBO.     CT Abdomen and Pelvis with features of bowel obstruction with fluid distended loops of small bowel and fecalization of the terminal ileum.  Zone of transition is not confidently identified.    Abdominal Exam is benign and reassuring.     - GI consulted  -- Continue Linzess  -- Continue Miralax and Bisacody suppositories.   -- Relistor 12 mg on 10/15, 10/16  -- Consider Gastrograffin enema   -- Would increase Movantik to 25mg upon [facility] to ensure good bowel regimen.   - Serial abdominal exams  - Ambulate daily   - Dietary consulted  - s/p IV fluids, encourage PO hydration   - ADAT

## 2024-10-16 NOTE — SUBJECTIVE & OBJECTIVE
Subjective:     Interval History: Followed up with patient for ongoing abdominal pain related to her constipation. Tried Mineral oil enema. Receiving Linzess and Relistor as well as suppositories. Small BM. Has produced small amounts of stool with enemas, but nothing substantial. Still with left sided abdominal pain. No abdominal distention, acute abdomen noted. CT done 10/11 noted fecalization of the TI. No transition point noted.     Review of Systems   Constitutional:  Negative for activity change, diaphoresis, fever and unexpected weight change.   HENT:  Negative for sore throat and trouble swallowing.    Eyes:  Negative for redness.   Gastrointestinal:  Positive for abdominal pain and constipation. Negative for abdominal distention, anal bleeding, blood in stool, diarrhea, nausea, rectal pain and vomiting.   Musculoskeletal:  Positive for back pain.   Skin:  Negative for color change, pallor and rash.   Neurological:  Negative for dizziness, syncope, weakness and headaches.     Objective:     Vital Signs (Most Recent):  Temp: 97.9 °F (36.6 °C) (10/16/24 1121)  Pulse: 76 (10/16/24 1123)  Resp: 16 (10/16/24 1121)  BP: 132/78 (10/16/24 1121)  SpO2: 99 % (10/16/24 1121) Vital Signs (24h Range):  Temp:  [97.9 °F (36.6 °C)-98.8 °F (37.1 °C)] 97.9 °F (36.6 °C)  Pulse:  [54-82] 76  Resp:  [12-18] 16  SpO2:  [97 %-99 %] 99 %  BP: (114-159)/(58-78) 132/78     Weight: 68 kg (150 lb) (10/13/24 1930)  Body mass index is 26.57 kg/m².    No intake or output data in the 24 hours ending 10/16/24 1133      Lines/Drains/Airways       Peripheral Intravenous Line  Duration                  Peripheral IV - Single Lumen 10/15/24 0628 22 G No Anterior;Right Forearm 1 day                     Physical Exam  Vitals reviewed.   Constitutional:       General: She is not in acute distress.     Appearance: Normal appearance. She is not ill-appearing.   HENT:      Mouth/Throat:      Mouth: Mucous membranes are moist.      Pharynx: Oropharynx  "is clear. No oropharyngeal exudate.   Eyes:      General: No scleral icterus.  Abdominal:      General: Abdomen is flat. Bowel sounds are normal. There is no distension.      Palpations: Abdomen is soft. There is no mass.      Tenderness: There is abdominal tenderness in the left upper quadrant and left lower quadrant.      Hernia: No hernia is present.   Skin:     General: Skin is warm and dry.      Capillary Refill: Capillary refill takes less than 2 seconds.      Coloration: Skin is not jaundiced or pale.      Findings: No bruising or erythema.   Neurological:      Mental Status: She is alert and oriented to person, place, and time. Mental status is at baseline.          Significant Labs:  CBC:   Recent Labs   Lab 10/15/24  0506 10/16/24  0305   WBC 5.69 6.26   HGB 11.5* 11.3*   HCT 36.3* 34.2*    243     CMP:   Recent Labs   Lab 10/16/24  0305   *   CALCIUM 8.8   ALBUMIN 3.0*   PROT 5.9*      K 4.0   CO2 22*   *   BUN 6*   CREATININE 0.6   ALKPHOS 81   ALT 16   AST 20   BILITOT 0.4     Coagulation: No results for input(s): "PT", "INR", "APTT" in the last 48 hours.      Significant Imaging:  Imaging results within the past 24 hours have been reviewed.  "

## 2024-10-17 ENCOUNTER — OUTPATIENT CASE MANAGEMENT (OUTPATIENT)
Dept: ADMINISTRATIVE | Facility: OTHER | Age: 69
End: 2024-10-17
Payer: MEDICARE

## 2024-10-17 LAB
ALBUMIN SERPL BCP-MCNC: 3 G/DL (ref 3.5–5.2)
ALP SERPL-CCNC: 79 U/L (ref 55–135)
ALT SERPL W/O P-5'-P-CCNC: 20 U/L (ref 10–44)
ANION GAP SERPL CALC-SCNC: 8 MMOL/L (ref 8–16)
AST SERPL-CCNC: 26 U/L (ref 10–40)
BASOPHILS # BLD AUTO: 0.02 K/UL (ref 0–0.2)
BASOPHILS NFR BLD: 0.3 % (ref 0–1.9)
BILIRUB SERPL-MCNC: 0.5 MG/DL (ref 0.1–1)
BUN SERPL-MCNC: 7 MG/DL (ref 8–23)
CALCIUM SERPL-MCNC: 8.8 MG/DL (ref 8.7–10.5)
CHLORIDE SERPL-SCNC: 109 MMOL/L (ref 95–110)
CO2 SERPL-SCNC: 22 MMOL/L (ref 23–29)
CREAT SERPL-MCNC: 0.6 MG/DL (ref 0.5–1.4)
DIFFERENTIAL METHOD BLD: ABNORMAL
EOSINOPHIL # BLD AUTO: 0.2 K/UL (ref 0–0.5)
EOSINOPHIL NFR BLD: 3.5 % (ref 0–8)
ERYTHROCYTE [DISTWIDTH] IN BLOOD BY AUTOMATED COUNT: 14.4 % (ref 11.5–14.5)
EST. GFR  (NO RACE VARIABLE): >60 ML/MIN/1.73 M^2
GLUCOSE SERPL-MCNC: 138 MG/DL (ref 70–110)
HCT VFR BLD AUTO: 33.7 % (ref 37–48.5)
HGB BLD-MCNC: 11.1 G/DL (ref 12–16)
IMM GRANULOCYTES # BLD AUTO: 0.02 K/UL (ref 0–0.04)
IMM GRANULOCYTES NFR BLD AUTO: 0.3 % (ref 0–0.5)
LYMPHOCYTES # BLD AUTO: 3.2 K/UL (ref 1–4.8)
LYMPHOCYTES NFR BLD: 55.1 % (ref 18–48)
MAGNESIUM SERPL-MCNC: 1.7 MG/DL (ref 1.6–2.6)
MCH RBC QN AUTO: 30.1 PG (ref 27–31)
MCHC RBC AUTO-ENTMCNC: 32.9 G/DL (ref 32–36)
MCV RBC AUTO: 91 FL (ref 82–98)
MONOCYTES # BLD AUTO: 0.7 K/UL (ref 0.3–1)
MONOCYTES NFR BLD: 11.8 % (ref 4–15)
NEUTROPHILS # BLD AUTO: 1.7 K/UL (ref 1.8–7.7)
NEUTROPHILS NFR BLD: 29 % (ref 38–73)
NRBC BLD-RTO: 0 /100 WBC
PHOSPHATE SERPL-MCNC: 3.4 MG/DL (ref 2.7–4.5)
PLATELET # BLD AUTO: 233 K/UL (ref 150–450)
PMV BLD AUTO: 10.9 FL (ref 9.2–12.9)
POCT GLUCOSE: 168 MG/DL (ref 70–110)
POCT GLUCOSE: 174 MG/DL (ref 70–110)
POCT GLUCOSE: 177 MG/DL (ref 70–110)
POCT GLUCOSE: 195 MG/DL (ref 70–110)
POCT GLUCOSE: 229 MG/DL (ref 70–110)
POTASSIUM SERPL-SCNC: 3.7 MMOL/L (ref 3.5–5.1)
PROT SERPL-MCNC: 5.7 G/DL (ref 6–8.4)
RBC # BLD AUTO: 3.69 M/UL (ref 4–5.4)
SODIUM SERPL-SCNC: 139 MMOL/L (ref 136–145)
WBC # BLD AUTO: 5.77 K/UL (ref 3.9–12.7)

## 2024-10-17 PROCEDURE — 84100 ASSAY OF PHOSPHORUS: CPT | Performed by: STUDENT IN AN ORGANIZED HEALTH CARE EDUCATION/TRAINING PROGRAM

## 2024-10-17 PROCEDURE — 63600175 PHARM REV CODE 636 W HCPCS: Performed by: STUDENT IN AN ORGANIZED HEALTH CARE EDUCATION/TRAINING PROGRAM

## 2024-10-17 PROCEDURE — 85025 COMPLETE CBC W/AUTO DIFF WBC: CPT | Performed by: STUDENT IN AN ORGANIZED HEALTH CARE EDUCATION/TRAINING PROGRAM

## 2024-10-17 PROCEDURE — 25000003 PHARM REV CODE 250: Performed by: HOSPITALIST

## 2024-10-17 PROCEDURE — 25000003 PHARM REV CODE 250: Performed by: STUDENT IN AN ORGANIZED HEALTH CARE EDUCATION/TRAINING PROGRAM

## 2024-10-17 PROCEDURE — 99232 SBSQ HOSP IP/OBS MODERATE 35: CPT | Mod: ,,,

## 2024-10-17 PROCEDURE — 36415 COLL VENOUS BLD VENIPUNCTURE: CPT | Performed by: STUDENT IN AN ORGANIZED HEALTH CARE EDUCATION/TRAINING PROGRAM

## 2024-10-17 PROCEDURE — 80053 COMPREHEN METABOLIC PANEL: CPT | Performed by: STUDENT IN AN ORGANIZED HEALTH CARE EDUCATION/TRAINING PROGRAM

## 2024-10-17 PROCEDURE — 25000003 PHARM REV CODE 250

## 2024-10-17 PROCEDURE — 83735 ASSAY OF MAGNESIUM: CPT | Performed by: STUDENT IN AN ORGANIZED HEALTH CARE EDUCATION/TRAINING PROGRAM

## 2024-10-17 PROCEDURE — 21400001 HC TELEMETRY ROOM

## 2024-10-17 RX ORDER — LACTULOSE 10 G/15ML
200 SOLUTION ORAL; RECTAL 3 TIMES DAILY
Status: DISCONTINUED | OUTPATIENT
Start: 2024-10-17 | End: 2024-10-17

## 2024-10-17 RX ORDER — LACTULOSE 10 G/15ML
10 SOLUTION ORAL 3 TIMES DAILY
Status: DISCONTINUED | OUTPATIENT
Start: 2024-10-17 | End: 2024-10-17

## 2024-10-17 RX ORDER — POLYETHYLENE GLYCOL 3350 17 G/17G
68 POWDER, FOR SOLUTION ORAL ONCE
Status: COMPLETED | OUTPATIENT
Start: 2024-10-17 | End: 2024-10-17

## 2024-10-17 RX ORDER — LACTULOSE 10 G/15ML
200 SOLUTION ORAL; RECTAL ONCE
Status: COMPLETED | OUTPATIENT
Start: 2024-10-17 | End: 2024-10-17

## 2024-10-17 RX ADMIN — ACETAMINOPHEN 1000 MG: 500 TABLET ORAL at 05:10

## 2024-10-17 RX ADMIN — ENOXAPARIN SODIUM 40 MG: 40 INJECTION SUBCUTANEOUS at 05:10

## 2024-10-17 RX ADMIN — TRAMADOL HYDROCHLORIDE 50 MG: 50 TABLET, COATED ORAL at 12:10

## 2024-10-17 RX ADMIN — ACETAMINOPHEN 1000 MG: 500 TABLET ORAL at 02:10

## 2024-10-17 RX ADMIN — ATORVASTATIN CALCIUM 40 MG: 40 TABLET, FILM COATED ORAL at 08:10

## 2024-10-17 RX ADMIN — ACETAMINOPHEN 1000 MG: 500 TABLET ORAL at 08:10

## 2024-10-17 RX ADMIN — LACTULOSE 200 G: 10 SOLUTION ORAL at 11:10

## 2024-10-17 RX ADMIN — LINACLOTIDE 290 MCG: 290 CAPSULE, GELATIN COATED ORAL at 07:10

## 2024-10-17 RX ADMIN — POLYETHYLENE GLYCOL 3350 68 G: 17 POWDER, FOR SOLUTION ORAL at 11:10

## 2024-10-17 RX ADMIN — BISACODYL 10 MG: 10 SUPPOSITORY RECTAL at 08:10

## 2024-10-17 RX ADMIN — TRAMADOL HYDROCHLORIDE 50 MG: 50 TABLET, COATED ORAL at 08:10

## 2024-10-17 RX ADMIN — AMLODIPINE BESYLATE 5 MG: 5 TABLET ORAL at 08:10

## 2024-10-17 NOTE — SUBJECTIVE & OBJECTIVE
Subjective:     Interval History: Followed up with patient. Now passing liquid brown stool and feeling better this AM. Pain improving. No abdominal distention, acute abdomen noted. CT done 10/16  concerning for possible ileus. No obstruction noted. No transition point noted.     Review of Systems   Constitutional:  Negative for activity change, diaphoresis, fever and unexpected weight change.   HENT:  Negative for sore throat and trouble swallowing.    Eyes:  Negative for redness.   Gastrointestinal:  Positive for abdominal pain and constipation. Negative for abdominal distention, anal bleeding, blood in stool, diarrhea, nausea, rectal pain and vomiting.   Musculoskeletal:  Positive for back pain.   Skin:  Negative for color change, pallor and rash.   Neurological:  Negative for dizziness, syncope, weakness and headaches.     Objective:     Vital Signs (Most Recent):  Temp: 98 °F (36.7 °C) (10/17/24 0721)  Pulse: 70 (10/17/24 0721)  Resp: 17 (10/17/24 0721)  BP: (!) 140/77 (10/17/24 0721)  SpO2: 98 % (10/17/24 0721) Vital Signs (24h Range):  Temp:  [97.9 °F (36.6 °C)-100.1 °F (37.8 °C)] 98 °F (36.7 °C)  Pulse:  [56-77] 70  Resp:  [16-18] 17  SpO2:  [95 %-99 %] 98 %  BP: (118-141)/(56-78) 140/77     Weight: 68 kg (150 lb) (10/13/24 1930)  Body mass index is 26.57 kg/m².    No intake or output data in the 24 hours ending 10/17/24 0955      Lines/Drains/Airways       Peripheral Intravenous Line  Duration                  Peripheral IV - Single Lumen 10/15/24 0628 22 G No Anterior;Right Forearm 2 days                     Physical Exam  Vitals reviewed.   Constitutional:       General: She is not in acute distress.     Appearance: Normal appearance. She is not ill-appearing.   HENT:      Mouth/Throat:      Mouth: Mucous membranes are moist.      Pharynx: Oropharynx is clear. No oropharyngeal exudate.   Eyes:      General: No scleral icterus.  Abdominal:      General: Abdomen is flat. Bowel sounds are normal. There is no  "distension.      Palpations: Abdomen is soft. There is no mass.      Tenderness: There is abdominal tenderness in the left upper quadrant and left lower quadrant.      Hernia: No hernia is present.   Skin:     General: Skin is warm and dry.      Capillary Refill: Capillary refill takes less than 2 seconds.      Coloration: Skin is not jaundiced or pale.      Findings: No bruising or erythema.   Neurological:      Mental Status: She is alert and oriented to person, place, and time. Mental status is at baseline.          Significant Labs:  CBC:   Recent Labs   Lab 10/16/24  0305 10/17/24  0440   WBC 6.26 5.77   HGB 11.3* 11.1*   HCT 34.2* 33.7*    233     CMP:   Recent Labs   Lab 10/17/24  0440   *   CALCIUM 8.8   ALBUMIN 3.0*   PROT 5.7*      K 3.7   CO2 22*      BUN 7*   CREATININE 0.6   ALKPHOS 79   ALT 20   AST 26   BILITOT 0.5     Coagulation: No results for input(s): "PT", "INR", "APTT" in the last 48 hours.      Significant Imaging:  Imaging results within the past 24 hours have been reviewed.  "

## 2024-10-17 NOTE — PROGRESS NOTES
[facility]  Gastroenterology  Progress Note    Patient Name: Jill Perez  MRN: [id number]  Admission Date: 10/12/2024  Hospital Length of Stay: 5 days  Code Status: Full Code   Attending Provider: Elliott Brewer MD  Consulting Provider: Abimbola Whitmore NP  Primary Care Physician: Soham Rosa MD  Principal Problem: Abdominal pain    Subjective:     Interval History: Followed up with patient. Now passing liquid brown stool and feeling better this AM. Pain improving. No abdominal distention, acute abdomen noted. CT done 10/16  concerning for possible ileus. No obstruction noted. No transition point noted.     Review of Systems   Constitutional:  Negative for activity change, diaphoresis, fever and unexpected weight change.   HENT:  Negative for sore throat and trouble swallowing.    Eyes:  Negative for redness.   Gastrointestinal:  Positive for abdominal pain and constipation. Negative for abdominal distention, anal bleeding, blood in stool, diarrhea, nausea, rectal pain and vomiting.   Musculoskeletal:  Positive for back pain.   Skin:  Negative for color change, pallor and rash.   Neurological:  Negative for dizziness, syncope, weakness and headaches.     Objective:     Vital Signs (Most Recent):  Temp: 98 °F (36.7 °C) (10/17/24 0721)  Pulse: 70 (10/17/24 0721)  Resp: 17 (10/17/24 0721)  BP: (!) 140/77 (10/17/24 0721)  SpO2: 98 % (10/17/24 0721) Vital Signs (24h Range):  Temp:  [97.9 °F (36.6 °C)-100.1 °F (37.8 °C)] 98 °F (36.7 °C)  Pulse:  [56-77] 70  Resp:  [16-18] 17  SpO2:  [95 %-99 %] 98 %  BP: (118-141)/(56-78) 140/77     Weight: 68 kg (150 lb) (10/13/24 1930)  Body mass index is 26.57 kg/m².    No intake or output data in the 24 hours ending 10/17/24 0955      Lines/Drains/Airways       Peripheral Intravenous Line  Duration                  Peripheral IV - Single Lumen 10/15/24 0628 22 G No Anterior;Right Forearm 2 days                     Physical Exam  Vitals reviewed.   Constitutional:        Patient: Saulo Shepard    Procedure Summary       Date: 08/31/23 Room / Location: [facility] ENDOSCOPY 2 / [facility] ENDOSCOPY    Anesthesia Start: 0738 Anesthesia Stop: 0747    Procedure: COLONOSCOPY WITH ANESTHESIA Diagnosis:       Encounter for screening for malignant neoplasm of colon      (Encounter for screening for malignant neoplasm of colon [Z12.11])    Surgeons: Kristopher Brush DO Provider: CHE Dixon CRNA    Anesthesia Type: MAC ASA Status: 2            Anesthesia Type: MAC    Vitals  Vitals Value Taken Time   BP     Temp     Pulse 71 08/31/23 0747   Resp     SpO2 98 % 08/31/23 0747   Vitals shown include unvalidated device data.        Post Anesthesia Care and Evaluation    Patient location during evaluation: PACU  Patient participation: complete - patient participated  Level of consciousness: awake and alert  Pain score: 0  Pain management: adequate    Airway patency: patent  Anesthetic complications: No anesthetic complications    Cardiovascular status: acceptable and stable  Respiratory status: acceptable and unassisted  Hydration status: acceptable     "General: She is not in acute distress.     Appearance: Normal appearance. She is not ill-appearing.   HENT:      Mouth/Throat:      Mouth: Mucous membranes are moist.      Pharynx: Oropharynx is clear. No oropharyngeal exudate.   Eyes:      General: No scleral icterus.  Abdominal:      General: Abdomen is flat. Bowel sounds are normal. There is no distension.      Palpations: Abdomen is soft. There is no mass.      Tenderness: There is abdominal tenderness in the left upper quadrant and left lower quadrant.      Hernia: No hernia is present.   Skin:     General: Skin is warm and dry.      Capillary Refill: Capillary refill takes less than 2 seconds.      Coloration: Skin is not jaundiced or pale.      Findings: No bruising or erythema.   Neurological:      Mental Status: She is alert and oriented to person, place, and time. Mental status is at baseline.          Significant Labs:  CBC:   Recent Labs   Lab 10/16/24  0305 10/17/24  0440   WBC 6.26 5.77   HGB 11.3* 11.1*   HCT 34.2* 33.7*    233     CMP:   Recent Labs   Lab 10/17/24  0440   *   CALCIUM 8.8   ALBUMIN 3.0*   PROT 5.7*      K 3.7   CO2 22*      BUN 7*   CREATININE 0.6   ALKPHOS 79   ALT 20   AST 26   BILITOT 0.5     Coagulation: No results for input(s): "PT", "INR", "APTT" in the last 48 hours.      Significant Imaging:  Imaging results within the past 24 hours have been reviewed.  Assessment/Plan:     GI  Chronic idiopathic constipation  Patient takes Linzess, Movantik and Miralax at home for her constipation regimen.     --Continue Relistor and Linzess.   --Miralax 4 packets with 32 oz of gatorade  --Can consider Gastrograffin enema  --Will arrange for outpatient clinic visit with her established GI MD, next available.         Thank you for your consult. After careful review of labs and patient current status with the GI staff and fellow, it has been decided that I will sign off. Please contact us if you have any additional " questions.    Abimbola Whitmore NP  Gastroenterology  [facility] - Surgery

## 2024-10-17 NOTE — ASSESSMENT & PLAN NOTE
Patient is a 69y F w/ hx of RYGB, chronic constipation, gastroparesis, HTN, HLD, DMII (on insulin) who presents as transfer from [facility] with abdominal pain, no BM in 4 days, no gas since yesterday. Transfer to [facility] [place] for evaluation by the bariatric surgery team due to hx of RYGB and SBO.     - CT Abdomen and Pelvis with features of bowel obstruction with fluid distended loops of small bowel and fecalization of the terminal ileum.  Zone of transition is not confidently identified.  - repeat CT AP revealed Distended to minimally dilated left upper quadrant small bowel loops, possibly representing a localized ileus.  A low-grade mechanical obstruction not fully excluded.       - GI consulted  -Chronic idiopathic constipation  Patient takes Linzess, Movantik and Miralax at home for her constipation regimen.      --Continue Relistor and Linzess.   --Miralax 4 packets with 32 oz of gatorade  --Can consider Gastrograffin enema  --Will arrange for outpatient clinic visit with her established GI MD, next available.

## 2024-10-17 NOTE — HOSPITAL COURSE
CT A/P on admission with features of bowel obstruction with fluid distended loops of small bowel and fecalization of the terminal ileum with no zone of transition is confidently identified.  Bariatric surgery thought fecalization of small bowel with what appears to be a chronic dysmotility issue with back-up extending from the TI to the level of the gastric pouch. Given no dilation of BP limb or remnant stomach and no evidence of transition point on imaging; bariatric surgerydid not believe this chronic issue to be 2/2 her bypass anatomy.  No acute surgical issue or any plans for surgical intervention in the near future and patient transferred to Hospital Medicine. Gastroenterology consulted for management of global dysmotility issues.  Patient started on aggressive bowel regimen with continued refractory constipation/vomiting/nausea with Linzess, MiraLax, and bisacodyl suppositories.  Patient with continued refractory constipation and patient given on Relistor on 10/15 and 10/16.  Patient with worsening, nausea, and vomiting on evening of 10/16.  Repeat CT abdomen/pelvis ordered; no evidence of obstruction. However, despite aggressive bowel regimen patient did not pass any substantial stool and continued to have LLQ abdominal pain. GI and general surgery re-consulted for management recommendations. No surgical or endocscopic interventions indicated. GI recommended stopping laxatives and only continuing her home linzess and movantik as her watery stools are likely from laxative over use. In comparing admission CT to repeat her stool burden had significantly improved. Cleared patient for [facility] with outpatient GI follow up, referral placed.     On day of [facility] patient's vital signs were stable and patient appeared clinically ready for [facility]. Hospital course, [facility] plan and return precautions discussed - patient expressed understanding. All questions answered at that time.     Physical Exam  Gen: in  NAD, appears stated age  Neuro: AAOx3, motor, sensory, and strength grossly intact BL  HEENT: EOMI, PERRLA; no JVD appreciated  CVS: RRR, no m/r/g  Resp: lungs CTAB, no w/r/r; no belabored breathing or accessory muscle use appreciated   Abd: non distended, slightly tender to palpation of LUQ - no guarding or rebound tenderness   Extrem: no UE or LE edema BL

## 2024-10-17 NOTE — PROGRESS NOTES
[facility] Medicine  Progress Note    Patient Name: Jill Perez  MRN: [id number]  Patient Class: IP- Inpatient   Admission Date: 10/12/2024  Length of Stay: 5 days  Attending Physician: Elliott Brewer MD  Primary Care Provider: Soham Rosa MD        Subjective:     Principal Problem:Abdominal pain    HPI:  Jill Perez is a 69 y.o. 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 She continues to follow with GI for gastroparesis, dysphagia and constipation. She presented on 10/11/2024 to [facility] with worsening abdominal pain for the last 4 days and nausea, vomiting and constipation. CT scan was obtained which showed bowel obstruction with fluid distended loops of small bowel and fecalization of the terminal ileum, however the transition point was not confidently. She was transferred to [facility] for surgical services with the bariatric surgery group.     Overview/Hospital Course:  CT A/P on admission with features of bowel obstruction with fluid distended loops of small bowel and fecalization of the terminal ileum with no zone of transition is confidently identified.  Bariatric surgery thought fecalization of small bowel with what appears to be a chronic dysmotility issue with back-up extending from the TI to the level of the gastric pouch. Given no dilation of BP limb or remnant stomach and no evidence of transition point on imaging; bariatric surgerydid not believe this chronic issue to be 2/2 her bypass anatomy.  No acute surgical issue or any plans for surgical intervention in the near future and patient transferred to Hospital Medicine. Gastroenterology consulted for management  of global dysmotility issues.  Patient started on aggressive bowel regimen with continued refractory constipation/vomiting/nausea with Linzess, MiraLax, and bisacodyl suppositories.  Patient with continued refractory constipation and patient given on Relistor on 10/15 and 10/16.  Patient with worsening, nausea, and vomiting on evening of 10/16.  Repeat CT abdomen/pelvis ordered; no evidence of obstruction.     Interval History:  NAEON. AFVSS.  Patient evaluated at bedside, reports no solid stool has been passed yet. Will order 28 g of miralax and lactulose enema per GI recommendations. Repeat CT AP without evidence of obstruction.     Review of Systems   Constitutional:  Negative for chills and fever.   Respiratory:  Negative for chest tightness and shortness of breath.    Cardiovascular:  Negative for chest pain and leg swelling.   Gastrointestinal:  Positive for abdominal pain, constipation, nausea and vomiting.   Neurological:  Negative for dizziness and weakness.     Objective:     Vital Signs (Most Recent):  Temp: 98.3 °F (36.8 °C) (10/17/24 1139)  Pulse: 98 (10/17/24 1152)  Resp: 18 (10/17/24 1238)  BP: 132/86 (10/17/24 1139)  SpO2: 98 % (10/17/24 1139) Vital Signs (24h Range):  Temp:  [98 °F (36.7 °C)-100.1 °F (37.8 °C)] 98.3 °F (36.8 °C)  Pulse:  [] 98  Resp:  [16-18] 18  SpO2:  [95 %-98 %] 98 %  BP: (118-141)/(56-86) 132/86     Weight: 68 kg (150 lb)  Body mass index is 26.57 kg/m².  No intake or output data in the 24 hours ending 10/17/24 1428      Physical Exam  Vitals reviewed.   Constitutional:       General: She is not in acute distress.     Appearance: Normal appearance. She is not ill-appearing.   Eyes:      General: No scleral icterus.  Abdominal:      General: Abdomen is flat. Bowel sounds are normal. There is no distension.      Palpations: Abdomen is soft. There is no mass.      Tenderness: There is abdominal tenderness in the left upper quadrant and left lower quadrant.      Hernia: No  hernia is present.   Skin:     General: Skin is warm and dry.   Neurological:      Mental Status: She is alert and oriented to person, place, and time. Mental status is at baseline.       Significant Labs: All pertinent labs within the past 24 hours have been reviewed.    Significant Imaging: I have reviewed all pertinent imaging results/findings within the past 24 hours.    Assessment/Plan:      * Abdominal pain  - See Chronic Idiopathic Constipation    Chronic idiopathic constipation  Patient is a 69y F w/ hx of RYGB, chronic constipation, gastroparesis, HTN, HLD, DMII (on insulin) who presents as transfer from [facility] with abdominal pain, no BM in 4 days, no gas since yesterday. Transfer to [facility] for evaluation by the bariatric surgery team due to hx of RYGB and SBO.     - CT Abdomen and Pelvis with features of bowel obstruction with fluid distended loops of small bowel and fecalization of the terminal ileum.  Zone of transition is not confidently identified.  - repeat CT AP revealed Distended to minimally dilated left upper quadrant small bowel loops, possibly representing a localized ileus.  A low-grade mechanical obstruction not fully excluded.       - GI consulted  -Chronic idiopathic constipation  Patient takes Linzess, Movantik and Miralax at home for her constipation regimen.      --Continue Relistor and Linzess.   --Miralax 4 packets with 32 oz of gatorade  --Can consider Gastrograffin enema  --Will arrange for outpatient clinic visit with her established GI MD, next available.     Type 2 diabetes mellitus with diabetic autonomic neuropathy, without long-term current use of insulin  Patient's FSGs are controlled on current medication regimen.  Last A1c reviewed-   Lab Results   Component Value Date    HGBA1C 9.6 (H) 10/01/2024     Most recent fingerstick glucose reviewed-   Recent Labs   Lab 10/15/24  1551 10/15/24  2055 10/16/24  0743 10/16/24  1125   POCTGLUCOSE 196* 164* 203* 176*       Current correctional  scale  Low  Maintain anti-hyperglycemic dose as follows-   Antihyperglycemics (From admission, onward)      Start     Stop Route Frequency Ordered    10/12/24 2219  insulin aspart U-100 pen 0-5 Units         -- SubQ Every 6 hours PRN 10/12/24 2120          - NM Gastric Emptying with delayed gastric emptying.   - Consider resuming gabapentin prn for neuropathy  - Hold Oral hypoglycemics while patient is in the hospital.  - Would benefit from ambulatory referral to endocrinology (placed on 10/13)    Hyperlipidemia associated with type 2 diabetes mellitus  - continue home atorvastatin 40mg qd    Primary hypertension  Patients blood pressure range in the last 24 hours was: BP  Min: 111/54  Max: 169/85.    - BP is uncontrolled, will adjust as follows: Home amlodipine 5 mg daily  (does not cause constipation)  - Continue to monitor vitals      VTE Risk Mitigation (From admission, onward)           Ordered     enoxaparin injection 40 mg  Daily         10/12/24 2059     IP VTE HIGH RISK PATIENT  Once         10/12/24 2059     Place sequential compression device  Until discontinued         10/12/24 2059                    Discharge Planning   JAMES: 10/18/2024     Code Status: Full Code   Is the patient medically ready for [facility]?:     Reason for patient still in hospital (select all that apply): Patient trending condition, Treatment, and Consult recommendations  Discharge Plan A: Home with family          Sylvie Calvo PA-C  Department of Hospital Medicine   [facility] - Surgery

## 2024-10-17 NOTE — SUBJECTIVE & OBJECTIVE
Interval History:  NAEON. AFVSS.  Patient evaluated at bedside, reports no solid stool has been passed yet. Will order 28 g of miralax and lactulose enema per GI recommendations. Repeat CT AP without evidence of obstruction.     Review of Systems   Constitutional:  Negative for chills and fever.   Respiratory:  Negative for chest tightness and shortness of breath.    Cardiovascular:  Negative for chest pain and leg swelling.   Gastrointestinal:  Positive for abdominal pain, constipation, nausea and vomiting.   Neurological:  Negative for dizziness and weakness.     Objective:     Vital Signs (Most Recent):  Temp: 98.3 °F (36.8 °C) (10/17/24 1139)  Pulse: 98 (10/17/24 1152)  Resp: 18 (10/17/24 1238)  BP: 132/86 (10/17/24 1139)  SpO2: 98 % (10/17/24 1139) Vital Signs (24h Range):  Temp:  [98 °F (36.7 °C)-100.1 °F (37.8 °C)] 98.3 °F (36.8 °C)  Pulse:  [] 98  Resp:  [16-18] 18  SpO2:  [95 %-98 %] 98 %  BP: (118-141)/(56-86) 132/86     Weight: 68 kg (150 lb)  Body mass index is 26.57 kg/m².  No intake or output data in the 24 hours ending 10/17/24 1428      Physical Exam  Vitals reviewed.   Constitutional:       General: She is not in acute distress.     Appearance: Normal appearance. She is not ill-appearing.   Eyes:      General: No scleral icterus.  Abdominal:      General: Abdomen is flat. Bowel sounds are normal. There is no distension.      Palpations: Abdomen is soft. There is no mass.      Tenderness: There is abdominal tenderness in the left upper quadrant and left lower quadrant.      Hernia: No hernia is present.   Skin:     General: Skin is warm and dry.   Neurological:      Mental Status: She is alert and oriented to person, place, and time. Mental status is at baseline.       Significant Labs: All pertinent labs within the past 24 hours have been reviewed.    Significant Imaging: I have reviewed all pertinent imaging results/findings within the past 24 hours.

## 2024-10-18 LAB
ALBUMIN SERPL BCP-MCNC: 3.1 G/DL (ref 3.5–5.2)
ALP SERPL-CCNC: 82 U/L (ref 40–150)
ALT SERPL W/O P-5'-P-CCNC: 24 U/L (ref 10–44)
ANION GAP SERPL CALC-SCNC: 6 MMOL/L (ref 8–16)
AST SERPL-CCNC: 23 U/L (ref 10–40)
BASOPHILS # BLD AUTO: 0.03 K/UL (ref 0–0.2)
BASOPHILS NFR BLD: 0.4 % (ref 0–1.9)
BILIRUB SERPL-MCNC: 0.3 MG/DL (ref 0.1–1)
BUN SERPL-MCNC: 8 MG/DL (ref 8–23)
CALCIUM SERPL-MCNC: 8.9 MG/DL (ref 8.7–10.5)
CHLORIDE SERPL-SCNC: 109 MMOL/L (ref 95–110)
CO2 SERPL-SCNC: 24 MMOL/L (ref 23–29)
CREAT SERPL-MCNC: 0.8 MG/DL (ref 0.5–1.4)
DIFFERENTIAL METHOD BLD: ABNORMAL
EOSINOPHIL # BLD AUTO: 0.2 K/UL (ref 0–0.5)
EOSINOPHIL NFR BLD: 2.8 % (ref 0–8)
ERYTHROCYTE [DISTWIDTH] IN BLOOD BY AUTOMATED COUNT: 14.2 % (ref 11.5–14.5)
EST. GFR  (NO RACE VARIABLE): >60 ML/MIN/1.73 M^2
GLUCOSE SERPL-MCNC: 205 MG/DL (ref 70–110)
HCT VFR BLD AUTO: 33 % (ref 37–48.5)
HGB BLD-MCNC: 10.9 G/DL (ref 12–16)
IMM GRANULOCYTES # BLD AUTO: 0.01 K/UL (ref 0–0.04)
IMM GRANULOCYTES NFR BLD AUTO: 0.1 % (ref 0–0.5)
LYMPHOCYTES # BLD AUTO: 3.2 K/UL (ref 1–4.8)
LYMPHOCYTES NFR BLD: 47.9 % (ref 18–48)
MAGNESIUM SERPL-MCNC: 1.6 MG/DL (ref 1.6–2.6)
MCH RBC QN AUTO: 30.2 PG (ref 27–31)
MCHC RBC AUTO-ENTMCNC: 33 G/DL (ref 32–36)
MCV RBC AUTO: 91 FL (ref 82–98)
MONOCYTES # BLD AUTO: 0.7 K/UL (ref 0.3–1)
MONOCYTES NFR BLD: 10.2 % (ref 4–15)
NEUTROPHILS # BLD AUTO: 2.6 K/UL (ref 1.8–7.7)
NEUTROPHILS NFR BLD: 38.6 % (ref 38–73)
NRBC BLD-RTO: 0 /100 WBC
PHOSPHATE SERPL-MCNC: 3.1 MG/DL (ref 2.7–4.5)
PLATELET # BLD AUTO: 241 K/UL (ref 150–450)
PMV BLD AUTO: 10.9 FL (ref 9.2–12.9)
POCT GLUCOSE: 165 MG/DL (ref 70–110)
POTASSIUM SERPL-SCNC: 3.8 MMOL/L (ref 3.5–5.1)
PROT SERPL-MCNC: 5.8 G/DL (ref 6–8.4)
RBC # BLD AUTO: 3.61 M/UL (ref 4–5.4)
SODIUM SERPL-SCNC: 139 MMOL/L (ref 136–145)
WBC # BLD AUTO: 6.75 K/UL (ref 3.9–12.7)

## 2024-10-18 PROCEDURE — 25000003 PHARM REV CODE 250: Performed by: HOSPITALIST

## 2024-10-18 PROCEDURE — 83735 ASSAY OF MAGNESIUM: CPT | Performed by: STUDENT IN AN ORGANIZED HEALTH CARE EDUCATION/TRAINING PROGRAM

## 2024-10-18 PROCEDURE — 63600175 PHARM REV CODE 636 W HCPCS: Mod: JZ,JG

## 2024-10-18 PROCEDURE — 84100 ASSAY OF PHOSPHORUS: CPT | Performed by: STUDENT IN AN ORGANIZED HEALTH CARE EDUCATION/TRAINING PROGRAM

## 2024-10-18 PROCEDURE — 63600175 PHARM REV CODE 636 W HCPCS: Performed by: STUDENT IN AN ORGANIZED HEALTH CARE EDUCATION/TRAINING PROGRAM

## 2024-10-18 PROCEDURE — 21400001 HC TELEMETRY ROOM

## 2024-10-18 PROCEDURE — 85025 COMPLETE CBC W/AUTO DIFF WBC: CPT | Performed by: STUDENT IN AN ORGANIZED HEALTH CARE EDUCATION/TRAINING PROGRAM

## 2024-10-18 PROCEDURE — 25000003 PHARM REV CODE 250: Performed by: STUDENT IN AN ORGANIZED HEALTH CARE EDUCATION/TRAINING PROGRAM

## 2024-10-18 PROCEDURE — 25000003 PHARM REV CODE 250

## 2024-10-18 PROCEDURE — 80053 COMPREHEN METABOLIC PANEL: CPT | Performed by: STUDENT IN AN ORGANIZED HEALTH CARE EDUCATION/TRAINING PROGRAM

## 2024-10-18 PROCEDURE — 36415 COLL VENOUS BLD VENIPUNCTURE: CPT | Performed by: STUDENT IN AN ORGANIZED HEALTH CARE EDUCATION/TRAINING PROGRAM

## 2024-10-18 RX ORDER — METOCLOPRAMIDE HYDROCHLORIDE 5 MG/ML
10 INJECTION INTRAMUSCULAR; INTRAVENOUS EVERY 6 HOURS
Status: COMPLETED | OUTPATIENT
Start: 2024-10-18 | End: 2024-10-19

## 2024-10-18 RX ORDER — LACTULOSE 10 G/15ML
10 SOLUTION ORAL 3 TIMES DAILY
Status: DISCONTINUED | OUTPATIENT
Start: 2024-10-18 | End: 2024-10-20

## 2024-10-18 RX ORDER — SENNOSIDES 8.6 MG/1
8.6 TABLET ORAL 2 TIMES DAILY
Status: DISCONTINUED | OUTPATIENT
Start: 2024-10-18 | End: 2024-10-18

## 2024-10-18 RX ADMIN — TRAMADOL HYDROCHLORIDE 50 MG: 50 TABLET, COATED ORAL at 03:10

## 2024-10-18 RX ADMIN — METOCLOPRAMIDE 10 MG: 5 INJECTION, SOLUTION INTRAMUSCULAR; INTRAVENOUS at 03:10

## 2024-10-18 RX ADMIN — LACTULOSE 10 G: 20 SOLUTION ORAL at 03:10

## 2024-10-18 RX ADMIN — ACETAMINOPHEN 1000 MG: 500 TABLET ORAL at 08:10

## 2024-10-18 RX ADMIN — LACTULOSE 10 G: 20 SOLUTION ORAL at 08:10

## 2024-10-18 RX ADMIN — TRAMADOL HYDROCHLORIDE 50 MG: 50 TABLET, COATED ORAL at 09:10

## 2024-10-18 RX ADMIN — METHYLNALTREXONE BROMIDE 12 MG: 12 INJECTION, SOLUTION SUBCUTANEOUS at 09:10

## 2024-10-18 RX ADMIN — TRAMADOL HYDROCHLORIDE 50 MG: 50 TABLET, COATED ORAL at 08:10

## 2024-10-18 RX ADMIN — ENOXAPARIN SODIUM 40 MG: 40 INJECTION SUBCUTANEOUS at 04:10

## 2024-10-18 RX ADMIN — LINACLOTIDE 290 MCG: 290 CAPSULE, GELATIN COATED ORAL at 07:10

## 2024-10-18 RX ADMIN — ACETAMINOPHEN 1000 MG: 500 TABLET ORAL at 05:10

## 2024-10-18 RX ADMIN — ACETAMINOPHEN 1000 MG: 500 TABLET ORAL at 01:10

## 2024-10-18 RX ADMIN — SENNOSIDES 8.6 MG: 8.6 TABLET, FILM COATED ORAL at 03:10

## 2024-10-18 RX ADMIN — AMLODIPINE BESYLATE 5 MG: 5 TABLET ORAL at 09:10

## 2024-10-18 RX ADMIN — ATORVASTATIN CALCIUM 40 MG: 40 TABLET, FILM COATED ORAL at 08:10

## 2024-10-18 NOTE — ASSESSMENT & PLAN NOTE
Patient is a 69y F w/ hx of RYGB, chronic constipation, gastroparesis, HTN, HLD, DMII (on insulin) who presents as transfer from [facility] with abdominal pain, no BM in 4 days, no gas since yesterday. Transfer to [facility] [place] for evaluation by the bariatric surgery team due to hx of RYGB and SBO.     - CT Abdomen and Pelvis with features of bowel obstruction with fluid distended loops of small bowel and fecalization of the terminal ileum.  Zone of transition is not confidently identified.  - repeat CT AP revealed Distended to minimally dilated left upper quadrant small bowel loops, possibly representing a localized ileus.  A low-grade mechanical obstruction not fully excluded.     - no BM today, will start TID lactulose, BID senna and q6h reglan x3 doses   - given 3rd dose of relistor   - GI consulted  -Chronic idiopathic constipation  Patient takes Linzess, Movantik and Miralax at home for her constipation regimen.      --Continue Relistor and Linzess.   --Miralax 4 packets with 32 oz of gatorade  --Can consider Gastrograffin enema  --Will arrange for outpatient clinic visit with her established GI MD, next available.

## 2024-10-18 NOTE — PROGRESS NOTES
[facility] Medicine  Progress Note    Patient Name: Jill Perez  MRN: [id number]  Patient Class: IP- Inpatient   Admission Date: 10/12/2024  Length of Stay: 6 days  Attending Physician: Elliott Brewer MD  Primary Care Provider: Soham Rosa MD    Subjective:     Principal Problem:Abdominal pain    HPI:  Jill Perez is a 69 y.o. 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 She continues to follow with GI for gastroparesis, dysphagia and constipation. She presented on 10/11/2024 to [facility] with worsening abdominal pain for the last 4 days and nausea, vomiting and constipation. CT scan was obtained which showed bowel obstruction with fluid distended loops of small bowel and fecalization of the terminal ileum, however the transition point was not confidently. She was transferred to [facility] for surgical services with the bariatric surgery group.     Overview/Hospital Course:  CT A/P on admission with features of bowel obstruction with fluid distended loops of small bowel and fecalization of the terminal ileum with no zone of transition is confidently identified.  Bariatric surgery thought fecalization of small bowel with what appears to be a chronic dysmotility issue with back-up extending from the TI to the level of the gastric pouch. Given no dilation of BP limb or remnant stomach and no evidence of transition point on imaging; bariatric surgerydid not believe this chronic issue to be 2/2 her bypass anatomy.  No acute surgical issue or any plans for surgical intervention in the near future and patient transferred to Hospital Medicine. Gastroenterology consulted for management of  global dysmotility issues.  Patient started on aggressive bowel regimen with continued refractory constipation/vomiting/nausea with Linzess, MiraLax, and bisacodyl suppositories.  Patient with continued refractory constipation and patient given on Relistor on 10/15 and 10/16.  Patient with worsening, nausea, and vomiting on evening of 10/16.  Repeat CT abdomen/pelvis ordered; no evidence of obstruction.     Interval History:  NAEON. AFVSS.   Patient evaluated at bedside, still has not had a bowel movement. Given 3rd dose of relistor today and will increase bowel regimen. Endorsing worsening LLQ abdominal pain and tenderness. Start reglan to promote GI motility. Advance diet to regular today.     Review of Systems   Constitutional:  Negative for chills and fever.   Respiratory:  Negative for chest tightness and shortness of breath.    Cardiovascular:  Negative for chest pain and leg swelling.   Gastrointestinal:  Positive for abdominal pain and constipation.   Neurological:  Negative for dizziness and weakness.     Objective:     Vital Signs (Most Recent):  Temp: 97.9 °F (36.6 °C) (10/18/24 1111)  Pulse: 72 (10/18/24 1111)  Resp: 16 (10/18/24 1511)  BP: 119/78 (10/18/24 1111)  SpO2: 99 % (10/18/24 1111) Vital Signs (24h Range):  Temp:  [97.8 °F (36.6 °C)-98.6 °F (37 °C)] 97.9 °F (36.6 °C)  Pulse:  [57-95] 72  Resp:  [12-18] 16  SpO2:  [94 %-99 %] 99 %  BP: (117-148)/(58-81) 119/78     Weight: 68 kg (150 lb)  Body mass index is 26.57 kg/m².  No intake or output data in the 24 hours ending 10/18/24 1513      Physical Exam  Vitals reviewed.   Constitutional:       General: She is not in acute distress.     Appearance: Normal appearance. She is not ill-appearing.   Eyes:      General: No scleral icterus.  Abdominal:      General: Abdomen is flat. Bowel sounds are normal. There is no distension.      Palpations: Abdomen is soft. There is no mass.      Tenderness: There is abdominal tenderness in the left upper quadrant  and left lower quadrant.      Hernia: No hernia is present.   Skin:     General: Skin is warm and dry.   Neurological:      Mental Status: She is alert and oriented to person, place, and time. Mental status is at baseline.       Significant Labs: All pertinent labs within the past 24 hours have been reviewed.    Significant Imaging: I have reviewed all pertinent imaging results/findings within the past 24 hours.    Assessment/Plan:      * Abdominal pain  - See Chronic Idiopathic Constipation    Chronic idiopathic constipation  Patient is a 69y F w/ hx of RYGB, chronic constipation, gastroparesis, HTN, HLD, DMII (on insulin) who presents as transfer from [facility] with abdominal pain, no BM in 4 days, no gas since yesterday. Transfer to [facility] for evaluation by the bariatric surgery team due to hx of RYGB and SBO.     - CT Abdomen and Pelvis with features of bowel obstruction with fluid distended loops of small bowel and fecalization of the terminal ileum.  Zone of transition is not confidently identified.  - repeat CT AP revealed Distended to minimally dilated left upper quadrant small bowel loops, possibly representing a localized ileus.  A low-grade mechanical obstruction not fully excluded.     - no BM today, will start TID lactulose, BID senna and q6h reglan x3 doses   - given 3rd dose of relistor   - GI consulted  -Chronic idiopathic constipation  Patient takes Linzess, Movantik and Miralax at home for her constipation regimen.      --Continue Relistor and Linzess.   --Miralax 4 packets with 32 oz of gatorade  --Can consider Gastrograffin enema  --Will arrange for outpatient clinic visit with her established GI MD, next available.     Type 2 diabetes mellitus with diabetic autonomic neuropathy, without long-term current use of insulin  Patient's FSGs are controlled on current medication regimen.  Last A1c reviewed-   Lab Results   Component Value Date    HGBA1C 9.6 (H) 10/01/2024     Most recent fingerstick glucose  reviewed-   Recent Labs   Lab 10/15/24  1551 10/15/24  2055 10/16/24  0743 10/16/24  1125   POCTGLUCOSE 196* 164* 203* 176*       Current correctional scale  Low  Maintain anti-hyperglycemic dose as follows-   Antihyperglycemics (From admission, onward)      Start     Stop Route Frequency Ordered    10/12/24 2219  insulin aspart U-100 pen 0-5 Units         -- SubQ Every 6 hours PRN 10/12/24 2120          - NM Gastric Emptying with delayed gastric emptying.   - Consider resuming gabapentin prn for neuropathy  - Hold Oral hypoglycemics while patient is in the hospital.  - Would benefit from ambulatory referral to endocrinology (placed on 10/13)    Hyperlipidemia associated with type 2 diabetes mellitus  - continue home atorvastatin 40mg qd    Primary hypertension  Patients blood pressure range in the last 24 hours was: BP  Min: 111/54  Max: 169/85.    - BP is uncontrolled, will adjust as follows: Home amlodipine 5 mg daily  (does not cause constipation)  - Continue to monitor vitals      VTE Risk Mitigation (From admission, onward)           Ordered     enoxaparin injection 40 mg  Daily         10/12/24 2059     IP VTE HIGH RISK PATIENT  Once         10/12/24 2059     Place sequential compression device  Until discontinued         10/12/24 2059                    Discharge Planning   JAMES: 10/19/2024     Code Status: Full Code   Is the patient medically ready for [facility]?:     Reason for patient still in hospital (select all that apply): Patient trending condition, Treatment, and Consult recommendations  Discharge Plan A: Home with family        Sylvie Calvo PA-C  Department of [facility] Medicine   [facility] - Surgery

## 2024-10-19 LAB
ALBUMIN SERPL BCP-MCNC: 2.9 G/DL (ref 3.5–5.2)
ALP SERPL-CCNC: 81 U/L (ref 40–150)
ALT SERPL W/O P-5'-P-CCNC: 21 U/L (ref 10–44)
ANION GAP SERPL CALC-SCNC: 8 MMOL/L (ref 8–16)
AST SERPL-CCNC: 21 U/L (ref 10–40)
BASOPHILS # BLD AUTO: 0.02 K/UL (ref 0–0.2)
BASOPHILS NFR BLD: 0.4 % (ref 0–1.9)
BILIRUB SERPL-MCNC: 0.3 MG/DL (ref 0.1–1)
BUN SERPL-MCNC: 7 MG/DL (ref 8–23)
CALCIUM SERPL-MCNC: 8.5 MG/DL (ref 8.7–10.5)
CHLORIDE SERPL-SCNC: 114 MMOL/L (ref 95–110)
CO2 SERPL-SCNC: 20 MMOL/L (ref 23–29)
CREAT SERPL-MCNC: 0.6 MG/DL (ref 0.5–1.4)
DIFFERENTIAL METHOD BLD: ABNORMAL
EOSINOPHIL # BLD AUTO: 0.2 K/UL (ref 0–0.5)
EOSINOPHIL NFR BLD: 3.6 % (ref 0–8)
ERYTHROCYTE [DISTWIDTH] IN BLOOD BY AUTOMATED COUNT: 14.3 % (ref 11.5–14.5)
EST. GFR  (NO RACE VARIABLE): >60 ML/MIN/1.73 M^2
GLUCOSE SERPL-MCNC: 152 MG/DL (ref 70–110)
HCT VFR BLD AUTO: 32.5 % (ref 37–48.5)
HGB BLD-MCNC: 10.5 G/DL (ref 12–16)
IMM GRANULOCYTES # BLD AUTO: 0.01 K/UL (ref 0–0.04)
IMM GRANULOCYTES NFR BLD AUTO: 0.2 % (ref 0–0.5)
LYMPHOCYTES # BLD AUTO: 2.7 K/UL (ref 1–4.8)
LYMPHOCYTES NFR BLD: 51.2 % (ref 18–48)
MAGNESIUM SERPL-MCNC: 1.5 MG/DL (ref 1.6–2.6)
MCH RBC QN AUTO: 29.7 PG (ref 27–31)
MCHC RBC AUTO-ENTMCNC: 32.3 G/DL (ref 32–36)
MCV RBC AUTO: 92 FL (ref 82–98)
MONOCYTES # BLD AUTO: 0.6 K/UL (ref 0.3–1)
MONOCYTES NFR BLD: 11.5 % (ref 4–15)
NEUTROPHILS # BLD AUTO: 1.7 K/UL (ref 1.8–7.7)
NEUTROPHILS NFR BLD: 33.1 % (ref 38–73)
NRBC BLD-RTO: 0 /100 WBC
PHOSPHATE SERPL-MCNC: 3.4 MG/DL (ref 2.7–4.5)
PLATELET # BLD AUTO: 231 K/UL (ref 150–450)
PMV BLD AUTO: 10.6 FL (ref 9.2–12.9)
POCT GLUCOSE: 233 MG/DL (ref 70–110)
POCT GLUCOSE: 238 MG/DL (ref 70–110)
POCT GLUCOSE: 327 MG/DL (ref 70–110)
POCT GLUCOSE: 402 MG/DL (ref 70–110)
POTASSIUM SERPL-SCNC: 3.2 MMOL/L (ref 3.5–5.1)
PROT SERPL-MCNC: 5.6 G/DL (ref 6–8.4)
RBC # BLD AUTO: 3.54 M/UL (ref 4–5.4)
SODIUM SERPL-SCNC: 142 MMOL/L (ref 136–145)
WBC # BLD AUTO: 5.23 K/UL (ref 3.9–12.7)

## 2024-10-19 PROCEDURE — 85025 COMPLETE CBC W/AUTO DIFF WBC: CPT | Performed by: STUDENT IN AN ORGANIZED HEALTH CARE EDUCATION/TRAINING PROGRAM

## 2024-10-19 PROCEDURE — 25000003 PHARM REV CODE 250: Performed by: HOSPITALIST

## 2024-10-19 PROCEDURE — 25500020 PHARM REV CODE 255: Performed by: STUDENT IN AN ORGANIZED HEALTH CARE EDUCATION/TRAINING PROGRAM

## 2024-10-19 PROCEDURE — 25000003 PHARM REV CODE 250

## 2024-10-19 PROCEDURE — 84100 ASSAY OF PHOSPHORUS: CPT | Performed by: STUDENT IN AN ORGANIZED HEALTH CARE EDUCATION/TRAINING PROGRAM

## 2024-10-19 PROCEDURE — 80053 COMPREHEN METABOLIC PANEL: CPT | Performed by: STUDENT IN AN ORGANIZED HEALTH CARE EDUCATION/TRAINING PROGRAM

## 2024-10-19 PROCEDURE — 63600175 PHARM REV CODE 636 W HCPCS

## 2024-10-19 PROCEDURE — 21400001 HC TELEMETRY ROOM

## 2024-10-19 PROCEDURE — 83735 ASSAY OF MAGNESIUM: CPT | Performed by: STUDENT IN AN ORGANIZED HEALTH CARE EDUCATION/TRAINING PROGRAM

## 2024-10-19 PROCEDURE — 36415 COLL VENOUS BLD VENIPUNCTURE: CPT | Performed by: STUDENT IN AN ORGANIZED HEALTH CARE EDUCATION/TRAINING PROGRAM

## 2024-10-19 PROCEDURE — A9585 GADOBUTROL INJECTION: HCPCS | Performed by: STUDENT IN AN ORGANIZED HEALTH CARE EDUCATION/TRAINING PROGRAM

## 2024-10-19 PROCEDURE — 63600175 PHARM REV CODE 636 W HCPCS: Performed by: STUDENT IN AN ORGANIZED HEALTH CARE EDUCATION/TRAINING PROGRAM

## 2024-10-19 PROCEDURE — 25000003 PHARM REV CODE 250: Performed by: STUDENT IN AN ORGANIZED HEALTH CARE EDUCATION/TRAINING PROGRAM

## 2024-10-19 RX ORDER — LANOLIN ALCOHOL/MO/W.PET/CERES
400 CREAM (GRAM) TOPICAL EVERY 4 HOURS
Status: COMPLETED | OUTPATIENT
Start: 2024-10-19 | End: 2024-10-19

## 2024-10-19 RX ORDER — GADOBUTROL 604.72 MG/ML
10 INJECTION INTRAVENOUS
Status: COMPLETED | OUTPATIENT
Start: 2024-10-19 | End: 2024-10-19

## 2024-10-19 RX ORDER — POTASSIUM CHLORIDE 750 MG/1
30 CAPSULE, EXTENDED RELEASE ORAL ONCE
Status: COMPLETED | OUTPATIENT
Start: 2024-10-19 | End: 2024-10-19

## 2024-10-19 RX ADMIN — LINACLOTIDE 290 MCG: 290 CAPSULE, GELATIN COATED ORAL at 07:10

## 2024-10-19 RX ADMIN — LACTULOSE 10 G: 20 SOLUTION ORAL at 10:10

## 2024-10-19 RX ADMIN — Medication 400 MG: at 07:10

## 2024-10-19 RX ADMIN — INSULIN ASPART 2 UNITS: 100 INJECTION, SOLUTION INTRAVENOUS; SUBCUTANEOUS at 07:10

## 2024-10-19 RX ADMIN — TRAMADOL HYDROCHLORIDE 50 MG: 50 TABLET, COATED ORAL at 03:10

## 2024-10-19 RX ADMIN — AMLODIPINE BESYLATE 5 MG: 5 TABLET ORAL at 07:10

## 2024-10-19 RX ADMIN — ENOXAPARIN SODIUM 40 MG: 40 INJECTION SUBCUTANEOUS at 04:10

## 2024-10-19 RX ADMIN — TRAMADOL HYDROCHLORIDE 50 MG: 50 TABLET, COATED ORAL at 07:10

## 2024-10-19 RX ADMIN — INSULIN ASPART 3 UNITS: 100 INJECTION, SOLUTION INTRAVENOUS; SUBCUTANEOUS at 10:10

## 2024-10-19 RX ADMIN — ACETAMINOPHEN 1000 MG: 500 TABLET ORAL at 10:10

## 2024-10-19 RX ADMIN — Medication 400 MG: at 03:10

## 2024-10-19 RX ADMIN — Medication 400 MG: at 08:10

## 2024-10-19 RX ADMIN — ACETAMINOPHEN 1000 MG: 500 TABLET ORAL at 03:10

## 2024-10-19 RX ADMIN — ATORVASTATIN CALCIUM 40 MG: 40 TABLET, FILM COATED ORAL at 10:10

## 2024-10-19 RX ADMIN — METOCLOPRAMIDE 10 MG: 5 INJECTION, SOLUTION INTRAMUSCULAR; INTRAVENOUS at 12:10

## 2024-10-19 RX ADMIN — LACTULOSE 10 G: 20 SOLUTION ORAL at 07:10

## 2024-10-19 RX ADMIN — ACETAMINOPHEN 1000 MG: 500 TABLET ORAL at 05:10

## 2024-10-19 RX ADMIN — METOCLOPRAMIDE 10 MG: 5 INJECTION, SOLUTION INTRAMUSCULAR; INTRAVENOUS at 05:10

## 2024-10-19 RX ADMIN — POTASSIUM CHLORIDE 30 MEQ: 750 CAPSULE, EXTENDED RELEASE ORAL at 08:10

## 2024-10-19 RX ADMIN — INSULIN ASPART 2 UNITS: 100 INJECTION, SOLUTION INTRAVENOUS; SUBCUTANEOUS at 11:10

## 2024-10-19 RX ADMIN — LACTULOSE 10 G: 20 SOLUTION ORAL at 04:10

## 2024-10-19 RX ADMIN — INSULIN ASPART 4 UNITS: 100 INJECTION, SOLUTION INTRAVENOUS; SUBCUTANEOUS at 04:10

## 2024-10-19 RX ADMIN — GADOBUTROL 10 ML: 604.72 INJECTION INTRAVENOUS at 04:10

## 2024-10-19 RX ADMIN — TRAMADOL HYDROCHLORIDE 50 MG: 50 TABLET, COATED ORAL at 09:10

## 2024-10-19 NOTE — SUBJECTIVE & OBJECTIVE
Interval History: NAEON. AFVSS.  Patient evaluated at bedside. Reports continued abdominal pain - still no formed or soft bowel movement only passing small watery stools. Reports she has not passed any solid stool since her admission to the hospital. Will obtain MRA abdomen to further investigate etiology of symptoms. GI has signed off but low threshold to re-consult if no improvement.       Review of Systems   Constitutional:  Negative for chills and fever.   Respiratory:  Negative for chest tightness and shortness of breath.    Cardiovascular:  Negative for chest pain and leg swelling.   Gastrointestinal:  Positive for abdominal pain and constipation.   Neurological:  Negative for dizziness and weakness.     Objective:     Vital Signs (Most Recent):  Temp: 96.6 °F (35.9 °C) (10/19/24 1114)  Pulse: 97 (10/19/24 1114)  Resp: 18 (10/19/24 1114)  BP: (!) 159/89 (10/19/24 1114)  SpO2: 96 % (10/19/24 1114) Vital Signs (24h Range):  Temp:  [96.6 °F (35.9 °C)-98.6 °F (37 °C)] 96.6 °F (35.9 °C)  Pulse:  [] 97  Resp:  [16-18] 18  SpO2:  [94 %-97 %] 96 %  BP: (105-159)/(51-89) 159/89     Weight: 68 kg (150 lb)  Body mass index is 26.57 kg/m².  No intake or output data in the 24 hours ending 10/19/24 1409      Physical Exam  Vitals reviewed.   Constitutional:       General: She is not in acute distress.     Appearance: Normal appearance. She is not ill-appearing.   Eyes:      General: No scleral icterus.  Abdominal:      General: Abdomen is flat. Bowel sounds are normal. There is no distension.      Palpations: Abdomen is soft. There is no mass.      Tenderness: There is abdominal tenderness in the left upper quadrant and left lower quadrant.      Hernia: No hernia is present.   Skin:     General: Skin is warm and dry.   Neurological:      Mental Status: She is alert and oriented to person, place, and time. Mental status is at baseline.     Significant Labs: All pertinent labs within the past 24 hours have been  reviewed.    Significant Imaging: I have reviewed all pertinent imaging results/findings within the past 24 hours.

## 2024-10-19 NOTE — ASSESSMENT & PLAN NOTE
Patient is a 69y F w/ hx of RYGB, chronic constipation, gastroparesis, HTN, HLD, DMII (on insulin) who presents as transfer from [facility] with abdominal pain, no BM in 4 days, no gas since yesterday. Transfer to [facility] for evaluation by the bariatric surgery team due to hx of RYGB and SBO.     - CT Abdomen and Pelvis with features of bowel obstruction with fluid distended loops of small bowel and fecalization of the terminal ileum.  Zone of transition is not confidently identified.  - repeat CT AP revealed Distended to minimally dilated left upper quadrant small bowel loops, possibly representing a localized ileus.  A low-grade mechanical obstruction not fully excluded.       - still no formed or soft Bms, obtaining MRA abdomen to evaluate vascular etiology of symptoms  - low threshold to re-consult GI or general surgery if no improvement

## 2024-10-19 NOTE — PROGRESS NOTES
[facility] Medicine  Progress Note    Patient Name: Jill Perez  MRN: [id number]  Patient Class: IP- Inpatient   Admission Date: 10/12/2024  Length of Stay: 7 days  Attending Physician: Elliott Brewer MD  Primary Care Provider: Soham Rosa MD    Subjective:     Principal Problem:Abdominal pain    HPI:  Jill Perez is a 69 y.o. 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 She continues to follow with GI for gastroparesis, dysphagia and constipation. She presented on 10/11/2024 to [facility] with worsening abdominal pain for the last 4 days and nausea, vomiting and constipation. CT scan was obtained which showed bowel obstruction with fluid distended loops of small bowel and fecalization of the terminal ileum, however the transition point was not confidently. She was transferred to [facility] for surgical services with the bariatric surgery group.     Overview/Hospital Course:  CT A/P on admission with features of bowel obstruction with fluid distended loops of small bowel and fecalization of the terminal ileum with no zone of transition is confidently identified.  Bariatric surgery thought fecalization of small bowel with what appears to be a chronic dysmotility issue with back-up extending from the TI to the level of the gastric pouch. Given no dilation of BP limb or remnant stomach and no evidence of transition point on imaging; bariatric surgerydid not believe this chronic issue to be 2/2 her bypass anatomy.  No acute surgical issue or any plans for surgical intervention in the near future and patient transferred to Hospital Medicine. Gastroenterology consulted for management of  global dysmotility issues.  Patient started on aggressive bowel regimen with continued refractory constipation/vomiting/nausea with Linzess, MiraLax, and bisacodyl suppositories.  Patient with continued refractory constipation and patient given on Relistor on 10/15 and 10/16.  Patient with worsening, nausea, and vomiting on evening of 10/16.  Repeat CT abdomen/pelvis ordered; no evidence of obstruction.     Interval History: NAEON. AFVSS.  Patient evaluated at bedside. Reports continued abdominal pain - still no formed or soft bowel movement only passing small watery stools. Reports she has not passed any solid stool since her admission to the hospital. Will obtain MRA abdomen to further investigate etiology of symptoms. GI has signed off but low threshold to re-consult if no improvement.       Review of Systems   Constitutional:  Negative for chills and fever.   Respiratory:  Negative for chest tightness and shortness of breath.    Cardiovascular:  Negative for chest pain and leg swelling.   Gastrointestinal:  Positive for abdominal pain and constipation.   Neurological:  Negative for dizziness and weakness.     Objective:     Vital Signs (Most Recent):  Temp: 96.6 °F (35.9 °C) (10/19/24 1114)  Pulse: 97 (10/19/24 1114)  Resp: 18 (10/19/24 1114)  BP: (!) 159/89 (10/19/24 1114)  SpO2: 96 % (10/19/24 1114) Vital Signs (24h Range):  Temp:  [96.6 °F (35.9 °C)-98.6 °F (37 °C)] 96.6 °F (35.9 °C)  Pulse:  [] 97  Resp:  [16-18] 18  SpO2:  [94 %-97 %] 96 %  BP: (105-159)/(51-89) 159/89     Weight: 68 kg (150 lb)  Body mass index is 26.57 kg/m².  No intake or output data in the 24 hours ending 10/19/24 1409      Physical Exam  Vitals reviewed.   Constitutional:       General: She is not in acute distress.     Appearance: Normal appearance. She is not ill-appearing.   Eyes:      General: No scleral icterus.  Abdominal:      General: Abdomen is flat. Bowel sounds are normal. There is no distension.      Palpations:  Abdomen is soft. There is no mass.      Tenderness: There is abdominal tenderness in the left upper quadrant and left lower quadrant.      Hernia: No hernia is present.   Skin:     General: Skin is warm and dry.   Neurological:      Mental Status: She is alert and oriented to person, place, and time. Mental status is at baseline.     Significant Labs: All pertinent labs within the past 24 hours have been reviewed.    Significant Imaging: I have reviewed all pertinent imaging results/findings within the past 24 hours.    Assessment/Plan:      * Abdominal pain  - See Chronic Idiopathic Constipation    Chronic idiopathic constipation  Patient is a 69y F w/ hx of RYGB, chronic constipation, gastroparesis, HTN, HLD, DMII (on insulin) who presents as transfer from [facility] with abdominal pain, no BM in 4 days, no gas since yesterday. Transfer to [facility] for evaluation by the bariatric surgery team due to hx of RYGB and SBO.     - CT Abdomen and Pelvis with features of bowel obstruction with fluid distended loops of small bowel and fecalization of the terminal ileum.  Zone of transition is not confidently identified.  - repeat CT AP revealed Distended to minimally dilated left upper quadrant small bowel loops, possibly representing a localized ileus.  A low-grade mechanical obstruction not fully excluded.       - still no formed or soft Bms, obtaining MRA abdomen to evaluate vascular etiology of symptoms  - low threshold to re-consult GI or general surgery if no improvement     Type 2 diabetes mellitus with diabetic autonomic neuropathy, without long-term current use of insulin  Patient's FSGs are controlled on current medication regimen.  Last A1c reviewed-   Lab Results   Component Value Date    HGBA1C 9.6 (H) 10/01/2024     Most recent fingerstick glucose reviewed-   Recent Labs   Lab 10/15/24  1551 10/15/24  2055 10/16/24  0743 10/16/24  1125   POCTGLUCOSE 196* 164* 203* 176*       Current correctional scale  Low  Maintain  anti-hyperglycemic dose as follows-   Antihyperglycemics (From admission, onward)      Start     Stop Route Frequency Ordered    10/12/24 2219  insulin aspart U-100 pen 0-5 Units         -- SubQ Every 6 hours PRN 10/12/24 2120          - NM Gastric Emptying with delayed gastric emptying.   - Consider resuming gabapentin prn for neuropathy  - Hold Oral hypoglycemics while patient is in the hospital.  - Would benefit from ambulatory referral to endocrinology (placed on 10/13)    Hyperlipidemia associated with type 2 diabetes mellitus  - continue home atorvastatin 40mg qd    Primary hypertension  Patients blood pressure range in the last 24 hours was: BP  Min: 111/54  Max: 169/85.    - BP is uncontrolled, will adjust as follows: Home amlodipine 5 mg daily  (does not cause constipation)  - Continue to monitor vitals      VTE Risk Mitigation (From admission, onward)           Ordered     enoxaparin injection 40 mg  Daily         10/12/24 2059     IP VTE HIGH RISK PATIENT  Once         10/12/24 2059     Place sequential compression device  Until discontinued         10/12/24 2059                    Discharge Planning   JAMES: 10/20/2024     Code Status: Full Code   Is the patient medically ready for [facility]?:     Reason for patient still in hospital (select all that apply): Patient trending condition, Treatment, and Imaging  Discharge Plan A: Home with family          Sylvie Calvo PA-C  Department of Hospital Medicine   [facility]

## 2024-10-20 LAB
ALBUMIN SERPL BCP-MCNC: 3 G/DL (ref 3.5–5.2)
ALP SERPL-CCNC: 80 U/L (ref 40–150)
ALT SERPL W/O P-5'-P-CCNC: 20 U/L (ref 10–44)
ANION GAP SERPL CALC-SCNC: 6 MMOL/L (ref 8–16)
AST SERPL-CCNC: 20 U/L (ref 10–40)
BASOPHILS # BLD AUTO: 0.03 K/UL (ref 0–0.2)
BASOPHILS NFR BLD: 0.5 % (ref 0–1.9)
BILIRUB SERPL-MCNC: 0.4 MG/DL (ref 0.1–1)
BUN SERPL-MCNC: 8 MG/DL (ref 8–23)
CALCIUM SERPL-MCNC: 8.9 MG/DL (ref 8.7–10.5)
CHLORIDE SERPL-SCNC: 114 MMOL/L (ref 95–110)
CO2 SERPL-SCNC: 20 MMOL/L (ref 23–29)
CREAT SERPL-MCNC: 0.6 MG/DL (ref 0.5–1.4)
DIFFERENTIAL METHOD BLD: ABNORMAL
EOSINOPHIL # BLD AUTO: 0.2 K/UL (ref 0–0.5)
EOSINOPHIL NFR BLD: 3.4 % (ref 0–8)
ERYTHROCYTE [DISTWIDTH] IN BLOOD BY AUTOMATED COUNT: 14.3 % (ref 11.5–14.5)
EST. GFR  (NO RACE VARIABLE): >60 ML/MIN/1.73 M^2
GLUCOSE SERPL-MCNC: 140 MG/DL (ref 70–110)
HCT VFR BLD AUTO: 31.5 % (ref 37–48.5)
HGB BLD-MCNC: 10.3 G/DL (ref 12–16)
IMM GRANULOCYTES # BLD AUTO: 0.01 K/UL (ref 0–0.04)
IMM GRANULOCYTES NFR BLD AUTO: 0.2 % (ref 0–0.5)
LYMPHOCYTES # BLD AUTO: 3.4 K/UL (ref 1–4.8)
LYMPHOCYTES NFR BLD: 55.4 % (ref 18–48)
MAGNESIUM SERPL-MCNC: 1.5 MG/DL (ref 1.6–2.6)
MCH RBC QN AUTO: 30.3 PG (ref 27–31)
MCHC RBC AUTO-ENTMCNC: 32.7 G/DL (ref 32–36)
MCV RBC AUTO: 93 FL (ref 82–98)
MONOCYTES # BLD AUTO: 0.6 K/UL (ref 0.3–1)
MONOCYTES NFR BLD: 10.3 % (ref 4–15)
NEUTROPHILS # BLD AUTO: 1.9 K/UL (ref 1.8–7.7)
NEUTROPHILS NFR BLD: 30.2 % (ref 38–73)
NRBC BLD-RTO: 0 /100 WBC
PHOSPHATE SERPL-MCNC: 2.6 MG/DL (ref 2.7–4.5)
PLATELET # BLD AUTO: 220 K/UL (ref 150–450)
PMV BLD AUTO: 10.2 FL (ref 9.2–12.9)
POCT GLUCOSE: 149 MG/DL (ref 70–110)
POCT GLUCOSE: 231 MG/DL (ref 70–110)
POCT GLUCOSE: 282 MG/DL (ref 70–110)
POCT GLUCOSE: 340 MG/DL (ref 70–110)
POTASSIUM SERPL-SCNC: 3.6 MMOL/L (ref 3.5–5.1)
PROT SERPL-MCNC: 5.7 G/DL (ref 6–8.4)
RBC # BLD AUTO: 3.4 M/UL (ref 4–5.4)
SODIUM SERPL-SCNC: 140 MMOL/L (ref 136–145)
WBC # BLD AUTO: 6.21 K/UL (ref 3.9–12.7)

## 2024-10-20 PROCEDURE — 63600175 PHARM REV CODE 636 W HCPCS: Performed by: STUDENT IN AN ORGANIZED HEALTH CARE EDUCATION/TRAINING PROGRAM

## 2024-10-20 PROCEDURE — 25000003 PHARM REV CODE 250: Performed by: HOSPITALIST

## 2024-10-20 PROCEDURE — 80053 COMPREHEN METABOLIC PANEL: CPT | Performed by: STUDENT IN AN ORGANIZED HEALTH CARE EDUCATION/TRAINING PROGRAM

## 2024-10-20 PROCEDURE — 25000003 PHARM REV CODE 250

## 2024-10-20 PROCEDURE — 25000003 PHARM REV CODE 250: Performed by: STUDENT IN AN ORGANIZED HEALTH CARE EDUCATION/TRAINING PROGRAM

## 2024-10-20 PROCEDURE — 21400001 HC TELEMETRY ROOM

## 2024-10-20 PROCEDURE — 84100 ASSAY OF PHOSPHORUS: CPT | Performed by: STUDENT IN AN ORGANIZED HEALTH CARE EDUCATION/TRAINING PROGRAM

## 2024-10-20 PROCEDURE — 85025 COMPLETE CBC W/AUTO DIFF WBC: CPT | Performed by: STUDENT IN AN ORGANIZED HEALTH CARE EDUCATION/TRAINING PROGRAM

## 2024-10-20 PROCEDURE — 99233 SBSQ HOSP IP/OBS HIGH 50: CPT | Mod: GC,,, | Performed by: STUDENT IN AN ORGANIZED HEALTH CARE EDUCATION/TRAINING PROGRAM

## 2024-10-20 PROCEDURE — 94761 N-INVAS EAR/PLS OXIMETRY MLT: CPT

## 2024-10-20 PROCEDURE — 83735 ASSAY OF MAGNESIUM: CPT | Performed by: STUDENT IN AN ORGANIZED HEALTH CARE EDUCATION/TRAINING PROGRAM

## 2024-10-20 PROCEDURE — 36415 COLL VENOUS BLD VENIPUNCTURE: CPT | Performed by: STUDENT IN AN ORGANIZED HEALTH CARE EDUCATION/TRAINING PROGRAM

## 2024-10-20 RX ORDER — LANOLIN ALCOHOL/MO/W.PET/CERES
400 CREAM (GRAM) TOPICAL 2 TIMES DAILY
Status: COMPLETED | OUTPATIENT
Start: 2024-10-20 | End: 2024-10-20

## 2024-10-20 RX ADMIN — Medication 400 MG: at 08:10

## 2024-10-20 RX ADMIN — ACETAMINOPHEN 1000 MG: 500 TABLET ORAL at 10:10

## 2024-10-20 RX ADMIN — LACTULOSE 10 G: 20 SOLUTION ORAL at 02:10

## 2024-10-20 RX ADMIN — ENOXAPARIN SODIUM 40 MG: 40 INJECTION SUBCUTANEOUS at 04:10

## 2024-10-20 RX ADMIN — INSULIN ASPART 2 UNITS: 100 INJECTION, SOLUTION INTRAVENOUS; SUBCUTANEOUS at 11:10

## 2024-10-20 RX ADMIN — TRAMADOL HYDROCHLORIDE 50 MG: 50 TABLET, COATED ORAL at 08:10

## 2024-10-20 RX ADMIN — ACETAMINOPHEN 1000 MG: 500 TABLET ORAL at 06:10

## 2024-10-20 RX ADMIN — ATORVASTATIN CALCIUM 40 MG: 40 TABLET, FILM COATED ORAL at 10:10

## 2024-10-20 RX ADMIN — ONDANSETRON 8 MG: 2 INJECTION INTRAMUSCULAR; INTRAVENOUS at 04:10

## 2024-10-20 RX ADMIN — Medication 400 MG: at 10:10

## 2024-10-20 RX ADMIN — LINACLOTIDE 290 MCG: 290 CAPSULE, GELATIN COATED ORAL at 06:10

## 2024-10-20 RX ADMIN — AMLODIPINE BESYLATE 5 MG: 5 TABLET ORAL at 08:10

## 2024-10-20 RX ADMIN — INSULIN ASPART 2 UNITS: 100 INJECTION, SOLUTION INTRAVENOUS; SUBCUTANEOUS at 05:10

## 2024-10-20 RX ADMIN — ACETAMINOPHEN 1000 MG: 500 TABLET ORAL at 02:10

## 2024-10-20 RX ADMIN — TRAMADOL HYDROCHLORIDE 50 MG: 50 TABLET, COATED ORAL at 04:10

## 2024-10-20 RX ADMIN — LACTULOSE 10 G: 20 SOLUTION ORAL at 08:10

## 2024-10-20 NOTE — PROGRESS NOTES
[facility]  Gastroenterology  Progress Note    Patient Name: Jill Perez  MRN: [id number]  Admission Date: 10/12/2024  Hospital Length of Stay: 8 days    Subjective:     HPI: Jill Perez is a 69 y.o. 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 CT initially showed concern for SBO with fecalization of terminal ileum and fluid distended loops of small bowel. Patient was transferred to [facility] for further surgical evaluation and management. Surgery does not believe patient has SBO and does not believe her significant constipation has to do with her surgical history and bypass anatomy. GI consulted to provide assistance with patient's history of dysmotility and constipation.     Denies N/V. Reports significant improvement after the empiric dilation done on 9/9/24 for her dysphagia but notices that symptoms are slowly returning. She takes linzess, miralax, movantik at home for her constipation. She takes norco 2-3 times a day at least for her chronic back pain.    Patient closely follows with Dr. Diggs in GI clinic and has seen Dr. Dumont for some of her outpatient scopes. She had a manometry study done in 1/2024 for workup of dysphagia showing concerns for EGJOO. Follow up EGD with [facility] on 9/9/24 was negative for EGJOO and she received empiric dilation of the GE junction to 20mm using balloon dilator. Last colonoscopy was in 6/2023 which was unremarkable and repeat recommended in 5 years.      Interval History:  GI reconsulted for concerns of constipation despite patient having diarrhea and repeat CT showing almost no stool to the point there there is only liquid stool noted in the colon suggestive of diarrheal illness. When patient is initially  asked about her bowel movements, she says that she is not having any at all. When clarifying with her, she will report that it is liquid coming out and is brown in color. We reiterated to the patient that these are in fact bowel movements even though they are liquid and brown in color and not well-formed stools.      Current Facility-Administered Medications on File Prior to Encounter   Medication Dose Route Frequency Provider Last Rate Last Admin    ceFAZolin 2 g in dextrose 5 % in water (D5W) 50 mL IVPB (MB+)  2 g Intravenous On Call Procedure Silvano Mak MD         Current Outpatient Medications on File Prior to Encounter   Medication Sig Dispense Refill    ALPRAZolam (XANAX) 0.5 MG tablet 1 Tablet 1 hour prior to procedure, then 1-2 tablets when you arrive to office  for procedural anxiety. 3 tablet 0    amLODIPine (NORVASC) 5 MG tablet Take 1 tablet (5 mg total) by mouth once daily. 90 tablet 3    atorvastatin (LIPITOR) 40 MG tablet Take 1 tablet (40 mg total) by mouth once daily. 90 tablet 2    blood sugar diagnostic (TRUE METRIX GLUCOSE TEST STRIP) Strp USE 3 TIMES A  each 12    blood sugar diagnostic Strp To check Blood Glucose 2 times daily, 100 each 0    blood-glucose meter (TRUE METRIX GLUCOSE METER) Misc USE 3 TIMES A DA 1 each 0    CYANOCOBALAMIN, VITAMIN B-12, ORAL Take 1 tablet by mouth once daily.      gabapentin (NEURONTIN) 800 MG tablet Take 1 tablet (800 mg total) by mouth 3 (three) times daily. 90 tablet 0    glipiZIDE (GLUCOTROL) 10 MG TR24 Take 1 tablet (10 mg total) by mouth daily with breakfast. 90 tablet 3    linaCLOtide (LINZESS) 290 mcg Cap capsule Take 1 capsule (290 mcg total) by mouth before breakfast. 30 capsule 11    metFORMIN (GLUCOPHAGE) 1000 MG tablet Take 1 tablet (1,000 mg total) by mouth 2 (two) times daily with meals. 180 tablet 2    naloxegoL (MOVANTIK) 12.5 mg Tab Take 12.5 mg by mouth once daily. 30 tablet 11    olmesartan (BENICAR) 40 MG tablet Take 1 tablet  (40 mg total) by mouth once daily. 90 tablet 3    ondansetron (ZOFRAN-ODT) 8 MG TbDL Take 1 tablet (8 mg total) by mouth every 12 (twelve) hours as needed. 30 tablet 2    venlafaxine (EFFEXOR-XR) 150 MG Cp24 Take 1 capsule (150 mg total) by mouth once daily. 90 capsule 3    vitamin E acetate (VITAMIN E ORAL) Take 1 tablet by mouth once daily.      zolpidem (AMBIEN) 10 mg Tab Take 1 tablet (10 mg total) by mouth nightly as needed (Insomnia). 90 tablet 0    acetaminophen (TYLENOL) 500 MG tablet Take 500 mg by mouth as needed for Pain.      COVID [id number],12up,,andu,,PF, (SPIKEVAX [id number],12Y UP,,PF,) 50 mcg/0.5 mL injection Inject into the muscle. 0.5 mL 0    diclofenac sodium (VOLTAREN ARTHRITIS PAIN) 1 % Gel Apply 2 g topically 4 (four) times daily. 100 g 3    diclofenac sodium (VOLTAREN ARTHRITIS PAIN) 1 % Gel Apply 2 g topically once daily. 100 g 0    HYDROcodone-acetaminophen (NORCO)  mg per tablet Take 1 tablet by mouth 4 (four) times daily as needed for pain 120 tablet 0    lancets (TRUEPLUS LANCETS) 30 gauge Misc USE 3 TIMES A  each 12    methocarbamoL (ROBAXIN) 750 MG Tab Take 1 tablet (750 mg total) by mouth 2 (two) times a day. 60 tablet 1    methocarbamoL (ROBAXIN) 750 MG Tab Take 1 tablet (750 mg total) by mouth 2 (two) times daily as needed. 60 tablet 0    multivitamin (THERAGRAN) per tablet Take 1 tablet by mouth once daily.      naloxone (NARCAN) 4 mg/actuation Spry 1 actuation in one nostril once;  may repeat dose in alternate nostrils q2-3min. Call  2 each 0    pantoprazole (PROTONIX) 40 MG tablet Take 1 tablet (40 mg total) by mouth 2 (two) times daily. (Patient taking differently: Take 40 mg by mouth once daily.) 180 tablet 3    polyethylene glycol (GLYCOLAX) 17 gram/dose powder Take 17 g by mouth every evening.         Review of patient's allergies indicates:   Allergen Reactions    Pcn [penicillins] Hives and Itching     Has tolerated cephalosporins    Penicillin Hives          Objective:     Vitals:    10/20/24 1624   BP:    Pulse:    Resp: 16   Temp:          Constitutional:  not in acute distress and well developed  HENT: Head: Normal, normocephalic, atraumatic.  Eyes: conjunctiva clear and sclera nonicteric  GI: soft, non-tender, without masses or organomegaly  Skin: normal color  Neurological: alert, oriented x3  Psychiatric: mood and affect are within normal limits, pt is a good historian; no memory problems were noted      Significant Labs:  Recent Labs   Lab 10/18/24  0252 10/19/24  0259 10/20/24  0257   HGB 10.9* 10.5* 10.3*       Lab Results   Component Value Date    WBC 6.21 10/20/2024    HGB 10.3 (L) 10/20/2024    HCT 31.5 (L) 10/20/2024    MCV 93 10/20/2024     10/20/2024       Lab Results   Component Value Date     10/20/2024    K 3.6 10/20/2024     (H) 10/20/2024    CO2 20 (L) 10/20/2024    BUN 8 10/20/2024    CREATININE 0.6 10/20/2024    CALCIUM 8.9 10/20/2024    ANIONGAP 6 (L) 10/20/2024    ESTGFRAFRICA >60 06/10/2022    EGFRNONAA >60 06/10/2022       Lab Results   Component Value Date    ALT 20 10/20/2024    AST 20 10/20/2024    ALKPHOS 80 10/20/2024    BILITOT 0.4 10/20/2024       Lab Results   Component Value Date    INR 0.9 04/22/2019       Significant Imaging:  Reviewed pertinent radiology findings.       Assessment/Plan:     Jill Perez is a 69 y.o. 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 CT initially showed concern for SBO with fecalization of terminal ileum and fluid distended loops of small bowel. Patient was transferred to [facility] for further surgical evaluation and management. Surgery does not believe patient has SBO and does not believe her significant constipation has to do  with her surgical history and bypass anatomy. GI consulted to provide assistance with patient's history of dysmotility and constipation.    She has been on aggressive bowel regimen since admission and GI signed off on 10/18 after pt has been consistently reporting liquid brown stool output. GI reconsulted for concern of constipation as pt has been reporting that she has not had any bowel movements since being in the hospital. When discussing this in detail with the patient, she reports it's only liquid brown output and not well-formed stool. We informed patient that liquid brown output is still a bowel movement which she understood. Repeat CT scan showing very minimal stool compared to prior CT and reports possible diarrheal illness and liquid stool in the colon.    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 Saadia Diggs in outpatient setting    Thank you for involving us in the care of Jill Perez. Please call with any additional questions, concerns or changes in the patient's clinical status.  We will sign off.    Patient seen and discussed with Dr. Justice.    Claude Rogers MD  Gastroenterology Fellow PGY-IV  [facility]

## 2024-10-20 NOTE — CARE UPDATE
General Surgery Care Update     Patient seen and examined. Imaging reviewed. No surgical intervention indicated at this time. Full consult note to follow.     Tonio Rubin MD   General Surgery, PGY4

## 2024-10-20 NOTE — CONSULTS
Please refer to Progress Note written on the same day. Only one consult note can be written per admission.

## 2024-10-20 NOTE — ASSESSMENT & PLAN NOTE
Patient is a 69y F w/ hx of RYGB, chronic constipation, gastroparesis, HTN, HLD, DMII (on insulin) who presents as transfer [facility] with abdominal pain, no BM in 4 days, no gas since yesterday. Transfer to [facility] for evaluation by the bariatric surgery team due to hx of RYGB and SBO.     - CT Abdomen and Pelvis with features of bowel obstruction with fluid distended loops of small bowel and fecalization of the terminal ileum.  Zone of transition is not confidently identified.  - repeat CT AP revealed Distended to minimally dilated left upper quadrant small bowel loops, possibly representing a localized ileus.  A low-grade mechanical obstruction not fully excluded.     - MRA abdomen unrevealing    - GI and general surgery re-consulted, appreciate recommendations

## 2024-10-20 NOTE — PROGRESS NOTES
[facility] Medicine  Progress Note    Patient Name: Jill Perez  MRN: [id number]  Patient Class: IP- Inpatient   Admission Date: 10/12/2024  Length of Stay: 8 days  Attending Physician: Elliott Brewer MD  Primary Care Provider: Soham Rosa MD    Subjective:     Principal Problem:Abdominal pain    HPI:  Jill Perez is a 69 y.o. 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 She continues to follow with GI for gastroparesis, dysphagia and constipation. She presented on 10/11/2024 to [facility] with worsening abdominal pain for the last 4 days and nausea, vomiting and constipation. CT scan was obtained which showed bowel obstruction with fluid distended loops of small bowel and fecalization of the terminal ileum, however the transition point was not confidently. She was transferred to [facility] for surgical services with the bariatric surgery group.     Overview/Hospital Course:  CT A/P on admission with features of bowel obstruction with fluid distended loops of small bowel and fecalization of the terminal ileum with no zone of transition is confidently identified.  Bariatric surgery thought fecalization of small bowel with what appears to be a chronic dysmotility issue with back-up extending from the TI to the level of the gastric pouch. Given no dilation of BP limb or remnant stomach and no evidence of transition point on imaging; bariatric surgerydid not believe this chronic issue to be 2/2 her bypass anatomy.  No acute surgical issue or any plans for surgical intervention in the near future and patient transferred to Hospital Medicine. Gastroenterology consulted for management of  global dysmotility issues.  Patient started on aggressive bowel regimen with continued refractory constipation/vomiting/nausea with Linzess, MiraLax, and bisacodyl suppositories.  Patient with continued refractory constipation and patient given on Relistor on 10/15 and 10/16.  Patient with worsening, nausea, and vomiting on evening of 10/16.  Repeat CT abdomen/pelvis ordered; no evidence of obstruction. However, despite aggressive bowel regimen patient did not pass any substantial stool and continued to have LLQ abdominal pain. GI and general surgery re-consulted for management recommendations.     Interval History: NAEON. AFVSS.   Patient evaluated at bedside, constipation and abdominal pain persist. Endorses feeling of tenesmus but no associated bowel movements. Consults placed to GI and general surgery again for updated recommendations.     Review of Systems   Constitutional:  Negative for chills and fever.   Respiratory:  Negative for chest tightness and shortness of breath.    Cardiovascular:  Negative for chest pain and leg swelling.   Gastrointestinal:  Positive for abdominal pain and constipation.   Neurological:  Negative for dizziness and weakness.     Objective:     Vital Signs (Most Recent):  Temp: 97.1 °F (36.2 °C) (10/20/24 1511)  Pulse: 68 (10/20/24 1511)  Resp: 18 (10/20/24 1511)  BP: (!) 100/53 (10/20/24 1511)  SpO2: 97 % (10/20/24 1511) Vital Signs (24h Range):  Temp:  [97.1 °F (36.2 °C)-98 °F (36.7 °C)] 97.1 °F (36.2 °C)  Pulse:  [62-74] 68  Resp:  [16-18] 18  SpO2:  [96 %-98 %] 97 %  BP: (100-156)/(53-83) 100/53     Weight: 68 kg (150 lb)  Body mass index is 26.57 kg/m².    Intake/Output Summary (Last 24 hours) at 10/20/2024 1525  Last data filed at 10/19/2024 2045  Gross per 24 hour   Intake --   Output 1 ml   Net -1 ml         Physical Exam  Vitals reviewed.   Constitutional:       General: She is not in acute distress.     Appearance: Normal appearance. She is not ill-appearing.   Eyes:       General: No scleral icterus.  Abdominal:      General: Abdomen is flat. Bowel sounds are normal. There is no distension.      Palpations: Abdomen is soft. There is no mass.      Tenderness: There is abdominal tenderness in the left upper quadrant and left lower quadrant.      Hernia: No hernia is present.   Skin:     General: Skin is warm and dry.   Neurological:      Mental Status: She is alert and oriented to person, place, and time. Mental status is at baseline.       Significant Labs: All pertinent labs within the past 24 hours have been reviewed.    Significant Imaging: I have reviewed all pertinent imaging results/findings within the past 24 hours.    Assessment/Plan:      * Abdominal pain  - See Chronic Idiopathic Constipation    Chronic idiopathic constipation  Patient is a 69y F w/ hx of RYGB, chronic constipation, gastroparesis, HTN, HLD, DMII (on insulin) who [facility] as [facility] with abdominal pain, no BM in 4 days, no gas since yesterday. Transfer to [facility] for evaluation by the bariatric surgery team due to hx of RYGB and SBO.     - CT Abdomen and Pelvis with features of bowel obstruction with fluid distended loops of small bowel and fecalization of the terminal ileum.  Zone of transition is not confidently identified.  - repeat CT AP revealed Distended to minimally dilated left upper quadrant small bowel loops, possibly representing a localized ileus.  A low-grade mechanical obstruction not fully excluded.     - MRA abdomen unrevealing    - GI and general surgery re-consulted, appreciate recommendations     Type 2 diabetes mellitus with diabetic autonomic neuropathy, without long-term current use of insulin  Patient's FSGs are controlled on current medication regimen.  Last A1c reviewed-   Lab Results   Component Value Date    HGBA1C 9.6 (H) 10/01/2024     Most recent fingerstick glucose reviewed-   Recent Labs   Lab 10/15/24  1551 10/15/24  2055 10/16/24  0743 10/16/24  1125   POCTGLUCOSE 196*  164* 203* 176*       Current correctional scale  Low  Maintain anti-hyperglycemic dose as follows-   Antihyperglycemics (From admission, onward)      Start     Stop Route Frequency Ordered    10/12/24 2219  insulin aspart U-100 pen 0-5 Units         -- SubQ Every 6 hours PRN 10/12/24 2120          - NM Gastric Emptying with delayed gastric emptying.   - Consider resuming gabapentin prn for neuropathy  - Hold Oral hypoglycemics while patient is in the hospital.  - Would benefit from ambulatory referral to endocrinology (placed on 10/13)    Hyperlipidemia associated with type 2 diabetes mellitus  - continue home atorvastatin 40mg qd    Primary hypertension  Patients blood pressure range in the last 24 hours was: BP  Min: 111/54  Max: 169/85.    - BP is uncontrolled, will adjust as follows: Home amlodipine 5 mg daily  (does not cause constipation)  - Continue to monitor vitals      VTE Risk Mitigation (From admission, onward)           Ordered     enoxaparin injection 40 mg  Daily         10/12/24 2059     IP VTE HIGH RISK PATIENT  Once         10/12/24 2059     Place sequential compression device  Until discontinued         10/12/24 2059                    Discharge Planning   JAMES: 10/21/2024     Code Status: Full Code   Is the patient medically ready for [facility]?:     Reason for patient still in hospital (select all that apply): Patient trending condition, Treatment, and Consult recommendations  Discharge Plan A: Home with family        Sylvie Calvo PA-C  Department of Hospital Medicine   [facility] - Surgery

## 2024-10-20 NOTE — PLAN OF CARE
Problem: Adult Inpatient Plan of Care  Goal: Plan of Care Review  Outcome: Progressing  Goal: Patient-Specific Goal (Individualized)  Outcome: Progressing  Goal: Absence of Hospital-Acquired Illness or Injury  Outcome: Progressing  Goal: Optimal Comfort and Wellbeing  Outcome: Progressing  Goal: Readiness for Transition of Care  Outcome: Progressing     Problem: Diabetes Comorbidity  Goal: Blood Glucose Level Within Targeted Range  Outcome: Progressing     Problem: Pain Acute  Goal: Optimal Pain Control and Function  Outcome: Progressing     Problem: Infection  Goal: Absence of Infection Signs and Symptoms  Outcome: Progressing    Pt is AAO x4 and is independent when completing ADLs such as using the toilet and medication administration. Throughout the shift the pt's chief complaint pertained to mild  to moderate pain of the abdomen radiating to the left side. Pt was given scheduled and PRN medications and was accepting of this intervention. Afterwards the pt was frequently checked per hospital policy as they are supine in bed with the wheels locked in place HOB elevated and call light near them. Plan of care will continue.

## 2024-10-21 VITALS
OXYGEN SATURATION: 97 % | DIASTOLIC BLOOD PRESSURE: 69 MMHG | HEART RATE: 67 BPM | SYSTOLIC BLOOD PRESSURE: 119 MMHG | BODY MASS INDEX: 26.58 KG/M2 | HEIGHT: 63 IN | RESPIRATION RATE: 18 BRPM | TEMPERATURE: 98 F | WEIGHT: 150 LBS

## 2024-10-21 LAB
ALBUMIN SERPL BCP-MCNC: 2.9 G/DL (ref 3.5–5.2)
ALP SERPL-CCNC: 83 U/L (ref 40–150)
ALT SERPL W/O P-5'-P-CCNC: 24 U/L (ref 10–44)
ANION GAP SERPL CALC-SCNC: 6 MMOL/L (ref 8–16)
AST SERPL-CCNC: 21 U/L (ref 10–40)
BASOPHILS # BLD AUTO: 0.02 K/UL (ref 0–0.2)
BASOPHILS NFR BLD: 0.4 % (ref 0–1.9)
BILIRUB SERPL-MCNC: 0.4 MG/DL (ref 0.1–1)
BUN SERPL-MCNC: 10 MG/DL (ref 8–23)
CALCIUM SERPL-MCNC: 8.7 MG/DL (ref 8.7–10.5)
CHLORIDE SERPL-SCNC: 112 MMOL/L (ref 95–110)
CO2 SERPL-SCNC: 23 MMOL/L (ref 23–29)
CREAT SERPL-MCNC: 0.6 MG/DL (ref 0.5–1.4)
DIFFERENTIAL METHOD BLD: ABNORMAL
EOSINOPHIL # BLD AUTO: 0.2 K/UL (ref 0–0.5)
EOSINOPHIL NFR BLD: 3.2 % (ref 0–8)
ERYTHROCYTE [DISTWIDTH] IN BLOOD BY AUTOMATED COUNT: 14.3 % (ref 11.5–14.5)
EST. GFR  (NO RACE VARIABLE): >60 ML/MIN/1.73 M^2
GLUCOSE SERPL-MCNC: 189 MG/DL (ref 70–110)
HCT VFR BLD AUTO: 31 % (ref 37–48.5)
HGB BLD-MCNC: 10.2 G/DL (ref 12–16)
IMM GRANULOCYTES # BLD AUTO: 0.01 K/UL (ref 0–0.04)
IMM GRANULOCYTES NFR BLD AUTO: 0.2 % (ref 0–0.5)
LYMPHOCYTES # BLD AUTO: 3.6 K/UL (ref 1–4.8)
LYMPHOCYTES NFR BLD: 63.1 % (ref 18–48)
MAGNESIUM SERPL-MCNC: 1.6 MG/DL (ref 1.6–2.6)
MCH RBC QN AUTO: 30.2 PG (ref 27–31)
MCHC RBC AUTO-ENTMCNC: 32.9 G/DL (ref 32–36)
MCV RBC AUTO: 92 FL (ref 82–98)
MONOCYTES # BLD AUTO: 0.6 K/UL (ref 0.3–1)
MONOCYTES NFR BLD: 9.9 % (ref 4–15)
NEUTROPHILS # BLD AUTO: 1.3 K/UL (ref 1.8–7.7)
NEUTROPHILS NFR BLD: 23.2 % (ref 38–73)
NRBC BLD-RTO: 0 /100 WBC
PHOSPHATE SERPL-MCNC: 2.6 MG/DL (ref 2.7–4.5)
PLATELET # BLD AUTO: 223 K/UL (ref 150–450)
PMV BLD AUTO: 10.3 FL (ref 9.2–12.9)
POCT GLUCOSE: 180 MG/DL (ref 70–110)
POCT GLUCOSE: 364 MG/DL (ref 70–110)
POTASSIUM SERPL-SCNC: 3.6 MMOL/L (ref 3.5–5.1)
PROT SERPL-MCNC: 5.6 G/DL (ref 6–8.4)
RBC # BLD AUTO: 3.38 M/UL (ref 4–5.4)
SODIUM SERPL-SCNC: 141 MMOL/L (ref 136–145)
WBC # BLD AUTO: 5.64 K/UL (ref 3.9–12.7)

## 2024-10-21 PROCEDURE — 84100 ASSAY OF PHOSPHORUS: CPT | Performed by: STUDENT IN AN ORGANIZED HEALTH CARE EDUCATION/TRAINING PROGRAM

## 2024-10-21 PROCEDURE — 25000003 PHARM REV CODE 250

## 2024-10-21 PROCEDURE — 83735 ASSAY OF MAGNESIUM: CPT | Performed by: STUDENT IN AN ORGANIZED HEALTH CARE EDUCATION/TRAINING PROGRAM

## 2024-10-21 PROCEDURE — 25000003 PHARM REV CODE 250: Performed by: STUDENT IN AN ORGANIZED HEALTH CARE EDUCATION/TRAINING PROGRAM

## 2024-10-21 PROCEDURE — 36415 COLL VENOUS BLD VENIPUNCTURE: CPT | Performed by: STUDENT IN AN ORGANIZED HEALTH CARE EDUCATION/TRAINING PROGRAM

## 2024-10-21 PROCEDURE — 80053 COMPREHEN METABOLIC PANEL: CPT | Performed by: STUDENT IN AN ORGANIZED HEALTH CARE EDUCATION/TRAINING PROGRAM

## 2024-10-21 PROCEDURE — 85025 COMPLETE CBC W/AUTO DIFF WBC: CPT | Performed by: STUDENT IN AN ORGANIZED HEALTH CARE EDUCATION/TRAINING PROGRAM

## 2024-10-21 PROCEDURE — 25000003 PHARM REV CODE 250: Performed by: HOSPITALIST

## 2024-10-21 RX ORDER — POLYETHYLENE GLYCOL 3350 17 G/17G
17 POWDER, FOR SOLUTION ORAL DAILY PRN
Start: 2024-10-21

## 2024-10-21 RX ADMIN — LINACLOTIDE 290 MCG: 290 CAPSULE, GELATIN COATED ORAL at 06:10

## 2024-10-21 RX ADMIN — TRAMADOL HYDROCHLORIDE 50 MG: 50 TABLET, COATED ORAL at 12:10

## 2024-10-21 RX ADMIN — ACETAMINOPHEN 1000 MG: 500 TABLET ORAL at 01:10

## 2024-10-21 RX ADMIN — ACETAMINOPHEN 1000 MG: 500 TABLET ORAL at 06:10

## 2024-10-21 RX ADMIN — AMLODIPINE BESYLATE 5 MG: 5 TABLET ORAL at 10:10

## 2024-10-21 NOTE — PLAN OF CARE
"  Problem: Adult Inpatient Plan of Care  Goal: Plan of Care Review  Outcome: Progressing  Goal: Patient-Specific Goal (Individualized)  Outcome: Progressing  Goal: Absence of Hospital-Acquired Illness or Injury  Outcome: Progressing  Goal: Optimal Comfort and Wellbeing  Outcome: Progressing  Goal: Readiness for Transition of Care  Outcome: Progressing     Problem: Diabetes Comorbidity  Goal: Blood Glucose Level Within Targeted Range  Outcome: Progressing     Problem: Pain Acute  Goal: Optimal Pain Control and Function  Outcome: Progressing     Problem: Infection  Goal: Absence of Infection Signs and Symptoms  Outcome: Progressing     Pt is AAO x4 as they were accepting of care and the ability to follow commands. The pt was able to do tasks independently such going to the bathroom, positioning in bed and medication administration. The pt's chief complaint was their pain of the L/side of their abdomen as it was described as "sharp and cramp-like, like needles going in my side."  In response to this, both scheduled and Prn medications were administered (scheduled: Tylenol two 500 mg tab and Prn: Tramadol 50 mg tab). Frequent pt checks were completed per hospital policy in regards to reassessing pain after medication administration, measuring urine output and assessing characteristic of skin as it was clean, dry and intact throughout all extremities. At this time the pt is asleep supine in bed with wheels locked in place with HOB lowered and call light in reach. Continue plan of care.  "
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
## 2024-10-21 NOTE — HPI
"Jill Perez is a 69 y.o. 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 She has not required any surgeries since that time but has continued to follow with GI for gastroparesis, dysphagia and constipation. She was previously evaluated by surgery this admission and was not found to have a bowel obstruction. Surgery reconsulted because the patient has reportedly not had a bowel movement since being here. She had a CT scan 10/16 which shows significant reduction in stool burden. The patient is having loose bowel movements, though she attributes this to enemas. She is tolerating PO, has no nausea/vomiting, is not distended and has only mild, intermittent pain.   "
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
## 2024-10-21 NOTE — ASSESSMENT & PLAN NOTE
Jill Perez is a a 70 yo F with complex surgical history, chronic constipation, gastroparesis, HTN, HLD, DMII (on insulin).    - She has no evidence of true mechanical bowel obstruction   - No surgical intervention indicated  - significant reduction in stool burden on recent CT scans and reportedly is having liquid BM  - MRA with normal vasculature  - management of chronic constipation/dysmotility per GI

## 2024-10-22 ENCOUNTER — TELEPHONE (OUTPATIENT)
Dept: SPINE | Facility: CLINIC | Age: 69
End: 2024-10-22
Payer: MEDICARE

## 2024-10-22 ENCOUNTER — PATIENT OUTREACH (OUTPATIENT)
Dept: ADMINISTRATIVE | Facility: CLINIC | Age: 69
End: 2024-10-22
Payer: MEDICARE

## 2024-10-22 NOTE — PROGRESS NOTES
C3 nurse attempted to contact Jill Hendricksce for a TCC post hospital [facility] follow up call. The patient is unable to conduct the call @ this time. The patient requested a callback.    The patient does not have a scheduled HOSFU appointment within 5-7 days post hospital [facility] date 10/21/24. Message sent to Physician staff to assist with HOSFU appointment scheduling.

## 2024-10-22 NOTE — PLAN OF CARE
[facility]  Discharge Final Note    Primary Care Provider: Soahm Rosa MD    Expected Discharge Date: 10/21/2024    Final Discharge Note (most recent)       Final Note - 10/21/24 1657          Final Note    Assessment Type Final Discharge Note     Anticipated Discharge Disposition Home or Self Care     Hospital Resources/Appts/Education Provided Provided patient/caregiver with written [facility] plan information;Provided education on problems/symptoms using teachback                   Future Appointments   Date Time Provider Department Center   10/23/2024  2:20 PM Abimbola Short, NP [facility]   11/12/2024  4:20 PM Doris Michael MD [facility] [place]   11/18/2024  8:00 AM Deangelo Weir MD [facility] [place]   12/9/2024  8:00 AM [facility]   2/3/2025  7:00 AM Soham Rosa MD [facility] [place]     Important Message from Medicare  Important Message from Medicare regarding Discharge Appeal Rights: Explained to patient/caregiver     Date IMM was signed: 10/21/24  Time IMM was signed: 1313    Contact Info       Soham Rosa MD   Specialty: Internal Medicine   Relationship: PCP - General    [address] [facility]  [place] [address]   Phone: [phone]       Next Steps: Follow up    Saadia Diggs MD   Specialty: Gastroenterology    [address]  [place] [address]   Phone: [phone]       Next Steps: Follow up

## 2024-10-23 NOTE — DISCHARGE SUMMARY
Carlos Fofana - [facility] Medicine  Discharge Summary      Patient Name: Jill Perez  MRN: [id number]  SINDI: [id number]  Patient Class: IP- Inpatient  Admission Date: 10/12/2024  Hospital Length of Stay: 9 days  Discharge Date and Time: 10/21/2024  4:57 PM  Attending Physician: Sherrie att. providers found   Discharging Provider: Sylvie Calvo PA-C  Primary Care Provider: Soham Rosa MD  [facility] Medicine Team: [facility]OS[facility] MED F Sylvie Calvo PA-C  Primary Care Team: [facility] MED     HPI:   Jill Perez is a 69 y.o. 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 She continues to follow with GI for gastroparesis, dysphagia and constipation. She presented on 10/11/2024 to [facility] with worsening abdominal pain for the last 4 days and nausea, vomiting and constipation. CT scan was obtained which showed bowel obstruction with fluid distended loops of small bowel and fecalization of the terminal ileum, however the transition point was not confidently. She was transferred to [facility] for surgical services with the bariatric surgery group.     * No surgery found *      Hospital Course:   CT A/P on admission with features of bowel obstruction with fluid distended loops of small bowel and fecalization of the terminal ileum with no zone of transition is confidently identified.  Bariatric surgery thought fecalization of small bowel with what appears to be a chronic dysmotility issue with back-up extending from the TI to the level of the gastric pouch. Given no dilation of BP limb or remnant stomach and no evidence of transition point on imaging; bariatric surgerydid not believe this chronic issue to be 2/2  her bypass anatomy.  No acute surgical issue or any plans for surgical intervention in the near future and patient transferred to Hospital Medicine. Gastroenterology consulted for management of global dysmotility issues.  Patient started on aggressive bowel regimen with continued refractory constipation/vomiting/nausea with Linzess, MiraLax, and bisacodyl suppositories.  Patient with continued refractory constipation and patient given on Relistor on 10/15 and 10/16.  Patient with worsening, nausea, and vomiting on evening of 10/16.  Repeat CT abdomen/pelvis ordered; no evidence of obstruction. However, despite aggressive bowel regimen patient did not pass any substantial stool and continued to have LLQ abdominal pain. GI and general surgery re-consulted for management recommendations. No surgical or endocscopic interventions indicated. GI recommended stopping laxatives and only continuing her home linzess and movantik as her watery stools are likely from laxative over use. In comparing admission CT to repeat her stool burden had significantly improved. Cleared patient for [facility] with outpatient GI follow up, referral placed.     On day of [facility] patient's vital signs were stable and patient appeared clinically ready for [facility]. Hospital course, [facility] plan and return precautions discussed - patient expressed understanding. All questions answered at that time.     Physical Exam  Gen: in NAD, appears stated age  Neuro: AAOx3, motor, sensory, and strength grossly intact BL  HEENT: EOMI, PERRLA; no JVD appreciated  CVS: RRR, no m/r/g  Resp: lungs CTAB, no w/r/r; no belabored breathing or accessory muscle use appreciated   Abd: non distended, slightly tender to palpation of LUQ - no guarding or rebound tenderness   Extrem: no UE or LE edema BL     Goals of Care Treatment Preferences:  Code Status: Full Code         Consults:   Consults (From admission, onward)          Status Ordering Provider     Inpatient  consult to General Surgery  Once        Provider:  (Not yet assigned)    Completed BALBIR POLK     Inpatient consult to Gastroenterology  Once        Provider:  (Not yet assigned)    Completed BALBIR POLK     Inpatient consult to Midline team  Once        Provider:  (Not yet assigned)    Completed PATRICK NDIAYE     Inpatient consult to Registered Dietitian/Nutritionist  Once        Provider:  (Not yet assigned)    Completed SAMAD, MAWADAH     Inpatient consult to Hospital Medicine-General  Once        Provider:  (Not yet assigned)    Completed MICHELLE GUERRA     Inpatient consult to Gastroenterology  Once        Provider:  (Not yet assigned)    Completed MICHELLE GUERRA            No new Assessment & Plan notes have been filed under this hospital service since the last note was generated.  Service: Hospital Medicine    Final Active Diagnoses:    Diagnosis Date Noted POA    PRINCIPAL PROBLEM:  Abdominal pain [R10.9] 09/18/2023 Yes    Moderate protein-calorie malnutrition [E44.0] 10/14/2024 Yes    Chronic idiopathic constipation [K59.04] 10/12/2024 Yes    Type 2 diabetes mellitus with diabetic autonomic neuropathy, without long-term current use of insulin [E11.43] 05/22/2023 Yes     Chronic    Primary hypertension [I10] 10/23/2018 Yes     Chronic    Hyperlipidemia associated with type 2 diabetes mellitus [E11.69, E78.5] 10/23/2018 Yes     Chronic      Problems Resolved During this Admission:    Diagnosis Date Noted Date Resolved POA    Diabetes mellitus with gastroparesis [E11.43] 12/20/2023 10/13/2024 Yes    Type 2 diabetes mellitus with hyperglycemia, without long-term current use of insulin [E11.65] 10/23/2018 10/13/2024 Yes     Chronic       Discharged Condition: good    Disposition: Home or Self Care    Follow Up:   Follow-up Information       Soham Rosa MD Follow up.    Specialty: Internal Medicine  Contact information:  [address] [place]  [address]  [phone]               Abby Hand MD Follow up.    Specialty: Gastroenterology  Contact information:  [address]  [facility] [address]  [phone]                           Patient Instructions:      Ambulatory referral/consult to Endocrinology   Standing Status: Future   Referral Priority: Routine Referral Type: Consultation   Requested Specialty: Endocrinology   Number of Visits Requested: 1     Ambulatory referral/consult to Gastroenterology   Standing Status: Future   Referral Priority: Urgent Referral Type: Consultation   Referral Reason: Specialty Services Required   Referred to Provider: ABBY HAND Requested Specialty: Gastroenterology   Number of Visits Requested: 1     Diet diabetic     Notify your health care provider if you experience any of the following:  temperature >100.4     Notify your health care provider if you experience any of the following:  persistent nausea and vomiting or diarrhea     Notify your health care provider if you experience any of the following:  severe uncontrolled pain     Notify your health care provider if you experience any of the following:  difficulty breathing or increased cough     Notify your health care provider if you experience any of the following:  severe persistent headache     Notify your health care provider if you experience any of the following:  worsening rash     Notify your health care provider if you experience any of the following:  persistent dizziness, light-headedness, or visual disturbances     Notify your health care provider if you experience any of the following:  increased confusion or weakness     Activity as tolerated       Significant Diagnostic Studies:   Lab Results   Component Value Date    WBC 5.64 10/21/2024    HGB 10.2 (L) 10/21/2024    HCT 31.0 (L) 10/21/2024    MCV 92 10/21/2024     10/21/2024       BMP  Lab Results   Component Value Date     10/21/2024    K 3.6 10/21/2024     (H)  10/21/2024    CO2 23 10/21/2024    BUN 10 10/21/2024    CREATININE 0.6 10/21/2024    CALCIUM 8.7 10/21/2024    ANIONGAP 6 (L) 10/21/2024    EGFRNORACEVR >60.0 10/21/2024       MRA Abdomen w/wo Contrast  Narrative: EXAMINATION:  MRA ABDOMEN W/WO CONTRAST    CLINICAL HISTORY:  abdominal pain and constipation, CT with some concern for mechanical obstruction. concern for vascular involvement causing symptoms;    TECHNIQUE:  MR angiogram of the abdomen performed utilizing 10 mL Gadavist intravenous contrast.    COMPARISON:  CT abdomen pelvis 10/16/2024    FINDINGS:  Normal course and caliber of abdominal aorta with no stenosis or aneurysm.  Celiac trunk, SMA and HAIM are patent with normal caliber.  Bilateral renal arteries and visualized bilateral iliac arterial systems are unremarkable.  No evidence for abdominal aortic dissection.    Limited evaluation of intra-abdominal organs shows no abnormality.  Postoperative changes noted in the lumbar spine.  Impression: Normal MR angiogram of the abdomen.    Electronically signed by resident: Mikki Conte  Date:    10/19/2024  Time:    16:36    Electronically signed by: Cameron Florez MD  Date:    10/19/2024  Time:    17:07    Results for orders placed or performed during the hospital encounter of 10/12/24 (from the past 2160 hours)   CT Abdomen Pelvis With IV Contrast NO Oral Contrast    Narrative    EXAMINATION:  CT ABDOMEN PELVIS WITH IV CONTRAST    CLINICAL HISTORY:  Bowel obstruction suspected;    TECHNIQUE:  Axial images of the abdomen and pelvis were acquired  after the use of 75 mL Omnipaque 350 IV contrast. No oral contrast was administered.  Coronal and sagittal reconstructions were also obtained.    COMPARISON:  CT abdomen pelvis 10/11/2024.    FINDINGS:  Abdomen CT and Pelvis CT:    Examination is degraded by streak artifact.    LUNG BASES: Heart is normal size.  Coronary and aortic valve calcification.  Lung bases demonstrate scattered subsegmental  atelectasis.    HEPATOBILIARY: Liver is normal size and attenuation without focal abnormality.  Gallbladder is surgically absent.  Continued prominence of the CBD with mild intrahepatic bile duct dilatation, likely reservoir effect in the setting of cholecystectomy.    SPLEEN: Surgically absent.    PANCREAS: Diffuse parenchymal fatty infiltration.  No mass or duct dilatation.    ADRENALS: No adrenal nodules.    KIDNEYS/URETERS: Kidneys enhance symmetrically.  No hydronephrosis or renal stones.  No solid mass lesions.    BLADDER/PELVIC ORGANS: Bladder is moderately distended.  No bladder wall thickening.  Uterus is unremarkable.  No adnexal lesions.    PERITONEUM / RETROPERITONEUM: No free fluid or organized collections.  No free air.    LYMPH NODES: No lymphadenopathy.    VESSELS: Mild atherosclerosis.  Portal veins appear patent.    GI TRACT: Prior Priya-en-Y gastric bypass, hiatal hernia repair, and partial small-bowel resection.  No bowel wall thickening or inflammatory change.  There are some minimally dilated, gas-filled and/or fluid-filled, small bowel loops in the left upper quadrant.  Liquid stool in the colon.  Appendix is not well delineated, may be surgically absent.    SOFT TISSUES: Anasarca.  Marked paraspinal muscle atrophy.    BONES: Diffuse osteopenia.  Prior L4-S1 posterior lumbar fusion.  No acute displaced fractures or osseous lesions.      Impression    Abdomen CT and Pelvis CT:    No evidence of bowel obstruction.  No bowel wall thickening or inflammation.    Liquid stool in the colon, suggestive of diarrheal illness.    Postsurgical changes about the stomach and bowel.    Distended to minimally dilated left upper quadrant small bowel loops, possibly representing a localized ileus.  A low-grade mechanical obstruction not fully excluded.  Correlate clinically.  Consider short-term follow-up imaging if clinically warranted.    Other findings as detailed above.    Electronically signed by resident:  Albert Weeks  Date:    10/16/2024  Time:    22:32    Electronically signed by: Deandre Guzman  Date:    10/16/2024  Time:    23:49   Results for orders placed or performed during the hospital encounter of 08/26/24 (from the past 2160 hours)   CT Lumbar Spine Without Contrast    Narrative    EXAMINATION:  CT LUMBAR SPINE WITHOUT CONTRAST    CLINICAL HISTORY:  Low back pain, trauma;    TECHNIQUE:  Low-dose axial, sagittal and coronal reformations are obtained through the lumbar spine.  Contrast was not administered.    COMPARISON:  Lumbar spine MRI performed 01/24/2024.    FINDINGS:  Additional comment: Transitional lumbosacral anatomy is suggested.  Note that there appears to be a partially unfused right transverse process L1.  There is a rudimentary S1-S2 disc.    Prior surgery: Redemonstrated operative sequela of L4-S1 posterior instrumented fusion.  Paired transpedicular screws secured with vertical stabilization rods are noted.  Intervening horizontal stabilization rods additionally noted.  No definite hardware fracture or other complication is appreciated.  Evidence of prior laminectomy at L4-L5.  Question post foraminotomy sequela at L4-5 and L5-S1.  Interbody grafting has been performed at L4-5 and L5-S1 with apparent solid osseous incorporation across the graft material.      Fractures: No acute lumbar spine fracture is present.    Alignment: There is no significant vertebral subluxation.    Discs: Operative sequela at L4-5 and L5-S1, as above.  Degenerative disease elsewhere, discussed further below.    Vertebral bodies: Query osseous demineralization.  Operative sequela at L4-S1, as above.  Degenerative endplate change elsewhere.    Paraspinal soft tissues: Unremarkable.    Although CT is inferior to MRI in the evaluation of spinal canal and foraminal soft tissues, level-wise findings are as follows:    L1-L2: There is no significant spinal canal or foraminal stenosis. Shallow diffuse disc bulge and mild  facet arthropathy with ligamentum flavum thickening.    L2-L3: There is no significant spinal canal or foraminal stenosis. Shallow diffuse disc bulge and mild facet arthropathy with ligamentum flavum thickening.    L3-L4: Diffuse disc bulge with moderate facet arthropathy and ligamentum flavum thickening.  Mild central spinal canal stenosis.  Mild bilateral foraminal narrowing    L4-L5: Operative sequela as above.  Spinal canal and foramina patent.    L5-S1: Operative sequela, as above.  Spinal canal and foramina patent.    Imaged sacroiliac joints: Degenerative changes.    Imaged abdomen, pelvis, and retroperitoneum: Postoperative/posttreatment sequela are suggested thin right posterior hemiabdomen/pelvis with relative streak artifact.  Aortoiliac atherosclerotic calcification.  Suspect prior cholecystectomy.  Pancreatic atrophy.  Nonspecific bilateral perinephric stranding.  Nonspecific coarse calcification in the left paramedian paravertebral soft tissues at the level of the iliac bones.      Impression    1. No evidence of acute fracture or traumatic subluxation.  2. Postoperative and degenerative findings, as discussed.      Electronically signed by: Teo Ricci  Date:    08/26/2024  Time:    13:58   CT Cervical Spine Without Contrast    Narrative    EXAMINATION:  CT HEAD WITHOUT CONTRAST; CT CERVICAL SPINE WITHOUT CONTRAST    CLINICAL HISTORY:  Head trauma, minor (Age >= 65y);; Neck trauma (Age >= 65y);    TECHNIQUE:  Low dose axial images were obtained through the head and cervical spine.  Coronal and sagittal reformations were also performed. Contrast was not administered.    COMPARISON:  CT head performed 02/18/2023.    FINDINGS:  Head:    Blood: No acute intracranial hemorrhage.    Parenchyma: No definite loss of gray-white differentiation to suggest acute or subacute transcortical infarct.    Ventricles/Extra-axial spaces: No abnormal extra-axial fluid collection. Basal cisterns are patent.    Vessels:  Mild-to-moderate atherosclerotic calcifications.    Orbits: Unremarkable.    Scalp: Unremarkable.    Skull: There are no depressed skull fractures or destructive bone lesions.    Sinuses and mastoids: Scattered relatively modest paranasal sinus mucosal thickening with small retention cysts.  Nonspecific partial opacification of the dependent right mastoid air cells, similar to remote study of 02/18/2023, possibly on the basis of effusion and/or mucosal thickening.    Other findings: None    Cervical spine:    Fractures: No acute fractures    Alignment: There is no significant vertebral subluxation  Atlanto-axial and atlanto-occipital joints: Atlanto-axial and atlanto-occipital intervals are not widened.  Facet joints: There is no traumatic facet joint widening.  Vertebral bodies: Normal  Discs: Degenerative disc disease.  Spinal canal and foraminal narrowing: Although CT does not optimally evaluate the soft tissue contents of the spinal canal and foramina, no critical stenosis is suggested.      Paraspinal soft tissues: Nonspecific calcification dependently within the left lateral nasopharyngeal recess.    Upper Lungs:No definite acute abnormality.      Impression    1. No acute intracranial findings.  2. No acute cervical spine fracture.      Electronically signed by: Teo Ricci  Date:    08/26/2024  Time:    12:00   CT Head Without Contrast    Narrative    EXAMINATION:  CT HEAD WITHOUT CONTRAST; CT CERVICAL SPINE WITHOUT CONTRAST    CLINICAL HISTORY:  Head trauma, minor (Age >= 65y);; Neck trauma (Age >= 65y);    TECHNIQUE:  Low dose axial images were obtained through the head and cervical spine.  Coronal and sagittal reformations were also performed. Contrast was not administered.    COMPARISON:  CT head performed 02/18/2023.    FINDINGS:  Head:    Blood: No acute intracranial hemorrhage.    Parenchyma: No definite loss of gray-white differentiation to suggest acute or subacute transcortical  infarct.    Ventricles/Extra-axial spaces: No abnormal extra-axial fluid collection. Basal cisterns are patent.    Vessels: Mild-to-moderate atherosclerotic calcifications.    Orbits: Unremarkable.    Scalp: Unremarkable.    Skull: There are no depressed skull fractures or destructive bone lesions.    Sinuses and mastoids: Scattered relatively modest paranasal sinus mucosal thickening with small retention cysts.  Nonspecific partial opacification of the dependent right mastoid air cells, similar to remote study of 02/18/2023, possibly on the basis of effusion and/or mucosal thickening.    Other findings: None    Cervical spine:    Fractures: No acute fractures    Alignment: There is no significant vertebral subluxation  Atlanto-axial and atlanto-occipital joints: Atlanto-axial and atlanto-occipital intervals are not widened.  Facet joints: There is no traumatic facet joint widening.  Vertebral bodies: Normal  Discs: Degenerative disc disease.  Spinal canal and foraminal narrowing: Although CT does not optimally evaluate the soft tissue contents of the spinal canal and foramina, no critical stenosis is suggested.      Paraspinal soft tissues: Nonspecific calcification dependently within the left lateral nasopharyngeal recess.    Upper Lungs:No definite acute abnormality.      Impression    1. No acute intracranial findings.  2. No acute cervical spine fracture.      Electronically signed by: Teo Ricci  Date:    08/26/2024  Time:    12:00     *Note: Due to a large number of results and/or encounters for the requested time period, some results have not been displayed. A complete set of results can be found in Results Review.         Pending Diagnostic Studies:       None           Medications:  Reconciled Home Medications:      Medication List        CHANGE how you take these medications      diclofenac sodium 1 % Gel  Commonly known as: VOLTAREN ARTHRITIS PAIN  Apply 2 g topically once daily.  What changed:  Another medication with the same name was removed. Continue taking this medication, and follow the directions you see here.     methocarbamoL 750 MG Tab  Commonly known as: ROBAXIN  Take 1 tablet (750 mg total) by mouth 2 (two) times daily as needed.  What changed: Another medication with the same name was removed. Continue taking this medication, and follow the directions you see here.     pantoprazole 40 MG tablet  Commonly known as: PROTONIX  Take 1 tablet (40 mg total) by mouth 2 (two) times daily.  What changed: when to take this     polyethylene glycol 17 gram/dose powder  Commonly known as: GLYCOLAX  Take 17 g by mouth daily as needed.  What changed:   when to take this  reasons to take this            CONTINUE taking these medications      acetaminophen 500 MG tablet  Commonly known as: TYLENOL  Take 500 mg by mouth as needed for Pain.     amLODIPine 5 MG tablet  Commonly known as: NORVASC  Take 1 tablet (5 mg total) by mouth once daily.     atorvastatin 40 MG tablet  Commonly known as: LIPITOR  Take 1 tablet (40 mg total) by mouth once daily.     CYANOCOBALAMIN (VITAMIN B-12) ORAL  Take 1 tablet by mouth once daily.     EASY TOUCH TWIST LANCETS 30 gauge Misc  Generic drug: lancets  USE 3 TIMES A DAY     gabapentin 800 MG tablet  Commonly known as: NEURONTIN  Take 1 tablet (800 mg total) by mouth 3 (three) times daily.     glipiZIDE 10 MG Tr24  Commonly known as: GLUCOTROL  Take 1 tablet (10 mg total) by mouth daily with breakfast.     HYDROcodone-acetaminophen  mg per tablet  Commonly known as: NORCO  Take 1 tablet by mouth 4 (four) times daily as needed for pain     LINZESS 290 mcg Cap capsule  Generic drug: linaCLOtide  Take 1 capsule (290 mcg total) by mouth before breakfast.     metFORMIN 1000 MG tablet  Commonly known as: GLUCOPHAGE  Take 1 tablet (1,000 mg total) by mouth 2 (two) times daily with meals.     MOVANTIK 12.5 mg Tab  Generic drug: naloxegoL  Take 12.5 mg by mouth once daily.      multivitamin per tablet  Commonly known as: THERAGRAN  Take 1 tablet by mouth once daily.     naloxone 4 mg/actuation Spry  Commonly known as: NARCAN  1 actuation in one nostril once;  may repeat dose in alternate nostrils q2-3min. Call      olmesartan 40 MG tablet  Commonly known as: BENICAR  Take 1 tablet (40 mg total) by mouth once daily.     ondansetron 8 MG Tbdl  Commonly known as: ZOFRAN-ODT  Take 1 tablet (8 mg total) by mouth every 12 (twelve) hours as needed.     TRUE METRIX GLUCOSE METER Misc  Generic drug: blood-glucose meter  USE 3 TIMES A DA     * TRUE METRIX GLUCOSE TEST STRIP Strp  Generic drug: blood sugar diagnostic  USE 3 TIMES A DAY     * blood sugar diagnostic Strp  To check Blood Glucose 2 times daily,     venlafaxine 150 MG Cp24  Commonly known as: EFFEXOR-XR  Take 1 capsule (150 mg total) by mouth once daily.     VITAMIN E ORAL  Take 1 tablet by mouth once daily.     zolpidem 10 mg Tab  Commonly known as: AMBIEN  Take 1 tablet (10 mg total) by mouth nightly as needed (Insomnia).           * This list has 2 medication(s) that are the same as other medications prescribed for you. Read the directions carefully, and ask your doctor or other care provider to review them with you.                STOP taking these medications      ALPRAZolam 0.5 MG tablet  Commonly known as: XANAX     SPIKEVAX [id number](12Y UP)(PF) 50 mcg/0.5 mL injection  Generic drug: COVID [id number](12up)(andu)(PF)              Indwelling Lines/Drains at time of [facility]:   Lines/Drains/Airways       None                   Time spent on the [facility] of patient: 36 minutes         Sylvie Calvo PA-C  Department of Hospital Medicine  [facility]

## 2024-10-24 ENCOUNTER — OUTPATIENT CASE MANAGEMENT (OUTPATIENT)
Dept: ADMINISTRATIVE | Facility: OTHER | Age: 69
End: 2024-10-24
Payer: MEDICARE

## 2024-10-28 ENCOUNTER — TELEPHONE (OUTPATIENT)
Dept: GASTROENTEROLOGY | Facility: CLINIC | Age: 69
End: 2024-10-28
Payer: MEDICARE

## 2024-10-28 ENCOUNTER — OUTPATIENT CASE MANAGEMENT (OUTPATIENT)
Dept: ADMINISTRATIVE | Facility: OTHER | Age: 69
End: 2024-10-28
Payer: MEDICARE

## 2024-10-28 ENCOUNTER — TELEPHONE (OUTPATIENT)
Dept: INTERNAL MEDICINE | Facility: CLINIC | Age: 69
End: 2024-10-28
Payer: MEDICARE

## 2024-10-28 DIAGNOSIS — E11.43 TYPE 2 DIABETES MELLITUS WITH DIABETIC AUTONOMIC NEUROPATHY, WITHOUT LONG-TERM CURRENT USE OF INSULIN: Primary | Chronic | ICD-10-CM

## 2024-10-28 DIAGNOSIS — I10 PRIMARY HYPERTENSION: ICD-10-CM

## 2024-10-30 ENCOUNTER — TELEPHONE (OUTPATIENT)
Dept: INTERNAL MEDICINE | Facility: CLINIC | Age: 69
End: 2024-10-30
Payer: MEDICARE

## 2024-10-30 ENCOUNTER — OFFICE VISIT (OUTPATIENT)
Dept: OPTOMETRY | Facility: CLINIC | Age: 69
End: 2024-10-30
Payer: COMMERCIAL

## 2024-10-30 DIAGNOSIS — E11.36 TYPE 2 DIABETES MELLITUS WITH CATARACT: ICD-10-CM

## 2024-10-30 DIAGNOSIS — E11.9 TYPE 2 DIABETES MELLITUS WITHOUT RETINOPATHY: Primary | ICD-10-CM

## 2024-10-30 DIAGNOSIS — H43.393 VISUAL FLOATERS, BILATERAL: ICD-10-CM

## 2024-10-30 DIAGNOSIS — H25.13 NUCLEAR SCLEROSIS OF BOTH EYES: ICD-10-CM

## 2024-10-30 DIAGNOSIS — H25.042 POSTERIOR SUBCAPSULAR AGE-RELATED CATARACT OF LEFT EYE: ICD-10-CM

## 2024-10-30 DIAGNOSIS — H52.4 BILATERAL PRESBYOPIA: ICD-10-CM

## 2024-10-30 PROCEDURE — 3046F HEMOGLOBIN A1C LEVEL >9.0%: CPT | Mod: CPTII,S$GLB,, | Performed by: OPTOMETRIST

## 2024-10-30 PROCEDURE — 2023F DILAT RTA XM W/O RTNOPTHY: CPT | Mod: CPTII,S$GLB,, | Performed by: OPTOMETRIST

## 2024-10-30 PROCEDURE — 1160F RVW MEDS BY RX/DR IN RCRD: CPT | Mod: CPTII,S$GLB,, | Performed by: OPTOMETRIST

## 2024-10-30 PROCEDURE — 92015 DETERMINE REFRACTIVE STATE: CPT | Mod: S$GLB,,, | Performed by: OPTOMETRIST

## 2024-10-30 PROCEDURE — 99999 PR PBB SHADOW E&M-EST. PATIENT-LVL II: CPT | Mod: PBBFAC,,, | Performed by: OPTOMETRIST

## 2024-10-30 PROCEDURE — 92014 COMPRE OPH EXAM EST PT 1/>: CPT | Mod: S$GLB,,, | Performed by: OPTOMETRIST

## 2024-10-30 PROCEDURE — 1159F MED LIST DOCD IN RCRD: CPT | Mod: CPTII,S$GLB,, | Performed by: OPTOMETRIST

## 2024-11-04 ENCOUNTER — OUTPATIENT CASE MANAGEMENT (OUTPATIENT)
Dept: ADMINISTRATIVE | Facility: OTHER | Age: 69
End: 2024-11-04
Payer: MEDICARE

## 2024-11-05 ENCOUNTER — PATIENT MESSAGE (OUTPATIENT)
Dept: ADMINISTRATIVE | Facility: OTHER | Age: 69
End: 2024-11-05
Payer: MEDICARE

## 2024-11-05 NOTE — PROGRESS NOTES
Outpatient Care Management  Plan of Care Follow Up Visit    Patient: Jill Perez  MRN: [id number]  Date of Service: 11/04/2024  Completed by: Loree Wood RN  Referral Date: 09/22/2023    Reason for Visit   Patient presents with    Update Plan Of Care    OPCM Chart Review       Brief Summary:   OPCM RN follow-up call completed.   Continue education on DM/Constipation.  Next Steps: Patient is in agreement to follow-up call on or around 11//11/2024.

## 2024-11-05 NOTE — PROGRESS NOTES
11/5/2024  1st attempt to complete Initial Assessment for Outpatient Care Management, left message. Attempt letter sent through portal.

## 2024-11-06 ENCOUNTER — TELEPHONE (OUTPATIENT)
Dept: INTERNAL MEDICINE | Facility: CLINIC | Age: 69
End: 2024-11-06
Payer: MEDICARE

## 2024-11-11 ENCOUNTER — OUTPATIENT CASE MANAGEMENT (OUTPATIENT)
Dept: ADMINISTRATIVE | Facility: OTHER | Age: 69
End: 2024-11-11
Payer: MEDICARE

## 2024-11-12 ENCOUNTER — TELEPHONE (OUTPATIENT)
Dept: INTERNAL MEDICINE | Facility: CLINIC | Age: 69
End: 2024-11-12
Payer: MEDICARE

## 2024-11-12 ENCOUNTER — OFFICE VISIT (OUTPATIENT)
Dept: RHEUMATOLOGY | Facility: CLINIC | Age: 69
End: 2024-11-12
Payer: COMMERCIAL

## 2024-11-12 ENCOUNTER — OUTPATIENT CASE MANAGEMENT (OUTPATIENT)
Dept: ADMINISTRATIVE | Facility: OTHER | Age: 69
End: 2024-11-12
Payer: MEDICARE

## 2024-11-12 VITALS
HEART RATE: 62 BPM | SYSTOLIC BLOOD PRESSURE: 144 MMHG | WEIGHT: 168 LBS | HEIGHT: 63 IN | BODY MASS INDEX: 29.77 KG/M2 | DIASTOLIC BLOOD PRESSURE: 81 MMHG

## 2024-11-12 DIAGNOSIS — F32.9 CURRENT EPISODE OF MAJOR DEPRESSIVE DISORDER WITHOUT PRIOR EPISODE, UNSPECIFIED DEPRESSION EPISODE SEVERITY: Chronic | ICD-10-CM

## 2024-11-12 DIAGNOSIS — I73.00 RAYNAUD'S DISEASE WITHOUT GANGRENE: Primary | ICD-10-CM

## 2024-11-12 DIAGNOSIS — F33.0 MAJOR DEPRESSIVE DISORDER, RECURRENT, MILD: Primary | Chronic | ICD-10-CM

## 2024-11-12 DIAGNOSIS — M25.50 ARTHRALGIA, UNSPECIFIED JOINT: ICD-10-CM

## 2024-11-12 PROCEDURE — 99999 PR PBB SHADOW E&M-EST. PATIENT-LVL IV: CPT | Mod: PBBFAC,,, | Performed by: STUDENT IN AN ORGANIZED HEALTH CARE EDUCATION/TRAINING PROGRAM

## 2024-11-12 PROCEDURE — 1100F PTFALLS ASSESS-DOCD GE2>/YR: CPT | Mod: CPTII,S$GLB,, | Performed by: STUDENT IN AN ORGANIZED HEALTH CARE EDUCATION/TRAINING PROGRAM

## 2024-11-12 PROCEDURE — 1111F DSCHRG MED/CURRENT MED MERGE: CPT | Mod: CPTII,S$GLB,, | Performed by: STUDENT IN AN ORGANIZED HEALTH CARE EDUCATION/TRAINING PROGRAM

## 2024-11-12 PROCEDURE — 1125F AMNT PAIN NOTED PAIN PRSNT: CPT | Mod: CPTII,S$GLB,, | Performed by: STUDENT IN AN ORGANIZED HEALTH CARE EDUCATION/TRAINING PROGRAM

## 2024-11-12 PROCEDURE — 3288F FALL RISK ASSESSMENT DOCD: CPT | Mod: CPTII,S$GLB,, | Performed by: STUDENT IN AN ORGANIZED HEALTH CARE EDUCATION/TRAINING PROGRAM

## 2024-11-12 PROCEDURE — 3079F DIAST BP 80-89 MM HG: CPT | Mod: CPTII,S$GLB,, | Performed by: STUDENT IN AN ORGANIZED HEALTH CARE EDUCATION/TRAINING PROGRAM

## 2024-11-12 PROCEDURE — 99214 OFFICE O/P EST MOD 30 MIN: CPT | Mod: S$GLB,,, | Performed by: STUDENT IN AN ORGANIZED HEALTH CARE EDUCATION/TRAINING PROGRAM

## 2024-11-12 PROCEDURE — 1159F MED LIST DOCD IN RCRD: CPT | Mod: CPTII,S$GLB,, | Performed by: STUDENT IN AN ORGANIZED HEALTH CARE EDUCATION/TRAINING PROGRAM

## 2024-11-12 PROCEDURE — 3046F HEMOGLOBIN A1C LEVEL >9.0%: CPT | Mod: CPTII,S$GLB,, | Performed by: STUDENT IN AN ORGANIZED HEALTH CARE EDUCATION/TRAINING PROGRAM

## 2024-11-12 PROCEDURE — 3008F BODY MASS INDEX DOCD: CPT | Mod: CPTII,S$GLB,, | Performed by: STUDENT IN AN ORGANIZED HEALTH CARE EDUCATION/TRAINING PROGRAM

## 2024-11-12 PROCEDURE — 3077F SYST BP >= 140 MM HG: CPT | Mod: CPTII,S$GLB,, | Performed by: STUDENT IN AN ORGANIZED HEALTH CARE EDUCATION/TRAINING PROGRAM

## 2024-11-12 RX ORDER — AMLODIPINE BESYLATE 5 MG/1
10 TABLET ORAL DAILY
Qty: 180 TABLET | Refills: 3 | Status: SHIPPED | OUTPATIENT
Start: 2024-11-12 | End: 2025-11-12

## 2024-11-12 NOTE — TELEPHONE ENCOUNTER
----- Message from Rashaad Desir sent at 11/12/2024 11:33 AM CST -----  Regarding: FW: Outpatient Care Management  Pt would like a referral to [facility]. Please advise.  ----- Message -----  From: Brigette Dougherty LCSW  Sent: 11/12/2024  11:30 AM CST  To: Moe Rousseau Staff  Subject: Outpatient Care Management                       Good morning,    I am a  with Outpatient Care Management. I spoke with Ms. Melchor this morning and she is interested in having a referral to [facility] for therapy to help manage her anxiety. Are you able to assist with this referral?    Thank you,    Brigette Dougherty LCSW  Outpatient Care Management  [phone]

## 2024-11-12 NOTE — PATIENT INSTRUCTIONS
Avoid cold temperatures  Avoid rapid shifts of temperatures  Avoid cold/damp breezes  Wear layers of loose fitting clothes over the torso,preferably wear clothes made of cotton/wool  Wear a hat and cover face/ears with a scarf  Wear good shoes and boots that fit well and dont injure the skin  Wear heavy protective socks  Wear gloves/mittens which are warm  Always set thermostat at a good temperature  Always keep a sweater or jacket  Use flannel sheets,warm blankets,electric blankets,preheated beds  Avoid touching cold water  Wear gloves to reach into freezers  Use insulated containers,gloves or napkins to hold cold drinks and food  Rinse vegetables with warm water,protect hands while washing dishes  Use disposable chemical heat packs for extra heat  Protect the skin using lotion containing lanolin on hands and feet  Wash with mild creamy soap  Examine for finger and toe tip ulcers  Nail care very important   Avoid stress  Dont smoke    If you have an episode :  Swing them around as if you are going to throw a soft ball  Warm your fingers by placing them under armpits  Wiggle your fingers and toes  Move and walk around   Run warm water until normal skin color returns

## 2024-11-12 NOTE — PROGRESS NOTES
Outpatient Care Management  Plan of Care Follow Up Visit    Patient: Jill Perez  MRN: [id number]  Date of Service: 11/12/2024  Completed by: Loree Wood RN  Referral Date: 09/22/2023    Reason for Visit   Patient presents with    Other     Completed SDOH assessment.     Update Plan Of Care       Brief Summary:   OPCM RN follow-up call completed.   Returned call from Jill.   Collaborated with OPCM LCSW.   Next Steps: Patient is in agreement to follow-up call on or around 11/25/2024.
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
## 2024-11-12 NOTE — PROGRESS NOTES
"Outpatient Care Management   - Patient Assessment    Patient: Jill Perez  MRN:  [id number]  Date of Service:  11/12/2024  Completed by:  Brigette Dougherty LCSW  Referral Date: 09/22/2023    Reason for Visit   Patient presents with    Other     11/5/2024  1st attempt to complete Initial Assessment for Outpatient Care Management, left message. Attempt letter sent through portal.    Social Work Assessment     11/12/2024       Brief Summary:  received a referral from OPCM RN for the following HR SW psychosocial needs: The patient also requests assistance with: identify support groups (ie: [facility]) and non-[facility] mental health resources. Spoke with patient who reports she lives at home with her spouse. Patient states her spouse still works part time. Patient reports she is independent with ADLs and IADLs, including driving. Patient states she sometimes uses a cane for ambulation due to arthritis pain and is hoping to obtain a scooter for longer distances. Patient reports she has a good relationship with her children and spouse. Patient states she had bariatric surgery 40 years ago and started having issues that required surgery starting about 4 years ago. SW discussed the [facility] and patient is agreeable for SW to email information. Patient states she does have anxiety at times that can be difficult to cope with but she "gets through it". Patient states her current coping methods include jhon painting, crocheting, knitting, and visiting the library. Patient is open to speaking with a therapist through [facility] and is not interested in an outside mental health resource/therapist at this time. Patient states she currently takes medication for anxiety prescribed through her PCP. Patient states that having food stamps would greatly help her and her spouse due to their income. SW completed the SNAP calculator which indicated that patient may " potentially be eligible for SNAP. Patient is agreeable for SW to send a referral to [facility] for help with SNAP application. Patient denies any further questions or concerns at this time. Patient is agreeable for a follow up call in 1 week. Care plan was created in collaboration with patient/caregiver input. SDOH questionnaire was completed by OPCM RN.

## 2024-11-12 NOTE — TELEPHONE ENCOUNTER
----- Message from  Loree sent at 11/11/2024  1:03 PM CST -----  Regarding: Reschedule missed PCP appt, request for PM&R and possibly Aquatic Therapy referrals.  Dr. Soham Rosa, PCP/Staff:    Mrs. Perez says she will be calling to reschedule her missed 11/7 appt.     She is interested in being evaluated for a scooter through [facility] and looking into Aquatic Therapy for Exercise d/t having chronic pain to right knee and back.     Please advise as appropriate.     Thank you,    Loree Wood, DARIUSN, RN, CCM [facility]  [email or web address]  TEL:  [phone]

## 2024-11-13 ENCOUNTER — OFFICE VISIT (OUTPATIENT)
Facility: CLINIC | Age: 69
End: 2024-11-13
Payer: COMMERCIAL

## 2024-11-13 DIAGNOSIS — R94.6 ABNORMAL THYROID FUNCTION TEST: ICD-10-CM

## 2024-11-13 DIAGNOSIS — Z98.84 H/O GASTRIC BYPASS: ICD-10-CM

## 2024-11-13 DIAGNOSIS — I10 PRIMARY HYPERTENSION: Chronic | ICD-10-CM

## 2024-11-13 DIAGNOSIS — E11.69 HYPERLIPIDEMIA ASSOCIATED WITH TYPE 2 DIABETES MELLITUS: Chronic | ICD-10-CM

## 2024-11-13 DIAGNOSIS — E11.43 TYPE 2 DIABETES MELLITUS WITH DIABETIC AUTONOMIC NEUROPATHY, WITHOUT LONG-TERM CURRENT USE OF INSULIN: Primary | Chronic | ICD-10-CM

## 2024-11-13 DIAGNOSIS — E78.5 HYPERLIPIDEMIA ASSOCIATED WITH TYPE 2 DIABETES MELLITUS: Chronic | ICD-10-CM

## 2024-11-13 PROCEDURE — 99204 OFFICE O/P NEW MOD 45 MIN: CPT | Mod: 95,,, | Performed by: STUDENT IN AN ORGANIZED HEALTH CARE EDUCATION/TRAINING PROGRAM

## 2024-11-13 PROCEDURE — 1111F DSCHRG MED/CURRENT MED MERGE: CPT | Mod: CPTII,95,, | Performed by: STUDENT IN AN ORGANIZED HEALTH CARE EDUCATION/TRAINING PROGRAM

## 2024-11-13 PROCEDURE — G2211 COMPLEX E/M VISIT ADD ON: HCPCS | Mod: 95,,, | Performed by: STUDENT IN AN ORGANIZED HEALTH CARE EDUCATION/TRAINING PROGRAM

## 2024-11-13 PROCEDURE — 3046F HEMOGLOBIN A1C LEVEL >9.0%: CPT | Mod: CPTII,95,, | Performed by: STUDENT IN AN ORGANIZED HEALTH CARE EDUCATION/TRAINING PROGRAM

## 2024-11-13 RX ORDER — PEN NEEDLE, DIABETIC 30 GX3/16"
1 NEEDLE, DISPOSABLE MISCELLANEOUS DAILY
Qty: 100 EACH | Refills: 3 | Status: SHIPPED | OUTPATIENT
Start: 2024-11-13

## 2024-11-13 RX ORDER — BLOOD-GLUCOSE SENSOR
1 EACH MISCELLANEOUS
Qty: 3 EACH | Refills: 11 | Status: SHIPPED | OUTPATIENT
Start: 2024-11-13 | End: 2025-11-13

## 2024-11-13 RX ORDER — INSULIN GLARGINE 100 [IU]/ML
10 INJECTION, SOLUTION SUBCUTANEOUS DAILY
Qty: 15 ML | Refills: 3 | Status: SHIPPED | OUTPATIENT
Start: 2024-11-13 | End: 2025-11-13

## 2024-11-13 NOTE — Clinical Note
Please set up labs in 3 months for this patient.  She will need diabetes education visit in 2-3 weeks as well  For follow up she ideally should have an in person visit for her next appointment in 3-4 months with someone so she can have a physical exam in person.  NP/PA should be ok if they have any openings.    Let me know if issues.  Thanks

## 2024-11-13 NOTE — ASSESSMENT & PLAN NOTE
Recent thyroid function testing in October with an isolated TSH that was low.  Will need follow up of this lab which is planned soon.  Possibly a false low value or temporary changes from silent thyroiditis process or from recent illness.  Can address as needed based on repeat lab.

## 2024-11-13 NOTE — PROGRESS NOTES
ENDOCRINOLOGY NEW PATIENT VISIT: 11/13/2024    The patient location is: Home  The chief complaint leading to consultation is: Diabetes    Visit type: audiovisual  [facility] visit but then half way through adjusted to [facility] visit due to audio issues    Face to Face time with patient: 35  50 minutes of total time spent on the encounter, which includes face to face time and non-face to face time preparing to see the patient (eg, review of tests), Obtaining and/or reviewing separately obtained history, Documenting clinical information in the electronic or other health record, Independently interpreting results (not separately reported) and communicating results to the patient/family/caregiver, or Care coordination (not separately reported).     Each patient to whom he or she provides medical services by telemedicine is:  (1) informed of the relationship between the physician and patient and the respective role of any other health care provider with respect to management of the patient; and (2) notified that he or she may decline to receive medical services by telemedicine and may withdraw from such care at any time.      Subjective:      Patient ID: Jill Perez is a 69 y.o. female.    Chief Complaint:  Diabetes    History of Present Illness  Jill Perez presents for evaluation of diabetes.  She has additional history of hypertension, hyperlipidemia, and issues with constipation and gastroparesis following gastric sleeve surgery in the past.  She has been managed by her PCP but has had worsening in blood sugar prompting a referral to endocrine.  Her history does include prior gastric sleeve at 28 years of age and she lost 250+ lbs over the years (max weight of 425).  In the past she was on Trulicity but likely removed in setting of constipation and gastroparesis, unclear though.        Regarding diabetes:    Initially Diagnosed with T2D:  34 years old at diagnosis    Current  symptoms/problems:    Issues of N/V due to gastroparesis and some abdominal pain at times.  Numbness/tingling of her feet, on gabapentin.  Some blurry vision lately, soon to have cataract surgery.    Denies any polyuria, polydipsia, nocturia, weight change   Denies h/o frequent UTIs/yeast infections, DKA, ketogenic diet, diuretics   Denies h/o pancreatitis, recurrent gallstones, daily etoh use, MEN syndrome, pancreatic tumors, gastroparesis, diabetic retinopathy    Known diabetic complications: peripheral neuropathy and gastroparesis  Cardiovascular risk factors: advanced age (older than 55 for men, 65 for women), diabetes mellitus, dyslipidemia, and hypertension    Current diabetic medications include:    1.   Metformin 1000 mg BID    Other medications tried:  Trulicity  Glucotrol    Diet: 1-2 meal on average and snacks as well    Exercise: Just housework and gardening.  Will look at doing aquatic exercises soon    Glucose Trends:  twice a day now checking, was in process of setting up digital diabetes monitoring (uses iPhone)    Morning sugars are 180-200'  Evening sugars are mid 200's      Does have Raynauds    Any episodes of hypoglycemia? no    Family Hx:    Grand Mother - Yes    Father - Yes   Sisters - Yes            Wt Readings from Last 10 Encounters:   11/12/24 76.2 kg (167 lb 15.9 oz)   10/13/24 68 kg (150 lb)   10/11/24 72.1 kg (159 lb)   10/01/24 75.6 kg (166 lb 10.7 oz)   09/09/24 72.6 kg (160 lb)   08/26/24 72.6 kg (160 lb)   08/06/24 73.8 kg (162 lb 11.2 oz)   08/05/24 77.1 kg (170 lb)   07/18/24 77.1 kg (170 lb)   07/05/24 79.8 kg (176 lb)       Diabetes Management Status    Statin: Taking  ACE/ARB: Taking      Screening or Prevention Patient's value   HgA1C Testing and Control Lab Results   Component Value Date    HGBA1C 9.6 (H) 10/01/2024    HGBA1C 11.4 (H) 07/01/2024    HGBA1C 7.3 (H) 09/12/2023    HGBA1C 6.8 (H) 05/12/2023    HGBA1C 6.9 (H) 02/17/2023      Lipid profile  : 05/12/2023   LDL  "control (goal LDL <70) Lab Results   Component Value Date    LDLCALC 139.4 05/12/2023      Nephropathy screening Lab Results   Component Value Date    LABMICR <5.0 09/12/2022     Lab Results   Component Value Date    PROTEINUA Negative 11/13/2023      Blood pressure BP Readings from Last 3 Encounters:   11/12/24 (!) 144/81   10/21/24 119/69   10/12/24 138/78      Dilated retinal exam : 10/30/2024   Foot exam : 05/22/2023        ROS:   As above    Objective:     LMP  (LMP Unknown)   BP Readings from Last 3 Encounters:   11/12/24 (!) 144/81   10/21/24 119/69   10/12/24 138/78     Wt Readings from Last 1 Encounters:   11/12/24 1553 76.2 kg (167 lb 15.9 oz)     There is no height or weight on file to calculate BMI.    Ht:  5'3"  Wt:  165  BMI:  29.2    Physical Exam  Constitutional:       Appearance: Normal appearance.   Neurological:      Mental Status: She is alert.   Psychiatric:         Mood and Affect: Mood normal.         Behavior: Behavior normal.        Lab Review:   Lab Results   Component Value Date    HGBA1C 9.6 (H) 10/01/2024     Lab Results   Component Value Date    CHOL 223 (H) 05/12/2023    HDL 64 05/12/2023    LDLCALC 139.4 05/12/2023    TRIG 98 05/12/2023    CHOLHDL 28.7 05/12/2023     Lab Results   Component Value Date     10/21/2024    K 3.6 10/21/2024     (H) 10/21/2024    CO2 23 10/21/2024     (H) 10/21/2024    BUN 10 10/21/2024    CREATININE 0.6 10/21/2024    CALCIUM 8.7 10/21/2024    PROT 5.6 (L) 10/21/2024    ALBUMIN 2.9 (L) 10/21/2024    BILITOT 0.4 10/21/2024    ALKPHOS 83 10/21/2024    AST 21 10/21/2024    ALT 24 10/21/2024    ANIONGAP 6 (L) 10/21/2024    ESTGFRAFRICA >60 06/10/2022    EGFRNONAA >60 06/10/2022    TSH 0.281 (L) 10/12/2024     Vit D, 25-Hydroxy   Date Value Ref Range Status   05/12/2023 45 30 - 96 ng/mL Final     Comment:     Vitamin D deficiency.........<10 ng/mL                              Vitamin D insufficiency......10-29 ng/mL       Vitamin D " sufficiency........> or equal to 30 ng/mL  Vitamin D toxicity............>100 ng/mL         Assessment and Plan     H/O gastric bypass  With her history of gastric bypass and subsequent issues with gastroparesis and now constipation we will plan to avoid GLP-1 RA for now.  Likely can still tolerated DPP4i which will be started soon.      Type 2 diabetes mellitus with diabetic autonomic neuropathy, without long-term current use of insulin  Reviewed history, labs and current therapies.  No blood sugar logs to review at this time and no use of CGM.  Blood sugars are mostly in the 200 range.  She had better control in the past when on GLP-1 RA but has known issues with constipation and also gastroparesis so likely therapy removed in that setting.  Medication list has Glucotrol but she denies use of this, ideally need to avoid given higher risk of hypoglycemia.      A1c with worsening overtime and now needing better glucose control with need for basal insulin daily with other oral therapies as planned.  In this setting there is be need for closer glucose control monitoring so a CGM will be required for this purpose.      Plan:  - Start Lantus 10 units daily with pen needles  - Start Januvia 100 mg daily  - Start Jardiance 10 mg daily  - Continue Metformin 1000 mg BID  - Start Dexcom g7 (uses iPhone) - continue glucometer for now  - DM Education visit for CGM training  - Labs in 2-3 months with A1c and urine microalbumin    - Removed Glucotrol from the medication list for now  - Will need foot exam in future and monitoring of insulin injection sites so next visit should be in person  - Reviewed need to reach out to us if BG is consistently under 80 or above 200 with the above changes    Primary hypertension  Known hypertension with some recent BP increases into the 140's.  May need further BP medicine adjustment in the future.  For now remains on CCB and ARB.      Hyperlipidemia associated with type 2 diabetes  mellitus  Known hyperlipidemia.  LDL has been above goal in the past.  Now on Lipitor 40 mg daily.  May need further dose adjustment to reach LDL target of less than 70.  Will plan labs soon for monitoring.      Abnormal thyroid function test  Recent thyroid function testing in October with an isolated TSH that was low.  Will need follow up of this lab which is planned soon.  Possibly a false low value or temporary changes from silent thyroiditis process or from recent illness.  Can address as needed based on repeat lab.          RTC in 3-4 months in person.  Labs prior to next visit.      **Visit today included increased complexity associated with the care of the problems addressed and managing the longitudinal care of the patient due to the serious and/or complex managed problems      Silvano Bryan DO     Disclaimer: This note has been generated using voice-recognition software. There may be typographical errors that have been missed during proof-reading.

## 2024-11-13 NOTE — ASSESSMENT & PLAN NOTE
With her history of gastric bypass and subsequent issues with gastroparesis and now constipation we will plan to avoid GLP-1 RA for now.  Likely can still tolerated DPP4i which will be started soon.

## 2024-11-13 NOTE — ASSESSMENT & PLAN NOTE
Known hyperlipidemia.  LDL has been above goal in the past.  Now on Lipitor 40 mg daily.  May need further dose adjustment to reach LDL target of less than 70.  Will plan labs soon for monitoring.

## 2024-11-13 NOTE — PROGRESS NOTES
Per PCP office, referral for psychiatry has been placed and patient will need to call [phone] to schedule her appt. SW attempted to contact patient and left a detailed message on how to schedule her appt.

## 2024-11-13 NOTE — ASSESSMENT & PLAN NOTE
Reviewed history, labs and current therapies.  No blood sugar logs to review at this time and no use of CGM.  Blood sugars are mostly in the 200 range.  She had better control in the past when on GLP-1 RA but has known issues with constipation and also gastroparesis so likely therapy removed in that setting.  Medication list has Glucotrol but she denies use of this, ideally need to avoid given higher risk of hypoglycemia.      A1c with worsening overtime and now needing better glucose control with need for basal insulin daily with other oral therapies as planned.  In this setting there is be need for closer glucose control monitoring so a CGM will be required for this purpose.      Plan:  - Start Lantus 10 units daily with pen needles  - Start Januvia 100 mg daily  - Start Jardiance 10 mg daily  - Continue Metformin 1000 mg BID  - Start Dexcom g7 (uses iPhone) - continue glucometer for now  - DM Education visit for CGM training  - Labs in 2-3 months with A1c and urine microalbumin    - Removed Glucotrol from the medication list for now  - Will need foot exam in future and monitoring of insulin injection sites so next visit should be in person  - Reviewed need to reach out to us if BG is consistently under 80 or above 200 with the above changes

## 2024-11-13 NOTE — ASSESSMENT & PLAN NOTE
Known hypertension with some recent BP increases into the 140's.  May need further BP medicine adjustment in the future.  For now remains on CCB and ARB.

## 2024-11-13 NOTE — PATIENT INSTRUCTIONS
Thank you for visiting with me during our virtual appointment.      As discussed I do want to make some changes to your diabetes treatments to help better control your blood sugars and bring your A1c into goal of less than 7%.      You can continue on your metformin therapy for now.  The Glucotrol/glipizide that was on your medication list is going to be removed so please avoid taking that for now.     I will be starting you on a once daily dose of long-acting insulin called Lantus which will be given as 10 units once a day subcutaneously.  I will be sending pen needles for this as well.  I will start to other medicines including Januvia which is a pill taken once a day to help the pancreas produce more insulin when your blood sugars are high.  In addition I will start a medicine called Jardiance which helps your kidneys to excrete extra glucose from your bloodstream.  This is also protective to the kidneys.    The starting of insulin will likely allow your insurance to approve a wearable glucose monitor called a Dexcom CGM.  I will be sending this to your pharmacy along with the other medicines to work on the approval process.  We will also set up a diabetes education visit within the next few weeks so that they can train you on use of the Dexcom so please bring that device with you to that visit.  They will set you up for remote monitoring of your blood sugar.    With the changes in medication above once you start all of these if you are noticing your blood sugars are falling below 80 or are still consistently above 200 then please let me know.  The goal will be to improve your blood sugars with these therapies and make small adjustments were needed.    We can plan to repeat labs for you in approximately 3 months with a visit in the clinic in person soon after that.  I encourage you to continue to follow your eye doctor at least once a year and he should have regular foot exams at least once a year but we can  help with that on your upcoming in-person visit in early 2025.  Plan to hear from our staff on setting up labs and that visit soon.    Reach out if any questions come up.

## 2024-11-14 ENCOUNTER — PATIENT OUTREACH (OUTPATIENT)
Dept: ADMINISTRATIVE | Facility: OTHER | Age: 69
End: 2024-11-14
Payer: MEDICARE

## 2024-11-19 ENCOUNTER — OUTPATIENT CASE MANAGEMENT (OUTPATIENT)
Dept: ADMINISTRATIVE | Facility: OTHER | Age: 69
End: 2024-11-19
Payer: MEDICARE

## 2024-11-19 NOTE — PROGRESS NOTES
Outpatient Care Management   - Care Plan Follow Up    Patient: Jill Perez  MRN:  [id number]  Date of Service:  11/19/2024  Completed by:  Brigette Dougherty LCSW  Referral Date: 09/22/2023    Reason for Visit   Patient presents with    OPCM PAULY Follow Up Call     11/19/2024       Brief Summary: Spoke with patient who reports she did not receive PAULY's voicemail regarding how to schedule her psychiatry appt. Patient states she is having issues with some features on her phone. PAULY educated patient on how to make an appt and provided the [facility] ph# as requested. Patient states she did not get the emailed information PAULY sent on 11/12/24, PAULY resent today to the confirmed email address. Patient states she just received her Dexcom along with medications and is concerned about how to use it. PAULY informed patient that it appears she has a diabetes education appt on 12/4/24 at 12:30 PM. PAULY provided patient with the dept ph# as requested, patient states she will call and see if there is a sooner appt or a waiting list for a sooner appt due to wanting to learn how to use her Dexcom as soon as possible. Patient is looking forward to receiving a call from W for SNAP help. PAULY will follow up with patient in 3 weeks.    Complex Care Plan     Care plan was discussed and completed today with input from patient and/or caregiver.    Patient Instructions     No follow-ups on file.

## 2024-11-20 ENCOUNTER — PATIENT OUTREACH (OUTPATIENT)
Dept: ADMINISTRATIVE | Facility: OTHER | Age: 69
End: 2024-11-20
Payer: MEDICARE

## 2024-11-20 NOTE — PROGRESS NOTES
Community Health Worker assistance requested from Brigette Mendez LCSW to assist this patient with SNAP application.  Application completed with:   Location of meeting: telephone  Status of application: I have spoke with Ms. Perez regarding completing her SNAP application. I explain that I will creat her portal and she must confirmed her email to access her application. She has informed me of an password she would like to use and I will create her portl. No additional needs at this time.  Follow up required: yes  Follow up Scheduled for:

## 2024-11-20 NOTE — PROGRESS NOTES
Community Health Worker continues to follow this patient for   Location of meeting: telephone    Status of application: I have contacted Ms. Perez due to once entering her email I was informed that the email was take. I have sent an forgot user ID to her email. Ms. Perez stated she was currently at the grocery store and will c heck er email when she is home ad give me a call back.   Follow up required: yes   Next scheduled follow up:

## 2024-11-23 ENCOUNTER — OFFICE VISIT (OUTPATIENT)
Dept: URGENT CARE | Facility: CLINIC | Age: 69
End: 2024-11-23
Payer: COMMERCIAL

## 2024-11-23 VITALS
RESPIRATION RATE: 20 BRPM | BODY MASS INDEX: 29.59 KG/M2 | HEIGHT: 63 IN | WEIGHT: 167 LBS | OXYGEN SATURATION: 99 % | TEMPERATURE: 98 F | DIASTOLIC BLOOD PRESSURE: 87 MMHG | HEART RATE: 83 BPM | SYSTOLIC BLOOD PRESSURE: 128 MMHG

## 2024-11-23 DIAGNOSIS — Z97.3 WEARS GLASSES: ICD-10-CM

## 2024-11-23 DIAGNOSIS — H00.024 HORDEOLUM INTERNUM OF LEFT UPPER EYELID: Primary | ICD-10-CM

## 2024-11-23 PROCEDURE — 99213 OFFICE O/P EST LOW 20 MIN: CPT | Mod: S$GLB,,, | Performed by: PHYSICIAN ASSISTANT

## 2024-11-23 RX ORDER — POLYMYXIN B SULFATE AND TRIMETHOPRIM 1; 10000 MG/ML; [USP'U]/ML
SOLUTION OPHTHALMIC
Qty: 10 ML | Refills: 0 | Status: SHIPPED | OUTPATIENT
Start: 2024-11-23 | End: 2024-11-30

## 2024-11-23 NOTE — PATIENT INSTRUCTIONS
tolerating full feeds, bolus every 3 hrs, but did not tolerate q4h feeds and did require extended infusion up to CIF. Post-op, has had more emesis and is needing continuous infusion, decreased volume - is clinically improved on current regimen.      Plan:  Keep on continuous feeds at lower volume, continues 100ml/kg/day today   Continue to follow with Pediatric Surgery:  Primary Surgeon is Dr. Oreilly  Plan for repair is 9/18 with Nissen and G-tube              Babygram as needed after all NG replacements  GI has currently signed off, contact with concerns  Will follow after [facility] with [facility]     PLEASE READ YOUR DISCHARGE INSTRUCTIONS ENTIRELY AS IT CONTAINS IMPORTANT INFORMATION.  - use eyedrops in affected eye as directed for 7 days.    - continue heat compression as needed.  Do not touch eyes with hand. May wipe eyes with tissue and discard after each use.  - Tylenol or Ibuprofen as directed as needed for pain.  For Tylenol, do not exceed 4000 mg/ day. For ibuprofen, do not exceed 2400 mg/day.  - if no improvement or worsening symptoms, recommend follow-up with ophthalmology for further evaluation.  Referral placed.    -You must understand that you've received an Urgent Care treatment only and that you may be released before all your medical problems are known or treated. You, the patient, will arrange for follow up care as instructed. Please arrange follow up with your primary medical clinic within 2-5 days if your signs and symptoms have not resolved or worsen.   - Follow up with your PCP or specialty clinic as directed.  You can call [phone] or [phone] to schedule an appointment with the appropriate provider.    - If your condition worsens or fails to improve, we recommend that you receive another evaluation at the emergency room immediately or contact your primary medical clinic to discuss your concerns  in next 2-5 days.  Strict clinic versus ER precautions given.     RED FLAGS/WARNING SYMPTOMS DISCUSSED WITH PATIENT THAT WOULD WARRANT EMERGENT MEDICAL ATTENTION. Patient aware and verbalized understanding.    Home care  Use prescribed antibiotic eye drops or ointment as directed to treat the infection.  Apply a warm compress (towel soaked in warm water) to the affected eye 3 to 4 times a day. Do this just before applying medicine to the eye.  Use a warm, wet cloth to wipe away crusting of the eyelids in the morning. This is caused by mucus drainage during the night. You may also use saline irrigating solution or artificial tears to rinse away mucus in the eye. Do not put a patch over the  eye.  Wash your hands before and after touching the infected eye. This is to prevent spreading the infection to the other eye, and to other people. Do not share your towels or washcloths with others.  You may use acetaminophen or ibuprofen to control pain, unless another medicine was prescribed. (Note: If you have chronic liver or kidney disease or have ever had a stomach ulcer or gastrointestinal bleeding, talk with your doctor before using these medicines.)  Do not wear contact lenses until your eyes have healed and all symptoms are gone.  Follow-up care  Follow up with your healthcare provider, or as advised.  When to seek medical advice  Call your healthcare provider right away if any of these occur:  Worsening vision  Increasing pain in the eye  Increasing swelling or redness of the eyelid  Redness spreading around the eye  Date Last Reviewed: 6/14/2015  © 9701-8370 [employer]. [address] [place] [address], [place] [address]. All rights reserved. This information is not intended as a substitute for professional medical care. Always follow your healthcare professional's instructions.

## 2024-11-23 NOTE — PROGRESS NOTES
"Subjective:      Patient ID: Jill Perez is a 69 y.o. female.    Vitals:  height is 5' 3" (1.6 m) and weight is 75.8 kg (167 lb). Her oral temperature is 98.3 °F (36.8 °C). Her blood pressure is 128/87 and her pulse is 83. Her respiration is 20 and oxygen saturation is 99%.     Chief Complaint: Eye Problem    69-year-old female who presents urgent care clinic for evaluation.  Pt present with left upper eyelid irritation to with redness, mild Pain and swelling that she woke up with this morning.  No URI symptoms, drainage, or ocular pain.  Eyelid pain is mild and rated as 2/10 at its worst.  Not tried anything for symptoms.  No other associated symptoms.    Eye Problem   The left eye is affected. This is a new problem. The current episode started today. The problem occurs constantly. The problem has been gradually worsening. There was no injury mechanism. The pain is at a severity of 2/10. The pain is mild. She Does not wear contacts. Pertinent negatives include no blurred vision, eye [facility], double vision, eye redness, fever, foreign body sensation, itching, nausea, photophobia, recent URI, vomiting or weakness. She has tried nothing for the symptoms.       Constitution: Negative for activity change, appetite change, chills, sweating, fatigue, fever and generalized weakness.   HENT:  Negative for ear pain, hearing loss, facial swelling, congestion, postnasal drip, sinus pain, sinus pressure, sore throat, trouble swallowing and voice change.    Neck: Negative for neck pain, neck stiffness and painful lymph nodes.   Cardiovascular:  Negative for chest pain, leg swelling, palpitations, sob on exertion and passing out.   Eyes:  Positive for eyelid swelling. Negative for eye [facility], eye itching, eye pain, eye redness, photophobia, vision loss, double vision and blurred vision.   Respiratory:  Negative for chest tightness, cough, sputum production, bloody sputum, COPD, shortness of breath, stridor, wheezing and " asthma.    Gastrointestinal:  Negative for abdominal pain, nausea, vomiting, constipation, diarrhea, bright red blood in stool, rectal bleeding, heartburn and bowel incontinence.   Genitourinary:  Negative for dysuria, frequency, urgency, urine decreased, flank pain, bladder incontinence and hematuria.   Musculoskeletal:  Negative for trauma, joint pain, joint swelling, abnormal ROM of joint, muscle cramps and muscle ache.   Skin:  Negative for color change, pale, rash and wound.   Allergic/Immunologic: Negative for seasonal allergies, asthma and immunocompromised state.   Neurological:  Negative for dizziness, history of vertigo, light-headedness, passing out, facial drooping, speech difficulty, coordination disturbances, loss of balance, headaches, disorientation, altered mental status, loss of consciousness, numbness, tingling and seizures.   Hematologic/Lymphatic: Negative for swollen lymph nodes, easy bruising/bleeding and trouble clotting. Does not bruise/bleed easily.   Psychiatric/Behavioral:  Negative for altered mental status and disorientation.       Objective:     Physical Exam   Constitutional: She appears well-developed. She is cooperative.  Non-toxic appearance. She does not appear ill. No distress.   HENT:   Head: Normocephalic and atraumatic.   Ears:   Right Ear: Hearing, external ear and ear canal normal.   Left Ear: Hearing, external ear and ear canal normal.   Nose: Nose normal.   Mouth/Throat: Oropharynx is clear and moist. Mucous membranes are moist. Oropharynx is clear.   Eyes: Conjunctivae, EOM and lids are normal. Pupils are equal, round, and reactive to light. Right eye exhibits no chemosis, no [facility], no exudate and no hordeolum. Left eye exhibits hordeolum. Left eye exhibits no chemosis, no [facility] and no exudate. Right conjunctiva is not injected. Right conjunctiva has no hemorrhage. Left conjunctiva is not injected. Left conjunctiva has no hemorrhage.     vision grossly intact gaze  aligned appropriately      Comments: No preseptal edema, erythema or tenderness to palpation.  No purulent [facility].   No direct or peripheral visual deficit.  No pain with extraocular motion.  No foreign body visualized.     Neck: Neck supple. No neck rigidity present.   Cardiovascular: Normal rate, regular rhythm and normal pulses.   Pulmonary/Chest: Effort normal. No accessory muscle usage. No respiratory distress. She has no wheezes. She exhibits no tenderness.   Abdominal: Normal appearance. She exhibits no distension.   Musculoskeletal: Normal range of motion.         General: Normal range of motion.      Right lower leg: No edema.      Left lower leg: No edema.      Comments: Moves all extremities with normal tone, strength, and ROM.   Neurological: no focal deficit. She is alert and at baseline. She has normal motor skills and normal sensation. She displays no weakness, facial symmetry and normal reflexes. No cranial nerve deficit or sensory deficit. She exhibits normal muscle tone. Gait and coordination normal. Coordination normal.   Skin: Skin is warm, dry, not diaphoretic and no rash. Capillary refill takes less than 2 seconds.   Psychiatric: Her behavior is normal. Mood and thought content normal.   Nursing note and vitals reviewed.      Assessment:     1. Hordeolum internum of left upper eyelid    2. Wears glasses      Note dictated with voice recognition software, please excuse any grammatical errors.    Patient presents with clinical exam findings and history consistent with above.  We discussed the differential diagnosis.    On exam, patient is nontoxic appearing and vitals are stable.  Patient is essentially neurovascularly intact on exam.      Diagnostic testing results were independently reviewed and interpreted, which were discussed in depth with patient.   Test ordered in clinic:  None  Additionally, previous progress notes/admissions/lab were reviewed and interpreted.  [facility] 10/21/2024 and  10/20/2024 with Good kidney function and EGFR.      Plan:     If no improvements with conservative treatment, referral to Ophthalmology.    Hordeolum internum of left upper eyelid  -     Ambulatory referral/consult to Ophthalmology  -     polymyxin B sulf-trimethoprim (POLYTRIM) 10,000 unit- 1 mg/mL Drop; Place 1 drop into the left eye every 4 (four) hours for 2 days, THEN 1 drop every 6 (six) hours for 5 days.  Dispense: 10 mL; Refill: 0    Wears glasses  -     Ambulatory referral/consult to Ophthalmology      Note dictated with voice recognition software, please excuse any grammatical errors.    We had shared decision making for patient's treatment. We discussed side effects/alternatives/benefits/risk and patient would like to proceed with treatment plan. We also discussed other OTC treatment recommendations.    Patient was counseled expected course and answered all of questions.     Patient was instructed to return for re-evaluation with urgent care or PCP for continued outpatient workup and management if symptoms do not improve/worsening symptoms. Strict ED versus clinic precautions given in depth.   Guideline, [facility] and follow-up instructions given verbally/printed; patient will also receive via [facility]. Patient verbalized understanding and agreed with the entirety of plan of care.         Additional MDM:     Heart Failure Score:   COPD = No

## 2024-11-26 ENCOUNTER — OUTPATIENT CASE MANAGEMENT (OUTPATIENT)
Dept: ADMINISTRATIVE | Facility: OTHER | Age: 69
End: 2024-11-26
Payer: MEDICARE

## 2024-11-26 ENCOUNTER — PATIENT MESSAGE (OUTPATIENT)
Dept: ADMINISTRATIVE | Facility: HOSPITAL | Age: 69
End: 2024-11-26
Payer: COMMERCIAL

## 2024-11-26 ENCOUNTER — PATIENT MESSAGE (OUTPATIENT)
Facility: CLINIC | Age: 69
End: 2024-11-26
Payer: COMMERCIAL

## 2024-11-26 ENCOUNTER — PATIENT OUTREACH (OUTPATIENT)
Dept: ADMINISTRATIVE | Facility: OTHER | Age: 69
End: 2024-11-26
Payer: MEDICARE

## 2024-11-26 NOTE — PROGRESS NOTES
Outpatient Care Management  Plan of Care Follow Up Visit    Patient: Jill Perez  MRN: [id number]  Date of Service: 11/26/2024  Completed by: Loree Wood RN  Referral Date: 09/22/2023    Reason for Visit   Patient presents with    Update Plan Of Care    OPCM Chart Review       Brief Summary:   OPCM RN follow-up call completed.   Continue education on DM/Constipation  Next Steps: Patient is in agreement to follow-up call on or around 12/2/2024. Check on preparation for CDE appt.

## 2024-11-26 NOTE — PROGRESS NOTES
Community Health Worker attempted to contact patient via telephone to assist with SNAP application. Left a voicemail message on patient's telephone number and left a message with patients  .  Will attempt again at a later date.

## 2024-11-27 ENCOUNTER — TELEPHONE (OUTPATIENT)
Dept: INTERNAL MEDICINE | Facility: CLINIC | Age: 69
End: 2024-11-27
Payer: MEDICARE

## 2024-11-27 DIAGNOSIS — M17.11 ARTHRITIS OF RIGHT KNEE: Primary | ICD-10-CM

## 2024-11-27 NOTE — TELEPHONE ENCOUNTER
----- Message from  Loree sent at 11/26/2024 10:59 AM CST -----  Regarding: Request to rescheduled missed PCP appt.  Dr. Soham Rosa, PCP/Staff:    This is a duplicate request to a message sent more immediately following Mrs. Perez's missed 11/7 appt.   Mrs. Perez would like to have her missed PCP appt 11/7 rescheduled.   She wants the okay from PCP to start with Silver Sneakers exercise at [place], a possible referral to aquatic therapy d/t arthritic right knee & referral to PM&R for a scooter eval.    Please advise Mrs. Perez.     Thank you,  DARIUS ObregonN, RN, CCM [facility]  [email or web address]  TEL:  [phone]

## 2024-11-27 NOTE — TELEPHONE ENCOUNTER
Sent pt a message in [email or web address] re: PT, Phys Med & Rehab referrals, okay to do Silver Sneakers

## 2024-11-27 NOTE — TELEPHONE ENCOUNTER
wants the okay from PCP to start with Silver Sneakers exercise at [place], a possible referral to aquatic therapy d/t arthritic right knee & referral to PM&R for a scooter eval.

## 2024-12-03 DIAGNOSIS — F51.01 PRIMARY INSOMNIA: Primary | ICD-10-CM

## 2024-12-03 RX ORDER — ZOLPIDEM TARTRATE 10 MG/1
10 TABLET ORAL NIGHTLY PRN
Qty: 90 TABLET | Refills: 0 | Status: SHIPPED | OUTPATIENT
Start: 2024-12-03 | End: 2025-06-03

## 2024-12-03 NOTE — TELEPHONE ENCOUNTER
Care Due:                  Date            Visit Type   Department     Provider  --------------------------------------------------------------------------------                                EP -                              PRIMARY      MET INTERNAL  Last Visit: 10-      CARE ([facility])   MARIELLE Rosa                              EP -                              PRIMARY      [facility] INTERNAL  Next Visit: 02-      CARE ([facility])   MARIELLE Rosa                                                            Last  Test          Frequency    Reason                     Performed    Due Date  --------------------------------------------------------------------------------    Lipid Panel.  12 months..  atorvastatin.............  05- 05-    [facility] Embedded Care Due Messages. Reference number: [id number].   12/03/2024 9:24:18 AM CST

## 2024-12-04 ENCOUNTER — CLINICAL SUPPORT (OUTPATIENT)
Dept: DIABETES | Facility: CLINIC | Age: 69
End: 2024-12-04
Payer: COMMERCIAL

## 2024-12-04 DIAGNOSIS — E11.43 TYPE 2 DIABETES MELLITUS WITH DIABETIC AUTONOMIC NEUROPATHY, WITHOUT LONG-TERM CURRENT USE OF INSULIN: Chronic | ICD-10-CM

## 2024-12-04 PROCEDURE — G0108 DIAB MANAGE TRN  PER INDIV: HCPCS | Mod: S$GLB,,, | Performed by: INTERNAL MEDICINE

## 2024-12-04 PROCEDURE — 99999 PR PBB SHADOW E&M-EST. PATIENT-LVL II: CPT | Mod: PBBFAC,,,

## 2024-12-04 NOTE — Clinical Note
Started her Dexcom 2 weeks ago  She is having 9% lows - maybe need to cut back her basal?? Or stop?  But she also gets high after eating.   She is linked to [facility]!  Just wanted to give you bishop

## 2024-12-04 NOTE — PROGRESS NOTES
Diabetes Care Specialist Progress Note  Author: Randi Nation RD, CDE  Date: 12/4/2024      Intake  Program Intake  Reason for Diabetes Program Visit:: Intervention  Type of Intervention:: Individual  Individual: Device Training  Device Training: Personal CGM (Dexcom G7 training)    Current diabetes risk level:: low    Lab Results   Component Value Date    HGBA1C 7.9 (H) 12/10/2024         Current Diabetes Treatment: Insulin, Oral Medications  Oral Medication Type/Dose: januvia, jardiance, metformin  Method of insulin delivery?: Injections  Injection Type: Pens  Pen Type/Dose: Lantus 10 units daily    Lantus 10 units daily   Januvia 100 mg daily  Jardiance 10 mg daily  Metformin 1000 mg BID         Continuous Glucose Monitoring  Patient has CGM: No                Diabetes Self-Management Skills Assessment  Medication Skills Assessment  Patient is able to identify current diabetes medications, dosages, and appropriate timing of medications.: yes  Patient reports problems or concerns with current medication regimen.: no  Patient is  aware that some diabetes medications can cause low blood sugar?: Yes  Medication Skills Assessment Completed:: Yes  Assessment indicates:: Adequate understanding  Area of need?: No              Diabetes Self-Management Care Plan:    Today's Diabetes Self-Management Care Plan was developed with Jill's input. Jill has agreed to work toward the following goal(s) to improve his/her overall diabetes control.      Care Plan: Diabetes Management   Updates made since 12/19/2023 12:00 AM        Problem: Blood Glucose Self-Monitoring         Goal: Patient agrees to use sensor glucose values and arrows to appropriately predict and treat hypo and hyperglycemia.    Start Date: 12/4/2024   Expected End Date: 6/30/2025   Priority: High   Barriers: Knowledge deficit   Note:    12/4/24   Dexcom G7 (iphone) training      Ms Perez is here for training on Dexcom G7 with [employer].   She has had t2 DM  since age 34.     She had a gastric sleeve at age 28 and lost 250+ pounds.   Now with gastroparesis.         Education was provided per Dexcom protocol.  Overview:  5 min glucose reading updates, trending arrows, BG graph screens  Menus: trend Graph, start sensor, enter BG, events, Alerts, Settings, stop Sensor    Alert Settings:                          * Urgent Low: 55 mg/dL                          * Urgent Low Soon: on                          * Low alert: 70 mg/dL                          * High alert: 250 mg/dL                          * Rise rate: off                          * Fall Rate: off                          * Signal Loss: off                          * No Reading: off                          * Always sound: on                          * Alert Schedule:  (instructed on how to set up alert schedule if desired)     Reviewed additional setting options with patient, including Graph Height   Reviewed where to find sensor insertion time and sensor expiration date.   Discussed no need to calibrate sensor during the entirety of the 10 day wear. Discussed that pt can calibrate sensor if desired and discussed how to calibrate  Reviewed sensor site selection. Site selected and prepped using aseptic technique.  Inserted to back of arm.   Discussed sensor removal and disposal.  Patient able to demonstrate without difficulty.  Encouraged to review manual prior to starting another sensor.   Reviewed problem solving aspects of sensor transmission/ variables that can disrupt RF transmission. Bluetooth range: 20 feet  Pt instructed on lag time of interstitial fluid from CBG and was advised to tx hypoglycemia and dose insulin based on SMBG values.  Dexcom technical support contact number given and examples of when to contact them discussed.   Patient advised to always check glucose with glucometer should readings do not match symptoms felt (ex. Hypo or hyperglycemia).      [employer] [facility] set up and linked to clinic  account for ongoing monitoring.                Task: Provided patient with blood glucose logs, reviewed appropriate timing and frequency to SMBG, education on parameters on when to notify provider and advised patient to bring logs to all appts with PCP/Endocrinologist/Diabetes Care Specialist. Completed 12/04/2024                Follow Up Plan     F/u for log review          Today's care plan and follow up schedule was discussed with patient.  Jill verbalized understanding of the care plan, goals, and agrees to follow up plan.        The patient was encouraged to communicate with his/her health care provider/physician and care team regarding his/her condition(s) and treatment.  I provided the patient with my contact information today and encouraged to contact me via phone or [facility] as needed.           Length of Visit   Total Time: 60 Minutes

## 2024-12-05 ENCOUNTER — OUTPATIENT CASE MANAGEMENT (OUTPATIENT)
Dept: ADMINISTRATIVE | Facility: OTHER | Age: 69
End: 2024-12-05
Payer: COMMERCIAL

## 2024-12-05 ENCOUNTER — PATIENT OUTREACH (OUTPATIENT)
Dept: ADMINISTRATIVE | Facility: OTHER | Age: 69
End: 2024-12-05
Payer: COMMERCIAL

## 2024-12-05 NOTE — PROGRESS NOTES
Community Health Worker continues to follow this patient for SNAP application  Location of meeting: telephone  Additional resources provided  Status of application: Ms. Perez stated she was unable to find the username log in for the SNAP portal. She stated she has not been feeling well today and have not been able to keep anything down. She stated that she would go ingot an SNAP office to complete her application. I have informed her that I can assist her in completing it but I will have  to make her another email and portal. She has agreed and asked that I give her a call tomorrow since she isnt feeling well.   Follow up required: yes  Next scheduled follow up: n/a

## 2024-12-05 NOTE — PROGRESS NOTES
Outpatient Care Management  Plan of Care Follow Up Visit    Patient: Jill Perez  MRN: [id number]  Date of Service: 12/05/2024  Completed by: Loree Wood RN  Referral Date: 09/22/2023    Reason for Visit   Patient presents with    Update Plan Of Care    OPCM Chart Review       Brief Summary:   OPCM RN follow-up call completed.   Continue education on DM/Chronic Idiopathic Constipation  Next Steps: Patient is in agreement to follow-up call on or around 12/17/2024.

## 2024-12-09 ENCOUNTER — TELEPHONE (OUTPATIENT)
Dept: INTERNAL MEDICINE | Facility: CLINIC | Age: 69
End: 2024-12-09
Payer: COMMERCIAL

## 2024-12-09 RX ORDER — DICLOFENAC SODIUM 10 MG/G
2 GEL TOPICAL DAILY
Qty: 100 G | Refills: 0 | Status: SHIPPED | OUTPATIENT
Start: 2024-12-09

## 2024-12-09 NOTE — TELEPHONE ENCOUNTER
Refill Routing Note   Medication(s) are not appropriate for processing by [facility] for the following reason(s):        Outside of protocol    [facility] action(s):  Route               Appointments  past 12m or future 3m with PCP    Date Provider   Last Visit   10/1/2024 Soham Rosa MD   Next Visit   1/7/2025 Soham Rosa MD   ED visits in past 90 days: 0        Note composed:9:06 AM 12/09/2024

## 2024-12-09 NOTE — TELEPHONE ENCOUNTER
No care due was identified.  [facility] Embedded Care Due Messages. Reference number: [id number].   12/09/2024 5:09:03 AM CST

## 2024-12-10 ENCOUNTER — LAB VISIT (OUTPATIENT)
Dept: LAB | Facility: HOSPITAL | Age: 69
End: 2024-12-10
Attending: INTERNAL MEDICINE
Payer: MEDICARE

## 2024-12-10 ENCOUNTER — OFFICE VISIT (OUTPATIENT)
Dept: INTERNAL MEDICINE | Facility: CLINIC | Age: 69
End: 2024-12-10
Payer: COMMERCIAL

## 2024-12-10 ENCOUNTER — OUTPATIENT CASE MANAGEMENT (OUTPATIENT)
Dept: ADMINISTRATIVE | Facility: OTHER | Age: 69
End: 2024-12-10
Payer: COMMERCIAL

## 2024-12-10 VITALS
HEIGHT: 66 IN | DIASTOLIC BLOOD PRESSURE: 72 MMHG | TEMPERATURE: 97 F | HEART RATE: 113 BPM | WEIGHT: 181.69 LBS | BODY MASS INDEX: 29.2 KG/M2 | SYSTOLIC BLOOD PRESSURE: 132 MMHG

## 2024-12-10 DIAGNOSIS — E11.69 HYPERLIPIDEMIA ASSOCIATED WITH TYPE 2 DIABETES MELLITUS: Chronic | ICD-10-CM

## 2024-12-10 DIAGNOSIS — E78.5 HYPERLIPIDEMIA ASSOCIATED WITH TYPE 2 DIABETES MELLITUS: Chronic | ICD-10-CM

## 2024-12-10 DIAGNOSIS — H57.8A2 FOREIGN BODY SENSATION, LEFT EYE: ICD-10-CM

## 2024-12-10 DIAGNOSIS — E11.43 TYPE 2 DIABETES MELLITUS WITH DIABETIC AUTONOMIC NEUROPATHY, WITHOUT LONG-TERM CURRENT USE OF INSULIN: Chronic | ICD-10-CM

## 2024-12-10 DIAGNOSIS — I10 PRIMARY HYPERTENSION: Chronic | ICD-10-CM

## 2024-12-10 DIAGNOSIS — E11.43 TYPE 2 DIABETES MELLITUS WITH DIABETIC AUTONOMIC NEUROPATHY, WITHOUT LONG-TERM CURRENT USE OF INSULIN: Primary | Chronic | ICD-10-CM

## 2024-12-10 DIAGNOSIS — H00.014 HORDEOLUM EXTERNUM OF LEFT UPPER EYELID: ICD-10-CM

## 2024-12-10 DIAGNOSIS — F33.0 MAJOR DEPRESSIVE DISORDER, RECURRENT, MILD: Chronic | ICD-10-CM

## 2024-12-10 DIAGNOSIS — R41.3 MEMORY DEFICIT: ICD-10-CM

## 2024-12-10 PROBLEM — F32.A DEPRESSION: Chronic | Status: RESOLVED | Noted: 2019-06-11 | Resolved: 2024-12-10

## 2024-12-10 LAB
ALBUMIN SERPL BCP-MCNC: 3.1 G/DL (ref 3.5–5.2)
ALP SERPL-CCNC: 123 U/L (ref 40–150)
ALT SERPL W/O P-5'-P-CCNC: 50 U/L (ref 10–44)
ANION GAP SERPL CALC-SCNC: 6 MMOL/L (ref 8–16)
AST SERPL-CCNC: 49 U/L (ref 10–40)
BILIRUB SERPL-MCNC: 0.3 MG/DL (ref 0.1–1)
BUN SERPL-MCNC: 9 MG/DL (ref 8–23)
CALCIUM SERPL-MCNC: 8.8 MG/DL (ref 8.7–10.5)
CHLORIDE SERPL-SCNC: 113 MMOL/L (ref 95–110)
CO2 SERPL-SCNC: 24 MMOL/L (ref 23–29)
CREAT SERPL-MCNC: 0.6 MG/DL (ref 0.5–1.4)
EST. GFR  (NO RACE VARIABLE): >60 ML/MIN/1.73 M^2
ESTIMATED AVG GLUCOSE: 180 MG/DL (ref 68–131)
GLUCOSE SERPL-MCNC: 98 MG/DL (ref 70–110)
HBA1C MFR BLD: 7.9 % (ref 4–5.6)
POTASSIUM SERPL-SCNC: 5.1 MMOL/L (ref 3.5–5.1)
PROT SERPL-MCNC: 6 G/DL (ref 6–8.4)
SODIUM SERPL-SCNC: 143 MMOL/L (ref 136–145)

## 2024-12-10 PROCEDURE — 80053 COMPREHEN METABOLIC PANEL: CPT | Performed by: INTERNAL MEDICINE

## 2024-12-10 PROCEDURE — 99999 PR PBB SHADOW E&M-EST. PATIENT-LVL III: CPT | Mod: PBBFAC,,, | Performed by: INTERNAL MEDICINE

## 2024-12-10 PROCEDURE — 1160F RVW MEDS BY RX/DR IN RCRD: CPT | Mod: CPTII,S$GLB,, | Performed by: INTERNAL MEDICINE

## 2024-12-10 PROCEDURE — 3008F BODY MASS INDEX DOCD: CPT | Mod: CPTII,S$GLB,, | Performed by: INTERNAL MEDICINE

## 2024-12-10 PROCEDURE — 83036 HEMOGLOBIN GLYCOSYLATED A1C: CPT | Performed by: INTERNAL MEDICINE

## 2024-12-10 PROCEDURE — 1159F MED LIST DOCD IN RCRD: CPT | Mod: CPTII,S$GLB,, | Performed by: INTERNAL MEDICINE

## 2024-12-10 PROCEDURE — 1100F PTFALLS ASSESS-DOCD GE2>/YR: CPT | Mod: CPTII,S$GLB,, | Performed by: INTERNAL MEDICINE

## 2024-12-10 PROCEDURE — 3046F HEMOGLOBIN A1C LEVEL >9.0%: CPT | Mod: CPTII,S$GLB,, | Performed by: INTERNAL MEDICINE

## 2024-12-10 PROCEDURE — 1126F AMNT PAIN NOTED NONE PRSNT: CPT | Mod: CPTII,S$GLB,, | Performed by: INTERNAL MEDICINE

## 2024-12-10 PROCEDURE — 3075F SYST BP GE 130 - 139MM HG: CPT | Mod: CPTII,S$GLB,, | Performed by: INTERNAL MEDICINE

## 2024-12-10 PROCEDURE — G2211 COMPLEX E/M VISIT ADD ON: HCPCS | Mod: S$GLB,,, | Performed by: INTERNAL MEDICINE

## 2024-12-10 PROCEDURE — 3078F DIAST BP <80 MM HG: CPT | Mod: CPTII,S$GLB,, | Performed by: INTERNAL MEDICINE

## 2024-12-10 PROCEDURE — 3288F FALL RISK ASSESSMENT DOCD: CPT | Mod: CPTII,S$GLB,, | Performed by: INTERNAL MEDICINE

## 2024-12-10 PROCEDURE — 99214 OFFICE O/P EST MOD 30 MIN: CPT | Mod: S$GLB,,, | Performed by: INTERNAL MEDICINE

## 2024-12-10 RX ORDER — POLYMYXIN B SULFATE AND TRIMETHOPRIM 1; 10000 MG/ML; [USP'U]/ML
1 SOLUTION OPHTHALMIC
COMMUNITY
Start: 2024-11-23

## 2024-12-10 NOTE — PROGRESS NOTES
Your electrolytes, kidney function are normal.  A1c indicates improvement in diabetes.  Liver function has increased.  I would like to check an ultrasound of the liver to evaluate further.

## 2024-12-10 NOTE — PROGRESS NOTES
Assessment:       1. Type 2 diabetes mellitus with diabetic autonomic neuropathy, without long-term current use of insulin  - Hemoglobin A1C; Future  - Comprehensive Metabolic Panel; Future    2. Primary hypertension    3. Hyperlipidemia associated with type 2 diabetes mellitus    4. Major depressive disorder, recurrent, mild    5. Memory deficit    6. Hordeolum externum of left upper eyelid    7. Foreign body sensation, left eye        Plan:       1. Continue Jardiance 10 mg, Lantus 10 units, Januvia 100 mg.   2. Continue Norvasc 5 mg   3. Continue Lipitor 40 mg   4. Continue Effexor  mg  5. Discussed diet and exercise.  Recommend reading.    6.  Continue antibiotic topically.    7.  Wetting drops.  If no relief, let me know for Ophthalmology.    Assessment & Plan    IMPRESSION:  1. Evaluated diabetes management, including CGM use and its impact on A1c  2. Assessed hypertension control based on patient-reported home BP readings  3. Considered memory concerns but determined no indication for medication at this time  4. Reviewed persistent eye irritation following recent stye treatment  5. Pending A1c results to determine if referral to [facility] is necessary for diabetes management    TYPE 2 DIABETES MELLITUS:   Explained CGM accuracy compared to finger stick readings for very elevated or low values.   Ms. Perez to continue smaller, more frequent meals to manage gastroparesis.   Continued Jardiance 10 mg, Lantus 10 units daily, Januvia 100 mg daily.   A1c test ordered.   Follow up earlier with [facility] if A1c is elevated.    HYPERTENSION:   Continued Norvasc 5 mg daily.    HYPERCHOLESTEROLEMIA:   Continued Lipitor 40 mg daily.    DEPRESSION:   Continued Effexor  mg daily.    EYE CONDITION (HORDEOLUM):   Educated on the appropriate use of eye drops, cautioning against Visine due to potential rebound effects.   Ms. Perez to apply hot compresses to affected eye.   Continue polymyxin eye drops as  prescribed.   Use OTC wetting eye drops every 4-6 hours as needed for eye irritation.   Contact the office by Thursday or Friday if eye irritation does not improve with current treatment.    MEMORY SUPPORT:   Discussed lifestyle interventions for memory support: reading daily, exercising, and eating healthy.   Recommend reading daily for cognitive health.    DIETARY COUNSELING:   Recommend proper hydration, with water as the best option and limiting sugar-free sports drinks to 2 per day.    GENERAL HEALTH:   Ms. Perez to continue exercise regimen, including water therapy.   Electrolytes panel ordered.   Kidney function test ordered.   Liver function test ordered.   Flu shot administered on 5th floor.    FOLLOW-UP:   Follow up in 6 months, pending A1c results.                 This note was generated with the assistance of ambient listening technology. Verbal consent was obtained by the patient and accompanying visitor(s) for the recording of patient appointment to facilitate this note. I attest to having reviewed and edited the generated note for accuracy, though some syntax or spelling errors may persist. Please contact the author of this note for any clarification.       Subjective:       Patient ID: Jill Perez is a 69 y.o. female.    Chief Complaint: Follow-up    HPI    69-year-old female here for follow-up.    Patient has diabetes on Jardiance 10 mg, Lantus 10 units daily, Januvia 100 mg.  Lab Results   Component Value Date    HGBA1C 9.6 (H) 10/01/2024    HGBA1C 11.4 (H) 07/01/2024    HGBA1C 7.3 (H) 09/12/2023     Lab Results   Component Value Date    LDLCALC 139.4 05/12/2023    CREATININE 0.6 10/21/2024     Patient has hypertension on Norvasc 5 mg.    Patient has hyperlipidemia on Lipitor 40 mg.    Patient has depression on Effexor  mg daily.    History of Present Illness    CHIEF COMPLAINT:  Ms. Perez presents today for follow-up.    DIABETES:  She is currently taking Jardiance 10 mg, Lantus 10 units  daily, and Genuvia 100 mg for diabetes management. She uses a continuous glucose monitor and reports improving and decreasing blood sugar readings. She is seeing a diabetic educator at the main campus.    HYPERTENSION:  She is taking Norvasc 5 mg for hypertension management. She reports checking her blood pressure at home with recent readings slightly elevated. Yesterday's reading was 140/90, and she states her blood pressure typically ranges from 136/75 to 140/90.    HYPERLIPIDEMIA:  She is taking Lipitor 40 mg for hyperlipidemia management.    DEPRESSION:  She reports ongoing symptoms of depression.    GASTROPARESIS:  She reports ongoing issues with gastroparesis. She manages symptoms by consuming smaller, more frequent meals and drinking plenty of fluids, including water and sugar-free sports drinks as recommended by her diabetic educator. She limits intake of sugar-free Powerade or Gatorade to no more than two per day.    MEMORY CONCERNS:  She reports experiencing frequent forgetfulness, often having to pause and recall her intentions when entering a room. She expresses concern about her memory and inquires about potential medication options to improve cognitive function. She denies believing she has dementia or mild cognitive impairment.    EYE ISSUE:  She reports a recent stye in her left eye. She initially sought treatment at urgent care and was prescribed eye drops. Currently, she experiences persistent symptoms including watery eyes, itching, and a sensation of something in her eye. The stye has improved, with the initial swelling having decreased, but discomfort persists. She is using prescribed eye drops, two drops per day. She has been referred to ophthalmology and has an appointment scheduled for March. She continues to use polymyxin eye drops as previously prescribed.      ROS:  Eyes: +eye [facility]  Psychiatric: +depression, +memory problems         Review of Systems          Objective:      Physical  Exam  Vitals reviewed.   Constitutional:       Appearance: She is well-developed.   HENT:      Head: Normocephalic and atraumatic.      Mouth/Throat:      Pharynx: No oropharyngeal exudate.   Eyes:      General: No scleral icterus.        Right eye: No [facility].         Left eye: No [facility].      Pupils: Pupils are equal, round, and reactive to light.   Neck:      Thyroid: No thyromegaly.      Trachea: No tracheal deviation.   Cardiovascular:      Rate and Rhythm: Normal rate and regular rhythm.      Heart sounds: Normal heart sounds. No murmur heard.     No friction rub. No gallop.   Pulmonary:      Effort: Pulmonary effort is normal. No respiratory distress.      Breath sounds: Normal breath sounds. No wheezing or rales.   Chest:      Chest wall: No tenderness.   Abdominal:      General: Bowel sounds are normal. There is no distension.      Palpations: Abdomen is soft. There is no mass.      Tenderness: There is no abdominal tenderness. There is no guarding or rebound.   Musculoskeletal:         General: No tenderness. Normal range of motion.      Cervical back: Normal range of motion and neck supple.   Skin:     General: Skin is warm and dry.      Coloration: Skin is not pale.      Findings: No erythema or rash.   Neurological:      Mental Status: She is alert and oriented to person, place, and time.   Psychiatric:         Behavior: Behavior normal.

## 2024-12-11 NOTE — PROGRESS NOTES
Outpatient Care Management   - Care Plan Follow Up    Patient: Jill Perez  MRN:  [id number]  Date of Service:  12/11/2024  Completed by:  Brigette Dougherty LCSW  Referral Date: 09/22/2023    Reason for Visit   Patient presents with    OPCM SW Follow Up Call     12/10/2024       Brief Summary: Spoke with patient who states she has several upcoming MD appts, including 2 today. Patient is consistently working through scheduling needed appts and reports has not had a chance to schedule psychiatry yet. Patient states she would very much like to have a scooter as she is unable to walk for longer periods of time. Patient also states she is looking into scheduling aquatic therapy. Spoke with OPCM RN who informed SW of the PM&R eval for a W/C or other appropriate DME has been placed. SW updated patient of the referral and provided the ph# to call and schedule an appt as requested. Patient states she is not sure if she received the resources that SW emailed to her, but states she will check at a later date since her appts are more important at this time. CHW is working with patient on completing a SNAP application. Patient is agreeable for a follow up call on 1/2/25.    Complex Care Plan     Care plan was discussed and completed today with input from patient and/or caregiver.    Patient Instructions     No follow-ups on file.

## 2024-12-12 ENCOUNTER — PATIENT OUTREACH (OUTPATIENT)
Dept: ADMINISTRATIVE | Facility: OTHER | Age: 69
End: 2024-12-12
Payer: COMMERCIAL

## 2024-12-12 NOTE — PROGRESS NOTES
Community Health Worker continues to follow this patient for SNAP application   Location of meeting: telephone  Additional resources provided  Status of application: I have created Ms. Perez email and SNAP portal. I spoke with Ms. Perez today and she informed that she isn't feeling well and believe she maybe coming down with a cold or the flu. She has going to see her PCP on Tuesday due to feeling ill and he informed her to drink a lot of fluids. She brought camphophenique for a fever blister and has woken up today to two additional once. She has taking day and night quil about thirty minutes ago and she is waiting for it to kick in because she is feeling worst today. I informed her of the application and she has asked for a call back due to not being up to it today. I will follow up on Monday to complete.  Follow up required: yes

## 2024-12-13 ENCOUNTER — TELEPHONE (OUTPATIENT)
Dept: INTERNAL MEDICINE | Facility: CLINIC | Age: 69
End: 2024-12-13
Payer: COMMERCIAL

## 2024-12-13 ENCOUNTER — OFFICE VISIT (OUTPATIENT)
Dept: OPTOMETRY | Facility: CLINIC | Age: 69
End: 2024-12-13
Payer: COMMERCIAL

## 2024-12-13 DIAGNOSIS — H00.014 HORDEOLUM EXTERNUM OF LEFT UPPER EYELID: ICD-10-CM

## 2024-12-13 DIAGNOSIS — H00.024 HORDEOLUM INTERNUM LEFT UPPER EYELID: Primary | ICD-10-CM

## 2024-12-13 DIAGNOSIS — F33.0 MAJOR DEPRESSIVE DISORDER, RECURRENT, MILD: Primary | ICD-10-CM

## 2024-12-13 DIAGNOSIS — F33.0 MAJOR DEPRESSIVE DISORDER, RECURRENT, MILD: ICD-10-CM

## 2024-12-13 PROCEDURE — 99999 PR PBB SHADOW E&M-EST. PATIENT-LVL III: CPT | Mod: PBBFAC,,, | Performed by: OPTOMETRIST

## 2024-12-13 RX ORDER — AMOXICILLIN AND CLAVULANATE POTASSIUM 500; 125 MG/1; MG/1
1 TABLET, FILM COATED ORAL 2 TIMES DAILY
Qty: 20 TABLET | Refills: 0 | Status: SHIPPED | OUTPATIENT
Start: 2024-12-13 | End: 2024-12-23

## 2024-12-13 NOTE — TELEPHONE ENCOUNTER
pt states that her eye has gotten worse since her last appt and would like a call back to see what you would like her to do.

## 2024-12-13 NOTE — PROGRESS NOTES
HPI    68 Y/o female is here for stye on left upper eyelid pt say's it's been   there since Monday and this morning it crusted closed and painful, pt   say's she used a otc eye drop TID   No f/f    Eye med: OTC EYE GTT TID OS   Last edited by Marlee Disla MA on 12/13/2024  2:07 PM.            Assessment /Plan     For exam results, see Encounter Report.    Hordeolum internum left upper eyelid  -     amoxicillin-clavulanate 500-125mg (AUGMENTIN) 500-125 mg Tab; Take 1 tablet (500 mg total) by mouth 2 (two) times daily. for 10 days  Dispense: 20 tablet; Refill: 0    Major depressive disorder, recurrent, mild  -     Ambulatory referral/consult to Optometry    Hordeolum externum of left upper eyelid  -     Ambulatory referral/consult to Optometry      Int jamie NELLA    PLAN:    1) Hot compresses, 5 minutes at a time, QID, along with AUGMENTIN BID PO X 7-10 days (pt has PCN allergy, but she says she has taken AUGMENTIN in past w no problems)  2) Discussed chalazion formation  3) Pt will call if not resolved in one week, and will schedule excision.  Otherwise rtc as sched w Dr Mcgraw for cat marioal

## 2024-12-13 NOTE — TELEPHONE ENCOUNTER
----- Message from Macey sent at 12/13/2024  8:15 AM CST -----  Contact: [phone]  .[email or web address]     Patient is calling for Medical Advice regarding: pt states that her eye has gotten worse since her last appt and would like a call back to see what you would like her to do.     How long has patient had these symptoms:    Pharmacy name and phone#:    Patient wants a call back or thru [facility]:    Comments:    Please advise patient replies from provider may take up to 48 hours.

## 2024-12-17 ENCOUNTER — PATIENT OUTREACH (OUTPATIENT)
Dept: ADMINISTRATIVE | Facility: OTHER | Age: 69
End: 2024-12-17
Payer: COMMERCIAL

## 2024-12-17 NOTE — PROGRESS NOTES
"  CHW - Case Closure    This Community Health Worker spoke to patient via telephone today.   Pt/Caregiver reported: Mrs. Melchor stated she is still not ready to complete her SNAP application. She has gone to the doctor and still not felling an 100 percent to complete. She will be going back to the doctor and contacted her diabetic doctor also. I have informed Ms. Melchor To contact me when she is ready to complete and she has agreed.   Pt  denied any additional needs at this time and agrees with episode closure at this time.  Provided patient with Community Health Worker's contact information and encouraged him/her to contact this Community Health Worker if additional needs arise.      SNAP portal log in: email UN: [email or web address]  Pswrd: [email or web address]  PIN"04/[id number]  "

## 2024-12-17 NOTE — PROGRESS NOTES
Community Health Worker attempted to contact patient via telephone to assist with SNAP MILY. Left a voicemail message on patient's telephone number .  Will attempt again at a later date.

## 2024-12-18 ENCOUNTER — OUTPATIENT CASE MANAGEMENT (OUTPATIENT)
Dept: ADMINISTRATIVE | Facility: OTHER | Age: 69
End: 2024-12-18
Payer: COMMERCIAL

## 2024-12-18 ENCOUNTER — PATIENT MESSAGE (OUTPATIENT)
Facility: CLINIC | Age: 69
End: 2024-12-18
Payer: COMMERCIAL
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
## 2024-12-29 DIAGNOSIS — R11.0 NAUSEA: ICD-10-CM

## 2024-12-30 RX ORDER — ONDANSETRON 8 MG/1
8 TABLET, ORALLY DISINTEGRATING ORAL EVERY 12 HOURS PRN
Qty: 30 TABLET | Refills: 2 | Status: SHIPPED | OUTPATIENT
Start: 2024-12-30 | End: 2025-01-04

## 2025-01-02 ENCOUNTER — PATIENT MESSAGE (OUTPATIENT)
Dept: ADMINISTRATIVE | Facility: OTHER | Age: 70
End: 2025-01-02
Payer: MEDICARE

## 2025-01-02 ENCOUNTER — OUTPATIENT CASE MANAGEMENT (OUTPATIENT)
Dept: ADMINISTRATIVE | Facility: OTHER | Age: 70
End: 2025-01-02
Payer: MEDICARE

## 2025-01-02 ENCOUNTER — OFFICE VISIT (OUTPATIENT)
Dept: GASTROENTEROLOGY | Facility: CLINIC | Age: 70
End: 2025-01-02
Payer: COMMERCIAL

## 2025-01-02 DIAGNOSIS — K31.84 GASTROPARESIS: Primary | ICD-10-CM

## 2025-01-02 DIAGNOSIS — K59.04 CHRONIC IDIOPATHIC CONSTIPATION: ICD-10-CM

## 2025-01-02 RX ORDER — PANTOPRAZOLE SODIUM 40 MG/1
40 TABLET, DELAYED RELEASE ORAL DAILY
Qty: 90 TABLET | Refills: 3 | Status: SHIPPED | OUTPATIENT
Start: 2025-01-02 | End: 2026-01-02

## 2025-01-02 RX ORDER — PRUCALOPRIDE 2 MG/1
2 TABLET, FILM COATED ORAL DAILY
Qty: 30 TABLET | Refills: 11 | Status: SHIPPED | OUTPATIENT
Start: 2025-01-02 | End: 2025-12-28

## 2025-01-02 RX ORDER — METOCLOPRAMIDE HYDROCHLORIDE 5 MG/5ML
10 SOLUTION ORAL
Qty: 1200 ML | Refills: 1 | Status: SHIPPED | OUTPATIENT
Start: 2025-01-02 | End: 2025-03-03

## 2025-01-02 NOTE — PROGRESS NOTES
1/16/2025  3rd attempt to complete Follow-Up for Outpatient Care Management, left message. Updated OPCM RN.      1/9/2025  2nd attempt to complete Follow-Up for Outpatient Care Management, left message.    1/2/2025  1st attempt to complete Follow-Up for Outpatient Care Management, left message. Attempt letter sent through portal.

## 2025-01-02 NOTE — LETTER
Jill Perez  [address]   [address]  [place] [id number]      Dear Jill Perez,     I am writing from the Outpatient Care Management Department at [facility]. I have been unsuccessful at reaching you since we spoke on ***.  I hope you found the assistance previously provided to you helpful.     Please contact me at *** from 8:00AM to 4:30 PM on Monday thru Friday.     As you know, [facility] also has a program with a nurse available 24/7 to answer questions or provide medical advice.  [facility] can be reached at [phone].    Sincerely,       Brigette Dougherty, STEFAN  Outpatient Care Management

## 2025-01-03 ENCOUNTER — HOSPITAL ENCOUNTER (EMERGENCY)
Facility: HOSPITAL | Age: 70
Discharge: HOME | End: 2025-01-04
Attending: EMERGENCY MEDICINE
Payer: COMMERCIAL

## 2025-01-03 DIAGNOSIS — R11.0 NAUSEA: ICD-10-CM

## 2025-01-03 DIAGNOSIS — R10.9 ABDOMINAL PAIN, UNSPECIFIED ABDOMINAL LOCATION: Primary | ICD-10-CM

## 2025-01-03 LAB
ALBUMIN SERPL BCP-MCNC: 3.2 G/DL (ref 3.5–5.2)
ALLENS TEST: NORMAL
ALP SERPL-CCNC: 86 U/L (ref 40–150)
ALT SERPL W/O P-5'-P-CCNC: 9 U/L (ref 10–44)
ANION GAP SERPL CALC-SCNC: 6 MMOL/L (ref 8–16)
AST SERPL-CCNC: 12 U/L (ref 10–40)
BASOPHILS # BLD AUTO: 0.02 K/UL (ref 0–0.2)
BASOPHILS NFR BLD: 0.3 % (ref 0–1.9)
BILIRUB SERPL-MCNC: 0.3 MG/DL (ref 0.1–1)
BUN SERPL-MCNC: 15 MG/DL (ref 6–30)
BUN SERPL-MCNC: 16 MG/DL (ref 8–23)
CALCIUM SERPL-MCNC: 8.9 MG/DL (ref 8.7–10.5)
CHLORIDE SERPL-SCNC: 110 MMOL/L (ref 95–110)
CHLORIDE SERPL-SCNC: 115 MMOL/L (ref 95–110)
CO2 SERPL-SCNC: 22 MMOL/L (ref 23–29)
CREAT SERPL-MCNC: 0.4 MG/DL (ref 0.5–1.4)
CREAT SERPL-MCNC: 0.7 MG/DL (ref 0.5–1.4)
DIFFERENTIAL METHOD BLD: ABNORMAL
EOSINOPHIL # BLD AUTO: 0 K/UL (ref 0–0.5)
EOSINOPHIL NFR BLD: 0.5 % (ref 0–8)
ERYTHROCYTE [DISTWIDTH] IN BLOOD BY AUTOMATED COUNT: 14.9 % (ref 11.5–14.5)
EST. GFR  (NO RACE VARIABLE): >60 ML/MIN/1.73 M^2
GLUCOSE SERPL-MCNC: 120 MG/DL (ref 70–110)
GLUCOSE SERPL-MCNC: 127 MG/DL (ref 70–110)
HCT VFR BLD AUTO: 38.5 % (ref 37–48.5)
HCT VFR BLD CALC: 34 %PCV (ref 36–54)
HCV AB SERPL QL IA: NORMAL
HGB BLD-MCNC: 12.4 G/DL (ref 12–16)
HIV 1+2 AB+HIV1 P24 AG SERPL QL IA: NORMAL
IMM GRANULOCYTES # BLD AUTO: 0.01 K/UL (ref 0–0.04)
IMM GRANULOCYTES NFR BLD AUTO: 0.2 % (ref 0–0.5)
LDH SERPL L TO P-CCNC: 0.82 MMOL/L (ref 0.5–2.2)
LIPASE SERPL-CCNC: 11 U/L (ref 4–60)
LYMPHOCYTES # BLD AUTO: 2.7 K/UL (ref 1–4.8)
LYMPHOCYTES NFR BLD: 43.4 % (ref 18–48)
MAGNESIUM SERPL-MCNC: 1.5 MG/DL (ref 1.6–2.6)
MCH RBC QN AUTO: 29.5 PG (ref 27–31)
MCHC RBC AUTO-ENTMCNC: 32.2 G/DL (ref 32–36)
MCV RBC AUTO: 91 FL (ref 82–98)
MONOCYTES # BLD AUTO: 0.5 K/UL (ref 0.3–1)
MONOCYTES NFR BLD: 8.7 % (ref 4–15)
NEUTROPHILS # BLD AUTO: 2.9 K/UL (ref 1.8–7.7)
NEUTROPHILS NFR BLD: 46.9 % (ref 38–73)
NRBC BLD-RTO: 0 /100 WBC
PLATELET # BLD AUTO: 335 K/UL (ref 150–450)
PMV BLD AUTO: 10.6 FL (ref 9.2–12.9)
POC IONIZED CALCIUM: 1.28 MMOL/L (ref 1.06–1.42)
POC TCO2 (MEASURED): 23 MMOL/L (ref 23–29)
POTASSIUM BLD-SCNC: 3.7 MMOL/L (ref 3.5–5.1)
POTASSIUM SERPL-SCNC: 4.1 MMOL/L (ref 3.5–5.1)
PROT SERPL-MCNC: 6.2 G/DL (ref 6–8.4)
RBC # BLD AUTO: 4.21 M/UL (ref 4–5.4)
SAMPLE: ABNORMAL
SAMPLE: NORMAL
SITE: NORMAL
SODIUM BLD-SCNC: 145 MMOL/L (ref 136–145)
SODIUM SERPL-SCNC: 143 MMOL/L (ref 136–145)
TROPONIN I SERPL DL<=0.01 NG/ML-MCNC: <3 NG/L (ref 0–14)
WBC # BLD AUTO: 6.22 K/UL (ref 3.9–12.7)

## 2025-01-03 PROCEDURE — 83605 ASSAY OF LACTIC ACID: CPT

## 2025-01-03 PROCEDURE — 99900035 HC TECH TIME PER 15 MIN (STAT)

## 2025-01-03 PROCEDURE — 25000003 PHARM REV CODE 250: Performed by: EMERGENCY MEDICINE

## 2025-01-03 PROCEDURE — 80053 COMPREHEN METABOLIC PANEL: CPT | Performed by: EMERGENCY MEDICINE

## 2025-01-03 PROCEDURE — 80047 BASIC METABLC PNL IONIZED CA: CPT

## 2025-01-03 PROCEDURE — 93005 ELECTROCARDIOGRAM TRACING: CPT

## 2025-01-03 PROCEDURE — 25500020 PHARM REV CODE 255: Performed by: EMERGENCY MEDICINE

## 2025-01-03 PROCEDURE — 82330 ASSAY OF CALCIUM: CPT | Mod: 91

## 2025-01-03 PROCEDURE — 63600175 PHARM REV CODE 636 W HCPCS

## 2025-01-03 PROCEDURE — 96361 HYDRATE IV INFUSION ADD-ON: CPT

## 2025-01-03 PROCEDURE — 84484 ASSAY OF TROPONIN QUANT: CPT | Performed by: EMERGENCY MEDICINE

## 2025-01-03 PROCEDURE — 85025 COMPLETE CBC W/AUTO DIFF WBC: CPT | Performed by: EMERGENCY MEDICINE

## 2025-01-03 PROCEDURE — 83735 ASSAY OF MAGNESIUM: CPT | Performed by: EMERGENCY MEDICINE

## 2025-01-03 PROCEDURE — 83690 ASSAY OF LIPASE: CPT | Performed by: EMERGENCY MEDICINE

## 2025-01-03 PROCEDURE — 96374 THER/PROPH/DIAG INJ IV PUSH: CPT

## 2025-01-03 PROCEDURE — 99285 EMERGENCY DEPT VISIT HI MDM: CPT | Mod: 25

## 2025-01-03 PROCEDURE — 87389 HIV-1 AG W/HIV-1&-2 AB AG IA: CPT | Performed by: PHYSICIAN ASSISTANT

## 2025-01-03 PROCEDURE — 86803 HEPATITIS C AB TEST: CPT | Performed by: PHYSICIAN ASSISTANT

## 2025-01-03 PROCEDURE — 93010 ELECTROCARDIOGRAM REPORT: CPT | Mod: ,,, | Performed by: INTERNAL MEDICINE

## 2025-01-03 PROCEDURE — 96375 TX/PRO/DX INJ NEW DRUG ADDON: CPT

## 2025-01-03 RX ORDER — ONDANSETRON HYDROCHLORIDE 2 MG/ML
4 INJECTION, SOLUTION INTRAVENOUS
Status: COMPLETED | OUTPATIENT
Start: 2025-01-03 | End: 2025-01-03

## 2025-01-03 RX ORDER — MORPHINE SULFATE 4 MG/ML
4 INJECTION, SOLUTION INTRAMUSCULAR; INTRAVENOUS
Status: COMPLETED | OUTPATIENT
Start: 2025-01-03 | End: 2025-01-03

## 2025-01-03 RX ADMIN — IOHEXOL 75 ML: 350 INJECTION, SOLUTION INTRAVENOUS at 10:01

## 2025-01-03 RX ADMIN — ONDANSETRON 4 MG: 2 INJECTION INTRAMUSCULAR; INTRAVENOUS at 08:01

## 2025-01-03 RX ADMIN — MORPHINE SULFATE 4 MG: 4 INJECTION INTRAVENOUS at 08:01

## 2025-01-03 RX ADMIN — SODIUM CHLORIDE 1000 ML: 9 INJECTION, SOLUTION INTRAVENOUS at 08:01

## 2025-01-04 VITALS
DIASTOLIC BLOOD PRESSURE: 79 MMHG | SYSTOLIC BLOOD PRESSURE: 145 MMHG | HEART RATE: 59 BPM | BODY MASS INDEX: 24.11 KG/M2 | WEIGHT: 150 LBS | OXYGEN SATURATION: 98 % | HEIGHT: 66 IN | TEMPERATURE: 98 F | RESPIRATION RATE: 17 BRPM

## 2025-01-04 LAB
OHS QRS DURATION: 136 MS
OHS QTC CALCULATION: 486 MS

## 2025-01-04 PROCEDURE — 63600175 PHARM REV CODE 636 W HCPCS

## 2025-01-04 PROCEDURE — 96372 THER/PROPH/DIAG INJ SC/IM: CPT

## 2025-01-04 RX ORDER — AMOXICILLIN AND CLAVULANATE POTASSIUM 875; 125 MG/1; MG/1
1 TABLET, FILM COATED ORAL 2 TIMES DAILY
Qty: 4 TABLET | Refills: 0 | Status: SHIPPED | OUTPATIENT
Start: 2025-01-04

## 2025-01-04 RX ORDER — DICYCLOMINE HYDROCHLORIDE 10 MG/ML
20 INJECTION INTRAMUSCULAR
Status: COMPLETED | OUTPATIENT
Start: 2025-01-04 | End: 2025-01-04

## 2025-01-04 RX ORDER — ONDANSETRON 8 MG/1
8 TABLET, ORALLY DISINTEGRATING ORAL EVERY 12 HOURS PRN
Qty: 20 TABLET | Refills: 0 | Status: SHIPPED | OUTPATIENT
Start: 2025-01-04

## 2025-01-04 RX ADMIN — DICYCLOMINE HYDROCHLORIDE 20 MG: 10 INJECTION, SOLUTION INTRAMUSCULAR at 12:01

## 2025-01-04 NOTE — ED NOTES
I-STAT Chem-8+ Results:   Value Reference Range   Sodium 145 136-145 mmol/L   Potassium  3.7 3.5-5.1 mmol/L   Chloride 110  mmol/L   Ionized Calcium 1.28 1.06-1.42 mmol/L   CO2 (measured) 23 23-29 mmol/L   Glucose 120  mg/dL   BUN 15 6-30 mg/dL   Creatinine 0.4 0.5-1.4 mg/dL   Hematocrit 34 36-54%

## 2025-01-04 NOTE — FIRST PROVIDER EVALUATION
"Medical screening examination initiated.  I have conducted a focused provider triage encounter, findings are as follows:    Brief history of present illness:  Pt with history of gastroparesis.  Has not been able to tolerate PO intake for 4 days.    Recently diagnosed with UTI, on abx.    Vitals:    01/03/25 1832   BP: (!) 189/96   Pulse: 85   Resp: 18   Temp: 97.7 °F (36.5 °C)   TempSrc: Oral   SpO2: 97%   Weight: 68 kg (150 lb)   Height: 5' 6" (1.676 m)       Pertinent physical exam:  tender LUQ, otherwise nontender.    Brief workup plan:  Start with abdominal labs, IV fluids, will defer imaging decisions to primary ED team.    Preliminary workup initiated; this workup will be continued and followed by the physician or advanced practice provider that is assigned to the patient when roomed.  "
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
## 2025-01-06 ENCOUNTER — TELEPHONE (OUTPATIENT)
Dept: INTERNAL MEDICINE | Facility: CLINIC | Age: 70
End: 2025-01-06
Payer: COMMERCIAL

## 2025-01-08 ENCOUNTER — TELEPHONE (OUTPATIENT)
Dept: INTERNAL MEDICINE | Facility: CLINIC | Age: 70
End: 2025-01-08
Payer: COMMERCIAL
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
## 2025-01-13 ENCOUNTER — OUTPATIENT CASE MANAGEMENT (OUTPATIENT)
Dept: ADMINISTRATIVE | Facility: OTHER | Age: 70
End: 2025-01-13
Payer: MEDICARE

## 2025-01-13 DIAGNOSIS — Z00.00 ENCOUNTER FOR MEDICARE ANNUAL WELLNESS EXAM: ICD-10-CM

## 2025-01-17 ENCOUNTER — TELEPHONE (OUTPATIENT)
Dept: INTERNAL MEDICINE | Facility: CLINIC | Age: 70
End: 2025-01-17
Payer: MEDICARE

## 2025-01-22 ENCOUNTER — OUTPATIENT CASE MANAGEMENT (OUTPATIENT)
Dept: ADMINISTRATIVE | Facility: OTHER | Age: 70
End: 2025-01-22
Payer: MEDICARE

## 2025-01-22 NOTE — PROGRESS NOTES
Outpatient Care Management  Plan of Care Follow Up Visit    Patient: Jill Perez  MRN: [id number]  Date of Service: 01/22/2025  Completed by: Loree Wood RN  Referral Date: 09/22/2023    Reason for Visit   Patient presents with    Update Plan Of Care    OPCM Chart Review       OPCM RN follow-up call completed.   Continue education on DM.  Next Steps: Patient is in agreement to follow-up call on or around 2/5/2025.

## 2025-01-28 RX ORDER — DICLOFENAC SODIUM 10 MG/G
2 GEL TOPICAL DAILY
Qty: 100 G | Refills: 2 | Status: SHIPPED | OUTPATIENT
Start: 2025-01-28

## 2025-01-28 NOTE — TELEPHONE ENCOUNTER
No care due was identified.  [facility] Embedded Care Due Messages. Reference number: [id number].   1/28/2025 5:09:10 AM CST

## 2025-01-29 ENCOUNTER — TELEPHONE (OUTPATIENT)
Dept: INTERNAL MEDICINE | Facility: CLINIC | Age: 70
End: 2025-01-29
Payer: MEDICARE

## 2025-01-30 ENCOUNTER — OFFICE VISIT (OUTPATIENT)
Dept: INTERNAL MEDICINE | Facility: CLINIC | Age: 70
End: 2025-01-30
Payer: COMMERCIAL

## 2025-01-30 VITALS
DIASTOLIC BLOOD PRESSURE: 80 MMHG | WEIGHT: 173.81 LBS | SYSTOLIC BLOOD PRESSURE: 138 MMHG | BODY MASS INDEX: 27.93 KG/M2 | HEIGHT: 66 IN | TEMPERATURE: 98 F

## 2025-01-30 DIAGNOSIS — E11.43 TYPE 2 DIABETES MELLITUS WITH DIABETIC AUTONOMIC NEUROPATHY, WITHOUT LONG-TERM CURRENT USE OF INSULIN: Primary | Chronic | ICD-10-CM

## 2025-01-30 DIAGNOSIS — I10 PRIMARY HYPERTENSION: ICD-10-CM

## 2025-01-30 DIAGNOSIS — E78.5 HYPERLIPIDEMIA ASSOCIATED WITH TYPE 2 DIABETES MELLITUS: ICD-10-CM

## 2025-01-30 DIAGNOSIS — I73.00 RAYNAUD'S DISEASE WITHOUT GANGRENE: ICD-10-CM

## 2025-01-30 DIAGNOSIS — E44.0 MODERATE PROTEIN-CALORIE MALNUTRITION: ICD-10-CM

## 2025-01-30 DIAGNOSIS — R41.3 MEMORY DEFICIT: ICD-10-CM

## 2025-01-30 DIAGNOSIS — E11.69 HYPERLIPIDEMIA ASSOCIATED WITH TYPE 2 DIABETES MELLITUS: ICD-10-CM

## 2025-01-30 DIAGNOSIS — M25.50 ARTHRALGIA, UNSPECIFIED JOINT: ICD-10-CM

## 2025-01-30 DIAGNOSIS — F33.0 MAJOR DEPRESSIVE DISORDER, RECURRENT, MILD: Chronic | ICD-10-CM

## 2025-01-30 PROCEDURE — 99214 OFFICE O/P EST MOD 30 MIN: CPT | Mod: S$GLB,,, | Performed by: INTERNAL MEDICINE

## 2025-01-30 PROCEDURE — 3075F SYST BP GE 130 - 139MM HG: CPT | Mod: CPTII,S$GLB,, | Performed by: INTERNAL MEDICINE

## 2025-01-30 PROCEDURE — 1160F RVW MEDS BY RX/DR IN RCRD: CPT | Mod: CPTII,S$GLB,, | Performed by: INTERNAL MEDICINE

## 2025-01-30 PROCEDURE — 3079F DIAST BP 80-89 MM HG: CPT | Mod: CPTII,S$GLB,, | Performed by: INTERNAL MEDICINE

## 2025-01-30 PROCEDURE — 3008F BODY MASS INDEX DOCD: CPT | Mod: CPTII,S$GLB,, | Performed by: INTERNAL MEDICINE

## 2025-01-30 PROCEDURE — 3072F LOW RISK FOR RETINOPATHY: CPT | Mod: CPTII,S$GLB,, | Performed by: INTERNAL MEDICINE

## 2025-01-30 PROCEDURE — 99999 PR PBB SHADOW E&M-EST. PATIENT-LVL V: CPT | Mod: PBBFAC,,, | Performed by: INTERNAL MEDICINE

## 2025-01-30 PROCEDURE — G2211 COMPLEX E/M VISIT ADD ON: HCPCS | Mod: S$GLB,,, | Performed by: INTERNAL MEDICINE

## 2025-01-30 PROCEDURE — 1125F AMNT PAIN NOTED PAIN PRSNT: CPT | Mod: CPTII,S$GLB,, | Performed by: INTERNAL MEDICINE

## 2025-01-30 PROCEDURE — 3288F FALL RISK ASSESSMENT DOCD: CPT | Mod: CPTII,S$GLB,, | Performed by: INTERNAL MEDICINE

## 2025-01-30 PROCEDURE — 1159F MED LIST DOCD IN RCRD: CPT | Mod: CPTII,S$GLB,, | Performed by: INTERNAL MEDICINE

## 2025-01-30 PROCEDURE — 1100F PTFALLS ASSESS-DOCD GE2>/YR: CPT | Mod: CPTII,S$GLB,, | Performed by: INTERNAL MEDICINE

## 2025-01-30 RX ORDER — HYDROCHLOROTHIAZIDE 12.5 MG/1
12.5 TABLET ORAL DAILY
Qty: 30 TABLET | Refills: 11 | Status: SHIPPED | OUTPATIENT
Start: 2025-01-30 | End: 2026-01-30

## 2025-01-30 RX ORDER — VENLAFAXINE HYDROCHLORIDE 75 MG/1
75 CAPSULE, EXTENDED RELEASE ORAL DAILY
Qty: 90 CAPSULE | Refills: 3 | Status: SHIPPED | OUTPATIENT
Start: 2025-01-30 | End: 2026-01-30

## 2025-01-30 RX ORDER — VENLAFAXINE HYDROCHLORIDE 150 MG/1
150 CAPSULE, EXTENDED RELEASE ORAL DAILY
Qty: 90 CAPSULE | Refills: 3 | Status: SHIPPED | OUTPATIENT
Start: 2025-01-30

## 2025-01-30 NOTE — PROGRESS NOTES
Assessment:       1. Type 2 diabetes mellitus with diabetic autonomic neuropathy, without long-term current use of insulin    2. Primary hypertension  - MYC E-VISIT  - hydroCHLOROthiazide 12.5 MG Tab; Take 1 tablet (12.5 mg total) by mouth once daily.  Dispense: 30 tablet; Refill: 11    3. Hyperlipidemia associated with type 2 diabetes mellitus    4. Moderate protein-calorie malnutrition    5. Raynaud's disease without gangrene    6. Arthralgia, unspecified joint    7. Memory deficit  - Ambulatory referral/consult to Neurology; Future    8. Major depressive disorder, recurrent, mild  - venlafaxine (EFFEXOR-XR) 150 MG Cp24; Take 1 capsule (150 mg total) by mouth once daily.  Dispense: 90 capsule; Refill: 3  - venlafaxine (EFFEXOR-XR) 75 MG 24 hr capsule; Take 1 capsule (75 mg total) by mouth once daily.  Dispense: 90 capsule; Refill: 3  - Ambulatory referral/consult to Psychiatry; Future        Plan:       1. Continue Jardiance 10 mg, Lantus 10 units, Januvia 100 mg, metformin 1000 mg twice daily  2. Continue Norvasc 5 mg BID, benicar 40 mg.  Start HCTZ 12.5 mg.  Did in 2 weeks.  3. Continue Lipitor 40 mg   4. Encouraged oral intake  5/6.  Monitor.    7.  Refer to Neurology   8. Increase Effexor from 150 mg to 225 mg.    Assessment & Plan    IMPRESSION:  1. Assessed blood pressure control, noting persistent elevation despite current regimen  2. Evaluated memory concerns using basic cognitive screening questions  3. Considered depression management, noting continued positive screening  4. Reviewed diabetes management, noting improving A1C trend    TYPE 2 DIABETES MELLITUS WITH OTHER SPECIFIED COMPLICATION:   Continued current diabetes medication regimen: Jardiance 10 mg, Januvia 100 mg, Metformin 1000 mg twice daily, Lipitor 40 mg, and Lantus 10 units.   Ms. Perez's glucose levels typically range from 120-130 mg/dL, with a recent peak of 180 mg/dL after consuming an empanada.   A1C levels have shown improvement,  decreasing from 9.6% in October to 7.9% in December.   Instructed patient to continue regular glucose monitoring.   Scheduled follow up in 3 months for an in-person visit to reassess A1C levels.    HYPERTENSION:   Measured current blood pressure at 138/80 mmHg.   Discussed potential correlation between uncontrolled hypertension and the patient's reported symptom of pulsatile tinnitus.   Assessed current antihypertensive regimen as inadequate for blood pressure control.   Added hydrochlorothiazide 12.5 mg to the existing regimen of amlodipine 5 mg twice daily and olmesartan 40 mg.   Instructed the patient to monitor blood pressure daily and record readings.   Scheduled an e-visit in 2 weeks to evaluate response to the new antihypertensive medication, including submission of home blood pressure readings.   Advised the patient to contact the office if pulsatile tinnitus persists after blood pressure is controlled.    DEPRESSION:   Noted positive screening for depression.   Increased Effexor dosage by adding 75 mg to the current 150 mg dose, for a total of 225 mg daily.   Referred the patient to psychiatry for comprehensive depression management.    MEMORY CONCERNS:   Performed cognitive assessment, including orientation questions, word recall, and simple tasks, revealing some memory deficits.   Explained that awareness of memory issues is a positive prognostic indicator.   Referred the patient to neurology for further evaluation of memory concerns.    DIABETES:   Recommend limiting consumption of elevated-carbohydrate foods like empanadas to better control blood sugar.    LABS:   plan on CMP in 2 weeks after starting HCTZ.  With the visit                This note was generated with the assistance of ambient listening technology. Verbal consent was obtained by the patient and accompanying visitor(s) for the recording of patient appointment to facilitate this note. I attest to having reviewed and edited the generated  note for accuracy, though some syntax or spelling errors may persist. Please contact the author of this note for any clarification.       Subjective:       Patient ID: Jill Perez is a 69 y.o. female.    Chief Complaint: Follow-up    HPI    69-year-old female here for follow-up.    Patient has diabetes on Jardiance 10 mg, Lantus 10 units daily, Januvia 100 mg, metformin 1000 mg twice daily  Lab Results   Component Value Date    HGBA1C 7.9 (H) 12/10/2024    HGBA1C 9.6 (H) 10/01/2024    HGBA1C 11.4 (H) 07/01/2024     Lab Results   Component Value Date    LDLCALC 139.4 05/12/2023    CREATININE 0.7 01/03/2025     Patient has hypertension on Norvasc 5 mg BID, benicar 40 mg.    Patient has hyperlipidemia on Lipitor 40 mg.    Patient has depression on Effexor  mg daily.    History of Present Illness    CHIEF COMPLAINT:  Ms. Perez presents today for follow-up    MEMORY CONCERNS:  She reports progressive worsening of memory issues, including significant forgetfulness. She has difficulty finding her car in parking lots and frequently questions her own actions. She expresses concern about these symptoms.    HYPERTENSION:  She reports elevated home blood pressure readings with systolic values ranging between 140-160 mmHg. She experiences pulsatile tinnitus, describing audible heartbeat in her ears.    DIABETES:  She reports blood sugar readings typically ranging between 120-130, with an elevated reading of 180 yesterday after consuming an empanada.    MENTAL HEALTH:  She expresses desire to seek psychiatric care for depression management.    SURGICAL INTEREST:  She expresses interest in excess skin removal surgery in [place].      ROS:  ENT: +tinnitus  Psychiatric: +depression, +memory problems         Review of Systems          Objective:      Physical Exam  Vitals reviewed.   Constitutional:       Appearance: She is well-developed.   HENT:      Head: Normocephalic and atraumatic.      Mouth/Throat:       "Pharynx: No oropharyngeal exudate.   Eyes:      General: No scleral icterus.        Right eye: No [facility].         Left eye: No discharge.      Pupils: Pupils are equal, round, and reactive to light.   Neck:      Thyroid: No thyromegaly.      Trachea: No tracheal deviation.   Cardiovascular:      Rate and Rhythm: Normal rate and regular rhythm.      Heart sounds: Normal heart sounds. No murmur heard.     No friction rub. No gallop.   Pulmonary:      Effort: Pulmonary effort is normal. No respiratory distress.      Breath sounds: Normal breath sounds. No wheezing or rales.   Chest:      Chest wall: No tenderness.   Abdominal:      General: Bowel sounds are normal. There is no distension.      Palpations: Abdomen is soft. There is no mass.      Tenderness: There is no abdominal tenderness. There is no guarding or rebound.   Musculoskeletal:         General: No tenderness. Normal range of motion.      Cervical back: Normal range of motion and neck supple.   Skin:     General: Skin is warm and dry.      Coloration: Skin is not pale.      Findings: No erythema or rash.   Neurological:      Mental Status: She is alert and oriented to person, place, and time.      Comments: MMSE  5/5 What time is it? (2025, winter, Jan, Thurs, 30th)  5/5 Where are we? ([place], [place], [facility], [facility], [facility])  3/3 3 object test (apple, desk, chair)  3/5 serial 7s or world backwards  1/3  Objects above (apple)  2/2 point to two objects and ask for name  1/1 Repeat "no ifs ands or buts"  1/1 Write a sentence  1/1 Copy a design  3/3 Follow three step command  1/1 Read and obey the following: Close your eyes    26/30   Psychiatric:         Behavior: Behavior normal.             "

## 2025-02-03 DIAGNOSIS — F51.01 PRIMARY INSOMNIA: ICD-10-CM

## 2025-02-03 RX ORDER — ZOLPIDEM TARTRATE 10 MG/1
10 TABLET ORAL NIGHTLY PRN
Qty: 90 TABLET | Refills: 0 | Status: SHIPPED | OUTPATIENT
Start: 2025-02-03 | End: 2025-08-04

## 2025-02-03 NOTE — TELEPHONE ENCOUNTER
Care Due:                  Date            Visit Type   Department     Provider  --------------------------------------------------------------------------------                                EP -                              PRIMARY      [facility] INTERNAL  Last Visit: 01-      CARE ([facility])   MARIELLE Rosa                              EP -                              PRIMARY      [facility] INTERNAL  Next Visit: 03-      CARE ([facility])   MARIELLE Rosa                                                            Last  Test          Frequency    Reason                     Performed    Due Date  --------------------------------------------------------------------------------    Lipid Panel.  12 months..  atorvastatin.............  05- 05-    [facility] Embedded Care Due Messages. Reference number: [id number].   2/03/2025 9:04:05 AM CST

## 2025-02-11 ENCOUNTER — OFFICE VISIT (OUTPATIENT)
Dept: OPHTHALMOLOGY | Facility: CLINIC | Age: 70
End: 2025-02-11
Payer: COMMERCIAL

## 2025-02-11 DIAGNOSIS — H25.042 POSTERIOR SUBCAPSULAR AGE-RELATED CATARACT OF LEFT EYE: ICD-10-CM

## 2025-02-11 DIAGNOSIS — H25.13 NUCLEAR SCLEROSIS OF BOTH EYES: ICD-10-CM

## 2025-02-11 PROCEDURE — 92136 OPHTHALMIC BIOMETRY: CPT | Mod: LT,S$GLB,, | Performed by: OPHTHALMOLOGY

## 2025-02-11 PROCEDURE — 1159F MED LIST DOCD IN RCRD: CPT | Mod: CPTII,S$GLB,, | Performed by: OPHTHALMOLOGY

## 2025-02-11 PROCEDURE — 1126F AMNT PAIN NOTED NONE PRSNT: CPT | Mod: CPTII,S$GLB,, | Performed by: OPHTHALMOLOGY

## 2025-02-11 PROCEDURE — 3288F FALL RISK ASSESSMENT DOCD: CPT | Mod: CPTII,S$GLB,, | Performed by: OPHTHALMOLOGY

## 2025-02-11 PROCEDURE — 99214 OFFICE O/P EST MOD 30 MIN: CPT | Mod: S$GLB,,, | Performed by: OPHTHALMOLOGY

## 2025-02-11 PROCEDURE — 99999 PR PBB SHADOW E&M-EST. PATIENT-LVL IV: CPT | Mod: PBBFAC,,, | Performed by: OPHTHALMOLOGY

## 2025-02-11 PROCEDURE — 1101F PT FALLS ASSESS-DOCD LE1/YR: CPT | Mod: CPTII,S$GLB,, | Performed by: OPHTHALMOLOGY

## 2025-02-11 RX ORDER — FENTANYL CITRATE 50 UG/ML
25 INJECTION, SOLUTION INTRAMUSCULAR; INTRAVENOUS
OUTPATIENT
Start: 2025-02-11

## 2025-02-11 RX ORDER — PREDNISOLONE ACETATE-GATIFLOXACIN-BROMFENAC .75; 5; 1 MG/ML; MG/ML; MG/ML
1 SUSPENSION/ DROPS OPHTHALMIC 3 TIMES DAILY
Qty: 8 ML | Refills: 3 | Status: SHIPPED | OUTPATIENT
Start: 2025-02-11

## 2025-02-11 RX ORDER — CYCLOP/TROP/PROPA/PHEN/KET/WAT 1-1-0.1%
1 DROPS (EA) OPHTHALMIC (EYE)
OUTPATIENT
Start: 2025-02-11 | End: 2025-02-11

## 2025-02-11 RX ORDER — MIDAZOLAM HYDROCHLORIDE 1 MG/ML
1 INJECTION, SOLUTION INTRAMUSCULAR; INTRAVENOUS
Status: SHIPPED | OUTPATIENT
Start: 2025-02-11

## 2025-02-11 NOTE — PROGRESS NOTES
HPI    Patient is here today for a cataract evaluation. Last seen on 12/13/2024   with Dr. Hutton. Patient states vision looks blurry at both distance and   near. It is difficult to drive at night due to bright lights and glare. No   pain, but occasional dryness and itching.     Eye Meds: None   Last edited by Abimbola Soni on 2/11/2025  1:38 PM.            Assessment /Plan     For exam results, see Encounter Report.    Nuclear sclerosis of both eyes  -     Ambulatory referral/consult to Ophthalmology    Posterior subcapsular age-related cataract of left eye  -     Ambulatory referral/consult to Ophthalmology      Visually Significant Cataract: Patient reports decreased vision consistent with the clinical amount of lenticular opacity, which reaches the level of visual significance and affects activities of daily living.     Specifically, this patient describes difficulty with:  - driving safely at night  - reading road signs  - reading small print  - deciphering medicine bottles  - reading the newspaper  - using the phone  - reading texts     Risks, benefits, and alternatives to cataract surgery were discussed and the consent reviewed. IOL options were discussed, including ATIOLs and the associated side effects and additional patient cost associated with them.   IOL Selections:   Right eye  IOL: CNAOTO 19.0     Left eye  IOL: CNA0T0 19.5    Pt wishes to have LEFT eye done first.  After discussion of refractive cataract surgery options, patient has chosen traditional manual surgery and is satisfied with wearing bifocal glasses if necessary after surgery.

## 2025-02-12 ENCOUNTER — HOSPITAL ENCOUNTER (EMERGENCY)
Facility: HOSPITAL | Age: 70
Discharge: HOME | End: 2025-02-12
Attending: EMERGENCY MEDICINE
Payer: MEDICARE

## 2025-02-12 VITALS
WEIGHT: 150 LBS | TEMPERATURE: 98 F | HEIGHT: 67 IN | BODY MASS INDEX: 23.54 KG/M2 | HEART RATE: 105 BPM | DIASTOLIC BLOOD PRESSURE: 89 MMHG | OXYGEN SATURATION: 96 % | SYSTOLIC BLOOD PRESSURE: 189 MMHG | RESPIRATION RATE: 20 BRPM

## 2025-02-12 DIAGNOSIS — S00.03XA CONTUSION OF SCALP, INITIAL ENCOUNTER: Primary | ICD-10-CM

## 2025-02-12 LAB — POCT GLUCOSE: 99 MG/DL (ref 70–110)

## 2025-02-12 PROCEDURE — 25000003 PHARM REV CODE 250: Performed by: NURSE PRACTITIONER

## 2025-02-12 PROCEDURE — 82962 GLUCOSE BLOOD TEST: CPT

## 2025-02-12 PROCEDURE — 99284 EMERGENCY DEPT VISIT MOD MDM: CPT | Mod: 25

## 2025-02-12 RX ORDER — ACETAMINOPHEN 325 MG/1
650 TABLET ORAL
Status: COMPLETED | OUTPATIENT
Start: 2025-02-12 | End: 2025-02-12

## 2025-02-12 RX ADMIN — ACETAMINOPHEN 650 MG: 325 TABLET ORAL at 02:02

## 2025-02-12 NOTE — ED TRIAGE NOTES
Pt reports hitting her head on the door frame yesterday falling onto her left knee. The knee is swollen and painful. She has had a headache since then and she vomited once this am. Denies LOC
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
## 2025-02-12 NOTE — FIRST PROVIDER EVALUATION
Emergency Department TeleTriage Encounter Note      CHIEF COMPLAINT    Chief Complaint   Patient presents with    Head Injury     Pt reports hitting head on door frame yesterday at 1300. Denies falling. -blood thinners, -LOC. Pt c/o R forehead pain and anxiety x1 hr.       VITAL SIGNS   Initial Vitals [02/12/25 1340]   BP Pulse Resp Temp SpO2   (!) 189/89 105 20 98.1 °F (36.7 °C) 96 %      MAP       --            ALLERGIES    Review of patient's allergies indicates:   Allergen Reactions    Pcn [penicillins] Hives and Itching     Has tolerated cephalosporins    Penicillin Hives       PROVIDER TRIAGE NOTE  This is a teletriage evaluation of a 69 y.o. female presenting to the ED with c/o head injury. Bent down to put something down and hit her right forehead on the door frame. Reports appx 24 hrs ago. Reports feeling forehead pain, headache, and jittery. No LOC, not anticoagulated. Limited physical exam via telehealth: The patient is awake, alert, answering questions appropriately and is not in respiratory distress. Initial orders will be placed and care will be transferred to an alternate provider when patient is roomed for a full evaluation. Any additional orders and the final disposition will be determined by that provider.         ORDERS  Labs Reviewed - No data to display    ED Orders (720h ago, onward)      Start Ordered     Status Ordering Provider    02/12/25 1400 02/12/25 1350  acetaminophen tablet 650 mg  ED 1 Time         Ordered DIANNA DAVIS    02/12/25 1351 02/12/25 1350  Ice to affected area  Once         Ordered DIANNA DAVIS              Virtual Visit Note: The provider triage portion of this emergency department evaluation and documentation was performed via [employer], a HIPAA-compliant telemedicine application, in concert with a tele-presenter in the room. A face to face patient evaluation with one of my colleagues will occur once the patient is placed in an emergency department  room.      DISCLAIMER: This note was prepared with [employer] voice recognition transcription software. Garbled syntax, mangled pronouns, and other bizarre constructions may be attributed to that software system.

## 2025-02-14 ENCOUNTER — PATIENT MESSAGE (OUTPATIENT)
Dept: INTERNAL MEDICINE | Facility: CLINIC | Age: 70
End: 2025-02-14
Payer: MEDICARE

## 2025-02-14 ENCOUNTER — OUTPATIENT CASE MANAGEMENT (OUTPATIENT)
Dept: ADMINISTRATIVE | Facility: OTHER | Age: 70
End: 2025-02-14
Payer: MEDICARE

## 2025-02-19 ENCOUNTER — OFFICE VISIT (OUTPATIENT)
Dept: PHYSICAL MEDICINE AND REHAB | Facility: CLINIC | Age: 70
End: 2025-02-19
Payer: COMMERCIAL

## 2025-02-19 VITALS — HEIGHT: 67 IN | OXYGEN SATURATION: 98 % | WEIGHT: 176.81 LBS | BODY MASS INDEX: 27.75 KG/M2

## 2025-02-19 DIAGNOSIS — R29.6 FREQUENT FALLS: ICD-10-CM

## 2025-02-19 DIAGNOSIS — G89.29 CHRONIC HAND PAIN, LEFT: ICD-10-CM

## 2025-02-19 DIAGNOSIS — M79.641 CHRONIC HAND PAIN, RIGHT: ICD-10-CM

## 2025-02-19 DIAGNOSIS — M25.50 POLYARTHRALGIA: ICD-10-CM

## 2025-02-19 DIAGNOSIS — G89.29 CHRONIC LEFT SHOULDER PAIN: ICD-10-CM

## 2025-02-19 DIAGNOSIS — R26.9 GAIT DISORDER: Primary | ICD-10-CM

## 2025-02-19 DIAGNOSIS — M79.642 CHRONIC HAND PAIN, LEFT: ICD-10-CM

## 2025-02-19 DIAGNOSIS — M25.561 CHRONIC PAIN OF RIGHT KNEE: ICD-10-CM

## 2025-02-19 DIAGNOSIS — M96.1 POST LAMINECTOMY SYNDROME: ICD-10-CM

## 2025-02-19 DIAGNOSIS — G89.29 CHRONIC PAIN OF RIGHT KNEE: ICD-10-CM

## 2025-02-19 DIAGNOSIS — R53.83 FATIGUE, UNSPECIFIED TYPE: ICD-10-CM

## 2025-02-19 DIAGNOSIS — M54.42 CHRONIC BILATERAL LOW BACK PAIN WITH LEFT-SIDED SCIATICA: ICD-10-CM

## 2025-02-19 DIAGNOSIS — M06.9 RHEUMATOID ARTHRITIS INVOLVING MULTIPLE SITES, UNSPECIFIED WHETHER RHEUMATOID FACTOR PRESENT: ICD-10-CM

## 2025-02-19 DIAGNOSIS — G89.29 CHRONIC HAND PAIN, RIGHT: ICD-10-CM

## 2025-02-19 DIAGNOSIS — G89.29 CHRONIC BILATERAL LOW BACK PAIN WITH LEFT-SIDED SCIATICA: ICD-10-CM

## 2025-02-19 DIAGNOSIS — M25.512 CHRONIC LEFT SHOULDER PAIN: ICD-10-CM
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
## 2025-02-24 ENCOUNTER — TELEPHONE (OUTPATIENT)
Dept: OPHTHALMOLOGY | Facility: CLINIC | Age: 70
End: 2025-02-24
Payer: MEDICARE

## 2025-02-24 DIAGNOSIS — H25.12 NUCLEAR SCLEROTIC CATARACT OF LEFT EYE: Primary | ICD-10-CM

## 2025-02-26 ENCOUNTER — OUTPATIENT CASE MANAGEMENT (OUTPATIENT)
Dept: ADMINISTRATIVE | Facility: OTHER | Age: 70
End: 2025-02-26
Payer: MEDICARE

## 2025-02-26 NOTE — PROGRESS NOTES
Outpatient Care Management  Plan of Care Follow Up Visit    Patient: Jill Perez  MRN: [id number]  Date of Service: 02/26/2025  Completed by: Loree Wood RN  Referral Date: 09/22/2023    Reason for Visit   Patient presents with    Case Closure       Brief Summary:   Closed case. Unable to reach x 3 attempts and letter via Patient Portal.

## 2025-03-05 ENCOUNTER — PATIENT MESSAGE (OUTPATIENT)
Dept: INTERNAL MEDICINE | Facility: CLINIC | Age: 70
End: 2025-03-05
Payer: MEDICARE

## 2025-03-11 ENCOUNTER — TELEPHONE (OUTPATIENT)
Dept: OPHTHALMOLOGY | Facility: CLINIC | Age: 70
End: 2025-03-11
Payer: MEDICARE

## 2025-03-11 DIAGNOSIS — H25.11 NUCLEAR SCLEROTIC CATARACT OF RIGHT EYE: Primary | ICD-10-CM

## 2025-03-14 RX ORDER — ATORVASTATIN CALCIUM 40 MG/1
40 TABLET, FILM COATED ORAL DAILY
Qty: 90 TABLET | Refills: 2 | Status: CANCELLED | OUTPATIENT
Start: 2025-03-14

## 2025-03-14 RX ORDER — ATORVASTATIN CALCIUM 40 MG/1
40 TABLET, FILM COATED ORAL DAILY
Qty: 90 TABLET | Refills: 2 | Status: SHIPPED | OUTPATIENT
Start: 2025-03-14

## 2025-03-14 NOTE — TELEPHONE ENCOUNTER
Refill Routing Note   Medication(s) are not appropriate for processing by [facility] for the following reason(s):        Required labs outdated    [facility] action(s):  Defer               Appointments  past 12m or future 3m with PCP    Date Provider   Last Visit   1/30/2025 Soham Rosa MD   Next Visit   3/31/2025 Soham Rosa MD   ED visits in past 90 days: 2        Note composed:1:55 PM 03/14/2025

## 2025-03-14 NOTE — TELEPHONE ENCOUNTER
Care Due:                  Date            Visit Type   Department     Provider  --------------------------------------------------------------------------------                                EP -                              PRIMARY      MET INTERNAL  Last Visit: 01-      CARE ([facility])   MARIELLE Rosa                              EP -                              PRIMARY      [facility] INTERNAL  Next Visit: 03-      CARE ([facility])   MARIELLE Rosa                                                            Last  Test          Frequency    Reason                     Performed    Due Date  --------------------------------------------------------------------------------    HBA1C.......  6 months...  metFORMIN................  12-   06-    [employer] Embedded Care Due Messages. Reference number: [id number].   3/14/2025 5:09:31 AM CDT

## 2025-03-14 NOTE — TELEPHONE ENCOUNTER
No care due was identified.  [facility] Embedded Care Due Messages. Reference number: [id number].   3/14/2025 10:49:11 AM CDT

## 2025-03-19 ENCOUNTER — TELEPHONE (OUTPATIENT)
Dept: OPHTHALMOLOGY | Facility: CLINIC | Age: 70
End: 2025-03-19
Payer: MEDICARE

## 2025-03-19 NOTE — TELEPHONE ENCOUNTER
Patient given arrival time of 7:00 am on Monday March 24. Nothing to eat or drink after 11:59 pm. Start drops into the operative eye Saturday. [address] [facility]

## 2025-03-24 ENCOUNTER — PATIENT MESSAGE (OUTPATIENT)
Dept: INTERNAL MEDICINE | Facility: CLINIC | Age: 70
End: 2025-03-24
Payer: MEDICARE

## 2025-03-24 RX ORDER — METFORMIN HYDROCHLORIDE 1000 MG/1
1000 TABLET ORAL 2 TIMES DAILY WITH MEALS
Qty: 180 TABLET | Refills: 2 | Status: CANCELLED | OUTPATIENT
Start: 2025-03-24 | End: 2025-12-19

## 2025-03-24 RX ORDER — METFORMIN HYDROCHLORIDE 1000 MG/1
1000 TABLET ORAL 2 TIMES DAILY WITH MEALS
Qty: 180 TABLET | Refills: 2 | Status: SHIPPED | OUTPATIENT
Start: 2025-03-24 | End: 2025-12-19

## 2025-03-24 NOTE — TELEPHONE ENCOUNTER
No care due was identified.  Health [facility] Embedded Care Due Messages. Reference number: [id number].   3/24/2025 5:09:01 AM CDT

## 2025-03-24 NOTE — TELEPHONE ENCOUNTER
No care due was identified.  [facility] Embedded Care Due Messages. Reference number: [id number].   3/24/2025 11:15:46 AM CDT

## 2025-05-05 ENCOUNTER — TELEPHONE (OUTPATIENT)
Dept: INTERNAL MEDICINE | Facility: CLINIC | Age: 70
End: 2025-05-05
Payer: MEDICARE

## 2025-05-06 ENCOUNTER — OFFICE VISIT (OUTPATIENT)
Dept: INTERNAL MEDICINE | Facility: CLINIC | Age: 70
End: 2025-05-06
Payer: MEDICARE

## 2025-05-06 VITALS
WEIGHT: 174.94 LBS | DIASTOLIC BLOOD PRESSURE: 74 MMHG | TEMPERATURE: 97 F | RESPIRATION RATE: 14 BRPM | HEART RATE: 75 BPM | HEIGHT: 67 IN | BODY MASS INDEX: 27.46 KG/M2 | OXYGEN SATURATION: 95 % | SYSTOLIC BLOOD PRESSURE: 110 MMHG

## 2025-05-06 DIAGNOSIS — F51.01 PRIMARY INSOMNIA: ICD-10-CM

## 2025-05-06 DIAGNOSIS — R76.8 POSITIVE ANA (ANTINUCLEAR ANTIBODY): ICD-10-CM

## 2025-05-06 DIAGNOSIS — I70.0 AORTIC ATHEROSCLEROSIS: Chronic | ICD-10-CM

## 2025-05-06 DIAGNOSIS — F51.01 PRIMARY INSOMNIA: Chronic | ICD-10-CM

## 2025-05-06 DIAGNOSIS — E11.69 HYPERLIPIDEMIA ASSOCIATED WITH TYPE 2 DIABETES MELLITUS: Chronic | ICD-10-CM

## 2025-05-06 DIAGNOSIS — K59.04 CHRONIC IDIOPATHIC CONSTIPATION: ICD-10-CM

## 2025-05-06 DIAGNOSIS — F33.0 MAJOR DEPRESSIVE DISORDER, RECURRENT, MILD: Chronic | ICD-10-CM

## 2025-05-06 DIAGNOSIS — R41.3 MEMORY DEFICIT: ICD-10-CM

## 2025-05-06 DIAGNOSIS — E78.5 HYPERLIPIDEMIA ASSOCIATED WITH TYPE 2 DIABETES MELLITUS: Chronic | ICD-10-CM

## 2025-05-06 DIAGNOSIS — E11.43 TYPE 2 DIABETES MELLITUS WITH DIABETIC AUTONOMIC NEUROPATHY, WITHOUT LONG-TERM CURRENT USE OF INSULIN: Chronic | ICD-10-CM

## 2025-05-06 DIAGNOSIS — M06.9 RHEUMATOID ARTHRITIS INVOLVING MULTIPLE SITES, UNSPECIFIED WHETHER RHEUMATOID FACTOR PRESENT: ICD-10-CM

## 2025-05-06 DIAGNOSIS — I10 PRIMARY HYPERTENSION: Primary | Chronic | ICD-10-CM

## 2025-05-06 PROCEDURE — 99215 OFFICE O/P EST HI 40 MIN: CPT | Mod: PBBFAC,PO

## 2025-05-06 PROCEDURE — 99214 OFFICE O/P EST MOD 30 MIN: CPT | Mod: S$PBB,,,

## 2025-05-06 PROCEDURE — 99999 PR PBB SHADOW E&M-EST. PATIENT-LVL V: CPT | Mod: PBBFAC,,,

## 2025-05-06 RX ORDER — METHOCARBAMOL 750 MG/1
TABLET, FILM COATED ORAL
Qty: 60 TABLET | Refills: 0 | Status: CANCELLED | OUTPATIENT
Start: 2025-05-06

## 2025-05-06 RX ORDER — ZOLPIDEM TARTRATE 10 MG/1
10 TABLET ORAL NIGHTLY PRN
Qty: 90 TABLET | Refills: 0 | Status: CANCELLED | OUTPATIENT
Start: 2025-05-06 | End: 2025-11-04

## 2025-05-06 RX ORDER — GABAPENTIN 800 MG/1
TABLET ORAL
Qty: 90 TABLET | Refills: 0 | Status: CANCELLED | OUTPATIENT
Start: 2025-05-06

## 2025-05-06 NOTE — PROGRESS NOTES
"Subjective:       Patient ID: Jill Perez is a 70 y.o. female.    Chief Complaint: Follow-Up    History of Present Illness      CHIEF COMPLAINT:  Ms. Perez presents today for follow up    DIABETES:  She reports blood sugar typically runs around 110, but was 160 this morning. She uses a continuous glucose monitor for blood sugar management and reports compliance with all medications.  Lab Results   Component Value Date    HGBA1C 7.9 (H) 12/10/2024        HYPERTENSION:  She reports well-controlled blood pressure with intermittent home monitoring. She is unable to recall home readings but endorses they are "normal". Blood pressure is controlled in clinic today. She denies side effects of medications. Denies associated symptoms of hypertension.     MEDICAL HISTORY:  She has a history of gastric bypass surgery complicated by severe gastroparesis. Prior to the surgery, her weight was 425 lbs. She has rheumatoid arthritis with a positive YOLIS and is followed by rheumatology in [place].    CURRENT MEDICATIONS:  For hypertension: amlodipine 10mg, hydrochlorothiazide 12.5mg, and olmesartan 40mg daily. For diabetes: Januvia 100mg daily, Metformin 1000mg twice daily, Jardiance 10mg daily, and Lantus 10 units daily. For other conditions: atorvastatin 40mg daily for cholesterol, gabapentin 800mg 3 times daily for neuropathy, Ambien 10mg nightly for insomnia, venlafaxine for depression, and Motegrity for chronic constipation related to gastroparesis.      ROS:  Gastrointestinal: +constipation  Neurological: +memory loss  Psychiatric: +depression, +memory problems         Objective:      Physical Exam  Vitals reviewed.   Constitutional:       General: She is awake. She is not in acute distress.     Appearance: Normal appearance. She is well-developed. She is not ill-appearing, toxic-appearing or diaphoretic.   HENT:      Head: Normocephalic and atraumatic.      Right Ear: External ear normal.      Left Ear: External ear normal. "      Nose: Nose normal.      Mouth/Throat:      Mouth: Mucous membranes are moist.      Pharynx: Oropharynx is clear. No oropharyngeal exudate.   Eyes:      General: No scleral icterus.     Pupils: Pupils are equal, round, and reactive to light.   Cardiovascular:      Rate and Rhythm: Normal rate and regular rhythm.      Pulses: Normal pulses.      Heart sounds: Normal heart sounds.   Pulmonary:      Effort: Pulmonary effort is normal.      Breath sounds: Normal breath sounds.   Abdominal:      General: Bowel sounds are normal. There is no distension.      Palpations: Abdomen is soft. There is no mass.      Tenderness: There is no abdominal tenderness. There is no guarding.   Musculoskeletal:         General: Normal range of motion.   Skin:     General: Skin is warm and dry.      Capillary Refill: Capillary refill takes less than 2 seconds.   Neurological:      Mental Status: She is alert and oriented to person, place, and time. Mental status is at baseline.      GCS: GCS eye subscore is 4. GCS verbal subscore is 5. GCS motor subscore is 6.   Psychiatric:         Behavior: Behavior is cooperative.           Physical Exam            Assessment:       1. Primary hypertension  - Comprehensive Metabolic Panel; Future    2. Hyperlipidemia associated with type 2 diabetes mellitus  - Lipid Panel; Future    3. Aortic atherosclerosis    4. Rheumatoid arthritis involving multiple sites, unspecified whether rheumatoid factor present    5. Positive YOLIS (antinuclear antibody)    6. Type 2 diabetes mellitus with diabetic autonomic neuropathy, without long-term current use of insulin  - Hemoglobin A1C; Future  - Microalbumin/Creatinine Ratio, Urine; Future    7. Poorly controlled diabetes mellitus    8. Primary insomnia    9. Major depressive disorder, recurrent, mild    10. Chronic idiopathic constipation    11. Memory deficit      Plan:         Assessment & Plan      PLAN SUMMARY:  - Ordered repeat lipid panel  - Ordered A1C,  comprehensive metabolic panel, and diabetes urine screening  - Continue Gabapentin 800 mg 3 times daily  - Continue venlafaxine  - Continue Januvia 100 mg daily, Metformin 1000 mg twice daily, Jardiance 10 mg daily, and Lantus 10 units daily  - Continue Ambien 10 mg nightly  - Continue Motegrity for chronic constipation and gastroparesis  - Continue current antihypertensive regimen: amlodipine 10 mg, hydrochlorothiazide 12.5 mg, and olmesartan 40 mg daily  - Continue atorvastatin 40 mg daily  - Complete liver ultrasound  - Follow up with psychiatry appointment for comprehensive management  - Continue follow-up with rheumatology in [place]  - Encouraged establishing appointment with neurology for evaluation of memory deficit    TYPE 2 DIABETES MELLITUS:  - Monitored glucose levels via continuous glucose monitor (CGM), noting usual readings around 110, with an elevated reading of 160 this morning.  - Last Hemoglobin A1C was 7.9, showing improvement.  - Clarified A1C goals of 7-8 as acceptable range for older patients with type 2 diabetes as long as patient is not experiencing episodes of hypoglycemia (she denies).  - Ordered A1C, comprehensive metabolic panel for electrolytes, kidney function, liver function, and diabetes urine screening.  - Instructed patient to continue all medications as prescribed: Januvia 100 mg daily, Metformin 1000 mg twice per day, Jardiance 10 mg daily, and Lantus 10 units daily.    DIABETIC AUTONOMIC NEUROPATHY:  - Monitored neuropathy symptoms.  - Instructed patient to continue Gabapentin 800 mg 3 times per day for management.    ESSENTIAL HYPERTENSION:  - Monitored blood pressure, which is reported as well-controlled through intermittent home measurements.  - Continued current antihypertensive regimen of amlodipine 10 mg, hydrochlorothiazide 12.5 mg, and olmesartan 40 mg daily.    CHRONIC IDIOPATHIC CONSTIPATION:  - Continued Motegrity for gastroparesis, which is associated with chronic  constipation following gastric bypass surgery.    RHEUMATOID ARTHRITIS INVOLVING MULTIPLE SITES/POSITIVE YOLIS:  - Assessed rheumatoid arthritis with positive YOLIS.  - Noted patient is followed by rheumatology.    MAJOR DEPRESSIVE DISORDER, RECURRENT MILD:  - Assessed depression symptoms.  - Confirmed patient has made an appointment with psychiatry for more comprehensive management.  - Continued venlafaxine for treatment.    INSOMNIA:  - Continued Ambien 10 mg nightly for management.    HYPERLIPIDEMIA ASSOCIATED WITH TYPE 2 DIABETES/AORTIC ATHEROSCLEROSIS:  - Ordered repeat lipid panel today.  - Continued atorvastatin 40 mg daily for management.    MEMORY DEFICIT:  -Acknowledged that patient previously expressed concerns for memory loss at prior appointment.  - Encouraged patient to establish appointment with Neurology for cognitive evaluation.  - Patient endorses that she is not interested in completing this evaluation currently, but will consider in the future.    Return to clinic in 3 months for follow up.         This note was generated with the assistance of ambient listening technology. Verbal consent was obtained by the patient and accompanying visitor(s) for the recording of patient appointment to facilitate this note. I attest to having reviewed and edited the generated note for accuracy, though some syntax or spelling errors may persist. Please contact the author of this note for any clarification.

## 2025-05-06 NOTE — TELEPHONE ENCOUNTER
Care Due:                  Date            Visit Type   Department     Provider  --------------------------------------------------------------------------------                                EP -                              PRIMARY      MET INTERNAL  Last Visit: 01-      CARE ([facility])   MARIELLE Rosa                              EP -                              PRIMARY      [facility] INTERNAL  Next Visit: 08-      CARE ([facility])   MARIELLE Rosa                                                            Last  Test          Frequency    Reason                     Performed    Due Date  --------------------------------------------------------------------------------    Lipid Panel.  12 months..  atorvastatin.............  05- 05-    [facility] Embedded Care Due Messages. Reference number: [id number].   5/06/2025 10:24:22 AM CDT

## 2025-06-05 DIAGNOSIS — F51.01 PRIMARY INSOMNIA: ICD-10-CM

## 2025-06-05 RX ORDER — ZOLPIDEM TARTRATE 10 MG/1
10 TABLET ORAL NIGHTLY PRN
Qty: 90 TABLET | Refills: 0 | Status: SHIPPED | OUTPATIENT
Start: 2025-06-05 | End: 2025-12-04

## 2025-06-16 ENCOUNTER — TELEPHONE (OUTPATIENT)
Dept: INTERNAL MEDICINE | Facility: CLINIC | Age: 70
End: 2025-06-16
Payer: MEDICARE

## 2025-06-16 NOTE — TELEPHONE ENCOUNTER
Fax from [facility]  Re zolpidem (ambien) 10 mg  Pt is on Percocet monthly. Requesting alternative sleep med  due to high risk combo. May send new Rx if appropriate

## 2025-06-16 NOTE — TELEPHONE ENCOUNTER
"Sent pt a message in [employer] re:   "Dr. Miranda covering for Dr. Rosa,  Patient needs an appointment to discuss risk of continuing versus alternative options."  "

## (undated) DEVICE — TROCAR ENDOPATH XCEL 5MM 7.5CM

## (undated) DEVICE — SHEARS HARMONIC 5CM 36CM

## (undated) DEVICE — SEE MEDLINE ITEM 157116

## (undated) DEVICE — KIT ANTIFOG W/SPONG & FLUID

## (undated) DEVICE — RELOAD ECHELON FLEX WHT 60MM

## (undated) DEVICE — KIT PROCEDURE STER INLET CLOSU

## (undated) DEVICE — TROCAR ENDOPATH XCEL 12X100MM

## (undated) DEVICE — SCISSOR 5MMX35CM DIRECT DRIVE

## (undated) DEVICE — HEMOSTAT SURGICEL 4X8IN

## (undated) DEVICE — IRRIGATOR ENDOSCOPY DISP.

## (undated) DEVICE — TUBING HF INSUFFLATION W/ FLTR

## (undated) DEVICE — CANNULA ENDOPATH XCEL 5X100MM

## (undated) DEVICE — LUBRICANT SURGILUBE 2 OZ

## (undated) DEVICE — SEE MEDLINE ITEM 157117

## (undated) DEVICE — SOL NS 1000CC

## (undated) DEVICE — TROCAR ENDOPATH XCEL 12MM 10CM

## (undated) DEVICE — TROCAR SPACEMAKER BLUNT 10MM

## (undated) DEVICE — ADHESIVE DERMABOND ADVANCED

## (undated) DEVICE — SUT MONOCRYL 4-0 PS-2

## (undated) DEVICE — GOWN POLY REINF BRTH SLV XL

## (undated) DEVICE — WARMER DRAPE STERILE LF

## (undated) DEVICE — ITEM INACTIVATED - ERP

## (undated) DEVICE — DRAPE ABDOMINAL TIBURON 14X11

## (undated) DEVICE — SEE MEDLINE ITEM 152487

## (undated) DEVICE — STAPLER ECHELON FLEX GST 60MM

## (undated) DEVICE — SOL IRR NACL .9% 3000ML

## (undated) DEVICE — APPLICATOR CHLORAPREP ORN 26ML

## (undated) DEVICE — TRAY MINOR GEN SURG OMC

## (undated) DEVICE — ELECTRODE REM PLYHSV RETURN 9

## (undated) DEVICE — TROCAR ENDOPATH XCEL 5X100MM

## (undated) DEVICE — BAG TISS RETRV MONARCH 10MM

## (undated) DEVICE — SUT MCRYL PLUS 4-0 PS2 27IN

## (undated) DEVICE — GOWN SURGICAL X-LARGE

## (undated) DEVICE — SUT ENDOLOOP PDSII 18 LIGA